# Patient Record
Sex: FEMALE | Race: WHITE | NOT HISPANIC OR LATINO | Employment: OTHER | URBAN - METROPOLITAN AREA
[De-identification: names, ages, dates, MRNs, and addresses within clinical notes are randomized per-mention and may not be internally consistent; named-entity substitution may affect disease eponyms.]

---

## 2017-12-07 LAB
ALBUMIN SERPL-MCNC: 4 G/DL (ref 3.6–5.1)
ALBUMIN/GLOB SERPL: 1.4 (CALC) (ref 1–2.5)
ALP SERPL-CCNC: 78 U/L (ref 33–130)
ALT SERPL-CCNC: 6 U/L (ref 6–29)
AST SERPL-CCNC: 13 U/L (ref 10–35)
BASOPHILS # BLD AUTO: 41 CELLS/UL (ref 0–200)
BASOPHILS NFR BLD AUTO: 0.7 %
BILIRUB SERPL-MCNC: 0.2 MG/DL (ref 0.2–1.2)
BUN SERPL-MCNC: 13 MG/DL (ref 7–25)
BUN/CREAT SERPL: NORMAL (CALC) (ref 6–22)
CALCIUM SERPL-MCNC: 9.4 MG/DL (ref 8.6–10.4)
CHLORIDE SERPL-SCNC: 105 MMOL/L (ref 98–110)
CO2 SERPL-SCNC: 26 MMOL/L (ref 20–31)
CREAT SERPL-MCNC: 0.87 MG/DL (ref 0.6–0.88)
EOSINOPHIL # BLD AUTO: 71 CELLS/UL (ref 15–500)
EOSINOPHIL NFR BLD AUTO: 1.2 %
ERYTHROCYTE [DISTWIDTH] IN BLOOD BY AUTOMATED COUNT: 12.3 % (ref 11–15)
GLOBULIN SER CALC-MCNC: 2.9 G/DL (CALC) (ref 1.9–3.7)
GLUCOSE SERPL-MCNC: 77 MG/DL (ref 65–99)
HCT VFR BLD AUTO: 34.9 % (ref 35–45)
HGB BLD-MCNC: 11.5 G/DL (ref 11.7–15.5)
LYMPHOCYTES # BLD AUTO: 1752 CELLS/UL (ref 850–3900)
LYMPHOCYTES NFR BLD AUTO: 29.7 %
MCH RBC QN AUTO: 30.7 PG (ref 27–33)
MCHC RBC AUTO-ENTMCNC: 33 G/DL (ref 32–36)
MCV RBC AUTO: 93.3 FL (ref 80–100)
MONOCYTES # BLD AUTO: 395 CELLS/UL (ref 200–950)
MONOCYTES NFR BLD AUTO: 6.7 %
NEUTROPHILS # BLD AUTO: 3640 CELLS/UL (ref 1500–7800)
NEUTROPHILS NFR BLD AUTO: 61.7 %
PLATELET # BLD AUTO: 326 THOUSAND/UL (ref 140–400)
PMV BLD REES-ECKER: 9.2 FL (ref 7.5–12.5)
POTASSIUM SERPL-SCNC: 4.8 MMOL/L (ref 3.5–5.3)
PROT SERPL-MCNC: 6.9 G/DL (ref 6.1–8.1)
RBC # BLD AUTO: 3.74 MILLION/UL (ref 3.8–5.1)
SL AMB EGFR AFRICAN AMERICAN: 72 ML/MIN/1.73M2
SL AMB EGFR NON AFRICAN AMERICAN: 62 ML/MIN/1.73M2
SODIUM SERPL-SCNC: 140 MMOL/L (ref 135–146)
TSH SERPL-ACNC: 0.48 MIU/L (ref 0.4–4.5)
VIT B12 SERPL-MCNC: 212 PG/ML (ref 200–1100)
WBC # BLD AUTO: 5.9 THOUSAND/UL (ref 3.8–10.8)

## 2017-12-22 LAB — METHYLMALONATE SERPL-SCNC: 542 NMOL/L (ref 87–318)

## 2018-05-10 LAB
AMORPHOUS MATERIAL (HISTORICAL): ABNORMAL
BACTERIA UR QL AUTO: ABNORMAL
BILIRUB UR QL STRIP: NEGATIVE MG/DL
CASTS/CASTS TYPE (HISTORICAL): ABNORMAL /LPF
CLARITY UR: CLEAR
COLOR UR: ABNORMAL
CRYSTAL TYPE (HISTORICAL): ABNORMAL /HPF
GLUCOSE UR STRIP-MCNC: NEGATIVE MG/DL
HGB UR QL STRIP.AUTO: ABNORMAL
KETONES UR STRIP-MCNC: NEGATIVE MG/DL
LEUKOCYTE ESTERASE UR QL STRIP: ABNORMAL
MUCOUS THREADS URNS QL MICRO: ABNORMAL
NITRITE UR QL STRIP: NEGATIVE
NON-SQ EPI CELLS URNS QL MICRO: ABNORMAL
OTHER STN SPEC: ABNORMAL
PH UR STRIP.AUTO: 6 [PH] (ref 4.5–8)
PROT UR STRIP-MCNC: NEGATIVE MG/DL
RBC #/AREA URNS AUTO: ABNORMAL /HPF
SP GR UR STRIP.AUTO: 1.01 (ref 1–1.04)
UROBILINOGEN UR QL STRIP.AUTO: NEGATIVE MG/DL (ref 0–1)
WBC #/AREA URNS AUTO: ABNORMAL /HPF

## 2018-06-28 ENCOUNTER — TRANSCRIBE ORDERS (OUTPATIENT)
Dept: ADMINISTRATIVE | Facility: HOSPITAL | Age: 83
End: 2018-06-28

## 2018-06-28 DIAGNOSIS — R10.2 PELVIC PAIN: Primary | ICD-10-CM

## 2018-07-01 ENCOUNTER — OFFICE VISIT (OUTPATIENT)
Dept: URGENT CARE | Age: 83
End: 2018-07-01
Payer: COMMERCIAL

## 2018-07-01 VITALS
BODY MASS INDEX: 21.68 KG/M2 | HEART RATE: 84 BPM | WEIGHT: 127 LBS | OXYGEN SATURATION: 95 % | RESPIRATION RATE: 16 BRPM | TEMPERATURE: 97.7 F | SYSTOLIC BLOOD PRESSURE: 152 MMHG | DIASTOLIC BLOOD PRESSURE: 80 MMHG | HEIGHT: 64 IN

## 2018-07-01 DIAGNOSIS — N30.01 ACUTE CYSTITIS WITH HEMATURIA: Primary | ICD-10-CM

## 2018-07-01 PROCEDURE — 87186 SC STD MICRODIL/AGAR DIL: CPT | Performed by: PHYSICIAN ASSISTANT

## 2018-07-01 PROCEDURE — 87086 URINE CULTURE/COLONY COUNT: CPT | Performed by: PHYSICIAN ASSISTANT

## 2018-07-01 PROCEDURE — 87147 CULTURE TYPE IMMUNOLOGIC: CPT | Performed by: PHYSICIAN ASSISTANT

## 2018-07-01 PROCEDURE — 87077 CULTURE AEROBIC IDENTIFY: CPT | Performed by: PHYSICIAN ASSISTANT

## 2018-07-01 PROCEDURE — 99203 OFFICE O/P NEW LOW 30 MIN: CPT | Performed by: PHYSICIAN ASSISTANT

## 2018-07-01 RX ORDER — QUETIAPINE FUMARATE 200 MG/1
400 TABLET, FILM COATED ORAL
Status: ON HOLD | COMMUNITY
End: 2018-08-13

## 2018-07-01 RX ORDER — SOLIFENACIN SUCCINATE 10 MG/1
5 TABLET, FILM COATED ORAL DAILY
COMMUNITY
End: 2018-08-08

## 2018-07-01 RX ORDER — ESCITALOPRAM OXALATE 10 MG/1
10 TABLET ORAL DAILY
Status: ON HOLD | COMMUNITY
End: 2018-08-13

## 2018-07-01 RX ORDER — CEPHALEXIN 500 MG/1
500 CAPSULE ORAL EVERY 8 HOURS SCHEDULED
Qty: 21 CAPSULE | Refills: 0 | Status: SHIPPED | OUTPATIENT
Start: 2018-07-01 | End: 2018-07-08

## 2018-07-01 RX ORDER — LAMOTRIGINE 200 MG/1
200 TABLET ORAL DAILY
Status: ON HOLD | COMMUNITY
End: 2018-08-13

## 2018-07-01 RX ORDER — CLONAZEPAM 0.5 MG/1
0.5 TABLET ORAL 3 TIMES DAILY
Status: ON HOLD | COMMUNITY
End: 2018-08-13

## 2018-07-01 RX ORDER — VENLAFAXINE 75 MG/1
75 TABLET ORAL DAILY
COMMUNITY
End: 2018-08-14 | Stop reason: HOSPADM

## 2018-07-01 RX ORDER — GABAPENTIN 300 MG/1
100 CAPSULE ORAL 2 TIMES DAILY
Status: ON HOLD | COMMUNITY
End: 2018-08-13

## 2018-07-01 NOTE — PATIENT INSTRUCTIONS
Follow up with PCP in 3-5 days  Proceed to  ER if symptoms worsen  Urinary Tract Infection in Women   AMBULATORY CARE:   A urinary tract infection (UTI)  is caused by bacteria that get inside your urinary tract  Most bacteria that enter your urinary tract come out when you urinate  If the bacteria stay in your urinary tract, you may get an infection  Your urinary tract includes your kidneys, ureters, bladder, and urethra  Urine is made in your kidneys, and it flows from the ureters to the bladder  Urine leaves the bladder through the urethra  A UTI is more common in your lower urinary tract, which includes your bladder and urethra  Common symptoms include the following:   · Urinating more often or waking from sleep to urinate    · Pain or burning when you urinate    · Pain or pressure in your lower abdomen     · Urine that smells bad    · Blood in your urine    · Leaking urine  Seek care immediately if:   · You are urinating very little or not at all  · You have a high fever with shaking chills  · You have side or back pain that gets worse  Contact your healthcare provider if:   · You have a fever  · You do not feel better after 2 days of taking antibiotics  · You are vomiting  · You have questions or concerns about your condition or care  Treatment for a UTI  may include medicines to treat a bacterial infection  You may also need medicines to decrease pain and burning, or decrease the urge to urinate often  Prevent a UTI:   · Empty your bladder often  Urinate and empty your bladder as soon as you feel the need  Do not hold your urine for long periods of time  · Wipe from front to back after you urinate or have a bowel movement  This will help prevent germs from getting into your urinary tract through your urethra  · Drink liquids as directed  Ask how much liquid to drink each day and which liquids are best for you   You may need to drink more liquids than usual to help flush out the bacteria  Do not drink alcohol, caffeine, or citrus juices  These can irritate your bladder and increase your symptoms  Your healthcare provider may recommend cranberry juice to help prevent a UTI  · Urinate after you have sex  This can help flush out bacteria passed during sex  · Do not douche or use feminine deodorants  These can change the chemical balance in your vagina  · Change sanitary pads or tampons often  This will help prevent germs from getting into your urinary tract  · Do pelvic muscle exercises often  Pelvic muscle exercises may help you start and stop urinating  Strong pelvic muscles may help you empty your bladder easier  Squeeze these muscles tightly for 5 seconds like you are trying to hold back urine  Then relax for 5 seconds  Gradually work up to squeezing for 10 seconds  Do 3 sets of 15 repetitions a day, or as directed  Follow up with your healthcare provider as directed:  Write down your questions so you remember to ask them during your visits  © 2017 [facility] Information is for End User's use only and may not be sold, redistributed or otherwise used for commercial purposes  All illustrations and images included in CareNotes® are the copyrighted property of [employer]  or Aureliano Ortega  The above information is an  only  It is not intended as medical advice for individual conditions or treatments  Talk to your doctor, nurse or pharmacist before following any medical regimen to see if it is safe and effective for you

## 2018-07-01 NOTE — PROGRESS NOTES
[facility] [employer] [facility]        NAME: Hannah Young is a 80 y o  female  : 1935    MRN: [id number]  DATE: 2018  TIME: 12:21 PM    Assessment and Plan   Acute cystitis with hematuria [N30 01]  1  Acute cystitis with hematuria  cephalexin (KEFLEX) 500 mg capsule    Urine culture         Patient Instructions       Follow up with PCP in 3-5 days  Proceed to  ER if symptoms worsen  Chief Complaint     Chief Complaint   Patient presents with    Urinary Tract Infection     3 day abdominal pain; pain with burning         History of Present Illness       42-year-old female patient reports today with a 3 day history of some pelvic pressure dysuria and blood in her urine  Was seen by gyn in a couple days ago and her pessary was removed at that point in time  Reports that the gyn did some urine cultures but has not returned a call about the results  Patient is still having symptoms since then  Did not start patient on antibiotics at that point in time patient reports no fever, chills, chest pain, nausea, vomiting, diarrhea at this time      Urinary Tract Infection    This is a new problem  The current episode started in the past 7 days  The problem occurs every urination  The problem has been waxing and waning  The quality of the pain is described as aching  The pain is moderate  There has been no fever  She is not sexually active  There is no history of pyelonephritis  Associated symptoms include frequency, hematuria and urgency  Pertinent negatives include no chills, discharge, flank pain, hesitancy, nausea, possible pregnancy or vomiting  She has tried nothing for the symptoms  The treatment provided no relief  Review of Systems   Review of Systems   Constitutional: Negative  Negative for chills  HENT: Negative  Eyes: Negative  Respiratory: Negative  Cardiovascular: Negative  Gastrointestinal: Negative  Negative for nausea and vomiting     Genitourinary: Positive for frequency, hematuria and urgency  Negative for flank pain and hesitancy  Musculoskeletal: Negative  Skin: Negative  Current Medications       Current Outpatient Prescriptions:     clonazePAM (KlonoPIN) 0 5 mg tablet, Take 0 5 mg by mouth 2 (two) times a day, Disp: , Rfl:     escitalopram (LEXAPRO) 10 mg tablet, Take 5 mg by mouth daily, Disp: , Rfl:     gabapentin (NEURONTIN) 300 mg capsule, Take 100 mg by mouth 3 (three) times a day, Disp: , Rfl:     lamoTRIgine (LaMICtal) 200 MG tablet, Take 25 mg by mouth daily, Disp: , Rfl:     QUEtiapine (SEROquel) 200 mg tablet, Take 25 mg by mouth daily at bedtime, Disp: , Rfl:     solifenacin (VESICARE) 10 MG tablet, Take 5 mg by mouth daily, Disp: , Rfl:     Topiramate (TOPAMAX PO), Take by mouth, Disp: , Rfl:     venlafaxine (EFFEXOR) 75 mg tablet, Take 75 mg by mouth 2 (two) times a day, Disp: , Rfl:     cephalexin (KEFLEX) 500 mg capsule, Take 1 capsule (500 mg total) by mouth every 8 (eight) hours for 7 days, Disp: 21 capsule, Rfl: 0    Current Allergies     Allergies as of 07/01/2018    (No Known Allergies)            The following portions of the patient's history were reviewed and updated as appropriate: allergies, current medications, past family history, past medical history, past social history, past surgical history and problem list      No past medical history on file  No past surgical history on file  No family history on file  Medications have been verified  Objective   /80   Pulse 84   Temp 97 7 °F (36 5 °C)   Resp 16   Ht 5' 4" (1 626 m)   Wt 57 6 kg (127 lb)   SpO2 95%   BMI 21 80 kg/m²        Physical Exam     Physical Exam   Constitutional: She is oriented to person, place, and time  She appears well-developed and well-nourished  No distress  HENT:   Head: Normocephalic and atraumatic     Right Ear: External ear normal    Left Ear: External ear normal    Nose: Nose normal    Mouth/Throat: Oropharynx is clear and moist  No oropharyngeal exudate  Eyes: Conjunctivae are normal  Right eye exhibits no discharge  Left eye exhibits no discharge  Neck: Normal range of motion  Neck supple  Cardiovascular: Normal rate, regular rhythm, normal heart sounds and intact distal pulses  No murmur heard  Pulmonary/Chest: Effort normal and breath sounds normal  No respiratory distress  She has no wheezes  She has no rales  Abdominal: Soft  Bowel sounds are normal  There is tenderness (Mild with palpation) in the suprapubic area  There is no rigidity, no rebound, no guarding, no tenderness at McBurney's point and negative Chi's sign  Musculoskeletal: Normal range of motion  Lymphadenopathy:     She has no cervical adenopathy  Neurological: She is alert and oriented to person, place, and time  Skin: Skin is warm and dry  Psychiatric: She has a normal mood and affect  Nursing note and vitals reviewed

## 2018-07-04 LAB — BACTERIA UR CULT: ABNORMAL

## 2018-07-05 ENCOUNTER — HOSPITAL ENCOUNTER (OUTPATIENT)
Dept: ULTRASOUND IMAGING | Facility: HOSPITAL | Age: 83
Discharge: HOME | End: 2018-07-05
Payer: COMMERCIAL

## 2018-07-05 DIAGNOSIS — R10.2 PELVIC PAIN: ICD-10-CM

## 2018-07-05 PROCEDURE — 76856 US EXAM PELVIC COMPLETE: CPT

## 2018-07-07 ENCOUNTER — TELEPHONE (OUTPATIENT)
Dept: URGENT CARE | Age: 83
End: 2018-07-07

## 2018-07-07 DIAGNOSIS — N30.01 ACUTE CYSTITIS WITH HEMATURIA: Primary | ICD-10-CM

## 2018-07-07 RX ORDER — NITROFURANTOIN 25; 75 MG/1; MG/1
100 CAPSULE ORAL 2 TIMES DAILY
Qty: 14 CAPSULE | Refills: 0 | Status: SHIPPED | OUTPATIENT
Start: 2018-07-07 | End: 2018-07-14

## 2018-07-07 NOTE — TELEPHONE ENCOUNTER
Patient's emergency contact did not call, he came to office and I spoke with him at   Patient was at [facility] 6/28  Urine culture was done at that time  Patient came in to our office 7/1 for persistent pelvic pain  Patient was placed on Keflex  OBGYN called patient and told patient that the urine culture done on 06/28 was negative and she does not need antibiotics  The urine culture that was done on 07/01 came back and was positive  It shows susceptibility to Macrobid I will start patient on this antibiotic now

## 2018-07-17 ENCOUNTER — TELEPHONE (OUTPATIENT)
Dept: FAMILY MEDICINE CLINIC | Facility: CLINIC | Age: 83
End: 2018-07-17

## 2018-07-17 NOTE — TELEPHONE ENCOUNTER
Patient called she said that she is having burning when urinating and that her right rib is hurting her when sitting and laying down and her right leg feels like it is going to give out on her when she is walking she said it started about 2 to 3 days ago she wants to know what she should do she can be reached at  [facility]

## 2018-07-18 ENCOUNTER — TREATMENT (OUTPATIENT)
Dept: FAMILY MEDICINE CLINIC | Facility: CLINIC | Age: 83
End: 2018-07-18

## 2018-07-18 ENCOUNTER — TELEPHONE (OUTPATIENT)
Dept: FAMILY MEDICINE CLINIC | Facility: CLINIC | Age: 83
End: 2018-07-18

## 2018-07-18 DIAGNOSIS — N39.0 URINARY TRACT INFECTION WITH HEMATURIA, SITE UNSPECIFIED: Primary | ICD-10-CM

## 2018-07-18 DIAGNOSIS — N39.0 URINARY TRACT INFECTION WITHOUT HEMATURIA, SITE UNSPECIFIED: Primary | ICD-10-CM

## 2018-07-18 DIAGNOSIS — R31.9 URINARY TRACT INFECTION WITH HEMATURIA, SITE UNSPECIFIED: Primary | ICD-10-CM

## 2018-07-18 LAB
SL AMB  POCT GLUCOSE, UA: NEGATIVE
SL AMB LEUKOCYTE ESTERASE,UA: ABNORMAL
SL AMB POCT BILIRUBIN,UA: NEGATIVE
SL AMB POCT BLOOD,UA: ABNORMAL
SL AMB POCT CLARITY,UA: ABNORMAL
SL AMB POCT COLOR,UA: YELLOW
SL AMB POCT KETONES,UA: NEGATIVE
SL AMB POCT NITRITE,UA: NEGATIVE
SL AMB POCT PH,UA: 7
SL AMB POCT SPECIFIC GRAVITY,UA: 1.01
SL AMB POCT URINE PROTEIN: NEGATIVE
SL AMB POCT UROBILINOGEN: 0.2

## 2018-07-18 RX ORDER — SULFAMETHOXAZOLE AND TRIMETHOPRIM 800; 160 MG/1; MG/1
1 TABLET ORAL EVERY 12 HOURS SCHEDULED
Qty: 14 TABLET | Refills: 0 | Status: SHIPPED | OUTPATIENT
Start: 2018-07-18 | End: 2018-07-25

## 2018-07-18 NOTE — TELEPHONE ENCOUNTER
Patient dropped off a urine sample she would like to know the out come it she can be reached at [phone]     TY

## 2018-07-18 NOTE — TELEPHONE ENCOUNTER
Pt notified    Doesn't know why she is on another antibx just finished one on Friday given by [facility] urgent [facility]

## 2018-07-19 ENCOUNTER — TELEPHONE (OUTPATIENT)
Dept: FAMILY MEDICINE CLINIC | Facility: CLINIC | Age: 83
End: 2018-07-19

## 2018-07-19 NOTE — TELEPHONE ENCOUNTER
Spoke with pt informed her that the symptoms she is having might be a side effect   Pt was told to increase her water intake and to call the office 1st thing in the morning If the situation gets worst

## 2018-07-19 NOTE — TELEPHONE ENCOUNTER
Patient called she said that she was subscribed bactrim 800-160 mg and she is to take them every twelve hours she wants to know if she can take it with her other medications patient can be reached at [phone]   TY

## 2018-07-19 NOTE — TELEPHONE ENCOUNTER
Pt called back stating her mouth is dry and her tongue is coated  She states it feels like it pasted  She is drinking ice water but it is not helping  She started a new medication that Dr Lisa Geiger put her on  It is Bactrim 800-160 mg every 12 hours  She states she took it this morning at 10:30am and just got the dry mouth this afternoon  She is concerned about this

## 2018-07-20 ENCOUNTER — OFFICE VISIT (OUTPATIENT)
Dept: FAMILY MEDICINE CLINIC | Facility: CLINIC | Age: 83
End: 2018-07-20
Payer: COMMERCIAL

## 2018-07-20 VITALS
OXYGEN SATURATION: 91 % | BODY MASS INDEX: 20.43 KG/M2 | TEMPERATURE: 96.8 F | DIASTOLIC BLOOD PRESSURE: 72 MMHG | HEART RATE: 145 BPM | WEIGHT: 119 LBS | SYSTOLIC BLOOD PRESSURE: 130 MMHG

## 2018-07-20 DIAGNOSIS — R30.0 DYSURIA: Primary | ICD-10-CM

## 2018-07-20 PROCEDURE — 99213 OFFICE O/P EST LOW 20 MIN: CPT | Performed by: FAMILY MEDICINE

## 2018-07-20 PROCEDURE — 4040F PNEUMOC VAC/ADMIN/RCVD: CPT | Performed by: FAMILY MEDICINE

## 2018-07-20 NOTE — PROGRESS NOTES
Assessment/Plan:    No problem-specific Assessment & Plan notes found for this encounter  There are no diagnoses linked to this encounter  Subjective:      Patient ID: Yandy Alarcon is a 80 y o  female      HPI    The following portions of the patient's history were reviewed and updated as appropriate: allergies, current medications, past family history, past medical history, past social history, past surgical history and problem list     Review of Systems      Objective:  Vitals:    07/20/18 1646   BP: 130/72   BP Location: Left arm   Patient Position: Sitting   Cuff Size: Adult   Pulse: (!) 145   Temp: (!) 96 8 °F (36 °C)   SpO2: 91%   Weight: 54 kg (119 lb)      Physical Exam      SPENT MOST OF VISIT COUNSELING ABOUT URINE

## 2018-07-20 NOTE — PATIENT INSTRUCTIONS
TAKE THE PYRIDIUM 3 TIMES A DAY FOR THE NEXT 30 DAYS  FINISH MY ANTIBIOTIC OVER THE NEXT SEVERAL DAYS  SYMPTOMS SHOULD RESOLVE WITHIN A FEW DAYS  IF THE SYMPTOMS COME BACK AFTER 1 MONTH WE MAY NEED TO PUT YOU ON PARENTAL 3 OR 4 TIMES A WEEK TO KEEP THE SYMPTOMS AWAY  LET ME KNOW IF THAT IS THE CASE    TAKE 2 TYLENOL ARTHRITIS TWICE A DAY FOR THE NEXT 7 DAYS FOR THE HIP PAIN

## 2018-07-24 ENCOUNTER — TELEPHONE (OUTPATIENT)
Dept: FAMILY MEDICINE CLINIC | Facility: CLINIC | Age: 83
End: 2018-07-24

## 2018-07-24 NOTE — TELEPHONE ENCOUNTER
Pt called stating she was prescribed Peridium 3 times a day  However she can only take it 2 times a day  She states she does not like the way it makes her feel  She states it makes her urinate frequently and it makes her very nervous

## 2018-08-08 ENCOUNTER — HOSPITAL ENCOUNTER (INPATIENT)
Facility: HOSPITAL | Age: 83
LOS: 6 days | Discharge: HOME | DRG: 885 | End: 2018-08-14
Attending: EMERGENCY MEDICINE | Admitting: PSYCHIATRY & NEUROLOGY
Payer: COMMERCIAL

## 2018-08-08 DIAGNOSIS — F33.2 SEVERE EPISODE OF RECURRENT MAJOR DEPRESSIVE DISORDER, WITHOUT PSYCHOTIC FEATURES (HCC): ICD-10-CM

## 2018-08-08 DIAGNOSIS — K59.00 CONSTIPATED: ICD-10-CM

## 2018-08-08 DIAGNOSIS — F31.9 BIPOLAR 1 DISORDER (HCC): Primary | ICD-10-CM

## 2018-08-08 DIAGNOSIS — N32.81 OVERACTIVE BLADDER: ICD-10-CM

## 2018-08-08 DIAGNOSIS — I10 ESSENTIAL HYPERTENSION: Chronic | ICD-10-CM

## 2018-08-08 PROBLEM — I47.10 SVT (SUPRAVENTRICULAR TACHYCARDIA): Status: ACTIVE | Noted: 2018-08-08

## 2018-08-08 PROBLEM — Z00.8 ENCOUNTER FOR PSYCHOLOGICAL EVALUATION: Status: ACTIVE | Noted: 2018-08-08

## 2018-08-08 PROBLEM — I47.1 SVT (SUPRAVENTRICULAR TACHYCARDIA) (HCC): Status: ACTIVE | Noted: 2018-08-08

## 2018-08-08 PROBLEM — R19.7 DIARRHEA: Status: ACTIVE | Noted: 2018-08-08

## 2018-08-08 LAB
AMPHETAMINES SERPL QL SCN: NEGATIVE
ANION GAP SERPL CALCULATED.3IONS-SCNC: 6 MMOL/L (ref 5–14)
BARBITURATES UR QL: NEGATIVE
BASOPHILS # BLD AUTO: 0 THOUSANDS/ΜL (ref 0–0.1)
BASOPHILS NFR BLD AUTO: 0 % (ref 0–1)
BENZODIAZ UR QL: NEGATIVE
BUN SERPL-MCNC: 11 MG/DL (ref 5–25)
CALCIUM SERPL-MCNC: 8.7 MG/DL (ref 8.4–10.2)
CHLORIDE SERPL-SCNC: 110 MMOL/L (ref 97–108)
CO2 SERPL-SCNC: 28 MMOL/L (ref 22–30)
COCAINE UR QL: NEGATIVE
CREAT SERPL-MCNC: 0.55 MG/DL (ref 0.6–1.2)
EOSINOPHIL # BLD AUTO: 0.1 THOUSAND/ΜL (ref 0–0.4)
EOSINOPHIL NFR BLD AUTO: 1 % (ref 0–6)
ERYTHROCYTE [DISTWIDTH] IN BLOOD BY AUTOMATED COUNT: 14.4 %
ETHANOL SERPL-MCNC: <10 MG/DL (ref 0–10)
GFR SERPL CREATININE-BSD FRML MDRD: 87 ML/MIN/1.73SQ M
GLUCOSE SERPL-MCNC: 93 MG/DL (ref 70–99)
HCT VFR BLD AUTO: 33.8 % (ref 36–46)
HGB BLD-MCNC: 11.1 G/DL (ref 12–16)
LYMPHOCYTES # BLD AUTO: 1.3 THOUSANDS/ΜL (ref 0.5–4)
LYMPHOCYTES NFR BLD AUTO: 24 % (ref 20–50)
MCH RBC QN AUTO: 30.8 PG (ref 26.8–34.3)
MCHC RBC AUTO-ENTMCNC: 32.8 G/DL (ref 31.4–37.4)
MCV RBC AUTO: 94 FL (ref 82–98)
METHADONE UR QL: NEGATIVE
MONOCYTES # BLD AUTO: 0.4 THOUSAND/ΜL (ref 0.2–0.9)
MONOCYTES NFR BLD AUTO: 6 % (ref 1–10)
NEUTROPHILS # BLD AUTO: 3.7 THOUSANDS/ΜL (ref 1.8–7.8)
NEUTS SEG NFR BLD AUTO: 68 % (ref 45–65)
OPIATES UR QL SCN: NEGATIVE
PCP UR QL: NEGATIVE
PLATELET # BLD AUTO: 308 THOUSANDS/UL (ref 150–450)
PMV BLD AUTO: 6.7 FL (ref 8.9–12.7)
POTASSIUM SERPL-SCNC: 4.2 MMOL/L (ref 3.6–5)
RBC # BLD AUTO: 3.6 MILLION/UL (ref 4–5.2)
SODIUM SERPL-SCNC: 144 MMOL/L (ref 137–147)
THC UR QL: NEGATIVE
WBC # BLD AUTO: 5.5 THOUSAND/UL (ref 4.31–10.16)

## 2018-08-08 PROCEDURE — 80307 DRUG TEST PRSMV CHEM ANLYZR: CPT | Performed by: EMERGENCY MEDICINE

## 2018-08-08 PROCEDURE — 36415 COLL VENOUS BLD VENIPUNCTURE: CPT | Performed by: EMERGENCY MEDICINE

## 2018-08-08 PROCEDURE — 80320 DRUG SCREEN QUANTALCOHOLS: CPT | Performed by: EMERGENCY MEDICINE

## 2018-08-08 PROCEDURE — 99253 IP/OBS CNSLTJ NEW/EST LOW 45: CPT | Performed by: HOSPITALIST

## 2018-08-08 PROCEDURE — 80048 BASIC METABOLIC PNL TOTAL CA: CPT | Performed by: EMERGENCY MEDICINE

## 2018-08-08 PROCEDURE — 85025 COMPLETE CBC W/AUTO DIFF WBC: CPT | Performed by: EMERGENCY MEDICINE

## 2018-08-08 PROCEDURE — 99285 EMERGENCY DEPT VISIT HI MDM: CPT

## 2018-08-08 RX ORDER — LORAZEPAM 0.5 MG/1
0.5 TABLET ORAL EVERY 4 HOURS PRN
Status: DISCONTINUED | OUTPATIENT
Start: 2018-08-08 | End: 2018-08-14 | Stop reason: HOSPADM

## 2018-08-08 RX ORDER — LANOLIN ALCOHOL/MO/W.PET/CERES
3 CREAM (GRAM) TOPICAL
Status: DISCONTINUED | OUTPATIENT
Start: 2018-08-08 | End: 2018-08-14 | Stop reason: HOSPADM

## 2018-08-08 RX ORDER — AMLODIPINE BESYLATE 2.5 MG/1
2.5 TABLET ORAL DAILY
Status: DISCONTINUED | OUTPATIENT
Start: 2018-08-08 | End: 2018-08-14 | Stop reason: HOSPADM

## 2018-08-08 RX ORDER — AMLODIPINE BESYLATE 2.5 MG/1
2.5 TABLET ORAL DAILY
Status: DISCONTINUED | OUTPATIENT
Start: 2018-08-09 | End: 2018-08-08

## 2018-08-08 RX ORDER — OLANZAPINE 10 MG/1
5 INJECTION, POWDER, LYOPHILIZED, FOR SOLUTION INTRAMUSCULAR EVERY 4 HOURS PRN
Status: DISCONTINUED | OUTPATIENT
Start: 2018-08-08 | End: 2018-08-14 | Stop reason: HOSPADM

## 2018-08-08 RX ORDER — QUETIAPINE FUMARATE 400 MG/1
400 TABLET, FILM COATED ORAL
Status: DISCONTINUED | OUTPATIENT
Start: 2018-08-08 | End: 2018-08-14 | Stop reason: HOSPADM

## 2018-08-08 RX ORDER — TRAZODONE HYDROCHLORIDE 50 MG/1
50 TABLET ORAL
Status: DISCONTINUED | OUTPATIENT
Start: 2018-08-08 | End: 2018-08-14 | Stop reason: HOSPADM

## 2018-08-08 RX ORDER — CLONAZEPAM 1 MG/1
TABLET ORAL
Status: COMPLETED
Start: 2018-08-08 | End: 2018-08-08

## 2018-08-08 RX ORDER — CLONAZEPAM 1 MG/1
1 TABLET ORAL ONCE
Status: COMPLETED | OUTPATIENT
Start: 2018-08-08 | End: 2018-08-08

## 2018-08-08 RX ORDER — TOPIRAMATE 25 MG/1
25 TABLET ORAL 2 TIMES DAILY
Status: DISCONTINUED | OUTPATIENT
Start: 2018-08-08 | End: 2018-08-08 | Stop reason: SDUPTHER

## 2018-08-08 RX ORDER — GABAPENTIN 100 MG/1
100 CAPSULE ORAL 2 TIMES DAILY
Status: DISCONTINUED | OUTPATIENT
Start: 2018-08-08 | End: 2018-08-09

## 2018-08-08 RX ORDER — TOPIRAMATE 25 MG/1
25 TABLET ORAL 2 TIMES DAILY
Status: DISCONTINUED | OUTPATIENT
Start: 2018-08-08 | End: 2018-08-10

## 2018-08-08 RX ORDER — NICOTINE 21 MG/24HR
21 PATCH, TRANSDERMAL 24 HOURS TRANSDERMAL ONCE
Status: COMPLETED | OUTPATIENT
Start: 2018-08-08 | End: 2018-08-08

## 2018-08-08 RX ORDER — OLANZAPINE 5 MG/1
5 TABLET ORAL EVERY 4 HOURS PRN
Status: DISCONTINUED | OUTPATIENT
Start: 2018-08-08 | End: 2018-08-14 | Stop reason: HOSPADM

## 2018-08-08 RX ORDER — CLONAZEPAM 0.5 MG/1
0.5 TABLET ORAL 3 TIMES DAILY
Status: DISCONTINUED | OUTPATIENT
Start: 2018-08-08 | End: 2018-08-14 | Stop reason: HOSPADM

## 2018-08-08 RX ORDER — LORAZEPAM 2 MG/ML
0.5 INJECTION INTRAMUSCULAR EVERY 4 HOURS PRN
Status: DISCONTINUED | OUTPATIENT
Start: 2018-08-08 | End: 2018-08-14 | Stop reason: HOSPADM

## 2018-08-08 RX ORDER — LAMOTRIGINE 25 MG/1
25 TABLET ORAL DAILY
Status: DISCONTINUED | OUTPATIENT
Start: 2018-08-09 | End: 2018-08-09

## 2018-08-08 RX ORDER — ACETAMINOPHEN 325 MG/1
650 TABLET ORAL EVERY 6 HOURS PRN
Status: DISCONTINUED | OUTPATIENT
Start: 2018-08-08 | End: 2018-08-14 | Stop reason: HOSPADM

## 2018-08-08 RX ORDER — SOLIFENACIN SUCCINATE 10 MG/1
5 TABLET, FILM COATED ORAL DAILY
COMMUNITY
End: 2018-08-14 | Stop reason: HOSPADM

## 2018-08-08 RX ORDER — NICOTINE 21 MG/24HR
PATCH, TRANSDERMAL 24 HOURS TRANSDERMAL
Status: COMPLETED
Start: 2018-08-08 | End: 2018-08-08

## 2018-08-08 RX ORDER — OXYBUTYNIN CHLORIDE 5 MG/1
5 TABLET, EXTENDED RELEASE ORAL DAILY
Status: DISCONTINUED | OUTPATIENT
Start: 2018-08-09 | End: 2018-08-14 | Stop reason: HOSPADM

## 2018-08-08 RX ADMIN — CLONAZEPAM 1 MG: 1 TABLET ORAL at 15:15

## 2018-08-08 RX ADMIN — NICOTINE 1 PATCH: 21 PATCH, EXTENDED RELEASE TRANSDERMAL at 15:14

## 2018-08-08 RX ADMIN — Medication 1 PATCH: at 15:14

## 2018-08-08 RX ADMIN — CLONAZEPAM 0.5 MG: 0.5 TABLET ORAL at 22:09

## 2018-08-08 RX ADMIN — GABAPENTIN 100 MG: 100 CAPSULE ORAL at 22:09

## 2018-08-08 RX ADMIN — TOPIRAMATE 25 MG: 25 TABLET, FILM COATED ORAL at 22:09

## 2018-08-08 RX ADMIN — AMLODIPINE BESYLATE 2.5 MG: 2.5 TABLET ORAL at 22:37

## 2018-08-08 RX ADMIN — QUETIAPINE 400 MG: 400 TABLET, FILM COATED ORAL at 22:09

## 2018-08-08 NOTE — ED NOTES
States that she is here for a nerves - "I am nervous about everything" ' Dr Michelle Thornton said I am bipolar"   " I don't think so "  " I also have a dry mouth "  Given water and [address] [facility],[address], RN  08/08/18 6750

## 2018-08-08 NOTE — ED NOTES
Patient presented to the ED with increased depression, increased anxiety, labile mood, decrease in sleep, increase in weight loss (estimating loss around 10-20lbs within the last couple months)  After numerous attempts of getting patient to return home and follow up with Dr Jolly Carbone office for an emergency appointment, patient stated she does not feel comfortable going home because "I do not know what would happen if I went home" Patient has no current plan and has no previous attempts but is scared that something that could happen  At this time, SW reached out to on call psych who called Dr Trina Arteaga then called and stated patient should be admitted and he would accept patient on the unit  Patient states "I wish I could feel better so I was happier " Patient states she has been feeling this way the last couple of weeks but the last 2-3 days patient not been able to control her feelings  SW to obtain pre cert for patient's admission

## 2018-08-08 NOTE — ED NOTES
Assisted to bathroom - using walker - gait steady - c/o of feeling nauseated or needing to have diarrhea - pt   Unsure of which      Agnes Westfall RN  08/08/18 9248

## 2018-08-08 NOTE — ED NOTES
Insurance Authorization:   Phone call placed to [employer]  Phone number: [phone]  Spoke to Clint Rolando  3 days approved  LCD/RVW :08/10/2018  Level of care: IP   Review on 08/10/2018  Authorization # [id number]

## 2018-08-08 NOTE — ED PROVIDER NOTES
History  Chief Complaint   Patient presents with    Anxiety     EMS states she is here bc she has been feeling funny, crampy all over and nerves  Pt states she is here bc of her nerves and she is feeling anxious     Patient is a 70-year-old female who comes in complaining of worsening depression over the last few weeks a requesting admission patient states that she has been compliant with medications and has seen doctors the past asking to come into the hospital see something can be done about depression    Patient has any suicidal ideations home as I a shins flow or auditory/visual hallucinations  Patient states that she has felt alone over since her   5 years ago and she will have intermittent phases were depression gets bad missed 1 of patient cannot recall at this time he with  past   Patient did not call Dr Rell Teran  Patient is not eating much having            Prior to Admission Medications   Prescriptions Last Dose Informant Patient Reported? Taking?    QUEtiapine (SEROquel) 200 mg tablet   Yes Yes   Sig: Take 400 mg by mouth daily at bedtime     Topiramate (TOPAMAX PO)   Yes Yes   Sig: Take 25 mg by mouth 2 (two) times a day     clonazePAM (KlonoPIN) 0 5 mg tablet   Yes Yes   Sig: Take 0 5 mg by mouth 3 (three) times a day     escitalopram (LEXAPRO) 10 mg tablet   Yes Yes   Sig: Take 10 mg by mouth daily     gabapentin (NEURONTIN) 300 mg capsule   Yes Yes   Sig: Take 100 mg by mouth 2 (two) times a day     lamoTRIgine (LaMICtal) 200 MG tablet   Yes Yes   Sig: Take 25 mg by mouth daily   solifenacin (VESICARE) 10 MG tablet   Yes Yes   Sig: Take 5 mg by mouth daily   venlafaxine (EFFEXOR) 75 mg tablet   Yes Yes   Sig: Take 75 mg by mouth daily        Facility-Administered Medications: None       Past Medical History:   Diagnosis Date    Anxiety     Depression     Psychiatric disorder        Past Surgical History:   Procedure Laterality Date    ADENOIDECTOMY      CATARACT EXTRACTION  CHOLECYSTECTOMY      HIP SURGERY      L hip    TONSILLECTOMY      WRIST SURGERY      L wrist       History reviewed  No pertinent family history  I have reviewed and agree with the history as documented  Social History   Substance Use Topics    Smoking status: Current Every Day Smoker    Smokeless tobacco: Never Used    Alcohol use No        Review of Systems   Constitutional: Positive for appetite change  HENT: Negative  Eyes: Negative  Respiratory: Negative  Cardiovascular: Negative  Gastrointestinal: Negative  Endocrine: Negative  Genitourinary: Negative  Musculoskeletal: Negative  Skin: Negative  Allergic/Immunologic: Negative  Neurological: Negative  Hematological: Negative  Psychiatric/Behavioral: Positive for dysphoric mood and sleep disturbance  All other systems reviewed and are negative  Physical Exam  Physical Exam   Constitutional: She is oriented to person, place, and time  She appears well-developed and well-nourished  Smells of tobacco   HENT:   Head: Normocephalic  Nose: Nose normal    Mouth/Throat: Oropharynx is clear and moist    Eyes: Conjunctivae are normal  Pupils are equal, round, and reactive to light  Neck: Normal range of motion  Neck supple  Cardiovascular: Normal rate, regular rhythm, normal heart sounds and intact distal pulses  Pulmonary/Chest: Effort normal and breath sounds normal    Abdominal: Soft  Bowel sounds are normal    Musculoskeletal: Normal range of motion  Neurological: She is alert and oriented to person, place, and time  Skin: Skin is warm and dry  Capillary refill takes less than 2 seconds  Psychiatric: Her speech is normal and behavior is normal  Judgment and thought content normal  Her mood appears not anxious  Her affect is blunt  Cognition and memory are normal  She exhibits a depressed mood  Nursing note and vitals reviewed        Vital Signs  ED Triage Vitals   Temperature Pulse Respirations Blood Pressure SpO2   08/08/18 1301 08/08/18 1301 08/08/18 1301 08/08/18 1301 08/08/18 1301   (!) 96 8 °F (36 °C) 88 20 161/85 97 %      Temp Source Heart Rate Source Patient Position - Orthostatic VS BP Location FiO2 (%)   08/08/18 1301 08/08/18 1301 08/08/18 1943 08/08/18 1301 --   Temporal Monitor Lying Left arm       Pain Score       08/08/18 2300       No Pain           Vitals:    08/08/18 2206 08/08/18 2237 08/08/18 2345 08/09/18 0707   BP: (!) 174/79 (!) 174/99 137/77 146/72   Pulse: 76  90 76   Patient Position - Orthostatic VS: Lying  Lying Lying       Visual Acuity      ED Medications  Medications   gabapentin (NEURONTIN) capsule 100 mg (100 mg Oral Given 8/8/18 2209)   clonazePAM (KlonoPIN) tablet 0 5 mg (0 5 mg Oral Given 8/8/18 2209)   QUEtiapine (SEROquel) tablet 400 mg (400 mg Oral Given 8/8/18 2209)   acetaminophen (TYLENOL) tablet 650 mg (not administered)   LORazepam (ATIVAN) tablet 0 5 mg (not administered)   melatonin tablet 3 mg (not administered)   OLANZapine (ZyPREXA) IM injection 5 mg (not administered)   OLANZapine (ZyPREXA) tablet 5 mg (not administered)   traZODone (DESYREL) tablet 50 mg (not administered)   LORazepam (ATIVAN) 2 mg/mL injection 0 5 mg (not administered)   lamoTRIgine (LaMICtal) tablet 25 mg (not administered)   oxybutynin (DITROPAN-XL) 24 hr tablet 5 mg (not administered)   topiramate (TOPAMAX) tablet 25 mg (25 mg Oral Given 8/8/18 2209)   amLODIPine (NORVASC) tablet 2 5 mg (2 5 mg Oral Given 8/8/18 2237)   nicotine (NICODERM CQ) 21 mg/24 hr TD 24 hr patch 21 mg (1 patch Transdermal Medication Applied 8/8/18 1514)   clonazePAM (KlonoPIN) tablet 1 mg (1 mg Oral Given 8/8/18 1515)       Diagnostic Studies  Results Reviewed     Procedure Component Value Units Date/Time    Rapid drug screen, urine [[id number]]  (Normal) Collected:  08/08/18 1602    Lab Status:  Final result Specimen:  Urine from Urine, Other Updated:  08/08/18 1702     Amph/Meth UR Negative Barbiturate Ur Negative     Benzodiazepine Urine Negative     Cocaine Urine Negative     Methadone Urine Negative     Opiate Urine Negative     PCP Ur Negative     THC Urine Negative    Narrative:         FOR MEDICAL PURPOSES ONLY  IF CONFIRMATION NEEDED PLEASE CONTACT THE LAB WITHIN 5 DAYS  Drug Screen Cutoff Levels:  AMPHETAMINE/METHAMPHETAMINES  1000 ng/mL  BARBITURATES     200 ng/mL  BENZODIAZEPINES     200 ng/mL  COCAINE      300 ng/mL  METHADONE      300 ng/mL  OPIATES      300 ng/mL  PHENCYCLIDINE     25 ng/mL  THC       50 ng/mL    Basic metabolic panel [[id number]]  (Abnormal) Collected:  08/08/18 1506    Lab Status:  Final result Specimen:  Blood from Arm, Left Updated:  08/08/18 1614     Sodium 144 mmol/L      Potassium 4 2 mmol/L      Chloride 110 (H) mmol/L      CO2 28 mmol/L      Anion Gap 6 mmol/L      BUN 11 mg/dL      Creatinine 0 55 (L) mg/dL      Glucose 93 mg/dL      Calcium 8 7 mg/dL      eGFR 87 ml/min/1 73sq m     Narrative:       Hemolysis  National Kidney Disease Education Program recommendations are as follows:  GFR calculation is accurate only with a steady state creatinine  Chronic Kidney disease less than 60 ml/min/1 73 sq  meters  Kidney failure less than 15 ml/min/1 73 sq  meters      Ethanol [[id number]]  (Normal) Collected:  08/08/18 1506    Lab Status:  Final result Specimen:  Blood from Arm, Left Updated:  08/08/18 1613     Ethanol Lvl <10 mg/dL     CBC and differential [[id number]]  (Abnormal) Collected:  08/08/18 1506    Lab Status:  Final result Specimen:  Blood from Arm, Left Updated:  08/08/18 1519     WBC 5 50 Thousand/uL      RBC 3 60 (L) Million/uL      Hemoglobin 11 1 (L) g/dL      Hematocrit 33 8 (L) %      MCV 94 fL      MCH 30 8 pg      MCHC 32 8 g/dL      RDW 14 4 %      MPV 6 7 (L) fL      Platelets 986 Thousands/uL      Neutrophils Relative 68 (H) %      Lymphocytes Relative 24 %      Monocytes Relative 6 %      Eosinophils Relative 1 %      Basophils Relative 0 % Neutrophils Absolute 3 70 Thousands/µL      Lymphocytes Absolute 1 30 Thousands/µL      Monocytes Absolute 0 40 Thousand/µL      Eosinophils Absolute 0 10 Thousand/µL      Basophils Absolute 0 00 Thousands/µL                  No orders to display              Procedures  Procedures       Phone Contacts  ED Phone Contact    ED Course                               MDM  CritCare Time    Disposition  Final diagnoses:   Severe episode of recurrent major depressive disorder, without psychotic features ([facility] 75 )     Time reflects when diagnosis was documented in both MDM as applicable and the Disposition within this note     Time User Action Codes Description Comment    8/8/2018  7:27 PM Alok Cole Add [F33 2] Severe episode of recurrent major depressive disorder, without psychotic features ([facility] 75 )     8/8/2018  7:27 PM Alok Cole Modify [F33 2] Severe episode of recurrent major depressive disorder, without psychotic features ([place] [address] )     8/9/2018  8:19 AM Lonnie Pelayo Modify [F33 2] Severe episode of recurrent major depressive disorder, without psychotic features [facility])       ED Disposition     ED Disposition Condition Comment    Transfer to Another Colman Willie should be transferred out to Older Adult         MD Documentation      Most Recent Value   Accepting Physician  Dr Jena Lopez NameKatharina   Sending MD  Dr Ana Cristina Vegas       RN Documentation      Most [facility] Name, Katharina Markham      Follow-up Information    None         Current Discharge Medication List      CONTINUE these medications which have NOT CHANGED    Details   clonazePAM (KlonoPIN) 0 5 mg tablet Take 0 5 mg by mouth 3 (three) times a day        escitalopram (LEXAPRO) 10 mg tablet Take 10 mg by mouth daily        gabapentin (NEURONTIN) 300 mg capsule Take 100 mg by mouth 2 (two) times a day lamoTRIgine (LaMICtal) 200 MG tablet Take 25 mg by mouth daily      QUEtiapine (SEROquel) 200 mg tablet Take 400 mg by mouth daily at bedtime        solifenacin (VESICARE) 10 MG tablet Take 5 mg by mouth daily      Topiramate (TOPAMAX PO) Take 25 mg by mouth 2 (two) times a day        venlafaxine (EFFEXOR) 75 mg tablet Take 75 mg by mouth daily             No discharge procedures on file      ED Provider  Electronically Signed by           Mahamed Rizo MD  08/09/18 5140

## 2018-08-08 NOTE — ED NOTES
KAYLEE spoke with Ayaan Rivera who stated 3 days approved, LCD/JEREMY: 08/10/2018 auth number [id number]  Care manager assigned will call [facility] Friday to do concurrent review

## 2018-08-09 PROBLEM — Z00.8 ENCOUNTER FOR PSYCHOLOGICAL EVALUATION: Status: RESOLVED | Noted: 2018-08-08 | Resolved: 2018-08-09

## 2018-08-09 PROBLEM — F31.9 BIPOLAR 1 DISORDER (HCC): Status: ACTIVE | Noted: 2018-08-09

## 2018-08-09 RX ORDER — MAGNESIUM HYDROXIDE/ALUMINUM HYDROXICE/SIMETHICONE 120; 1200; 1200 MG/30ML; MG/30ML; MG/30ML
30 SUSPENSION ORAL EVERY 4 HOURS PRN
Status: DISCONTINUED | OUTPATIENT
Start: 2018-08-09 | End: 2018-08-14 | Stop reason: HOSPADM

## 2018-08-09 RX ORDER — NICOTINE 21 MG/24HR
21 PATCH, TRANSDERMAL 24 HOURS TRANSDERMAL DAILY
Status: DISCONTINUED | OUTPATIENT
Start: 2018-08-09 | End: 2018-08-14 | Stop reason: HOSPADM

## 2018-08-09 RX ORDER — ESCITALOPRAM OXALATE 10 MG/1
10 TABLET ORAL DAILY
Status: DISCONTINUED | OUTPATIENT
Start: 2018-08-09 | End: 2018-08-14 | Stop reason: HOSPADM

## 2018-08-09 RX ORDER — LAMOTRIGINE 25 MG/1
25 TABLET ORAL 2 TIMES DAILY
Status: DISCONTINUED | OUTPATIENT
Start: 2018-08-09 | End: 2018-08-09

## 2018-08-09 RX ORDER — LAMOTRIGINE 100 MG/1
200 TABLET ORAL
Status: DISCONTINUED | OUTPATIENT
Start: 2018-08-09 | End: 2018-08-14 | Stop reason: HOSPADM

## 2018-08-09 RX ORDER — GABAPENTIN 100 MG/1
100 CAPSULE ORAL 3 TIMES DAILY
Status: DISCONTINUED | OUTPATIENT
Start: 2018-08-09 | End: 2018-08-14 | Stop reason: HOSPADM

## 2018-08-09 RX ADMIN — AMLODIPINE BESYLATE 2.5 MG: 2.5 TABLET ORAL at 09:01

## 2018-08-09 RX ADMIN — LAMOTRIGINE 25 MG: 25 TABLET ORAL at 09:01

## 2018-08-09 RX ADMIN — LAMOTRIGINE 200 MG: 100 TABLET ORAL at 21:06

## 2018-08-09 RX ADMIN — QUETIAPINE 400 MG: 400 TABLET, FILM COATED ORAL at 21:06

## 2018-08-09 RX ADMIN — TOPIRAMATE 25 MG: 25 TABLET, FILM COATED ORAL at 09:01

## 2018-08-09 RX ADMIN — NICOTINE 21 MG: 21 PATCH, EXTENDED RELEASE TRANSDERMAL at 08:58

## 2018-08-09 RX ADMIN — CLONAZEPAM 0.5 MG: 0.5 TABLET ORAL at 21:06

## 2018-08-09 RX ADMIN — CLONAZEPAM 0.5 MG: 0.5 TABLET ORAL at 17:06

## 2018-08-09 RX ADMIN — GABAPENTIN 100 MG: 100 CAPSULE ORAL at 11:40

## 2018-08-09 RX ADMIN — OXYBUTYNIN CHLORIDE 5 MG: 5 TABLET, EXTENDED RELEASE ORAL at 08:59

## 2018-08-09 RX ADMIN — TOPIRAMATE 25 MG: 25 TABLET, FILM COATED ORAL at 17:06

## 2018-08-09 RX ADMIN — GABAPENTIN 100 MG: 100 CAPSULE ORAL at 17:06

## 2018-08-09 RX ADMIN — GABAPENTIN 100 MG: 100 CAPSULE ORAL at 21:06

## 2018-08-09 RX ADMIN — CLONAZEPAM 0.5 MG: 0.5 TABLET ORAL at 09:00

## 2018-08-09 RX ADMIN — ESCITALOPRAM OXALATE 10 MG: 10 TABLET ORAL at 11:41

## 2018-08-09 NOTE — ASSESSMENT & PLAN NOTE
Patient reports abdominal discomfort and episodes of diarrhea started yesterday  Denies any recent antibiotic use  Will check stool C diff  Placed on contact isolation until C diff will be ruled out  Patient afebrile nontoxic WBC normal

## 2018-08-09 NOTE — PROGRESS NOTES
Awake, alert, oriented, forgetful at times  Medication compliant, psych team made aware of discrepancies between home meds and ordered meds  Denies any SI at this time  Depressed, anxious, slight tremor observed  Currently resting calmly in dining room attending group therapy  Will continue to monitor

## 2018-08-09 NOTE — H&P
Initial Psychiatric Evaluation    Medical Record Number: [id number]  Encounter: [id number]      History:     Michaelle Live is an 80 y o , female, admitted to the psychiatric unit under a 201 status to Dr Samira Livingston' service with the chief complaint of increased depression and labile mood  The patient has also been having increased anxiety and decreased sleep  The patient has been having a weight loss about 10-20 lb over the last couple months  The patient has no current suicidal thoughts or plan  The patient however is scared that something could happen  The patient did not feel comfortable leaving the hospital and following up outpatient with Dr Samira Livingston  The patient stated she does not feel comfortable going home because in I do not know what would happen if I went home     The patient states she has been feeling depressed and anxious for the last couple of weeks but the last 2-3 days she has not been able to control how she feels  The patient is a   The patient was transferred to the psychiatric unit once medically stable  The patient is alert and oriented x3 at this time  The patient is forgetful at times  She denies suicidal ideation  She denies any auditory or visual hallucination  Her speech is within normal limits  The patient does feel depressed and anxious  Her mood is labile  Patient has been seeing Dr sosa for many years  Past Medical History:   Diagnosis Date    Anxiety     Depression     Psychiatric disorder        Past surgical history:  Past Surgical History:   Procedure Laterality Date    ADENOIDECTOMY      CATARACT EXTRACTION      CHOLECYSTECTOMY      HIP SURGERY      L hip    TONSILLECTOMY      WRIST SURGERY      L wrist       Family history:  History reviewed  No pertinent family history      Current medications:    Current Facility-Administered Medications:     acetaminophen (TYLENOL) tablet 650 mg, 650 mg, Oral, Q6H PRN, Neysa Apley, PA-C    amLODIPine (NORVASC) tablet 2 5 mg, 2 5 mg, Oral, Daily, Papo Grimaldo MD, 2 5 mg at 08/09/18 0901    clonazePAM (KlonoPIN) tablet 0 5 mg, 0 5 mg, Oral, TID, Zahida Cordoba PA-C, 0 5 mg at 08/09/18 0900    escitalopram (LEXAPRO) tablet 10 mg, 10 mg, Oral, Daily, Claudette Llamas, PA-C    gabapentin (NEURONTIN) capsule 100 mg, 100 mg, Oral, TID, Lucille Recinos MD, Stopped at 08/09/18 1010    lamoTRIgine (LaMICtal) tablet 200 mg, 200 mg, Oral, HS, Claudette Llamas, PA-C    LORazepam (ATIVAN) 2 mg/mL injection 0 5 mg, 0 5 mg, Intramuscular, Q4H PRN, Zahida Cordoba PA-C    LORazepam (ATIVAN) tablet 0 5 mg, 0 5 mg, Oral, Q4H PRN, Zahida Cordoba PA-C    melatonin tablet 3 mg, 3 mg, Oral, HS PRN, Zahida Cordoba PA-C    nicotine (NICODERM CQ) 21 mg/24 hr TD 24 hr patch 21 mg, 21 mg, Transdermal, Daily, ALBERTO Tracy, 21 mg at 08/09/18 0858    OLANZapine (ZyPREXA) IM injection 5 mg, 5 mg, Intramuscular, Q4H PRN, Zahida Cordoba PA-C    OLANZapine (ZyPREXA) tablet 5 mg, 5 mg, Oral, Q4H PRN, Zahida Cordoba PA-C    oxybutynin (DITROPAN-XL) 24 hr tablet 5 mg, 5 mg, Oral, Daily, Papo Grimaldo MD, 5 mg at 08/09/18 0859    QUEtiapine (SEROquel) tablet 400 mg, 400 mg, Oral, HS, Zahida Cordoba PA-C, 400 mg at 08/08/18 2209    topiramate (TOPAMAX) tablet 25 mg, 25 mg, Oral, BID, Papo Grimaldo MD, 25 mg at 08/09/18 0901    traZODone (DESYREL) tablet 50 mg, 50 mg, Oral, HS PRN, BELL Sosa-C      Allergies:  No Known Allergies    Social History:  Social History     Social History    Marital status: /Civil Union     Spouse name: N/A    Number of children: N/A    Years of education: N/A     Occupational History    Not on file       Social History Main Topics    Smoking status: Current Every Day Smoker    Smokeless tobacco: Never Used    Alcohol use No    Drug use: No    Sexual activity: Not on file     Other Topics Concern    Not on file     Social History Narrative  No narrative on file         Physical Examination:     Vital Signs:  Vitals:    08/08/18 2206 08/08/18 2237 08/08/18 2345 08/09/18 0707   BP: (!) 174/79 (!) 174/99 137/77 146/72   BP Location: Right arm  Right arm Right arm   Pulse: 76  90 76   Resp:   18 17   Temp:   98 °F (36 7 °C) (!) 96 8 °F (36 °C)   TempSrc:   Temporal Temporal   SpO2:   95% 96%   Weight:       Height:             Appearance:  age appropriate and casually dressed   Behavior:  restless and fidgety   Speech:  normal volume   Mood:  anxious and depressed   Affect:  constricted   Thought Process:  circumstantial   Thought Content:  normal   Perceptual Disturbances: None   Risk Potential: none   Sensorium:  person, place, situation and time   Cognition:  intact   Consciousness:  alert and awake    Attention: attention span and concentration were age appropriate   Intellect: average   Insight:  fair   Judgment: fair and good      Motor Activity: no abnormal movements           Diagnostic Studies:     Recent Labs:  Results Reviewed     Procedure Component Value Units Date/Time    Rapid drug screen, urine [[id number]]  (Normal) Collected:  08/08/18 1602    Lab Status:  Final result Specimen:  Urine from Urine, Other Updated:  08/08/18 1702     Amph/Meth UR Negative     Barbiturate Ur Negative     Benzodiazepine Urine Negative     Cocaine Urine Negative     Methadone Urine Negative     Opiate Urine Negative     PCP Ur Negative     THC Urine Negative    Narrative:         FOR MEDICAL PURPOSES ONLY  IF CONFIRMATION NEEDED PLEASE CONTACT THE LAB WITHIN 5 DAYS      Drug Screen Cutoff Levels:  AMPHETAMINE/METHAMPHETAMINES  1000 ng/mL  BARBITURATES     200 ng/mL  BENZODIAZEPINES     200 ng/mL  COCAINE      300 ng/mL  METHADONE      300 ng/mL  OPIATES      300 ng/mL  PHENCYCLIDINE     25 ng/mL  THC       50 ng/mL    Basic metabolic panel [[id number]]  (Abnormal) Collected:  08/08/18 1506    Lab Status:  Final result Specimen:  Blood from Arm, Left Updated: 08/08/18 1614     Sodium 144 mmol/L      Potassium 4 2 mmol/L      Chloride 110 (H) mmol/L      CO2 28 mmol/L      Anion Gap 6 mmol/L      BUN 11 mg/dL      Creatinine 0 55 (L) mg/dL      Glucose 93 mg/dL      Calcium 8 7 mg/dL      eGFR 87 ml/min/1 73sq m     Narrative:       Hemolysis  National Kidney Disease Education Program recommendations are as follows:  GFR calculation is accurate only with a steady state creatinine  Chronic Kidney disease less than 60 ml/min/1 73 sq  meters  Kidney failure less than 15 ml/min/1 73 sq  meters  Ethanol [[id number]]  (Normal) Collected:  08/08/18 1506    Lab Status:  Final result Specimen:  Blood from Arm, Left Updated:  08/08/18 1613     Ethanol Lvl <10 mg/dL     CBC and differential [[id number]]  (Abnormal) Collected:  08/08/18 1506    Lab Status:  Final result Specimen:  Blood from Arm, Left Updated:  08/08/18 1519     WBC 5 50 Thousand/uL      RBC 3 60 (L) Million/uL      Hemoglobin 11 1 (L) g/dL      Hematocrit 33 8 (L) %      MCV 94 fL      MCH 30 8 pg      MCHC 32 8 g/dL      RDW 14 4 %      MPV 6 7 (L) fL      Platelets 290 Thousands/uL      Neutrophils Relative 68 (H) %      Lymphocytes Relative 24 %      Monocytes Relative 6 %      Eosinophils Relative 1 %      Basophils Relative 0 %      Neutrophils Absolute 3 70 Thousands/µL      Lymphocytes Absolute 1 30 Thousands/µL      Monocytes Absolute 0 40 Thousand/µL      Eosinophils Absolute 0 10 Thousand/µL      Basophils Absolute 0 00 Thousands/µL           I/O Past 24 hours:  No intake/output data recorded  I/O this shift:  In: 120 [P O :120]  Out: -         Impression / Plan:     Bipolar 1 disorder ([facility] [address] )    Recommended Treatment:      Medications  1) Restart patient's medications  Decrease Neurontin to 100mg TID and monitor gait  Non-pharmacological treatments  1) Continue with group therapy, milieu therapy and occupational therapy      2) Medical will be consulted to help manage comorbid conditions    Safety  1) Safety/communication plan established targeting dynamic risk factors above  Counseling / Coordination of Care    Total floor / unit time spent today 50 minutes  Greater than 50% of total time was spent with the patient and / or family counseling and / or coordination of care  A description of the counseling / coordination of care  Patient's Rights, confidentiality and exceptions to confidentiality, use of automated medical record, [facility] staff access to medical record, and consent to treatment reviewed        Shin Rm PA-C

## 2018-08-09 NOTE — PROGRESS NOTES
Pt resting in bed but is not sleeping, OOB to bathroom multiple times throughout the night which she said is normal for her  Pt does c/o not being able to fall asleep and wants to talk to the MD in the morning because she thinks her sleeping pills are not the right dose, pt educated about bed time medication regimen  Pt denies thoughts to self-harm and remains on SP1 for safety and support

## 2018-08-09 NOTE — PROGRESS NOTES
Pt resting queitly at this time with eyes closed, no distress or concerns noted  Sp1 maintained for safety and support

## 2018-08-09 NOTE — PLAN OF CARE
Alteration in Thoughts and Perception     Treatment Goal: Gain control of psychotic behaviors/thinking, reduce/eliminate presenting symptoms and demonstrate improved reality functioning upon discharge Progressing     Verbalize thoughts and feelings Progressing     Refrain from acting on delusional thinking/internal stimuli Progressing     Agree to be compliant with medication regime, as prescribed and report medication side effects Progressing     Attend and participate in unit activities, including therapeutic, recreational, and educational groups Progressing     Recognize dysfunctional thoughts, communicate reality-based thoughts at the time of discharge Progressing     Complete daily ADLs, including personal hygiene independently, as able Progressing        Anxiety     Anxiety is at manageable level Progressing        Depression     Treatment Goal: Demonstrate behavioral control of depressive symptoms, verbalize feelings of improved mood/affect, and adopt new coping skills prior to discharge Progressing     Verbalize thoughts and feelings Progressing     Refrain from harming self Progressing     Refrain from isolation Progressing     Refrain from self-neglect Progressing     Attend and participate in unit activities, including therapeutic, recreational, and educational groups Progressing     Complete daily ADLs, including personal hygiene independently, as able Progressing        Ineffective Coping     Cooperates with admission process Progressing     Identifies ineffective coping skills Progressing     Identifies healthy coping skills Progressing     Demonstrates healthy coping skills Progressing     Participates in unit activities Progressing     Patient/Family participate in treatment and DC plans Progressing     Patient/Family verbalizes awareness of resources Progressing     Understands least restrictive measures Progressing     Free from restraint events Progressing        Prexisting or High Potential for Compromised Skin Integrity     Skin integrity is maintained or improved Progressing

## 2018-08-09 NOTE — CONSULTS
Consult- Abbie Patton 1935, 80 y o  female MRN: [id number]    Unit/Bed#: [facility] 941-74 Encounter: [id number]    Primary Care Provider: Ad Brown MD   Date and time admitted to hospital: 8/8/2018  1:00 PM      Consults    * Encounter for psychological evaluation   Assessment & Plan    Patient presented with increased anxiety  Management as per Psychiatry        Essential hypertension   Assessment & Plan    Blood pressure on admission 161/80  Will start 2 5 mg of amlodipine  Monitor blood        Diarrhea   Assessment & Plan    Patient reports abdominal discomfort and episodes of diarrhea started yesterday  Denies any recent antibiotic use  Will check stool C diff  Placed on contact isolation until C diff will be ruled out  Patient afebrile nontoxic WBC normal         Hyperlipidemia   Assessment & Plan    Cardiac, low-cholesterol diet            VTE Prophylaxis: Reason for no pharmacologic prophylaxis High fall risk  / sequential compression device     Recommendations for Discharge:  · Follow up with PCP in psychiatry    Counseling / Coordination of Care Time: 45 minutes  Greater than 50% of total time spent on patient counseling and coordination of care  Collaboration of Care: Were Recommendations Directly Discussed with Primary Treatment Team? - Yes     History of Present Illness:    Abbie Patton is a 80 y o  female who is originally admitted to the psychiatry service due to increased anxiety  We are consulted for medical management of her other comorbidities  Patient has a history of hypertension for which is not on any medication her blood pressure on admission elevated upon my assessment she also complained on abdominal discomfort and episodes of loose stool since yesterday  She denies recent antibiotic use WBC normal patient afebrile and hemodynamically stable  Review of Systems:    Review of Systems   Gastrointestinal: Positive for abdominal pain and diarrhea         Past Medical and Surgical History:     Past Medical History:   Diagnosis Date    Anxiety     Psychiatric disorder        Past Surgical History:   Procedure Laterality Date    ADENOIDECTOMY      CATARACT EXTRACTION      CHOLECYSTECTOMY      HIP SURGERY      L hip    TONSILLECTOMY      WRIST SURGERY      L wrist       Meds/Allergies:    all medications and allergies reviewed    Allergies: No Known Allergies    Social History:     Marital Status: /Civil Union    Substance Use History:   History   Alcohol Use No     History   Smoking Status    Current Every Day Smoker   Smokeless Tobacco    Never Used     History   Drug Use No       Family History:    non-contributory    Physical Exam:     Vitals:   Blood Pressure: 134/86 (08/08/18 2012)  Pulse: 88 (08/08/18 2012)  Temperature: 98 °F (36 7 °C) (08/08/18 2012)  Temp Source: Temporal (08/08/18 2012)  Respirations: 18 (08/08/18 2012)  Height: 5' 3" (160 cm) (5'3 approximately) (08/08/18 2012)  Weight - Scale: 53 3 kg (117 lb 6 4 oz) (08/08/18 2012)  SpO2: 95 % (08/08/18 2012)    Physical Exam   Constitutional: No distress  HENT:   Head: Normocephalic  Eyes: Pupils are equal, round, and reactive to light  Neck: Normal range of motion  Cardiovascular: Regular rhythm  Pulmonary/Chest: No respiratory distress  Abdominal: She exhibits no distension  There is no tenderness  Musculoskeletal: She exhibits no edema  Neurological: She is alert  Skin: Skin is warm  Psychiatric: She has a normal mood and affect  Additional Data:     Lab Results: I have personally reviewed pertinent reports          Results from last 7 days  Lab Units 08/08/18  1506   WBC Thousand/uL 5 50   HEMOGLOBIN g/dL 11 1*   HEMATOCRIT % 33 8*   PLATELETS Thousands/uL 308   NEUTROS PCT % 68*   LYMPHS PCT % 24   MONOS PCT % 6   EOS PCT % 1       Results from last 7 days  Lab Units 08/08/18  1506   SODIUM mmol/L 144   POTASSIUM mmol/L 4 2   CHLORIDE mmol/L 110*   CO2 mmol/L 28   BUN mg/dL 11   CREATININE mg/dL 0 55*   CALCIUM mg/dL 8 7   GLUCOSE RANDOM mg/dL 93             No results found for: HGBA1C        Imaging: I have personally reviewed pertinent reports  No orders to display         ** Please Note: This note has been constructed using a voice recognition system   **

## 2018-08-09 NOTE — PROGRESS NOTES
Pt visible on unit, ambulates with walker  Med and meal compliant  Denies pain  Denies Sis at this time, will continue to monitor on SP1 precautions for safety and support

## 2018-08-10 PROCEDURE — 97166 OT EVAL MOD COMPLEX 45 MIN: CPT

## 2018-08-10 RX ORDER — DOCUSATE SODIUM 100 MG/1
100 CAPSULE, LIQUID FILLED ORAL 2 TIMES DAILY
Status: DISCONTINUED | OUTPATIENT
Start: 2018-08-10 | End: 2018-08-14 | Stop reason: HOSPADM

## 2018-08-10 RX ORDER — TOPIRAMATE 25 MG/1
50 TABLET ORAL 2 TIMES DAILY
Status: DISCONTINUED | OUTPATIENT
Start: 2018-08-10 | End: 2018-08-14 | Stop reason: HOSPADM

## 2018-08-10 RX ADMIN — OXYBUTYNIN CHLORIDE 5 MG: 5 TABLET, EXTENDED RELEASE ORAL at 08:43

## 2018-08-10 RX ADMIN — CLONAZEPAM 0.5 MG: 0.5 TABLET ORAL at 21:24

## 2018-08-10 RX ADMIN — DOCUSATE SODIUM 100 MG: 100 CAPSULE, LIQUID FILLED ORAL at 21:24

## 2018-08-10 RX ADMIN — TOPIRAMATE 50 MG: 25 TABLET, FILM COATED ORAL at 08:43

## 2018-08-10 RX ADMIN — AMLODIPINE BESYLATE 2.5 MG: 2.5 TABLET ORAL at 08:43

## 2018-08-10 RX ADMIN — GABAPENTIN 100 MG: 100 CAPSULE ORAL at 17:29

## 2018-08-10 RX ADMIN — GABAPENTIN 100 MG: 100 CAPSULE ORAL at 21:24

## 2018-08-10 RX ADMIN — CLONAZEPAM 0.5 MG: 0.5 TABLET ORAL at 17:29

## 2018-08-10 RX ADMIN — CLONAZEPAM 0.5 MG: 0.5 TABLET ORAL at 08:43

## 2018-08-10 RX ADMIN — NICOTINE 21 MG: 21 PATCH, EXTENDED RELEASE TRANSDERMAL at 08:43

## 2018-08-10 RX ADMIN — GABAPENTIN 100 MG: 100 CAPSULE ORAL at 08:43

## 2018-08-10 RX ADMIN — TOPIRAMATE 50 MG: 25 TABLET, FILM COATED ORAL at 17:29

## 2018-08-10 RX ADMIN — LAMOTRIGINE 200 MG: 100 TABLET ORAL at 21:24

## 2018-08-10 RX ADMIN — ESCITALOPRAM OXALATE 10 MG: 10 TABLET ORAL at 08:43

## 2018-08-10 RX ADMIN — QUETIAPINE 400 MG: 400 TABLET, FILM COATED ORAL at 21:24

## 2018-08-10 NOTE — NURSING NOTE
Patient continues to deny suicidal ideation and no attempts at self harm noted  Patient denies pain or discomfort  Patient's appetite is good with patient eating % of meals  Patient reports no anxiety or depressive symptoms this shift  New order to discontinue C-Diff diagnostic as patient has had no loose stools  No other new issues or concerns noted at this time

## 2018-08-10 NOTE — NURSING NOTE
Patient denies suicidal plans or intention but continues to have some thoughts regarding "not wanting to live like this"  Patient was compliant with medications and encouraged to attend groups and talk with peers or staff should she have active suicidal intention or plans  Patient agreed to same  No complaints of pain or discomfort  ALBERTO Mcclelland notified that patient has had no loose stools noted and still has order for C-Diff specimen  Staff notified of need for specimen to rule out same  No other issues noted at this time

## 2018-08-10 NOTE — SOCIAL WORK
SW attempted to meet with patient to complete psychosocial assessment  Patient was not in the mood to talk about her past and asked to speak another time  Patient remains medication and meal compliant  Patient still not feeling safe to contract for safety outside the hospital  Maribell Atkins will continue to follow up and provide services as needed

## 2018-08-10 NOTE — PLAN OF CARE
Problem: OCCUPATIONAL THERAPY ADULT  Goal: Performs self-care activities at highest level of function for planned discharge setting  See evaluation for individualized goals  Treatment Interventions: ADL retraining, Continued evaluation, Activityengagement (coping skills training, life management training)          See flowsheet documentation for full assessment, interventions and recommendations  Outcome: Progressing  Limitation: Decreased ADL status, Decreased high-level ADLs, Mood limitation (decreased coping skills, decreased life management tasks)  Prognosis: Good  Assessment: Pt is a 80 y o  female seen for OT evaluation s/p admit to [facility] on 8/8/2018 w/ Bipolar 1 disorder ([facility] [address] )  Comorbidities affecting pt's functional performance at time of assessment include: anxiety, depression  Personal factors affecting pt at time of IE include:steps to enter environment, difficulty performing IADLS  and decreased physical abilities (i e, increased walking challenges), desire to want to remain in her home, coping skills, life managgemetn skills, grief issues  Prior to admission, pt was living in her own home with her children assisting her  Upon evaluation: Pt requires treatment with consideration of the following deficits impacting occupational performance: impaired [address], increased pain, decreased coping skills and difficulty with ambulation, anxiety, decreased coping skills, decreased life management strategies  Pt to benefit from continued skilled OT tx while in the hospital to address deficits as defined above and maximize level of functional independence w ADL's and functional mobility  Occupational Performance areas to address include: socialization, health maintenance, functional mobility, social participation and coping skills training, life management training   From OT standpoint, recommendation at time of d/c would be to return to her own home when stable and if able to continue to life alone with family supports

## 2018-08-10 NOTE — PROGRESS NOTES
Patient awake, alert, and oriented  VSS  Pleasant and cooperative upon approach  Visible on the unit, social with peers  Meal and medication compliant  Denies SI, anxiety, depression at this time  No behavior issues noted  No further complaints  Maintained on SP1 precautions  Will continue to monitor for safety and support

## 2018-08-10 NOTE — PROGRESS NOTES
Pt appears to be asleep in bed with symmetrical non labored breathing observed  Pt in not exhibiting any erratic or self-harming behaviors, SP1 maintained for safety and support

## 2018-08-10 NOTE — OCCUPATIONAL THERAPY NOTE
Occupational Therapy Evaluation      Matthews Jero    8/10/2018    Patient Active Problem List   Diagnosis    Depression    Gastroesophageal reflux disease    Hyperlipidemia    Essential hypertension    Tobacco dependence syndrome    Sciatica    Diarrhea    Bipolar 1 disorder ([address] )       Past Medical History:   Diagnosis Date    Anxiety     Depression     Psychiatric disorder        Past Surgical History:   Procedure Laterality Date    ADENOIDECTOMY      CATARACT EXTRACTION      CHOLECYSTECTOMY      HIP SURGERY      L hip    TONSILLECTOMY      WRIST SURGERY      L wrist        08/10/18 1130   Note Type   Note type Eval/Treat   Restrictions/Precautions   Other Precautions Suicidal;Hard of hearing  (behavioral health 15 minute checks; she wears a hearing aide)   Pain Assessment   Pain Assessment No/denies pain  (stated no pain at rest, but in knees when she walks)   Pain Score (she rates pain a 7 when she walks)   Home Living   Type of [address] [place] [address]; Able to live on main level with bedroom/bathroom;Stairs to enter with rails   Additional Comments She stated that she plans to return to her home which is a bilevel  She stated that she lives alone, does not do the steps unless her children are present  She stated that she has been staying on the first floor of her 3 story bilevel  She stated that she really does not yet want to go to an detention  She stated that there are steps into her home, there is a banister on one side, but she will only do steps when her sons are there to supervise her  Prior Function   Level of [place] Independent with ADLs and functional mobility; Needs assistance with IADLs  (she is currently using a walker for ambulation)   Lives With Alone   Receives Help From Family  (3 sons and 1 daughter)   ADL Assistance Independent   IADLs Needs assistance   Falls in the last 6 months 0  (previously, before using walker, she stated she fell 5 times) Vocational Retired   Comments She stated that came into the hospital due in part trying to make a decision regarding [facility] placement  She stated that she does not feel ready for this, but she also reported that she has been having challenges with her legs, knees  Her record noted that she was complaining of worsening depression, increased depression, anxiety, labile mood  Depression was worsening over the last few weeks  She was noted to have been compliant with medications  She reported feeling alone, her  had passed 5 years ago  Lifestyle   Autonomy She wants to live in her own home, her children have been supportive   Reciprocal Relationships she stated that her 4 adult children are involved, they visit at least 1 time per week  Her children assist her with transportation, money management, cleaning  She also stated that she has friends who come over to play [employer] every week  Intrinsic Gratification she stated that her hobbies are watching TV, taping soap operas, playing [employer]   Psychosocial   Psychosocial (WDL) X   Patient Behaviors/Mood Anxious; Cooperative;Depressed;Pleasant;Verbal   Ability to Express Feelings Able to express   Ability to Express Needs Able to express   Ability to Express Thoughts Able to express   Ability to Understand Others Understands   Subjective   Subjective she stated reason for hospitalization, "I was scared, I felt dizzy  I wanted to come  "   ADL   Additional Comments She stated that when she is home, she is independent in her personal care  She stated that she has a tub bench  Recent daily cares noted that she did receive minimal assistance for hygien needs   Transfers   Additional Comments she has been transfering independently with use of rolling walker     Functional Mobility   Additional Comments she has been ambulating with use of rolling walker   Activity Tolerance   Activity Tolerance Patient tolerated treatment well   RUE Assessment   RUE Assessment [facility] LUE Assessment   LUE Assessment WFL   Hand Function   Gross Motor Coordination Functional   Fine Motor Coordination Functional  (she did present with tremors when attempting writing task)   Vision-Basic Assessment   Current Vision Does not wear glasses   Cognition   Overall Cognitive Status WFL   Arousal/Participation Alert; Responsive;Arousable; Cooperative   Attention Within functional limits   Orientation Level Oriented X4   Memory Within functional limits   Following Commands Follows multistep commands inconsistently   Comments She was able to carry out 1-2 step directions with extra time, but she did require some periodic assistance for error correction    Assessment   Limitation Decreased ADL status; Decreased high-level ADLs;Mood limitation  (decreased coping skills, decreased life management tasks)   Prognosis Good   Assessment Pt is a 80 y o  female seen for OT evaluation s/p admit to [facility] on 8/8/2018 w/ Bipolar 1 disorder ([facility] [address] )  Comorbidities affecting pt's functional performance at time of assessment include: anxiety, depression  Personal factors affecting pt at time of IE include:steps to enter environment, difficulty performing IADLS  and decreased physical abilities (i e, increased walking challenges), desire to want to remain in her home, coping skills, life managgemetn skills, grief issues  Prior to admission, pt was living in her own home with her children assisting her  Upon evaluation: Pt requires treatment with consideration of the following deficits impacting occupational performance: impaired [address], increased pain, decreased coping skills and difficulty with ambulation, anxiety, decreased coping skills, decreased life management strategies  Pt to benefit from continued skilled OT tx while in the hospital to address deficits as defined above and maximize level of functional independence w ADL's and functional mobility   Occupational Performance areas to address include: socialization, health maintenance, functional mobility, social participation and coping skills training, life management training  From OT standpoint, recommendation at time of d/c would be to return to her own home when stable and if able to continue to life alone with family supports  Goals   Patient Goals "Help my mind so I'm not so depressed  LTG Time Frame (30 days)   Long Term Goal #1 Attend/ participate in 1 OT group offered on the unit to offset admitting behaviors/ symptoms  Long Term Goal #2 Identify and explore strategies to promote improved functioning and wellness  Long Term Goal #3 Identify 3 positive qualities of self  #4 Consistently utilize positive coping skills to deal with daily living stressors without becoming unduly anxius and overwhelmed 100% of the time  Plan   Treatment Interventions ADL retraining;Continued evaluation; Activityengagement  (coping skills training, life management training)   Goal Expiration Date 09/09/18   Treatment Day 1   OT Frequency 5x/wk   Nehal Coronel, OT

## 2018-08-10 NOTE — PROGRESS NOTES
Psychiatry Progress Note    Subjective: Interval History     Patient continues to report feeling depressed  Patient continues to express passive suicidal ideations  States that she does not know what she would do if she was to go home and does not feel safe if she was to be discharged  Her anxiety remains height and  She had poor sleep last night  Patient was several somatic complaints primarily surrounding her abdomen  Medical was contacted     She has been tolerating her medications well without side effect        Current medications:    Current Facility-Administered Medications:     acetaminophen (TYLENOL) tablet 650 mg, 650 mg, Oral, Q6H PRN, Elvira Swenson PA-C    aluminum-magnesium hydroxide-simethicone (MYLANTA) 200-200-20 mg/5 mL oral suspension 30 mL, 30 mL, Oral, Q4H PRN, Hortencia Ciminiromy CRNP    amLODIPine (NORVASC) tablet 2 5 mg, 2 5 mg, Oral, Daily, Ailyn Queen MD, 2 5 mg at 08/09/18 0901    clonazePAM (KlonoPIN) tablet 0 5 mg, 0 5 mg, Oral, TID, Elvira Swenson PA-C, 0 5 mg at 08/09/18 2106    escitalopram (LEXAPRO) tablet 10 mg, 10 mg, Oral, Daily, Yrn Barbour PA-C, 10 mg at 08/09/18 1141    gabapentin (NEURONTIN) capsule 100 mg, 100 mg, Oral, TID, Satinder Benedict MD, 100 mg at 08/09/18 2106    lamoTRIgine (LaMICtal) tablet 200 mg, 200 mg, Oral, HS, Yrn Barbour PA-C, 200 mg at 08/09/18 2106    LORazepam (ATIVAN) 2 mg/mL injection 0 5 mg, 0 5 mg, Intramuscular, Q4H PRN, Elvira Swenson PA-C    LORazepam (ATIVAN) tablet 0 5 mg, 0 5 mg, Oral, Q4H PRN, Elvira Swenson PA-C    melatonin tablet 3 mg, 3 mg, Oral, HS PRN, Elvira Swenson PA-C    nicotine (NICODERM CQ) 21 mg/24 hr TD 24 hr patch 21 mg, 21 mg, Transdermal, Daily, Hortencia Ciminieri, CRNP, 21 mg at 08/09/18 0858    OLANZapine (ZyPREXA) IM injection 5 mg, 5 mg, Intramuscular, Q4H PRN, Elvira DumontrDAISY    OLANZapine (ZyPREXA) tablet 5 mg, 5 mg, Oral, Q4H PRN, Elvira Dumontr, DAISY   oxybutynin (DITROPAN-XL) 24 hr tablet 5 mg, 5 mg, Oral, Daily, Niki Sanchez MD, 5 mg at 08/09/18 0859    QUEtiapine (SEROquel) tablet 400 mg, 400 mg, Oral, HS, Keyana Fritz PA-C, 400 mg at 08/09/18 2106    topiramate (TOPAMAX) tablet 25 mg, 25 mg, Oral, BID, Niki Sanchez MD, 25 mg at 08/09/18 1706    traZODone (DESYREL) tablet 50 mg, 50 mg, Oral, HS PRN, Keyana Fritz PA-C      Objective:     Vital Signs:  Vitals:    08/08/18 2345 08/09/18 0707 08/09/18 1558 08/10/18 0624   BP: 137/77 146/72 (!) 187/91 148/73   BP Location: Right arm Right arm Right arm Right arm   Pulse: 90 76 77 71   Resp: 18 17 18 16   Temp: 98 °F (36 7 °C) (!) 96 8 °F (36 °C) (!) 97 1 °F (36 2 °C) (!) 97 4 °F (36 3 °C)   TempSrc: Temporal Temporal Temporal Temporal   SpO2: 95% 96% 93% 94%   Weight:       Height:             Appearance:  age appropriate and casually dressed   Behavior:  normal   Speech:  soft   Mood:  depressed   Affect:  blunted and constricted   Thought Process:  normal   Thought Content:  normal   Perceptual Disturbances: None   Risk Potential: none   Sensorium:  person, place, situation and time   Cognition:  intact   Consciousness:  alert and awake    Attention: attention span and concentration were age appropriate   Intellect: average   Insight:  good   Judgment: good      Motor Activity: no abnormal movements           Recent Labs:  Results Reviewed     Procedure Component Value Units Date/Time    Rapid drug screen, urine [[id number]]  (Normal) Collected:  08/08/18 1602    Lab Status:  Final result Specimen:  Urine from Urine, Other Updated:  08/08/18 1702     Amph/Meth UR Negative     Barbiturate Ur Negative     Benzodiazepine Urine Negative     Cocaine Urine Negative     Methadone Urine Negative     Opiate Urine Negative     PCP Ur Negative     THC Urine Negative    Narrative:         FOR MEDICAL PURPOSES ONLY  IF CONFIRMATION NEEDED PLEASE CONTACT THE LAB WITHIN 5 DAYS      Drug Screen Cutoff Levels:  AMPHETAMINE/METHAMPHETAMINES  1000 ng/mL  BARBITURATES     200 ng/mL  BENZODIAZEPINES     200 ng/mL  COCAINE      300 ng/mL  METHADONE      300 ng/mL  OPIATES      300 ng/mL  PHENCYCLIDINE     25 ng/mL  THC       50 ng/mL    Basic metabolic panel [[id number]]  (Abnormal) Collected:  08/08/18 1506    Lab Status:  Final result Specimen:  Blood from Arm, Left Updated:  08/08/18 1614     Sodium 144 mmol/L      Potassium 4 2 mmol/L      Chloride 110 (H) mmol/L      CO2 28 mmol/L      Anion Gap 6 mmol/L      BUN 11 mg/dL      Creatinine 0 55 (L) mg/dL      Glucose 93 mg/dL      Calcium 8 7 mg/dL      eGFR 87 ml/min/1 73sq m     Narrative:       Hemolysis  National Kidney Disease Education Program recommendations are as follows:  GFR calculation is accurate only with a steady state creatinine  Chronic Kidney disease less than 60 ml/min/1 73 sq  meters  Kidney failure less than 15 ml/min/1 73 sq  meters  Ethanol [[id number]]  (Normal) Collected:  08/08/18 1506    Lab Status:  Final result Specimen:  Blood from Arm, Left Updated:  08/08/18 1613     Ethanol Lvl <10 mg/dL     CBC and differential [[id number]]  (Abnormal) Collected:  08/08/18 1506    Lab Status:  Final result Specimen:  Blood from Arm, Left Updated:  08/08/18 1519     WBC 5 50 Thousand/uL      RBC 3 60 (L) Million/uL      Hemoglobin 11 1 (L) g/dL      Hematocrit 33 8 (L) %      MCV 94 fL      MCH 30 8 pg      MCHC 32 8 g/dL      RDW 14 4 %      MPV 6 7 (L) fL      Platelets 257 Thousands/uL      Neutrophils Relative 68 (H) %      Lymphocytes Relative 24 %      Monocytes Relative 6 %      Eosinophils Relative 1 %      Basophils Relative 0 %      Neutrophils Absolute 3 70 Thousands/µL      Lymphocytes Absolute 1 30 Thousands/µL      Monocytes Absolute 0 40 Thousand/µL      Eosinophils Absolute 0 10 Thousand/µL      Basophils Absolute 0 00 Thousands/µL           I/O Past 24 hours:  I/O last 3 completed shifts:   In: 540 [P O :540]  Out: -   No intake/output data recorded  Assessment / Plan:     Bipolar 1 disorder (HCC)    Recommended Treatment:      Medication changes:  1) increase Topamax to 50 mg twice a day    Non-pharmacological treatments  1) Continue with group therapy, milieu therapy and occupational therapy  Safety  1) Safety/communication plan established targeting dynamic risk factors above  2) Risks, benefits, and possible side effects of medications explained to patient and patient verbalizes understanding  Counseling / Coordination of Care    Total floor / unit time spent today 20 minutes  Greater than 50% of total time was spent with the patient and / or family counseling and / or coordination of care  A description of the counseling / coordination of care  Patient's Rights, confidentiality and exceptions to confidentiality, use of automated medical record, [facility] staff access to medical record, and consent to treatment reviewed      Dwayne Black PA-C

## 2018-08-11 RX ADMIN — AMLODIPINE BESYLATE 2.5 MG: 2.5 TABLET ORAL at 08:19

## 2018-08-11 RX ADMIN — TOPIRAMATE 50 MG: 25 TABLET, FILM COATED ORAL at 08:19

## 2018-08-11 RX ADMIN — DOCUSATE SODIUM 100 MG: 100 CAPSULE, LIQUID FILLED ORAL at 17:37

## 2018-08-11 RX ADMIN — CLONAZEPAM 0.5 MG: 0.5 TABLET ORAL at 15:20

## 2018-08-11 RX ADMIN — LAMOTRIGINE 200 MG: 100 TABLET ORAL at 21:11

## 2018-08-11 RX ADMIN — TOPIRAMATE 50 MG: 25 TABLET, FILM COATED ORAL at 17:37

## 2018-08-11 RX ADMIN — CLONAZEPAM 0.5 MG: 0.5 TABLET ORAL at 08:19

## 2018-08-11 RX ADMIN — OXYBUTYNIN CHLORIDE 5 MG: 5 TABLET, EXTENDED RELEASE ORAL at 08:19

## 2018-08-11 RX ADMIN — QUETIAPINE 400 MG: 400 TABLET, FILM COATED ORAL at 21:11

## 2018-08-11 RX ADMIN — GABAPENTIN 100 MG: 100 CAPSULE ORAL at 08:19

## 2018-08-11 RX ADMIN — CLONAZEPAM 0.5 MG: 0.5 TABLET ORAL at 21:10

## 2018-08-11 RX ADMIN — GABAPENTIN 100 MG: 100 CAPSULE ORAL at 21:11

## 2018-08-11 RX ADMIN — NICOTINE 21 MG: 21 PATCH, EXTENDED RELEASE TRANSDERMAL at 08:19

## 2018-08-11 RX ADMIN — GABAPENTIN 100 MG: 100 CAPSULE ORAL at 15:20

## 2018-08-11 RX ADMIN — ESCITALOPRAM OXALATE 10 MG: 10 TABLET ORAL at 08:19

## 2018-08-11 RX ADMIN — DOCUSATE SODIUM 100 MG: 100 CAPSULE, LIQUID FILLED ORAL at 08:19

## 2018-08-11 NOTE — PROGRESS NOTES
Psychiatry Progress Note    Subjective: Interval History     Patient, cooperative during assessment  Patient reports that she continues to have ongoing anxiety and depression  Patient reports however that she does feel her symptoms have improved since her admission  Patient reports that she feels that being around other people have helped to improve her mood  Patient continues to express passive suicidal ideations however patient denies any plan or intent to harm herself in the hospital at this time  Patient has expressed to staff that while she has noted improvement in her mood she still does not feel safe to return home  Patient has been visible on the unit during the day however more withdrawn in the evening  Patient has been compliant with her medications  Patient with no somatic complaints today  Patient ate 100% of breakfast, 75% of lunch, 25% of dinner        Current medications:    Current Facility-Administered Medications:     acetaminophen (TYLENOL) tablet 650 mg, 650 mg, Oral, Q6H PRN, Samira Cervantes PA-C    aluminum-magnesium hydroxide-simethicone (MYLANTA) 200-200-20 mg/5 mL oral suspension 30 mL, 30 mL, Oral, Q4H PRN, ALBERTO Tracy    amLODIPine (NORVASC) tablet 2 5 mg, 2 5 mg, Oral, Daily, Apurva Gray MD, 2 5 mg at 08/10/18 6380    clonazePAM (KlonoPIN) tablet 0 5 mg, 0 5 mg, Oral, TID, Samira Cervantes PA-C, 0 5 mg at 08/10/18 2124    docusate sodium (COLACE) capsule 100 mg, 100 mg, Oral, BID, Apurva Gray MD, 100 mg at 08/10/18 2124    escitalopram (LEXAPRO) tablet 10 mg, 10 mg, Oral, Daily, Lance Cruz PA-C, 10 mg at 08/10/18 4319    gabapentin (NEURONTIN) capsule 100 mg, 100 mg, Oral, TID, Hussein Castillo MD, 100 mg at 08/10/18 2124    lamoTRIgine (LaMICtal) tablet 200 mg, 200 mg, Oral, HS, Jeni Clemens PA-C, 200 mg at 08/10/18 2124    LORazepam (ATIVAN) 2 mg/mL injection 0 5 mg, 0 5 mg, Intramuscular, Q4H PRN, Samira Cervantes PA-C    LORazepam (ATIVAN) tablet 0 5 mg, 0 5 mg, Oral, Q4H PRN, Ximena Grade, PA-C    melatonin tablet 3 mg, 3 mg, Oral, HS PRN, Spearman Grade, PA-C    nicotine (NICODERM CQ) 21 mg/24 hr TD 24 hr patch 21 mg, 21 mg, Transdermal, Daily, Hortencia Ciminiromy, CRNP, 21 mg at 08/10/18 0843    OLANZapine (ZyPREXA) IM injection 5 mg, 5 mg, Intramuscular, Q4H PRN, Spearman Grade, PA-C    OLANZapine (ZyPREXA) tablet 5 mg, 5 mg, Oral, Q4H PRN, Spearman Grade, PA-C    oxybutynin (DITROPAN-XL) 24 hr tablet 5 mg, 5 mg, Oral, Daily, Ollen Homans, MD, 5 mg at 08/10/18 0843    QUEtiapine (SEROquel) tablet 400 mg, 400 mg, Oral, HS, Spearman Grade, PA-C, 400 mg at 08/10/18 2124    topiramate (TOPAMAX) tablet 50 mg, 50 mg, Oral, BID, Chad Smicksburg, PA-C, 50 mg at 08/10/18 1729    traZODone (DESYREL) tablet 50 mg, 50 mg, Oral, HS PRN, Spearman Grade, PA-C      Objective:     Vital Signs:  Vitals:    08/09/18 1558 08/10/18 0624 08/10/18 1621 08/11/18 0745   BP: (!) 187/91 148/73 132/63 114/63   BP Location: Right arm Right arm Right arm Left arm   Pulse: 77 71 77 76   Resp: 18 16 16 18   Temp: (!) 97 1 °F (36 2 °C) (!) 97 4 °F (36 3 °C) (!) 97 1 °F (36 2 °C) (!) 97 1 °F (36 2 °C)   TempSrc: Temporal Temporal Temporal Temporal   SpO2: 93% 94% 98% 98%   Weight:       Height:             Appearance:  age appropriate and casually dressed   Behavior:  normal   Speech:  normal volume   Mood:  depressed   Affect:  constricted   Thought Process:  normal   Thought Content:  normal   Perceptual Disturbances: None   Risk Potential: Suicidal Ideations without plan   Sensorium:  person, place, situation and time   Cognition:  intact   Consciousness:  alert and awake    Attention: attention span and concentration were age appropriate   Intellect: average   Insight:  limited   Judgment: limited      Motor Activity: no abnormal movements           Recent Labs:  Results Reviewed     Procedure Component Value Units Date/Time    Rapid drug screen, urine [[id number]]  (Normal) Collected:  08/08/18 1602    Lab Status:  Final result Specimen:  Urine from Urine, Other Updated:  08/08/18 1702     Amph/Meth UR Negative     Barbiturate Ur Negative     Benzodiazepine Urine Negative     Cocaine Urine Negative     Methadone Urine Negative     Opiate Urine Negative     PCP Ur Negative     THC Urine Negative    Narrative:         FOR MEDICAL PURPOSES ONLY  IF CONFIRMATION NEEDED PLEASE CONTACT THE LAB WITHIN 5 DAYS  Drug Screen Cutoff Levels:  AMPHETAMINE/METHAMPHETAMINES  1000 ng/mL  BARBITURATES     200 ng/mL  BENZODIAZEPINES     200 ng/mL  COCAINE      300 ng/mL  METHADONE      300 ng/mL  OPIATES      300 ng/mL  PHENCYCLIDINE     25 ng/mL  THC       50 ng/mL    Basic metabolic panel [[id number]]  (Abnormal) Collected:  08/08/18 1506    Lab Status:  Final result Specimen:  Blood from Arm, Left Updated:  08/08/18 1614     Sodium 144 mmol/L      Potassium 4 2 mmol/L      Chloride 110 (H) mmol/L      CO2 28 mmol/L      Anion Gap 6 mmol/L      BUN 11 mg/dL      Creatinine 0 55 (L) mg/dL      Glucose 93 mg/dL      Calcium 8 7 mg/dL      eGFR 87 ml/min/1 73sq m     Narrative:       Hemolysis  National Kidney Disease Education Program recommendations are as follows:  GFR calculation is accurate only with a steady state creatinine  Chronic Kidney disease less than 60 ml/min/1 73 sq  meters  Kidney failure less than 15 ml/min/1 73 sq  meters      Ethanol [[id number]]  (Normal) Collected:  08/08/18 1506    Lab Status:  Final result Specimen:  Blood from Arm, Left Updated:  08/08/18 1613     Ethanol Lvl <10 mg/dL     CBC and differential [[id number]]  (Abnormal) Collected:  08/08/18 1506    Lab Status:  Final result Specimen:  Blood from Arm, Left Updated:  08/08/18 1519     WBC 5 50 Thousand/uL      RBC 3 60 (L) Million/uL      Hemoglobin 11 1 (L) g/dL      Hematocrit 33 8 (L) %      MCV 94 fL      MCH 30 8 pg      MCHC 32 8 g/dL      RDW 14 4 %      MPV 6 7 (L) fL Platelets 328 Thousands/uL      Neutrophils Relative 68 (H) %      Lymphocytes Relative 24 %      Monocytes Relative 6 %      Eosinophils Relative 1 %      Basophils Relative 0 %      Neutrophils Absolute 3 70 Thousands/µL      Lymphocytes Absolute 1 30 Thousands/µL      Monocytes Absolute 0 40 Thousand/µL      Eosinophils Absolute 0 10 Thousand/µL      Basophils Absolute 0 00 Thousands/µL           I/O Past 24 hours:  I/O last 3 completed shifts: In: 940 [P O :940]  Out: -   No intake/output data recorded  Assessment / Plan:     [facility]    Recommended Treatment:      Medication changes:  1) continue current medication regimen  Non-pharmacological treatments  1) Continue with group therapy, milieu therapy and occupational therapy  Safety  1) Safety/communication plan established targeting dynamic risk factors above  2) Risks, benefits, and possible side effects of medications explained to patient and patient verbalizes understanding  Counseling / Coordination of Care    Total floor / unit time spent today 20 minutes  Greater than 50% of total time was spent with the patient and / or family counseling and / or coordination of care  A description of the counseling / coordination of care  Patient's Rights, confidentiality and exceptions to confidentiality, use of automated medical record, [facility] staff access to medical record, and consent to treatment reviewed      Rosalinda Mcclain PA-C

## 2018-08-11 NOTE — PROGRESS NOTES
Patient c/o constipation  Paged medical and got order placed for colace BID  Patient worried about having an accident if she takes medication tonight  Accepted medication  Isolative to room after dinner  No behavior issues noted  Maintained on SP1 precautions  Will continue to monitor for safety and support

## 2018-08-11 NOTE — NURSING NOTE
Patient remains compliant with medications and meals  Appetite is good with patient eating 100% of dinner  No complaints of pain or discomfort  Patient denies suicidal ideation and has no intent or plans for same  No new issues noted at this time

## 2018-08-11 NOTE — NURSING NOTE
Patient denies active suicidal ideation and reports no plan or intent  Patient denies pain and was compliant with AM medications  Patient's appetite is good with patient eating 100% of breakfast  Ptient does isolate to her room at times and she was encouraged to stay out of room and socialize with peers more often No new issues noted at this time

## 2018-08-11 NOTE — NURSING NOTE
Patient continues to deny suicidal ideation and has no plans or intent  Patient is visible on the unit at meal times and goes back to room in between meals  Patient encouraged to come out of room more often and socialize with peers  Patient declined same  Patient's appetite remains good with patient eating 100% of lunch

## 2018-08-11 NOTE — PROGRESS NOTES
Pt in bed and appears to be sleeping well, no distress or concerns noted  No erratic or self-harming behaviors, Sp1 maintained for safety and support

## 2018-08-12 RX ADMIN — AMLODIPINE BESYLATE 2.5 MG: 2.5 TABLET ORAL at 09:14

## 2018-08-12 RX ADMIN — QUETIAPINE 400 MG: 400 TABLET, FILM COATED ORAL at 21:12

## 2018-08-12 RX ADMIN — ESCITALOPRAM OXALATE 10 MG: 10 TABLET ORAL at 09:14

## 2018-08-12 RX ADMIN — OXYBUTYNIN CHLORIDE 5 MG: 5 TABLET, EXTENDED RELEASE ORAL at 09:14

## 2018-08-12 RX ADMIN — LAMOTRIGINE 200 MG: 100 TABLET ORAL at 21:12

## 2018-08-12 RX ADMIN — GABAPENTIN 100 MG: 100 CAPSULE ORAL at 21:11

## 2018-08-12 RX ADMIN — CLONAZEPAM 0.5 MG: 0.5 TABLET ORAL at 15:50

## 2018-08-12 RX ADMIN — TOPIRAMATE 50 MG: 25 TABLET, FILM COATED ORAL at 09:14

## 2018-08-12 RX ADMIN — CLONAZEPAM 0.5 MG: 0.5 TABLET ORAL at 09:14

## 2018-08-12 RX ADMIN — MELATONIN TAB 3 MG 3 MG: 3 TAB at 21:12

## 2018-08-12 RX ADMIN — NICOTINE 21 MG: 21 PATCH, EXTENDED RELEASE TRANSDERMAL at 09:19

## 2018-08-12 RX ADMIN — GABAPENTIN 100 MG: 100 CAPSULE ORAL at 15:50

## 2018-08-12 RX ADMIN — DOCUSATE SODIUM 100 MG: 100 CAPSULE, LIQUID FILLED ORAL at 09:14

## 2018-08-12 RX ADMIN — TOPIRAMATE 50 MG: 25 TABLET, FILM COATED ORAL at 17:17

## 2018-08-12 RX ADMIN — DOCUSATE SODIUM 100 MG: 100 CAPSULE, LIQUID FILLED ORAL at 17:17

## 2018-08-12 RX ADMIN — GABAPENTIN 100 MG: 100 CAPSULE ORAL at 09:14

## 2018-08-12 RX ADMIN — CLONAZEPAM 0.5 MG: 0.5 TABLET ORAL at 21:11

## 2018-08-12 NOTE — NURSING NOTE
Patient remains compliant with medications and meals  Appetite remains good  No complaints of pain or discomfort  Patient continues to deny suicidal ideation and says she feels pretty good  Patient is cooperative with staff and interacts with several peers without difficulty  No new issues noted at this time

## 2018-08-12 NOTE — NURSING NOTE
Patient reports no suicidal ideation and no depressive or anxiety symptoms this morning  Patient reports feeling "better"  Patient with good appetite and ate 100% of breakfast   No complaints of pain or discomfort  Patient is visible on the unit and interacts with peers without difficulty  Patient is cooperative with staff  Patient is ambulating with rolling walker independently without difficulty  No new issues noted at this time

## 2018-08-12 NOTE — NURSING NOTE
Pt alert and visiting with roommate  Cooperate, took meds without difficulty  No suicidal ideations  Continue to monitor

## 2018-08-12 NOTE — PROGRESS NOTES
Psychiatry Progress Note    Subjective: Interval History     Patient with a brighter affect today  Patient reporting improvement in her depression and anxiety  Patient expressing that she feels that being around people has made a difference in her mood  Patient reports that she needs to figure out something to do when she is out of the hospital so that she is not alone all the time  Patient reports that she is not having any suicidal ideations  No psychosis  Patient with no behavioral disturbances  Patient has been medication and meal compliant  Patient slept well last evening        Current medications:    Current Facility-Administered Medications:     acetaminophen (TYLENOL) tablet 650 mg, 650 mg, Oral, Q6H PRN, BELL Choe-C    aluminum-magnesium hydroxide-simethicone (MYLANTA) 200-200-20 mg/5 mL oral suspension 30 mL, 30 mL, Oral, Q4H PRN, ALBERTO Tracy    amLODIPine (NORVASC) tablet 2 5 mg, 2 5 mg, Oral, Daily, Cecy Siu MD, 2 5 mg at 08/11/18 3135    clonazePAM (KlonoPIN) tablet 0 5 mg, 0 5 mg, Oral, TID, BELL Choe-C, 0 5 mg at 08/11/18 2110    docusate sodium (COLACE) capsule 100 mg, 100 mg, Oral, BID, Cecy Siu MD, 100 mg at 08/11/18 1737    escitalopram (LEXAPRO) tablet 10 mg, 10 mg, Oral, Daily, Emma Counter, PA-C, 10 mg at 08/11/18 8322    gabapentin (NEURONTIN) capsule 100 mg, 100 mg, Oral, TID, Adama Quezada MD, 100 mg at 08/11/18 2111    lamoTRIgine (LaMICtal) tablet 200 mg, 200 mg, Oral, HS, Emma Counter, PA-C, 200 mg at 08/11/18 2111    LORazepam (ATIVAN) 2 mg/mL injection 0 5 mg, 0 5 mg, Intramuscular, Q4H PRN, Crystal More PA-C    LORazepam (ATIVAN) tablet 0 5 mg, 0 5 mg, Oral, Q4H PRN, Crystal More PA-C    melatonin tablet 3 mg, 3 mg, Oral, HS PRN, BELL Choe-C    nicotine (NICODERM CQ) 21 mg/24 hr TD 24 hr patch 21 mg, 21 mg, Transdermal, Daily, ALBERTO Tracy, 21 mg at 08/11/18 0819   OLANZapine (ZyPREXA) IM injection 5 mg, 5 mg, Intramuscular, Q4H PRN, Erica Sarkar PA-C    OLANZapine (ZyPREXA) tablet 5 mg, 5 mg, Oral, Q4H PRN, Erica Sarkar PA-C    oxybutynin (DITROPAN-XL) 24 hr tablet 5 mg, 5 mg, Oral, Daily, Sayda Bowling MD, 5 mg at 08/11/18 4097    QUEtiapine (SEROquel) tablet 400 mg, 400 mg, Oral, HS, Erica Sarkar PA-C, 400 mg at 08/11/18 2111    topiramate (TOPAMAX) tablet 50 mg, 50 mg, Oral, BID, Akbar Arias PA-C, 50 mg at 08/11/18 1737    traZODone (DESYREL) tablet 50 mg, 50 mg, Oral, HS PRN, Erica Sarkar PA-C      Objective:     Vital Signs:  Vitals:    08/10/18 1621 08/11/18 0745 08/11/18 1530 08/12/18 0730   BP: 132/63 114/63 124/64 156/68   BP Location: Right arm Left arm Right arm Left arm   Pulse: 77 76 72 83   Resp: 16 18 18 19   Temp: (!) 97 1 °F (36 2 °C) (!) 97 1 °F (36 2 °C) 98 2 °F (36 8 °C) (!) 96 8 °F (36 °C)   TempSrc: Temporal Temporal Temporal Temporal   SpO2: 98% 98% 98% 95%   Weight:       Height:             Appearance:  age appropriate and casually dressed   Behavior:  normal   Speech:  normal volume   Mood:  depressed   Affect:  constricted   Thought Process:  normal   Thought Content:  normal   Perceptual Disturbances: None   Risk Potential: Suicidal Ideations without plan   Sensorium:  person, place, situation and time   Cognition:  intact   Consciousness:  alert and awake    Attention: attention span and concentration were age appropriate   Intellect: average   Insight:  limited   Judgment: limited      Motor Activity: no abnormal movements           Recent Labs:  Results Reviewed     Procedure Component Value Units Date/Time    Rapid drug screen, urine [[id number]]  (Normal) Collected:  08/08/18 1602    Lab Status:  Final result Specimen:  Urine from Urine, Other Updated:  08/08/18 1702     Amph/Meth UR Negative     Barbiturate Ur Negative     Benzodiazepine Urine Negative     Cocaine Urine Negative     Methadone Urine Negative     Opiate Urine Negative     PCP Ur Negative     THC Urine Negative    Narrative:         FOR MEDICAL PURPOSES ONLY  IF CONFIRMATION NEEDED PLEASE CONTACT THE LAB WITHIN 5 DAYS  Drug Screen Cutoff Levels:  AMPHETAMINE/METHAMPHETAMINES  1000 ng/mL  BARBITURATES     200 ng/mL  BENZODIAZEPINES     200 ng/mL  COCAINE      300 ng/mL  METHADONE      300 ng/mL  OPIATES      300 ng/mL  PHENCYCLIDINE     25 ng/mL  THC       50 ng/mL    Basic metabolic panel [[id number]]  (Abnormal) Collected:  08/08/18 1506    Lab Status:  Final result Specimen:  Blood from Arm, Left Updated:  08/08/18 1614     Sodium 144 mmol/L      Potassium 4 2 mmol/L      Chloride 110 (H) mmol/L      CO2 28 mmol/L      Anion Gap 6 mmol/L      BUN 11 mg/dL      Creatinine 0 55 (L) mg/dL      Glucose 93 mg/dL      Calcium 8 7 mg/dL      eGFR 87 ml/min/1 73sq m     Narrative:       Hemolysis  National Kidney Disease Education Program recommendations are as follows:  GFR calculation is accurate only with a steady state creatinine  Chronic Kidney disease less than 60 ml/min/1 73 sq  meters  Kidney failure less than 15 ml/min/1 73 sq  meters      Ethanol [[id number]]  (Normal) Collected:  08/08/18 1506    Lab Status:  Final result Specimen:  Blood from Arm, Left Updated:  08/08/18 1613     Ethanol Lvl <10 mg/dL     CBC and differential [[id number]]  (Abnormal) Collected:  08/08/18 1506    Lab Status:  Final result Specimen:  Blood from Arm, Left Updated:  08/08/18 1519     WBC 5 50 Thousand/uL      RBC 3 60 (L) Million/uL      Hemoglobin 11 1 (L) g/dL      Hematocrit 33 8 (L) %      MCV 94 fL      MCH 30 8 pg      MCHC 32 8 g/dL      RDW 14 4 %      MPV 6 7 (L) fL      Platelets 814 Thousands/uL      Neutrophils Relative 68 (H) %      Lymphocytes Relative 24 %      Monocytes Relative 6 %      Eosinophils Relative 1 %      Basophils Relative 0 %      Neutrophils Absolute 3 70 Thousands/µL      Lymphocytes Absolute 1 30 Thousands/µL      Monocytes Absolute 0 40 Thousand/µL      Eosinophils Absolute 0 10 Thousand/µL      Basophils Absolute 0 00 Thousands/µL           I/O Past 24 hours:  I/O last 3 completed shifts: In: 1240 [P O :1240]  Out: -   No intake/output data recorded  Assessment / Plan:     [facility]    Recommended Treatment:      Medication changes:  1) continue current medication regimen  Non-pharmacological treatments  1) Continue with group therapy, milieu therapy and occupational therapy  Safety  1) Safety/communication plan established targeting dynamic risk factors above  2) Risks, benefits, and possible side effects of medications explained to patient and patient verbalizes understanding  Counseling / Coordination of Care    Total floor / unit time spent today 20 minutes  Greater than 50% of total time was spent with the patient and / or family counseling and / or coordination of care  A description of the counseling / coordination of care  Patient's Rights, confidentiality and exceptions to confidentiality, use of automated medical record, [facility] staff access to medical record, and consent to treatment reviewed      Chad Barbour PA-C

## 2018-08-13 PROCEDURE — 97150 GROUP THERAPEUTIC PROCEDURES: CPT

## 2018-08-13 RX ORDER — GABAPENTIN 100 MG/1
100 CAPSULE ORAL 3 TIMES DAILY
Qty: 90 CAPSULE | Refills: 0 | Status: SHIPPED | OUTPATIENT
Start: 2018-08-13 | End: 2019-04-17 | Stop reason: HOSPADM

## 2018-08-13 RX ORDER — AMOXICILLIN 250 MG
1 CAPSULE ORAL
Status: DISCONTINUED | OUTPATIENT
Start: 2018-08-13 | End: 2018-08-14 | Stop reason: HOSPADM

## 2018-08-13 RX ORDER — TOPIRAMATE 50 MG/1
50 TABLET, FILM COATED ORAL 2 TIMES DAILY
Qty: 60 TABLET | Refills: 0 | Status: SHIPPED | OUTPATIENT
Start: 2018-08-13

## 2018-08-13 RX ORDER — OXYBUTYNIN CHLORIDE 5 MG/1
5 TABLET, EXTENDED RELEASE ORAL DAILY
Qty: 30 TABLET | Refills: 0 | Status: SHIPPED | OUTPATIENT
Start: 2018-08-14 | End: 2018-08-29 | Stop reason: SDUPTHER

## 2018-08-13 RX ORDER — LAMOTRIGINE 200 MG/1
200 TABLET ORAL
Qty: 30 TABLET | Refills: 0 | Status: SHIPPED | OUTPATIENT
Start: 2018-08-13 | End: 2019-12-06 | Stop reason: SDUPTHER

## 2018-08-13 RX ORDER — BISACODYL 10 MG
10 SUPPOSITORY, RECTAL RECTAL DAILY PRN
Status: DISCONTINUED | OUTPATIENT
Start: 2018-08-13 | End: 2018-08-14 | Stop reason: HOSPADM

## 2018-08-13 RX ORDER — AMLODIPINE BESYLATE 2.5 MG/1
2.5 TABLET ORAL DAILY
Qty: 30 TABLET | Refills: 0 | Status: SHIPPED | OUTPATIENT
Start: 2018-08-14 | End: 2018-08-23 | Stop reason: SDUPTHER

## 2018-08-13 RX ORDER — DOCUSATE SODIUM 100 MG/1
100 CAPSULE, LIQUID FILLED ORAL 2 TIMES DAILY
Qty: 60 CAPSULE | Refills: 0 | Status: SHIPPED | OUTPATIENT
Start: 2018-08-13 | End: 2018-08-29 | Stop reason: SDUPTHER

## 2018-08-13 RX ORDER — CLONAZEPAM 0.5 MG/1
0.5 TABLET ORAL 3 TIMES DAILY
Qty: 90 TABLET | Refills: 0 | Status: SHIPPED | OUTPATIENT
Start: 2018-08-13 | End: 2019-03-31

## 2018-08-13 RX ORDER — QUETIAPINE FUMARATE 200 MG/1
400 TABLET, FILM COATED ORAL
Qty: 60 TABLET | Refills: 0 | Status: SHIPPED | OUTPATIENT
Start: 2018-08-13

## 2018-08-13 RX ORDER — ESCITALOPRAM OXALATE 10 MG/1
10 TABLET ORAL DAILY
Qty: 30 TABLET | Refills: 0 | Status: SHIPPED | OUTPATIENT
Start: 2018-08-13 | End: 2019-04-17 | Stop reason: HOSPADM

## 2018-08-13 RX ADMIN — LAMOTRIGINE 200 MG: 100 TABLET ORAL at 20:38

## 2018-08-13 RX ADMIN — GABAPENTIN 100 MG: 100 CAPSULE ORAL at 20:33

## 2018-08-13 RX ADMIN — NICOTINE 21 MG: 21 PATCH, EXTENDED RELEASE TRANSDERMAL at 08:45

## 2018-08-13 RX ADMIN — ESCITALOPRAM OXALATE 10 MG: 10 TABLET ORAL at 08:31

## 2018-08-13 RX ADMIN — CLONAZEPAM 0.5 MG: 0.5 TABLET ORAL at 08:30

## 2018-08-13 RX ADMIN — TOPIRAMATE 50 MG: 25 TABLET, FILM COATED ORAL at 17:53

## 2018-08-13 RX ADMIN — OXYBUTYNIN CHLORIDE 5 MG: 5 TABLET, EXTENDED RELEASE ORAL at 08:31

## 2018-08-13 RX ADMIN — QUETIAPINE 400 MG: 400 TABLET, FILM COATED ORAL at 20:38

## 2018-08-13 RX ADMIN — DOCUSATE SODIUM 100 MG: 100 CAPSULE, LIQUID FILLED ORAL at 17:53

## 2018-08-13 RX ADMIN — DOCUSATE SODIUM 100 MG: 100 CAPSULE, LIQUID FILLED ORAL at 08:31

## 2018-08-13 RX ADMIN — SENNOSIDES AND DOCUSATE SODIUM 1 TABLET: 8.6; 5 TABLET ORAL at 20:33

## 2018-08-13 RX ADMIN — CLONAZEPAM 0.5 MG: 0.5 TABLET ORAL at 20:33

## 2018-08-13 RX ADMIN — AMLODIPINE BESYLATE 2.5 MG: 2.5 TABLET ORAL at 08:31

## 2018-08-13 RX ADMIN — GABAPENTIN 100 MG: 100 CAPSULE ORAL at 08:31

## 2018-08-13 RX ADMIN — CLONAZEPAM 0.5 MG: 0.5 TABLET ORAL at 17:53

## 2018-08-13 RX ADMIN — TOPIRAMATE 50 MG: 25 TABLET, FILM COATED ORAL at 08:31

## 2018-08-13 RX ADMIN — GABAPENTIN 100 MG: 100 CAPSULE ORAL at 17:53

## 2018-08-13 NOTE — NURSING NOTE
Pt alert and oriented times three  No complaints offered  No suicidal ideations  Continue q 15 min checks

## 2018-08-13 NOTE — PROGRESS NOTES
Patient pleasant, cooperative, no issues offered   Med and meal compliant   Will continue to monitor

## 2018-08-13 NOTE — PLAN OF CARE
Problem: OCCUPATIONAL THERAPY ADULT  Goal: Performs self-care activities at highest level of function for planned discharge setting  See evaluation for individualized goals  Treatment Interventions: ADL retraining, Continued evaluation, Activityengagement (coping skills training, life management training)          See flowsheet documentation for full assessment, interventions and recommendations  Outcome: Progressing  Limitation: Decreased ADL status, Decreased high-level ADLs, Mood limitation (decreased coping skills, decreased life management tasks)  Prognosis: Good  Assessment: Sheeba Champagne was present for most of this OT socialization program  She carried out many of the morning stretch exercises  She was pleasant, engaged in some current events discussion  She appeared to have a good sense of humor at times, and she did make humorous side comments  She was supportive of group process  Her motivation for program has been good  Progress has been consistent towards her goals  Continue to promote positive focus and positive group investment

## 2018-08-13 NOTE — OCCUPATIONAL THERAPY NOTE
Occupational Therapy Group Treatment Note      Abbie Pooja    8/13/2018    Patient Active Problem List   Diagnosis    Depression    Gastroesophageal reflux disease    Hyperlipidemia    Essential hypertension    Tobacco dependence syndrome    Sciatica    Diarrhea    Bipolar 1 disorder ([address] )       Past Medical History:   Diagnosis Date    Anxiety     Depression     Psychiatric disorder        Past Surgical History:   Procedure Laterality Date    ADENOIDECTOMY      CATARACT EXTRACTION      CHOLECYSTECTOMY      HIP SURGERY      L hip    TONSILLECTOMY      WRIST SURGERY      L wrist        08/13/18 1005   Assessment   Assessment Sherry Metz joined this Life Management program for the full session  She was alert, attentive  She discussed body language, what was suggested in different pictures  She also discussed other forms of communications  She talked about strategies for motivation, stating that she was sometimes her own worst critic  She added that she has felt guilty regarding that she had to put her late  in a nursing home because she was not able to take care of him  She was engaged and supportive of group process  She also stated that she does not like to be alone, but she is not sure if she is ready to go to an senior living  Progress has been consistent towards her goals  Her efforts were commended  Plan   Treatment Interventions ADL retraining;Continued evaluation; Activityengagement  (coping skill, life management skills)   Goal Expiration Date 09/09/18   Treatment Day 4   OT Frequency 5x/wk   Chemo Median, OT

## 2018-08-13 NOTE — OCCUPATIONAL THERAPY NOTE
Occupational Therapy Group Treatment Note      Madhuri Durhamaman    8/13/2018    Patient Active Problem List   Diagnosis    Depression    Gastroesophageal reflux disease    Hyperlipidemia    Essential hypertension    Tobacco dependence syndrome    Sciatica    Diarrhea    Bipolar 1 disorder ([address] )       Past Medical History:   Diagnosis Date    Anxiety     Depression     Psychiatric disorder        Past Surgical History:   Procedure Laterality Date    ADENOIDECTOMY      CATARACT EXTRACTION      CHOLECYSTECTOMY      HIP SURGERY      L hip    TONSILLECTOMY      WRIST SURGERY      L wrist        08/13/18 1105   Assessment   Assessment Travis Clemente was present for most of this OT socialization program  She carried out many of the morning stretch exercises  She was pleasant, engaged in some current events discussion  She appeared to have a good sense of humor at times, and she did make humorous side comments  She was supportive of group process  Her motivation for program has been good  Progress has been consistent towards her goals  Continue to promote positive focus and positive group investment  Plan   Treatment Interventions ADL retraining;Continued evaluation; Activityengagement  (coping skills training, life management training)   Goal Expiration Date 09/09/18   Treatment Day 4   OT Frequency 5x/wk   Citlali Kelsey OT

## 2018-08-13 NOTE — SOCIAL WORK
KAYLEE spoke with son, Heron, in regards to discharge  Son does not believe that she should be discharged home alone but KAYLEE explained that from a hospital standpoint we have no reason to not allow patient to return home  KAYLEE explained the process of opening a Critical access hospital case due to his concerns, patient's son stated that is the same thing the family PCP stated a couple weeks ago and son never followed through  Per son, he wants no responsibility in bringing her home  KAYLEE will set up transportation  KAYLEE called and put in referral for [facility] to go out and assess patient in the community, [phone]  [place] will be out post discharge to evaluate patient  [place] will be calling [place] to discuss this further  Son is in agreement and is thankful for this  SW will continue to follow up provide services as needed

## 2018-08-13 NOTE — PLAN OF CARE
Problem: OCCUPATIONAL THERAPY ADULT  Goal: Performs self-care activities at highest level of function for planned discharge setting  See evaluation for individualized goals  Treatment Interventions: ADL retraining, Continued evaluation, Activityengagement (coping skill, life management skills)          See flowsheet documentation for full assessment, interventions and recommendations  Outcome: Progressing  Limitation: Decreased ADL status, Decreased high-level ADLs, Mood limitation (decreased coping skills, decreased life management tasks)  Prognosis: Good  Assessment: Jake Hercules joined this Life Management program for the full session  She was alert, attentive  She discussed body language, what was suggested in different pictures  She also discussed other forms of communications  She talked about strategies for motivation, stating that she was sometimes her own worst critic  She added that she has felt guilty regarding that she had to put her late  in a nursing home because she was not able to take care of him  She was engaged and supportive of group process  She also stated that she does not like to be alone, but she is not sure if she is ready to go to an [facility]  Progress has been consistent towards her goals  Her efforts were commended

## 2018-08-13 NOTE — PLAN OF CARE
Problem: DISCHARGE PLANNING  Goal: Discharge to home or other facility with appropriate resources  INTERVENTIONS:  - Identify barriers to discharge w/patient and caregiver  - Arrange for needed discharge resources and transportation as appropriate  - Identify discharge learning needs (meds, wound care, etc )  - Arrange for interpretive services to assist at discharge as needed  - Refer to Case Management Department for coordinating discharge planning if the patient needs post-hospital services based on physician/advanced practitioner order or complex needs related to functional status, cognitive ability, or social support system  Outcome: Completed Date Met: 08/13/18  Patient is in agreement with d/c on 08/14/2018  Family is aware and also in agreement  Patient aware of appointments

## 2018-08-13 NOTE — PROGRESS NOTES
Patient pleasant, happy to be d/c tomorrow  Refused laxative PRN  states '' I am going home tomorrow, I don't a  want mess up I will take when I am in home ''   Med and meal compliant  Will continue to monitor

## 2018-08-13 NOTE — PROGRESS NOTES
Psychiatry Progress Note    Subjective: Interval History     The patient states I feel better around people    The patient rates her depression a 4/10  She denies feeling anxious at this time  She states she has been feeling better since being in the hospital because she is around others  She states she lives alone and her  passed 5 years ago  She said for the first 2 years she was able to handle everything herself but feels that she is struggling now  She states she also has a walker and has a 2 story home  She states she has not been able to get up to the 2nd story which has added to her depression  Patient states she was not making herself meals at home her eating  Patient has been eating here  Patient states she slept well last night  Patient denies any suicidal or homicidal ideation  She denies any auditory or visual hallucinations  Will discuss with social work for safe and appropriate discharge        Current medications:    Current Facility-Administered Medications:     acetaminophen (TYLENOL) tablet 650 mg, 650 mg, Oral, Q6H PRN, Letty Trent PA-C    aluminum-magnesium hydroxide-simethicone (MYLANTA) 200-200-20 mg/5 mL oral suspension 30 mL, 30 mL, Oral, Q4H PRN, ALBERTO Tracy    amLODIPine (NORVASC) tablet 2 5 mg, 2 5 mg, Oral, Daily, Robe Yee MD, 2 5 mg at 08/12/18 0914    bisacodyl (DULCOLAX) rectal suppository 10 mg, 10 mg, Rectal, Daily PRN, Maria De Jesus Carlos PA-C    clonazePAM (KlonoPIN) tablet 0 5 mg, 0 5 mg, Oral, TID, Letty Trent PA-C, 0 5 mg at 08/12/18 2111    docusate sodium (COLACE) capsule 100 mg, 100 mg, Oral, BID, Robe Yee MD, 100 mg at 08/12/18 1717    escitalopram (LEXAPRO) tablet 10 mg, 10 mg, Oral, Daily, Peg DAISY Carlos, 10 mg at 08/12/18 0914    gabapentin (NEURONTIN) capsule 100 mg, 100 mg, Oral, TID, Theron David MD, 100 mg at 08/12/18 2111    lamoTRIgine (LaMICtal) tablet 200 mg, 200 mg, Oral, HS, Jeni Arvoneyda Oliver PA-C, 200 mg at 08/12/18 2112    LORazepam (ATIVAN) 2 mg/mL injection 0 5 mg, 0 5 mg, Intramuscular, Q4H PRN, Digna Castro PA-C    LORazepam (ATIVAN) tablet 0 5 mg, 0 5 mg, Oral, Q4H PRN, Digna Castro PA-C    magnesium hydroxide (MILK OF MAGNESIA) 400 mg/5 mL oral suspension 30 mL, 30 mL, Oral, Daily PRN, Anastacia Montoya PA-C    melatonin tablet 3 mg, 3 mg, Oral, HS PRN, Digna Castro PA-C, 3 mg at 08/12/18 2112    nicotine (NICODERM CQ) 21 mg/24 hr TD 24 hr patch 21 mg, 21 mg, Transdermal, Daily, Hortencia Ciminieri, CRNP, 21 mg at 08/12/18 0919    OLANZapine (ZyPREXA) IM injection 5 mg, 5 mg, Intramuscular, Q4H PRN, Digna Castro PA-C    OLANZapine (ZyPREXA) tablet 5 mg, 5 mg, Oral, Q4H PRN, Digna Castro PA-C    oxybutynin (DITROPAN-XL) 24 hr tablet 5 mg, 5 mg, Oral, Daily, Rebeca Arteaga MD, 5 mg at 08/12/18 0914    QUEtiapine (SEROquel) tablet 400 mg, 400 mg, Oral, HS, Digna Castro PA-C, 400 mg at 08/12/18 2112    senna-docusate sodium (SENOKOT S) 8 6-50 mg per tablet 1 tablet, 1 tablet, Oral, HS PRN, Anastacia Montoya PA-C    topiramate (TOPAMAX) tablet 50 mg, 50 mg, Oral, BID, Gil Arreola PA-C, 50 mg at 08/12/18 1717    traZODone (DESYREL) tablet 50 mg, 50 mg, Oral, HS PRN, Digna Castro PA-C      Objective:     Vital Signs:  Vitals:    08/11/18 0745 08/11/18 1530 08/12/18 0730 08/12/18 1530   BP: 114/63 124/64 156/68 121/72   BP Location: Left arm Right arm Left arm Right arm   Pulse: 76 72 83 78   Resp: 18 18 19 16   Temp: (!) 97 1 °F (36 2 °C) 98 2 °F (36 8 °C) (!) 96 8 °F (36 °C) 97 9 °F (36 6 °C)   TempSrc: Temporal Temporal Temporal Temporal   SpO2: 98% 98% 95% 96%   Weight:       Height:             Appearance:  age appropriate and casually dressed   Behavior:  normal   Speech:  normal volume   Mood:  depressed   Affect:  constricted   Thought Process:  normal   Thought Content:  normal   Perceptual Disturbances: None   Risk Potential: none   Sensorium:  person, place, situation and time   Cognition:  intact   Consciousness:  alert and awake    Attention: attention span and concentration were age appropriate   Intellect: average   Insight:  fair   Judgment: fair      Motor Activity: no abnormal movements           Recent Labs:  Results Reviewed     Procedure Component Value Units Date/Time    Rapid drug screen, urine [[id number]]  (Normal) Collected:  08/08/18 1602    Lab Status:  Final result Specimen:  Urine from Urine, Other Updated:  08/08/18 1702     Amph/Meth UR Negative     Barbiturate Ur Negative     Benzodiazepine Urine Negative     Cocaine Urine Negative     Methadone Urine Negative     Opiate Urine Negative     PCP Ur Negative     THC Urine Negative    Narrative:         FOR MEDICAL PURPOSES ONLY  IF CONFIRMATION NEEDED PLEASE CONTACT THE LAB WITHIN 5 DAYS  Drug Screen Cutoff Levels:  AMPHETAMINE/METHAMPHETAMINES  1000 ng/mL  BARBITURATES     200 ng/mL  BENZODIAZEPINES     200 ng/mL  COCAINE      300 ng/mL  METHADONE      300 ng/mL  OPIATES      300 ng/mL  PHENCYCLIDINE     25 ng/mL  THC       50 ng/mL    Basic metabolic panel [[id number]]  (Abnormal) Collected:  08/08/18 1506    Lab Status:  Final result Specimen:  Blood from Arm, Left Updated:  08/08/18 1614     Sodium 144 mmol/L      Potassium 4 2 mmol/L      Chloride 110 (H) mmol/L      CO2 28 mmol/L      Anion Gap 6 mmol/L      BUN 11 mg/dL      Creatinine 0 55 (L) mg/dL      Glucose 93 mg/dL      Calcium 8 7 mg/dL      eGFR 87 ml/min/1 73sq m     Narrative:       Hemolysis  National Kidney Disease Education Program recommendations are as follows:  GFR calculation is accurate only with a steady state creatinine  Chronic Kidney disease less than 60 ml/min/1 73 sq  meters  Kidney failure less than 15 ml/min/1 73 sq  meters      Ethanol [[id number]]  (Normal) Collected:  08/08/18 1506    Lab Status:  Final result Specimen:  Blood from Arm, Left Updated:  08/08/18 1613 Ethanol Lvl <10 mg/dL     CBC and differential [[id number]]  (Abnormal) Collected:  08/08/18 1506    Lab Status:  Final result Specimen:  Blood from Arm, Left Updated:  08/08/18 1519     WBC 5 50 Thousand/uL      RBC 3 60 (L) Million/uL      Hemoglobin 11 1 (L) g/dL      Hematocrit 33 8 (L) %      MCV 94 fL      MCH 30 8 pg      MCHC 32 8 g/dL      RDW 14 4 %      MPV 6 7 (L) fL      Platelets 460 Thousands/uL      Neutrophils Relative 68 (H) %      Lymphocytes Relative 24 %      Monocytes Relative 6 %      Eosinophils Relative 1 %      Basophils Relative 0 %      Neutrophils Absolute 3 70 Thousands/µL      Lymphocytes Absolute 1 30 Thousands/µL      Monocytes Absolute 0 40 Thousand/µL      Eosinophils Absolute 0 10 Thousand/µL      Basophils Absolute 0 00 Thousands/µL           I/O Past 24 hours:  I/O last 3 completed shifts: In: 600 [P O :600]  Out: -   No intake/output data recorded  Assessment / Plan:     [facility]    Recommended Treatment:      Medication changes:  1) Continue current medication regimen  Non-pharmacological treatments  1) Continue with group therapy, milieu therapy and occupational therapy  Safety  1) Safety/communication plan established targeting dynamic risk factors above  2) Risks, benefits, and possible side effects of medications explained to patient and patient verbalizes understanding  Counseling / Coordination of Care    Total floor / unit time spent today 20 minutes  Greater than 50% of total time was spent with the patient and / or family counseling and / or coordination of care  A description of the counseling / coordination of care  Patient's Rights, confidentiality and exceptions to confidentiality, use of automated medical record, [address] [place], [place] [address]nn [facility] staff access to medical record, and consent to treatment reviewed      Case Peraza PA-C

## 2018-08-13 NOTE — DISCHARGE INSTR - OTHER ORDERS
[facility]) AT [place] [facility] - licensed telephone and mobile crisis services that provide mental health assessments to all age groups regardless of income or insurance  [facility] operates 24-hour/7 days a week  [address] [place] [address] assists consumer who are experiencing a mental health emergency and lack the resources to assist themselves  Immediate intervention for suicidal and depressed individuals with home visits/outreach being top priority  Crisis staff assists consumer in [facility] including placement in [address] [place], [place] [address]y can be contacted at [phone]         For additional information and eligibility requirements of the programs and services available through the [facility] of Aging and Adult Services, you may contact the [facility] [address] and Referral Unit at [phone] or email our agency at [email or web address]

## 2018-08-14 VITALS
HEIGHT: 63 IN | HEART RATE: 77 BPM | WEIGHT: 117.4 LBS | TEMPERATURE: 98 F | BODY MASS INDEX: 20.8 KG/M2 | RESPIRATION RATE: 16 BRPM | OXYGEN SATURATION: 96 % | DIASTOLIC BLOOD PRESSURE: 63 MMHG | SYSTOLIC BLOOD PRESSURE: 134 MMHG

## 2018-08-14 RX ADMIN — CLONAZEPAM 0.5 MG: 0.5 TABLET ORAL at 08:16

## 2018-08-14 RX ADMIN — ESCITALOPRAM OXALATE 10 MG: 10 TABLET ORAL at 08:16

## 2018-08-14 RX ADMIN — OXYBUTYNIN CHLORIDE 5 MG: 5 TABLET, EXTENDED RELEASE ORAL at 08:17

## 2018-08-14 RX ADMIN — DOCUSATE SODIUM 100 MG: 100 CAPSULE, LIQUID FILLED ORAL at 08:17

## 2018-08-14 RX ADMIN — GABAPENTIN 100 MG: 100 CAPSULE ORAL at 08:16

## 2018-08-14 RX ADMIN — NICOTINE 21 MG: 21 PATCH, EXTENDED RELEASE TRANSDERMAL at 08:17

## 2018-08-14 RX ADMIN — AMLODIPINE BESYLATE 2.5 MG: 2.5 TABLET ORAL at 08:17

## 2018-08-14 RX ADMIN — TOPIRAMATE 50 MG: 25 TABLET, FILM COATED ORAL at 08:17

## 2018-08-14 NOTE — PLAN OF CARE
Problem: Alteration in Thoughts and Perception  Goal: Treatment Goal: Gain control of psychotic behaviors/thinking, reduce/eliminate presenting symptoms and demonstrate improved reality functioning upon discharge  Outcome: Adequate for Discharge    Goal: Verbalize thoughts and feelings  Interventions:  - Promote a nonjudgmental and trusting relationship with the patient through active listening and therapeutic communication  - Assess patient's level of functioning, behavior and potential for risk  - Engage patient in 1 on 1 interactions for a minimum of 15 minutes each session  - Encourage patient to express fears, feelings, frustrations, and discuss symptoms    - [place] patient to reality, help patient recognize reality-based thinking   - Administer medications as ordered and assess for potential side effects  - Provide the patient education related to the signs and symptoms of the illness and desired effects of prescribed medications   Outcome: Adequate for Discharge    Goal: Refrain from acting on delusional thinking/internal stimuli  Interventions:  - Monitor patient closely, per order   - Utilize least restrictive measures   - Set reasonable limits, give positive feedback for acceptable   - Administer medications as ordered and monitor of potential side effects   Outcome: Adequate for Discharge    Goal: Agree to be compliant with medication regime, as prescribed and report medication side effects  Interventions:  - Offer appropriate PRN medication and supervise ingestion; conduct aims, as needed    Outcome: Adequate for Discharge    Goal: Attend and participate in unit activities, including therapeutic, recreational, and educational groups  Interventions:  - Provide therapeutic and educational activities daily, encourage attendance and participation, and document same in the medical record    Outcome: Adequate for Discharge    Goal: Recognize dysfunctional thoughts, communicate reality-based thoughts at the time of discharge  Interventions:  - Provide medication and psycho-education to assist patient in compliance and developing insight into his/her illness    Outcome: Adequate for Discharge    Goal: Complete daily ADLs, including personal hygiene independently, as able  Interventions:  - Observe, teach, and assist patient with ADLS  - Monitor and promote a balance of rest/activity, with adequate nutrition and elimination    Outcome: Adequate for Discharge      Problem: Ineffective Coping  Goal: Cooperates with admission process  Interventions:   - Complete admission process   Outcome: Adequate for Discharge    Goal: Identifies ineffective coping skills  Outcome: Adequate for Discharge    Goal: Identifies healthy coping skills  Outcome: Adequate for Discharge    Goal: Demonstrates healthy coping skills  Outcome: Adequate for Discharge    Goal: Participates in unit activities  Interventions:  - Provide therapeutic environment   - Provide required programming   - Redirect inappropriate behaviors    Outcome: Adequate for Discharge    Goal: Patient/Family participate in treatment and DC plans  Interventions:  - Provide therapeutic environment   Outcome: Adequate for Discharge    Goal: Patient/Family verbalizes awareness of resources  Outcome: Adequate for Discharge    Goal: Understands least restrictive measures  Interventions:  - Utilize least restrictive behavior   Outcome: Adequate for Discharge    Goal: Free from restraint events  - Utilize least restrictive measures   - Provide behavioral interventions   - Redirect inappropriate behaviors    Outcome: Adequate for Discharge      Problem: Depression  Goal: Treatment Goal: Demonstrate behavioral control of depressive symptoms, verbalize feelings of improved mood/affect, and adopt new coping skills prior to discharge  Outcome: Adequate for Discharge    Goal: Verbalize thoughts and feelings  Interventions:  - Assess and re-assess patient's level of risk   - Engage patient in 1:1 interactions, daily, for a minimum of 15 minutes   - Encourage patient to express feelings, fears, frustrations, hopes    Outcome: Adequate for Discharge    Goal: Refrain from harming self  Interventions:  - Monitor patient closely, per order   - Supervise medication ingestion, monitor effects and side effects    Outcome: Adequate for Discharge    Goal: Refrain from isolation  Interventions:  - Develop a trusting relationship   - Encourage socialization    Outcome: Adequate for Discharge    Goal: Refrain from self-neglect  Outcome: Adequate for Discharge    Goal: Attend and participate in unit activities, including therapeutic, recreational, and educational groups  Interventions:  - Provide therapeutic and educational activities daily, encourage attendance and participation, and document same in the medical record    Outcome: Adequate for Discharge    Goal: Complete daily ADLs, including personal hygiene independently, as able  Interventions:  - Observe, teach, and assist patient with ADLS  -  Monitor and promote a balance of rest/activity, with adequate nutrition and elimination    Outcome: Adequate for Discharge      Problem: Anxiety  Goal: Anxiety is at manageable level  Interventions:  - Assess and monitor patient's anxiety level  - Monitor for signs and symptoms of anxiety both physical and emotional (heart palpitations, chest pain, shortness of breath, headaches, nausea, feeling jumpy, restlessness, irritable, apprehensive)  - Collaborate with interdisciplinary team and initiate plan and interventions as ordered    - [place] patient to unit/surroundings  - Explain treatment plan  - Encourage participation in care  - Encourage verbalization of concerns/fears  - Identify coping mechanisms  - Assist in developing anxiety-reducing skills  - Administer/offer alternative therapies  - Limit or eliminate stimulants   Outcome: Adequate for Discharge

## 2018-08-14 NOTE — SOCIAL WORK
Patient is being discharged, no SI/HI, no psychosis or A/V  Patient is in agreement with discharge  No questions verbalized  Patient provided with after care appointment, discharge instructions and prescriptions  IMM, core measures, transition of care, discharge instructions, and treatment plan complete  Pt will be picked up by [facility] at 9:30am  SW will continue to follow up as needed

## 2018-08-14 NOTE — NURSING NOTE
Patient d/c to home ,  APOLINAR Martini transported to home  All prescription given to patient and [facility]   All valuables given to patient

## 2018-08-14 NOTE — DISCHARGE SUMMARY
Psychiatric Discharge Summary    Medical Record Number: [id number]  Encounter: [id number]    Discharge diagnosis:  Bipolar 1 disorder ([facility] )    Secondary diagnoses:  Problem List     * (Principal)Bipolar 1 disorder ([facility] )    Depression    Gastroesophageal reflux disease    Hyperlipidemia (Chronic)    Essential hypertension (Chronic)    Tobacco dependence syndrome    Sciatica    Diarrhea          HPI:     Denilson Rubin 51-year-old female admitted to the psychiatric unit under a 201 status to Dr Talita Hutson' service with a chief complaint of increased depression and labile mood  The patient has been having increased anxiety and decreased sleep  The patient has been having a weight loss of 10-20 lb over the last couple months  The patient has no current suicidal thoughts or plan  The patient however is scared that something could happen  The patient does not feel comfortable leaving the hospital on following up outpatient with Dr sosa  The patient stated she did not feel comfortable home because I do not know what would happen if I went home    The patient states she has been feeling depressed and anxious for the last couple of weeks but the last 2-3 days she has not been able to control it  The patient is a  and lives alone  The patient was transferred to the psychiatric unit once medically stable  The patient is alert and oriented x3  The patient is forgetful at times  She denies suicidal ideation  She denies auditory or visual hallucinations  Her speech is within normal limits  The patient does feel depressed and anxious  Her mood is labile  Patient has been seeing Dr sosa for many years  Brief Hospital Course:     After the patient was admitted to the psychiatric unit the patient had medication adjustments to stabilize her mood  Following these medication changes, the patient started to stabilize  Finally on 8/14/2018, the patient was found to be stable for discharge    On the day of discharge the patient reported their symptoms as significantly improved  All psychiatric medications were being tolerated without side effect or adverse event  No suicidal or homicidal ideations were present  The patient expressed their readiness and willingness to be discharged and referral to outpatient treatment was made  The case was discussed with Dr Tomas Horton and he has deemed the patient stable for discharge        Condition on discharge:     Improved    Medications Upon Discharge:           amLODIPine (NORVASC) tablet 2 5 mg, 2 5 mg, Oral, Daily, Joy Zamora MD, 2 5 mg at 08/14/18 0817    clonazePAM (KlonoPIN) tablet 0 5 mg, 0 5 mg, Oral, TID, Alek Tang PA-C, 0 5 mg at 08/14/18 0816    docusate sodium (COLACE) capsule 100 mg, 100 mg, Oral, BID, Joy Zamora MD, 100 mg at 08/14/18 0817    escitalopram (LEXAPRO) tablet 10 mg, 10 mg, Oral, Daily, Jus Cunningham PA-C, 10 mg at 08/14/18 7592    gabapentin (NEURONTIN) capsule 100 mg, 100 mg, Oral, TID, Max Garcia MD, 100 mg at 08/14/18 0816    lamoTRIgine (LaMICtal) tablet 200 mg, 200 mg, Oral, HS, Jus Cunningham PA-C, Stopped at 08/13/18 2123    magnesium hydroxide (MILK OF MAGNESIA) 400 mg/5 mL oral suspension 30 mL, 30 mL, Oral, Daily PRN, Jus Cunningham PA-C    oxybutynin (DITROPAN-XL) 24 hr tablet 5 mg, 5 mg, Oral, Daily, Joy Zamora MD, 5 mg at 08/14/18 4317    QUEtiapine (SEROquel) tablet 400 mg, 400 mg, Oral, HS, Alek Tang PA-C, Stopped at 08/13/18 2123    senna-docusate sodium (SENOKOT S) 8 6-50 mg per tablet 1 tablet, 1 tablet, Oral, HS PRN, Jus Cunningham PA-C, 1 tablet at 08/13/18 2033    topiramate (TOPAMAX) tablet 50 mg, 50 mg, Oral, BID, Ray Shook PA-C, 50 mg at 08/14/18 [address]

## 2018-08-14 NOTE — PROGRESS NOTES
Pt in bed and appears to be sleeping well, no distress or concerns noted  Pt not exhibiting any erratic or self-harming behaviors, SP1 maintained for safety and support

## 2018-08-16 ENCOUNTER — TELEPHONE (OUTPATIENT)
Dept: FAMILY MEDICINE CLINIC | Facility: CLINIC | Age: 83
End: 2018-08-16

## 2018-08-16 NOTE — TELEPHONE ENCOUNTER
Patient called she said that she was in the hospital for her nerves went in last Wednesday she said and wasn't having bowel movements so they gave her colace and she said that she is moving her bowels really bad she wants to know if she should stop taking the colace patient can be reached at [phone] ty

## 2018-08-20 ENCOUNTER — TELEPHONE (OUTPATIENT)
Dept: FAMILY MEDICINE CLINIC | Facility: CLINIC | Age: 83
End: 2018-08-20

## 2018-08-20 ENCOUNTER — TRANSITIONAL CARE MANAGEMENT (OUTPATIENT)
Dept: FAMILY MEDICINE CLINIC | Facility: CLINIC | Age: 83
End: 2018-08-20

## 2018-08-23 ENCOUNTER — OFFICE VISIT (OUTPATIENT)
Dept: FAMILY MEDICINE CLINIC | Facility: CLINIC | Age: 83
End: 2018-08-23
Payer: COMMERCIAL

## 2018-08-23 VITALS
TEMPERATURE: 96.8 F | DIASTOLIC BLOOD PRESSURE: 70 MMHG | SYSTOLIC BLOOD PRESSURE: 130 MMHG | BODY MASS INDEX: 21.01 KG/M2 | WEIGHT: 118.6 LBS

## 2018-08-23 DIAGNOSIS — I10 ESSENTIAL HYPERTENSION: Chronic | ICD-10-CM

## 2018-08-23 PROCEDURE — 99495 TRANSJ CARE MGMT MOD F2F 14D: CPT | Performed by: FAMILY MEDICINE

## 2018-08-23 PROCEDURE — 1160F RVW MEDS BY RX/DR IN RCRD: CPT | Performed by: FAMILY MEDICINE

## 2018-08-23 RX ORDER — AMLODIPINE BESYLATE 2.5 MG/1
2.5 TABLET ORAL DAILY
Qty: 90 TABLET | Refills: 3 | Status: SHIPPED | OUTPATIENT
Start: 2018-08-23 | End: 2018-08-29 | Stop reason: SDUPTHER

## 2018-08-29 ENCOUNTER — TELEPHONE (OUTPATIENT)
Dept: FAMILY MEDICINE CLINIC | Facility: CLINIC | Age: 83
End: 2018-08-29

## 2018-08-29 DIAGNOSIS — I10 ESSENTIAL HYPERTENSION: Chronic | ICD-10-CM

## 2018-08-29 DIAGNOSIS — K59.00 CONSTIPATION, UNSPECIFIED CONSTIPATION TYPE: ICD-10-CM

## 2018-08-29 DIAGNOSIS — N32.81 OVERACTIVE BLADDER: ICD-10-CM

## 2018-08-29 RX ORDER — DOCUSATE SODIUM 100 MG/1
100 CAPSULE, LIQUID FILLED ORAL 2 TIMES DAILY
Qty: 180 CAPSULE | Refills: 3 | Status: SHIPPED | OUTPATIENT
Start: 2018-08-29 | End: 2018-08-31 | Stop reason: SDUPTHER

## 2018-08-29 RX ORDER — OXYBUTYNIN CHLORIDE 5 MG/1
5 TABLET, EXTENDED RELEASE ORAL DAILY
Qty: 90 TABLET | Refills: 3 | Status: SHIPPED | OUTPATIENT
Start: 2018-08-29 | End: 2018-08-31 | Stop reason: SDUPTHER

## 2018-08-29 RX ORDER — AMLODIPINE BESYLATE 2.5 MG/1
2.5 TABLET ORAL DAILY
Qty: 90 TABLET | Refills: 3 | Status: SHIPPED | OUTPATIENT
Start: 2018-08-29 | End: 2018-08-31 | Stop reason: SDUPTHER

## 2018-08-29 NOTE — TELEPHONE ENCOUNTER
Patient called she needs presp for amlodipine 2 5 mg and presp for docusate sodium 100 mg  And presp for Oxybutynin 5 mg [facility] at [phone]    TY

## 2018-08-31 DIAGNOSIS — N32.81 OVERACTIVE BLADDER: ICD-10-CM

## 2018-08-31 DIAGNOSIS — K59.00 CONSTIPATION, UNSPECIFIED CONSTIPATION TYPE: ICD-10-CM

## 2018-08-31 DIAGNOSIS — F31.9 BIPOLAR 1 DISORDER (HCC): ICD-10-CM

## 2018-08-31 DIAGNOSIS — I10 ESSENTIAL HYPERTENSION: Chronic | ICD-10-CM

## 2018-08-31 RX ORDER — DOCUSATE SODIUM 100 MG/1
100 CAPSULE, LIQUID FILLED ORAL 2 TIMES DAILY
Qty: 180 CAPSULE | Refills: 0 | Status: SHIPPED | OUTPATIENT
Start: 2018-08-31 | End: 2019-03-18 | Stop reason: SDUPTHER

## 2018-08-31 RX ORDER — AMLODIPINE BESYLATE 2.5 MG/1
2.5 TABLET ORAL DAILY
Qty: 90 TABLET | Refills: 0 | Status: SHIPPED | OUTPATIENT
Start: 2018-08-31 | End: 2018-11-26 | Stop reason: SDUPTHER

## 2018-08-31 RX ORDER — OXYBUTYNIN CHLORIDE 5 MG/1
5 TABLET, EXTENDED RELEASE ORAL DAILY
Qty: 90 TABLET | Refills: 0 | Status: SHIPPED | OUTPATIENT
Start: 2018-08-31 | End: 2018-11-26 | Stop reason: SDUPTHER

## 2018-11-26 DIAGNOSIS — N32.81 OVERACTIVE BLADDER: ICD-10-CM

## 2018-11-26 DIAGNOSIS — I10 ESSENTIAL HYPERTENSION: Chronic | ICD-10-CM

## 2018-11-27 RX ORDER — AMLODIPINE BESYLATE 2.5 MG/1
2.5 TABLET ORAL DAILY
Qty: 90 TABLET | Refills: 0 | Status: SHIPPED | OUTPATIENT
Start: 2018-11-27 | End: 2018-11-29 | Stop reason: SDUPTHER

## 2018-11-27 RX ORDER — OXYBUTYNIN CHLORIDE 5 MG/1
5 TABLET, EXTENDED RELEASE ORAL DAILY
Qty: 90 TABLET | Refills: 0 | Status: SHIPPED | OUTPATIENT
Start: 2018-11-27 | End: 2018-11-29 | Stop reason: SDUPTHER

## 2018-11-29 DIAGNOSIS — N32.81 OVERACTIVE BLADDER: ICD-10-CM

## 2018-11-29 DIAGNOSIS — I10 ESSENTIAL HYPERTENSION: Chronic | ICD-10-CM

## 2018-11-29 RX ORDER — OXYBUTYNIN CHLORIDE 5 MG/1
5 TABLET, EXTENDED RELEASE ORAL DAILY
Qty: 90 TABLET | Refills: 3 | Status: SHIPPED | OUTPATIENT
Start: 2018-11-29 | End: 2018-12-26 | Stop reason: SDUPTHER

## 2018-11-29 RX ORDER — AMLODIPINE BESYLATE 2.5 MG/1
2.5 TABLET ORAL DAILY
Qty: 90 TABLET | Refills: 3 | Status: SHIPPED | OUTPATIENT
Start: 2018-11-29 | End: 2018-12-26 | Stop reason: SDUPTHER

## 2018-12-05 ENCOUNTER — TELEPHONE (OUTPATIENT)
Dept: FAMILY MEDICINE CLINIC | Facility: CLINIC | Age: 83
End: 2018-12-05

## 2018-12-05 NOTE — TELEPHONE ENCOUNTER
Patient called she would like to speak with you personally she said she would like to know if she should get physical therapy she said that her legs are getting really bad and she does use a walker she said patient can be reached at [phone]   TY

## 2018-12-06 ENCOUNTER — TREATMENT (OUTPATIENT)
Dept: FAMILY MEDICINE CLINIC | Facility: CLINIC | Age: 83
End: 2018-12-06

## 2018-12-06 DIAGNOSIS — R26.2 AMBULATORY DYSFUNCTION: Primary | ICD-10-CM

## 2018-12-20 ENCOUNTER — TELEPHONE (OUTPATIENT)
Dept: FAMILY MEDICINE CLINIC | Facility: CLINIC | Age: 83
End: 2018-12-20

## 2018-12-20 NOTE — TELEPHONE ENCOUNTER
Patient called she would like the at home physical therapy set up for her she said that they can be reached at [phone] and looks like a order is in the sytem for her to have at home physical therapy    TY

## 2018-12-21 NOTE — TELEPHONE ENCOUNTER
Called to get pt set up for home PT and [facility] is not currently taking anymore new referrals right now  Called [facility] and they will reach out to her  Faxed over referral to them  Called and left message for pt that she should be expecting a call from them

## 2018-12-26 DIAGNOSIS — I10 ESSENTIAL HYPERTENSION: Chronic | ICD-10-CM

## 2018-12-26 DIAGNOSIS — N32.81 OVERACTIVE BLADDER: ICD-10-CM

## 2018-12-26 RX ORDER — AMLODIPINE BESYLATE 2.5 MG/1
2.5 TABLET ORAL DAILY
Qty: 90 TABLET | Refills: 3 | Status: SHIPPED | OUTPATIENT
Start: 2018-12-26 | End: 2019-04-17 | Stop reason: HOSPADM

## 2018-12-26 RX ORDER — OXYBUTYNIN CHLORIDE 5 MG/1
5 TABLET, EXTENDED RELEASE ORAL DAILY
Qty: 90 TABLET | Refills: 3 | Status: SHIPPED | OUTPATIENT
Start: 2018-12-26 | End: 2019-02-04 | Stop reason: SDUPTHER

## 2019-01-22 ENCOUNTER — OFFICE VISIT (OUTPATIENT)
Dept: FAMILY MEDICINE CLINIC | Facility: CLINIC | Age: 84
End: 2019-01-22
Payer: COMMERCIAL

## 2019-01-22 VITALS
OXYGEN SATURATION: 97 % | DIASTOLIC BLOOD PRESSURE: 80 MMHG | BODY MASS INDEX: 18.61 KG/M2 | HEART RATE: 75 BPM | WEIGHT: 109 LBS | HEIGHT: 64 IN | SYSTOLIC BLOOD PRESSURE: 140 MMHG | TEMPERATURE: 97.9 F

## 2019-01-22 DIAGNOSIS — M54.30 SCIATICA, UNSPECIFIED LATERALITY: ICD-10-CM

## 2019-01-22 DIAGNOSIS — H61.21 IMPACTED CERUMEN OF RIGHT EAR: Primary | ICD-10-CM

## 2019-01-22 DIAGNOSIS — R10.30 LOWER ABDOMINAL PAIN: ICD-10-CM

## 2019-01-22 LAB
ALBUMIN SERPL BCP-MCNC: 3.5 G/DL (ref 3.5–5)
ALP SERPL-CCNC: 72 U/L (ref 46–116)
ALT SERPL W P-5'-P-CCNC: 14 U/L (ref 12–78)
ANION GAP SERPL CALCULATED.3IONS-SCNC: 2 MMOL/L (ref 4–13)
AST SERPL W P-5'-P-CCNC: 14 U/L (ref 5–45)
BASOPHILS # BLD AUTO: 0.03 THOUSANDS/ΜL (ref 0–0.1)
BASOPHILS NFR BLD AUTO: 1 % (ref 0–1)
BILIRUB SERPL-MCNC: 0.2 MG/DL (ref 0.2–1)
BUN SERPL-MCNC: 12 MG/DL (ref 5–25)
CALCIUM SERPL-MCNC: 8.6 MG/DL (ref 8.3–10.1)
CHLORIDE SERPL-SCNC: 111 MMOL/L (ref 100–108)
CO2 SERPL-SCNC: 29 MMOL/L (ref 21–32)
CREAT SERPL-MCNC: 0.64 MG/DL (ref 0.6–1.3)
EOSINOPHIL # BLD AUTO: 0.09 THOUSAND/ΜL (ref 0–0.61)
EOSINOPHIL NFR BLD AUTO: 2 % (ref 0–6)
ERYTHROCYTE [DISTWIDTH] IN BLOOD BY AUTOMATED COUNT: 14 % (ref 11.6–15.1)
GFR SERPL CREATININE-BSD FRML MDRD: 82 ML/MIN/1.73SQ M
GLUCOSE SERPL-MCNC: 75 MG/DL (ref 65–140)
HCT VFR BLD AUTO: 35.8 % (ref 34.8–46.1)
HGB BLD-MCNC: 10.8 G/DL (ref 11.5–15.4)
IMM GRANULOCYTES # BLD AUTO: 0.01 THOUSAND/UL (ref 0–0.2)
IMM GRANULOCYTES NFR BLD AUTO: 0 % (ref 0–2)
LYMPHOCYTES # BLD AUTO: 1.8 THOUSANDS/ΜL (ref 0.6–4.47)
LYMPHOCYTES NFR BLD AUTO: 33 % (ref 14–44)
MCH RBC QN AUTO: 30.7 PG (ref 26.8–34.3)
MCHC RBC AUTO-ENTMCNC: 30.2 G/DL (ref 31.4–37.4)
MCV RBC AUTO: 102 FL (ref 82–98)
MONOCYTES # BLD AUTO: 0.38 THOUSAND/ΜL (ref 0.17–1.22)
MONOCYTES NFR BLD AUTO: 7 % (ref 4–12)
NEUTROPHILS # BLD AUTO: 3.15 THOUSANDS/ΜL (ref 1.85–7.62)
NEUTS SEG NFR BLD AUTO: 57 % (ref 43–75)
NRBC BLD AUTO-RTO: 0 /100 WBCS
PLATELET # BLD AUTO: 289 THOUSANDS/UL (ref 149–390)
PMV BLD AUTO: 9.6 FL (ref 8.9–12.7)
POTASSIUM SERPL-SCNC: 3.9 MMOL/L (ref 3.5–5.3)
PROT SERPL-MCNC: 7.3 G/DL (ref 6.4–8.2)
RBC # BLD AUTO: 3.52 MILLION/UL (ref 3.81–5.12)
SODIUM SERPL-SCNC: 142 MMOL/L (ref 136–145)
TSH SERPL DL<=0.05 MIU/L-ACNC: 0.63 UIU/ML (ref 0.36–3.74)
WBC # BLD AUTO: 5.46 THOUSAND/UL (ref 4.31–10.16)

## 2019-01-22 PROCEDURE — 36415 COLL VENOUS BLD VENIPUNCTURE: CPT | Performed by: FAMILY MEDICINE

## 2019-01-22 PROCEDURE — 69210 REMOVE IMPACTED EAR WAX UNI: CPT | Performed by: FAMILY MEDICINE

## 2019-01-22 PROCEDURE — 85025 COMPLETE CBC W/AUTO DIFF WBC: CPT | Performed by: FAMILY MEDICINE

## 2019-01-22 PROCEDURE — 80053 COMPREHEN METABOLIC PANEL: CPT | Performed by: FAMILY MEDICINE

## 2019-01-22 PROCEDURE — 84443 ASSAY THYROID STIM HORMONE: CPT | Performed by: FAMILY MEDICINE

## 2019-01-22 PROCEDURE — 99214 OFFICE O/P EST MOD 30 MIN: CPT | Performed by: FAMILY MEDICINE

## 2019-01-22 RX ORDER — CLONAZEPAM 0.5 MG/1
TABLET ORAL
COMMUNITY
Start: 2018-12-26 | End: 2019-04-17 | Stop reason: HOSPADM

## 2019-01-22 NOTE — PATIENT INSTRUCTIONS
Take Metamucil 2 tbsp every day  Hopefully this will help the constipation and reduce the abdominal pain  Return in 3-4 weeks to review the a studies  Stop oxybutynin till then  We will discuss further the left thigh pain upon the next visit  I believe this is an element of sciatica

## 2019-01-22 NOTE — PROGRESS NOTES
Assessment/Plan:    No problem-specific Assessment & Plan notes found for this encounter  Diagnoses and all orders for this visit:    Impacted cerumen of right ear  -     Ear cerumen removal  -     CBC and differential  -     Comprehensive metabolic panel  -     TSH, 3rd generation with Free T4 reflex  -     US pelvis complete non OB; Future  -     US abdomen complete; Future    Lower abdominal pain  -     CBC and differential  -     Comprehensive metabolic panel  -     TSH, 3rd generation with Free T4 reflex  -     US pelvis complete non OB; Future  -     US abdomen complete; Future    Sciatica, unspecified laterality    Other orders  -     clonazePAM (KlonoPIN) 0 5 mg tablet;           Subjective:      Patient ID: Curtis Witt is a 80 y o  female  Patient comes with several concerns today 1st is lower abdominal pain 2nd is weight loss and 3rd is back pain radiating to the knee on the left side  She is known to have had sciatic in the past   She has lost about 30-40 lb over the last several years  And about 10-12 lb over the last year  The patient denies any heartburn or reflux-type symptoms  The following portions of the patient's history were reviewed and updated as appropriate: allergies, current medications, past family history, past medical history, past social history, past surgical history and problem list     Review of Systems   Constitutional: Positive for unexpected weight change  Gastrointestinal: Positive for abdominal pain  Musculoskeletal: Positive for back pain  L thigh pain         Objective:  Vitals:    01/22/19 1403   BP: 140/80   BP Location: Left arm   Patient Position: Sitting   Cuff Size: Adult   Pulse: 75   Temp: 97 9 °F (36 6 °C)   TempSrc: Temporal   SpO2: 97%   Weight: 49 4 kg (109 lb)   Height: 5' 4" (1 626 m)      Physical Exam   Constitutional: She appears well-developed and well-nourished  HENT:   Head: Normocephalic and atraumatic     Eyes: Conjunctivae are normal    Neck: Neck supple  No thyromegaly present  Cardiovascular: Normal rate, regular rhythm, normal heart sounds and intact distal pulses  No murmur heard  Pulmonary/Chest: Effort normal and breath sounds normal  No respiratory distress  Abdominal: She exhibits no distension and no mass  There is no tenderness  Musculoskeletal: She exhibits no edema  Lymphadenopathy:     She has no cervical adenopathy  Skin: Skin is warm and dry  Psychiatric: She has a normal mood and affect  Her behavior is normal          Ear cerumen removal  Date/Time: 1/22/2019 2:34 PM  Performed by: Agueda Turcios by: Honey Vazquez     Patient location:  Clinic  Other Assisting Provider: No    Consent:     Consent obtained:  Verbal    Consent given by:  Patient  Procedure details:     Local anesthetic:  None    Location:  R ear    Procedure type: curette      Approach:  External  Post-procedure details:     Complication:  None    Hearing quality:  Improved    Patient tolerance of procedure: Tolerated well, no immediate complications      We will update the blood work etc get an ultrasound of the abdomen given the complaints listed above  We will also put her on Metamucil to try to reduce the constipation    I have her back in several weeks to review all this

## 2019-01-23 ENCOUNTER — TELEPHONE (OUTPATIENT)
Dept: FAMILY MEDICINE CLINIC | Facility: CLINIC | Age: 84
End: 2019-01-23

## 2019-01-23 NOTE — TELEPHONE ENCOUNTER
Patient called she said to cancel her appointment on 2/19/19 at 230 pm with dr Tressia Mohs said she is unable to make the appointment so it was cancelled per her request and rescheduled to 2/12/19 at 230 pm with dr Tressia Mohs

## 2019-01-29 ENCOUNTER — HOSPITAL ENCOUNTER (OUTPATIENT)
Dept: ULTRASOUND IMAGING | Facility: HOSPITAL | Age: 84
Discharge: HOME | End: 2019-01-29
Payer: COMMERCIAL

## 2019-01-29 DIAGNOSIS — R10.30 LOWER ABDOMINAL PAIN: ICD-10-CM

## 2019-01-29 DIAGNOSIS — H61.21 IMPACTED CERUMEN OF RIGHT EAR: ICD-10-CM

## 2019-01-29 PROCEDURE — 76856 US EXAM PELVIC COMPLETE: CPT

## 2019-02-04 ENCOUNTER — TELEPHONE (OUTPATIENT)
Dept: FAMILY MEDICINE CLINIC | Facility: CLINIC | Age: 84
End: 2019-02-04

## 2019-02-04 DIAGNOSIS — N32.81 OVERACTIVE BLADDER: ICD-10-CM

## 2019-02-04 RX ORDER — OXYBUTYNIN CHLORIDE 5 MG/1
TABLET, EXTENDED RELEASE ORAL
Qty: 90 TABLET | Refills: 3 | Status: SHIPPED | OUTPATIENT
Start: 2019-02-04 | End: 2019-03-18 | Stop reason: SDUPTHER

## 2019-02-04 NOTE — TELEPHONE ENCOUNTER
Patient called she said that she was told to stop taking the Oxybutynin 5 mg per dr Vivian Fountain but she said that she wants to go back on that medication she would like it called into [facility] pharmacy at [phone]    Pt # [phone]   TY   Patient calling back she would like to know if she is able to go back on the Oxybutynin she can be reached at [phone]   TY

## 2019-02-05 ENCOUNTER — TELEPHONE (OUTPATIENT)
Dept: FAMILY MEDICINE CLINIC | Facility: CLINIC | Age: 84
End: 2019-02-05

## 2019-02-05 NOTE — TELEPHONE ENCOUNTER
Pt called in and was informed that she can continue on the Oxybutynin  Pt states that she didn't need a refill on the medication because she has 46 pills left  Please call pharmacy and disregard the order thank you

## 2019-02-13 ENCOUNTER — TELEPHONE (OUTPATIENT)
Dept: FAMILY MEDICINE CLINIC | Facility: CLINIC | Age: 84
End: 2019-02-13

## 2019-02-13 NOTE — TELEPHONE ENCOUNTER
Pt called in to reschedule her appointment from yesterday  Pt would like Dr Cesar to review the pelvis X-ray she had done and call her  Please advise thank you

## 2019-02-13 NOTE — TELEPHONE ENCOUNTER
Ultrasound of the pelvis did not show anything abnormal   X-rays of the pelvis showed some arthritis in the back the hip joint looks in good shape

## 2019-02-26 ENCOUNTER — OFFICE VISIT (OUTPATIENT)
Dept: FAMILY MEDICINE CLINIC | Facility: CLINIC | Age: 84
End: 2019-02-26
Payer: COMMERCIAL

## 2019-02-26 VITALS
DIASTOLIC BLOOD PRESSURE: 76 MMHG | HEIGHT: 64 IN | TEMPERATURE: 97.6 F | BODY MASS INDEX: 17.93 KG/M2 | WEIGHT: 105 LBS | SYSTOLIC BLOOD PRESSURE: 134 MMHG

## 2019-02-26 DIAGNOSIS — M54.30 SCIATICA, UNSPECIFIED LATERALITY: ICD-10-CM

## 2019-02-26 DIAGNOSIS — D50.9 IRON DEFICIENCY ANEMIA, UNSPECIFIED IRON DEFICIENCY ANEMIA TYPE: Primary | Chronic | ICD-10-CM

## 2019-02-26 PROBLEM — D64.9 ANEMIA: Chronic | Status: ACTIVE | Noted: 2019-02-26

## 2019-02-26 LAB
BASOPHILS # BLD AUTO: 0.04 THOUSANDS/ΜL (ref 0–0.1)
BASOPHILS NFR BLD AUTO: 1 % (ref 0–1)
EOSINOPHIL # BLD AUTO: 0.11 THOUSAND/ΜL (ref 0–0.61)
EOSINOPHIL NFR BLD AUTO: 2 % (ref 0–6)
ERYTHROCYTE [DISTWIDTH] IN BLOOD BY AUTOMATED COUNT: 13.7 % (ref 11.6–15.1)
FERRITIN SERPL-MCNC: 84 NG/ML (ref 8–388)
HCT VFR BLD AUTO: 36.1 % (ref 34.8–46.1)
HGB BLD-MCNC: 10.8 G/DL (ref 11.5–15.4)
IMM GRANULOCYTES # BLD AUTO: 0.02 THOUSAND/UL (ref 0–0.2)
IMM GRANULOCYTES NFR BLD AUTO: 0 % (ref 0–2)
LYMPHOCYTES # BLD AUTO: 1.56 THOUSANDS/ΜL (ref 0.6–4.47)
LYMPHOCYTES NFR BLD AUTO: 27 % (ref 14–44)
MCH RBC QN AUTO: 30.6 PG (ref 26.8–34.3)
MCHC RBC AUTO-ENTMCNC: 29.9 G/DL (ref 31.4–37.4)
MCV RBC AUTO: 102 FL (ref 82–98)
MONOCYTES # BLD AUTO: 0.4 THOUSAND/ΜL (ref 0.17–1.22)
MONOCYTES NFR BLD AUTO: 7 % (ref 4–12)
NEUTROPHILS # BLD AUTO: 3.59 THOUSANDS/ΜL (ref 1.85–7.62)
NEUTS SEG NFR BLD AUTO: 63 % (ref 43–75)
NRBC BLD AUTO-RTO: 0 /100 WBCS
PLATELET # BLD AUTO: 282 THOUSANDS/UL (ref 149–390)
PMV BLD AUTO: 9.4 FL (ref 8.9–12.7)
RBC # BLD AUTO: 3.53 MILLION/UL (ref 3.81–5.12)
WBC # BLD AUTO: 5.72 THOUSAND/UL (ref 4.31–10.16)

## 2019-02-26 PROCEDURE — 99214 OFFICE O/P EST MOD 30 MIN: CPT | Performed by: FAMILY MEDICINE

## 2019-02-26 PROCEDURE — 84165 PROTEIN E-PHORESIS SERUM: CPT | Performed by: PATHOLOGY

## 2019-02-26 PROCEDURE — 82728 ASSAY OF FERRITIN: CPT | Performed by: FAMILY MEDICINE

## 2019-02-26 PROCEDURE — 85025 COMPLETE CBC W/AUTO DIFF WBC: CPT | Performed by: FAMILY MEDICINE

## 2019-02-26 PROCEDURE — 84165 PROTEIN E-PHORESIS SERUM: CPT | Performed by: FAMILY MEDICINE

## 2019-02-26 PROCEDURE — 36415 COLL VENOUS BLD VENIPUNCTURE: CPT | Performed by: FAMILY MEDICINE

## 2019-02-26 PROCEDURE — 1160F RVW MEDS BY RX/DR IN RCRD: CPT | Performed by: FAMILY MEDICINE

## 2019-02-26 RX ORDER — TRAMADOL HYDROCHLORIDE 50 MG/1
TABLET ORAL
Qty: 30 TABLET | Refills: 0 | Status: SHIPPED | OUTPATIENT
Start: 2019-02-26 | End: 2019-03-31

## 2019-02-26 NOTE — PROGRESS NOTES
Assessment/Plan:    No problem-specific Assessment & Plan notes found for this encounter  Diagnoses and all orders for this visit:    Iron deficiency anemia, unspecified iron deficiency anemia type  -     CBC and differential  -     Ferritin  -     Protein electrophoresis, serum  -     traMADol (ULTRAM) 50 mg tablet; 1/2 tablet every 8 hrs if needed    Sciatica, unspecified laterality          Subjective:      Patient ID: Ervin Carranza is a 80 y o  female  Patient comes with continued complaints of left back hip and lower extremity pain no numbness and tingling physical therapy has not helped her very much she is using a walker on a regular basis  We x-rayed her hip several months ago it was unremarkable   She has also developed a low-grade anemia over the last year and has had several factors completed but not a total workup  She has lost a bit of weight over the last year as well  She admits that her depression right now is not doing as well as it should  She follows with Dr Andrzej Smith in another month      The following portions of the patient's history were reviewed and updated as appropriate: allergies, current medications, past family history, past medical history, past social history, past surgical history and problem list     Review of Systems   Constitutional: Negative for activity change and appetite change  HENT: Negative for voice change  Eyes: Negative for visual disturbance  Respiratory: Negative for chest tightness and shortness of breath  Cardiovascular: Negative for chest pain, palpitations and leg swelling  Gastrointestinal: Negative for abdominal pain, blood in stool, constipation and diarrhea  Genitourinary: Negative for dysuria, vaginal bleeding and vaginal discharge  Musculoskeletal: Positive for back pain  Skin: Negative for rash  Neurological: Negative for dizziness  Neg SLR    Psychiatric/Behavioral: Positive for dysphoric mood  Objective:  Vitals:    02/26/19 1426   BP: 134/76   BP Location: Left arm   Patient Position: Sitting   Cuff Size: Adult   Temp: 97 6 °F (36 4 °C)   Weight: 47 6 kg (105 lb)   Height: 5' 4" (1 626 m)      Physical Exam   Constitutional: She appears well-developed and well-nourished  HENT:   Head: Normocephalic and atraumatic  Eyes: Conjunctivae are normal    Neck: Neck supple  No thyromegaly present  Cardiovascular: Normal rate, regular rhythm, normal heart sounds and intact distal pulses  No murmur heard  Pulmonary/Chest: Effort normal and breath sounds normal  No respiratory distress  Musculoskeletal: She exhibits no edema  Lymphadenopathy:     She has no cervical adenopathy  Skin: Skin is warm and dry  Psychiatric: She has a normal mood and affect  Her behavior is normal          Picture is compatible with a left sciatica  The weight loss is currently unexplainable unless it might be related to her depression her recent labs were all fairly unremarkable except for a low-grade anemia we will complete the factor workup there physical therapy did not seem to help very much she can take Tylenol products because it makes her too tired I am loathe to give any anti-inflammatory medicines given the weight loss recently  We will try low-dose of tramadol

## 2019-02-26 NOTE — PATIENT INSTRUCTIONS
Try the new pain pill 1/2 tablet every 8 hours when needed  If the pain is not too bad try not to use the pills  Also double up on the stool softener and take 1 twice every day

## 2019-02-28 ENCOUNTER — TELEPHONE (OUTPATIENT)
Dept: FAMILY MEDICINE CLINIC | Facility: CLINIC | Age: 84
End: 2019-02-28

## 2019-02-28 LAB
ALBUMIN SERPL ELPH-MCNC: 4.19 G/DL (ref 3.5–5)
ALBUMIN SERPL ELPH-MCNC: 59 % (ref 52–65)
ALPHA1 GLOB SERPL ELPH-MCNC: 0.33 G/DL (ref 0.1–0.4)
ALPHA1 GLOB SERPL ELPH-MCNC: 4.7 % (ref 2.5–5)
ALPHA2 GLOB SERPL ELPH-MCNC: 0.71 G/DL (ref 0.4–1.2)
ALPHA2 GLOB SERPL ELPH-MCNC: 10 % (ref 7–13)
BETA GLOB ABNORMAL SERPL ELPH-MCNC: 0.38 G/DL (ref 0.4–0.8)
BETA1 GLOB SERPL ELPH-MCNC: 5.4 % (ref 5–13)
BETA2 GLOB SERPL ELPH-MCNC: 6.1 % (ref 2–8)
BETA2+GAMMA GLOB SERPL ELPH-MCNC: 0.43 G/DL (ref 0.2–0.5)
GAMMA GLOB ABNORMAL SERPL ELPH-MCNC: 1.05 G/DL (ref 0.5–1.6)
GAMMA GLOB SERPL ELPH-MCNC: 14.8 % (ref 12–22)
IGG/ALB SER: 1.44 {RATIO} (ref 1.1–1.8)
PROT PATTERN SERPL ELPH-IMP: ABNORMAL
PROT SERPL-MCNC: 7.1 G/DL (ref 6.4–8.2)

## 2019-03-11 ENCOUNTER — TELEPHONE (OUTPATIENT)
Dept: FAMILY MEDICINE CLINIC | Facility: CLINIC | Age: 84
End: 2019-03-11

## 2019-03-11 NOTE — TELEPHONE ENCOUNTER
Patient is calling because she is taking aleve instead of the tramadol and she is also calling for her blood work results   Thank you

## 2019-03-11 NOTE — TELEPHONE ENCOUNTER
Pt notified of normal labs    Is eating   Why is she losing weight?   Down to 101 lb   Used to be in 120's  -

## 2019-03-13 ENCOUNTER — TELEPHONE (OUTPATIENT)
Dept: FAMILY MEDICINE CLINIC | Facility: CLINIC | Age: 84
End: 2019-03-13

## 2019-03-13 NOTE — TELEPHONE ENCOUNTER
Patient called she said that she hasn't moved her bowels since last Tuesday she did try taking Glycerin and she said it isnt' helping her and that she does have a big hemorrhoid and that when she was trying to move her bowels she squeezed to hard she said and when she was wiping she said she had some blood she wants to know what she can do for her bowels that she isn't going pt # [phone]  Ty

## 2019-03-18 DIAGNOSIS — N32.81 OVERACTIVE BLADDER: ICD-10-CM

## 2019-03-18 DIAGNOSIS — K59.00 CONSTIPATION, UNSPECIFIED CONSTIPATION TYPE: ICD-10-CM

## 2019-03-18 RX ORDER — OXYBUTYNIN CHLORIDE 5 MG/1
TABLET, EXTENDED RELEASE ORAL
Qty: 90 TABLET | Refills: 3 | Status: SHIPPED | OUTPATIENT
Start: 2019-03-18 | End: 2019-05-28 | Stop reason: ALTCHOICE

## 2019-03-18 RX ORDER — DOCUSATE SODIUM 100 MG/1
100 CAPSULE, LIQUID FILLED ORAL 2 TIMES DAILY
Qty: 180 CAPSULE | Refills: 0 | Status: SHIPPED | OUTPATIENT
Start: 2019-03-18 | End: 2019-04-17 | Stop reason: HOSPADM

## 2019-03-18 NOTE — TELEPHONE ENCOUNTER
Patient called she needs Rx for Colace 100 mg and Rx for Oxybutynin 5 mg called into [facility] at [phone]  TY

## 2019-03-21 ENCOUNTER — OFFICE VISIT (OUTPATIENT)
Dept: FAMILY MEDICINE CLINIC | Facility: CLINIC | Age: 84
End: 2019-03-21
Payer: COMMERCIAL

## 2019-03-21 VITALS
RESPIRATION RATE: 18 BRPM | OXYGEN SATURATION: 97 % | DIASTOLIC BLOOD PRESSURE: 74 MMHG | HEART RATE: 91 BPM | BODY MASS INDEX: 17.85 KG/M2 | WEIGHT: 104 LBS | SYSTOLIC BLOOD PRESSURE: 108 MMHG | TEMPERATURE: 98.2 F

## 2019-03-21 DIAGNOSIS — F33.2 SEVERE EPISODE OF RECURRENT MAJOR DEPRESSIVE DISORDER, WITHOUT PSYCHOTIC FEATURES (HCC): Primary | ICD-10-CM

## 2019-03-21 PROCEDURE — 99213 OFFICE O/P EST LOW 20 MIN: CPT | Performed by: FAMILY MEDICINE

## 2019-03-21 PROCEDURE — 1160F RVW MEDS BY RX/DR IN RCRD: CPT | Performed by: FAMILY MEDICINE

## 2019-03-21 NOTE — PROGRESS NOTES
Assessment/Plan:    No problem-specific Assessment & Plan notes found for this encounter  There are no diagnoses linked to this encounter  Subjective:      Patient ID: Tessa Conway is a 80 y o  female  Shawnaalisia Tracy returns today to discuss the weight issues over the last several months  She admits today that her depression is not doing as well as it probably should  She comes with a family member who verifies this  Her next scheduled appointment is in the middle of May with Dr Dali Melton  The following portions of the patient's history were reviewed and updated as appropriate: allergies, current medications, past family history, past medical history, past social history, past surgical history and problem list     Review of Systems      Objective:  Vitals:    03/21/19 1125   BP: 108/74   BP Location: Left arm   Patient Position: Sitting   Cuff Size: Standard   Pulse: 91   Resp: 18   Temp: 98 2 °F (36 8 °C)   TempSrc: Temporal   SpO2: 97%   Weight: 47 2 kg (104 lb)      Physical Exam   Constitutional: She appears well-developed and well-nourished  Eyes: Conjunctivae are normal    Pulmonary/Chest: Effort normal    Skin: Skin is warm and dry  Psychiatric: Thought content normal          Given the unremarkable lab evaluation and physical findings and the presence of uncontrolled depression I believe that her psychiatric issues are causing her to have some weight loss  Fortunately the nutritional panels and the weight has been stable over the last visit her too

## 2019-03-21 NOTE — PATIENT INSTRUCTIONS
I believe it is appropriate to call Dr Charlene Okeefe is office and schedule a visit earlier than May  Try Alvin instant breakfast as a supplement

## 2019-03-31 ENCOUNTER — HOSPITAL ENCOUNTER (INPATIENT)
Facility: HOSPITAL | Age: 84
LOS: 4 days | Discharge: NURSING, REHAB OR LONG-TERM CARE | DRG: 389 | End: 2019-04-04
Attending: EMERGENCY MEDICINE | Admitting: FAMILY MEDICINE
Payer: COMMERCIAL

## 2019-03-31 ENCOUNTER — APPOINTMENT (EMERGENCY)
Dept: RADIOLOGY | Facility: HOSPITAL | Age: 84
DRG: 389 | End: 2019-03-31
Payer: COMMERCIAL

## 2019-03-31 ENCOUNTER — APPOINTMENT (EMERGENCY)
Dept: CT IMAGING | Facility: HOSPITAL | Age: 84
DRG: 389 | End: 2019-03-31
Payer: COMMERCIAL

## 2019-03-31 ENCOUNTER — TELEPHONE (OUTPATIENT)
Dept: OTHER | Facility: OTHER | Age: 84
End: 2019-03-31

## 2019-03-31 DIAGNOSIS — K56.7 ILEUS (HCC): ICD-10-CM

## 2019-03-31 DIAGNOSIS — E86.0 DEHYDRATION: ICD-10-CM

## 2019-03-31 DIAGNOSIS — R11.10 VOMITING: Primary | ICD-10-CM

## 2019-03-31 DIAGNOSIS — F33.2 SEVERE EPISODE OF RECURRENT MAJOR DEPRESSIVE DISORDER, WITHOUT PSYCHOTIC FEATURES (HCC): ICD-10-CM

## 2019-03-31 PROBLEM — E43 SEVERE PROTEIN-CALORIE MALNUTRITION (HCC): Status: ACTIVE | Noted: 2019-03-31

## 2019-03-31 PROBLEM — E87.6 HYPOKALEMIA DUE TO LOSS OF POTASSIUM: Status: ACTIVE | Noted: 2019-03-31

## 2019-03-31 LAB
ALBUMIN SERPL BCP-MCNC: 4 G/DL (ref 3–5.2)
ALP SERPL-CCNC: 67 U/L (ref 43–122)
ALT SERPL W P-5'-P-CCNC: 19 U/L (ref 9–52)
AMORPH PHOS CRY URNS QL MICRO: ABNORMAL /HPF
ANION GAP SERPL CALCULATED.3IONS-SCNC: 8 MMOL/L (ref 5–14)
AST SERPL W P-5'-P-CCNC: 22 U/L (ref 14–36)
ATRIAL RATE: 89 BPM
BACTERIA UR QL AUTO: ABNORMAL /HPF
BILIRUB SERPL-MCNC: 0.4 MG/DL
BILIRUB UR QL STRIP: NEGATIVE
BUN SERPL-MCNC: 22 MG/DL (ref 5–25)
CALCIUM SERPL-MCNC: 9 MG/DL (ref 8.4–10.2)
CHLORIDE SERPL-SCNC: 107 MMOL/L (ref 97–108)
CLARITY UR: CLEAR
CO2 SERPL-SCNC: 28 MMOL/L (ref 22–30)
COLOR UR: YELLOW
CREAT SERPL-MCNC: 0.64 MG/DL (ref 0.6–1.2)
EOSINOPHIL # BLD AUTO: 0.08 THOUSAND/UL (ref 0–0.4)
EOSINOPHIL NFR BLD MANUAL: 1 % (ref 0–6)
ERYTHROCYTE [DISTWIDTH] IN BLOOD BY AUTOMATED COUNT: 14.1 %
GFR SERPL CREATININE-BSD FRML MDRD: 82 ML/MIN/1.73SQ M
GLUCOSE SERPL-MCNC: 121 MG/DL (ref 70–99)
GLUCOSE UR STRIP-MCNC: NEGATIVE MG/DL
HCT VFR BLD AUTO: 35.7 % (ref 36–46)
HGB BLD-MCNC: 11.6 G/DL (ref 12–16)
HGB UR QL STRIP.AUTO: NEGATIVE
KETONES UR STRIP-MCNC: NEGATIVE MG/DL
LEUKOCYTE ESTERASE UR QL STRIP: NEGATIVE
LYMPHOCYTES # BLD AUTO: 0.34 THOUSAND/UL (ref 0.5–4)
LYMPHOCYTES # BLD AUTO: 4 % (ref 25–45)
MCH RBC QN AUTO: 31.1 PG (ref 26.8–34.3)
MCHC RBC AUTO-ENTMCNC: 32.6 G/DL (ref 31.4–37.4)
MCV RBC AUTO: 95 FL (ref 80–100)
MONOCYTES # BLD AUTO: 0.25 THOUSAND/UL (ref 0.2–0.9)
MONOCYTES NFR BLD AUTO: 3 % (ref 1–10)
NEUTS BAND NFR BLD MANUAL: 2 % (ref 0–8)
NEUTS SEG # BLD: 7.73 THOUSAND/UL (ref 1.8–7.8)
NEUTS SEG NFR BLD AUTO: 90 %
NITRITE UR QL STRIP: NEGATIVE
NON-SQ EPI CELLS URNS QL MICRO: ABNORMAL /HPF
P AXIS: 34 DEGREES
PH UR STRIP.AUTO: 8 [PH]
PLATELET # BLD AUTO: 276 THOUSANDS/UL (ref 150–450)
PLATELET BLD QL SMEAR: ADEQUATE
PMV BLD AUTO: 7 FL (ref 8.9–12.7)
POTASSIUM SERPL-SCNC: 3.4 MMOL/L (ref 3.6–5)
PR INTERVAL: 188 MS
PROT SERPL-MCNC: 7 G/DL (ref 5.9–8.4)
PROT UR STRIP-MCNC: ABNORMAL MG/DL
QRS AXIS: 10 DEGREES
QRSD INTERVAL: 88 MS
QT INTERVAL: 382 MS
QTC INTERVAL: 464 MS
RBC # BLD AUTO: 3.74 MILLION/UL (ref 4–5.2)
RBC #/AREA URNS AUTO: ABNORMAL /HPF
RBC MORPH BLD: NORMAL
SODIUM SERPL-SCNC: 143 MMOL/L (ref 137–147)
SP GR UR STRIP.AUTO: 1.01 (ref 1–1.04)
T WAVE AXIS: 59 DEGREES
TOTAL CELLS COUNTED SPEC: 100
TOXIC GRANULES BLD QL SMEAR: PRESENT
TROPONIN I SERPL-MCNC: 0.01 NG/ML (ref 0–0.03)
UROBILINOGEN UA: NEGATIVE MG/DL
VENTRICULAR RATE: 89 BPM
WBC # BLD AUTO: 8.4 THOUSAND/UL (ref 4.31–10.16)
WBC #/AREA URNS AUTO: ABNORMAL /HPF

## 2019-03-31 PROCEDURE — 74022 RADEX COMPL AQT ABD SERIES: CPT

## 2019-03-31 PROCEDURE — 36415 COLL VENOUS BLD VENIPUNCTURE: CPT | Performed by: EMERGENCY MEDICINE

## 2019-03-31 PROCEDURE — 80053 COMPREHEN METABOLIC PANEL: CPT | Performed by: EMERGENCY MEDICINE

## 2019-03-31 PROCEDURE — 93005 ELECTROCARDIOGRAM TRACING: CPT

## 2019-03-31 PROCEDURE — 85027 COMPLETE CBC AUTOMATED: CPT | Performed by: EMERGENCY MEDICINE

## 2019-03-31 PROCEDURE — 74177 CT ABD & PELVIS W/CONTRAST: CPT

## 2019-03-31 PROCEDURE — 82607 VITAMIN B-12: CPT | Performed by: FAMILY MEDICINE

## 2019-03-31 PROCEDURE — 85007 BL SMEAR W/DIFF WBC COUNT: CPT | Performed by: EMERGENCY MEDICINE

## 2019-03-31 PROCEDURE — 81001 URINALYSIS AUTO W/SCOPE: CPT | Performed by: PHYSICIAN ASSISTANT

## 2019-03-31 PROCEDURE — 96361 HYDRATE IV INFUSION ADD-ON: CPT

## 2019-03-31 PROCEDURE — C9113 INJ PANTOPRAZOLE SODIUM, VIA: HCPCS | Performed by: PHYSICIAN ASSISTANT

## 2019-03-31 PROCEDURE — 99285 EMERGENCY DEPT VISIT HI MDM: CPT

## 2019-03-31 PROCEDURE — 93010 ELECTROCARDIOGRAM REPORT: CPT | Performed by: INTERNAL MEDICINE

## 2019-03-31 PROCEDURE — 96374 THER/PROPH/DIAG INJ IV PUSH: CPT

## 2019-03-31 PROCEDURE — 84484 ASSAY OF TROPONIN QUANT: CPT | Performed by: FAMILY MEDICINE

## 2019-03-31 PROCEDURE — 99222 1ST HOSP IP/OBS MODERATE 55: CPT | Performed by: FAMILY MEDICINE

## 2019-03-31 RX ORDER — LORAZEPAM 2 MG/ML
0.5 INJECTION INTRAMUSCULAR EVERY 4 HOURS PRN
Status: CANCELLED | OUTPATIENT
Start: 2019-03-31

## 2019-03-31 RX ORDER — ONDANSETRON 2 MG/ML
4 INJECTION INTRAMUSCULAR; INTRAVENOUS ONCE
Status: COMPLETED | OUTPATIENT
Start: 2019-03-31 | End: 2019-03-31

## 2019-03-31 RX ORDER — PANTOPRAZOLE SODIUM 40 MG/1
40 INJECTION, POWDER, FOR SOLUTION INTRAVENOUS EVERY 12 HOURS SCHEDULED
Status: DISCONTINUED | OUTPATIENT
Start: 2019-03-31 | End: 2019-04-03

## 2019-03-31 RX ORDER — ONDANSETRON 2 MG/ML
4 INJECTION INTRAMUSCULAR; INTRAVENOUS EVERY 6 HOURS PRN
Status: DISCONTINUED | OUTPATIENT
Start: 2019-03-31 | End: 2019-04-04 | Stop reason: HOSPADM

## 2019-03-31 RX ORDER — SODIUM CHLORIDE AND POTASSIUM CHLORIDE .9; .15 G/100ML; G/100ML
100 SOLUTION INTRAVENOUS CONTINUOUS
Status: CANCELLED | OUTPATIENT
Start: 2019-03-31

## 2019-03-31 RX ORDER — ENALAPRILAT 2.5 MG/2ML
0.62 INJECTION INTRAVENOUS EVERY 6 HOURS PRN
Status: DISCONTINUED | OUTPATIENT
Start: 2019-03-31 | End: 2019-04-04 | Stop reason: HOSPADM

## 2019-03-31 RX ORDER — NICOTINE 21 MG/24HR
1 PATCH, TRANSDERMAL 24 HOURS TRANSDERMAL DAILY
Status: DISCONTINUED | OUTPATIENT
Start: 2019-03-31 | End: 2019-04-04 | Stop reason: HOSPADM

## 2019-03-31 RX ORDER — OLANZAPINE 10 MG/1
2.5 INJECTION, POWDER, LYOPHILIZED, FOR SOLUTION INTRAMUSCULAR 2 TIMES DAILY PRN
Status: CANCELLED | OUTPATIENT
Start: 2019-03-31

## 2019-03-31 RX ORDER — PANTOPRAZOLE SODIUM 40 MG/1
40 INJECTION, POWDER, FOR SOLUTION INTRAVENOUS EVERY 12 HOURS SCHEDULED
Status: CANCELLED | OUTPATIENT
Start: 2019-03-31

## 2019-03-31 RX ORDER — BISACODYL 10 MG
10 SUPPOSITORY, RECTAL RECTAL ONCE
Status: COMPLETED | OUTPATIENT
Start: 2019-03-31 | End: 2019-03-31

## 2019-03-31 RX ORDER — KETOROLAC TROMETHAMINE 30 MG/ML
15 INJECTION, SOLUTION INTRAMUSCULAR; INTRAVENOUS EVERY 6 HOURS PRN
Status: DISCONTINUED | OUTPATIENT
Start: 2019-03-31 | End: 2019-04-03

## 2019-03-31 RX ORDER — ENALAPRILAT 2.5 MG/2ML
0.62 INJECTION INTRAVENOUS EVERY 6 HOURS PRN
Status: CANCELLED | OUTPATIENT
Start: 2019-03-31

## 2019-03-31 RX ORDER — LORAZEPAM 2 MG/ML
0.5 INJECTION INTRAMUSCULAR 3 TIMES DAILY
Status: DISCONTINUED | OUTPATIENT
Start: 2019-03-31 | End: 2019-04-02

## 2019-03-31 RX ORDER — SODIUM CHLORIDE AND POTASSIUM CHLORIDE .9; .15 G/100ML; G/100ML
100 SOLUTION INTRAVENOUS CONTINUOUS
Status: DISCONTINUED | OUTPATIENT
Start: 2019-03-31 | End: 2019-04-03

## 2019-03-31 RX ORDER — LORAZEPAM 2 MG/ML
0.5 INJECTION INTRAMUSCULAR EVERY 4 HOURS PRN
Status: DISCONTINUED | OUTPATIENT
Start: 2019-03-31 | End: 2019-03-31

## 2019-03-31 RX ORDER — BISACODYL 10 MG
10 SUPPOSITORY, RECTAL RECTAL ONCE
Status: CANCELLED | OUTPATIENT
Start: 2019-03-31

## 2019-03-31 RX ORDER — OLANZAPINE 10 MG/1
2.5 INJECTION, POWDER, LYOPHILIZED, FOR SOLUTION INTRAMUSCULAR 2 TIMES DAILY PRN
Status: DISCONTINUED | OUTPATIENT
Start: 2019-03-31 | End: 2019-04-01

## 2019-03-31 RX ADMIN — ONDANSETRON HYDROCHLORIDE 4 MG: 2 INJECTION, SOLUTION INTRAMUSCULAR; INTRAVENOUS at 09:59

## 2019-03-31 RX ADMIN — POTASSIUM CHLORIDE AND SODIUM CHLORIDE 100 ML/HR: 900; 150 INJECTION, SOLUTION INTRAVENOUS at 16:12

## 2019-03-31 RX ADMIN — NICOTINE 1 PATCH: 14 PATCH, EXTENDED RELEASE TRANSDERMAL at 16:14

## 2019-03-31 RX ADMIN — IOHEXOL 50 ML: 240 INJECTION, SOLUTION INTRATHECAL; INTRAVASCULAR; INTRAVENOUS; ORAL at 11:28

## 2019-03-31 RX ADMIN — ONDANSETRON HYDROCHLORIDE 4 MG: 2 INJECTION, SOLUTION INTRAMUSCULAR; INTRAVENOUS at 18:43

## 2019-03-31 RX ADMIN — PANTOPRAZOLE SODIUM 40 MG: 40 INJECTION, POWDER, FOR SOLUTION INTRAVENOUS at 21:29

## 2019-03-31 RX ADMIN — LORAZEPAM 0.5 MG: 2 INJECTION INTRAMUSCULAR; INTRAVENOUS at 22:17

## 2019-03-31 RX ADMIN — IOHEXOL 100 ML: 350 INJECTION, SOLUTION INTRAVENOUS at 13:11

## 2019-03-31 RX ADMIN — BISACODYL 10 MG: 10 SUPPOSITORY RECTAL at 16:14

## 2019-03-31 RX ADMIN — KETOROLAC TROMETHAMINE 15 MG: 30 INJECTION, SOLUTION INTRAMUSCULAR; INTRAVENOUS at 18:42

## 2019-03-31 RX ADMIN — ENOXAPARIN SODIUM 40 MG: 40 INJECTION SUBCUTANEOUS at 16:14

## 2019-03-31 RX ADMIN — LORAZEPAM 0.5 MG: 2 INJECTION INTRAMUSCULAR; INTRAVENOUS at 16:15

## 2019-03-31 RX ADMIN — SODIUM CHLORIDE 1000 ML: 9 INJECTION, SOLUTION INTRAVENOUS at 09:53

## 2019-03-31 NOTE — TELEPHONE ENCOUNTER
Male (approximated)  CONFIDENTIALTY NOTICE: This fax transmission is intended only for the addressee  It contains information that is legally privileged,  confidential or otherwise protected from use or disclosure  If you are not the intended recipient, you are strictly prohibited from reviewing,  disclosing, copying using or disseminating any of this information or taking any action in reliance on or regarding this information  If you have  received this fax in error, please notify us immediately by telephone so that we can arrange for its return to us  Page:   Call Id: [id number]  Health Call  Standard Call Report  Health Call  Patient Name: Male  Gender: Male  : (approximated)  Age:  Return Phone  Number:  Address:  City/State/Zip:  Practice Name: [address]:  Physician:  [address] Name:  Relationship To  Patient:  Return Phone Number: Unavailable  Presenting Problem: Consult/[facility]  Service Type: Consults  Charged Service 1: [address] Name and  Number:  Nurse Assessment  Nurse: Poonam John Date/Time: 3/31/2019 5:46:13 PM  Type of assessment required:  ---Consult  DateTime called Roseline Ybarra Name  ---[email or web address]  Etta Right of appointment:  ---Routine  Facility/Unit/Room Number/Unit Phone Number  ---[address]/Room#513/[phone]  Practice consulted:  ---Vascular  Requesting physician:  ---Carole oTdd  Patient's name//Medical Record Number  ---Cole Perez [id number]  Admitting diagnosis/Reason for consult  ---ILEUS/ILEUS  Physician Notified/DateTime:  ---[email or web address]  Male (approximated)  CONFIDENTIALTY NOTICE: This fax transmission is intended only for the addressee  It contains information that is legally privileged,  confidential or otherwise protected from use or disclosure   If you are not the intended recipient, you are strictly prohibited from reviewing,  disclosing, copying using or disseminating any of this information or taking any action in reliance on or regarding this information  If you have  received this fax in error, please notify us immediately by telephone so that we can arrange for its return to us  Page: 2 of 2  Call Id: [id number]  Protocols  Protocol Title Nurse Date/Time  Disp   Time Disposition Final User  3/31/2019 5:54:37 PM Close Yes Ranjan Arriaga

## 2019-04-01 ENCOUNTER — APPOINTMENT (INPATIENT)
Dept: RADIOLOGY | Facility: HOSPITAL | Age: 84
DRG: 389 | End: 2019-04-01
Payer: COMMERCIAL

## 2019-04-01 PROBLEM — E87.6 HYPOKALEMIA DUE TO LOSS OF POTASSIUM: Status: RESOLVED | Noted: 2019-03-31 | Resolved: 2019-04-01

## 2019-04-01 LAB
ANION GAP SERPL CALCULATED.3IONS-SCNC: 7 MMOL/L (ref 5–14)
ATRIAL RATE: 88 BPM
BASOPHILS # BLD AUTO: 0 THOUSANDS/ΜL (ref 0–0.1)
BASOPHILS NFR BLD AUTO: 0 % (ref 0–1)
BUN SERPL-MCNC: 17 MG/DL (ref 5–25)
CALCIUM SERPL-MCNC: 8.6 MG/DL (ref 8.4–10.2)
CHLORIDE SERPL-SCNC: 111 MMOL/L (ref 97–108)
CO2 SERPL-SCNC: 25 MMOL/L (ref 22–30)
CREAT SERPL-MCNC: 0.6 MG/DL (ref 0.6–1.2)
EOSINOPHIL # BLD AUTO: 0.1 THOUSAND/ΜL (ref 0–0.4)
EOSINOPHIL NFR BLD AUTO: 1 % (ref 0–6)
ERYTHROCYTE [DISTWIDTH] IN BLOOD BY AUTOMATED COUNT: 14.6 %
FERRITIN SERPL-MCNC: 105 NG/ML (ref 8–388)
GFR SERPL CREATININE-BSD FRML MDRD: 84 ML/MIN/1.73SQ M
GLUCOSE SERPL-MCNC: 83 MG/DL (ref 70–99)
HCT VFR BLD AUTO: 35.1 % (ref 36–46)
HGB BLD-MCNC: 11.3 G/DL (ref 12–16)
IRON SATN MFR SERPL: 15 %
IRON SERPL-MCNC: 28 UG/DL (ref 50–170)
LYMPHOCYTES # BLD AUTO: 0.8 THOUSANDS/ΜL (ref 0.5–4)
LYMPHOCYTES NFR BLD AUTO: 16 % (ref 25–45)
MCH RBC QN AUTO: 31 PG (ref 26.8–34.3)
MCHC RBC AUTO-ENTMCNC: 32.3 G/DL (ref 31.4–37.4)
MCV RBC AUTO: 96 FL (ref 80–100)
MONOCYTES # BLD AUTO: 0.5 THOUSAND/ΜL (ref 0.2–0.9)
MONOCYTES NFR BLD AUTO: 10 % (ref 1–10)
NEUTROPHILS # BLD AUTO: 3.6 THOUSANDS/ΜL (ref 1.8–7.8)
NEUTS SEG NFR BLD AUTO: 73 % (ref 45–65)
P AXIS: 26 DEGREES
PLATELET # BLD AUTO: 271 THOUSANDS/UL (ref 150–450)
PMV BLD AUTO: 7.4 FL (ref 8.9–12.7)
POTASSIUM SERPL-SCNC: 4 MMOL/L (ref 3.6–5)
PR INTERVAL: 194 MS
QRS AXIS: 8 DEGREES
QRSD INTERVAL: 86 MS
QT INTERVAL: 364 MS
QTC INTERVAL: 440 MS
RBC # BLD AUTO: 3.65 MILLION/UL (ref 4–5.2)
SODIUM SERPL-SCNC: 143 MMOL/L (ref 137–147)
T WAVE AXIS: 44 DEGREES
TIBC SERPL-MCNC: 186 UG/DL (ref 250–450)
TROPONIN I SERPL-MCNC: 0.01 NG/ML (ref 0–0.03)
TSH SERPL DL<=0.05 MIU/L-ACNC: 0.47 UIU/ML (ref 0.47–4.68)
VENTRICULAR RATE: 88 BPM
VIT B12 SERPL-MCNC: 144 PG/ML (ref 100–900)
WBC # BLD AUTO: 5 THOUSAND/UL (ref 4.31–10.16)

## 2019-04-01 PROCEDURE — 99232 SBSQ HOSP IP/OBS MODERATE 35: CPT | Performed by: FAMILY MEDICINE

## 2019-04-01 PROCEDURE — 83540 ASSAY OF IRON: CPT | Performed by: FAMILY MEDICINE

## 2019-04-01 PROCEDURE — 83550 IRON BINDING TEST: CPT | Performed by: FAMILY MEDICINE

## 2019-04-01 PROCEDURE — 84443 ASSAY THYROID STIM HORMONE: CPT | Performed by: FAMILY MEDICINE

## 2019-04-01 PROCEDURE — 74022 RADEX COMPL AQT ABD SERIES: CPT

## 2019-04-01 PROCEDURE — C9113 INJ PANTOPRAZOLE SODIUM, VIA: HCPCS | Performed by: PHYSICIAN ASSISTANT

## 2019-04-01 PROCEDURE — 85025 COMPLETE CBC W/AUTO DIFF WBC: CPT | Performed by: PHYSICIAN ASSISTANT

## 2019-04-01 PROCEDURE — 82728 ASSAY OF FERRITIN: CPT | Performed by: FAMILY MEDICINE

## 2019-04-01 PROCEDURE — 93010 ELECTROCARDIOGRAM REPORT: CPT | Performed by: INTERNAL MEDICINE

## 2019-04-01 PROCEDURE — 80048 BASIC METABOLIC PNL TOTAL CA: CPT | Performed by: PHYSICIAN ASSISTANT

## 2019-04-01 PROCEDURE — 99232 SBSQ HOSP IP/OBS MODERATE 35: CPT | Performed by: SPECIALIST

## 2019-04-01 RX ORDER — HALOPERIDOL 5 MG/ML
2 INJECTION INTRAMUSCULAR EVERY 6 HOURS PRN
Status: DISCONTINUED | OUTPATIENT
Start: 2019-04-01 | End: 2019-04-03

## 2019-04-01 RX ORDER — SODIUM PHOSPHATE, DIBASIC AND SODIUM PHOSPHATE, MONOBASIC 7; 19 G/133ML; G/133ML
1 ENEMA RECTAL ONCE
Status: COMPLETED | OUTPATIENT
Start: 2019-04-01 | End: 2019-04-01

## 2019-04-01 RX ADMIN — POTASSIUM CHLORIDE AND SODIUM CHLORIDE 100 ML/HR: 900; 150 INJECTION, SOLUTION INTRAVENOUS at 13:33

## 2019-04-01 RX ADMIN — LORAZEPAM 0.5 MG: 2 INJECTION INTRAMUSCULAR; INTRAVENOUS at 21:03

## 2019-04-01 RX ADMIN — ENALAPRILAT 0.62 MG: 1.25 INJECTION INTRAVENOUS at 05:55

## 2019-04-01 RX ADMIN — ENOXAPARIN SODIUM 40 MG: 40 INJECTION SUBCUTANEOUS at 08:27

## 2019-04-01 RX ADMIN — SODIUM PHOSPHATE, DIBASIC AND SODIUM PHOSPHATE, MONOBASIC 1 ENEMA: 7; 19 ENEMA RECTAL at 14:19

## 2019-04-01 RX ADMIN — PANTOPRAZOLE SODIUM 40 MG: 40 INJECTION, POWDER, FOR SOLUTION INTRAVENOUS at 08:27

## 2019-04-01 RX ADMIN — LORAZEPAM 0.5 MG: 2 INJECTION INTRAMUSCULAR; INTRAVENOUS at 16:16

## 2019-04-01 RX ADMIN — PANTOPRAZOLE SODIUM 40 MG: 40 INJECTION, POWDER, FOR SOLUTION INTRAVENOUS at 21:03

## 2019-04-01 RX ADMIN — LORAZEPAM 0.5 MG: 2 INJECTION INTRAMUSCULAR; INTRAVENOUS at 08:27

## 2019-04-01 RX ADMIN — NICOTINE 1 PATCH: 14 PATCH, EXTENDED RELEASE TRANSDERMAL at 08:31

## 2019-04-01 RX ADMIN — ENALAPRILAT 0.62 MG: 1.25 INJECTION INTRAVENOUS at 16:18

## 2019-04-01 RX ADMIN — POTASSIUM CHLORIDE AND SODIUM CHLORIDE 100 ML/HR: 900; 150 INJECTION, SOLUTION INTRAVENOUS at 02:31

## 2019-04-02 ENCOUNTER — APPOINTMENT (INPATIENT)
Dept: CT IMAGING | Facility: HOSPITAL | Age: 84
DRG: 389 | End: 2019-04-02
Payer: COMMERCIAL

## 2019-04-02 PROBLEM — E53.8 VITAMIN B12 DEFICIENCY: Status: ACTIVE | Noted: 2019-04-02

## 2019-04-02 PROBLEM — G93.40 ACUTE ENCEPHALOPATHY: Status: ACTIVE | Noted: 2019-04-02

## 2019-04-02 LAB
ANION GAP SERPL CALCULATED.3IONS-SCNC: 11 MMOL/L (ref 5–14)
BUN SERPL-MCNC: 15 MG/DL (ref 5–25)
CALCIUM SERPL-MCNC: 9.2 MG/DL (ref 8.4–10.2)
CHLORIDE SERPL-SCNC: 109 MMOL/L (ref 97–108)
CO2 SERPL-SCNC: 21 MMOL/L (ref 22–30)
CREAT SERPL-MCNC: 0.5 MG/DL (ref 0.6–1.2)
GFR SERPL CREATININE-BSD FRML MDRD: 89 ML/MIN/1.73SQ M
GLUCOSE SERPL-MCNC: 90 MG/DL (ref 70–99)
POTASSIUM SERPL-SCNC: 3.6 MMOL/L (ref 3.6–5)
SODIUM SERPL-SCNC: 141 MMOL/L (ref 137–147)

## 2019-04-02 PROCEDURE — G8978 MOBILITY CURRENT STATUS: HCPCS

## 2019-04-02 PROCEDURE — 70450 CT HEAD/BRAIN W/O DYE: CPT

## 2019-04-02 PROCEDURE — G8979 MOBILITY GOAL STATUS: HCPCS

## 2019-04-02 PROCEDURE — 99232 SBSQ HOSP IP/OBS MODERATE 35: CPT | Performed by: FAMILY MEDICINE

## 2019-04-02 PROCEDURE — C9113 INJ PANTOPRAZOLE SODIUM, VIA: HCPCS | Performed by: PHYSICIAN ASSISTANT

## 2019-04-02 PROCEDURE — 80048 BASIC METABOLIC PNL TOTAL CA: CPT | Performed by: FAMILY MEDICINE

## 2019-04-02 PROCEDURE — ERR1 ERRONEOUS ENCOUNTER-DISREGARD: Performed by: SPECIALIST

## 2019-04-02 PROCEDURE — 97163 PT EVAL HIGH COMPLEX 45 MIN: CPT

## 2019-04-02 RX ORDER — LORAZEPAM 2 MG/ML
1 INJECTION INTRAMUSCULAR EVERY 6 HOURS PRN
Status: DISCONTINUED | OUTPATIENT
Start: 2019-04-02 | End: 2019-04-04 | Stop reason: HOSPADM

## 2019-04-02 RX ORDER — CYANOCOBALAMIN 1000 UG/ML
1000 INJECTION INTRAMUSCULAR; SUBCUTANEOUS
Status: COMPLETED | OUTPATIENT
Start: 2019-04-02 | End: 2019-04-02

## 2019-04-02 RX ORDER — ESCITALOPRAM OXALATE 10 MG/1
10 TABLET ORAL DAILY
Status: DISCONTINUED | OUTPATIENT
Start: 2019-04-02 | End: 2019-04-04 | Stop reason: HOSPADM

## 2019-04-02 RX ORDER — DOCUSATE SODIUM 100 MG/1
100 CAPSULE, LIQUID FILLED ORAL 2 TIMES DAILY
Status: DISCONTINUED | OUTPATIENT
Start: 2019-04-02 | End: 2019-04-04 | Stop reason: HOSPADM

## 2019-04-02 RX ORDER — GABAPENTIN 100 MG/1
100 CAPSULE ORAL 3 TIMES DAILY
Status: DISCONTINUED | OUTPATIENT
Start: 2019-04-02 | End: 2019-04-04 | Stop reason: HOSPADM

## 2019-04-02 RX ORDER — SODIUM PHOSPHATE, DIBASIC AND SODIUM PHOSPHATE, MONOBASIC 7; 19 G/133ML; G/133ML
1 ENEMA RECTAL DAILY
Status: DISCONTINUED | OUTPATIENT
Start: 2019-04-02 | End: 2019-04-03

## 2019-04-02 RX ORDER — CLONAZEPAM 0.5 MG/1
0.5 TABLET ORAL 3 TIMES DAILY
Status: DISCONTINUED | OUTPATIENT
Start: 2019-04-02 | End: 2019-04-04 | Stop reason: HOSPADM

## 2019-04-02 RX ORDER — LAMOTRIGINE 100 MG/1
200 TABLET ORAL
Status: DISCONTINUED | OUTPATIENT
Start: 2019-04-02 | End: 2019-04-04 | Stop reason: HOSPADM

## 2019-04-02 RX ORDER — HYDRALAZINE HYDROCHLORIDE 20 MG/ML
10 INJECTION INTRAMUSCULAR; INTRAVENOUS EVERY 4 HOURS PRN
Status: DISCONTINUED | OUTPATIENT
Start: 2019-04-02 | End: 2019-04-03

## 2019-04-02 RX ORDER — AMLODIPINE BESYLATE 2.5 MG/1
2.5 TABLET ORAL DAILY
Status: DISCONTINUED | OUTPATIENT
Start: 2019-04-02 | End: 2019-04-04 | Stop reason: HOSPADM

## 2019-04-02 RX ORDER — TOPIRAMATE 25 MG/1
50 TABLET ORAL 2 TIMES DAILY
Status: DISCONTINUED | OUTPATIENT
Start: 2019-04-02 | End: 2019-04-04 | Stop reason: HOSPADM

## 2019-04-02 RX ORDER — HALOPERIDOL 5 MG/ML
3 INJECTION INTRAMUSCULAR ONCE
Status: DISCONTINUED | OUTPATIENT
Start: 2019-04-02 | End: 2019-04-04 | Stop reason: HOSPADM

## 2019-04-02 RX ADMIN — LORAZEPAM 0.5 MG: 2 INJECTION INTRAMUSCULAR; INTRAVENOUS at 09:37

## 2019-04-02 RX ADMIN — HALOPERIDOL LACTATE 2 MG: 5 INJECTION, SOLUTION INTRAMUSCULAR at 12:08

## 2019-04-02 RX ADMIN — TOPIRAMATE 50 MG: 25 TABLET, FILM COATED ORAL at 17:14

## 2019-04-02 RX ADMIN — NICOTINE 1 PATCH: 14 PATCH, EXTENDED RELEASE TRANSDERMAL at 09:39

## 2019-04-02 RX ADMIN — DOCUSATE SODIUM 100 MG: 100 CAPSULE, LIQUID FILLED ORAL at 17:14

## 2019-04-02 RX ADMIN — AMLODIPINE BESYLATE 2.5 MG: 2.5 TABLET ORAL at 14:40

## 2019-04-02 RX ADMIN — LORAZEPAM 1 MG: 2 INJECTION INTRAMUSCULAR; INTRAVENOUS at 05:44

## 2019-04-02 RX ADMIN — GABAPENTIN 100 MG: 100 CAPSULE ORAL at 21:21

## 2019-04-02 RX ADMIN — QUETIAPINE FUMARATE 400 MG: 300 TABLET ORAL at 21:20

## 2019-04-02 RX ADMIN — HYDRALAZINE HYDROCHLORIDE 10 MG: 20 INJECTION INTRAMUSCULAR; INTRAVENOUS at 00:45

## 2019-04-02 RX ADMIN — ESCITALOPRAM OXALATE 10 MG: 10 TABLET ORAL at 14:40

## 2019-04-02 RX ADMIN — HALOPERIDOL LACTATE 2 MG: 5 INJECTION, SOLUTION INTRAMUSCULAR at 00:57

## 2019-04-02 RX ADMIN — PANTOPRAZOLE SODIUM 40 MG: 40 INJECTION, POWDER, FOR SOLUTION INTRAVENOUS at 21:20

## 2019-04-02 RX ADMIN — CLONAZEPAM 0.5 MG: 0.5 TABLET ORAL at 21:20

## 2019-04-02 RX ADMIN — LAMOTRIGINE 200 MG: 100 TABLET ORAL at 21:21

## 2019-04-02 RX ADMIN — GABAPENTIN 100 MG: 100 CAPSULE ORAL at 17:14

## 2019-04-02 RX ADMIN — CYANOCOBALAMIN 1000 MCG: 1000 INJECTION INTRAMUSCULAR; SUBCUTANEOUS at 12:06

## 2019-04-02 RX ADMIN — ENOXAPARIN SODIUM 40 MG: 40 INJECTION SUBCUTANEOUS at 09:37

## 2019-04-02 RX ADMIN — PANTOPRAZOLE SODIUM 40 MG: 40 INJECTION, POWDER, FOR SOLUTION INTRAVENOUS at 09:36

## 2019-04-02 RX ADMIN — CLONAZEPAM 0.5 MG: 0.5 TABLET ORAL at 17:13

## 2019-04-03 LAB
ANION GAP SERPL CALCULATED.3IONS-SCNC: 8 MMOL/L (ref 5–14)
ANISOCYTOSIS BLD QL SMEAR: PRESENT
BUN SERPL-MCNC: 14 MG/DL (ref 5–25)
CALCIUM SERPL-MCNC: 8.9 MG/DL (ref 8.4–10.2)
CHLORIDE SERPL-SCNC: 109 MMOL/L (ref 97–108)
CK MB SERPL-MCNC: 11.4 NG/ML (ref 0–2.4)
CK MB SERPL-MCNC: 2.5 % (ref 0–2.5)
CK SERPL-CCNC: 464 U/L (ref 30–135)
CO2 SERPL-SCNC: 23 MMOL/L (ref 22–30)
CREAT SERPL-MCNC: 0.64 MG/DL (ref 0.6–1.2)
EOSINOPHIL # BLD AUTO: 0.07 THOUSAND/UL (ref 0–0.4)
EOSINOPHIL NFR BLD MANUAL: 1 % (ref 0–6)
ERYTHROCYTE [DISTWIDTH] IN BLOOD BY AUTOMATED COUNT: 14 %
GFR SERPL CREATININE-BSD FRML MDRD: 82 ML/MIN/1.73SQ M
GLUCOSE SERPL-MCNC: 83 MG/DL (ref 70–99)
HCT VFR BLD AUTO: 32.8 % (ref 36–46)
HGB BLD-MCNC: 10.7 G/DL (ref 12–16)
LYMPHOCYTES # BLD AUTO: 1.32 THOUSAND/UL (ref 0.5–4)
LYMPHOCYTES # BLD AUTO: 20 % (ref 25–45)
MCH RBC QN AUTO: 31 PG (ref 26.8–34.3)
MCHC RBC AUTO-ENTMCNC: 32.6 G/DL (ref 31.4–37.4)
MCV RBC AUTO: 95 FL (ref 80–100)
MONOCYTES # BLD AUTO: 0.92 THOUSAND/UL (ref 0.2–0.9)
MONOCYTES NFR BLD AUTO: 14 % (ref 1–10)
NEUTS BAND NFR BLD MANUAL: 20 % (ref 0–8)
NEUTS SEG # BLD: 4.29 THOUSAND/UL (ref 1.8–7.8)
NEUTS SEG NFR BLD AUTO: 45 %
PLATELET # BLD AUTO: 249 THOUSANDS/UL (ref 150–450)
PLATELET BLD QL SMEAR: ADEQUATE
PMV BLD AUTO: 7.9 FL (ref 8.9–12.7)
POIKILOCYTOSIS BLD QL SMEAR: PRESENT
POTASSIUM SERPL-SCNC: 3.5 MMOL/L (ref 3.6–5)
RBC # BLD AUTO: 3.45 MILLION/UL (ref 4–5.2)
RBC MORPH BLD: ABNORMAL
SODIUM SERPL-SCNC: 140 MMOL/L (ref 137–147)
TOTAL CELLS COUNTED SPEC: 100
WBC # BLD AUTO: 6.6 THOUSAND/UL (ref 4.31–10.16)

## 2019-04-03 PROCEDURE — 80048 BASIC METABOLIC PNL TOTAL CA: CPT | Performed by: FAMILY MEDICINE

## 2019-04-03 PROCEDURE — C9113 INJ PANTOPRAZOLE SODIUM, VIA: HCPCS | Performed by: PHYSICIAN ASSISTANT

## 2019-04-03 PROCEDURE — 82550 ASSAY OF CK (CPK): CPT | Performed by: FAMILY MEDICINE

## 2019-04-03 PROCEDURE — 97110 THERAPEUTIC EXERCISES: CPT

## 2019-04-03 PROCEDURE — ERR1 ERRONEOUS ENCOUNTER-DISREGARD: Performed by: SPECIALIST

## 2019-04-03 PROCEDURE — 85007 BL SMEAR W/DIFF WBC COUNT: CPT | Performed by: FAMILY MEDICINE

## 2019-04-03 PROCEDURE — 97530 THERAPEUTIC ACTIVITIES: CPT

## 2019-04-03 PROCEDURE — 99232 SBSQ HOSP IP/OBS MODERATE 35: CPT | Performed by: FAMILY MEDICINE

## 2019-04-03 PROCEDURE — 85027 COMPLETE CBC AUTOMATED: CPT | Performed by: FAMILY MEDICINE

## 2019-04-03 PROCEDURE — 82553 CREATINE MB FRACTION: CPT | Performed by: FAMILY MEDICINE

## 2019-04-03 RX ORDER — LANOLIN ALCOHOL/MO/W.PET/CERES
1000 CREAM (GRAM) TOPICAL DAILY
Status: DISCONTINUED | OUTPATIENT
Start: 2019-04-03 | End: 2019-04-04 | Stop reason: HOSPADM

## 2019-04-03 RX ORDER — AMOXICILLIN 250 MG
1 CAPSULE ORAL
Status: DISCONTINUED | OUTPATIENT
Start: 2019-04-03 | End: 2019-04-04 | Stop reason: HOSPADM

## 2019-04-03 RX ORDER — CYANOCOBALAMIN 1000 UG/ML
1000 INJECTION INTRAMUSCULAR; SUBCUTANEOUS
Status: COMPLETED | OUTPATIENT
Start: 2019-04-03 | End: 2019-04-03

## 2019-04-03 RX ORDER — POLYETHYLENE GLYCOL 3350 17 G/17G
17 POWDER, FOR SOLUTION ORAL DAILY
Status: DISCONTINUED | OUTPATIENT
Start: 2019-04-03 | End: 2019-04-04 | Stop reason: HOSPADM

## 2019-04-03 RX ORDER — PANTOPRAZOLE SODIUM 40 MG/1
40 TABLET, DELAYED RELEASE ORAL
Status: DISCONTINUED | OUTPATIENT
Start: 2019-04-04 | End: 2019-04-04 | Stop reason: HOSPADM

## 2019-04-03 RX ADMIN — LORAZEPAM 1 MG: 2 INJECTION INTRAMUSCULAR; INTRAVENOUS at 05:33

## 2019-04-03 RX ADMIN — CYANOCOBALAMIN TAB 1000 MCG 1000 MCG: 1000 TAB at 16:48

## 2019-04-03 RX ADMIN — CLONAZEPAM 0.5 MG: 0.5 TABLET ORAL at 21:56

## 2019-04-03 RX ADMIN — DOCUSATE SODIUM 100 MG: 100 CAPSULE, LIQUID FILLED ORAL at 18:43

## 2019-04-03 RX ADMIN — TOPIRAMATE 50 MG: 25 TABLET, FILM COATED ORAL at 18:43

## 2019-04-03 RX ADMIN — AMLODIPINE BESYLATE 2.5 MG: 2.5 TABLET ORAL at 08:58

## 2019-04-03 RX ADMIN — CLONAZEPAM 0.5 MG: 0.5 TABLET ORAL at 16:45

## 2019-04-03 RX ADMIN — POLYETHYLENE GLYCOL 3350 17 G: 17 POWDER, FOR SOLUTION ORAL at 16:47

## 2019-04-03 RX ADMIN — POTASSIUM CHLORIDE AND SODIUM CHLORIDE 100 ML/HR: 900; 150 INJECTION, SOLUTION INTRAVENOUS at 00:37

## 2019-04-03 RX ADMIN — NICOTINE 1 PATCH: 14 PATCH, EXTENDED RELEASE TRANSDERMAL at 09:03

## 2019-04-03 RX ADMIN — PANTOPRAZOLE SODIUM 40 MG: 40 INJECTION, POWDER, FOR SOLUTION INTRAVENOUS at 08:58

## 2019-04-03 RX ADMIN — ENOXAPARIN SODIUM 40 MG: 40 INJECTION SUBCUTANEOUS at 08:58

## 2019-04-03 RX ADMIN — CYANOCOBALAMIN 1000 MCG: 1000 INJECTION INTRAMUSCULAR; SUBCUTANEOUS at 16:48

## 2019-04-03 RX ADMIN — TOPIRAMATE 50 MG: 25 TABLET, FILM COATED ORAL at 08:58

## 2019-04-03 RX ADMIN — QUETIAPINE FUMARATE 400 MG: 300 TABLET ORAL at 21:56

## 2019-04-03 RX ADMIN — GABAPENTIN 100 MG: 100 CAPSULE ORAL at 21:56

## 2019-04-03 RX ADMIN — LAMOTRIGINE 200 MG: 100 TABLET ORAL at 21:56

## 2019-04-03 RX ADMIN — GABAPENTIN 100 MG: 100 CAPSULE ORAL at 08:58

## 2019-04-03 RX ADMIN — DOCUSATE SODIUM 100 MG: 100 CAPSULE, LIQUID FILLED ORAL at 08:58

## 2019-04-03 RX ADMIN — CLONAZEPAM 0.5 MG: 0.5 TABLET ORAL at 08:58

## 2019-04-03 RX ADMIN — SENNOSIDES AND DOCUSATE SODIUM 1 TABLET: 8.6; 5 TABLET ORAL at 21:56

## 2019-04-03 RX ADMIN — GABAPENTIN 100 MG: 100 CAPSULE ORAL at 16:45

## 2019-04-03 RX ADMIN — ESCITALOPRAM OXALATE 10 MG: 10 TABLET ORAL at 08:58

## 2019-04-04 ENCOUNTER — HOSPITAL ENCOUNTER (INPATIENT)
Facility: HOSPITAL | Age: 84
LOS: 13 days | Discharge: HOME | DRG: 091 | End: 2019-04-17
Attending: FAMILY MEDICINE | Admitting: FAMILY MEDICINE
Payer: COMMERCIAL

## 2019-04-04 VITALS
WEIGHT: 105.16 LBS | TEMPERATURE: 98.8 F | DIASTOLIC BLOOD PRESSURE: 63 MMHG | HEART RATE: 84 BPM | HEIGHT: 64 IN | BODY MASS INDEX: 17.95 KG/M2 | OXYGEN SATURATION: 94 % | RESPIRATION RATE: 20 BRPM | SYSTOLIC BLOOD PRESSURE: 136 MMHG

## 2019-04-04 DIAGNOSIS — F17.200 TOBACCO DEPENDENCE SYNDROME: ICD-10-CM

## 2019-04-04 DIAGNOSIS — F32.A DEPRESSION: ICD-10-CM

## 2019-04-04 DIAGNOSIS — L60.9 NAIL ABNORMALITIES: Primary | ICD-10-CM

## 2019-04-04 DIAGNOSIS — R10.30 LOWER ABDOMINAL PAIN: ICD-10-CM

## 2019-04-04 DIAGNOSIS — D64.9 ANEMIA: Chronic | ICD-10-CM

## 2019-04-04 PROBLEM — R53.81 PHYSICAL DECONDITIONING: Status: ACTIVE | Noted: 2019-04-04

## 2019-04-04 LAB
BASOPHILS # BLD AUTO: 0 THOUSANDS/ΜL (ref 0–0.1)
BASOPHILS NFR BLD AUTO: 1 % (ref 0–1)
CK MB SERPL-MCNC: 2.8 % (ref 0–2.5)
CK MB SERPL-MCNC: 5.2 NG/ML (ref 0–2.4)
CK SERPL-CCNC: 187 U/L (ref 30–135)
EOSINOPHIL # BLD AUTO: 0.1 THOUSAND/ΜL (ref 0–0.4)
EOSINOPHIL NFR BLD AUTO: 2 % (ref 0–6)
ERYTHROCYTE [DISTWIDTH] IN BLOOD BY AUTOMATED COUNT: 14.4 %
HCT VFR BLD AUTO: 30.9 % (ref 36–46)
HGB BLD-MCNC: 9.9 G/DL (ref 12–16)
LYMPHOCYTES # BLD AUTO: 1.8 THOUSANDS/ΜL (ref 0.5–4)
LYMPHOCYTES NFR BLD AUTO: 42 % (ref 25–45)
MCH RBC QN AUTO: 30.4 PG (ref 26.8–34.3)
MCHC RBC AUTO-ENTMCNC: 32 G/DL (ref 31.4–37.4)
MCV RBC AUTO: 95 FL (ref 80–100)
MONOCYTES # BLD AUTO: 0.5 THOUSAND/ΜL (ref 0.2–0.9)
MONOCYTES NFR BLD AUTO: 12 % (ref 1–10)
NEUTROPHILS # BLD AUTO: 1.8 THOUSANDS/ΜL (ref 1.8–7.8)
NEUTS SEG NFR BLD AUTO: 43 % (ref 45–65)
PLATELET # BLD AUTO: 233 THOUSANDS/UL (ref 150–450)
PMV BLD AUTO: 7.3 FL (ref 8.9–12.7)
PROCALCITONIN SERPL-MCNC: <0.05 NG/ML
RBC # BLD AUTO: 3.26 MILLION/UL (ref 4–5.2)
WBC # BLD AUTO: 4.2 THOUSAND/UL (ref 4.31–10.16)

## 2019-04-04 PROCEDURE — 82553 CREATINE MB FRACTION: CPT | Performed by: FAMILY MEDICINE

## 2019-04-04 PROCEDURE — 99305 1ST NF CARE MODERATE MDM 35: CPT | Performed by: FAMILY MEDICINE

## 2019-04-04 PROCEDURE — 82550 ASSAY OF CK (CPK): CPT | Performed by: FAMILY MEDICINE

## 2019-04-04 PROCEDURE — 84145 PROCALCITONIN (PCT): CPT | Performed by: FAMILY MEDICINE

## 2019-04-04 PROCEDURE — G8987 SELF CARE CURRENT STATUS: HCPCS

## 2019-04-04 PROCEDURE — 97116 GAIT TRAINING THERAPY: CPT

## 2019-04-04 PROCEDURE — 97535 SELF CARE MNGMENT TRAINING: CPT

## 2019-04-04 PROCEDURE — 97167 OT EVAL HIGH COMPLEX 60 MIN: CPT

## 2019-04-04 PROCEDURE — 97110 THERAPEUTIC EXERCISES: CPT

## 2019-04-04 PROCEDURE — 85025 COMPLETE CBC W/AUTO DIFF WBC: CPT | Performed by: FAMILY MEDICINE

## 2019-04-04 PROCEDURE — 99239 HOSP IP/OBS DSCHRG MGMT >30: CPT | Performed by: FAMILY MEDICINE

## 2019-04-04 PROCEDURE — G8988 SELF CARE GOAL STATUS: HCPCS

## 2019-04-04 RX ORDER — GABAPENTIN 100 MG/1
100 CAPSULE ORAL 3 TIMES DAILY
Status: DISCONTINUED | OUTPATIENT
Start: 2019-04-04 | End: 2019-04-17 | Stop reason: HOSPADM

## 2019-04-04 RX ORDER — NICOTINE 21 MG/24HR
1 PATCH, TRANSDERMAL 24 HOURS TRANSDERMAL DAILY
Status: DISCONTINUED | OUTPATIENT
Start: 2019-04-05 | End: 2019-04-17 | Stop reason: HOSPADM

## 2019-04-04 RX ORDER — GABAPENTIN 100 MG/1
100 CAPSULE ORAL 3 TIMES DAILY
Status: CANCELLED | OUTPATIENT
Start: 2019-04-04

## 2019-04-04 RX ORDER — QUETIAPINE FUMARATE 400 MG/1
400 TABLET, FILM COATED ORAL
Status: DISCONTINUED | OUTPATIENT
Start: 2019-04-04 | End: 2019-04-17 | Stop reason: HOSPADM

## 2019-04-04 RX ORDER — LANOLIN ALCOHOL/MO/W.PET/CERES
1000 CREAM (GRAM) TOPICAL DAILY
Status: CANCELLED | OUTPATIENT
Start: 2019-04-04

## 2019-04-04 RX ORDER — AMLODIPINE BESYLATE 2.5 MG/1
2.5 TABLET ORAL DAILY
Status: DISCONTINUED | OUTPATIENT
Start: 2019-04-05 | End: 2019-04-17 | Stop reason: HOSPADM

## 2019-04-04 RX ORDER — AMLODIPINE BESYLATE 2.5 MG/1
2.5 TABLET ORAL DAILY
Status: CANCELLED | OUTPATIENT
Start: 2019-04-04

## 2019-04-04 RX ORDER — LAMOTRIGINE 100 MG/1
200 TABLET ORAL
Status: DISCONTINUED | OUTPATIENT
Start: 2019-04-04 | End: 2019-04-17 | Stop reason: HOSPADM

## 2019-04-04 RX ORDER — LANOLIN ALCOHOL/MO/W.PET/CERES
1000 CREAM (GRAM) TOPICAL DAILY
Status: DISCONTINUED | OUTPATIENT
Start: 2019-04-05 | End: 2019-04-17 | Stop reason: HOSPADM

## 2019-04-04 RX ORDER — PANTOPRAZOLE SODIUM 40 MG/1
40 TABLET, DELAYED RELEASE ORAL
Status: CANCELLED | OUTPATIENT
Start: 2019-04-05

## 2019-04-04 RX ORDER — PANTOPRAZOLE SODIUM 40 MG/1
40 TABLET, DELAYED RELEASE ORAL
Status: DISCONTINUED | OUTPATIENT
Start: 2019-04-05 | End: 2019-04-17 | Stop reason: HOSPADM

## 2019-04-04 RX ORDER — POLYETHYLENE GLYCOL 3350 17 G/17G
17 POWDER, FOR SOLUTION ORAL DAILY
Status: DISCONTINUED | OUTPATIENT
Start: 2019-04-05 | End: 2019-04-17 | Stop reason: HOSPADM

## 2019-04-04 RX ORDER — CLONAZEPAM 0.5 MG/1
0.5 TABLET ORAL 3 TIMES DAILY
Status: CANCELLED | OUTPATIENT
Start: 2019-04-04

## 2019-04-04 RX ORDER — ESCITALOPRAM OXALATE 10 MG/1
10 TABLET ORAL DAILY
Status: CANCELLED | OUTPATIENT
Start: 2019-04-04

## 2019-04-04 RX ORDER — ESCITALOPRAM OXALATE 10 MG/1
10 TABLET ORAL DAILY
Status: DISCONTINUED | OUTPATIENT
Start: 2019-04-05 | End: 2019-04-17 | Stop reason: HOSPADM

## 2019-04-04 RX ORDER — CLONAZEPAM 0.5 MG/1
0.5 TABLET ORAL 3 TIMES DAILY
Status: DISCONTINUED | OUTPATIENT
Start: 2019-04-04 | End: 2019-04-17 | Stop reason: HOSPADM

## 2019-04-04 RX ORDER — TOPIRAMATE 25 MG/1
50 TABLET ORAL 2 TIMES DAILY
Status: DISCONTINUED | OUTPATIENT
Start: 2019-04-04 | End: 2019-04-17 | Stop reason: HOSPADM

## 2019-04-04 RX ORDER — LAMOTRIGINE 100 MG/1
200 TABLET ORAL
Status: CANCELLED | OUTPATIENT
Start: 2019-04-04

## 2019-04-04 RX ORDER — TOPIRAMATE 25 MG/1
50 TABLET ORAL 2 TIMES DAILY
Status: CANCELLED | OUTPATIENT
Start: 2019-04-04

## 2019-04-04 RX ORDER — POLYETHYLENE GLYCOL 3350 17 G/17G
17 POWDER, FOR SOLUTION ORAL DAILY
Status: CANCELLED | OUTPATIENT
Start: 2019-04-04

## 2019-04-04 RX ORDER — NICOTINE 21 MG/24HR
1 PATCH, TRANSDERMAL 24 HOURS TRANSDERMAL DAILY
Status: CANCELLED | OUTPATIENT
Start: 2019-04-04

## 2019-04-04 RX ORDER — AMOXICILLIN 250 MG
1 CAPSULE ORAL 2 TIMES DAILY
Status: DISCONTINUED | OUTPATIENT
Start: 2019-04-04 | End: 2019-04-17 | Stop reason: HOSPADM

## 2019-04-04 RX ORDER — AMOXICILLIN 250 MG
1 CAPSULE ORAL 2 TIMES DAILY
Status: CANCELLED | OUTPATIENT
Start: 2019-04-04

## 2019-04-04 RX ADMIN — TOPIRAMATE 50 MG: 25 TABLET, FILM COATED ORAL at 08:38

## 2019-04-04 RX ADMIN — ENOXAPARIN SODIUM 40 MG: 40 INJECTION SUBCUTANEOUS at 08:38

## 2019-04-04 RX ADMIN — CLONAZEPAM 0.5 MG: 0.5 TABLET ORAL at 21:42

## 2019-04-04 RX ADMIN — DOCUSATE SODIUM 100 MG: 100 CAPSULE, LIQUID FILLED ORAL at 08:38

## 2019-04-04 RX ADMIN — TOPIRAMATE 50 MG: 25 TABLET, FILM COATED ORAL at 18:57

## 2019-04-04 RX ADMIN — GABAPENTIN 100 MG: 100 CAPSULE ORAL at 08:38

## 2019-04-04 RX ADMIN — CYANOCOBALAMIN TAB 1000 MCG 1000 MCG: 1000 TAB at 08:38

## 2019-04-04 RX ADMIN — ESCITALOPRAM OXALATE 10 MG: 10 TABLET ORAL at 08:38

## 2019-04-04 RX ADMIN — CLONAZEPAM 0.5 MG: 0.5 TABLET ORAL at 08:38

## 2019-04-04 RX ADMIN — QUETIAPINE 400 MG: 400 TABLET, FILM COATED ORAL at 21:42

## 2019-04-04 RX ADMIN — SENNOSIDES AND DOCUSATE SODIUM 1 TABLET: 8.6; 5 TABLET ORAL at 18:57

## 2019-04-04 RX ADMIN — NICOTINE 1 PATCH: 14 PATCH, EXTENDED RELEASE TRANSDERMAL at 08:48

## 2019-04-04 RX ADMIN — PANTOPRAZOLE SODIUM 40 MG: 40 TABLET, DELAYED RELEASE ORAL at 06:28

## 2019-04-04 RX ADMIN — POLYETHYLENE GLYCOL 3350 17 G: 17 POWDER, FOR SOLUTION ORAL at 08:38

## 2019-04-04 RX ADMIN — GABAPENTIN 100 MG: 100 CAPSULE ORAL at 21:00

## 2019-04-04 RX ADMIN — LAMOTRIGINE 200 MG: 100 TABLET ORAL at 21:42

## 2019-04-05 PROCEDURE — 97167 OT EVAL HIGH COMPLEX 60 MIN: CPT

## 2019-04-05 PROCEDURE — 97530 THERAPEUTIC ACTIVITIES: CPT

## 2019-04-05 PROCEDURE — G8987 SELF CARE CURRENT STATUS: HCPCS

## 2019-04-05 PROCEDURE — 97535 SELF CARE MNGMENT TRAINING: CPT

## 2019-04-05 PROCEDURE — 97163 PT EVAL HIGH COMPLEX 45 MIN: CPT

## 2019-04-05 PROCEDURE — G8988 SELF CARE GOAL STATUS: HCPCS

## 2019-04-05 RX ORDER — SODIUM PHOSPHATE, DIBASIC AND SODIUM PHOSPHATE, MONOBASIC 7; 19 G/133ML; G/133ML
1 ENEMA RECTAL DAILY PRN
Status: DISCONTINUED | OUTPATIENT
Start: 2019-04-05 | End: 2019-04-17 | Stop reason: HOSPADM

## 2019-04-05 RX ORDER — ONDANSETRON 4 MG/1
4 TABLET, ORALLY DISINTEGRATING ORAL EVERY 8 HOURS PRN
Status: DISCONTINUED | OUTPATIENT
Start: 2019-04-05 | End: 2019-04-17 | Stop reason: HOSPADM

## 2019-04-05 RX ADMIN — AMLODIPINE BESYLATE 2.5 MG: 2.5 TABLET ORAL at 10:16

## 2019-04-05 RX ADMIN — GABAPENTIN 100 MG: 100 CAPSULE ORAL at 10:16

## 2019-04-05 RX ADMIN — CLONAZEPAM 0.5 MG: 0.5 TABLET ORAL at 10:15

## 2019-04-05 RX ADMIN — TOPIRAMATE 50 MG: 25 TABLET, FILM COATED ORAL at 18:07

## 2019-04-05 RX ADMIN — PANTOPRAZOLE SODIUM 40 MG: 40 TABLET, DELAYED RELEASE ORAL at 05:36

## 2019-04-05 RX ADMIN — TUBERCULIN PURIFIED PROTEIN DERIVATIVE 5 UNITS: 5 INJECTION, SOLUTION INTRADERMAL at 05:31

## 2019-04-05 RX ADMIN — SODIUM PHOSPHATE, DIBASIC AND SODIUM PHOSPHATE, MONOBASIC 1 ENEMA: 7; 19 ENEMA RECTAL at 18:26

## 2019-04-05 RX ADMIN — SENNOSIDES AND DOCUSATE SODIUM 1 TABLET: 8.6; 5 TABLET ORAL at 10:15

## 2019-04-05 RX ADMIN — CYANOCOBALAMIN TAB 1000 MCG 1000 MCG: 1000 TAB at 10:16

## 2019-04-05 RX ADMIN — CLONAZEPAM 0.5 MG: 0.5 TABLET ORAL at 21:39

## 2019-04-05 RX ADMIN — GABAPENTIN 100 MG: 100 CAPSULE ORAL at 18:08

## 2019-04-05 RX ADMIN — ESCITALOPRAM OXALATE 10 MG: 10 TABLET ORAL at 10:16

## 2019-04-05 RX ADMIN — QUETIAPINE 400 MG: 400 TABLET, FILM COATED ORAL at 21:40

## 2019-04-05 RX ADMIN — GABAPENTIN 100 MG: 100 CAPSULE ORAL at 21:40

## 2019-04-05 RX ADMIN — SENNOSIDES AND DOCUSATE SODIUM 1 TABLET: 8.6; 5 TABLET ORAL at 18:07

## 2019-04-05 RX ADMIN — LAMOTRIGINE 200 MG: 100 TABLET ORAL at 21:40

## 2019-04-05 RX ADMIN — POLYETHYLENE GLYCOL 3350 17 G: 17 POWDER, FOR SOLUTION ORAL at 10:18

## 2019-04-05 RX ADMIN — TOPIRAMATE 50 MG: 25 TABLET, FILM COATED ORAL at 10:15

## 2019-04-05 RX ADMIN — CLONAZEPAM 0.5 MG: 0.5 TABLET ORAL at 18:06

## 2019-04-05 RX ADMIN — ENOXAPARIN SODIUM 40 MG: 40 INJECTION SUBCUTANEOUS at 10:17

## 2019-04-05 RX ADMIN — NICOTINE 1 PATCH: 14 PATCH, EXTENDED RELEASE TRANSDERMAL at 10:16

## 2019-04-06 PROCEDURE — 97530 THERAPEUTIC ACTIVITIES: CPT | Performed by: PHYSICAL THERAPIST

## 2019-04-06 PROCEDURE — 97530 THERAPEUTIC ACTIVITIES: CPT

## 2019-04-06 PROCEDURE — 97110 THERAPEUTIC EXERCISES: CPT | Performed by: PHYSICAL THERAPIST

## 2019-04-06 PROCEDURE — 97535 SELF CARE MNGMENT TRAINING: CPT

## 2019-04-06 PROCEDURE — 97116 GAIT TRAINING THERAPY: CPT | Performed by: PHYSICAL THERAPIST

## 2019-04-06 RX ADMIN — CYANOCOBALAMIN TAB 1000 MCG 1000 MCG: 1000 TAB at 09:04

## 2019-04-06 RX ADMIN — LAMOTRIGINE 200 MG: 100 TABLET ORAL at 21:40

## 2019-04-06 RX ADMIN — CLONAZEPAM 0.5 MG: 0.5 TABLET ORAL at 15:39

## 2019-04-06 RX ADMIN — AMLODIPINE BESYLATE 2.5 MG: 2.5 TABLET ORAL at 09:04

## 2019-04-06 RX ADMIN — ESCITALOPRAM OXALATE 10 MG: 10 TABLET ORAL at 09:04

## 2019-04-06 RX ADMIN — SENNOSIDES AND DOCUSATE SODIUM 1 TABLET: 8.6; 5 TABLET ORAL at 09:04

## 2019-04-06 RX ADMIN — GABAPENTIN 100 MG: 100 CAPSULE ORAL at 15:39

## 2019-04-06 RX ADMIN — CLONAZEPAM 0.5 MG: 0.5 TABLET ORAL at 21:40

## 2019-04-06 RX ADMIN — TOPIRAMATE 50 MG: 25 TABLET, FILM COATED ORAL at 09:04

## 2019-04-06 RX ADMIN — GABAPENTIN 100 MG: 100 CAPSULE ORAL at 21:40

## 2019-04-06 RX ADMIN — GABAPENTIN 100 MG: 100 CAPSULE ORAL at 09:04

## 2019-04-06 RX ADMIN — POLYETHYLENE GLYCOL 3350 17 G: 17 POWDER, FOR SOLUTION ORAL at 09:03

## 2019-04-06 RX ADMIN — TOPIRAMATE 50 MG: 25 TABLET, FILM COATED ORAL at 18:16

## 2019-04-06 RX ADMIN — ENOXAPARIN SODIUM 40 MG: 40 INJECTION SUBCUTANEOUS at 09:04

## 2019-04-06 RX ADMIN — CLONAZEPAM 0.5 MG: 0.5 TABLET ORAL at 09:04

## 2019-04-06 RX ADMIN — QUETIAPINE 400 MG: 400 TABLET, FILM COATED ORAL at 21:40

## 2019-04-06 RX ADMIN — NICOTINE 1 PATCH: 14 PATCH, EXTENDED RELEASE TRANSDERMAL at 09:03

## 2019-04-06 RX ADMIN — SENNOSIDES AND DOCUSATE SODIUM 1 TABLET: 8.6; 5 TABLET ORAL at 18:16

## 2019-04-06 RX ADMIN — PANTOPRAZOLE SODIUM 40 MG: 40 TABLET, DELAYED RELEASE ORAL at 06:24

## 2019-04-07 RX ADMIN — TOPIRAMATE 50 MG: 25 TABLET, FILM COATED ORAL at 17:58

## 2019-04-07 RX ADMIN — SENNOSIDES AND DOCUSATE SODIUM 1 TABLET: 8.6; 5 TABLET ORAL at 17:58

## 2019-04-07 RX ADMIN — CLONAZEPAM 0.5 MG: 0.5 TABLET ORAL at 17:58

## 2019-04-07 RX ADMIN — SENNOSIDES AND DOCUSATE SODIUM 1 TABLET: 8.6; 5 TABLET ORAL at 09:14

## 2019-04-07 RX ADMIN — PANTOPRAZOLE SODIUM 40 MG: 40 TABLET, DELAYED RELEASE ORAL at 05:46

## 2019-04-07 RX ADMIN — GABAPENTIN 100 MG: 100 CAPSULE ORAL at 21:05

## 2019-04-07 RX ADMIN — CLONAZEPAM 0.5 MG: 0.5 TABLET ORAL at 09:14

## 2019-04-07 RX ADMIN — ESCITALOPRAM OXALATE 10 MG: 10 TABLET ORAL at 09:14

## 2019-04-07 RX ADMIN — POLYETHYLENE GLYCOL 3350 17 G: 17 POWDER, FOR SOLUTION ORAL at 09:14

## 2019-04-07 RX ADMIN — CYANOCOBALAMIN TAB 1000 MCG 1000 MCG: 1000 TAB at 09:14

## 2019-04-07 RX ADMIN — CLONAZEPAM 0.5 MG: 0.5 TABLET ORAL at 21:05

## 2019-04-07 RX ADMIN — ENOXAPARIN SODIUM 40 MG: 40 INJECTION SUBCUTANEOUS at 09:14

## 2019-04-07 RX ADMIN — AMLODIPINE BESYLATE 2.5 MG: 2.5 TABLET ORAL at 09:14

## 2019-04-07 RX ADMIN — TOPIRAMATE 50 MG: 25 TABLET, FILM COATED ORAL at 09:14

## 2019-04-07 RX ADMIN — GABAPENTIN 100 MG: 100 CAPSULE ORAL at 09:14

## 2019-04-07 RX ADMIN — GABAPENTIN 100 MG: 100 CAPSULE ORAL at 17:58

## 2019-04-07 RX ADMIN — LAMOTRIGINE 200 MG: 100 TABLET ORAL at 21:05

## 2019-04-07 RX ADMIN — NICOTINE 1 PATCH: 14 PATCH, EXTENDED RELEASE TRANSDERMAL at 09:15

## 2019-04-07 RX ADMIN — QUETIAPINE 400 MG: 400 TABLET, FILM COATED ORAL at 21:05

## 2019-04-08 PROCEDURE — 97535 SELF CARE MNGMENT TRAINING: CPT

## 2019-04-08 PROCEDURE — 97530 THERAPEUTIC ACTIVITIES: CPT

## 2019-04-08 PROCEDURE — 97110 THERAPEUTIC EXERCISES: CPT

## 2019-04-08 PROCEDURE — 97116 GAIT TRAINING THERAPY: CPT

## 2019-04-08 RX ADMIN — NICOTINE 1 PATCH: 14 PATCH, EXTENDED RELEASE TRANSDERMAL at 08:43

## 2019-04-08 RX ADMIN — TOPIRAMATE 50 MG: 25 TABLET, FILM COATED ORAL at 08:42

## 2019-04-08 RX ADMIN — ENOXAPARIN SODIUM 40 MG: 40 INJECTION SUBCUTANEOUS at 08:42

## 2019-04-08 RX ADMIN — PANTOPRAZOLE SODIUM 40 MG: 40 TABLET, DELAYED RELEASE ORAL at 06:18

## 2019-04-08 RX ADMIN — CYANOCOBALAMIN TAB 1000 MCG 1000 MCG: 1000 TAB at 08:42

## 2019-04-08 RX ADMIN — CLONAZEPAM 0.5 MG: 0.5 TABLET ORAL at 16:15

## 2019-04-08 RX ADMIN — GABAPENTIN 100 MG: 100 CAPSULE ORAL at 16:15

## 2019-04-08 RX ADMIN — QUETIAPINE 400 MG: 400 TABLET, FILM COATED ORAL at 21:19

## 2019-04-08 RX ADMIN — CLONAZEPAM 0.5 MG: 0.5 TABLET ORAL at 08:42

## 2019-04-08 RX ADMIN — CLONAZEPAM 0.5 MG: 0.5 TABLET ORAL at 20:12

## 2019-04-08 RX ADMIN — AMLODIPINE BESYLATE 2.5 MG: 2.5 TABLET ORAL at 08:42

## 2019-04-08 RX ADMIN — SENNOSIDES AND DOCUSATE SODIUM 1 TABLET: 8.6; 5 TABLET ORAL at 08:42

## 2019-04-08 RX ADMIN — GABAPENTIN 100 MG: 100 CAPSULE ORAL at 08:42

## 2019-04-08 RX ADMIN — SENNOSIDES AND DOCUSATE SODIUM 1 TABLET: 8.6; 5 TABLET ORAL at 18:20

## 2019-04-08 RX ADMIN — LAMOTRIGINE 200 MG: 100 TABLET ORAL at 21:19

## 2019-04-08 RX ADMIN — ESCITALOPRAM OXALATE 10 MG: 10 TABLET ORAL at 08:42

## 2019-04-08 RX ADMIN — POLYETHYLENE GLYCOL 3350 17 G: 17 POWDER, FOR SOLUTION ORAL at 08:42

## 2019-04-08 RX ADMIN — TOPIRAMATE 50 MG: 25 TABLET, FILM COATED ORAL at 18:20

## 2019-04-08 RX ADMIN — GABAPENTIN 100 MG: 100 CAPSULE ORAL at 20:13

## 2019-04-09 PROCEDURE — 97110 THERAPEUTIC EXERCISES: CPT

## 2019-04-09 PROCEDURE — 97535 SELF CARE MNGMENT TRAINING: CPT

## 2019-04-09 PROCEDURE — 97530 THERAPEUTIC ACTIVITIES: CPT

## 2019-04-09 RX ADMIN — LAMOTRIGINE 200 MG: 100 TABLET ORAL at 21:22

## 2019-04-09 RX ADMIN — QUETIAPINE 400 MG: 400 TABLET, FILM COATED ORAL at 21:21

## 2019-04-09 RX ADMIN — TOPIRAMATE 50 MG: 25 TABLET, FILM COATED ORAL at 09:55

## 2019-04-09 RX ADMIN — CLONAZEPAM 0.5 MG: 0.5 TABLET ORAL at 21:22

## 2019-04-09 RX ADMIN — TOPIRAMATE 50 MG: 25 TABLET, FILM COATED ORAL at 17:29

## 2019-04-09 RX ADMIN — ENOXAPARIN SODIUM 40 MG: 40 INJECTION SUBCUTANEOUS at 09:56

## 2019-04-09 RX ADMIN — SENNOSIDES AND DOCUSATE SODIUM 1 TABLET: 8.6; 5 TABLET ORAL at 09:54

## 2019-04-09 RX ADMIN — ESCITALOPRAM OXALATE 10 MG: 10 TABLET ORAL at 09:54

## 2019-04-09 RX ADMIN — CLONAZEPAM 0.5 MG: 0.5 TABLET ORAL at 17:29

## 2019-04-09 RX ADMIN — PANTOPRAZOLE SODIUM 40 MG: 40 TABLET, DELAYED RELEASE ORAL at 05:03

## 2019-04-09 RX ADMIN — CLONAZEPAM 0.5 MG: 0.5 TABLET ORAL at 09:54

## 2019-04-09 RX ADMIN — AMLODIPINE BESYLATE 2.5 MG: 2.5 TABLET ORAL at 09:54

## 2019-04-09 RX ADMIN — SENNOSIDES AND DOCUSATE SODIUM 1 TABLET: 8.6; 5 TABLET ORAL at 17:29

## 2019-04-09 RX ADMIN — GABAPENTIN 100 MG: 100 CAPSULE ORAL at 21:21

## 2019-04-09 RX ADMIN — GABAPENTIN 100 MG: 100 CAPSULE ORAL at 09:54

## 2019-04-09 RX ADMIN — NICOTINE 1 PATCH: 14 PATCH, EXTENDED RELEASE TRANSDERMAL at 09:59

## 2019-04-09 RX ADMIN — GABAPENTIN 100 MG: 100 CAPSULE ORAL at 17:29

## 2019-04-09 RX ADMIN — CYANOCOBALAMIN TAB 1000 MCG 1000 MCG: 1000 TAB at 09:54

## 2019-04-09 RX ADMIN — POLYETHYLENE GLYCOL 3350 17 G: 17 POWDER, FOR SOLUTION ORAL at 09:56

## 2019-04-10 PROCEDURE — 97535 SELF CARE MNGMENT TRAINING: CPT

## 2019-04-10 PROCEDURE — 97116 GAIT TRAINING THERAPY: CPT

## 2019-04-10 PROCEDURE — 97110 THERAPEUTIC EXERCISES: CPT

## 2019-04-10 PROCEDURE — 97530 THERAPEUTIC ACTIVITIES: CPT

## 2019-04-10 RX ADMIN — SENNOSIDES AND DOCUSATE SODIUM 1 TABLET: 8.6; 5 TABLET ORAL at 09:54

## 2019-04-10 RX ADMIN — TOPIRAMATE 50 MG: 25 TABLET, FILM COATED ORAL at 09:54

## 2019-04-10 RX ADMIN — SENNOSIDES AND DOCUSATE SODIUM 1 TABLET: 8.6; 5 TABLET ORAL at 17:14

## 2019-04-10 RX ADMIN — GABAPENTIN 100 MG: 100 CAPSULE ORAL at 09:53

## 2019-04-10 RX ADMIN — CLONAZEPAM 0.5 MG: 0.5 TABLET ORAL at 22:03

## 2019-04-10 RX ADMIN — CLONAZEPAM 0.5 MG: 0.5 TABLET ORAL at 17:14

## 2019-04-10 RX ADMIN — QUETIAPINE 400 MG: 400 TABLET, FILM COATED ORAL at 22:04

## 2019-04-10 RX ADMIN — ENOXAPARIN SODIUM 40 MG: 40 INJECTION SUBCUTANEOUS at 09:53

## 2019-04-10 RX ADMIN — GABAPENTIN 100 MG: 100 CAPSULE ORAL at 17:13

## 2019-04-10 RX ADMIN — PANTOPRAZOLE SODIUM 40 MG: 40 TABLET, DELAYED RELEASE ORAL at 06:37

## 2019-04-10 RX ADMIN — ESCITALOPRAM OXALATE 10 MG: 10 TABLET ORAL at 09:53

## 2019-04-10 RX ADMIN — CLONAZEPAM 0.5 MG: 0.5 TABLET ORAL at 09:53

## 2019-04-10 RX ADMIN — GABAPENTIN 100 MG: 100 CAPSULE ORAL at 22:04

## 2019-04-10 RX ADMIN — TOPIRAMATE 50 MG: 25 TABLET, FILM COATED ORAL at 17:14

## 2019-04-10 RX ADMIN — POLYETHYLENE GLYCOL 3350 17 G: 17 POWDER, FOR SOLUTION ORAL at 09:54

## 2019-04-10 RX ADMIN — LAMOTRIGINE 200 MG: 100 TABLET ORAL at 22:03

## 2019-04-10 RX ADMIN — AMLODIPINE BESYLATE 2.5 MG: 2.5 TABLET ORAL at 09:54

## 2019-04-10 RX ADMIN — CYANOCOBALAMIN TAB 1000 MCG 1000 MCG: 1000 TAB at 09:54

## 2019-04-10 RX ADMIN — NICOTINE 1 PATCH: 14 PATCH, EXTENDED RELEASE TRANSDERMAL at 09:54

## 2019-04-11 PROCEDURE — 97110 THERAPEUTIC EXERCISES: CPT

## 2019-04-11 PROCEDURE — 97530 THERAPEUTIC ACTIVITIES: CPT

## 2019-04-11 PROCEDURE — 97535 SELF CARE MNGMENT TRAINING: CPT

## 2019-04-11 PROCEDURE — G0515 COGNITIVE SKILLS DEVELOPMENT: HCPCS

## 2019-04-11 PROCEDURE — 97116 GAIT TRAINING THERAPY: CPT

## 2019-04-11 RX ORDER — HYDROCORTISONE ACETATE 25 MG/1
25 SUPPOSITORY RECTAL
Status: COMPLETED | OUTPATIENT
Start: 2019-04-11 | End: 2019-04-16

## 2019-04-11 RX ADMIN — TOPIRAMATE 50 MG: 25 TABLET, FILM COATED ORAL at 08:03

## 2019-04-11 RX ADMIN — ENOXAPARIN SODIUM 40 MG: 40 INJECTION SUBCUTANEOUS at 08:05

## 2019-04-11 RX ADMIN — SENNOSIDES AND DOCUSATE SODIUM 1 TABLET: 8.6; 5 TABLET ORAL at 08:03

## 2019-04-11 RX ADMIN — AMLODIPINE BESYLATE 2.5 MG: 2.5 TABLET ORAL at 08:03

## 2019-04-11 RX ADMIN — TOPIRAMATE 50 MG: 25 TABLET, FILM COATED ORAL at 17:32

## 2019-04-11 RX ADMIN — SENNOSIDES AND DOCUSATE SODIUM 1 TABLET: 8.6; 5 TABLET ORAL at 17:31

## 2019-04-11 RX ADMIN — CYANOCOBALAMIN TAB 1000 MCG 1000 MCG: 1000 TAB at 08:03

## 2019-04-11 RX ADMIN — PANTOPRAZOLE SODIUM 40 MG: 40 TABLET, DELAYED RELEASE ORAL at 06:47

## 2019-04-11 RX ADMIN — GABAPENTIN 100 MG: 100 CAPSULE ORAL at 16:41

## 2019-04-11 RX ADMIN — CLONAZEPAM 0.5 MG: 0.5 TABLET ORAL at 16:41

## 2019-04-11 RX ADMIN — ESCITALOPRAM OXALATE 10 MG: 10 TABLET ORAL at 08:03

## 2019-04-11 RX ADMIN — GABAPENTIN 100 MG: 100 CAPSULE ORAL at 21:18

## 2019-04-11 RX ADMIN — LAMOTRIGINE 200 MG: 100 TABLET ORAL at 21:18

## 2019-04-11 RX ADMIN — NICOTINE 1 PATCH: 14 PATCH, EXTENDED RELEASE TRANSDERMAL at 08:11

## 2019-04-11 RX ADMIN — GABAPENTIN 100 MG: 100 CAPSULE ORAL at 08:03

## 2019-04-11 RX ADMIN — HYDROCORTISONE ACETATE 25 MG: 25 SUPPOSITORY RECTAL at 21:17

## 2019-04-11 RX ADMIN — CLONAZEPAM 0.5 MG: 0.5 TABLET ORAL at 08:03

## 2019-04-11 RX ADMIN — CLONAZEPAM 0.5 MG: 0.5 TABLET ORAL at 21:19

## 2019-04-11 RX ADMIN — POLYETHYLENE GLYCOL 3350 17 G: 17 POWDER, FOR SOLUTION ORAL at 08:05

## 2019-04-11 RX ADMIN — QUETIAPINE 400 MG: 400 TABLET, FILM COATED ORAL at 21:19

## 2019-04-12 PROCEDURE — 97535 SELF CARE MNGMENT TRAINING: CPT

## 2019-04-12 PROCEDURE — 97110 THERAPEUTIC EXERCISES: CPT

## 2019-04-12 PROCEDURE — 97530 THERAPEUTIC ACTIVITIES: CPT

## 2019-04-12 PROCEDURE — 97116 GAIT TRAINING THERAPY: CPT

## 2019-04-12 RX ADMIN — CYANOCOBALAMIN TAB 1000 MCG 1000 MCG: 1000 TAB at 08:40

## 2019-04-12 RX ADMIN — GABAPENTIN 100 MG: 100 CAPSULE ORAL at 08:40

## 2019-04-12 RX ADMIN — CLONAZEPAM 0.5 MG: 0.5 TABLET ORAL at 20:09

## 2019-04-12 RX ADMIN — TOPIRAMATE 50 MG: 25 TABLET, FILM COATED ORAL at 08:40

## 2019-04-12 RX ADMIN — SENNOSIDES AND DOCUSATE SODIUM 1 TABLET: 8.6; 5 TABLET ORAL at 18:24

## 2019-04-12 RX ADMIN — CLONAZEPAM 0.5 MG: 0.5 TABLET ORAL at 08:40

## 2019-04-12 RX ADMIN — NICOTINE 1 PATCH: 14 PATCH, EXTENDED RELEASE TRANSDERMAL at 08:43

## 2019-04-12 RX ADMIN — ESCITALOPRAM OXALATE 10 MG: 10 TABLET ORAL at 08:40

## 2019-04-12 RX ADMIN — TOPIRAMATE 50 MG: 25 TABLET, FILM COATED ORAL at 18:24

## 2019-04-12 RX ADMIN — LAMOTRIGINE 200 MG: 100 TABLET ORAL at 22:47

## 2019-04-12 RX ADMIN — QUETIAPINE 400 MG: 400 TABLET, FILM COATED ORAL at 22:47

## 2019-04-12 RX ADMIN — ENOXAPARIN SODIUM 40 MG: 40 INJECTION SUBCUTANEOUS at 08:41

## 2019-04-12 RX ADMIN — GABAPENTIN 100 MG: 100 CAPSULE ORAL at 20:09

## 2019-04-12 RX ADMIN — SENNOSIDES AND DOCUSATE SODIUM 1 TABLET: 8.6; 5 TABLET ORAL at 08:40

## 2019-04-12 RX ADMIN — POLYETHYLENE GLYCOL 3350 17 G: 17 POWDER, FOR SOLUTION ORAL at 08:40

## 2019-04-12 RX ADMIN — GABAPENTIN 100 MG: 100 CAPSULE ORAL at 16:19

## 2019-04-12 RX ADMIN — AMLODIPINE BESYLATE 2.5 MG: 2.5 TABLET ORAL at 08:40

## 2019-04-12 RX ADMIN — PANTOPRAZOLE SODIUM 40 MG: 40 TABLET, DELAYED RELEASE ORAL at 06:22

## 2019-04-12 RX ADMIN — CLONAZEPAM 0.5 MG: 0.5 TABLET ORAL at 16:17

## 2019-04-13 PROCEDURE — 97116 GAIT TRAINING THERAPY: CPT

## 2019-04-13 PROCEDURE — 97530 THERAPEUTIC ACTIVITIES: CPT

## 2019-04-13 PROCEDURE — 97110 THERAPEUTIC EXERCISES: CPT

## 2019-04-13 RX ADMIN — CLONAZEPAM 0.5 MG: 0.5 TABLET ORAL at 10:36

## 2019-04-13 RX ADMIN — HYDROCORTISONE ACETATE 25 MG: 25 SUPPOSITORY RECTAL at 21:45

## 2019-04-13 RX ADMIN — NICOTINE 1 PATCH: 14 PATCH, EXTENDED RELEASE TRANSDERMAL at 10:37

## 2019-04-13 RX ADMIN — LAMOTRIGINE 200 MG: 100 TABLET ORAL at 21:45

## 2019-04-13 RX ADMIN — AMLODIPINE BESYLATE 2.5 MG: 2.5 TABLET ORAL at 10:37

## 2019-04-13 RX ADMIN — TOPIRAMATE 50 MG: 25 TABLET, FILM COATED ORAL at 18:06

## 2019-04-13 RX ADMIN — CLONAZEPAM 0.5 MG: 0.5 TABLET ORAL at 16:37

## 2019-04-13 RX ADMIN — ENOXAPARIN SODIUM 40 MG: 40 INJECTION SUBCUTANEOUS at 10:35

## 2019-04-13 RX ADMIN — TOPIRAMATE 50 MG: 25 TABLET, FILM COATED ORAL at 10:36

## 2019-04-13 RX ADMIN — CLONAZEPAM 0.5 MG: 0.5 TABLET ORAL at 21:45

## 2019-04-13 RX ADMIN — GABAPENTIN 100 MG: 100 CAPSULE ORAL at 16:37

## 2019-04-13 RX ADMIN — CYANOCOBALAMIN TAB 1000 MCG 1000 MCG: 1000 TAB at 10:36

## 2019-04-13 RX ADMIN — PANTOPRAZOLE SODIUM 40 MG: 40 TABLET, DELAYED RELEASE ORAL at 06:55

## 2019-04-13 RX ADMIN — QUETIAPINE 400 MG: 400 TABLET, FILM COATED ORAL at 21:45

## 2019-04-13 RX ADMIN — SENNOSIDES AND DOCUSATE SODIUM 1 TABLET: 8.6; 5 TABLET ORAL at 10:37

## 2019-04-13 RX ADMIN — ESCITALOPRAM OXALATE 10 MG: 10 TABLET ORAL at 10:35

## 2019-04-13 RX ADMIN — SENNOSIDES AND DOCUSATE SODIUM 1 TABLET: 8.6; 5 TABLET ORAL at 18:06

## 2019-04-13 RX ADMIN — POLYETHYLENE GLYCOL 3350 17 G: 17 POWDER, FOR SOLUTION ORAL at 10:35

## 2019-04-13 RX ADMIN — GABAPENTIN 100 MG: 100 CAPSULE ORAL at 21:45

## 2019-04-13 RX ADMIN — GABAPENTIN 100 MG: 100 CAPSULE ORAL at 10:36

## 2019-04-14 RX ADMIN — SENNOSIDES AND DOCUSATE SODIUM 1 TABLET: 8.6; 5 TABLET ORAL at 09:44

## 2019-04-14 RX ADMIN — TOPIRAMATE 50 MG: 25 TABLET, FILM COATED ORAL at 17:59

## 2019-04-14 RX ADMIN — AMLODIPINE BESYLATE 2.5 MG: 2.5 TABLET ORAL at 09:44

## 2019-04-14 RX ADMIN — GABAPENTIN 100 MG: 100 CAPSULE ORAL at 09:44

## 2019-04-14 RX ADMIN — TOPIRAMATE 50 MG: 25 TABLET, FILM COATED ORAL at 09:44

## 2019-04-14 RX ADMIN — GABAPENTIN 100 MG: 100 CAPSULE ORAL at 20:56

## 2019-04-14 RX ADMIN — HYDROCORTISONE ACETATE 25 MG: 25 SUPPOSITORY RECTAL at 21:02

## 2019-04-14 RX ADMIN — CLONAZEPAM 0.5 MG: 0.5 TABLET ORAL at 20:56

## 2019-04-14 RX ADMIN — GABAPENTIN 100 MG: 100 CAPSULE ORAL at 16:17

## 2019-04-14 RX ADMIN — LAMOTRIGINE 200 MG: 100 TABLET ORAL at 21:02

## 2019-04-14 RX ADMIN — CYANOCOBALAMIN TAB 1000 MCG 1000 MCG: 1000 TAB at 09:44

## 2019-04-14 RX ADMIN — QUETIAPINE 400 MG: 400 TABLET, FILM COATED ORAL at 21:02

## 2019-04-14 RX ADMIN — POLYETHYLENE GLYCOL 3350 17 G: 17 POWDER, FOR SOLUTION ORAL at 09:43

## 2019-04-14 RX ADMIN — SENNOSIDES AND DOCUSATE SODIUM 1 TABLET: 8.6; 5 TABLET ORAL at 17:59

## 2019-04-14 RX ADMIN — CLONAZEPAM 0.5 MG: 0.5 TABLET ORAL at 09:44

## 2019-04-14 RX ADMIN — ENOXAPARIN SODIUM 40 MG: 40 INJECTION SUBCUTANEOUS at 09:48

## 2019-04-14 RX ADMIN — CLONAZEPAM 0.5 MG: 0.5 TABLET ORAL at 16:17

## 2019-04-14 RX ADMIN — PANTOPRAZOLE SODIUM 40 MG: 40 TABLET, DELAYED RELEASE ORAL at 06:22

## 2019-04-14 RX ADMIN — NICOTINE 1 PATCH: 14 PATCH, EXTENDED RELEASE TRANSDERMAL at 09:44

## 2019-04-14 RX ADMIN — ESCITALOPRAM OXALATE 10 MG: 10 TABLET ORAL at 09:44

## 2019-04-15 LAB
ANION GAP SERPL CALCULATED.3IONS-SCNC: 5 MMOL/L (ref 5–14)
BUN SERPL-MCNC: 28 MG/DL (ref 5–25)
CALCIUM SERPL-MCNC: 9.5 MG/DL (ref 8.4–10.2)
CHLORIDE SERPL-SCNC: 100 MMOL/L (ref 97–108)
CO2 SERPL-SCNC: 35 MMOL/L (ref 22–30)
CREAT SERPL-MCNC: 0.67 MG/DL (ref 0.6–1.2)
ERYTHROCYTE [DISTWIDTH] IN BLOOD BY AUTOMATED COUNT: 14.9 %
GFR SERPL CREATININE-BSD FRML MDRD: 81 ML/MIN/1.73SQ M
GLUCOSE SERPL-MCNC: 78 MG/DL (ref 70–99)
HCT VFR BLD AUTO: 33.3 % (ref 36–46)
HGB BLD-MCNC: 10.8 G/DL (ref 12–16)
MCH RBC QN AUTO: 31.8 PG (ref 26.8–34.3)
MCHC RBC AUTO-ENTMCNC: 32.4 G/DL (ref 31.4–37.4)
MCV RBC AUTO: 98 FL (ref 80–100)
PLATELET # BLD AUTO: 466 THOUSANDS/UL (ref 150–450)
PMV BLD AUTO: 7.9 FL (ref 8.9–12.7)
POTASSIUM SERPL-SCNC: 4.2 MMOL/L (ref 3.6–5)
RBC # BLD AUTO: 3.4 MILLION/UL (ref 4–5.2)
SODIUM SERPL-SCNC: 140 MMOL/L (ref 137–147)
WBC # BLD AUTO: 6 THOUSAND/UL (ref 4.31–10.16)

## 2019-04-15 PROCEDURE — 80048 BASIC METABOLIC PNL TOTAL CA: CPT | Performed by: FAMILY MEDICINE

## 2019-04-15 PROCEDURE — 97530 THERAPEUTIC ACTIVITIES: CPT

## 2019-04-15 PROCEDURE — 97116 GAIT TRAINING THERAPY: CPT

## 2019-04-15 PROCEDURE — 85027 COMPLETE CBC AUTOMATED: CPT | Performed by: FAMILY MEDICINE

## 2019-04-15 PROCEDURE — 97110 THERAPEUTIC EXERCISES: CPT

## 2019-04-15 PROCEDURE — 0HBRXZZ EXCISION OF TOE NAIL, EXTERNAL APPROACH: ICD-10-PCS | Performed by: PODIATRIST

## 2019-04-15 RX ADMIN — ESCITALOPRAM OXALATE 10 MG: 10 TABLET ORAL at 09:50

## 2019-04-15 RX ADMIN — HYDROCORTISONE ACETATE 25 MG: 25 SUPPOSITORY RECTAL at 22:55

## 2019-04-15 RX ADMIN — CLONAZEPAM 0.5 MG: 0.5 TABLET ORAL at 20:08

## 2019-04-15 RX ADMIN — TOPIRAMATE 50 MG: 25 TABLET, FILM COATED ORAL at 09:49

## 2019-04-15 RX ADMIN — CYANOCOBALAMIN TAB 1000 MCG 1000 MCG: 1000 TAB at 09:49

## 2019-04-15 RX ADMIN — CLONAZEPAM 0.5 MG: 0.5 TABLET ORAL at 15:52

## 2019-04-15 RX ADMIN — POLYETHYLENE GLYCOL 3350 17 G: 17 POWDER, FOR SOLUTION ORAL at 09:50

## 2019-04-15 RX ADMIN — TOPIRAMATE 50 MG: 25 TABLET, FILM COATED ORAL at 17:28

## 2019-04-15 RX ADMIN — NICOTINE 1 PATCH: 14 PATCH, EXTENDED RELEASE TRANSDERMAL at 09:57

## 2019-04-15 RX ADMIN — SENNOSIDES AND DOCUSATE SODIUM 1 TABLET: 8.6; 5 TABLET ORAL at 17:28

## 2019-04-15 RX ADMIN — LAMOTRIGINE 200 MG: 100 TABLET ORAL at 22:55

## 2019-04-15 RX ADMIN — CLONAZEPAM 0.5 MG: 0.5 TABLET ORAL at 09:50

## 2019-04-15 RX ADMIN — QUETIAPINE 400 MG: 400 TABLET, FILM COATED ORAL at 22:55

## 2019-04-15 RX ADMIN — SENNOSIDES AND DOCUSATE SODIUM 1 TABLET: 8.6; 5 TABLET ORAL at 09:49

## 2019-04-15 RX ADMIN — AMLODIPINE BESYLATE 2.5 MG: 2.5 TABLET ORAL at 09:50

## 2019-04-15 RX ADMIN — GABAPENTIN 100 MG: 100 CAPSULE ORAL at 15:52

## 2019-04-15 RX ADMIN — GABAPENTIN 100 MG: 100 CAPSULE ORAL at 20:08

## 2019-04-15 RX ADMIN — GABAPENTIN 100 MG: 100 CAPSULE ORAL at 09:50

## 2019-04-15 RX ADMIN — ENOXAPARIN SODIUM 40 MG: 40 INJECTION SUBCUTANEOUS at 09:50

## 2019-04-15 RX ADMIN — PANTOPRAZOLE SODIUM 40 MG: 40 TABLET, DELAYED RELEASE ORAL at 06:12

## 2019-04-16 PROCEDURE — 97530 THERAPEUTIC ACTIVITIES: CPT

## 2019-04-16 PROCEDURE — 97110 THERAPEUTIC EXERCISES: CPT

## 2019-04-16 RX ORDER — HYDROCORTISONE ACETATE 25 MG/1
25 SUPPOSITORY RECTAL
Status: DISCONTINUED | OUTPATIENT
Start: 2019-04-16 | End: 2019-04-17 | Stop reason: HOSPADM

## 2019-04-16 RX ADMIN — HYDROCORTISONE ACETATE 25 MG: 25 SUPPOSITORY RECTAL at 21:57

## 2019-04-16 RX ADMIN — LAMOTRIGINE 200 MG: 100 TABLET ORAL at 21:56

## 2019-04-16 RX ADMIN — SENNOSIDES AND DOCUSATE SODIUM 1 TABLET: 8.6; 5 TABLET ORAL at 18:40

## 2019-04-16 RX ADMIN — GABAPENTIN 100 MG: 100 CAPSULE ORAL at 21:55

## 2019-04-16 RX ADMIN — QUETIAPINE 400 MG: 400 TABLET, FILM COATED ORAL at 21:56

## 2019-04-16 RX ADMIN — SENNOSIDES AND DOCUSATE SODIUM 1 TABLET: 8.6; 5 TABLET ORAL at 09:57

## 2019-04-16 RX ADMIN — ESCITALOPRAM OXALATE 10 MG: 10 TABLET ORAL at 09:57

## 2019-04-16 RX ADMIN — HYDROCORTISONE ACETATE 25 MG: 25 SUPPOSITORY RECTAL at 21:55

## 2019-04-16 RX ADMIN — ENOXAPARIN SODIUM 40 MG: 40 INJECTION SUBCUTANEOUS at 09:58

## 2019-04-16 RX ADMIN — CLONAZEPAM 0.5 MG: 0.5 TABLET ORAL at 21:56

## 2019-04-16 RX ADMIN — TOPIRAMATE 50 MG: 25 TABLET, FILM COATED ORAL at 09:00

## 2019-04-16 RX ADMIN — GABAPENTIN 100 MG: 100 CAPSULE ORAL at 09:58

## 2019-04-16 RX ADMIN — NICOTINE 1 PATCH: 14 PATCH, EXTENDED RELEASE TRANSDERMAL at 09:58

## 2019-04-16 RX ADMIN — GABAPENTIN 100 MG: 100 CAPSULE ORAL at 18:40

## 2019-04-16 RX ADMIN — CLONAZEPAM 0.5 MG: 0.5 TABLET ORAL at 18:39

## 2019-04-16 RX ADMIN — PANTOPRAZOLE SODIUM 40 MG: 40 TABLET, DELAYED RELEASE ORAL at 06:28

## 2019-04-16 RX ADMIN — TOPIRAMATE 50 MG: 25 TABLET, FILM COATED ORAL at 18:40

## 2019-04-16 RX ADMIN — POLYETHYLENE GLYCOL 3350 17 G: 17 POWDER, FOR SOLUTION ORAL at 09:57

## 2019-04-16 RX ADMIN — CLONAZEPAM 0.5 MG: 0.5 TABLET ORAL at 09:57

## 2019-04-16 RX ADMIN — CYANOCOBALAMIN TAB 1000 MCG 1000 MCG: 1000 TAB at 09:57

## 2019-04-17 VITALS
BODY MASS INDEX: 19.64 KG/M2 | HEIGHT: 62 IN | OXYGEN SATURATION: 98 % | SYSTOLIC BLOOD PRESSURE: 115 MMHG | DIASTOLIC BLOOD PRESSURE: 53 MMHG | TEMPERATURE: 98.3 F | WEIGHT: 106.7 LBS | HEART RATE: 76 BPM | RESPIRATION RATE: 18 BRPM

## 2019-04-17 LAB
ANION GAP SERPL CALCULATED.3IONS-SCNC: 5 MMOL/L (ref 5–14)
BASOPHILS # BLD AUTO: 0 THOUSANDS/ΜL (ref 0–0.1)
BASOPHILS NFR BLD AUTO: 1 % (ref 0–1)
BUN SERPL-MCNC: 31 MG/DL (ref 5–25)
CALCIUM SERPL-MCNC: 9.3 MG/DL (ref 8.4–10.2)
CHLORIDE SERPL-SCNC: 104 MMOL/L (ref 97–108)
CO2 SERPL-SCNC: 30 MMOL/L (ref 22–30)
CREAT SERPL-MCNC: 0.64 MG/DL (ref 0.6–1.2)
EOSINOPHIL # BLD AUTO: 0.1 THOUSAND/ΜL (ref 0–0.4)
EOSINOPHIL NFR BLD AUTO: 2 % (ref 0–6)
ERYTHROCYTE [DISTWIDTH] IN BLOOD BY AUTOMATED COUNT: 15 %
GFR SERPL CREATININE-BSD FRML MDRD: 82 ML/MIN/1.73SQ M
GLUCOSE P FAST SERPL-MCNC: 82 MG/DL (ref 70–99)
GLUCOSE SERPL-MCNC: 82 MG/DL (ref 70–99)
HCT VFR BLD AUTO: 29.6 % (ref 36–46)
HGB BLD-MCNC: 9.5 G/DL (ref 12–16)
LYMPHOCYTES # BLD AUTO: 1.9 THOUSANDS/ΜL (ref 0.5–4)
LYMPHOCYTES NFR BLD AUTO: 32 % (ref 25–45)
MCH RBC QN AUTO: 31.1 PG (ref 26.8–34.3)
MCHC RBC AUTO-ENTMCNC: 32.2 G/DL (ref 31.4–37.4)
MCV RBC AUTO: 97 FL (ref 80–100)
MONOCYTES # BLD AUTO: 0.5 THOUSAND/ΜL (ref 0.2–0.9)
MONOCYTES NFR BLD AUTO: 8 % (ref 1–10)
NEUTROPHILS # BLD AUTO: 3.3 THOUSANDS/ΜL (ref 1.8–7.8)
NEUTS SEG NFR BLD AUTO: 56 % (ref 45–65)
PLATELET # BLD AUTO: 355 THOUSANDS/UL (ref 150–450)
PMV BLD AUTO: 7.9 FL (ref 8.9–12.7)
POTASSIUM SERPL-SCNC: 4.1 MMOL/L (ref 3.6–5)
RBC # BLD AUTO: 3.06 MILLION/UL (ref 4–5.2)
SODIUM SERPL-SCNC: 139 MMOL/L (ref 137–147)
WBC # BLD AUTO: 5.8 THOUSAND/UL (ref 4.31–10.16)

## 2019-04-17 PROCEDURE — 85025 COMPLETE CBC W/AUTO DIFF WBC: CPT | Performed by: INTERNAL MEDICINE

## 2019-04-17 PROCEDURE — 80048 BASIC METABOLIC PNL TOTAL CA: CPT | Performed by: INTERNAL MEDICINE

## 2019-04-17 PROCEDURE — 99316 NF DSCHRG MGMT 30 MIN+: CPT | Performed by: INTERNAL MEDICINE

## 2019-04-17 RX ORDER — CLONAZEPAM 0.5 MG/1
0.5 TABLET ORAL 3 TIMES DAILY
Qty: 30 TABLET | Refills: 0 | Status: ON HOLD | OUTPATIENT
Start: 2019-04-17 | End: 2019-04-22 | Stop reason: CLARIF

## 2019-04-17 RX ORDER — POLYETHYLENE GLYCOL 3350 17 G/17G
17 POWDER, FOR SOLUTION ORAL DAILY
Qty: 14 EACH | Refills: 0 | Status: SHIPPED | OUTPATIENT
Start: 2019-04-18 | End: 2019-05-28 | Stop reason: SDUPTHER

## 2019-04-17 RX ORDER — AMOXICILLIN 250 MG
1 CAPSULE ORAL 2 TIMES DAILY
Qty: 30 TABLET | Refills: 0 | Status: SHIPPED | OUTPATIENT
Start: 2019-04-17 | End: 2019-05-28 | Stop reason: SDUPTHER

## 2019-04-17 RX ORDER — PANTOPRAZOLE SODIUM 40 MG/1
40 TABLET, DELAYED RELEASE ORAL
Qty: 30 TABLET | Refills: 0 | Status: SHIPPED | OUTPATIENT
Start: 2019-04-18 | End: 2019-05-28 | Stop reason: SDUPTHER

## 2019-04-17 RX ORDER — NICOTINE 21 MG/24HR
1 PATCH, TRANSDERMAL 24 HOURS TRANSDERMAL DAILY
Qty: 28 PATCH | Refills: 0 | Status: SHIPPED | OUTPATIENT
Start: 2019-04-18 | End: 2019-07-24

## 2019-04-17 RX ADMIN — SENNOSIDES AND DOCUSATE SODIUM 1 TABLET: 8.6; 5 TABLET ORAL at 09:31

## 2019-04-17 RX ADMIN — ESCITALOPRAM OXALATE 10 MG: 10 TABLET ORAL at 09:31

## 2019-04-17 RX ADMIN — CYANOCOBALAMIN TAB 1000 MCG 1000 MCG: 1000 TAB at 09:32

## 2019-04-17 RX ADMIN — GABAPENTIN 100 MG: 100 CAPSULE ORAL at 09:31

## 2019-04-17 RX ADMIN — AMLODIPINE BESYLATE 2.5 MG: 2.5 TABLET ORAL at 09:32

## 2019-04-17 RX ADMIN — CLONAZEPAM 0.5 MG: 0.5 TABLET ORAL at 09:31

## 2019-04-17 RX ADMIN — TOPIRAMATE 50 MG: 25 TABLET, FILM COATED ORAL at 09:32

## 2019-04-17 RX ADMIN — NICOTINE 1 PATCH: 14 PATCH, EXTENDED RELEASE TRANSDERMAL at 09:33

## 2019-04-17 RX ADMIN — PANTOPRAZOLE SODIUM 40 MG: 40 TABLET, DELAYED RELEASE ORAL at 05:02

## 2019-04-17 RX ADMIN — ENOXAPARIN SODIUM 40 MG: 40 INJECTION SUBCUTANEOUS at 09:31

## 2019-04-19 ENCOUNTER — TELEPHONE (OUTPATIENT)
Dept: FAMILY MEDICINE CLINIC | Facility: CLINIC | Age: 84
End: 2019-04-19

## 2019-04-21 ENCOUNTER — APPOINTMENT (EMERGENCY)
Dept: RADIOLOGY | Facility: HOSPITAL | Age: 84
End: 2019-04-21
Payer: COMMERCIAL

## 2019-04-21 ENCOUNTER — HOSPITAL ENCOUNTER (OUTPATIENT)
Facility: HOSPITAL | Age: 84
Setting detail: OBSERVATION
Discharge: HOME | End: 2019-04-22
Attending: EMERGENCY MEDICINE | Admitting: INTERNAL MEDICINE
Payer: COMMERCIAL

## 2019-04-21 ENCOUNTER — APPOINTMENT (EMERGENCY)
Dept: CT IMAGING | Facility: HOSPITAL | Age: 84
End: 2019-04-21
Payer: COMMERCIAL

## 2019-04-21 DIAGNOSIS — R10.9 NONSPECIFIC ABDOMINAL PAIN: ICD-10-CM

## 2019-04-21 DIAGNOSIS — R07.9 CHEST PAIN: ICD-10-CM

## 2019-04-21 DIAGNOSIS — R20.2 PARESTHESIAS: Primary | ICD-10-CM

## 2019-04-21 DIAGNOSIS — R51.9 HEADACHE: ICD-10-CM

## 2019-04-21 LAB
ALBUMIN SERPL BCP-MCNC: 3.1 G/DL (ref 3.5–5)
ALP SERPL-CCNC: 57 U/L (ref 46–116)
ALT SERPL W P-5'-P-CCNC: 24 U/L (ref 12–78)
ANION GAP SERPL CALCULATED.3IONS-SCNC: 6 MMOL/L (ref 4–13)
APTT PPP: 27 SECONDS (ref 26–38)
AST SERPL W P-5'-P-CCNC: 20 U/L (ref 5–45)
ATRIAL RATE: 67 BPM
BASOPHILS # BLD AUTO: 0.04 THOUSANDS/ΜL (ref 0–0.1)
BASOPHILS NFR BLD AUTO: 1 % (ref 0–1)
BILIRUB SERPL-MCNC: 0.29 MG/DL (ref 0.2–1)
BILIRUB UR QL STRIP: NEGATIVE
BUN SERPL-MCNC: 16 MG/DL (ref 5–25)
CALCIUM SERPL-MCNC: 9.1 MG/DL (ref 8.3–10.1)
CHLORIDE SERPL-SCNC: 110 MMOL/L (ref 100–108)
CLARITY UR: CLEAR
CO2 SERPL-SCNC: 29 MMOL/L (ref 21–32)
COLOR UR: YELLOW
CREAT SERPL-MCNC: 0.69 MG/DL (ref 0.6–1.3)
EOSINOPHIL # BLD AUTO: 0.14 THOUSAND/ΜL (ref 0–0.61)
EOSINOPHIL NFR BLD AUTO: 3 % (ref 0–6)
ERYTHROCYTE [DISTWIDTH] IN BLOOD BY AUTOMATED COUNT: 14.1 % (ref 11.6–15.1)
GFR SERPL CREATININE-BSD FRML MDRD: 80 ML/MIN/1.73SQ M
GLUCOSE SERPL-MCNC: 87 MG/DL (ref 65–140)
GLUCOSE UR STRIP-MCNC: NEGATIVE MG/DL
HCT VFR BLD AUTO: 32.6 % (ref 34.8–46.1)
HGB BLD-MCNC: 10 G/DL (ref 11.5–15.4)
HGB UR QL STRIP.AUTO: NEGATIVE
IMM GRANULOCYTES # BLD AUTO: 0.02 THOUSAND/UL (ref 0–0.2)
IMM GRANULOCYTES NFR BLD AUTO: 0 % (ref 0–2)
INR PPP: 1.05 (ref 0.86–1.17)
KETONES UR STRIP-MCNC: NEGATIVE MG/DL
LEUKOCYTE ESTERASE UR QL STRIP: NEGATIVE
LYMPHOCYTES # BLD AUTO: 1.57 THOUSANDS/ΜL (ref 0.6–4.47)
LYMPHOCYTES NFR BLD AUTO: 31 % (ref 14–44)
MCH RBC QN AUTO: 31.3 PG (ref 26.8–34.3)
MCHC RBC AUTO-ENTMCNC: 30.7 G/DL (ref 31.4–37.4)
MCV RBC AUTO: 102 FL (ref 82–98)
MONOCYTES # BLD AUTO: 0.36 THOUSAND/ΜL (ref 0.17–1.22)
MONOCYTES NFR BLD AUTO: 7 % (ref 4–12)
NEUTROPHILS # BLD AUTO: 2.96 THOUSANDS/ΜL (ref 1.85–7.62)
NEUTS SEG NFR BLD AUTO: 58 % (ref 43–75)
NITRITE UR QL STRIP: NEGATIVE
NRBC BLD AUTO-RTO: 0 /100 WBCS
P AXIS: 51 DEGREES
PH UR STRIP.AUTO: 8 [PH]
PLATELET # BLD AUTO: 347 THOUSANDS/UL (ref 149–390)
PMV BLD AUTO: 9.3 FL (ref 8.9–12.7)
POTASSIUM SERPL-SCNC: 3.9 MMOL/L (ref 3.5–5.3)
PR INTERVAL: 194 MS
PROT SERPL-MCNC: 6.8 G/DL (ref 6.4–8.2)
PROT UR STRIP-MCNC: NEGATIVE MG/DL
PROTHROMBIN TIME: 13.8 SECONDS (ref 11.8–14.2)
QRS AXIS: 18 DEGREES
QRSD INTERVAL: 92 MS
QT INTERVAL: 386 MS
QTC INTERVAL: 407 MS
RBC # BLD AUTO: 3.19 MILLION/UL (ref 3.81–5.12)
SODIUM SERPL-SCNC: 145 MMOL/L (ref 136–145)
SP GR UR STRIP.AUTO: 1.01 (ref 1–1.03)
T WAVE AXIS: 56 DEGREES
TROPONIN I SERPL-MCNC: <0.02 NG/ML
TSH SERPL DL<=0.05 MIU/L-ACNC: 1.35 UIU/ML (ref 0.36–3.74)
UROBILINOGEN UR QL STRIP.AUTO: 0.2 E.U./DL
VENTRICULAR RATE: 67 BPM
WBC # BLD AUTO: 5.09 THOUSAND/UL (ref 4.31–10.16)

## 2019-04-21 PROCEDURE — 99220 PR INITIAL OBSERVATION CARE/DAY 70 MINUTES: CPT | Performed by: INTERNAL MEDICINE

## 2019-04-21 PROCEDURE — 96374 THER/PROPH/DIAG INJ IV PUSH: CPT

## 2019-04-21 PROCEDURE — 85610 PROTHROMBIN TIME: CPT | Performed by: EMERGENCY MEDICINE

## 2019-04-21 PROCEDURE — 85730 THROMBOPLASTIN TIME PARTIAL: CPT | Performed by: EMERGENCY MEDICINE

## 2019-04-21 PROCEDURE — 99284 EMERGENCY DEPT VISIT MOD MDM: CPT | Performed by: EMERGENCY MEDICINE

## 2019-04-21 PROCEDURE — 99285 EMERGENCY DEPT VISIT HI MDM: CPT

## 2019-04-21 PROCEDURE — 84443 ASSAY THYROID STIM HORMONE: CPT | Performed by: EMERGENCY MEDICINE

## 2019-04-21 PROCEDURE — 84484 ASSAY OF TROPONIN QUANT: CPT | Performed by: EMERGENCY MEDICINE

## 2019-04-21 PROCEDURE — 84484 ASSAY OF TROPONIN QUANT: CPT | Performed by: INTERNAL MEDICINE

## 2019-04-21 PROCEDURE — 71046 X-RAY EXAM CHEST 2 VIEWS: CPT

## 2019-04-21 PROCEDURE — 85025 COMPLETE CBC W/AUTO DIFF WBC: CPT | Performed by: EMERGENCY MEDICINE

## 2019-04-21 PROCEDURE — 74177 CT ABD & PELVIS W/CONTRAST: CPT

## 2019-04-21 PROCEDURE — 70450 CT HEAD/BRAIN W/O DYE: CPT

## 2019-04-21 PROCEDURE — 96375 TX/PRO/DX INJ NEW DRUG ADDON: CPT

## 2019-04-21 PROCEDURE — 81003 URINALYSIS AUTO W/O SCOPE: CPT | Performed by: EMERGENCY MEDICINE

## 2019-04-21 PROCEDURE — 96361 HYDRATE IV INFUSION ADD-ON: CPT

## 2019-04-21 PROCEDURE — 80053 COMPREHEN METABOLIC PANEL: CPT | Performed by: EMERGENCY MEDICINE

## 2019-04-21 PROCEDURE — 93005 ELECTROCARDIOGRAM TRACING: CPT

## 2019-04-21 PROCEDURE — 93010 ELECTROCARDIOGRAM REPORT: CPT | Performed by: INTERNAL MEDICINE

## 2019-04-21 PROCEDURE — 36415 COLL VENOUS BLD VENIPUNCTURE: CPT | Performed by: EMERGENCY MEDICINE

## 2019-04-21 RX ORDER — PANTOPRAZOLE SODIUM 40 MG/1
40 TABLET, DELAYED RELEASE ORAL
Status: DISCONTINUED | OUTPATIENT
Start: 2019-04-22 | End: 2019-04-22 | Stop reason: HOSPADM

## 2019-04-21 RX ORDER — SODIUM CHLORIDE 9 MG/ML
75 INJECTION, SOLUTION INTRAVENOUS CONTINUOUS
Status: DISCONTINUED | OUTPATIENT
Start: 2019-04-21 | End: 2019-04-22 | Stop reason: HOSPADM

## 2019-04-21 RX ORDER — CLONAZEPAM 0.5 MG/1
0.5 TABLET ORAL 3 TIMES DAILY
Status: DISCONTINUED | OUTPATIENT
Start: 2019-04-21 | End: 2019-04-22 | Stop reason: HOSPADM

## 2019-04-21 RX ORDER — POLYETHYLENE GLYCOL 3350 17 G/17G
17 POWDER, FOR SOLUTION ORAL DAILY
Status: DISCONTINUED | OUTPATIENT
Start: 2019-04-21 | End: 2019-04-22 | Stop reason: HOSPADM

## 2019-04-21 RX ORDER — OXYBUTYNIN CHLORIDE 5 MG/1
5 TABLET, EXTENDED RELEASE ORAL DAILY
Status: DISCONTINUED | OUTPATIENT
Start: 2019-04-21 | End: 2019-04-22 | Stop reason: HOSPADM

## 2019-04-21 RX ORDER — CHOLECALCIFEROL (VITAMIN D3) 125 MCG
1000 CAPSULE ORAL DAILY
Status: DISCONTINUED | OUTPATIENT
Start: 2019-04-21 | End: 2019-04-22 | Stop reason: HOSPADM

## 2019-04-21 RX ORDER — ACETAMINOPHEN 325 MG/1
650 TABLET ORAL EVERY 6 HOURS PRN
Status: DISCONTINUED | OUTPATIENT
Start: 2019-04-21 | End: 2019-04-22 | Stop reason: HOSPADM

## 2019-04-21 RX ORDER — ONDANSETRON 2 MG/ML
4 INJECTION INTRAMUSCULAR; INTRAVENOUS ONCE
Status: COMPLETED | OUTPATIENT
Start: 2019-04-21 | End: 2019-04-21

## 2019-04-21 RX ORDER — LORAZEPAM 2 MG/ML
0.5 INJECTION INTRAMUSCULAR ONCE
Status: COMPLETED | OUTPATIENT
Start: 2019-04-21 | End: 2019-04-21

## 2019-04-21 RX ORDER — HYDRALAZINE HYDROCHLORIDE 20 MG/ML
10 INJECTION INTRAMUSCULAR; INTRAVENOUS EVERY 4 HOURS PRN
Status: DISCONTINUED | OUTPATIENT
Start: 2019-04-21 | End: 2019-04-22 | Stop reason: HOSPADM

## 2019-04-21 RX ORDER — TOPIRAMATE 25 MG/1
50 TABLET ORAL 2 TIMES DAILY
Status: DISCONTINUED | OUTPATIENT
Start: 2019-04-21 | End: 2019-04-22 | Stop reason: HOSPADM

## 2019-04-21 RX ORDER — AMOXICILLIN 250 MG
1 CAPSULE ORAL 2 TIMES DAILY
Status: DISCONTINUED | OUTPATIENT
Start: 2019-04-21 | End: 2019-04-22 | Stop reason: HOSPADM

## 2019-04-21 RX ORDER — NICOTINE 21 MG/24HR
14 PATCH, TRANSDERMAL 24 HOURS TRANSDERMAL ONCE
Status: COMPLETED | OUTPATIENT
Start: 2019-04-21 | End: 2019-04-22

## 2019-04-21 RX ORDER — QUETIAPINE FUMARATE 200 MG/1
400 TABLET, FILM COATED ORAL
Status: DISCONTINUED | OUTPATIENT
Start: 2019-04-21 | End: 2019-04-22 | Stop reason: HOSPADM

## 2019-04-21 RX ORDER — LAMOTRIGINE 100 MG/1
200 TABLET ORAL DAILY
Status: DISCONTINUED | OUTPATIENT
Start: 2019-04-21 | End: 2019-04-22 | Stop reason: HOSPADM

## 2019-04-21 RX ORDER — ONDANSETRON 2 MG/ML
4 INJECTION INTRAMUSCULAR; INTRAVENOUS EVERY 4 HOURS PRN
Status: DISCONTINUED | OUTPATIENT
Start: 2019-04-21 | End: 2019-04-22 | Stop reason: HOSPADM

## 2019-04-21 RX ORDER — LORAZEPAM 2 MG/ML
0.5 INJECTION INTRAMUSCULAR 4 TIMES DAILY PRN
Status: DISCONTINUED | OUTPATIENT
Start: 2019-04-21 | End: 2019-04-22 | Stop reason: HOSPADM

## 2019-04-21 RX ORDER — HYDROCODONE BITARTRATE AND ACETAMINOPHEN 5; 325 MG/1; MG/1
1 TABLET ORAL EVERY 4 HOURS PRN
Status: DISCONTINUED | OUTPATIENT
Start: 2019-04-21 | End: 2019-04-22 | Stop reason: HOSPADM

## 2019-04-21 RX ADMIN — OXYBUTYNIN CHLORIDE 5 MG: 5 TABLET, EXTENDED RELEASE ORAL at 13:36

## 2019-04-21 RX ADMIN — LORAZEPAM 0.5 MG: 2 INJECTION INTRAMUSCULAR; INTRAVENOUS at 08:43

## 2019-04-21 RX ADMIN — POLYETHYLENE GLYCOL 3350 17 G: 17 POWDER, FOR SOLUTION ORAL at 15:27

## 2019-04-21 RX ADMIN — SODIUM CHLORIDE 75 ML/HR: 0.9 INJECTION, SOLUTION INTRAVENOUS at 16:54

## 2019-04-21 RX ADMIN — SODIUM CHLORIDE 1000 ML: 0.9 INJECTION, SOLUTION INTRAVENOUS at 15:23

## 2019-04-21 RX ADMIN — QUETIAPINE FUMARATE 400 MG: 200 TABLET ORAL at 22:54

## 2019-04-21 RX ADMIN — ONDANSETRON 4 MG: 2 INJECTION INTRAMUSCULAR; INTRAVENOUS at 08:42

## 2019-04-21 RX ADMIN — TOPIRAMATE 50 MG: 25 TABLET, FILM COATED ORAL at 13:36

## 2019-04-21 RX ADMIN — CLONAZEPAM 0.5 MG: 0.5 TABLET ORAL at 13:36

## 2019-04-21 RX ADMIN — SODIUM CHLORIDE 500 ML: 0.9 INJECTION, SOLUTION INTRAVENOUS at 08:41

## 2019-04-21 RX ADMIN — IOHEXOL 100 ML: 350 INJECTION, SOLUTION INTRAVENOUS at 09:29

## 2019-04-21 RX ADMIN — SENNOSIDES AND DOCUSATE SODIUM 1 TABLET: 8.6; 5 TABLET ORAL at 16:59

## 2019-04-21 RX ADMIN — LAMOTRIGINE 200 MG: 100 TABLET ORAL at 13:36

## 2019-04-21 RX ADMIN — CLONAZEPAM 0.5 MG: 0.5 TABLET ORAL at 22:53

## 2019-04-21 RX ADMIN — NICOTINE 14 MG: 14 PATCH TRANSDERMAL at 11:24

## 2019-04-21 RX ADMIN — CYANOCOBALAMIN TAB 500 MCG 1000 MCG: 500 TAB at 13:35

## 2019-04-21 RX ADMIN — ENOXAPARIN SODIUM 30 MG: 30 INJECTION SUBCUTANEOUS at 15:35

## 2019-04-22 VITALS
TEMPERATURE: 98.5 F | HEART RATE: 77 BPM | OXYGEN SATURATION: 97 % | HEIGHT: 63 IN | DIASTOLIC BLOOD PRESSURE: 61 MMHG | WEIGHT: 102.95 LBS | SYSTOLIC BLOOD PRESSURE: 124 MMHG | BODY MASS INDEX: 18.24 KG/M2 | RESPIRATION RATE: 18 BRPM

## 2019-04-22 LAB
ALBUMIN SERPL BCP-MCNC: 2.7 G/DL (ref 3.5–5)
ALP SERPL-CCNC: 50 U/L (ref 46–116)
ALT SERPL W P-5'-P-CCNC: 18 U/L (ref 12–78)
ANION GAP SERPL CALCULATED.3IONS-SCNC: 7 MMOL/L (ref 4–13)
AST SERPL W P-5'-P-CCNC: 16 U/L (ref 5–45)
BILIRUB SERPL-MCNC: 0.16 MG/DL (ref 0.2–1)
BUN SERPL-MCNC: 14 MG/DL (ref 5–25)
CALCIUM SERPL-MCNC: 8.7 MG/DL (ref 8.3–10.1)
CHLORIDE SERPL-SCNC: 115 MMOL/L (ref 100–108)
CO2 SERPL-SCNC: 25 MMOL/L (ref 21–32)
CREAT SERPL-MCNC: 0.64 MG/DL (ref 0.6–1.3)
ERYTHROCYTE [DISTWIDTH] IN BLOOD BY AUTOMATED COUNT: 14 % (ref 11.6–15.1)
GFR SERPL CREATININE-BSD FRML MDRD: 82 ML/MIN/1.73SQ M
GLUCOSE SERPL-MCNC: 84 MG/DL (ref 65–140)
HCT VFR BLD AUTO: 30.7 % (ref 34.8–46.1)
HGB BLD-MCNC: 9.4 G/DL (ref 11.5–15.4)
MCH RBC QN AUTO: 31.2 PG (ref 26.8–34.3)
MCHC RBC AUTO-ENTMCNC: 30.6 G/DL (ref 31.4–37.4)
MCV RBC AUTO: 102 FL (ref 82–98)
PLATELET # BLD AUTO: 298 THOUSANDS/UL (ref 149–390)
PMV BLD AUTO: 9.1 FL (ref 8.9–12.7)
POTASSIUM SERPL-SCNC: 3.7 MMOL/L (ref 3.5–5.3)
PROT SERPL-MCNC: 5.9 G/DL (ref 6.4–8.2)
RBC # BLD AUTO: 3.01 MILLION/UL (ref 3.81–5.12)
SODIUM SERPL-SCNC: 147 MMOL/L (ref 136–145)
WBC # BLD AUTO: 5.43 THOUSAND/UL (ref 4.31–10.16)

## 2019-04-22 PROCEDURE — 80053 COMPREHEN METABOLIC PANEL: CPT | Performed by: INTERNAL MEDICINE

## 2019-04-22 PROCEDURE — 99217 PR OBSERVATION CARE DISCHARGE MANAGEMENT: CPT | Performed by: INTERNAL MEDICINE

## 2019-04-22 PROCEDURE — 97163 PT EVAL HIGH COMPLEX 45 MIN: CPT

## 2019-04-22 PROCEDURE — 85027 COMPLETE CBC AUTOMATED: CPT | Performed by: INTERNAL MEDICINE

## 2019-04-22 PROCEDURE — G8978 MOBILITY CURRENT STATUS: HCPCS

## 2019-04-22 PROCEDURE — G8979 MOBILITY GOAL STATUS: HCPCS

## 2019-04-22 RX ORDER — CLONAZEPAM 0.5 MG/1
0.5 TABLET ORAL 3 TIMES DAILY
COMMUNITY
End: 2019-04-26 | Stop reason: SDUPTHER

## 2019-04-22 RX ADMIN — TOPIRAMATE 50 MG: 25 TABLET, FILM COATED ORAL at 08:20

## 2019-04-22 RX ADMIN — CYANOCOBALAMIN TAB 500 MCG 1000 MCG: 500 TAB at 08:20

## 2019-04-22 RX ADMIN — CLONAZEPAM 0.5 MG: 0.5 TABLET ORAL at 16:40

## 2019-04-22 RX ADMIN — SODIUM CHLORIDE 75 ML/HR: 0.9 INJECTION, SOLUTION INTRAVENOUS at 06:30

## 2019-04-22 RX ADMIN — NICOTINE 1 PATCH: 7 PATCH TRANSDERMAL at 08:18

## 2019-04-22 RX ADMIN — CLONAZEPAM 0.5 MG: 0.5 TABLET ORAL at 08:20

## 2019-04-22 RX ADMIN — ENOXAPARIN SODIUM 30 MG: 30 INJECTION SUBCUTANEOUS at 08:18

## 2019-04-22 RX ADMIN — OXYBUTYNIN CHLORIDE 5 MG: 5 TABLET, EXTENDED RELEASE ORAL at 08:20

## 2019-04-22 RX ADMIN — TOPIRAMATE 50 MG: 25 TABLET, FILM COATED ORAL at 16:40

## 2019-04-22 RX ADMIN — SENNOSIDES AND DOCUSATE SODIUM 1 TABLET: 8.6; 5 TABLET ORAL at 08:20

## 2019-04-22 RX ADMIN — SENNOSIDES AND DOCUSATE SODIUM 1 TABLET: 8.6; 5 TABLET ORAL at 16:40

## 2019-04-22 RX ADMIN — PANTOPRAZOLE SODIUM 40 MG: 40 TABLET, DELAYED RELEASE ORAL at 05:22

## 2019-04-22 RX ADMIN — POLYETHYLENE GLYCOL 3350 17 G: 17 POWDER, FOR SOLUTION ORAL at 08:18

## 2019-04-22 RX ADMIN — LAMOTRIGINE 200 MG: 100 TABLET ORAL at 08:20

## 2019-04-25 ENCOUNTER — TELEPHONE (OUTPATIENT)
Dept: OTHER | Facility: OTHER | Age: 84
End: 2019-04-25

## 2019-04-25 ENCOUNTER — TELEPHONE (OUTPATIENT)
Dept: FAMILY MEDICINE CLINIC | Facility: CLINIC | Age: 84
End: 2019-04-25

## 2019-04-25 DIAGNOSIS — K64.9 HEMORRHOIDS, UNSPECIFIED HEMORRHOID TYPE: Primary | ICD-10-CM

## 2019-04-26 DIAGNOSIS — F33.2 SEVERE EPISODE OF RECURRENT MAJOR DEPRESSIVE DISORDER, WITHOUT PSYCHOTIC FEATURES (HCC): Primary | ICD-10-CM

## 2019-04-26 RX ORDER — CLONAZEPAM 0.5 MG/1
0.5 TABLET ORAL 3 TIMES DAILY PRN
Qty: 90 TABLET | Refills: 0 | Status: SHIPPED | OUTPATIENT
Start: 2019-04-26 | End: 2019-12-06 | Stop reason: DRUGHIGH

## 2019-05-13 ENCOUNTER — TREATMENT (OUTPATIENT)
Dept: FAMILY MEDICINE CLINIC | Facility: CLINIC | Age: 84
End: 2019-05-13

## 2019-05-13 ENCOUNTER — TELEPHONE (OUTPATIENT)
Dept: FAMILY MEDICINE CLINIC | Facility: CLINIC | Age: 84
End: 2019-05-13

## 2019-05-13 DIAGNOSIS — M54.30 SCIATICA, UNSPECIFIED LATERALITY: Primary | ICD-10-CM

## 2019-05-13 RX ORDER — LIDOCAINE 50 MG/G
1 PATCH TOPICAL DAILY
Qty: 30 PATCH | Refills: 12 | Status: SHIPPED | OUTPATIENT
Start: 2019-05-13 | End: 2019-05-28

## 2019-05-17 DIAGNOSIS — K64.9 HEMORRHOIDS, UNSPECIFIED HEMORRHOID TYPE: ICD-10-CM

## 2019-05-28 ENCOUNTER — OFFICE VISIT (OUTPATIENT)
Dept: FAMILY MEDICINE CLINIC | Facility: CLINIC | Age: 84
End: 2019-05-28
Payer: COMMERCIAL

## 2019-05-28 VITALS
HEIGHT: 63 IN | SYSTOLIC BLOOD PRESSURE: 130 MMHG | DIASTOLIC BLOOD PRESSURE: 78 MMHG | HEART RATE: 68 BPM | TEMPERATURE: 98.8 F | WEIGHT: 107.7 LBS | BODY MASS INDEX: 19.08 KG/M2

## 2019-05-28 DIAGNOSIS — R10.30 LOWER ABDOMINAL PAIN: ICD-10-CM

## 2019-05-28 DIAGNOSIS — K21.9 GASTROESOPHAGEAL REFLUX DISEASE WITHOUT ESOPHAGITIS: ICD-10-CM

## 2019-05-28 DIAGNOSIS — D50.0 IRON DEFICIENCY ANEMIA DUE TO CHRONIC BLOOD LOSS: Chronic | ICD-10-CM

## 2019-05-28 DIAGNOSIS — F31.9 BIPOLAR 1 DISORDER (HCC): ICD-10-CM

## 2019-05-28 DIAGNOSIS — K64.9 HEMORRHOIDS, UNSPECIFIED HEMORRHOID TYPE: ICD-10-CM

## 2019-05-28 DIAGNOSIS — I10 ESSENTIAL HYPERTENSION: Chronic | ICD-10-CM

## 2019-05-28 DIAGNOSIS — D64.9 ANEMIA: Chronic | ICD-10-CM

## 2019-05-28 DIAGNOSIS — E53.8 VITAMIN B12 DEFICIENCY: Primary | ICD-10-CM

## 2019-05-28 DIAGNOSIS — N32.89 SPASTIC BLADDER: ICD-10-CM

## 2019-05-28 PROCEDURE — 1160F RVW MEDS BY RX/DR IN RCRD: CPT | Performed by: FAMILY MEDICINE

## 2019-05-28 PROCEDURE — 3078F DIAST BP <80 MM HG: CPT | Performed by: FAMILY MEDICINE

## 2019-05-28 PROCEDURE — 1036F TOBACCO NON-USER: CPT | Performed by: FAMILY MEDICINE

## 2019-05-28 PROCEDURE — 99214 OFFICE O/P EST MOD 30 MIN: CPT | Performed by: FAMILY MEDICINE

## 2019-05-28 PROCEDURE — 3075F SYST BP GE 130 - 139MM HG: CPT | Performed by: FAMILY MEDICINE

## 2019-05-28 RX ORDER — AMOXICILLIN 250 MG
1 CAPSULE ORAL 2 TIMES DAILY
Qty: 180 TABLET | Refills: 3 | Status: SHIPPED | OUTPATIENT
Start: 2019-05-28 | End: 2020-01-17

## 2019-05-28 RX ORDER — POLYETHYLENE GLYCOL 3350 17 G/17G
17 POWDER, FOR SOLUTION ORAL DAILY
Qty: 90 EACH | Refills: 12 | Status: SHIPPED | OUTPATIENT
Start: 2019-05-28 | End: 2019-11-26

## 2019-05-28 RX ORDER — PANTOPRAZOLE SODIUM 40 MG/1
40 TABLET, DELAYED RELEASE ORAL
Qty: 90 TABLET | Refills: 3 | Status: SHIPPED | OUTPATIENT
Start: 2019-05-28

## 2019-05-28 RX ORDER — OXYBUTYNIN CHLORIDE 5 MG/1
5 TABLET ORAL DAILY
Qty: 90 TABLET | Refills: 3 | Status: SHIPPED | OUTPATIENT
Start: 2019-05-28 | End: 2019-09-20 | Stop reason: ALTCHOICE

## 2019-05-28 RX ORDER — OXYBUTYNIN CHLORIDE 5 MG/1
TABLET ORAL
COMMUNITY
Start: 2019-05-09 | End: 2019-05-28 | Stop reason: SDUPTHER

## 2019-06-13 ENCOUNTER — TREATMENT (OUTPATIENT)
Dept: FAMILY MEDICINE CLINIC | Facility: CLINIC | Age: 84
End: 2019-06-13

## 2019-06-13 DIAGNOSIS — K64.9 HEMORRHOIDS, UNSPECIFIED HEMORRHOID TYPE: ICD-10-CM

## 2019-07-15 ENCOUNTER — TREATMENT (OUTPATIENT)
Dept: FAMILY MEDICINE CLINIC | Facility: CLINIC | Age: 84
End: 2019-07-15

## 2019-07-15 ENCOUNTER — TELEPHONE (OUTPATIENT)
Dept: FAMILY MEDICINE CLINIC | Facility: CLINIC | Age: 84
End: 2019-07-15

## 2019-07-15 DIAGNOSIS — K64.9 HEMORRHOIDS, UNSPECIFIED HEMORRHOID TYPE: ICD-10-CM

## 2019-07-15 RX ORDER — HYDROCORTISONE ACETATE 25 MG/1
25 SUPPOSITORY RECTAL
Qty: 14 SUPPOSITORY | Refills: 0 | Status: SHIPPED | OUTPATIENT
Start: 2019-07-15 | End: 2019-07-19 | Stop reason: SDUPTHER

## 2019-07-15 NOTE — TELEPHONE ENCOUNTER
Pt did call back to check the status of her message  She take the appointment for 8:45am but would still like something a little later if possible

## 2019-07-15 NOTE — TELEPHONE ENCOUNTER
I cancelled her appt / called patient     You need to call [facility] Aakash Sharpe ) and tell them that I am changing her hemmorrhoid treatement:    Use suppository at bedtime for 2 wks and the cream externally bid and at bedtime for 2 wks ;     RXs called to [facility]

## 2019-07-15 NOTE — TELEPHONE ENCOUNTER
Pt called stating she would like to get an appointment on Wednesday to have her hemorrhoids checked  She states they are bothering her  She can be reached at [phone]  She can get her daughter to transport her  She was offered the 8:45am but is looking for something a little later

## 2019-07-18 ENCOUNTER — TELEPHONE (OUTPATIENT)
Dept: FAMILY MEDICINE CLINIC | Facility: CLINIC | Age: 84
End: 2019-07-18

## 2019-07-18 NOTE — TELEPHONE ENCOUNTER
Spoke to patient she states that aid never received faxed  Fax was sent twice I tried calling facility and fetema was on an emergency call  Verbal that was given to her was what was faxed  Need clarification on what is going on? Patient should be using suppository and rectal cream that glp ordered

## 2019-07-18 NOTE — TELEPHONE ENCOUNTER
Paul Delong called and states that she needs to speak to you and she needs for you to get back to her within half hour in order for her to make arrangements where she lives her phone number is [phone]

## 2019-07-19 DIAGNOSIS — K64.9 HEMORRHOIDS, UNSPECIFIED HEMORRHOID TYPE: ICD-10-CM

## 2019-07-19 RX ORDER — HYDROCORTISONE ACETATE 25 MG/1
25 SUPPOSITORY RECTAL
Qty: 14 SUPPOSITORY | Refills: 0 | Status: SHIPPED | OUTPATIENT
Start: 2019-07-19 | End: 2019-09-14

## 2019-07-22 ENCOUNTER — TELEPHONE (OUTPATIENT)
Dept: FAMILY MEDICINE CLINIC | Facility: CLINIC | Age: 84
End: 2019-07-22

## 2019-07-22 NOTE — TELEPHONE ENCOUNTER
Patient called stating she used suppository on Friday Sunday started with upset stomach has been shakey and nauseous

## 2019-07-24 ENCOUNTER — TREATMENT (OUTPATIENT)
Dept: FAMILY MEDICINE CLINIC | Facility: CLINIC | Age: 84
End: 2019-07-24

## 2019-07-24 ENCOUNTER — TELEPHONE (OUTPATIENT)
Dept: FAMILY MEDICINE CLINIC | Facility: CLINIC | Age: 84
End: 2019-07-24

## 2019-07-24 DIAGNOSIS — F17.200 TOBACCO DEPENDENCE SYNDROME: Primary | ICD-10-CM

## 2019-07-24 NOTE — TELEPHONE ENCOUNTER
Daughter called she states that on 05/2019 patient was here in the office and was put on a nicotine patch she says that patient still on the nicotine 14mg, the other orders that were written on her note were never sent to the pharmacy  She wants to know if patient should be on the 7mg patch now   If so then she would like this to be sent to [facility] pharmacy and she wants a call to be made to her at [phone] her name is dayna

## 2019-07-25 ENCOUNTER — TELEPHONE (OUTPATIENT)
Dept: FAMILY MEDICINE CLINIC | Facility: CLINIC | Age: 84
End: 2019-07-25

## 2019-07-25 DIAGNOSIS — F17.200 TOBACCO DEPENDENCE SYNDROME: ICD-10-CM

## 2019-07-25 NOTE — TELEPHONE ENCOUNTER
Pt called stating Dr Dimple Oden prescribed her Nicotine patches and they were sent to [facility] pharmacy on [address]  She now has to use [facility] at the home she is in  The phone number is [phone]  Fax is [phone]  She would like to speak with Morris Ibrahim or Dr Dimple Oden but did not state what it is in regards to

## 2019-08-05 ENCOUNTER — TELEPHONE (OUTPATIENT)
Dept: FAMILY MEDICINE CLINIC | Facility: CLINIC | Age: 84
End: 2019-08-05

## 2019-08-05 NOTE — TELEPHONE ENCOUNTER
Leeanna Cindy used suppositories for her hemorrhoids for 8 days but still is bothered by them and has occasional blood on toilet paper  Other suggestion or need to be seen?   [phone]

## 2019-08-06 ENCOUNTER — TELEPHONE (OUTPATIENT)
Dept: FAMILY MEDICINE CLINIC | Facility: CLINIC | Age: 84
End: 2019-08-06

## 2019-08-06 DIAGNOSIS — K64.9 HEMORRHOIDS, UNSPECIFIED HEMORRHOID TYPE: Primary | ICD-10-CM

## 2019-08-06 NOTE — TELEPHONE ENCOUNTER
Pt called in checking the status on her earlier phone call regarding her suppositories  Please advise thank you

## 2019-08-06 NOTE — TELEPHONE ENCOUNTER
Pt states that she would like the referral to colon rectal surgeon  Please generate  Script can be faxed to [phone] thank you

## 2019-08-06 NOTE — TELEPHONE ENCOUNTER
Patient called back regarding nans message  Not sure of it was received but patient states that she has used suppositories for hemorrhoids for 8 days which are still bothering and occasionally has blood on toilet paper   Please advise ty

## 2019-08-07 NOTE — TELEPHONE ENCOUNTER
Patient is scheduled with Dr Domenic Doll office on 08/14/19 at 11:15am The office will only treat the hemorrhoids not remove them  Pt is aware

## 2019-08-15 ENCOUNTER — TREATMENT (OUTPATIENT)
Dept: FAMILY MEDICINE CLINIC | Facility: CLINIC | Age: 84
End: 2019-08-15

## 2019-08-29 ENCOUNTER — TELEPHONE (OUTPATIENT)
Dept: FAMILY MEDICINE CLINIC | Facility: CLINIC | Age: 84
End: 2019-08-29

## 2019-08-29 NOTE — TELEPHONE ENCOUNTER
Pt needs insurance referral  Referral done  Waiting on referral number  Faxed to [facility] and to [facility]  Pt has appt 09/20/19  Phone # [phone]  Fax # [phone]  NPI   # [id number]  Dx code L84 and L60 8

## 2019-09-11 ENCOUNTER — TELEPHONE (OUTPATIENT)
Dept: FAMILY MEDICINE CLINIC | Facility: CLINIC | Age: 84
End: 2019-09-11

## 2019-09-11 DIAGNOSIS — R39.9 URINARY SYMPTOM OR SIGN: Primary | ICD-10-CM

## 2019-09-11 NOTE — TELEPHONE ENCOUNTER
Call from [facility] care nurse through [facility] requesting a order for a C&S due to patient complaining of flank discomfort   This can be faxed to [phone] pt pradeep Roach

## 2019-09-14 ENCOUNTER — APPOINTMENT (EMERGENCY)
Dept: CT IMAGING | Facility: HOSPITAL | Age: 84
End: 2019-09-14
Payer: COMMERCIAL

## 2019-09-14 ENCOUNTER — HOSPITAL ENCOUNTER (EMERGENCY)
Facility: HOSPITAL | Age: 84
Discharge: HOME | End: 2019-09-14
Attending: EMERGENCY MEDICINE | Admitting: EMERGENCY MEDICINE
Payer: COMMERCIAL

## 2019-09-14 VITALS
SYSTOLIC BLOOD PRESSURE: 157 MMHG | RESPIRATION RATE: 18 BRPM | TEMPERATURE: 98.6 F | DIASTOLIC BLOOD PRESSURE: 79 MMHG | HEART RATE: 68 BPM | WEIGHT: 100.97 LBS | OXYGEN SATURATION: 97 % | BODY MASS INDEX: 17.89 KG/M2

## 2019-09-14 DIAGNOSIS — R30.0 DYSURIA: Primary | ICD-10-CM

## 2019-09-14 LAB
ANION GAP SERPL CALCULATED.3IONS-SCNC: 9 MMOL/L (ref 4–13)
BACTERIA UR QL AUTO: ABNORMAL /HPF
BASOPHILS # BLD AUTO: 0.04 THOUSANDS/ΜL (ref 0–0.1)
BASOPHILS NFR BLD AUTO: 1 % (ref 0–1)
BILIRUB UR QL STRIP: NEGATIVE
BUN SERPL-MCNC: 20 MG/DL (ref 5–25)
CALCIUM SERPL-MCNC: 9.7 MG/DL (ref 8.3–10.1)
CHLORIDE SERPL-SCNC: 107 MMOL/L (ref 100–108)
CLARITY UR: CLEAR
CO2 SERPL-SCNC: 29 MMOL/L (ref 21–32)
COLOR UR: YELLOW
CREAT SERPL-MCNC: 0.76 MG/DL (ref 0.6–1.3)
EOSINOPHIL # BLD AUTO: 0.1 THOUSAND/ΜL (ref 0–0.61)
EOSINOPHIL NFR BLD AUTO: 2 % (ref 0–6)
ERYTHROCYTE [DISTWIDTH] IN BLOOD BY AUTOMATED COUNT: 13.2 % (ref 11.6–15.1)
GFR SERPL CREATININE-BSD FRML MDRD: 72 ML/MIN/1.73SQ M
GLUCOSE SERPL-MCNC: 85 MG/DL (ref 65–140)
GLUCOSE UR STRIP-MCNC: NEGATIVE MG/DL
HCT VFR BLD AUTO: 38.8 % (ref 34.8–46.1)
HGB BLD-MCNC: 11.9 G/DL (ref 11.5–15.4)
HGB UR QL STRIP.AUTO: ABNORMAL
IMM GRANULOCYTES # BLD AUTO: 0.01 THOUSAND/UL (ref 0–0.2)
IMM GRANULOCYTES NFR BLD AUTO: 0 % (ref 0–2)
KETONES UR STRIP-MCNC: NEGATIVE MG/DL
LEUKOCYTE ESTERASE UR QL STRIP: NEGATIVE
LYMPHOCYTES # BLD AUTO: 1.5 THOUSANDS/ΜL (ref 0.6–4.47)
LYMPHOCYTES NFR BLD AUTO: 26 % (ref 14–44)
MCH RBC QN AUTO: 30.4 PG (ref 26.8–34.3)
MCHC RBC AUTO-ENTMCNC: 30.7 G/DL (ref 31.4–37.4)
MCV RBC AUTO: 99 FL (ref 82–98)
MONOCYTES # BLD AUTO: 0.39 THOUSAND/ΜL (ref 0.17–1.22)
MONOCYTES NFR BLD AUTO: 7 % (ref 4–12)
NEUTROPHILS # BLD AUTO: 3.79 THOUSANDS/ΜL (ref 1.85–7.62)
NEUTS SEG NFR BLD AUTO: 64 % (ref 43–75)
NITRITE UR QL STRIP: NEGATIVE
NON-SQ EPI CELLS URNS QL MICRO: ABNORMAL /HPF
NRBC BLD AUTO-RTO: 0 /100 WBCS
PH UR STRIP.AUTO: 7 [PH] (ref 4.5–8)
PLATELET # BLD AUTO: 329 THOUSANDS/UL (ref 149–390)
PMV BLD AUTO: 9.1 FL (ref 8.9–12.7)
POTASSIUM SERPL-SCNC: 3.8 MMOL/L (ref 3.5–5.3)
PROT UR STRIP-MCNC: NEGATIVE MG/DL
RBC # BLD AUTO: 3.92 MILLION/UL (ref 3.81–5.12)
RBC #/AREA URNS AUTO: ABNORMAL /HPF
SODIUM SERPL-SCNC: 145 MMOL/L (ref 136–145)
SP GR UR STRIP.AUTO: 1.01 (ref 1–1.03)
UROBILINOGEN UR QL STRIP.AUTO: 0.2 E.U./DL
WBC # BLD AUTO: 5.83 THOUSAND/UL (ref 4.31–10.16)
WBC #/AREA URNS AUTO: ABNORMAL /HPF

## 2019-09-14 PROCEDURE — 81001 URINALYSIS AUTO W/SCOPE: CPT

## 2019-09-14 PROCEDURE — 85025 COMPLETE CBC W/AUTO DIFF WBC: CPT | Performed by: EMERGENCY MEDICINE

## 2019-09-14 PROCEDURE — 99284 EMERGENCY DEPT VISIT MOD MDM: CPT

## 2019-09-14 PROCEDURE — 99284 EMERGENCY DEPT VISIT MOD MDM: CPT | Performed by: EMERGENCY MEDICINE

## 2019-09-14 PROCEDURE — 96361 HYDRATE IV INFUSION ADD-ON: CPT

## 2019-09-14 PROCEDURE — 80048 BASIC METABOLIC PNL TOTAL CA: CPT | Performed by: EMERGENCY MEDICINE

## 2019-09-14 PROCEDURE — 36415 COLL VENOUS BLD VENIPUNCTURE: CPT | Performed by: EMERGENCY MEDICINE

## 2019-09-14 PROCEDURE — 74177 CT ABD & PELVIS W/CONTRAST: CPT

## 2019-09-14 PROCEDURE — 96374 THER/PROPH/DIAG INJ IV PUSH: CPT

## 2019-09-14 RX ORDER — LABETALOL 20 MG/4 ML (5 MG/ML) INTRAVENOUS SYRINGE
20 ONCE
Status: COMPLETED | OUTPATIENT
Start: 2019-09-14 | End: 2019-09-14

## 2019-09-14 RX ADMIN — SODIUM CHLORIDE 500 ML: 0.9 INJECTION, SOLUTION INTRAVENOUS at 18:04

## 2019-09-14 RX ADMIN — LABETALOL 20 MG/4 ML (5 MG/ML) INTRAVENOUS SYRINGE 20 MG: at 18:04

## 2019-09-14 RX ADMIN — IOHEXOL 100 ML: 350 INJECTION, SOLUTION INTRAVENOUS at 18:52

## 2019-09-14 NOTE — DISCHARGE INSTRUCTIONS
Call your family doctor in the morning, discuss your symptoms with them, you should be seen in the office for further evaluation and management of your dysuria  The number for the urologist is also included in these discharge instructions, they can further manage you symptoms

## 2019-09-14 NOTE — ED PROVIDER NOTES
History  Chief Complaint   Patient presents with    Urinary Frequency     Increased urinary frequency for two weeks  pain with urination  79-year-old female with a history of anemia, anxiety, GERD presents to the emergency department with a 2 week history of urinary frequency which has got worse over the last several days  She does have suprapubic pain associated with this  No burning on urination  No hematuria  She has had nausea without vomiting  No fevers or chills  History provided by:  Patient   used: No    Urinary Frequency   Severity:  Unable to specify  Onset quality:  Gradual  Duration:  2 weeks  Timing:  Intermittent  Progression:  Worsening  Chronicity:  New  Relieved by:  Nothing tried  Worsened by:  Nothing  Ineffective treatments:  Nothing tried  Associated symptoms: abdominal pain and nausea    Associated symptoms: no chest pain, no congestion, no cough, no diarrhea, no fatigue, no fever, no headaches, no loss of consciousness, no rash, no rhinorrhea, no shortness of breath, no sore throat, no vomiting and no wheezing    Abdominal pain:     Location:  Suprapubic    Quality comment:  Unable to specify    Severity:  Unable to specify    Onset quality:  Gradual    Duration:  2 weeks    Timing:  Intermittent    Progression:  Worsening    Chronicity:  New      Prior to Admission Medications   Prescriptions Last Dose Informant Patient Reported? Taking?    Mirabegron ER (MYRBETRIQ) 50 MG TB24   Yes Yes   Sig: Take 50 mg by mouth daily   QUEtiapine (SEROquel) 200 mg tablet  Self No Yes   Sig: Take 2 tablets (400 mg total) by mouth daily at bedtime   clonazePAM (KlonoPIN) 0 5 mg tablet   No Yes   Sig: Take 1 tablet (0 5 mg total) by mouth 3 (three) times a day as needed for anxiety for up to 30 days   Patient taking differently: Take 0 5 mg by mouth 2 (two) times a day    cyanocobalamin 1000 MCG tablet   No Yes   Sig: Take 1 tablet (1,000 mcg total) by mouth daily hydrocortisone (ANUSOL-HC, PROCTOSOL HC,) 2 5 % rectal cream   No Yes   Sig: Use three times a day  externally in perirectal area for 2 weeks   lamoTRIgine (LaMICtal) 200 MG tablet  Self No Yes   Sig: Take 1 tablet (200 mg total) by mouth daily at bedtime   Patient taking differently: Take 200 mg by mouth daily    nicotine (NICODERM CQ) 7 mg/24hr TD 24 hr patch   No Yes   Sig: Place 1 patch on the skin every 24 hours   oxybutynin (DITROPAN) 5 mg tablet   No No   Sig: Take 1 tablet (5 mg total) by mouth daily   pantoprazole (PROTONIX) 40 mg tablet   No Yes   Sig: Take 1 tablet (40 mg total) by mouth daily in the early morning   polyethylene glycol (MIRALAX) 17 g packet   No Yes   Sig: Take 17 g by mouth daily   senna-docusate sodium (SENOKOT S) 8 6-50 mg per tablet   No Yes   Sig: Take 1 tablet by mouth 2 (two) times a day   topiramate (TOPAMAX) 50 MG tablet  Self No Yes   Sig: Take 1 tablet (50 mg total) by mouth 2 (two) times a day      Facility-Administered Medications: None       Past Medical History:   Diagnosis Date    Anemia 2/26/2019    Anxiety     Bipolar 1 disorder (HCC)     Depression     DVT [facility]deep venous thrombosis) ([facility]) 2008    Left leg    GERD (gastroesophageal reflux disease)     Hypertension     Overactive bladder        Past Surgical History:   Procedure Laterality Date    ADENOIDECTOMY      CATARACT EXTRACTION Bilateral     Right 10/2003, left 4/2004    CHOLECYSTECTOMY      DILATION AND CURETTAGE OF UTERUS  1985    HERNIA REPAIR  11/02/2007    Femoral and small bowel resection    HIP SURGERY      L hip    TONSILLECTOMY      As a youth    WRIST SURGERY      L wrist       Family History   Problem Relation Age of Onset    Heart disease Father      I have reviewed and agree with the history as documented      Social History     Tobacco Use    Smoking status: Former Smoker     Packs/day: 1 00     Types: Cigarettes    Smokeless tobacco: Former User    Tobacco comment: Per [employer]: Heavy tobacco smoker   Substance Use Topics    Alcohol use: Not Currently     Alcohol/week: 0 0 standard drinks     Frequency: Never     Drinks per session: Patient refused     Binge frequency: Never    Drug use: Not Currently        Review of Systems   Constitutional: Negative  Negative for chills, diaphoresis, fatigue and fever  HENT: Negative  Negative for congestion, rhinorrhea and sore throat  Eyes: Negative  Negative for discharge, redness and itching  Respiratory: Negative  Negative for apnea, cough, chest tightness, shortness of breath and wheezing  Cardiovascular: Negative for chest pain, palpitations and leg swelling  Gastrointestinal: Positive for abdominal pain and nausea  Negative for abdominal distention, diarrhea and vomiting  Endocrine: Negative  Genitourinary: Positive for frequency  Negative for decreased urine volume, dysuria, flank pain and urgency  Musculoskeletal: Negative  Negative for back pain  Skin: Negative  Negative for rash  Allergic/Immunologic: Negative  Neurological: Negative  Negative for dizziness, loss of consciousness, syncope, weakness, light-headedness, numbness and headaches  Hematological: Negative  All other systems reviewed and are negative  Physical Exam  Physical Exam   Constitutional: She is oriented to person, place, and time  She appears well-developed and well-nourished  Non-toxic appearance  She does not have a sickly appearance  She does not appear ill  No distress  HENT:   Head: Normocephalic and atraumatic  Right Ear: External ear normal    Left Ear: External ear normal    Mouth/Throat: Oropharynx is clear and moist    Eyes: Pupils are equal, round, and reactive to light  Conjunctivae are normal  Right eye exhibits no discharge  Left eye exhibits no discharge  No scleral icterus  Cardiovascular: Normal rate and regular rhythm  Exam reveals no gallop and no friction rub  Murmur heard     Systolic murmur is present with a grade of 3/6  Pulmonary/Chest: Effort normal and breath sounds normal  No stridor  No respiratory distress  She has no wheezes  She has no rales  She exhibits no tenderness  Abdominal: Soft  Normal appearance and bowel sounds are normal  She exhibits no distension and no mass  There is tenderness in the right lower quadrant, suprapubic area and left lower quadrant  There is no rebound and no guarding  No hernia  Musculoskeletal: Normal range of motion  She exhibits no edema, tenderness or deformity  Neurological: She is alert and oriented to person, place, and time  She has normal reflexes  She exhibits normal muscle tone  Skin: Skin is warm and dry  No rash noted  She is not diaphoretic  No erythema  No pallor  Psychiatric: She has a normal mood and affect  Nursing note and vitals reviewed  Vital Signs  ED Triage Vitals [09/14/19 1705]   Temperature Pulse Respirations Blood Pressure SpO2   98 6 °F (37 °C) 77 18 (!) 239/112 98 %      Temp Source Heart Rate Source Patient Position - Orthostatic VS BP Location FiO2 (%)   Oral Monitor Lying Right arm --      Pain Score       7           Vitals:    09/14/19 1705   BP: (!) 239/112   Pulse: 77   Patient Position - Orthostatic VS: Lying         Visual Acuity      ED Medications  Medications - No data to display    Diagnostic Studies  Results Reviewed     Procedure Component Value Units Date/Time    CBC and differential [[id number]]     Lab Status:  No result Specimen:  Blood     Basic metabolic panel [[id number]]     Lab Status:  No result Specimen:  Blood     POCT urinalysis dipstick [[id number]]     Lab Status:  No result Specimen:  Urine                  No orders to display              Procedures  Procedures       ED Course                               MDM  Number of Diagnoses or Management Options  Diagnosis management comments: 51-year-old female presents with a 2 week history of urinary frequency and lower abdominal pain    She states over the last several days the symptoms are getting worse  She has no burning or hematuria  No fevers or chills  She has had nausea without vomiting  Exam she is alert in no distress  She does have mild lower abdominal tenderness without peritoneal signs  She is quite hypertensive on arrival to the emergency department  Patient denies a history of hypertension even though it is listed on her past medical history  She does not take any medication for blood pressure  She states she thinks because she is worked up will check basic labs, urinalysis  If her urine is diagnostic of a UTI, and her labs are normal   Will most likely discharge on antibiotics  Will recheck her blood pressure  If the urine is inconclusive, will CT abdomen pelvis to rule out other causes of her frequency of urination and abdominal pain  Amount and/or Complexity of Data Reviewed  Clinical lab tests: ordered and reviewed  Independent visualization of images, tracings, or specimens: yes        Disposition  Final diagnoses:   None     ED Disposition     None      Follow-up Information    None         Patient's Medications   Discharge Prescriptions    No medications on file     No discharge procedures on file      ED Provider  Electronically Signed by           Marvel Leong DO  09/15/19 3502

## 2019-09-14 NOTE — ED NOTES
Medication list updated with list provided by patient from facility        Janett Espinosa, JAIDA  09/14/19 8663

## 2019-09-17 ENCOUNTER — TELEPHONE (OUTPATIENT)
Dept: FAMILY MEDICINE CLINIC | Facility: CLINIC | Age: 84
End: 2019-09-17

## 2019-09-17 NOTE — TELEPHONE ENCOUNTER
Doesn't feel good stomach issues , hemorrhoid, frequent urination at [facility]  Seen at [facility] Ed Saturday night   MRI and urine ok   Needs follow up    Has no transportation to get here but cant wait  [phone]  Where can she be fit in? wants this week and Dr Richardson Lax

## 2019-09-17 NOTE — TELEPHONE ENCOUNTER
Bridgett Wright called asking for April about appointment this week with Dr fred Wright asked if April can call her back at [phone] and to leave a voicemail

## 2019-09-20 ENCOUNTER — OFFICE VISIT (OUTPATIENT)
Dept: FAMILY MEDICINE CLINIC | Facility: CLINIC | Age: 84
End: 2019-09-20
Payer: COMMERCIAL

## 2019-09-20 VITALS
TEMPERATURE: 98.3 F | DIASTOLIC BLOOD PRESSURE: 90 MMHG | OXYGEN SATURATION: 95 % | HEART RATE: 102 BPM | SYSTOLIC BLOOD PRESSURE: 140 MMHG

## 2019-09-20 DIAGNOSIS — E53.8 VITAMIN B12 DEFICIENCY: ICD-10-CM

## 2019-09-20 DIAGNOSIS — D50.0 IRON DEFICIENCY ANEMIA DUE TO CHRONIC BLOOD LOSS: Chronic | ICD-10-CM

## 2019-09-20 DIAGNOSIS — I10 ESSENTIAL HYPERTENSION: Chronic | ICD-10-CM

## 2019-09-20 DIAGNOSIS — E78.5 HYPERLIPIDEMIA, UNSPECIFIED HYPERLIPIDEMIA TYPE: Chronic | ICD-10-CM

## 2019-09-20 DIAGNOSIS — F33.2 SEVERE EPISODE OF RECURRENT MAJOR DEPRESSIVE DISORDER, WITHOUT PSYCHOTIC FEATURES (HCC): ICD-10-CM

## 2019-09-20 DIAGNOSIS — K21.9 GASTROESOPHAGEAL REFLUX DISEASE WITHOUT ESOPHAGITIS: Primary | ICD-10-CM

## 2019-09-20 PROBLEM — H61.21 IMPACTED CERUMEN OF RIGHT EAR: Status: RESOLVED | Noted: 2019-01-22 | Resolved: 2019-09-20

## 2019-09-20 PROBLEM — G93.40 ACUTE ENCEPHALOPATHY: Status: RESOLVED | Noted: 2019-04-02 | Resolved: 2019-09-20

## 2019-09-20 PROBLEM — K56.7 ILEUS (HCC): Status: RESOLVED | Noted: 2019-03-31 | Resolved: 2019-09-20

## 2019-09-20 PROCEDURE — 99213 OFFICE O/P EST LOW 20 MIN: CPT | Performed by: FAMILY MEDICINE

## 2019-09-23 ENCOUNTER — TREATMENT (OUTPATIENT)
Dept: FAMILY MEDICINE CLINIC | Facility: CLINIC | Age: 84
End: 2019-09-23

## 2019-09-23 ENCOUNTER — TELEPHONE (OUTPATIENT)
Dept: FAMILY MEDICINE CLINIC | Facility: CLINIC | Age: 84
End: 2019-09-23

## 2019-09-23 DIAGNOSIS — N76.1 CHRONIC VAGINITIS: ICD-10-CM

## 2019-09-23 DIAGNOSIS — N76.1 CHRONIC VAGINITIS: Primary | ICD-10-CM

## 2019-09-23 RX ORDER — DIAPER,BRIEF,INFANT-TODD,DISP
EACH MISCELLANEOUS
Qty: 30 G | Refills: 6 | Status: SHIPPED | OUTPATIENT
Start: 2019-09-23 | End: 2019-09-23 | Stop reason: SDUPTHER

## 2019-09-23 NOTE — TELEPHONE ENCOUNTER
Patient states due to the reaction from the previous cream she is feeling nauseous and feeling like she has to urinate a lot more often with burning wondering if these symptoms are coming from using the cream that had the alcohol

## 2019-09-23 NOTE — TELEPHONE ENCOUNTER
Patient called upset about proctocz 2 5% cream states she cream has alcohol and patient is allergic to this  Patient stated she also put the cream inside her rectum  Frustrated because she is not having any relief

## 2019-09-24 RX ORDER — DIAPER,BRIEF,INFANT-TODD,DISP
EACH MISCELLANEOUS
Qty: 30 G | Refills: 6 | Status: SHIPPED | OUTPATIENT
Start: 2019-09-24 | End: 2020-07-13

## 2019-09-26 ENCOUNTER — TELEPHONE (OUTPATIENT)
Dept: FAMILY MEDICINE CLINIC | Facility: CLINIC | Age: 84
End: 2019-09-26

## 2019-09-26 DIAGNOSIS — F17.200 TOBACCO DEPENDENCE SYNDROME: ICD-10-CM

## 2019-09-27 DIAGNOSIS — F17.200 TOBACCO DEPENDENCE SYNDROME: ICD-10-CM

## 2019-10-01 ENCOUNTER — TELEPHONE (OUTPATIENT)
Dept: FAMILY MEDICINE CLINIC | Facility: CLINIC | Age: 84
End: 2019-10-01

## 2019-10-01 NOTE — TELEPHONE ENCOUNTER
Patient called to cancel appointment she had for today at 2:15, she doesn't have transportation  She has UTI Sx, Pain, burning, pressure, frequency  Would you be able to call medication to the [address] [place] [address]?

## 2019-10-02 NOTE — TELEPHONE ENCOUNTER
Patient states she is not able to drop off urine specimen  States that has no burning when voiding just has frequency   But, when voiding urine drips to the back and is affecting her hemorrhoids

## 2019-10-04 ENCOUNTER — OFFICE VISIT (OUTPATIENT)
Dept: FAMILY MEDICINE CLINIC | Facility: CLINIC | Age: 84
End: 2019-10-04
Payer: COMMERCIAL

## 2019-10-04 VITALS
DIASTOLIC BLOOD PRESSURE: 80 MMHG | SYSTOLIC BLOOD PRESSURE: 110 MMHG | HEIGHT: 63 IN | WEIGHT: 108.2 LBS | HEART RATE: 80 BPM | RESPIRATION RATE: 18 BRPM | BODY MASS INDEX: 19.17 KG/M2 | TEMPERATURE: 98 F

## 2019-10-04 DIAGNOSIS — N90.89 LABIAL IRRITATION: ICD-10-CM

## 2019-10-04 DIAGNOSIS — R39.9 URINARY SYMPTOM OR SIGN: Primary | ICD-10-CM

## 2019-10-04 PROBLEM — K81.2 ACUTE ON CHRONIC CHOLECYSTITIS: Status: ACTIVE | Noted: 2019-10-04

## 2019-10-04 PROBLEM — K57.30 DIVERTICULOSIS OF COLON: Status: ACTIVE | Noted: 2019-10-04

## 2019-10-04 PROBLEM — K42.9 UMBILICAL HERNIA: Status: ACTIVE | Noted: 2019-10-04

## 2019-10-04 PROBLEM — K82.9 DISORDER OF GALLBLADDER: Status: ACTIVE | Noted: 2019-10-04

## 2019-10-04 LAB
SL AMB  POCT GLUCOSE, UA: NORMAL
SL AMB LEUKOCYTE ESTERASE,UA: NORMAL
SL AMB POCT BILIRUBIN,UA: NORMAL
SL AMB POCT BLOOD,UA: NORMAL
SL AMB POCT CLARITY,UA: NORMAL
SL AMB POCT COLOR,UA: NORMAL
SL AMB POCT KETONES,UA: NORMAL
SL AMB POCT NITRITE,UA: NORMAL
SL AMB POCT PH,UA: 5
SL AMB POCT SPECIFIC GRAVITY,UA: 1
SL AMB POCT URINE PROTEIN: NORMAL
SL AMB POCT UROBILINOGEN: NORMAL

## 2019-10-04 PROCEDURE — 81002 URINALYSIS NONAUTO W/O SCOPE: CPT | Performed by: NURSE PRACTITIONER

## 2019-10-04 PROCEDURE — 99213 OFFICE O/P EST LOW 20 MIN: CPT | Performed by: NURSE PRACTITIONER

## 2019-10-04 NOTE — PATIENT INSTRUCTIONS
1   Cortisone cream on labia first once a day   2  Light coat of vasoline on top and whenever urinating    Small amount

## 2019-10-04 NOTE — PROGRESS NOTES
Assessment/Plan:         Diagnoses and all orders for this visit:    Urinary symptom or sign  -     POCT urine dip  Urine dip is negative  If cont with symptoms will see GYN sooner   Has follow up next month already scheduled  Labial irritation  Cont with cortisone as directed and place thin layer of vasoline to act as barrier after urination  Change pull up more often         Subjective:      Patient ID: Caleb Mendoza is a 80 y o  female  HPI   Increasing urinary frequency   Having a lot of urgency to go  Feeling like getting worse  No fevers or chills  Has been tested in past and negative  Did have gyn appt    Started on mirabegron  States she does have a devise in place for several years from gyn to help   Feels it isn't working Left labia feeling irritated Using cortisone daily on labial area  The following portions of the patient's history were reviewed and updated as appropriate: allergies, current medications, past family history, past medical history, past social history, past surgical history and problem list     Review of Systems   Constitutional: Negative for activity change, appetite change, chills and fever  Genitourinary: Positive for frequency and urgency  Negative for dysuria, vaginal bleeding and vaginal discharge  Having urinary incontinence   Labia irritation on left side          Objective:  Vitals:    10/04/19 1051   BP: 110/80   BP Location: Left arm   Patient Position: Sitting   Cuff Size: Standard   Pulse: 80   Resp: 18   Temp: 98 °F (36 7 °C)   TempSrc: Temporal   Weight: 49 1 kg (108 lb 3 2 oz)   Height: 5' 3" (1 6 m)      Physical Exam   Constitutional: She appears well-developed and well-nourished  Abdominal: Soft  Bowel sounds are normal  There is no tenderness  Genitourinary:         Vitals reviewed

## 2019-10-16 DIAGNOSIS — R52 PAIN: Primary | ICD-10-CM

## 2019-10-16 RX ORDER — SENNOSIDES 8.6 MG
CAPSULE ORAL
Qty: 30 TABLET | Refills: 12 | Status: SHIPPED | OUTPATIENT
Start: 2019-10-16 | End: 2019-10-18 | Stop reason: SDUPTHER

## 2019-10-16 NOTE — TELEPHONE ENCOUNTER
Call from nurse at [facility] states that pt has been complaining on muscle aches and pain especially in her knees  Nurse requesting that [phone] fax order something for pain (tylenol)? For pt to take  Fax number is [phone] thank you  Per nurse pt has nothing on med list for pain not even tylenol

## 2019-10-18 DIAGNOSIS — R52 PAIN: ICD-10-CM

## 2019-10-18 RX ORDER — SENNOSIDES 8.6 MG
CAPSULE ORAL
Qty: 30 TABLET | Refills: 12 | Status: SHIPPED | OUTPATIENT
Start: 2019-10-18 | End: 2020-11-13

## 2019-11-04 ENCOUNTER — TELEPHONE (OUTPATIENT)
Dept: FAMILY MEDICINE CLINIC | Facility: CLINIC | Age: 84
End: 2019-11-04

## 2019-11-04 NOTE — TELEPHONE ENCOUNTER
Per Lashell's request [facility] that Iman Capellan has an allergy to alcohol  [phone]

## 2019-11-13 ENCOUNTER — TREATMENT (OUTPATIENT)
Dept: FAMILY MEDICINE CLINIC | Facility: CLINIC | Age: 84
End: 2019-11-13

## 2019-11-13 ENCOUNTER — TELEPHONE (OUTPATIENT)
Dept: FAMILY MEDICINE CLINIC | Facility: CLINIC | Age: 84
End: 2019-11-13

## 2019-11-13 DIAGNOSIS — K57.30 DIVERTICULOSIS OF COLON: Primary | ICD-10-CM

## 2019-11-13 RX ORDER — BISACODYL 10 MG
10 SUPPOSITORY, RECTAL RECTAL DAILY
Qty: 12 SUPPOSITORY | Refills: 0 | Status: SHIPPED | OUTPATIENT
Start: 2019-11-13 | End: 2019-12-06

## 2019-11-13 RX ORDER — BISACODYL 10 MG
10 SUPPOSITORY, RECTAL RECTAL DAILY
Qty: 12 SUPPOSITORY | Refills: 0 | Status: SHIPPED | OUTPATIENT
Start: 2019-11-13 | End: 2019-11-13 | Stop reason: SDUPTHER

## 2019-11-13 NOTE — TELEPHONE ENCOUNTER
Nurse Musa Carranza called, stating patient is having some constipation problems  They would like to know if you would be willing to prescribed a ducolax suppository  Please send to [address] [place] [address]   Please advise, if any questions please call Musa Carranza [phone] thank you

## 2019-11-26 ENCOUNTER — TELEPHONE (OUTPATIENT)
Dept: FAMILY MEDICINE CLINIC | Facility: CLINIC | Age: 84
End: 2019-11-26

## 2019-11-26 DIAGNOSIS — R10.30 LOWER ABDOMINAL PAIN: ICD-10-CM

## 2019-11-26 RX ORDER — POLYETHYLENE GLYCOL 3350 17 G/17G
17 POWDER, FOR SOLUTION ORAL EVERY OTHER DAY
Qty: 90 EACH | Refills: 12 | Status: SHIPPED | OUTPATIENT
Start: 2019-11-26 | End: 2020-01-17

## 2019-11-26 NOTE — TELEPHONE ENCOUNTER
Does not take her stool softeners every day   Wants it listed as needed  At [facility]   Fax   [phone] order  Doesn't need it everyday  Makes her have too many BM's

## 2019-11-26 NOTE — TELEPHONE ENCOUNTER
She wants the Rx to be as needed it for the bowel movement can you please review and fax it over to [facility]   Thank you

## 2019-11-27 ENCOUNTER — TELEPHONE (OUTPATIENT)
Dept: FAMILY MEDICINE CLINIC | Facility: CLINIC | Age: 84
End: 2019-11-27

## 2019-11-27 NOTE — TELEPHONE ENCOUNTER
Wants to take stool softener when she wants not every other day as prescribed by Dr Debbie Schmitt to regulate it on her own

## 2019-12-03 ENCOUNTER — APPOINTMENT (EMERGENCY)
Dept: CT IMAGING | Facility: HOSPITAL | Age: 84
End: 2019-12-03
Payer: COMMERCIAL

## 2019-12-03 ENCOUNTER — APPOINTMENT (EMERGENCY)
Dept: RADIOLOGY | Facility: HOSPITAL | Age: 84
End: 2019-12-03
Payer: COMMERCIAL

## 2019-12-03 ENCOUNTER — HOSPITAL ENCOUNTER (EMERGENCY)
Facility: HOSPITAL | Age: 84
Discharge: HOME | End: 2019-12-03
Attending: EMERGENCY MEDICINE | Admitting: EMERGENCY MEDICINE
Payer: COMMERCIAL

## 2019-12-03 VITALS
DIASTOLIC BLOOD PRESSURE: 95 MMHG | TEMPERATURE: 98.9 F | RESPIRATION RATE: 16 BRPM | SYSTOLIC BLOOD PRESSURE: 189 MMHG | HEART RATE: 75 BPM | OXYGEN SATURATION: 97 %

## 2019-12-03 DIAGNOSIS — N39.0 URINARY TRACT INFECTION: ICD-10-CM

## 2019-12-03 DIAGNOSIS — R10.9 FLANK PAIN: Primary | ICD-10-CM

## 2019-12-03 DIAGNOSIS — I10 HYPERTENSION: ICD-10-CM

## 2019-12-03 LAB
ALBUMIN SERPL BCP-MCNC: 4.3 G/DL (ref 3–5.2)
ALP SERPL-CCNC: 55 U/L (ref 43–122)
ALT SERPL W P-5'-P-CCNC: 17 U/L (ref 9–52)
AMORPH PHOS CRY URNS QL MICRO: ABNORMAL /HPF
ANION GAP SERPL CALCULATED.3IONS-SCNC: 8 MMOL/L (ref 5–14)
AST SERPL W P-5'-P-CCNC: 28 U/L (ref 14–36)
BACTERIA UR QL AUTO: ABNORMAL /HPF
BASOPHILS # BLD AUTO: 0 THOUSANDS/ΜL (ref 0–0.1)
BASOPHILS NFR BLD AUTO: 1 % (ref 0–1)
BILIRUB SERPL-MCNC: 0.4 MG/DL
BILIRUB UR QL STRIP: NEGATIVE
BUN SERPL-MCNC: 16 MG/DL (ref 5–25)
CALCIUM SERPL-MCNC: 9.6 MG/DL (ref 8.4–10.2)
CHLORIDE SERPL-SCNC: 107 MMOL/L (ref 97–108)
CLARITY UR: CLEAR
CO2 SERPL-SCNC: 28 MMOL/L (ref 22–30)
COLOR UR: ABNORMAL
CREAT SERPL-MCNC: 0.8 MG/DL (ref 0.6–1.2)
EOSINOPHIL # BLD AUTO: 0.1 THOUSAND/ΜL (ref 0–0.4)
EOSINOPHIL NFR BLD AUTO: 2 % (ref 0–6)
ERYTHROCYTE [DISTWIDTH] IN BLOOD BY AUTOMATED COUNT: 14.2 %
GFR SERPL CREATININE-BSD FRML MDRD: 68 ML/MIN/1.73SQ M
GLUCOSE SERPL-MCNC: 94 MG/DL (ref 70–99)
GLUCOSE UR STRIP-MCNC: NEGATIVE MG/DL
HCT VFR BLD AUTO: 36.2 % (ref 36–46)
HGB BLD-MCNC: 12.1 G/DL (ref 12–16)
HGB UR QL STRIP.AUTO: NEGATIVE
KETONES UR STRIP-MCNC: NEGATIVE MG/DL
LEUKOCYTE ESTERASE UR QL STRIP: 25
LIPASE SERPL-CCNC: 51 U/L (ref 23–300)
LYMPHOCYTES # BLD AUTO: 1.4 THOUSANDS/ΜL (ref 0.5–4)
LYMPHOCYTES NFR BLD AUTO: 37 % (ref 25–45)
MCH RBC QN AUTO: 31 PG (ref 26.8–34.3)
MCHC RBC AUTO-ENTMCNC: 33.4 G/DL (ref 31.4–37.4)
MCV RBC AUTO: 93 FL (ref 80–100)
MONOCYTES # BLD AUTO: 0.3 THOUSAND/ΜL (ref 0.2–0.9)
MONOCYTES NFR BLD AUTO: 8 % (ref 1–10)
NEUTROPHILS # BLD AUTO: 2 THOUSANDS/ΜL (ref 1.8–7.8)
NEUTS SEG NFR BLD AUTO: 53 % (ref 45–65)
NITRITE UR QL STRIP: NEGATIVE
NON-SQ EPI CELLS URNS QL MICRO: ABNORMAL /HPF
PH UR STRIP.AUTO: 7 [PH]
PLATELET # BLD AUTO: 315 THOUSANDS/UL (ref 150–450)
PMV BLD AUTO: 6.8 FL (ref 8.9–12.7)
POTASSIUM SERPL-SCNC: 4.1 MMOL/L (ref 3.6–5)
PROT SERPL-MCNC: 7.7 G/DL (ref 5.9–8.4)
PROT UR STRIP-MCNC: NEGATIVE MG/DL
RBC # BLD AUTO: 3.89 MILLION/UL (ref 4–5.2)
RBC #/AREA URNS AUTO: ABNORMAL /HPF
SODIUM SERPL-SCNC: 143 MMOL/L (ref 137–147)
SP GR UR STRIP.AUTO: 1.01 (ref 1–1.04)
UROBILINOGEN UA: NEGATIVE MG/DL
WBC # BLD AUTO: 3.7 THOUSAND/UL (ref 4.31–10.16)
WBC #/AREA URNS AUTO: ABNORMAL /HPF

## 2019-12-03 PROCEDURE — 81001 URINALYSIS AUTO W/SCOPE: CPT | Performed by: EMERGENCY MEDICINE

## 2019-12-03 PROCEDURE — 80053 COMPREHEN METABOLIC PANEL: CPT | Performed by: EMERGENCY MEDICINE

## 2019-12-03 PROCEDURE — 71045 X-RAY EXAM CHEST 1 VIEW: CPT

## 2019-12-03 PROCEDURE — 81003 URINALYSIS AUTO W/O SCOPE: CPT | Performed by: EMERGENCY MEDICINE

## 2019-12-03 PROCEDURE — 83690 ASSAY OF LIPASE: CPT | Performed by: EMERGENCY MEDICINE

## 2019-12-03 PROCEDURE — 36415 COLL VENOUS BLD VENIPUNCTURE: CPT | Performed by: EMERGENCY MEDICINE

## 2019-12-03 PROCEDURE — 93005 ELECTROCARDIOGRAM TRACING: CPT

## 2019-12-03 PROCEDURE — 96360 HYDRATION IV INFUSION INIT: CPT

## 2019-12-03 PROCEDURE — 74177 CT ABD & PELVIS W/CONTRAST: CPT

## 2019-12-03 PROCEDURE — 99285 EMERGENCY DEPT VISIT HI MDM: CPT

## 2019-12-03 PROCEDURE — 99284 EMERGENCY DEPT VISIT MOD MDM: CPT | Performed by: EMERGENCY MEDICINE

## 2019-12-03 PROCEDURE — 85025 COMPLETE CBC W/AUTO DIFF WBC: CPT | Performed by: EMERGENCY MEDICINE

## 2019-12-03 RX ORDER — HYDROCHLOROTHIAZIDE 25 MG/1
25 TABLET ORAL DAILY
Qty: 20 TABLET | Refills: 0 | Status: SHIPPED | OUTPATIENT
Start: 2019-12-03 | End: 2020-01-17

## 2019-12-03 RX ORDER — HYDROCHLOROTHIAZIDE 25 MG/1
25 TABLET ORAL DAILY
Status: DISCONTINUED | OUTPATIENT
Start: 2019-12-03 | End: 2019-12-03 | Stop reason: HOSPADM

## 2019-12-03 RX ORDER — CEPHALEXIN 500 MG/1
500 CAPSULE ORAL EVERY 8 HOURS SCHEDULED
Qty: 21 CAPSULE | Refills: 0 | Status: SHIPPED | OUTPATIENT
Start: 2019-12-03 | End: 2019-12-10

## 2019-12-03 RX ORDER — CEPHALEXIN 500 MG/1
500 CAPSULE ORAL ONCE
Status: COMPLETED | OUTPATIENT
Start: 2019-12-03 | End: 2019-12-03

## 2019-12-03 RX ORDER — ACETAMINOPHEN 325 MG/1
975 TABLET ORAL ONCE
Status: COMPLETED | OUTPATIENT
Start: 2019-12-03 | End: 2019-12-03

## 2019-12-03 RX ORDER — MORPHINE SULFATE 4 MG/ML
4 INJECTION, SOLUTION INTRAMUSCULAR; INTRAVENOUS ONCE
Status: DISCONTINUED | OUTPATIENT
Start: 2019-12-03 | End: 2019-12-03

## 2019-12-03 RX ADMIN — ACETAMINOPHEN 975 MG: 325 TABLET ORAL at 18:24

## 2019-12-03 RX ADMIN — IOHEXOL 100 ML: 350 INJECTION, SOLUTION INTRAVENOUS at 18:35

## 2019-12-03 RX ADMIN — HYDROCHLOROTHIAZIDE 25 MG: 25 TABLET ORAL at 18:51

## 2019-12-03 RX ADMIN — CEPHALEXIN 500 MG: 500 CAPSULE ORAL at 20:30

## 2019-12-03 RX ADMIN — SODIUM CHLORIDE 1000 ML: 0.9 INJECTION, SOLUTION INTRAVENOUS at 18:30

## 2019-12-03 NOTE — ED NOTES
Patient transported to [address] [place] [address], [address] [place] [address]  12/03/19 7316

## 2019-12-03 NOTE — ED PROVIDER NOTES
History  Chief Complaint   Patient presents with    Flank Pain     rt flank apin and frequent urination for 2 weeks  states it has been getting worse for the past 2 days     Patient is an 78-year-old female who presents for concerns of right flank pain for 2 days  Also complains of associated urinary frequency without dysuria  Denies vaginal discharge or bleeding  States she initially thought it was a muscle spasm but has been getting worse  Also noted to have elevated blood pressure on arrival, denies any history of hypertension or taking any antihypertensive medications  Review of medications shows that she is also on treatment for overactive bladder  States she has been compliant with her medications  Denies any associated headache, vision changes, neck pain, chest pain or shortness of breath  She points to just above for right posterior superior iliac spine as to where her pain is located  Denies any falls or trauma  Prior to Admission Medications   Prescriptions Last Dose Informant Patient Reported? Taking?    Mirabegron ER (MYRBETRIQ) 50 MG TB24   Yes No   Sig: Take 50 mg by mouth daily   QUEtiapine (SEROquel) 200 mg tablet  Self No No   Sig: Take 2 tablets (400 mg total) by mouth daily at bedtime   acetaminophen (TYLENOL) 650 mg CR tablet   No No   Si tablets po every 8 hours as needed   bisacodyl (DULCOLAX) 10 mg suppository   No No   Sig: Insert 1 suppository (10 mg total) into the rectum daily   clonazePAM (KlonoPIN) 0 5 mg tablet   No No   Sig: Take 1 tablet (0 5 mg total) by mouth 3 (three) times a day as needed for anxiety for up to 30 days   Patient taking differently: Take 0 5 mg by mouth 2 (two) times a day    cyanocobalamin 1000 MCG tablet   No No   Sig: Take 1 tablet (1,000 mcg total) by mouth daily   hydrocortisone 1 % cream   No No   Sig: Apply s directed 2-3 times per day   lamoTRIgine (LaMICtal) 200 MG tablet  Self No No   Sig: Take 1 tablet (200 mg total) by mouth daily at bedtime   Patient taking differently: Take 200 mg by mouth daily    nicotine (NICODERM CQ) 7 mg/24hr TD 24 hr patch   No No   Sig: Place 1 patch on the skin every 24 hours   pantoprazole (PROTONIX) 40 mg tablet   No No   Sig: Take 1 tablet (40 mg total) by mouth daily in the early morning   polyethylene glycol (MIRALAX) 17 g packet   No No   Sig: Take 17 g by mouth every other day On a regular basis   senna-docusate sodium (SENOKOT S) 8 6-50 mg per tablet   No No   Sig: Take 1 tablet by mouth 2 (two) times a day   topiramate (TOPAMAX) 50 MG tablet  Self No No   Sig: Take 1 tablet (50 mg total) by mouth 2 (two) times a day      Facility-Administered Medications: None       Past Medical History:   Diagnosis Date    Anemia 2/26/2019    Anxiety     Bipolar 1 disorder ([facility] )     Depression     DVT (deep venous thrombosis) ([facility] ) 2008    Left leg    GERD (gastroesophageal reflux disease)     Hypertension     Overactive bladder        Past Surgical History:   Procedure Laterality Date    ADENOIDECTOMY      CATARACT EXTRACTION Bilateral     Right 10/2003, left 4/2004    CHOLECYSTECTOMY      DILATION AND CURETTAGE OF UTERUS  1985    HERNIA REPAIR  11/02/2007    Femoral and small bowel resection    HIP SURGERY      L hip    TONSILLECTOMY      As a youth    WRIST SURGERY      L wrist       Family History   Problem Relation Age of Onset    Heart disease Father      I have reviewed and agree with the history as documented  Social History     Tobacco Use    Smoking status: Former Smoker     Packs/day: 1 00     Types: Cigarettes    Smokeless tobacco: Former User    Tobacco comment: Per [employer]: Heavy tobacco smoker   Substance Use Topics    Alcohol use: Not Currently     Alcohol/week: 0 0 standard drinks     Frequency: Never     Drinks per session: Patient refused     Binge frequency: Never    Drug use: Not Currently        Review of Systems   Constitutional: Negative  Negative for chills and fever  HENT: Negative  Negative for rhinorrhea, sore throat, trouble swallowing and voice change  Eyes: Negative  Negative for pain and visual disturbance  Respiratory: Negative  Negative for cough, shortness of breath and wheezing  Cardiovascular: Negative  Negative for chest pain and palpitations  Gastrointestinal: Negative for abdominal pain, diarrhea, nausea and vomiting  Genitourinary: Positive for flank pain and frequency  Negative for decreased urine volume, difficulty urinating, dysuria, vaginal bleeding, vaginal discharge and vaginal pain  Musculoskeletal: Negative for neck pain and neck stiffness  Skin: Negative  Negative for rash  Neurological: Negative  Negative for dizziness, speech difficulty, weakness, light-headedness and numbness  Physical Exam  Physical Exam   Constitutional: She is oriented to person, place, and time  She appears well-developed and well-nourished  No distress  HENT:   Head: Normocephalic and atraumatic  Mouth/Throat: Oropharynx is clear and moist    Eyes: Pupils are equal, round, and reactive to light  Conjunctivae and EOM are normal    Neck: Normal range of motion  Neck supple  No tracheal deviation present  Cardiovascular: Normal rate, regular rhythm and intact distal pulses  Pulmonary/Chest: Effort normal and breath sounds normal  No respiratory distress  She has no wheezes  She has no rales  Abdominal: Soft  Bowel sounds are normal  She exhibits no distension  There is no tenderness  There is no rebound and no guarding  Musculoskeletal: Normal range of motion  She exhibits no tenderness or deformity  Back:    Neurological: She is alert and oriented to person, place, and time  Skin: Skin is warm and dry  Capillary refill takes less than 2 seconds  No rash noted  Psychiatric: She has a normal mood and affect  Her behavior is normal    Nursing note and vitals reviewed        Vital Signs  ED Triage Vitals [12/03/19 1731]   Temperature Pulse Respirations Blood Pressure SpO2   98 9 °F (37 2 °C) 87 16 (!) 227/126 100 %      Temp Source Heart Rate Source Patient Position - Orthostatic VS BP Location FiO2 (%)   Tympanic Monitor Sitting Left arm --      Pain Score       --           Vitals:    12/03/19 1731   BP: (!) 227/126   Pulse: 87   Patient Position - Orthostatic VS: Sitting         Visual Acuity      ED Medications  Medications   sodium chloride 0 9 % bolus 1,000 mL (has no administration in time range)   morphine (PF) 4 mg/mL injection 4 mg (has no administration in time range)       Diagnostic Studies  Results Reviewed     Procedure Component Value Units Date/Time    CBC and differential [[id number]]     Lab Status:  No result Specimen:  Blood     Comprehensive metabolic panel [[id number]]     Lab Status:  No result Specimen:  Blood     Lipase [[id number]]     Lab Status:  No result Specimen:  Blood     UA w Reflex to Microscopic w Reflex to Culture [[id number]]     Lab Status:  No result Specimen:  Urine                  XR chest 1 view portable    (Results Pending)   CT abdomen pelvis with contrast    (Results Pending)              Procedures  Procedures         ED Course  ED Course as of Dec 03 1757   Tue Dec 03, 2019   1754 Procedure Note: EKG  Date/Time: 12/03/19 5:54 PM   Performed by: Sandra Diego  Authorized by: Sandra Diego  ECG interpreted by me, the ED Provider: yes   The EKG demonstrates:  Rate 80  Rhythm sinus  QTc 447  No ST elevations/depressions  Non-specific t wave changes                                      MDM  Number of Diagnoses or Management Options  Flank pain:   Hypertension:   Urinary tract infection:   Diagnosis management comments: Patient is an 80-year-old female who presents for flank pain with associated hypertension and urinary frequency  Will obtain CT scan of the abdomen pelvis, basic blood work as well as urinalysis and urine culture    Patient otherwise afebrile, in no acute distress an otherwise well-appearing  Exam consistent with urinary tract infection versus pyelonephritis versus renal stone  Disposition will be pending results of imaging and lab work  Patient's CT scan did not reveal any acute intra-abdominal pathology  She does have bilateral intrarenal stones but no evidence of blood on her urinalysis, does have history of chronic bladder spasm and a weakly positive urinalysis for infection  Will treat with antibiotics  Advised patient that she will need to follow up as an outpatient for both control of her blood pressure as well as continued treatment and evaluation for bladder spasm and urinary tract infection  Addendum:    At approximately 2200 on 12/03/2019 I was made aware by Radiology for a over read by radiologist for concerns of questionable pneumothorax  Patient had no respiratory distress, had no complaints of chest pain, had pointed to her right posterior superior iliac spine as source of her pain  Has other findings that could be consistent cause for her symptoms  Do not feel that she has a pneumothorax  However, out of abundance of caution I did attempt to call the patient's facility, left voicemail stating that she should contact the emergency room to be updated on any need for return evaluation  Amount and/or Complexity of Data Reviewed  Clinical lab tests: ordered and reviewed  Tests in the radiology section of CPT®: ordered and reviewed          Disposition  Final diagnoses:   None     ED Disposition     None      Follow-up Information    None         Patient's Medications   Discharge Prescriptions    No medications on file     No discharge procedures on file      ED Provider  Electronically Signed by           Dakota Edmonds DO  12/04/19 5253

## 2019-12-04 LAB
ATRIAL RATE: 80 BPM
P AXIS: 54 DEGREES
PR INTERVAL: 206 MS
QRS AXIS: 18 DEGREES
QRSD INTERVAL: 92 MS
QT INTERVAL: 388 MS
QTC INTERVAL: 447 MS
T WAVE AXIS: 58 DEGREES
VENTRICULAR RATE: 80 BPM

## 2019-12-04 PROCEDURE — 93010 ELECTROCARDIOGRAM REPORT: CPT | Performed by: INTERNAL MEDICINE

## 2019-12-05 ENCOUNTER — TELEPHONE (OUTPATIENT)
Dept: OTHER | Facility: OTHER | Age: 84
End: 2019-12-05

## 2019-12-06 ENCOUNTER — TREATMENT (OUTPATIENT)
Dept: FAMILY MEDICINE CLINIC | Facility: CLINIC | Age: 84
End: 2019-12-06

## 2019-12-06 ENCOUNTER — TELEPHONE (OUTPATIENT)
Dept: FAMILY MEDICINE CLINIC | Facility: CLINIC | Age: 84
End: 2019-12-06

## 2019-12-06 ENCOUNTER — TELEPHONE (OUTPATIENT)
Dept: OTHER | Facility: OTHER | Age: 84
End: 2019-12-06

## 2019-12-06 DIAGNOSIS — F41.1 ANXIETY STATE: ICD-10-CM

## 2019-12-06 DIAGNOSIS — F31.9 BIPOLAR 1 DISORDER (HCC): ICD-10-CM

## 2019-12-06 DIAGNOSIS — K57.30 DIVERTICULOSIS OF COLON: ICD-10-CM

## 2019-12-06 DIAGNOSIS — R32 URINARY INCONTINENCE IN FEMALE: Primary | ICD-10-CM

## 2019-12-06 DIAGNOSIS — F33.2 SEVERE EPISODE OF RECURRENT MAJOR DEPRESSIVE DISORDER, WITHOUT PSYCHOTIC FEATURES (HCC): ICD-10-CM

## 2019-12-06 RX ORDER — LAMOTRIGINE 200 MG/1
200 TABLET ORAL
Qty: 30 TABLET | Refills: 12 | Status: SHIPPED | OUTPATIENT
Start: 2019-12-06 | End: 2019-12-06

## 2019-12-06 RX ORDER — BISACODYL 10 MG
10 SUPPOSITORY, RECTAL RECTAL DAILY
Qty: 12 SUPPOSITORY | Refills: 3 | Status: SHIPPED | OUTPATIENT
Start: 2019-12-06 | End: 2020-01-17

## 2019-12-06 RX ORDER — LAMOTRIGINE 200 MG/1
200 TABLET ORAL
Qty: 30 TABLET | Refills: 12 | Status: SHIPPED | OUTPATIENT
Start: 2019-12-06

## 2019-12-06 RX ORDER — SOLIFENACIN SUCCINATE 10 MG/1
10 TABLET, FILM COATED ORAL DAILY
Qty: 30 TABLET | Refills: 3 | Status: SHIPPED | OUTPATIENT
Start: 2019-12-06 | End: 2020-07-13

## 2019-12-06 RX ORDER — CLONAZEPAM 0.5 MG/1
0.5 TABLET ORAL 2 TIMES DAILY
Qty: 60 TABLET | Refills: 0 | Status: ON HOLD | OUTPATIENT
Start: 2019-12-06 | End: 2021-01-09 | Stop reason: SDUPTHER

## 2019-12-06 NOTE — TELEPHONE ENCOUNTER
Patient called regarding the change in time of day her Lamictal can be given  Dr Ihsan Garber the change from being taken at night to being taken in the a m      She needs the Script with the changes to be faxed to [facility] attn: Felice Rodriguez

## 2019-12-06 NOTE — TELEPHONE ENCOUNTER
AP Dr Abi Escobedo @[phone]    Actual page:  [phone]/ [place]/ [facility]/ PT   Keri Burgess 1935/ needs updated med list faxed over      Informed caller to call service back if no response within 20 minutes

## 2019-12-06 NOTE — PROGRESS NOTES
Was in ER and a mixup with meds on readmission to [facility]  I reviewed the [address] [place] [address] with the Nurse and updated the needed changes, including: d/c Myrbetriq and start Vesicare (change made by UROL), Make Dulcolax Supp, PRN and reorder the clnazepam BID

## 2020-01-17 ENCOUNTER — OFFICE VISIT (OUTPATIENT)
Dept: FAMILY MEDICINE CLINIC | Facility: CLINIC | Age: 85
End: 2020-01-17
Payer: COMMERCIAL

## 2020-01-17 VITALS
WEIGHT: 102.6 LBS | OXYGEN SATURATION: 95 % | HEART RATE: 90 BPM | DIASTOLIC BLOOD PRESSURE: 72 MMHG | BODY MASS INDEX: 18.18 KG/M2 | HEIGHT: 63 IN | SYSTOLIC BLOOD PRESSURE: 120 MMHG | TEMPERATURE: 97.9 F

## 2020-01-17 DIAGNOSIS — K58.2 IRRITABLE BOWEL SYNDROME WITH BOTH CONSTIPATION AND DIARRHEA: Primary | ICD-10-CM

## 2020-01-17 DIAGNOSIS — K57.30 DIVERTICULOSIS OF COLON: ICD-10-CM

## 2020-01-17 DIAGNOSIS — E43 UNSPECIFIED SEVERE PROTEIN-CALORIE MALNUTRITION (HCC): ICD-10-CM

## 2020-01-17 DIAGNOSIS — F31.9 BIPOLAR 1 DISORDER (HCC): ICD-10-CM

## 2020-01-17 PROCEDURE — 1160F RVW MEDS BY RX/DR IN RCRD: CPT | Performed by: FAMILY MEDICINE

## 2020-01-17 PROCEDURE — 99214 OFFICE O/P EST MOD 30 MIN: CPT | Performed by: FAMILY MEDICINE

## 2020-01-17 RX ORDER — BISACODYL 10 MG
SUPPOSITORY, RECTAL RECTAL
Qty: 12 SUPPOSITORY | Refills: 12 | Status: SHIPPED | OUTPATIENT
Start: 2020-01-17 | End: 2020-06-15

## 2020-01-17 NOTE — PROGRESS NOTES
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  -     methylcellulose (CITRUCEL) 500 mg tablet; Take 2 tablets (1,000 mg total) by mouth daily    Unspecified severe protein-calorie malnutrition (HCC)    Bipolar 1 disorder (HCC)    Diverticulosis of colon          Subjective:      Patient ID: Karen Jefferson is a 80 y o  female  Patient comes to discuss her of recurring abdominal pain and bowel issues  She describes that sometimes she can go for 5 days without a bowel movement becomes very uncomfortable and then other day she may have small pellets frequently  Her bowel regimen I reviewed at the present time  She had a CT scan done 5 weeks ago which was fine she had labs done around that same time which were fine   I checked the old files and I do not find any evidence of a colonoscopy in past 10 years  The following portions of the patient's history were reviewed and updated as appropriate: allergies, current medications, past family history, past medical history, past social history, past surgical history and problem list     Review of Systems   Gastrointestinal: Positive for abdominal distention, abdominal pain, constipation, diarrhea and nausea  Objective:  Vitals:    01/17/20 1304   BP: 120/72   Pulse: 90   Temp: 97 9 °F (36 6 °C)   TempSrc: Temporal   SpO2: 95%   Weight: 46 5 kg (102 lb 9 6 oz)   Height: 5' 3" (1 6 m)      Physical Exam   Constitutional: She appears well-developed and well-nourished  Eyes: Conjunctivae are normal    Pulmonary/Chest: Effort normal    Abdominal: Bowel sounds are normal  She exhibits no distension and no mass  There is tenderness  There is no guarding  No hernia  Skin: Skin is warm and dry     Psychiatric: Thought content normal          I believe the major issue here is a form of irritable bowel syndrome which she has actually had symptoms for many years  We need to regularize the bowel pattern a bit so that we have a well-formed in more regular pattern  This should eliminate the intestinal cramping      Current regimen will include daily dose of Citrucel 500 mg and using a suppository for evacuation if bowel movements have not occurred within a 72 hour period

## 2020-01-20 ENCOUNTER — TELEPHONE (OUTPATIENT)
Dept: FAMILY MEDICINE CLINIC | Facility: CLINIC | Age: 85
End: 2020-01-20

## 2020-01-20 NOTE — TELEPHONE ENCOUNTER
----- Message from Claudia Kern MD sent at 1/17/2020  2:27 PM EST -----    Call patient's son Fabienne Carrel let him know that I check the old records I find no evidence of a colonoscopy within the last 8-10 years  As we discussed the CT scan done recently was okay but this does not cover the colon  If she continues to complain of issues with her belly perhaps we would need to get her to a GI specialist for colonoscopy let us see how she does with her bowel regimen for the next month or 2

## 2020-01-21 ENCOUNTER — TELEPHONE (OUTPATIENT)
Dept: FAMILY MEDICINE CLINIC | Facility: CLINIC | Age: 85
End: 2020-01-21

## 2020-01-21 NOTE — TELEPHONE ENCOUNTER
----- Message from Magali Sal MD sent at 1/17/2020  2:27 PM EST -----    Call patient's son Jaquan Zarate let him know that I check the old records I find no evidence of a colonoscopy within the last 8-10 years  As we discussed the CT scan done recently was okay but this does not cover the colon  If she continues to complain of issues with her belly perhaps we would need to get her to a GI specialist for colonoscopy let us see how she does with her bowel regimen for the next month or 2

## 2020-01-23 DIAGNOSIS — F17.200 TOBACCO DEPENDENCE SYNDROME: ICD-10-CM

## 2020-01-24 DIAGNOSIS — F17.200 TOBACCO DEPENDENCE SYNDROME: ICD-10-CM

## 2020-01-24 NOTE — TELEPHONE ENCOUNTER
[facility] for a referral they informed me patient does not a need a referral for this appt

## 2020-01-24 NOTE — TELEPHONE ENCOUNTER
Wants sennecot bid prn  [place]    She sates dulcolax isnt enough to keep her bowels moving       [facility] [address] [facility]  appt  1/28/2020

## 2020-01-24 NOTE — TELEPHONE ENCOUNTER
[facility] requesting [facility] to [facility]  [address] [facility]    She has an appointment  1/28/2020

## 2020-01-27 DIAGNOSIS — K59.00 CONSTIPATION, UNSPECIFIED CONSTIPATION TYPE: Primary | ICD-10-CM

## 2020-01-27 RX ORDER — SENNA PLUS 8.6 MG/1
1 TABLET ORAL DAILY
Qty: 30 TABLET | Refills: 12 | Status: SHIPPED | OUTPATIENT
Start: 2020-01-27 | End: 2020-06-15

## 2020-01-27 NOTE — TELEPHONE ENCOUNTER
Left message that senocot was added back to list and sent to [address] [place] [address] pharmacy    Patient was notifed that nicotine patches would not be prescribed by dr Cy Pritchett

## 2020-02-03 ENCOUNTER — TELEPHONE (OUTPATIENT)
Dept: FAMILY MEDICINE CLINIC | Facility: CLINIC | Age: 85
End: 2020-02-03

## 2020-02-03 NOTE — TELEPHONE ENCOUNTER
Pt needed her script for her sennakot it was sent to [facility] pharmacy instead of [facility] services  this has been corrected  And the medication has been called into [place] services    Pt has been notified that she should get her medication [facility] 2/3/20

## 2020-02-09 ENCOUNTER — HOSPITAL ENCOUNTER (EMERGENCY)
Facility: HOSPITAL | Age: 85
Discharge: HOME | End: 2020-02-09
Attending: EMERGENCY MEDICINE
Payer: COMMERCIAL

## 2020-02-09 ENCOUNTER — APPOINTMENT (EMERGENCY)
Dept: CT IMAGING | Facility: HOSPITAL | Age: 85
End: 2020-02-09
Payer: COMMERCIAL

## 2020-02-09 VITALS
WEIGHT: 109.13 LBS | DIASTOLIC BLOOD PRESSURE: 84 MMHG | BODY MASS INDEX: 19.33 KG/M2 | TEMPERATURE: 97.5 F | HEART RATE: 93 BPM | SYSTOLIC BLOOD PRESSURE: 191 MMHG | RESPIRATION RATE: 20 BRPM | OXYGEN SATURATION: 97 %

## 2020-02-09 DIAGNOSIS — K59.00 CONSTIPATION: ICD-10-CM

## 2020-02-09 DIAGNOSIS — R10.9 FLANK PAIN: Primary | ICD-10-CM

## 2020-02-09 LAB
ANION GAP SERPL CALCULATED.3IONS-SCNC: 6 MMOL/L (ref 4–13)
BASOPHILS # BLD AUTO: 0.05 THOUSANDS/ΜL (ref 0–0.1)
BASOPHILS NFR BLD AUTO: 1 % (ref 0–1)
BILIRUB UR QL STRIP: NEGATIVE
BUN SERPL-MCNC: 19 MG/DL (ref 5–25)
CALCIUM SERPL-MCNC: 9.2 MG/DL (ref 8.3–10.1)
CHLORIDE SERPL-SCNC: 109 MMOL/L (ref 100–108)
CLARITY UR: CLEAR
CO2 SERPL-SCNC: 29 MMOL/L (ref 21–32)
COLOR UR: YELLOW
CREAT SERPL-MCNC: 0.81 MG/DL (ref 0.6–1.3)
EOSINOPHIL # BLD AUTO: 0.03 THOUSAND/ΜL (ref 0–0.61)
EOSINOPHIL NFR BLD AUTO: 1 % (ref 0–6)
ERYTHROCYTE [DISTWIDTH] IN BLOOD BY AUTOMATED COUNT: 15.4 % (ref 11.6–15.1)
GFR SERPL CREATININE-BSD FRML MDRD: 66 ML/MIN/1.73SQ M
GLUCOSE SERPL-MCNC: 87 MG/DL (ref 65–140)
GLUCOSE UR STRIP-MCNC: NEGATIVE MG/DL
HCT VFR BLD AUTO: 35 % (ref 34.8–46.1)
HGB BLD-MCNC: 10.8 G/DL (ref 11.5–15.4)
HGB UR QL STRIP.AUTO: NEGATIVE
IMM GRANULOCYTES # BLD AUTO: 0.01 THOUSAND/UL (ref 0–0.2)
IMM GRANULOCYTES NFR BLD AUTO: 0 % (ref 0–2)
KETONES UR STRIP-MCNC: NEGATIVE MG/DL
LEUKOCYTE ESTERASE UR QL STRIP: NEGATIVE
LYMPHOCYTES # BLD AUTO: 1.54 THOUSANDS/ΜL (ref 0.6–4.47)
LYMPHOCYTES NFR BLD AUTO: 41 % (ref 14–44)
MCH RBC QN AUTO: 32 PG (ref 26.8–34.3)
MCHC RBC AUTO-ENTMCNC: 30.9 G/DL (ref 31.4–37.4)
MCV RBC AUTO: 104 FL (ref 82–98)
MONOCYTES # BLD AUTO: 0.29 THOUSAND/ΜL (ref 0.17–1.22)
MONOCYTES NFR BLD AUTO: 8 % (ref 4–12)
NEUTROPHILS # BLD AUTO: 1.84 THOUSANDS/ΜL (ref 1.85–7.62)
NEUTS SEG NFR BLD AUTO: 49 % (ref 43–75)
NITRITE UR QL STRIP: NEGATIVE
NRBC BLD AUTO-RTO: 0 /100 WBCS
PH UR STRIP.AUTO: 7 [PH]
PLATELET # BLD AUTO: 330 THOUSANDS/UL (ref 149–390)
PMV BLD AUTO: 8.7 FL (ref 8.9–12.7)
POTASSIUM SERPL-SCNC: 4 MMOL/L (ref 3.5–5.3)
PROT UR STRIP-MCNC: NEGATIVE MG/DL
RBC # BLD AUTO: 3.38 MILLION/UL (ref 3.81–5.12)
SODIUM SERPL-SCNC: 144 MMOL/L (ref 136–145)
SP GR UR STRIP.AUTO: 1.02 (ref 1–1.03)
UROBILINOGEN UR QL STRIP.AUTO: 0.2 E.U./DL
WBC # BLD AUTO: 3.76 THOUSAND/UL (ref 4.31–10.16)

## 2020-02-09 PROCEDURE — 74177 CT ABD & PELVIS W/CONTRAST: CPT

## 2020-02-09 PROCEDURE — 85025 COMPLETE CBC W/AUTO DIFF WBC: CPT | Performed by: EMERGENCY MEDICINE

## 2020-02-09 PROCEDURE — 81003 URINALYSIS AUTO W/O SCOPE: CPT | Performed by: EMERGENCY MEDICINE

## 2020-02-09 PROCEDURE — 80048 BASIC METABOLIC PNL TOTAL CA: CPT | Performed by: EMERGENCY MEDICINE

## 2020-02-09 PROCEDURE — 36415 COLL VENOUS BLD VENIPUNCTURE: CPT | Performed by: EMERGENCY MEDICINE

## 2020-02-09 PROCEDURE — 99284 EMERGENCY DEPT VISIT MOD MDM: CPT

## 2020-02-09 PROCEDURE — 99284 EMERGENCY DEPT VISIT MOD MDM: CPT | Performed by: EMERGENCY MEDICINE

## 2020-02-09 RX ORDER — POLYETHYLENE GLYCOL 3350 17 G/17G
17 POWDER, FOR SOLUTION ORAL 3 TIMES DAILY
Qty: 30 EACH | Refills: 0 | Status: SHIPPED | OUTPATIENT
Start: 2020-02-09 | End: 2020-06-15

## 2020-02-09 RX ADMIN — IOHEXOL 100 ML: 350 INJECTION, SOLUTION INTRAVENOUS at 13:20

## 2020-02-09 NOTE — DISCHARGE INSTRUCTIONS
Your blood pressure was elevated in the emergency department today  You should make an appointment with your doctor in the next 1 week for follow-up and recheck of the blood pressure  Tylenol 650 mg every 4 hours as needed for pain    CONSTIPATION    DISCHARGE INSTRUCTIONS:   Medicines:   Mix 1 cap full of MiraLax in 8 oz of water, and drink the entire glass  Do this 3 times a day until you start having regular bowel movements  Once you start having regular bowel movements, stop using MiraLax  Return to the emergency department if:   You have severe abdominal pain  You have a bloody or black bowel movement  You vomit more than once  You have a fever and back, stomach, muscle, or joint pain    Your flank pain is not better in the next 48 hours or gets worse before then  You are concerned about anything else

## 2020-02-09 NOTE — ED PROVIDER NOTES
Emergency Department Note- Chiara Wright 80 y o  female MRN: [id number]    Unit/Bed#: ED 28 Encounter: [id number]        History of Present Illness     Patient is an 80-year-old female history of irritable bowel syndrome, alternating diarrhea and constipation, transferred [facility] at the [facility]  She says her last regular bowel movement was 3 days ago  About that time she developed some right flank and abdominal pain which is relatively constant, mild in severity, better with Tylenol  It is worse with moving around and better with remaining still as well  No nausea, no vomiting, no dysuria or hematuria  No vaginal bleeding or discharge  No trauma  No fever or chills  No anorexia  No migration or change in the pain  She is normally able to ambulate without walker says she has been able to ambulate at her residence  She took Tylenol earlier this morning  She said she does not need pain medicine right now      REVIEW OF SYSTEMS     Constitutional:  No recent weight  gains or losses   Eyes:  No visual changes   ENT:  No tinnitus or hearing changes   Cardiac: No chest pain or palpitations   Respiratory:  No cough or shortness of breath   Abdominal:  As per HPI   Urinary: No dysuria or hematuria   Hematologic: No easy bruising or bleeding   Skin: No rash   Musculoskeletal: No aches or pains   Neurologic: No weakness or sensory changes   Psychiatric: No mood changes      Historical Information   Past Medical History:   Diagnosis Date    Anemia 2/26/2019    Anxiety     [facility]     Depression     [facility] 2008    Left leg    GERD (gastroesophageal reflux disease)     Hypertension     Overactive bladder      Past Surgical History:   Procedure Laterality Date    ADENOIDECTOMY      CATARACT EXTRACTION Bilateral     Right 10/2003, left 4/2004    CHOLECYSTECTOMY      DILATION AND CURETTAGE OF UTERUS  1985    HERNIA REPAIR  11/02/2007    Femoral and small bowel resection    HIP SURGERY      L hip    TONSILLECTOMY      As a youth    WRIST SURGERY      L wrist     Social History   Social History     Substance and Sexual Activity   Alcohol Use Not Currently    Alcohol/week: 0 0 standard drinks    Frequency: Never    Drinks per session: Patient refused    Binge frequency: Never     Social History     Substance and Sexual Activity   Drug Use Not Currently     Social History     Tobacco Use   Smoking Status Former Smoker    Packs/day: 1 00    Types: Cigarettes   Smokeless Tobacco Former User   Tobacco Comment    Per [employer]: Heavy tobacco smoker     Family History:   Family History   Problem Relation Age of Onset    Heart disease Father        MEDICATIONS:  No current facility-administered medications on file prior to encounter  Current Outpatient Medications on File Prior to Encounter   Medication Sig Dispense Refill    acetaminophen (TYLENOL) 650 mg CR tablet 2 tablets po every 8 hours as needed 30 tablet 12    bisacodyl (DULCOLAX) 10 mg suppository Use 1 if no bowel movement in 3 days  May repeat in 4 hours if necessary   12 suppository 12    clonazePAM (KlonoPIN) 0 5 mg tablet Take 1 tablet (0 5 mg total) by mouth 2 (two) times a day 60 tablet 0    cyanocobalamin 1000 MCG tablet Take 1 tablet (1,000 mcg total) by mouth daily 90 tablet 3    hydrocortisone 1 % cream Apply s directed 2-3 times per day 30 g 6    lamoTRIgine (LaMICtal) 200 MG tablet Take 1 tablet (200 mg total) by mouth daily before breakfast 30 tablet 12    methylcellulose (CITRUCEL) 500 mg tablet Take 1 tablet (500 mg total) by mouth daily 90 each 12    nicotine (NICODERM CQ) 7 mg/24hr TD 24 hr patch Place 1 patch on the skin every 24 hours 28 patch 1    pantoprazole (PROTONIX) 40 mg tablet Take 1 tablet (40 mg total) by mouth daily in the early morning 90 tablet 3    QUEtiapine (SEROquel) 200 mg tablet Take 2 tablets (400 mg total) by mouth daily at bedtime 60 tablet 0    senna (SENOKOT) 8 6 MG tablet Take 1 tablet (8 6 mg total) by mouth daily Bid  As needed 30 tablet 12    solifenacin (VESICARE) 10 MG tablet Take 1 tablet (10 mg total) by mouth daily In the morning  30 tablet 3    topiramate (TOPAMAX) 50 MG tablet Take 1 tablet (50 mg total) by mouth 2 (two) times a day 60 tablet 0     ALLERGIES:  Allergies   Allergen Reactions    Ethanol     Simvastatin        Vitals: Blood pressure (!) 195/85, pulse 69, temperature 97 5 °F (36 4 °C), temperature source Oral, resp  rate 17, weight 49 5 kg (109 lb 2 oz), SpO2 99 %  PHYSICAL EXAM    General:  Patient is well-appearing  Head:  Atraumatic  Eyes:  Conjunctiva pink  ENT:  Mucous membranes are moist  Neck:  Supple  Cardiac:  S1-S2, without murmurs  Lungs:  Clear to auscultation bilaterally  Abdomen:  Soft, some mild right flank tenderness and right lower and mid right abdominal tenderness, there is no CVA tenderness, there is no tympany, no rigidity, no guarding, bowel sounds are normal   She has no spinal tenderness there is no warmth or fluctuance surrounding her back or spine  Rectal examination shows no visible blood, nontender, no fecal impaction, small amount of soft stool at the end of my finger  Extremities:  Normal range of motion  Neurologic:  Awake, fluent speech, normal comprehension, AAOx3, strength is 5/5 of the bilateral hips, knees, ankles  Reflux or 2 out of for the knees and ankles  No saddle anesthesia, negative straight leg raise bilaterally    Skin:  Pink warm and dry  Psychiatric:  Alert, pleasant, cooperative      Labs Reviewed   BASIC METABOLIC PANEL - Abnormal       Result Value Ref Range Status    Sodium 144  136 - 145 mmol/L Final    Potassium 4 0  3 5 - 5 3 mmol/L Final    Chloride 109 (*) 100 - 108 mmol/L Final    CO2 29  21 - 32 mmol/L Final    ANION GAP 6  4 - 13 mmol/L Final    BUN 19  5 - 25 mg/dL Final    Creatinine 0 81  0 60 - 1 30 mg/dL Final    Comment: Standardized to IDMS reference method    Glucose 87  65 - 140 mg/dL Final    Comment:   If the patient is fasting, the ADA then defines impaired fasting glucose as > 100 mg/dL and diabetes as > or equal to 123 mg/dL  Specimen collection should occur prior to Sulfasalazine administration due to the potential for falsely depressed results  Specimen collection should occur prior to Sulfapyridine administration due to the potential for falsely elevated results      Calcium 9 2  8 3 - 10 1 mg/dL Final    eGFR 66  ml/min/1 73sq m Final    Narrative:     [place] guidelines for Chronic Kidney Disease (CKD):     Stage 1 with normal or high GFR (GFR > 90 mL/min/1 73 square meters)    Stage 2 Mild CKD (GFR = 60-89 mL/min/1 73 square meters)    Stage 3A Moderate CKD (GFR = 45-59 mL/min/1 73 square meters)    Stage 3B Moderate CKD (GFR = 30-44 mL/min/1 73 square meters)    Stage 4 Severe CKD (GFR = 15-29 mL/min/1 73 square meters)    Stage 5 End Stage CKD (GFR <15 mL/min/1 73 square meters)  Note: GFR calculation is accurate only with a steady state creatinine   CBC AND DIFFERENTIAL - Abnormal    WBC 3 76 (*) 4 31 - 10 16 Thousand/uL Final    RBC 3 38 (*) 3 81 - 5 12 Million/uL Final    Hemoglobin 10 8 (*) 11 5 - 15 4 g/dL Final    Hematocrit 35 0  34 8 - 46 1 % Final     (*) 82 - 98 fL Final    MCH 32 0  26 8 - 34 3 pg Final    MCHC 30 9 (*) 31 4 - 37 4 g/dL Final    RDW 15 4 (*) 11 6 - 15 1 % Final    MPV 8 7 (*) 8 9 - 12 7 fL Final    Platelets 209  232 - 390 Thousands/uL Final    nRBC 0  /100 WBCs Final    Neutrophils Relative 49  43 - 75 % Final    Immat GRANS % 0  0 - 2 % Final    Lymphocytes Relative 41  14 - 44 % Final    Monocytes Relative 8  4 - 12 % Final    Eosinophils Relative 1  0 - 6 % Final    Basophils Relative 1  0 - 1 % Final    Neutrophils Absolute 1 84 (*) 1 85 - 7 62 Thousands/µL Final    Immature Grans Absolute 0 01  0 00 - 0 20 Thousand/uL Final    Lymphocytes Absolute 1 54  0 60 - 4 47 Thousands/µL Final    Monocytes Absolute 0 29  0 17 - 1 22 Thousand/µL Final    Eosinophils Absolute 0 03  0 00 - 0 61 Thousand/µL Final    Basophils Absolute 0 05  0 00 - 0 10 Thousands/µL Final   UA W REFLEX TO MICROSCOPIC WITH REFLEX TO CULTURE    Color, UA Yellow   Final    Clarity, UA Clear   Final    Specific Gravity, UA 1 025  1 003 - 1 030 Final    pH, UA 7 0  4 5, 5 0, 5 5, 6 0, 6 5, 7 0, 7 5, 8 0 Final    Leukocytes, UA Negative  Negative Final    Nitrite, UA Negative  Negative Final    Protein, UA Negative  Negative mg/dl Final    Glucose, UA Negative  Negative mg/dl Final    Ketones, UA Negative  Negative mg/dl Final    Urobilinogen, UA 0 2  0 2, 1 0 E U /dl E U /dl Final    Bilirubin, UA Negative  Negative Final    Blood, UA Negative  Negative Final       Medications   iohexol (OMNIPAQUE) 350 MG/ML injection (MULTI-DOSE) 100 mL (100 mL Intravenous Given 2/9/20 1320)       CT abdomen pelvis w contrast   Final Result      1  Bilateral nephrolithiasis and renal cysts as on prior study without development of hydronephrosis or perinephric inflammation   2  Large amount of stool present within the colon  No evidence of small bowel obstruction  Bowel loop within a preexistent ventral hernia but without obstruction or inflammation   3  Limited assessment of the inferior pelvis secondary to artifact from left hip prosthesis  Workstation performed: [id number]             Vitals:    02/09/20 1152 02/09/20 1346   BP: (!) 146/115 (!) 195/85   TempSrc: Oral    Pulse: 73 69   Resp: 14 17   Patient Position - Orthostatic VS: Lying Lying   Temp: 97 5 °F (36 4 °C)        ED Course as of Feb 09 1419   Sun Feb 09, 2020   1406 On reassessment there was no change from the above physical exam findings  Patient was feeling comfortable          Assessment/Plan     ED Medical Decision Making: The cause of the patient's symptoms is unclear but unlikely to represent an acute emergency  No evidence of neurosurgical emergency in terms of her flank discomfort, think this may be muscular  Do not believe it represents pyelonephritis or ureteral colic  Do not believe it is an acute intra-abdominal surgical emergency  She feels comfortable going back to her assisted living facility in a believe that is reasonable  She does have orders for Senokot p r n  However she has not been asking for this  Given her constipation of the large stool burden on CT, will recommend that she use MiraLax until she is having good bowel movements then switch to Colace for bowel regimen  While the cause of the patient's complaints is most likely benign, it is possible that this is the early presentation of a more serious condition  I did discuss this diagnostic uncertainty with the patient, the importance of follow up care, as well as the need to return to immediately return to the closest emergency department for the concerning signs and symptoms listed in the discharge instructions  The patient stated they were aware of this diagnostic uncertainty, understood the importance of follow up and were comfortable being discharged  I believe that discharge home with outpatient follow up for further evaluation is medically appropriate  Supportive care, importance of follow-up and return precautions were discussed with the patient  Patient expressed understanding  Time reflects when diagnosis was documented in both MDM as applicable and the Disposition within this note     Time User Action Codes Description Comment    2/9/2020  2:06 PM Humera Meek Add [R10 9] Flank pain     2/9/2020  2:06 PM Humera Meek Add [K59 00] Constipation       ED Disposition     ED Disposition Condition Date/Time Comment    Discharge Stable Sun Feb 9, 2020  2:06 PM Emory Christianson discharge to home/self care              Follow-up Information     Follow up With Specialties Details Why Neetu Lipscomb MD Family Medicine Schedule an appointment as soon as possible for a visit in 2 days  [address] [facility] [address]            New Prescriptions    No medications on file            Mariangel Valenzuela DO  02/09/20 1662

## 2020-02-09 NOTE — ED NOTES
Patient son present in ED to transport patient home at this time        Padma Montoya RN  02/09/20 0252

## 2020-02-12 ENCOUNTER — TREATMENT (OUTPATIENT)
Dept: FAMILY MEDICINE CLINIC | Facility: CLINIC | Age: 85
End: 2020-02-12

## 2020-02-12 ENCOUNTER — TELEPHONE (OUTPATIENT)
Dept: FAMILY MEDICINE CLINIC | Facility: CLINIC | Age: 85
End: 2020-02-12

## 2020-02-12 DIAGNOSIS — M54.50 LOW BACK PAIN, UNSPECIFIED BACK PAIN LATERALITY, UNSPECIFIED CHRONICITY, UNSPECIFIED WHETHER SCIATICA PRESENT: Primary | ICD-10-CM

## 2020-02-12 RX ORDER — CELECOXIB 200 MG/1
200 CAPSULE ORAL 2 TIMES DAILY
Qty: 5 CAPSULE | Refills: 0 | Status: SHIPPED | OUTPATIENT
Start: 2020-02-12 | End: 2020-07-13

## 2020-02-12 NOTE — TELEPHONE ENCOUNTER
Bill from home care called regarding patient  Patient has had back pain since Sunday  Patient even went to ER on Sunday they did not prescribed anything for patient  Danyel Stacy would like to know if patient can have a muscle relaxer for a couple of days  Please send to [address] [facility] pharmacy

## 2020-02-21 ENCOUNTER — TELEPHONE (OUTPATIENT)
Dept: FAMILY MEDICINE CLINIC | Facility: CLINIC | Age: 85
End: 2020-02-21

## 2020-02-21 DIAGNOSIS — M54.50 LOW BACK PAIN, UNSPECIFIED BACK PAIN LATERALITY, UNSPECIFIED CHRONICITY, UNSPECIFIED WHETHER SCIATICA PRESENT: Primary | ICD-10-CM

## 2020-02-21 RX ORDER — CELECOXIB 200 MG/1
200 CAPSULE ORAL 2 TIMES DAILY
Qty: 6 CAPSULE | Refills: 0 | Status: SHIPPED | OUTPATIENT
Start: 2020-02-21 | End: 2020-07-13

## 2020-02-21 NOTE — TELEPHONE ENCOUNTER
Pt called stating that the medication given to her by dr Anthony Michel really worked for her pain  Pt would like another refill on this medication  It is celebrex 200 mg 2 times daily   Refill needs to be sent to [address] [place] [address]

## 2020-02-21 NOTE — TELEPHONE ENCOUNTER
Please call and advise that I will give her 3 more days of med   Looks like Dr Deshawn Gaffney doesn't want her on long term   He will be back in office on Tuesday and call call and discuss with him then

## 2020-03-13 ENCOUNTER — TELEPHONE (OUTPATIENT)
Dept: FAMILY MEDICINE CLINIC | Facility: CLINIC | Age: 85
End: 2020-03-13

## 2020-03-13 ENCOUNTER — TREATMENT (OUTPATIENT)
Dept: FAMILY MEDICINE CLINIC | Facility: CLINIC | Age: 85
End: 2020-03-13

## 2020-03-13 DIAGNOSIS — K59.00 CONSTIPATION, UNSPECIFIED CONSTIPATION TYPE: ICD-10-CM

## 2020-03-13 DIAGNOSIS — K59.00 CONSTIPATION, UNSPECIFIED CONSTIPATION TYPE: Primary | ICD-10-CM

## 2020-03-13 NOTE — TELEPHONE ENCOUNTER
After non BM for 3 days I would try MOM 30 cc  And repeat in 4 hrs ; then if not effective use the supporitory

## 2020-03-13 NOTE — TELEPHONE ENCOUNTER
Patient is calling because she will like to know how long can she go with out using a suppository after being able to go for a couple day please review thank you

## 2020-03-13 NOTE — TELEPHONE ENCOUNTER
Need order for MOM to [facility] [facility] pharmacy in order to have it given in nursing home

## 2020-03-18 ENCOUNTER — DOCUMENTATION (OUTPATIENT)
Dept: FAMILY MEDICINE CLINIC | Facility: CLINIC | Age: 85
End: 2020-03-18

## 2020-03-18 ENCOUNTER — TELEPHONE (OUTPATIENT)
Dept: FAMILY MEDICINE CLINIC | Facility: CLINIC | Age: 85
End: 2020-03-18

## 2020-03-18 NOTE — TELEPHONE ENCOUNTER
Gustavo nurse from [facility] called for patient because she has symptoms of a uti  He is wondering we can call in a prescription for the patient? Please advise  Fax for [facility] facility is [phone] if we need it

## 2020-03-18 NOTE — TELEPHONE ENCOUNTER
Call and find out what sx??      If burning / frequency / worse incontinence : then send C/S + U A  And I will cover her until result known     Let me know

## 2020-03-18 NOTE — TELEPHONE ENCOUNTER
Spoke with Poornima Fraction at [facility] and [phone] - patient has been having frequency, burning and urgency   Denies any fever or chills

## 2020-03-20 ENCOUNTER — TELEPHONE (OUTPATIENT)
Dept: FAMILY MEDICINE CLINIC | Facility: CLINIC | Age: 85
End: 2020-03-20

## 2020-03-20 NOTE — TELEPHONE ENCOUNTER
Patient called verifying medication for her constipation, Patient was given advised that Dr Pawan Arzate ordered MOM on 3/17 and she should be taking the medication to help her  Patient was so confused with her medication, given advise to have her aide or any family member to call our office so that we can straighten her medication

## 2020-03-26 ENCOUNTER — TELEPHONE (OUTPATIENT)
Dept: FAMILY MEDICINE CLINIC | Facility: CLINIC | Age: 85
End: 2020-03-26

## 2020-03-26 NOTE — TELEPHONE ENCOUNTER
I reviewed her studies nothing would imply that she needs an antibiotic at this time if she is having burning with urination or a marked difference in her urinary pattern perhaps that would indicate a need check that out thanks

## 2020-03-26 NOTE — TELEPHONE ENCOUNTER
Patient is asking about an antibiotic because she had a bladder study done and it came back she had infection please review thank you

## 2020-03-26 NOTE — TELEPHONE ENCOUNTER
Do you know anything about this test? Hard to get in contact with patient to clarify when calling facility

## 2020-03-27 ENCOUNTER — TREATMENT (OUTPATIENT)
Dept: MEDSURG UNIT | Facility: HOSPITAL | Age: 85
End: 2020-03-27

## 2020-03-27 DIAGNOSIS — N39.0 URINARY TRACT INFECTION WITHOUT HEMATURIA, SITE UNSPECIFIED: Primary | ICD-10-CM

## 2020-03-27 RX ORDER — SULFAMETHOXAZOLE AND TRIMETHOPRIM 800; 160 MG/1; MG/1
1 TABLET ORAL EVERY 12 HOURS SCHEDULED
Qty: 14 TABLET | Refills: 0 | Status: SHIPPED | OUTPATIENT
Start: 2020-03-27 | End: 2020-04-03

## 2020-04-07 ENCOUNTER — TELEPHONE (OUTPATIENT)
Dept: FAMILY MEDICINE CLINIC | Facility: CLINIC | Age: 85
End: 2020-04-07

## 2020-04-07 ENCOUNTER — DOCUMENTATION (OUTPATIENT)
Dept: FAMILY MEDICINE CLINIC | Facility: CLINIC | Age: 85
End: 2020-04-07

## 2020-04-14 ENCOUNTER — TREATMENT (OUTPATIENT)
Dept: FAMILY MEDICINE CLINIC | Facility: CLINIC | Age: 85
End: 2020-04-14

## 2020-04-14 ENCOUNTER — TELEPHONE (OUTPATIENT)
Dept: FAMILY MEDICINE CLINIC | Facility: CLINIC | Age: 85
End: 2020-04-14

## 2020-04-14 DIAGNOSIS — K64.9 HEMORRHOIDS, UNSPECIFIED HEMORRHOID TYPE: Primary | ICD-10-CM

## 2020-04-24 ENCOUNTER — TELEPHONE (OUTPATIENT)
Dept: FAMILY MEDICINE CLINIC | Facility: CLINIC | Age: 85
End: 2020-04-24

## 2020-04-27 ENCOUNTER — TELEPHONE (OUTPATIENT)
Dept: FAMILY MEDICINE CLINIC | Facility: CLINIC | Age: 85
End: 2020-04-27

## 2020-04-27 DIAGNOSIS — K59.00 CONSTIPATION, UNSPECIFIED CONSTIPATION TYPE: Primary | ICD-10-CM

## 2020-04-28 ENCOUNTER — TELEPHONE (OUTPATIENT)
Dept: FAMILY MEDICINE CLINIC | Facility: CLINIC | Age: 85
End: 2020-04-28

## 2020-04-28 DIAGNOSIS — K59.00 CONSTIPATION, UNSPECIFIED CONSTIPATION TYPE: ICD-10-CM

## 2020-04-28 DIAGNOSIS — N39.0 URINARY TRACT INFECTION WITHOUT HEMATURIA, SITE UNSPECIFIED: Primary | ICD-10-CM

## 2020-04-28 RX ORDER — SENNA PLUS 8.6 MG/1
1 TABLET ORAL DAILY
Qty: 30 TABLET | Refills: 12 | Status: CANCELLED | OUTPATIENT
Start: 2020-04-28

## 2020-04-29 ENCOUNTER — TREATMENT (OUTPATIENT)
Dept: FAMILY MEDICINE CLINIC | Facility: CLINIC | Age: 85
End: 2020-04-29

## 2020-04-29 DIAGNOSIS — N39.0 URINARY TRACT INFECTION WITHOUT HEMATURIA, SITE UNSPECIFIED: Primary | ICD-10-CM

## 2020-04-29 DIAGNOSIS — N39.0 URINARY TRACT INFECTION WITHOUT HEMATURIA, SITE UNSPECIFIED: ICD-10-CM

## 2020-04-29 RX ORDER — SULFAMETHOXAZOLE AND TRIMETHOPRIM 800; 160 MG/1; MG/1
1 TABLET ORAL EVERY 12 HOURS SCHEDULED
Qty: 14 TABLET | Refills: 0 | Status: SHIPPED | OUTPATIENT
Start: 2020-04-29 | End: 2020-05-06

## 2020-04-29 RX ORDER — SULFAMETHOXAZOLE AND TRIMETHOPRIM 800; 160 MG/1; MG/1
1 TABLET ORAL EVERY 12 HOURS SCHEDULED
Qty: 14 TABLET | Refills: 0 | Status: SHIPPED | OUTPATIENT
Start: 2020-04-29 | End: 2020-04-29 | Stop reason: SDUPTHER

## 2020-05-12 ENCOUNTER — TELEPHONE (OUTPATIENT)
Dept: FAMILY MEDICINE CLINIC | Facility: CLINIC | Age: 85
End: 2020-05-12

## 2020-05-14 ENCOUNTER — TELEPHONE (OUTPATIENT)
Dept: FAMILY MEDICINE CLINIC | Facility: CLINIC | Age: 85
End: 2020-05-14

## 2020-05-14 DIAGNOSIS — R30.0 DYSURIA: Primary | ICD-10-CM

## 2020-05-15 ENCOUNTER — TELEPHONE (OUTPATIENT)
Dept: OTHER | Facility: OTHER | Age: 85
End: 2020-05-15

## 2020-05-16 ENCOUNTER — TREATMENT (OUTPATIENT)
Dept: FAMILY MEDICINE CLINIC | Facility: CLINIC | Age: 85
End: 2020-05-16

## 2020-05-16 DIAGNOSIS — R32 URINARY INCONTINENCE IN FEMALE: Primary | ICD-10-CM

## 2020-05-16 RX ORDER — AMOXICILLIN 500 MG/1
500 TABLET, FILM COATED ORAL 3 TIMES DAILY
Qty: 21 TABLET | Refills: 0 | Status: SHIPPED | OUTPATIENT
Start: 2020-05-16 | End: 2020-05-23

## 2020-05-18 ENCOUNTER — TREATMENT (OUTPATIENT)
Dept: FAMILY MEDICINE CLINIC | Facility: CLINIC | Age: 85
End: 2020-05-18

## 2020-05-18 DIAGNOSIS — N39.0 URINARY TRACT INFECTION WITHOUT HEMATURIA, SITE UNSPECIFIED: Primary | ICD-10-CM

## 2020-05-18 RX ORDER — CIPROFLOXACIN 250 MG/1
250 TABLET, FILM COATED ORAL EVERY 12 HOURS SCHEDULED
Qty: 10 TABLET | Refills: 0 | Status: SHIPPED | OUTPATIENT
Start: 2020-05-18 | End: 2020-05-23

## 2020-05-19 ENCOUNTER — TELEPHONE (OUTPATIENT)
Dept: FAMILY MEDICINE CLINIC | Facility: CLINIC | Age: 85
End: 2020-05-19

## 2020-05-22 ENCOUNTER — TELEPHONE (OUTPATIENT)
Dept: FAMILY MEDICINE CLINIC | Facility: CLINIC | Age: 85
End: 2020-05-22

## 2020-06-01 ENCOUNTER — TELEPHONE (OUTPATIENT)
Dept: FAMILY MEDICINE CLINIC | Facility: CLINIC | Age: 85
End: 2020-06-01

## 2020-06-01 DIAGNOSIS — R30.0 DYSURIA: ICD-10-CM

## 2020-06-01 DIAGNOSIS — N39.0 URINARY TRACT INFECTION WITHOUT HEMATURIA, SITE UNSPECIFIED: Primary | ICD-10-CM

## 2020-06-03 ENCOUNTER — TELEPHONE (OUTPATIENT)
Dept: FAMILY MEDICINE CLINIC | Facility: CLINIC | Age: 85
End: 2020-06-03

## 2020-06-03 ENCOUNTER — TREATMENT (OUTPATIENT)
Dept: FAMILY MEDICINE CLINIC | Facility: CLINIC | Age: 85
End: 2020-06-03

## 2020-06-03 DIAGNOSIS — N39.0 URINARY TRACT INFECTION WITHOUT HEMATURIA, SITE UNSPECIFIED: Primary | ICD-10-CM

## 2020-06-04 ENCOUNTER — TELEPHONE (OUTPATIENT)
Dept: FAMILY MEDICINE CLINIC | Facility: CLINIC | Age: 85
End: 2020-06-04

## 2020-06-05 RX ORDER — SULFAMETHOXAZOLE AND TRIMETHOPRIM 800; 160 MG/1; MG/1
1 TABLET ORAL EVERY 12 HOURS SCHEDULED
Qty: 120 TABLET | Refills: 0 | Status: SHIPPED | OUTPATIENT
Start: 2020-06-05 | End: 2020-06-12

## 2020-06-07 ENCOUNTER — APPOINTMENT (EMERGENCY)
Dept: CT IMAGING | Facility: HOSPITAL | Age: 85
End: 2020-06-07
Payer: COMMERCIAL

## 2020-06-07 ENCOUNTER — HOSPITAL ENCOUNTER (EMERGENCY)
Facility: HOSPITAL | Age: 85
Discharge: HOME | End: 2020-06-07
Attending: EMERGENCY MEDICINE | Admitting: EMERGENCY MEDICINE
Payer: COMMERCIAL

## 2020-06-07 VITALS
TEMPERATURE: 98 F | OXYGEN SATURATION: 95 % | SYSTOLIC BLOOD PRESSURE: 141 MMHG | DIASTOLIC BLOOD PRESSURE: 77 MMHG | WEIGHT: 110.67 LBS | BODY MASS INDEX: 19.6 KG/M2 | RESPIRATION RATE: 18 BRPM | HEART RATE: 73 BPM

## 2020-06-07 DIAGNOSIS — K59.04 CHRONIC IDIOPATHIC CONSTIPATION: ICD-10-CM

## 2020-06-07 DIAGNOSIS — R10.9 ABDOMINAL PAIN: Primary | ICD-10-CM

## 2020-06-07 LAB
ALBUMIN SERPL BCP-MCNC: 3.7 G/DL (ref 3.5–5)
ALP SERPL-CCNC: 67 U/L (ref 46–116)
ALT SERPL W P-5'-P-CCNC: 15 U/L (ref 12–78)
ANION GAP SERPL CALCULATED.3IONS-SCNC: 4 MMOL/L (ref 4–13)
AST SERPL W P-5'-P-CCNC: 17 U/L (ref 5–45)
BASOPHILS # BLD AUTO: 0.03 THOUSANDS/ΜL (ref 0–0.1)
BASOPHILS NFR BLD AUTO: 1 % (ref 0–1)
BILIRUB SERPL-MCNC: 0.22 MG/DL (ref 0.2–1)
BUN SERPL-MCNC: 23 MG/DL (ref 5–25)
CALCIUM SERPL-MCNC: 9 MG/DL (ref 8.3–10.1)
CHLORIDE SERPL-SCNC: 105 MMOL/L (ref 100–108)
CO2 SERPL-SCNC: 31 MMOL/L (ref 21–32)
CREAT SERPL-MCNC: 1.02 MG/DL (ref 0.6–1.3)
EOSINOPHIL # BLD AUTO: 0.05 THOUSAND/ΜL (ref 0–0.61)
EOSINOPHIL NFR BLD AUTO: 1 % (ref 0–6)
ERYTHROCYTE [DISTWIDTH] IN BLOOD BY AUTOMATED COUNT: 13.3 % (ref 11.6–15.1)
GFR SERPL CREATININE-BSD FRML MDRD: 50 ML/MIN/1.73SQ M
GLUCOSE SERPL-MCNC: 93 MG/DL (ref 65–140)
HCT VFR BLD AUTO: 37.1 % (ref 34.8–46.1)
HGB BLD-MCNC: 11.4 G/DL (ref 11.5–15.4)
IMM GRANULOCYTES # BLD AUTO: 0.01 THOUSAND/UL (ref 0–0.2)
IMM GRANULOCYTES NFR BLD AUTO: 0 % (ref 0–2)
LIPASE SERPL-CCNC: 120 U/L (ref 73–393)
LYMPHOCYTES # BLD AUTO: 1.53 THOUSANDS/ΜL (ref 0.6–4.47)
LYMPHOCYTES NFR BLD AUTO: 37 % (ref 14–44)
MCH RBC QN AUTO: 32.4 PG (ref 26.8–34.3)
MCHC RBC AUTO-ENTMCNC: 30.7 G/DL (ref 31.4–37.4)
MCV RBC AUTO: 105 FL (ref 82–98)
MONOCYTES # BLD AUTO: 0.35 THOUSAND/ΜL (ref 0.17–1.22)
MONOCYTES NFR BLD AUTO: 8 % (ref 4–12)
NEUTROPHILS # BLD AUTO: 2.2 THOUSANDS/ΜL (ref 1.85–7.62)
NEUTS SEG NFR BLD AUTO: 53 % (ref 43–75)
NRBC BLD AUTO-RTO: 0 /100 WBCS
PLATELET # BLD AUTO: 332 THOUSANDS/UL (ref 149–390)
PMV BLD AUTO: 8.9 FL (ref 8.9–12.7)
POTASSIUM SERPL-SCNC: 4.3 MMOL/L (ref 3.5–5.3)
PROT SERPL-MCNC: 7.8 G/DL (ref 6.4–8.2)
RBC # BLD AUTO: 3.52 MILLION/UL (ref 3.81–5.12)
SODIUM SERPL-SCNC: 140 MMOL/L (ref 136–145)
WBC # BLD AUTO: 4.17 THOUSAND/UL (ref 4.31–10.16)

## 2020-06-07 PROCEDURE — 99284 EMERGENCY DEPT VISIT MOD MDM: CPT | Performed by: EMERGENCY MEDICINE

## 2020-06-07 PROCEDURE — 83690 ASSAY OF LIPASE: CPT | Performed by: EMERGENCY MEDICINE

## 2020-06-07 PROCEDURE — 85025 COMPLETE CBC W/AUTO DIFF WBC: CPT | Performed by: EMERGENCY MEDICINE

## 2020-06-07 PROCEDURE — 99284 EMERGENCY DEPT VISIT MOD MDM: CPT

## 2020-06-07 PROCEDURE — 36415 COLL VENOUS BLD VENIPUNCTURE: CPT

## 2020-06-07 PROCEDURE — 74177 CT ABD & PELVIS W/CONTRAST: CPT

## 2020-06-07 PROCEDURE — 80053 COMPREHEN METABOLIC PANEL: CPT | Performed by: EMERGENCY MEDICINE

## 2020-06-07 RX ORDER — POLYETHYLENE GLYCOL 3350 17 G/17G
17 POWDER, FOR SOLUTION ORAL ONCE
Status: COMPLETED | OUTPATIENT
Start: 2020-06-07 | End: 2020-06-07

## 2020-06-07 RX ORDER — MAGNESIUM CARB/ALUMINUM HYDROX 105-160MG
296 TABLET,CHEWABLE ORAL ONCE
Qty: 296 ML | Refills: 0 | Status: SHIPPED | OUTPATIENT
Start: 2020-06-07 | End: 2020-06-15

## 2020-06-07 RX ADMIN — POLYETHYLENE GLYCOL 3350 17 G: 17 POWDER, FOR SOLUTION ORAL at 19:06

## 2020-06-07 RX ADMIN — IOHEXOL 100 ML: 350 INJECTION, SOLUTION INTRAVENOUS at 17:22

## 2020-06-15 ENCOUNTER — TREATMENT (OUTPATIENT)
Dept: FAMILY MEDICINE CLINIC | Facility: CLINIC | Age: 85
End: 2020-06-15

## 2020-06-15 ENCOUNTER — TELEPHONE (OUTPATIENT)
Dept: FAMILY MEDICINE CLINIC | Facility: CLINIC | Age: 85
End: 2020-06-15

## 2020-06-16 ENCOUNTER — TELEPHONE (OUTPATIENT)
Dept: FAMILY MEDICINE CLINIC | Facility: CLINIC | Age: 85
End: 2020-06-16

## 2020-07-13 ENCOUNTER — HOSPITAL ENCOUNTER (INPATIENT)
Facility: HOSPITAL | Age: 85
LOS: 3 days | Discharge: HOME | DRG: 392 | End: 2020-07-17
Attending: EMERGENCY MEDICINE | Admitting: INTERNAL MEDICINE
Payer: COMMERCIAL

## 2020-07-13 ENCOUNTER — APPOINTMENT (EMERGENCY)
Dept: RADIOLOGY | Facility: HOSPITAL | Age: 85
DRG: 392 | End: 2020-07-13
Payer: COMMERCIAL

## 2020-07-13 ENCOUNTER — APPOINTMENT (EMERGENCY)
Dept: CT IMAGING | Facility: HOSPITAL | Age: 85
DRG: 392 | End: 2020-07-13
Payer: COMMERCIAL

## 2020-07-13 DIAGNOSIS — R10.9 ABDOMINAL PAIN, UNSPECIFIED ABDOMINAL LOCATION: ICD-10-CM

## 2020-07-13 DIAGNOSIS — F31.9 BIPOLAR 1 DISORDER (HCC): ICD-10-CM

## 2020-07-13 DIAGNOSIS — R10.30 LOWER ABDOMINAL PAIN: ICD-10-CM

## 2020-07-13 DIAGNOSIS — R07.9 CHEST PAIN, UNSPECIFIED: Primary | ICD-10-CM

## 2020-07-13 DIAGNOSIS — K21.9 GASTROESOPHAGEAL REFLUX DISEASE, ESOPHAGITIS PRESENCE NOT SPECIFIED: ICD-10-CM

## 2020-07-13 PROBLEM — Z79.2 PROPHYLACTIC ANTIBIOTIC: Status: ACTIVE | Noted: 2020-07-13

## 2020-07-13 PROBLEM — N32.81 OAB (OVERACTIVE BLADDER): Status: ACTIVE | Noted: 2020-07-13

## 2020-07-13 LAB
ALBUMIN SERPL BCP-MCNC: 3.5 G/DL (ref 3.5–5)
ALP SERPL-CCNC: 65 U/L (ref 46–116)
ALT SERPL W P-5'-P-CCNC: 12 U/L (ref 12–78)
ANION GAP SERPL CALCULATED.3IONS-SCNC: 8 MMOL/L (ref 4–13)
AST SERPL W P-5'-P-CCNC: 18 U/L (ref 5–45)
ATRIAL RATE: 80 BPM
ATRIAL RATE: 82 BPM
BASOPHILS # BLD AUTO: 0.05 THOUSANDS/ΜL (ref 0–0.1)
BASOPHILS NFR BLD AUTO: 1 % (ref 0–1)
BILIRUB SERPL-MCNC: 0.42 MG/DL (ref 0.2–1)
BILIRUB UR QL STRIP: NEGATIVE
BUN SERPL-MCNC: 14 MG/DL (ref 5–25)
CALCIUM SERPL-MCNC: 9 MG/DL (ref 8.3–10.1)
CHLORIDE SERPL-SCNC: 107 MMOL/L (ref 100–108)
CLARITY UR: CLEAR
CLARITY, POC: CLEAR
CO2 SERPL-SCNC: 28 MMOL/L (ref 21–32)
COLOR UR: YELLOW
COLOR, POC: YELLOW
CREAT SERPL-MCNC: 0.82 MG/DL (ref 0.6–1.3)
EOSINOPHIL # BLD AUTO: 0.05 THOUSAND/ΜL (ref 0–0.61)
EOSINOPHIL NFR BLD AUTO: 1 % (ref 0–6)
ERYTHROCYTE [DISTWIDTH] IN BLOOD BY AUTOMATED COUNT: 13.6 % (ref 11.6–15.1)
GFR SERPL CREATININE-BSD FRML MDRD: 65 ML/MIN/1.73SQ M
GLUCOSE SERPL-MCNC: 91 MG/DL (ref 65–140)
GLUCOSE UR STRIP-MCNC: NEGATIVE MG/DL
HCT VFR BLD AUTO: 33.1 % (ref 34.8–46.1)
HGB BLD-MCNC: 10.1 G/DL (ref 11.5–15.4)
HGB UR QL STRIP.AUTO: NEGATIVE
IMM GRANULOCYTES # BLD AUTO: 0.03 THOUSAND/UL (ref 0–0.2)
IMM GRANULOCYTES NFR BLD AUTO: 0 % (ref 0–2)
KETONES UR STRIP-MCNC: NEGATIVE MG/DL
LEUKOCYTE ESTERASE UR QL STRIP: NEGATIVE
LIPASE SERPL-CCNC: 104 U/L (ref 73–393)
LYMPHOCYTES # BLD AUTO: 1.31 THOUSANDS/ΜL (ref 0.6–4.47)
LYMPHOCYTES NFR BLD AUTO: 18 % (ref 14–44)
MCH RBC QN AUTO: 32.6 PG (ref 26.8–34.3)
MCHC RBC AUTO-ENTMCNC: 30.5 G/DL (ref 31.4–37.4)
MCV RBC AUTO: 107 FL (ref 82–98)
MONOCYTES # BLD AUTO: 0.62 THOUSAND/ΜL (ref 0.17–1.22)
MONOCYTES NFR BLD AUTO: 8 % (ref 4–12)
NEUTROPHILS # BLD AUTO: 5.37 THOUSANDS/ΜL (ref 1.85–7.62)
NEUTS SEG NFR BLD AUTO: 72 % (ref 43–75)
NITRITE UR QL STRIP: NEGATIVE
NRBC BLD AUTO-RTO: 0 /100 WBCS
P AXIS: 45 DEGREES
P AXIS: 88 DEGREES
PH UR STRIP.AUTO: 7 [PH] (ref 4.5–8)
PLATELET # BLD AUTO: 265 THOUSANDS/UL (ref 149–390)
PMV BLD AUTO: 9 FL (ref 8.9–12.7)
POTASSIUM SERPL-SCNC: 3.9 MMOL/L (ref 3.5–5.3)
PR INTERVAL: 186 MS
PR INTERVAL: 190 MS
PROT SERPL-MCNC: 7.5 G/DL (ref 6.4–8.2)
PROT UR STRIP-MCNC: NEGATIVE MG/DL
QRS AXIS: 26 DEGREES
QRS AXIS: 31 DEGREES
QRSD INTERVAL: 92 MS
QRSD INTERVAL: 94 MS
QT INTERVAL: 358 MS
QT INTERVAL: 366 MS
QTC INTERVAL: 412 MS
QTC INTERVAL: 427 MS
RBC # BLD AUTO: 3.1 MILLION/UL (ref 3.81–5.12)
SARS-COV-2 RNA RESP QL NAA+PROBE: NEGATIVE
SODIUM SERPL-SCNC: 143 MMOL/L (ref 136–145)
SP GR UR STRIP.AUTO: 1.01 (ref 1–1.03)
T WAVE AXIS: 62 DEGREES
T WAVE AXIS: 62 DEGREES
TROPONIN I SERPL-MCNC: <0.02 NG/ML
UROBILINOGEN UR QL STRIP.AUTO: 0.2 E.U./DL
VENTRICULAR RATE: 80 BPM
VENTRICULAR RATE: 82 BPM
WBC # BLD AUTO: 7.43 THOUSAND/UL (ref 4.31–10.16)

## 2020-07-13 PROCEDURE — 80053 COMPREHEN METABOLIC PANEL: CPT | Performed by: PHYSICIAN ASSISTANT

## 2020-07-13 PROCEDURE — 99220 PR INITIAL OBSERVATION CARE/DAY 70 MINUTES: CPT | Performed by: INTERNAL MEDICINE

## 2020-07-13 PROCEDURE — 93005 ELECTROCARDIOGRAM TRACING: CPT

## 2020-07-13 PROCEDURE — 81003 URINALYSIS AUTO W/O SCOPE: CPT

## 2020-07-13 PROCEDURE — 74177 CT ABD & PELVIS W/CONTRAST: CPT

## 2020-07-13 PROCEDURE — 93010 ELECTROCARDIOGRAM REPORT: CPT | Performed by: INTERNAL MEDICINE

## 2020-07-13 PROCEDURE — 84484 ASSAY OF TROPONIN QUANT: CPT | Performed by: PHYSICIAN ASSISTANT

## 2020-07-13 PROCEDURE — 83690 ASSAY OF LIPASE: CPT | Performed by: PHYSICIAN ASSISTANT

## 2020-07-13 PROCEDURE — 99285 EMERGENCY DEPT VISIT HI MDM: CPT | Performed by: PHYSICIAN ASSISTANT

## 2020-07-13 PROCEDURE — 99285 EMERGENCY DEPT VISIT HI MDM: CPT

## 2020-07-13 PROCEDURE — 84484 ASSAY OF TROPONIN QUANT: CPT | Performed by: INTERNAL MEDICINE

## 2020-07-13 PROCEDURE — 71045 X-RAY EXAM CHEST 1 VIEW: CPT

## 2020-07-13 PROCEDURE — 85025 COMPLETE CBC W/AUTO DIFF WBC: CPT | Performed by: PHYSICIAN ASSISTANT

## 2020-07-13 PROCEDURE — 36415 COLL VENOUS BLD VENIPUNCTURE: CPT | Performed by: PHYSICIAN ASSISTANT

## 2020-07-13 PROCEDURE — 87635 SARS-COV-2 COVID-19 AMP PRB: CPT | Performed by: PHYSICIAN ASSISTANT

## 2020-07-13 RX ORDER — OXYBUTYNIN CHLORIDE 10 MG/1
10 TABLET, EXTENDED RELEASE ORAL DAILY
Status: DISCONTINUED | OUTPATIENT
Start: 2020-07-14 | End: 2020-07-17 | Stop reason: HOSPADM

## 2020-07-13 RX ORDER — TOPIRAMATE 25 MG/1
50 TABLET ORAL 2 TIMES DAILY
Status: DISCONTINUED | OUTPATIENT
Start: 2020-07-13 | End: 2020-07-17 | Stop reason: HOSPADM

## 2020-07-13 RX ORDER — PANTOPRAZOLE SODIUM 40 MG/1
40 TABLET, DELAYED RELEASE ORAL
Status: DISCONTINUED | OUTPATIENT
Start: 2020-07-14 | End: 2020-07-17 | Stop reason: HOSPADM

## 2020-07-13 RX ORDER — QUETIAPINE FUMARATE 200 MG/1
400 TABLET, FILM COATED ORAL
Status: DISCONTINUED | OUTPATIENT
Start: 2020-07-13 | End: 2020-07-17 | Stop reason: HOSPADM

## 2020-07-13 RX ORDER — MAGNESIUM HYDROXIDE/ALUMINUM HYDROXICE/SIMETHICONE 120; 1200; 1200 MG/30ML; MG/30ML; MG/30ML
30 SUSPENSION ORAL 3 TIMES DAILY
Status: COMPLETED | OUTPATIENT
Start: 2020-07-13 | End: 2020-07-15

## 2020-07-13 RX ORDER — SULFAMETHOXAZOLE AND TRIMETHOPRIM 800; 160 MG/1; MG/1
1 TABLET ORAL DAILY
COMMUNITY
End: 2020-12-18

## 2020-07-13 RX ORDER — CLONAZEPAM 0.5 MG/1
0.5 TABLET ORAL 2 TIMES DAILY
Status: DISCONTINUED | OUTPATIENT
Start: 2020-07-13 | End: 2020-07-17 | Stop reason: HOSPADM

## 2020-07-13 RX ORDER — LAMOTRIGINE 100 MG/1
200 TABLET ORAL
Status: DISCONTINUED | OUTPATIENT
Start: 2020-07-14 | End: 2020-07-17 | Stop reason: HOSPADM

## 2020-07-13 RX ORDER — SULFAMETHOXAZOLE AND TRIMETHOPRIM 800; 160 MG/1; MG/1
1 TABLET ORAL DAILY
Status: DISCONTINUED | OUTPATIENT
Start: 2020-07-14 | End: 2020-07-17 | Stop reason: HOSPADM

## 2020-07-13 RX ORDER — LAMOTRIGINE 100 MG/1
200 TABLET ORAL ONCE
Status: COMPLETED | OUTPATIENT
Start: 2020-07-13 | End: 2020-07-13

## 2020-07-13 RX ORDER — BISACODYL 10 MG
10 SUPPOSITORY, RECTAL RECTAL AS NEEDED
Status: ON HOLD | COMMUNITY
End: 2021-02-08 | Stop reason: SDUPTHER

## 2020-07-13 RX ORDER — ESTRADIOL 0.1 MG/G
0.5 CREAM VAGINAL DAILY
COMMUNITY
End: 2021-10-26 | Stop reason: ALTCHOICE

## 2020-07-13 RX ORDER — HEPARIN SODIUM 5000 [USP'U]/ML
5000 INJECTION, SOLUTION INTRAVENOUS; SUBCUTANEOUS EVERY 8 HOURS SCHEDULED
Status: DISCONTINUED | OUTPATIENT
Start: 2020-07-13 | End: 2020-07-17 | Stop reason: HOSPADM

## 2020-07-13 RX ORDER — ONDANSETRON 2 MG/ML
4 INJECTION INTRAMUSCULAR; INTRAVENOUS EVERY 4 HOURS PRN
Status: DISCONTINUED | OUTPATIENT
Start: 2020-07-13 | End: 2020-07-17 | Stop reason: HOSPADM

## 2020-07-13 RX ORDER — CLONAZEPAM 0.5 MG/1
0.5 TABLET ORAL ONCE
Status: COMPLETED | OUTPATIENT
Start: 2020-07-13 | End: 2020-07-13

## 2020-07-13 RX ORDER — PANTOPRAZOLE SODIUM 40 MG/1
40 TABLET, DELAYED RELEASE ORAL ONCE
Status: COMPLETED | OUTPATIENT
Start: 2020-07-13 | End: 2020-07-13

## 2020-07-13 RX ORDER — ACETAMINOPHEN 325 MG/1
650 TABLET ORAL EVERY 6 HOURS PRN
Status: DISCONTINUED | OUTPATIENT
Start: 2020-07-13 | End: 2020-07-17 | Stop reason: HOSPADM

## 2020-07-13 RX ORDER — ONDANSETRON 2 MG/ML
4 INJECTION INTRAMUSCULAR; INTRAVENOUS ONCE
Status: COMPLETED | OUTPATIENT
Start: 2020-07-13 | End: 2020-07-13

## 2020-07-13 RX ORDER — ASPIRIN 81 MG/1
324 TABLET, CHEWABLE ORAL ONCE
Status: COMPLETED | OUTPATIENT
Start: 2020-07-13 | End: 2020-07-13

## 2020-07-13 RX ADMIN — IOHEXOL 85 ML: 350 INJECTION, SOLUTION INTRAVENOUS at 07:59

## 2020-07-13 RX ADMIN — PANTOPRAZOLE SODIUM 40 MG: 40 TABLET, DELAYED RELEASE ORAL at 12:08

## 2020-07-13 RX ADMIN — LAMOTRIGINE 200 MG: 100 TABLET ORAL at 12:08

## 2020-07-13 RX ADMIN — TOPIRAMATE 50 MG: 25 TABLET, FILM COATED ORAL at 12:08

## 2020-07-13 RX ADMIN — ALUMINUM HYDROXIDE, MAGNESIUM HYDROXIDE, AND SIMETHICONE 30 ML: 200; 200; 20 SUSPENSION ORAL at 20:06

## 2020-07-13 RX ADMIN — CLONAZEPAM 0.5 MG: 0.5 TABLET ORAL at 12:08

## 2020-07-13 RX ADMIN — TOPIRAMATE 50 MG: 25 TABLET, FILM COATED ORAL at 17:10

## 2020-07-13 RX ADMIN — QUETIAPINE FUMARATE 400 MG: 200 TABLET ORAL at 21:59

## 2020-07-13 RX ADMIN — ONDANSETRON 4 MG: 2 INJECTION INTRAMUSCULAR; INTRAVENOUS at 10:43

## 2020-07-13 RX ADMIN — CLONAZEPAM 0.5 MG: 0.5 TABLET ORAL at 18:37

## 2020-07-13 RX ADMIN — ASPIRIN 81 MG 324 MG: 81 TABLET ORAL at 10:44

## 2020-07-13 RX ADMIN — HEPARIN SODIUM 5000 UNITS: 5000 INJECTION INTRAVENOUS; SUBCUTANEOUS at 21:59

## 2020-07-13 NOTE — ASSESSMENT & PLAN NOTE
· Chest pain in adult  Upon further questioning pain is more abdominal and patient states that she did not have bowel movements for 4-5 days  · At time of examination she is eating a taco salad and feels worse  · Cardiac enzymes negative but will continue to trend and monitor on telemetry    · Will schedule Mylanta and assess symptoms

## 2020-07-13 NOTE — ED PROVIDER NOTES
History  Chief Complaint   Patient presents with    Abdominal Pain     Patient presents via EMS complaining of abdominal pain and nausea, reports problems passing her bowels for "around 5 days" at a time  [facility]  Patient presents ER for evaluation of abdominal pain  Patient states that she has had pain around the last 3 days  States she has had problems with her bowels in the past   Patient states that pain is mainly in her upper abdomen however radiates throughout her whole abdomen  Patient states that her last bowel movement was 2 days ago however prior to that she had 6 days without a bowel movement  Patient also complains of associated nausea however denies any vomiting  States that her chest has also been feeling intermittently tight and states that it began to feel tight again this morning and continues to feel tight here  States that she had an episode of dizziness around 2 days ago however denies any dizziness at this time  Also notes mild urinary frequency  Patient denies taking any medication this morning  Denies any fevers, vomiting, diarrhea, syncope, cough, shortness of breath, or any other concerning symptoms  Prior to Admission Medications   Prescriptions Last Dose Informant Patient Reported? Taking?    Mirabegron ER 50 MG  at Unknown time  Yes Yes   Sig: Take 1 tablet by mouth daily   QUEtiapine (SEROquel) 200 mg tablet 2020 at Unknown time Self No Yes   Sig: Take 2 tablets (400 mg total) by mouth daily at bedtime   Sennosides-Docusate Sodium (SENNA-PLUS PO) 2020 at Unknown time  Yes Yes   Sig: Take 8 6 mg by mouth 2 (two) times a day as needed   acetaminophen (TYLENOL) 650 mg CR tablet   No Yes   Si tablets po every 8 hours as needed   bisacodyl (Biscolax) 10 mg suppository   Yes Yes   Sig: Insert 10 mg into the rectum as needed for constipation   clonazePAM (KlonoPIN) 0 5 mg tablet 2020 at Unknown time  No Yes Sig: Take 1 tablet (0 5 mg total) by mouth 2 (two) times a day   cyanocobalamin 1000 MCG tablet 7/13/2020 at Unknown time  No Yes   Sig: Take 1 tablet (1,000 mcg total) by mouth daily   estradiol (ESTRACE VAGINAL) 0 1 mg/g vaginal cream   Yes Yes   Sig: Insert 0 5 g into the vagina daily   hydrocortisone 2 5 % cream Not Taking at Unknown time  No No   Sig: Apply topically 2 (two) times a day Use for hemorrhoids   Patient not taking: Reported on 7/13/2020   lamoTRIgine (LaMICtal) 200 MG tablet 7/13/2020 at Unknown time  No Yes   Sig: Take 1 tablet (200 mg total) by mouth daily before breakfast   magnesium hydroxide (MILK OF MAGNESIA) 400 mg/5 mL oral suspension   Yes Yes   Sig: Take by mouth daily as needed for constipation   methylcellulose (CITRUCEL) 500 mg tablet 7/13/2020 at Unknown time  Yes Yes   Sig: Take by mouth daily   pantoprazole (PROTONIX) 40 mg tablet 7/13/2020 at Unknown time  No Yes   Sig: Take 1 tablet (40 mg total) by mouth daily in the early morning   sulfamethoxazole-trimethoprim (BACTRIM DS) 800-160 mg per tablet Not Taking at Unknown time  Yes No   Sig: Take 1 tablet by mouth daily   topiramate (TOPAMAX) 50 MG tablet 7/13/2020 at Unknown time Self No Yes   Sig: Take 1 tablet (50 mg total) by mouth 2 (two) times a day      Facility-Administered Medications: None       Past Medical History:   Diagnosis Date    Anemia 2/26/2019    Anxiety     Bipolar 1 disorder ([facility])     Depression     DVT (deep venous thrombosis) ([facility]) 2008    Left leg    GERD (gastroesophageal reflux disease)     Hypertension     Overactive bladder        Past Surgical History:   Procedure Laterality Date    ADENOIDECTOMY      CATARACT EXTRACTION Bilateral     Right 10/2003, left 4/2004    CHOLECYSTECTOMY      DILATION AND CURETTAGE OF UTERUS  1985    HERNIA REPAIR  11/02/2007    Femoral and small bowel resection    HIP SURGERY      L hip    TONSILLECTOMY      As a youth    WRIST SURGERY      L wrist Family History   Problem Relation Age of Onset    Heart disease Father      I have reviewed and agree with the history as documented  E-Cigarette/Vaping     E-Cigarette/Vaping Substances     Social History     Tobacco Use    Smoking status: Former Smoker     Packs/day: 1 00     Types: Cigarettes    Smokeless tobacco: Former User    Tobacco comment: Per [employer]: Heavy tobacco smoker   Substance Use Topics    Alcohol use: Not Currently     Alcohol/week: 0 0 standard drinks     Frequency: Never     Drinks per session: Patient refused     Binge frequency: Never    Drug use: Not Currently       Review of Systems   Constitutional: Negative for fever  HENT: Negative for congestion, rhinorrhea and sore throat  Respiratory: Negative for shortness of breath  Cardiovascular: Positive for chest pain  Gastrointestinal: Positive for abdominal pain, constipation and nausea  Negative for vomiting  Genitourinary: Positive for frequency  Negative for dysuria  Musculoskeletal: Negative for back pain and neck pain  Skin: Negative for rash  Neurological: Negative for weakness, numbness and headaches  All other systems reviewed and are negative  Physical Exam  Physical Exam   Constitutional: She is oriented to person, place, and time  She appears well-developed and well-nourished  HENT:   Head: Normocephalic and atraumatic  Nose: Nose normal    Eyes: Conjunctivae and EOM are normal    Neck: Normal range of motion  Cardiovascular: Normal rate  Pulmonary/Chest: Effort normal  No respiratory distress  Abdominal: Soft  There is tenderness  There is no guarding  Diffuse tenderness palpation without guarding  Musculoskeletal: Normal range of motion  Neurological: She is alert and oriented to person, place, and time  Skin: Skin is warm  Capillary refill takes less than 2 seconds  Psychiatric: She has a normal mood and affect         Vital Signs  ED Triage Vitals   Temperature Pulse Respirations Blood Pressure SpO2   07/13/20 0629 07/13/20 0629 07/13/20 0629 07/13/20 0629 07/13/20 0629   97 8 °F (36 6 °C) 78 18 148/68 96 %      Temp Source Heart Rate Source Patient Position - Orthostatic VS BP Location FiO2 (%)   07/13/20 1251 07/13/20 0629 07/13/20 0629 07/13/20 0629 --   Tympanic Monitor Sitting Right arm       Pain Score       07/13/20 0629       6           Vitals:    07/13/20 0902 07/13/20 1045 07/13/20 1210 07/13/20 1251   BP: 161/81 (!) 177/84 146/73 (!) 173/92   Pulse: 83 90 92 82   Patient Position - Orthostatic VS: Lying  Sitting Lying         Visual Acuity      ED Medications  Medications   topiramate (TOPAMAX) tablet 50 mg (50 mg Oral Given 7/13/20 1208)   iohexol (OMNIPAQUE) 350 MG/ML injection (MULTI-DOSE) 85 mL (85 mL Intravenous Given 7/13/20 0759)   aspirin chewable tablet 324 mg (324 mg Oral Given 7/13/20 1044)   ondansetron (ZOFRAN) injection 4 mg (4 mg Intravenous Given 7/13/20 1043)   clonazePAM (KlonoPIN) tablet 0 5 mg (0 5 mg Oral Given 7/13/20 1208)   lamoTRIgine (LaMICtal) tablet 200 mg (200 mg Oral Given 7/13/20 1208)   pantoprazole (PROTONIX) EC tablet 40 mg (40 mg Oral Given 7/13/20 1208)       Diagnostic Studies  Results Reviewed     Procedure Component Value Units Date/Time    Troponin I [[id number]]     Lab Status:  No result Specimen:  Blood     Novel Coronavirus (Covid-19),PCR [facility] [[id number]]  (Normal) Collected:  07/13/20 1043    Lab Status:  Final result Specimen:  Nares from Nose Updated:  07/13/20 1204     SARS-CoV-2 Negative    Narrative: The specimen collection materials, transport medium, and/or testing methodology utilized in the production of these test results have been proven to be reliable in a limited validation with an abbreviated program under the Emergency Utilization Authorization provided by the FDA  Testing reported as "Presumptive positive" will be confirmed with secondary testing with a reference laboratory to ensure result accuracy  Clinical caution and judgement should be used with the interpretation of these results with consideration of the clinical impression and other laboratory testing  Testing reported as "Positive" or "Negative" has been proven to be accurate according to standard laboratory validation requirements  All testing is performed with control materials showing appropriate reactivity at standard intervals        Troponin I [[id number]]     Lab Status:  No result Specimen:  Blood     Troponin I [[id number]]  (Normal) Collected:  07/13/20 1043    Lab Status:  Final result Specimen:  Blood from Arm, Left Updated:  07/13/20 1131     Troponin I <0 02 ng/mL     POCT urinalysis dipstick [[id number]]  (Normal) Resulted:  07/13/20 0746    Lab Status:  Final result Specimen:  Urine Updated:  07/13/20 0746     Clarity, UA clear     Color, UA yellow    Comprehensive metabolic panel [[id number]] Collected:  07/13/20 0644    Lab Status:  Final result Specimen:  Blood from Arm, Left Updated:  07/13/20 0720     Sodium 143 mmol/L      Potassium 3 9 mmol/L      Chloride 107 mmol/L      CO2 28 mmol/L      ANION GAP 8 mmol/L      BUN 14 mg/dL      Creatinine 0 82 mg/dL      Glucose 91 mg/dL      Calcium 9 0 mg/dL      AST 18 U/L      ALT 12 U/L      Alkaline Phosphatase 65 U/L      Total Protein 7 5 g/dL      Albumin 3 5 g/dL      Total Bilirubin 0 42 mg/dL      eGFR 65 ml/min/1 73sq m     Narrative:       Leticia guidelines for Chronic Kidney Disease (CKD):     Stage 1 with normal or high GFR (GFR > 90 mL/min/1 73 square meters)    Stage 2 Mild CKD (GFR = 60-89 mL/min/1 73 square meters)    Stage 3A Moderate CKD (GFR = 45-59 mL/min/1 73 square meters)    Stage 3B Moderate CKD (GFR = 30-44 mL/min/1 73 square meters)    Stage 4 Severe CKD (GFR = 15-29 mL/min/1 73 square meters)    Stage 5 End Stage CKD (GFR <15 mL/min/1 73 square meters)  Note: GFR calculation is accurate only with a steady state creatinine    Lipase [[id number]]  (Normal) Collected:  07/13/20 0644    Lab Status:  Final result Specimen:  Blood from Arm, Left Updated:  07/13/20 0720     Lipase 104 u/L     Urine Macroscopic, POC [[id number]] Collected:  07/13/20 0714    Lab Status:  Final result Specimen:  Urine Updated:  07/13/20 0716     Color, UA Yellow     Clarity, UA Clear     pH, UA 7 0     Leukocytes, UA Negative     Nitrite, UA Negative     Protein, UA Negative mg/dl      Glucose, UA Negative mg/dl      Ketones, UA Negative mg/dl      Urobilinogen, UA 0 2 E U /dl      Bilirubin, UA Negative     Blood, UA Negative     Specific Gravity, UA 1 015    Narrative:       CLINITEK RESULT    Troponin I [[id number]]  (Normal) Collected:  07/13/20 0644    Lab Status:  Final result Specimen:  Blood from Arm, Left Updated:  07/13/20 0708     Troponin I <0 02 ng/mL     CBC and differential [[id number]]  (Abnormal) Collected:  07/13/20 0644    Lab Status:  Final result Specimen:  Blood from Arm, Left Updated:  07/13/20 0649     WBC 7 43 Thousand/uL      RBC 3 10 Million/uL      Hemoglobin 10 1 g/dL      Hematocrit 33 1 %       fL      MCH 32 6 pg      MCHC 30 5 g/dL      RDW 13 6 %      MPV 9 0 fL      Platelets 103 Thousands/uL      nRBC 0 /100 WBCs      Neutrophils Relative 72 %      Immat GRANS % 0 %      Lymphocytes Relative 18 %      Monocytes Relative 8 %      Eosinophils Relative 1 %      Basophils Relative 1 %      Neutrophils Absolute 5 37 Thousands/µL      Immature Grans Absolute 0 03 Thousand/uL      Lymphocytes Absolute 1 31 Thousands/µL      Monocytes Absolute 0 62 Thousand/µL      Eosinophils Absolute 0 05 Thousand/µL      Basophils Absolute 0 05 Thousands/µL                  CT abdomen pelvis with contrast   Final Result by Christiano Markham MD (07/13 0805)      1  Bilateral nonobstructive nephrolithiasis  2   No acute inflammatory or infectious process                 Workstation performed: [id number]         XR chest 1 view portable   Final Result by Jarad Veronica MD (07/13 0162)      No acute cardiopulmonary disease  Workstation performed: [id number]                    Procedures  Procedures         ED Course  ED Course as of Jul 13 1518   Mon Jul 13, 2020   0659 WBC: 7 43   0659 Hemoglobin(!): 10 1   0659 Blood Pressure: 148/68   0700 Temperature: 97 8 °F (36 6 °C)   0700 Pulse: 78   0700 Respirations: 18   0700 SpO2: 96 %   0749 Nitrite, UA: Negative   0749 Leukocytes, UA: Negative   0749 Blood, UA: Negative   0758 No acute abnormality   XR chest 1 view portable   1102 Nurse requested patients morning meds be ordered as she did not take them this morning  Meds were verified  Will order in ER          US AUDIT      Most Recent Value   Initial Alcohol Screen: US AUDIT-C    1  How often do you have a drink containing alcohol?  0 Filed at: 07/13/2020 0628   2  How many drinks containing alcohol do you have on a typical day you are drinking? 0 Filed at: 07/13/2020 0628   3b  FEMALE Any Age, or MALE 65+: How often do you have 4 or more drinks on one occassion? 0 Filed at: 07/13/2020 5951   Audit-C Score  0 Filed at: 07/13/2020 7192                  VENKAT/DAST-10      Most Recent Value   How many times in the past year have you    Used an illegal drug or used a prescription medication for non-medical reasons? Never Filed at: 07/13/2020 5022                                MDM     Labs and imaging grossly benign  EKG with no acute findings  However due to patient's chest pain, intermittent dizziness, age number abdominal pain, will admit for cardiac rule out  Patient given aspirin in the ER    Patient stable throughout entire ER stay      Disposition  Final diagnoses:   Chest pain, unspecified   Abdominal pain, unspecified abdominal location     Time reflects when diagnosis was documented in both MDM as applicable and the Disposition within this note     Time User Action Codes Description Comment    7/13/2020  9:22 AM Gloria Vera Add [R07 9] Chest pain, unspecified     7/13/2020  9:22 AM Tanda Bosworth Add [R10 9] Abdominal pain, unspecified abdominal location       ED Disposition     ED Disposition Condition Date/Time Comment    Admit Stable Mon Jul 13, 2020  9:22 AM Case was discussed with LUÍS and the patient's admission status was agreed to be Admission Status: observation status to the service of Dr Saravanan Toribio           Follow-up Information    None         Current Discharge Medication List      CONTINUE these medications which have NOT CHANGED    Details   acetaminophen (TYLENOL) 650 mg CR tablet 2 tablets po every 8 hours as needed  Qty: 30 tablet, Refills: 12    Associated Diagnoses: Pain      bisacodyl (Biscolax) 10 mg suppository Insert 10 mg into the rectum as needed for constipation      clonazePAM (KlonoPIN) 0 5 mg tablet Take 1 tablet (0 5 mg total) by mouth 2 (two) times a day  Qty: 60 tablet, Refills: 0    Associated Diagnoses: Anxiety state      cyanocobalamin 1000 MCG tablet Take 1 tablet (1,000 mcg total) by mouth daily  Qty: 90 tablet, Refills: 3    Associated Diagnoses: Anemia      estradiol (ESTRACE VAGINAL) 0 1 mg/g vaginal cream Insert 0 5 g into the vagina daily      lamoTRIgine (LaMICtal) 200 MG tablet Take 1 tablet (200 mg total) by mouth daily before breakfast  Qty: 30 tablet, Refills: 12    Associated Diagnoses: Bipolar 1 disorder (HCC)      magnesium hydroxide (MILK OF MAGNESIA) 400 mg/5 mL oral suspension Take by mouth daily as needed for constipation      methylcellulose (CITRUCEL) 500 mg tablet Take by mouth daily      Mirabegron ER 50 MG TB24 Take 1 tablet by mouth daily      pantoprazole (PROTONIX) 40 mg tablet Take 1 tablet (40 mg total) by mouth daily in the early morning  Qty: 90 tablet, Refills: 3    Associated Diagnoses: Anemia      QUEtiapine (SEROquel) 200 mg tablet Take 2 tablets (400 mg total) by mouth daily at bedtime  Qty: 60 tablet, Refills: 0    Associated Diagnoses: Bipolar 1 disorder (HCC)      Sennosides-Docusate Sodium (SENNA-PLUS PO) Take 8 6 mg by mouth 2 (two) times a day as needed      topiramate (TOPAMAX) 50 MG tablet Take 1 tablet (50 mg total) by mouth 2 (two) times a day  Qty: 60 tablet, Refills: 0    Associated Diagnoses: Bipolar 1 disorder (HCC)      hydrocortisone 2 5 % cream Apply topically 2 (two) times a day Use for hemorrhoids  Qty: 30 g, Refills: 2    Associated Diagnoses: Hemorrhoids, unspecified hemorrhoid type      sulfamethoxazole-trimethoprim (BACTRIM DS) 800-160 mg per tablet Take 1 tablet by mouth daily           No discharge procedures on file      PDMP Review     None          ED Provider  Electronically Signed by           Augusto Godinez PA-C  07/13/20 0630 [address]  07/13/20 9337

## 2020-07-13 NOTE — ASSESSMENT & PLAN NOTE
· Prophylactic antibiotic  [facility]      · On Bactrim ds for four months ending on 10/2/2020 for unclear reason

## 2020-07-13 NOTE — ASSESSMENT & PLAN NOTE
· Overactive bladder    Patient on Myrbetriq and should continue to follow-up with Dr Jeff Emerson as outpatient

## 2020-07-13 NOTE — H&P
H&P- Surekha Nair 1935, 80 y o  female MRN: [id number]  Unit/Bed#: E4 -01 Encounter: [id number]  Primary Care Provider: Perla Disla MD   Date and time admitted to hospital: 7/13/2020  6:23 AM        Assessment and Plan  * Chest pain in adult  Assessment & Plan  · Chest pain in adult  Upon further questioning pain is more abdominal and patient states that she did not have bowel movements for 4-5 days  · At time of examination she is eating a taco salad and feels worse  · Cardiac enzymes negative but will continue to trend and monitor on telemetry  · Will schedule Mylanta and assess symptoms    OAB (overactive bladder)  Assessment & Plan  · Overactive bladder  Patient on Myrbetriq and should continue to follow-up with Dr Betty Tierney as outpatient    Prophylactic antibiotic  Assessment & Plan  · Prophylactic antibiotic  Reviewed paper medication [facility]  · On Bactrim ds for four months ending on 10/2/2020 for unclear reason    Bipolar 1 disorder ([facility] [address] )  Assessment & Plan  · Bipolar disorder follows with Dr Malka Hernandez  Continue clonazepam, lamictal, topiramate, and quetiapine    Essential hypertension  Assessment & Plan  · History of essential hypertension no longer on any medications    Gastroesophageal reflux disease  Assessment & Plan  · GERD continue pantoprazole      VTE Prophylaxis: Heparin  Code Status: Level 1 - Full Code  Anticipated Length of Stay:  Patient will be admitted on an Observation basis with an anticipated length of stay of  less than 2 midnights  Justification for Hospital Stay: Chest pain in adult  Total Time for Visit, including Counseling / Coordination of Care: xx mins  Greater than 50% of this total time spent on direct patient counseling and coordination of care      Chief Complaint:     Chief Complaint   Patient presents with    Abdominal Pain     Patient presents via EMS complaining of abdominal pain and nausea, reports problems passing her bowels for "around 5 days" at a time  [facility]  History of Present Illness:    Surekha Nair is a 80 y o  female who presents with worsening abdominal pain  The [facility] and states that she has not had a bowel movement in 4-5 days  She was also complaining of urinary frequency but she does see Dr Betty Tierney with urogynecology  In the emergency department her workup was negative including CT scans but admission was requested for chest pain  Patient denies having any chest pain and states that pain is more abdominal and radiates into ribs  She denies having any heart disease  The patient had a bowel movement shortly after admission and now states that her abdominal pain is worse after eating a taco salad      Review of Systems:  History obtained from chart review and the patient  General ROS: negative for - chills or fever  Psychological ROS: negative for - hostility or irritability  Ophthalmic ROS: negative for - loss of vision  Respiratory ROS: negative for - shortness of breath  Cardiovascular ROS: negative for - chest pain  Gastrointestinal ROS: positive for - abdominal pain  Genito-Urinary ROS: negative for - hematuria  Musculoskeletal ROS: negative for - joint swelling or muscle pain  Neurological ROS: negative for - seizures  Otherwise, all other 12 point review of systems normal     Past Medical and Surgical History:   Past Medical History:   Diagnosis Date    Anemia 2/26/2019    Anxiety     Bipolar 1 disorder (HCC)     Depression     DVT (deep venous thrombosis) ([facility] [address] ) 2008    Left leg    GERD (gastroesophageal reflux disease)     Hypertension     Overactive bladder      Past Surgical History:   Procedure Laterality Date    ADENOIDECTOMY      CATARACT EXTRACTION Bilateral     Right 10/2003, left 4/2004    CHOLECYSTECTOMY      DILATION AND CURETTAGE OF UTERUS  1985    HERNIA REPAIR  11/02/2007    Femoral and small bowel resection    HIP SURGERY      L hip    TONSILLECTOMY      As a youth    WRIST SURGERY      L wrist     Meds/Allergies: Allergies: Allergies   Allergen Reactions    Ethanol     Simvastatin      Prior to Admission Medications   Prescriptions Last Dose Informant Patient Reported? Taking?    Mirabegron ER 50 MG  at Unknown time  Yes Yes   Sig: Take 1 tablet by mouth daily   QUEtiapine (SEROquel) 200 mg tablet 2020 at Unknown time Self No Yes   Sig: Take 2 tablets (400 mg total) by mouth daily at bedtime   Sennosides-Docusate Sodium (SENNA-PLUS PO) 2020 at Unknown time  Yes Yes   Sig: Take 8 6 mg by mouth 2 (two) times a day as needed   acetaminophen (TYLENOL) 650 mg CR tablet   No Yes   Si tablets po every 8 hours as needed   bisacodyl (Biscolax) 10 mg suppository   Yes Yes   Sig: Insert 10 mg into the rectum as needed for constipation   clonazePAM (KlonoPIN) 0 5 mg tablet 2020 at Unknown time  No Yes   Sig: Take 1 tablet (0 5 mg total) by mouth 2 (two) times a day   cyanocobalamin 1000 MCG tablet 2020 at Unknown time  No Yes   Sig: Take 1 tablet (1,000 mcg total) by mouth daily   estradiol (ESTRACE VAGINAL) 0 1 mg/g vaginal cream   Yes Yes   Sig: Insert 0 5 g into the vagina daily   hydrocortisone 2 5 % cream Not Taking at Unknown time  No No   Sig: Apply topically 2 (two) times a day Use for hemorrhoids   Patient not taking: Reported on 2020   lamoTRIgine (LaMICtal) 200 MG tablet 2020 at Unknown time  No Yes   Sig: Take 1 tablet (200 mg total) by mouth daily before breakfast   magnesium hydroxide (MILK OF MAGNESIA) 400 mg/5 mL oral suspension   Yes Yes   Sig: Take by mouth daily as needed for constipation   methylcellulose (CITRUCEL) 500 mg tablet 2020 at Unknown time  Yes Yes   Sig: Take by mouth daily   pantoprazole (PROTONIX) 40 mg tablet 2020 at Unknown time  No Yes   Sig: Take 1 tablet (40 mg total) by mouth daily in the early morning sulfamethoxazole-trimethoprim (BACTRIM DS) 800-160 mg per tablet Not Taking at Unknown time  Yes No   Sig: Take 1 tablet by mouth daily   topiramate (TOPAMAX) 50 MG tablet 7/13/2020 at Unknown time Self No Yes   Sig: Take 1 tablet (50 mg total) by mouth 2 (two) times a day      Facility-Administered Medications: None     Social History:     Social History     Socioeconomic History    Marital status: /Civil Union     Spouse name: Not on file    Number of children: Not on file    Years of education: Not on file    Highest education level: Not on file   Occupational History    Not on file   Social Needs    Financial resource strain: Not on file    Food insecurity:     Worry: Not on file     Inability: Not on file    Transportation needs:     Medical: Not on file     Non-medical: Not on file   Tobacco Use    Smoking status: Former Smoker     Packs/day: 1 00     Types: Cigarettes    Smokeless tobacco: Former User    Tobacco comment: Per [employer]: Heavy tobacco smoker   Substance and Sexual Activity    Alcohol use: Not Currently     Alcohol/week: 0 0 standard drinks     Frequency: Never     Drinks per session: Patient refused     Binge frequency: Never    Drug use: Not Currently    Sexual activity: Not Currently   Lifestyle    Physical activity:     Days per week: Not on file     Minutes per session: Not on file    Stress: Not on file   Relationships    Social connections:     Talks on phone: Not on file     Gets together: Not on file     Attends Nondenominational service: Not on file     Active member of club or organization: Not on file     Attends meetings of clubs or organizations: Not on file     Relationship status: Not on file    Intimate partner violence:     Fear of current or ex partner: Not on file     Emotionally abused: Not on file     Physically abused: Not on file     Forced sexual activity: Not on file   Other Topics Concern    Not on file   Social History Narrative    Not on file Patient Pre-hospital Living Situation:   Patient Pre-hospital Level of Mobility:   Patient Pre-hospital Diet Restrictions:     Family History:  Family History   Problem Relation Age of Onset    Heart disease Father      Physical Exam:   Vitals:   Blood Pressure: 170/71 (07/13/20 1500)  Pulse: 90 (07/13/20 1500)  Temperature: 97 8 °F (36 6 °C) (07/13/20 1500)  Temp Source: Temporal (07/13/20 1500)  Respirations: 16 (07/13/20 1500)  SpO2: 97 % (07/13/20 1500)    General appearance: alert, appears stated age and cooperative  Skin: Skin color, texture, turgor normal  No rashes or lesions  Head: Normocephalic, without obvious abnormality, atraumatic  Eyes: conjunctivae/corneas clear  PERRL, EOM's intact  Lungs: clear to auscultation bilaterally  Heart: regular rate and rhythm and ++ murmur  Abdomen: soft, non-tender; bowel sounds normal; no masses,  no organomegaly  Back: symmetric, no curvature  ROM normal  No CVA tenderness  Extremities: extremities normal, atraumatic, no cyanosis or edema  Neurologic: Grossly normal  Psychiatric:  Mood appropriate    Lab Results: I have personally reviewed pertinent reports      Results from last 7 days   Lab Units 07/13/20  0644   WBC Thousand/uL 7 43   HEMOGLOBIN g/dL 10 1*   HEMATOCRIT % 33 1*   PLATELETS Thousands/uL 265   NEUTROS PCT % 72   LYMPHS PCT % 18   MONOS PCT % 8   EOS PCT % 1     Results from last 7 days   Lab Units 07/13/20  0644   SODIUM mmol/L 143   POTASSIUM mmol/L 3 9   CHLORIDE mmol/L 107   CO2 mmol/L 28   ANION GAP mmol/L 8   BUN mg/dL 14   CREATININE mg/dL 0 82   CALCIUM mg/dL 9 0   ALBUMIN g/dL 3 5   TOTAL BILIRUBIN mg/dL 0 42   ALK PHOS U/L 65   ALT U/L 12   AST U/L 18   EGFR ml/min/1 73sq m 65   GLUCOSE RANDOM mg/dL 91         Results from last 7 days   Lab Units 07/13/20  1525 07/13/20  1043 07/13/20  0644   TROPONIN I ng/mL <0 02 <0 02 <0 02                  Results from last 7 days   Lab Units 07/13/20  0746 07/13/20  0714   COLOR UA  yellow Yellow CLARITY UA  clear Clear   SPEC GRAV UA   --  1 015   PH UA   --  7 0   LEUKOCYTES UA   --  Negative   NITRITE UA   --  Negative   GLUCOSE UA mg/dl  --  Negative   KETONES UA mg/dl  --  Negative   BILIRUBIN UA   --  Negative   BLOOD UA   --  Negative               Imaging: I have personally reviewed pertinent films in PACS  Xr Chest 1 View Portable  Result Date: 7/13/2020  Impression: No acute cardiopulmonary disease  Workstation performed: [id number]     Ct Abdomen Pelvis With Contrast  Result Date: 7/13/2020  Impression: 1  Bilateral nonobstructive nephrolithiasis  2   No acute inflammatory or infectious process  Workstation performed: [id number]       EKG, Pathology, and Other Studies Reviewed on Admission:   EKG  Result Date: 07/13/20  Personally reviewed strips with impression of:  Normal sinus rhythm 80 bpm    Allscripts/ Epic Records Reviewed: Yes    ** Please Note: This note has been constructed using a voice recognition system   **

## 2020-07-13 NOTE — ED NOTES
Patient using commode to urinate at this time  RN noticed reddened Sacrum       Von Veronica RN  07/13/20 0700

## 2020-07-13 NOTE — ASSESSMENT & PLAN NOTE
· Bipolar disorder follows with Dr Lili Wang    Continue clonazepam, lamictal, topiramate, and quetiapine

## 2020-07-13 NOTE — PLAN OF CARE
Problem: Potential for Falls  Goal: Patient will remain free of falls  Description  INTERVENTIONS:  - Assess patient frequently for physical needs  -  Identify cognitive and physical deficits and behaviors that affect risk of falls    -  [place] fall precautions as indicated by assessment   - Educate patient/family on patient safety including physical limitations  - Instruct patient to call for assistance with activity based on assessment  - Modify environment to reduce risk of injury  - Consider OT/PT consult to assist with strengthening/mobility  Outcome: Progressing     Problem: PAIN - ADULT  Goal: Verbalizes/displays adequate comfort level or baseline comfort level  Description  Interventions:  - Encourage patient to monitor pain and request assistance  - Assess pain using appropriate pain scale  - Administer analgesics based on type and severity of pain and evaluate response  - Implement non-pharmacological measures as appropriate and evaluate response  - Consider cultural and social influences on pain and pain management  - Notify physician/advanced practitioner if interventions unsuccessful or patient reports new pain  Outcome: Progressing     Problem: INFECTION - ADULT  Goal: Absence or prevention of progression during hospitalization  Description  INTERVENTIONS:  - Assess and monitor for signs and symptoms of infection  - Monitor lab/diagnostic results  - Monitor all insertion sites, i e  indwelling lines, tubes, and drains  - Monitor endotracheal if appropriate and nasal secretions for changes in amount and color  - [place] appropriate cooling/warming therapies per order  - Administer medications as ordered  - Instruct and encourage patient and family to use good hand hygiene technique  - Identify and instruct in appropriate isolation precautions for identified infection/condition  Outcome: Progressing  Goal: Absence of fever/infection during neutropenic period  Description  INTERVENTIONS:  - Monitor WBC    Outcome: Progressing     Problem: SAFETY ADULT  Goal: Maintain or return to baseline ADL function  Description  INTERVENTIONS:  -  Assess patient's ability to carry out ADLs; assess patient's baseline for ADL function and identify physical deficits which impact ability to perform ADLs (bathing, care of mouth/teeth, toileting, grooming, dressing, etc )  - Assess/evaluate cause of self-care deficits   - Assess range of motion  - Assess patient's mobility; develop plan if impaired  - Assess patient's need for assistive devices and provide as appropriate  - Encourage maximum independence but intervene and supervise when necessary  - Involve family in performance of ADLs  - Assess for home care needs following discharge   - Consider OT consult to assist with ADL evaluation and planning for discharge  - Provide patient education as appropriate  Outcome: Progressing  Goal: Maintain or return mobility status to optimal level  Description  INTERVENTIONS:  - Assess patient's baseline mobility status (ambulation, transfers, stairs, etc )    - Identify cognitive and physical deficits and behaviors that affect mobility  - Identify mobility aids required to assist with transfers and/or ambulation (gait belt, sit-to-stand, lift, walker, cane, etc )  - [place] fall precautions as indicated by assessment  - Record patient progress and toleration of activity level on Mobility SBAR; progress patient to next Phase/Stage  - Instruct patient to call for assistance with activity based on assessment  - Consider rehabilitation consult to assist with strengthening/weightbearing, etc   Outcome: Progressing     Problem: DISCHARGE PLANNING  Goal: Discharge to home or other facility with appropriate resources  Description  INTERVENTIONS:  - Identify barriers to discharge w/patient and caregiver  - Arrange for needed discharge resources and transportation as appropriate  - Identify discharge learning needs (meds, wound care, etc )  - Arrange for interpretive services to assist at discharge as needed  - Refer to Case Management Department for coordinating discharge planning if the patient needs post-hospital services based on physician/advanced practitioner order or complex needs related to functional status, cognitive ability, or social support system  Outcome: Progressing     Problem: Knowledge Deficit  Goal: Patient/family/caregiver demonstrates understanding of disease process, treatment plan, medications, and discharge instructions  Description  Complete learning assessment and assess knowledge base    Interventions:  - Provide teaching at level of understanding  - Provide teaching via preferred learning methods  Outcome: Progressing     Problem: CARDIOVASCULAR - ADULT  Goal: Maintains optimal cardiac output and hemodynamic stability  Description  INTERVENTIONS:  - Monitor I/O, vital signs and rhythm  - Monitor for S/S and trends of decreased cardiac output  - Administer and titrate ordered vasoactive medications to optimize hemodynamic stability  - Assess quality of pulses, skin color and temperature  - Assess for signs of decreased coronary artery perfusion  - Instruct patient to report change in severity of symptoms  Outcome: Progressing  Goal: Absence of cardiac dysrhythmias or at baseline rhythm  Description  INTERVENTIONS:  - Continuous cardiac monitoring, vital signs, obtain 12 lead EKG if ordered  - Administer antiarrhythmic and heart rate control medications as ordered  - Monitor electrolytes and administer replacement therapy as ordered  Outcome: Progressing     Problem: GASTROINTESTINAL - ADULT  Goal: Minimal or absence of nausea and/or vomiting  Description  INTERVENTIONS:  - Administer IV fluids if ordered to ensure adequate hydration  - Maintain NPO status until nausea and vomiting are resolved  - Nasogastric tube if ordered  - Administer ordered antiemetic medications as needed  - Provide nonpharmacologic comfort measures as appropriate  - Advance diet as tolerated, if ordered  - Consider nutrition services referral to assist patient with adequate nutrition and appropriate food choices  Outcome: Progressing  Goal: Maintains or returns to baseline bowel function  Description  INTERVENTIONS:  - Assess bowel function  - Encourage oral fluids to ensure adequate hydration  - Administer IV fluids if ordered to ensure adequate hydration  - Administer ordered medications as needed  - Encourage mobilization and activity  - Consider nutritional services referral to assist patient with adequate nutrition and appropriate food choices  Outcome: Progressing  Goal: Maintains adequate nutritional intake  Description  INTERVENTIONS:  - Monitor percentage of each meal consumed  - Identify factors contributing to decreased intake, treat as appropriate  - Assist with meals as needed  - Monitor I&O, weight, and lab values if indicated  - Obtain nutrition services referral as needed  Outcome: Progressing  Goal: Establish and maintain optimal ostomy function  Description  INTERVENTIONS:  - Assess bowel function  - Encourage oral fluids to ensure adequate hydration  - Administer IV fluids if ordered to ensure adequate hydration   - Administer ordered medications as needed  - Encourage mobilization and activity  - Nutrition services referral to assist patient with appropriate food choices  - Assess stoma site  - Consider wound care consult   Outcome: Progressing

## 2020-07-14 ENCOUNTER — ANESTHESIA EVENT (INPATIENT)
Dept: GASTROENTEROLOGY | Facility: HOSPITAL | Age: 85
DRG: 392 | End: 2020-07-14
Payer: COMMERCIAL

## 2020-07-14 LAB
ALBUMIN SERPL BCP-MCNC: 3 G/DL (ref 3.5–5)
ALP SERPL-CCNC: 58 U/L (ref 46–116)
ALT SERPL W P-5'-P-CCNC: 9 U/L (ref 12–78)
ANION GAP SERPL CALCULATED.3IONS-SCNC: 9 MMOL/L (ref 4–13)
AST SERPL W P-5'-P-CCNC: 14 U/L (ref 5–45)
BILIRUB SERPL-MCNC: 0.26 MG/DL (ref 0.2–1)
BUN SERPL-MCNC: 18 MG/DL (ref 5–25)
CALCIUM SERPL-MCNC: 8.7 MG/DL (ref 8.3–10.1)
CHLORIDE SERPL-SCNC: 108 MMOL/L (ref 100–108)
CO2 SERPL-SCNC: 26 MMOL/L (ref 21–32)
CREAT SERPL-MCNC: 0.68 MG/DL (ref 0.6–1.3)
ERYTHROCYTE [DISTWIDTH] IN BLOOD BY AUTOMATED COUNT: 13.5 % (ref 11.6–15.1)
GFR SERPL CREATININE-BSD FRML MDRD: 80 ML/MIN/1.73SQ M
GLUCOSE SERPL-MCNC: 94 MG/DL (ref 65–140)
HCT VFR BLD AUTO: 31.1 % (ref 34.8–46.1)
HGB BLD-MCNC: 9.8 G/DL (ref 11.5–15.4)
MCH RBC QN AUTO: 33.8 PG (ref 26.8–34.3)
MCHC RBC AUTO-ENTMCNC: 31.5 G/DL (ref 31.4–37.4)
MCV RBC AUTO: 107 FL (ref 82–98)
PLATELET # BLD AUTO: 259 THOUSANDS/UL (ref 149–390)
PMV BLD AUTO: 8.9 FL (ref 8.9–12.7)
POTASSIUM SERPL-SCNC: 3.7 MMOL/L (ref 3.5–5.3)
PROT SERPL-MCNC: 6.9 G/DL (ref 6.4–8.2)
RBC # BLD AUTO: 2.9 MILLION/UL (ref 3.81–5.12)
SODIUM SERPL-SCNC: 143 MMOL/L (ref 136–145)
WBC # BLD AUTO: 6.12 THOUSAND/UL (ref 4.31–10.16)

## 2020-07-14 PROCEDURE — 0DB68ZX EXCISION OF STOMACH, VIA NATURAL OR ARTIFICIAL OPENING ENDOSCOPIC, DIAGNOSTIC: ICD-10-PCS | Performed by: INTERNAL MEDICINE

## 2020-07-14 PROCEDURE — 0DB98ZX EXCISION OF DUODENUM, VIA NATURAL OR ARTIFICIAL OPENING ENDOSCOPIC, DIAGNOSTIC: ICD-10-PCS | Performed by: INTERNAL MEDICINE

## 2020-07-14 PROCEDURE — 99225 PR SBSQ OBSERVATION CARE/DAY 25 MINUTES: CPT | Performed by: INTERNAL MEDICINE

## 2020-07-14 PROCEDURE — 80053 COMPREHEN METABOLIC PANEL: CPT | Performed by: INTERNAL MEDICINE

## 2020-07-14 PROCEDURE — 85027 COMPLETE CBC AUTOMATED: CPT | Performed by: INTERNAL MEDICINE

## 2020-07-14 PROCEDURE — 99223 1ST HOSP IP/OBS HIGH 75: CPT | Performed by: INTERNAL MEDICINE

## 2020-07-14 RX ORDER — POLYETHYLENE GLYCOL 3350 17 G/17G
17 POWDER, FOR SOLUTION ORAL 2 TIMES DAILY
Status: DISCONTINUED | OUTPATIENT
Start: 2020-07-14 | End: 2020-07-17 | Stop reason: HOSPADM

## 2020-07-14 RX ORDER — BISACODYL 10 MG
10 SUPPOSITORY, RECTAL RECTAL DAILY PRN
Status: DISCONTINUED | OUTPATIENT
Start: 2020-07-14 | End: 2020-07-17 | Stop reason: HOSPADM

## 2020-07-14 RX ADMIN — CLONAZEPAM 0.5 MG: 0.5 TABLET ORAL at 17:16

## 2020-07-14 RX ADMIN — CLONAZEPAM 0.5 MG: 0.5 TABLET ORAL at 08:26

## 2020-07-14 RX ADMIN — HEPARIN SODIUM 5000 UNITS: 5000 INJECTION INTRAVENOUS; SUBCUTANEOUS at 06:04

## 2020-07-14 RX ADMIN — HEPARIN SODIUM 5000 UNITS: 5000 INJECTION INTRAVENOUS; SUBCUTANEOUS at 21:10

## 2020-07-14 RX ADMIN — QUETIAPINE FUMARATE 400 MG: 200 TABLET ORAL at 21:10

## 2020-07-14 RX ADMIN — SULFAMETHOXAZOLE AND TRIMETHOPRIM 1 TABLET: 800; 160 TABLET ORAL at 08:26

## 2020-07-14 RX ADMIN — HEPARIN SODIUM 5000 UNITS: 5000 INJECTION INTRAVENOUS; SUBCUTANEOUS at 13:35

## 2020-07-14 RX ADMIN — ONDANSETRON 4 MG: 2 INJECTION INTRAMUSCULAR; INTRAVENOUS at 19:44

## 2020-07-14 RX ADMIN — POLYETHYLENE GLYCOL 3350 17 G: 17 POWDER, FOR SOLUTION ORAL at 17:16

## 2020-07-14 RX ADMIN — ALUMINUM HYDROXIDE, MAGNESIUM HYDROXIDE, AND SIMETHICONE 30 ML: 200; 200; 20 SUSPENSION ORAL at 21:10

## 2020-07-14 RX ADMIN — OXYBUTYNIN 10 MG: 10 TABLET, FILM COATED, EXTENDED RELEASE ORAL at 08:26

## 2020-07-14 RX ADMIN — CYANOCOBALAMIN TAB 500 MCG 1000 MCG: 500 TAB at 08:26

## 2020-07-14 RX ADMIN — ONDANSETRON 4 MG: 2 INJECTION INTRAMUSCULAR; INTRAVENOUS at 01:13

## 2020-07-14 RX ADMIN — ALUMINUM HYDROXIDE, MAGNESIUM HYDROXIDE, AND SIMETHICONE 30 ML: 200; 200; 20 SUSPENSION ORAL at 17:16

## 2020-07-14 RX ADMIN — PANTOPRAZOLE SODIUM 40 MG: 40 TABLET, DELAYED RELEASE ORAL at 06:04

## 2020-07-14 RX ADMIN — TOPIRAMATE 50 MG: 25 TABLET, FILM COATED ORAL at 17:16

## 2020-07-14 RX ADMIN — TOPIRAMATE 50 MG: 25 TABLET, FILM COATED ORAL at 08:26

## 2020-07-14 RX ADMIN — ALUMINUM HYDROXIDE, MAGNESIUM HYDROXIDE, AND SIMETHICONE 30 ML: 200; 200; 20 SUSPENSION ORAL at 08:26

## 2020-07-14 RX ADMIN — POLYETHYLENE GLYCOL 3350 17 G: 17 POWDER, FOR SOLUTION ORAL at 13:35

## 2020-07-14 RX ADMIN — LAMOTRIGINE 200 MG: 100 TABLET ORAL at 06:04

## 2020-07-14 NOTE — ASSESSMENT & PLAN NOTE
· GERD continue pantoprazole  Was given Mylanta scheduled without relief of abdominal pain  · Patient does not recall any recent endoscopic evaluations  · Reports postprandial pain    Will have GI evaluate

## 2020-07-14 NOTE — CONSULTS
Consultation - [facility]) [facility]  Ernie Vanen 80 y o  female MRN: [id number]  Unit/Bed#: E4 -01 Encounter: [id number]        Inpatient consult to gastroenterology  Consult performed by: Mary Jane Tobar PA-C  Consult ordered by: Ashleigh Kirkpatrick DO          Reason for Consult / Principal Problem:  Abdominal pain    HPI:  71-year-old female with bipolar disorder, chronic GERD, hypertension, overactive bladder presenting for evaluation of abdominal pain  Patient reports multiple chronic GI symptoms  She reports worsening epigastric and left upper quadrant pain for a while" she cannot quantify how long exactly  The pain is sharp and occurs after meals  She has associated nausea and occasional heartburn but no vomiting  She also reports intermittent dysphagia for both solids and liquids, but mostly solids  She feels the food gets stuck in her throat, particularly cheese omelets  Sometimes when she is swallowing liquids, the liquid bubbles up in her throat and she needs to cough/bring it up  She has lost about 20-25 pounds over the past 2 years  She also has chronic constipation  She moves her bowels every 4-6 days  Her last bowel movement was on Sunday  She often strains to have a bowel movement  Sometimes she sees blood on the toilet paper with wiping which she attributes to hemorrhoids  Surgical history significant for cholecystectomy  She is a former tobacco smoker  She does not drink alcohol  She has never had EGD before  She thinks she had a colonoscopy at [facility] in the past, but I cannot find record of this  REVIEW OF SYSTEMS:    CONSTITUTIONAL: Denies any fever, chills  Good appetite, and no recent weight loss  HEENT: No earache or tinnitus  Denies hearing loss or visual disturbances  CARDIOVASCULAR: No chest pain or palpitations  RESPIRATORY: Denies any cough, hemoptysis, shortness of breath or dyspnea on exertion    GASTROINTESTINAL: As noted in the History of Present Illness  GENITOURINARY: No problems with urination  Denies any hematuria or dysuria  NEUROLOGIC: No dizziness or vertigo, denies headaches  MUSCULOSKELETAL: Denies any muscle or joint pain  SKIN: Denies skin rashes or itching  ENDOCRINE: Denies excessive thirst  Denies intolerance to heat or cold  PSYCHOSOCIAL: + anxiety and depression         Historical Information   Past Medical History:   Diagnosis Date    Anemia 2/26/2019    Anxiety     Bipolar 1 disorder ([facility] )     Depression     DVT (deep venous thrombosis) ([facility] ) 2008    Left leg    GERD (gastroesophageal reflux disease)     Hypertension     Overactive bladder      Past Surgical History:   Procedure Laterality Date    ADENOIDECTOMY      CATARACT EXTRACTION Bilateral     Right 10/2003, left 4/2004    CHOLECYSTECTOMY      DILATION AND CURETTAGE OF UTERUS  1985    HERNIA REPAIR  11/02/2007    Femoral and small bowel resection    HIP SURGERY      L hip    TONSILLECTOMY      As a youth    WRIST SURGERY      L wrist     Social History   Social History     Substance and Sexual Activity   Alcohol Use Not Currently    Alcohol/week: 0 0 standard drinks    Frequency: Never    Drinks per session: Patient refused    Binge frequency: Never     Social History     Substance and Sexual Activity   Drug Use Not Currently     Social History     Tobacco Use   Smoking Status Former Smoker    Packs/day: 1 00    Types: Cigarettes   Smokeless Tobacco Former User   Tobacco Comment    Per [employer]: Heavy tobacco smoker     Family History   Problem Relation Age of Onset    Heart disease Father        Meds/Allergies     Medications Prior to Admission   Medication    acetaminophen (TYLENOL) 650 mg CR tablet    bisacodyl (Biscolax) 10 mg suppository    clonazePAM (KlonoPIN) 0 5 mg tablet    cyanocobalamin 1000 MCG tablet    estradiol (ESTRACE VAGINAL) 0 1 mg/g vaginal cream    lamoTRIgine (LaMICtal) 200 MG tablet    magnesium hydroxide (MILK OF MAGNESIA) 400 mg/5 mL oral suspension    methylcellulose (CITRUCEL) 500 mg tablet    Mirabegron ER 50 MG TB24    pantoprazole (PROTONIX) 40 mg tablet    QUEtiapine (SEROquel) 200 mg tablet    Sennosides-Docusate Sodium (SENNA-PLUS PO)    topiramate (TOPAMAX) 50 MG tablet    hydrocortisone 2 5 % cream    sulfamethoxazole-trimethoprim (BACTRIM DS) 800-160 mg per tablet     Current Facility-Administered Medications   Medication Dose Route Frequency    acetaminophen (TYLENOL) tablet 650 mg  650 mg Oral Q6H PRN    aluminum-magnesium hydroxide-simethicone (MYLANTA) 200-200-20 mg/5 mL oral suspension 30 mL  30 mL Oral TID    clonazePAM (KlonoPIN) tablet 0 5 mg  0 5 mg Oral BID    cyanocobalamin (VITAMIN B-12) tablet 1,000 mcg  1,000 mcg Oral Daily    heparin (porcine) subcutaneous injection 5,000 Units  5,000 Units Subcutaneous Q8H [address]    lamoTRIgine (LaMICtal) tablet 200 mg  200 mg Oral Daily Before Breakfast    ondansetron (ZOFRAN) injection 4 mg  4 mg Intravenous Q4H PRN    oxybutynin (DITROPAN-XL) 24 hr tablet 10 mg  10 mg Oral Daily    pantoprazole (PROTONIX) EC tablet 40 mg  40 mg Oral Early Morning    QUEtiapine (SEROquel) tablet 400 mg  400 mg Oral HS    sulfamethoxazole-trimethoprim (BACTRIM DS) 800-160 mg per tablet 1 tablet  1 tablet Oral Daily    topiramate (TOPAMAX) tablet 50 mg  50 mg Oral BID       Allergies   Allergen Reactions    Ethanol     Simvastatin            Objective     Blood pressure 112/60, pulse 88, temperature 98 °F (36 7 °C), temperature source Tympanic, resp  rate 18, SpO2 97 %        Intake/Output Summary (Last 24 hours) at 7/14/2020 1122  Last data filed at 7/14/2020 0500  Gross per 24 hour   Intake 360 ml   Output    Net 360 ml         PHYSICAL EXAM:      General Appearance:   Alert, pleasant elderly female, cooperative, no distress, appears stated age    HEENT:   Normocephalic, atraumatic, anicteric      Neck:  Supple, symmetrical, trachea midline, no adenopathy   Lungs:   Clear to auscultation bilaterally   Heart[de-identified]   S1 and S2 normal; regular rate and rhythm   Abdomen:   Nondistended  Normal bowel sounds  Soft  Mild-moderate epigastric tenderness to palpation  No rebound or guarding      Genitalia:   Deferred    Rectal:   Deferred    Extremities:  No cyanosis, clubbing or edema    Pulses:  2+ and symmetric all extremities    Skin:  Skin color, texture, turgor normal, no rashes or lesions    Lymph nodes:  No palpable cervical lymphadenopathy        Lab Results:   Results from last 7 days   Lab Units 07/14/20  0523 07/13/20  0644   WBC Thousand/uL 6 12 7 43   HEMOGLOBIN g/dL 9 8* 10 1*   HEMATOCRIT % 31 1* 33 1*   PLATELETS Thousands/uL 259 265   NEUTROS PCT %  --  72   LYMPHS PCT %  --  18   MONOS PCT %  --  8   EOS PCT %  --  1     Results from last 7 days   Lab Units 07/14/20  0523   POTASSIUM mmol/L 3 7   CHLORIDE mmol/L 108   CO2 mmol/L 26   BUN mg/dL 18   CREATININE mg/dL 0 68   CALCIUM mg/dL 8 7   ALK PHOS U/L 58   ALT U/L 9*   AST U/L 14         Results from last 7 days   Lab Units 07/13/20  0644   LIPASE u/L 104       Imaging Studies: I have personally reviewed pertinent imaging studies  Xr Chest 1 View Portable    Result Date: 7/13/2020  Impression: No acute cardiopulmonary disease  Workstation performed: [id number]     Ct Abdomen Pelvis With Contrast    Result Date: 7/13/2020  Impression: 1  Bilateral nonobstructive nephrolithiasis  2   No acute inflammatory or infectious process  Workstation performed: [id number]       ASSESSMENT and PLAN:      Discussed plan with SLIM     42-year-old female with bipolar disorder, chronic GERD, hypertension, overactive bladder presenting for evaluation of multiple complaints including abdominal pain, dysphagia, constipation  1) Epigastric pain: She describes postprandial epigastric pain associated with nausea   Lipase and liver enzymes normal  CT abdomen pelvis with IV contrast unremarkable except for bilateral nonobstructing renal calculi  She had cholecystectomy in the past  She is on Protonix 40 mg daily outpatient  Differential diagnosis includes GERD, gastritis, peptic ulcer disease, H pylori infection, malignancy     -Plan for EGD tomorrow  -Continue Protonix 40 mg daily    2) Dysphagia:  She complains of intermittent dysphagia for both solids and liquids, but mostly solids  She also reports coughing after swallowing and "bubbling" in her throat when swallowing liquids  Differential diagnosis includes causes of esophageal dysphagia including stricture, ring, esophagitis, malignancy, dysmotility and oropharyngeal dysphagia      -Plan for EGD tomorrow  -Recommend SLP evaluation  -Continue Protonix 40 mg daily    3) Chronic constipation: Likely component of drug-induced constipation from psychiatric medications    -Start MiraLax twice daily  -Dulcolax suppositories as needed    4) Rectal bleeding: Likely outlet bleeding from hemorrhoids or anal fissure, cannot rule out large polyp or malignancy  She does have macrocytic anemia, hemoglobin 9 8     -We could consider colonoscopy outpatient depending upon patient's wishes    Patient was seen and examined by Dr Osiris Reinoso  All key medical decisions were made by Dr Osiris Reinoso  Thank you for allowing us to participate in the care of this present patient  We will follow-up with you closely

## 2020-07-14 NOTE — ASSESSMENT & PLAN NOTE
· Prophylactic antibiotic? Reviewed paper medication list from above and beyond at the nights      · On Bactrim ds for four months ending on 10/2/2020 for unclear reason

## 2020-07-14 NOTE — PLAN OF CARE
Problem: Potential for Falls  Goal: Patient will remain free of falls  Description  INTERVENTIONS:  - Assess patient frequently for physical needs  -  Identify cognitive and physical deficits and behaviors that affect risk of falls    -  [place] fall precautions as indicated by assessment   - Educate patient/family on patient safety including physical limitations  - Instruct patient to call for assistance with activity based on assessment  - Modify environment to reduce risk of injury  - Consider OT/PT consult to assist with strengthening/mobility  Outcome: Progressing     Problem: PAIN - ADULT  Goal: Verbalizes/displays adequate comfort level or baseline comfort level  Description  Interventions:  - Encourage patient to monitor pain and request assistance  - Assess pain using appropriate pain scale  - Administer analgesics based on type and severity of pain and evaluate response  - Implement non-pharmacological measures as appropriate and evaluate response  - Consider cultural and social influences on pain and pain management  - Notify physician/advanced practitioner if interventions unsuccessful or patient reports new pain  Outcome: Progressing     Problem: INFECTION - ADULT  Goal: Absence or prevention of progression during hospitalization  Description  INTERVENTIONS:  - Assess and monitor for signs and symptoms of infection  - Monitor lab/diagnostic results  - Monitor all insertion sites, i e  indwelling lines, tubes, and drains  - Monitor endotracheal if appropriate and nasal secretions for changes in amount and color  - [place] appropriate cooling/warming therapies per order  - Administer medications as ordered  - Instruct and encourage patient and family to use good hand hygiene technique  - Identify and instruct in appropriate isolation precautions for identified infection/condition  Outcome: Progressing  Goal: Absence of fever/infection during neutropenic period  Description  INTERVENTIONS:  - Monitor WBC    Outcome: Progressing     Problem: SAFETY ADULT  Goal: Maintain or return to baseline ADL function  Description  INTERVENTIONS:  -  Assess patient's ability to carry out ADLs; assess patient's baseline for ADL function and identify physical deficits which impact ability to perform ADLs (bathing, care of mouth/teeth, toileting, grooming, dressing, etc )  - Assess/evaluate cause of self-care deficits   - Assess range of motion  - Assess patient's mobility; develop plan if impaired  - Assess patient's need for assistive devices and provide as appropriate  - Encourage maximum independence but intervene and supervise when necessary  - Involve family in performance of ADLs  - Assess for home care needs following discharge   - Consider OT consult to assist with ADL evaluation and planning for discharge  - Provide patient education as appropriate  Outcome: Progressing  Goal: Maintain or return mobility status to optimal level  Description  INTERVENTIONS:  - Assess patient's baseline mobility status (ambulation, transfers, stairs, etc )    - Identify cognitive and physical deficits and behaviors that affect mobility  - Identify mobility aids required to assist with transfers and/or ambulation (gait belt, sit-to-stand, lift, walker, cane, etc )  - [place] fall precautions as indicated by assessment  - Record patient progress and toleration of activity level on Mobility SBAR; progress patient to next Phase/Stage  - Instruct patient to call for assistance with activity based on assessment  - Consider rehabilitation consult to assist with strengthening/weightbearing, etc   Outcome: Progressing     Problem: DISCHARGE PLANNING  Goal: Discharge to home or other facility with appropriate resources  Description  INTERVENTIONS:  - Identify barriers to discharge w/patient and caregiver  - Arrange for needed discharge resources and transportation as appropriate  - Identify discharge learning needs (meds, wound care, etc )  - Arrange for interpretive services to assist at discharge as needed  - Refer to Case Management Department for coordinating discharge planning if the patient needs post-hospital services based on physician/advanced practitioner order or complex needs related to functional status, cognitive ability, or social support system  Outcome: Progressing     Problem: Knowledge Deficit  Goal: Patient/family/caregiver demonstrates understanding of disease process, treatment plan, medications, and discharge instructions  Description  Complete learning assessment and assess knowledge base    Interventions:  - Provide teaching at level of understanding  - Provide teaching via preferred learning methods  Outcome: Progressing     Problem: CARDIOVASCULAR - ADULT  Goal: Maintains optimal cardiac output and hemodynamic stability  Description  INTERVENTIONS:  - Monitor I/O, vital signs and rhythm  - Monitor for S/S and trends of decreased cardiac output  - Administer and titrate ordered vasoactive medications to optimize hemodynamic stability  - Assess quality of pulses, skin color and temperature  - Assess for signs of decreased coronary artery perfusion  - Instruct patient to report change in severity of symptoms  Outcome: Progressing  Goal: Absence of cardiac dysrhythmias or at baseline rhythm  Description  INTERVENTIONS:  - Continuous cardiac monitoring, vital signs, obtain 12 lead EKG if ordered  - Administer antiarrhythmic and heart rate control medications as ordered  - Monitor electrolytes and administer replacement therapy as ordered  Outcome: Progressing     Problem: GASTROINTESTINAL - ADULT  Goal: Minimal or absence of nausea and/or vomiting  Description  INTERVENTIONS:  - Administer IV fluids if ordered to ensure adequate hydration  - Maintain NPO status until nausea and vomiting are resolved  - Nasogastric tube if ordered  - Administer ordered antiemetic medications as needed  - Provide nonpharmacologic comfort measures as appropriate  - Advance diet as tolerated, if ordered  - Consider nutrition services referral to assist patient with adequate nutrition and appropriate food choices  Outcome: Progressing  Goal: Maintains or returns to baseline bowel function  Description  INTERVENTIONS:  - Assess bowel function  - Encourage oral fluids to ensure adequate hydration  - Administer IV fluids if ordered to ensure adequate hydration  - Administer ordered medications as needed  - Encourage mobilization and activity  - Consider nutritional services referral to assist patient with adequate nutrition and appropriate food choices  Outcome: Progressing  Goal: Maintains adequate nutritional intake  Description  INTERVENTIONS:  - Monitor percentage of each meal consumed  - Identify factors contributing to decreased intake, treat as appropriate  - Assist with meals as needed  - Monitor I&O, weight, and lab values if indicated  - Obtain nutrition services referral as needed  Outcome: Progressing  Goal: Establish and maintain optimal ostomy function  Description  INTERVENTIONS:  - Assess bowel function  - Encourage oral fluids to ensure adequate hydration  - Administer IV fluids if ordered to ensure adequate hydration   - Administer ordered medications as needed  - Encourage mobilization and activity  - Nutrition services referral to assist patient with appropriate food choices  - Assess stoma site  - Consider wound care consult   Outcome: Progressing

## 2020-07-14 NOTE — ASSESSMENT & PLAN NOTE
· Overactive bladder    Patient on Myrbetriq and should continue to follow-up with Dr Mario Deleon as outpatient

## 2020-07-14 NOTE — UTILIZATION REVIEW
Initial Clinical Review  PLEASE NOTE:   Patient Upgraded to IP 7/14 @ 1709  From OBS 7/13 @  due to GI Consult with plan for EGD       Start   Ordered   07/14/20 1709  Inpatient Admission Once     Transfer Service: General Medicine       Question Answer Comment   Admitting Physician Homar Domingo    Level of Care Med Surg    Estimated length of stay More than 2 Midnights    Certification I certify that inpatient services are medically necessary for this patient for a duration of greater than two midnights  See H&P and MD Progress Notes for additional information about the patient's course of treatment  07/14/20 1709       ED Arrival Information     Expected Arrival Acuity Means of Arrival Escorted By Service Admission Type    - 7/13/2020 06:23 Urgent Ambulance [facility] ([address]) General Medicine Urgent    Arrival Complaint    abdominal pain        Chief Complaint   Patient presents with    Abdominal Pain     Patient presents via EMS complaining of abdominal pain and nausea, reports problems passing her bowels for "around 5 days" at a time  [facility]  Assessment/Plan: 79 yo female presents to ED from [facility] with abdominal pain x 3 days assoc with nausea  States her last BM was 2 days ago but prior to that she went 6 days w/o a BM  Also c/o intermittent chest tightness and states it  began to feel tight again this morning and continues to feel tight here  States that she had an episode of dizziness around 2 days ago however denies any dizziness at this time  Also notes mild urinary frequency - follows with urogyn for this  On exam: Abd-Diffuse tenderness palpation without guarding  Hg 10 1,  Trop negative, EKG w/o acute findings  CT A&P: No acute inflammatory or infectious process  Admitted to Observation with Chest Pain / Abdominal Pain - no BM 4-5 days  Will monitor on Tele, R/O ACS, schedule Mylanta   Patient had a bowel movement shortly after admission and now states that her abdominal pain is worse after eating a taco salad  7/14 Continues with abdominal pain - postprandial  NO BM  Consult GI  Per GI: Epigastric pain, Dysphagia, Rectal bleeding  Differential diagnosis includes GERD, gastritis, peptic ulcer disease, H pylori infection, malignancy  -Plan for EGD tomorrow  Continue Protonix 40 mg daily  Recommend Speech eval, Start MiraLax twice daily for constipation  Rectal bleeding likely due to hemorrhoids or anal fissure, cannot rule out large polyp or malignancy  She does have macrocytic anemia, hemoglobin 9  8 consider colonoscopy outpatient     ED Triage Vitals   Temperature Pulse Respirations Blood Pressure SpO2   07/13/20 0629 07/13/20 0629 07/13/20 0629 07/13/20 0629 07/13/20 0629   97 8 °F (36 6 °C) 78 18 148/68 96 %      Temp Source Heart Rate Source Patient Position - Orthostatic VS BP Location FiO2 (%)   07/13/20 1251 07/13/20 0629 07/13/20 0629 07/13/20 0629 --   Tympanic Monitor Sitting Right arm       Pain Score       07/13/20 0629       6        Wt Readings from Last 1 Encounters:   06/07/20 50 2 kg (110 lb 10 7 oz)     Additional Vital Signs:   07/14/20 1100  97 8 °F (36 6 °C)  88  18 112/64 94 % None (Room air) Lying   07/14/20 0700  98 °F (36 7 °C)  88  18 112/60 97 % None (Room air) Lying   07/13/20 2337  97 7 °F (36 5 °C)  85  18 111/62 96 % None (Room air) Lying   07/13/20 1959  97 4 °F (36 3 °C)  85  18 179/82Abnormal  98 % None (Room air) Lying   07/13/20 1500  97 8 °F (36 6 °C)  90  16 170/71 97 % None (Room air) Lying   07/13/20 1251  97 6 °F (36 4 °C)  82  16 173/92Abnormal  98 % None (Room air) Lying   07/13/20 1210    92  16 146/73 98 % None (Room air) Sitting   07/13/20 1045    90  16 177/84Abnormal  98 % None (Room air)    07/13/20 0902    83  14 161/81 98 % None (Room air) Lying       Pertinent Labs/Diagnostic Test Results:   Results from last 7 days   Lab Units 07/13/20  1043   SARS-COV-2  Negative     Results from last 7 days   Lab Units 07/14/20  0523 07/13/20  0644   WBC Thousand/uL 6 12 7 43   HEMOGLOBIN g/dL 9 8* 10 1*   HEMATOCRIT % 31 1* 33 1*   PLATELETS Thousands/uL 259 265   NEUTROS ABS Thousands/µL  --  5 37     Results from last 7 days   Lab Units 07/14/20  0523 07/13/20  0644   SODIUM mmol/L 143 143   POTASSIUM mmol/L 3 7 3 9   CHLORIDE mmol/L 108 107   CO2 mmol/L 26 28   ANION GAP mmol/L 9 8   BUN mg/dL 18 14   CREATININE mg/dL 0 68 0 82   EGFR ml/min/1 73sq m 80 65   CALCIUM mg/dL 8 7 9 0     Results from last 7 days   Lab Units 07/14/20  0523 07/13/20  0644   AST U/L 14 18   ALT U/L 9* 12   ALK PHOS U/L 58 65   TOTAL PROTEIN g/dL 6 9 7 5   ALBUMIN g/dL 3 0* 3 5   TOTAL BILIRUBIN mg/dL 0 26 0 42     Results from last 7 days   Lab Units 07/14/20  0523 07/13/20  0644   GLUCOSE RANDOM mg/dL 94 91       Results from last 7 days   Lab Units 07/13/20  1806 07/13/20  1525 07/13/20  1043 07/13/20  0644   TROPONIN I ng/mL <0 02 <0 02 <0 02 <0 02       Results from last 7 days   Lab Units 07/13/20  0644   LIPASE u/L 104     Results from last 7 days   Lab Units 07/13/20  0746 07/13/20  0714   CLARITY UA  clear Clear   COLOR UA  yellow Yellow   SPEC GRAV UA   --  1 015   PH UA   --  7 0   GLUCOSE UA mg/dl  --  Negative   KETONES UA mg/dl  --  Negative   BLOOD UA   --  Negative   PROTEIN UA mg/dl  --  Negative   NITRITE UA   --  Negative   BILIRUBIN UA   --  Negative   UROBILINOGEN UA E U /dl  --  0 2   LEUKOCYTES UA   --  Negative     7/13 CXR: Diffuse tenderness palpation without guarding  7/13 CT A&P: 1   Bilateral nonobstructive nephrolithiasis  2    No acute inflammatory or infectious process      ED Treatment:   Medication Administration from 07/13/2020 0623 to 07/13/2020 1243       Date/Time Order Dose Route Action     07/13/2020 1044 aspirin chewable tablet 324 mg 324 mg Oral Given     07/13/2020 1043 ondansetron (ZOFRAN) injection 4 mg 4 mg Intravenous Given     07/13/2020 1208 clonazePAM (KlonoPIN) tablet 0 5 mg 0 5 mg Oral Given     07/13/2020 1208 lamoTRIgine (LaMICtal) tablet 200 mg 200 mg Oral Given     07/13/2020 1208 pantoprazole (PROTONIX) EC tablet 40 mg 40 mg Oral Given     07/13/2020 1208 topiramate (TOPAMAX) tablet 50 mg 50 mg Oral Given        Past Medical History:   Diagnosis Date    Anemia 2/26/2019    Anxiety     Bipolar 1 disorder (HCC)     Depression     DVT (deep venous thrombosis) ([facility] ) 2008    Left leg    GERD (gastroesophageal reflux disease)     Hypertension     Overactive bladder      Present on Admission:   Bipolar 1 disorder ([facility] )   Chest pain in adult   Essential hypertension   Gastroesophageal reflux disease   OAB (overactive bladder)      Admitting Diagnosis: Chest pain, unspecified [R07 9]  Abdominal pain [R10 9]  Abdominal pain, unspecified abdominal location [R10 9]  Age/Sex: 80 y o  female  Admission Orders:  Scheduled Medications:  Medications:  aluminum-magnesium hydroxide-simethicone 30 mL Oral TID   clonazePAM 0 5 mg Oral BID   cyanocobalamin 1,000 mcg Oral Daily   heparin (porcine) 5,000 Units Subcutaneous Q8H [facility]   lamoTRIgine 200 mg Oral Daily Before Breakfast   oxybutynin 10 mg Oral Daily   pantoprazole 40 mg Oral Early Morning   QUEtiapine 400 mg Oral HS   sulfamethoxazole-trimethoprim 1 tablet Oral Daily   topiramate 50 mg Oral BID     Continuous IV Infusions:     PRN Meds:  acetaminophen 650 mg Oral Q6H PRN   ondansetron 4 mg Intravenous Q4H  X 1 7/14     TELE  CONSULT TO GASTROENTEROLOGY    Network Utilization Review Department  [email or web address]  ATTENTION: Please call with any questions or concerns to [phone] and carefully listen to the prompts so that you are directed to the right person  All voicemails are confidential   Magali Doing all requests for admission clinical reviews, approved or denied determinations and any other requests to dedicated fax number below belonging to the campus where the patient is receiving treatment   List of dedicated fax numbers for the Facilities:  [address] DENIALS (Administrative/Medical Necessity) [phone]   [address] (Maternity/NICU/Pediatrics) [phone]   Hilary Mckeon [phone]   Calin Loving [phone]   Ayesha Herrera [phone]   Ana Corbin [facility] [address] [facility] [phone]   [facility]  [phone]   [facility] [facility]  [address] [facility] [address] [facility] [phone]

## 2020-07-14 NOTE — ASSESSMENT & PLAN NOTE
· Bipolar disorder follows with Dr Joy Rojas    Continue clonazepam, lamictal, topiramate, and quetiapine

## 2020-07-14 NOTE — PLAN OF CARE
Problem: Potential for Falls  Goal: Patient will remain free of falls  Description  INTERVENTIONS:  - Assess patient frequently for physical needs  -  Identify cognitive and physical deficits and behaviors that affect risk of falls    -  [place] fall precautions as indicated by assessment   - Educate patient/family on patient safety including physical limitations  - Instruct patient to call for assistance with activity based on assessment  - Modify environment to reduce risk of injury  - Consider OT/PT consult to assist with strengthening/mobility  Outcome: Progressing     Problem: PAIN - ADULT  Goal: Verbalizes/displays adequate comfort level or baseline comfort level  Description  Interventions:  - Encourage patient to monitor pain and request assistance  - Assess pain using appropriate pain scale  - Administer analgesics based on type and severity of pain and evaluate response  - Implement non-pharmacological measures as appropriate and evaluate response  - Consider cultural and social influences on pain and pain management  - Notify physician/advanced practitioner if interventions unsuccessful or patient reports new pain  Outcome: Progressing     Problem: INFECTION - ADULT  Goal: Absence or prevention of progression during hospitalization  Description  INTERVENTIONS:  - Assess and monitor for signs and symptoms of infection  - Monitor lab/diagnostic results  - Monitor all insertion sites, i e  indwelling lines, tubes, and drains  - Monitor endotracheal if appropriate and nasal secretions for changes in amount and color  - [place] appropriate cooling/warming therapies per order  - Administer medications as ordered  - Instruct and encourage patient and family to use good hand hygiene technique  - Identify and instruct in appropriate isolation precautions for identified infection/condition  Outcome: Progressing  Goal: Absence of fever/infection during neutropenic period  Description  INTERVENTIONS:  - Monitor WBC    Outcome: Progressing     Problem: SAFETY ADULT  Goal: Maintain or return to baseline ADL function  Description  INTERVENTIONS:  -  Assess patient's ability to carry out ADLs; assess patient's baseline for ADL function and identify physical deficits which impact ability to perform ADLs (bathing, care of mouth/teeth, toileting, grooming, dressing, etc )  - Assess/evaluate cause of self-care deficits   - Assess range of motion  - Assess patient's mobility; develop plan if impaired  - Assess patient's need for assistive devices and provide as appropriate  - Encourage maximum independence but intervene and supervise when necessary  - Involve family in performance of ADLs  - Assess for home care needs following discharge   - Consider OT consult to assist with ADL evaluation and planning for discharge  - Provide patient education as appropriate  Outcome: Progressing  Goal: Maintain or return mobility status to optimal level  Description  INTERVENTIONS:  - Assess patient's baseline mobility status (ambulation, transfers, stairs, etc )    - Identify cognitive and physical deficits and behaviors that affect mobility  - Identify mobility aids required to assist with transfers and/or ambulation (gait belt, sit-to-stand, lift, walker, cane, etc )  - [place] fall precautions as indicated by assessment  - Record patient progress and toleration of activity level on Mobility SBAR; progress patient to next Phase/Stage  - Instruct patient to call for assistance with activity based on assessment  - Consider rehabilitation consult to assist with strengthening/weightbearing, etc   Outcome: Progressing     Problem: DISCHARGE PLANNING  Goal: Discharge to home or other facility with appropriate resources  Description  INTERVENTIONS:  - Identify barriers to discharge w/patient and caregiver  - Arrange for needed discharge resources and transportation as appropriate  - Identify discharge learning needs (meds, wound care, etc )  - Arrange for interpretive services to assist at discharge as needed  - Refer to Case Management Department for coordinating discharge planning if the patient needs post-hospital services based on physician/advanced practitioner order or complex needs related to functional status, cognitive ability, or social support system  Outcome: Progressing     Problem: Knowledge Deficit  Goal: Patient/family/caregiver demonstrates understanding of disease process, treatment plan, medications, and discharge instructions  Description  Complete learning assessment and assess knowledge base    Interventions:  - Provide teaching at level of understanding  - Provide teaching via preferred learning methods  Outcome: Progressing     Problem: CARDIOVASCULAR - ADULT  Goal: Maintains optimal cardiac output and hemodynamic stability  Description  INTERVENTIONS:  - Monitor I/O, vital signs and rhythm  - Monitor for S/S and trends of decreased cardiac output  - Administer and titrate ordered vasoactive medications to optimize hemodynamic stability  - Assess quality of pulses, skin color and temperature  - Assess for signs of decreased coronary artery perfusion  - Instruct patient to report change in severity of symptoms  Outcome: Progressing  Goal: Absence of cardiac dysrhythmias or at baseline rhythm  Description  INTERVENTIONS:  - Continuous cardiac monitoring, vital signs, obtain 12 lead EKG if ordered  - Administer antiarrhythmic and heart rate control medications as ordered  - Monitor electrolytes and administer replacement therapy as ordered  Outcome: Progressing     Problem: GASTROINTESTINAL - ADULT  Goal: Minimal or absence of nausea and/or vomiting  Description  INTERVENTIONS:  - Administer IV fluids if ordered to ensure adequate hydration  - Maintain NPO status until nausea and vomiting are resolved  - Nasogastric tube if ordered  - Administer ordered antiemetic medications as needed  - Provide nonpharmacologic comfort measures as appropriate  - Advance diet as tolerated, if ordered  - Consider nutrition services referral to assist patient with adequate nutrition and appropriate food choices  Outcome: Progressing  Goal: Maintains or returns to baseline bowel function  Description  INTERVENTIONS:  - Assess bowel function  - Encourage oral fluids to ensure adequate hydration  - Administer IV fluids if ordered to ensure adequate hydration  - Administer ordered medications as needed  - Encourage mobilization and activity  - Consider nutritional services referral to assist patient with adequate nutrition and appropriate food choices  Outcome: Progressing  Goal: Maintains adequate nutritional intake  Description  INTERVENTIONS:  - Monitor percentage of each meal consumed  - Identify factors contributing to decreased intake, treat as appropriate  - Assist with meals as needed  - Monitor I&O, weight, and lab values if indicated  - Obtain nutrition services referral as needed  Outcome: Progressing  Goal: Establish and maintain optimal ostomy function  Description  INTERVENTIONS:  - Assess bowel function  - Encourage oral fluids to ensure adequate hydration  - Administer IV fluids if ordered to ensure adequate hydration   - Administer ordered medications as needed  - Encourage mobilization and activity  - Nutrition services referral to assist patient with appropriate food choices  - Assess stoma site  - Consider wound care consult   Outcome: Progressing

## 2020-07-14 NOTE — ANESTHESIA PREPROCEDURE EVALUATION
Review of Systems/Medical History  Patient summary reviewed  Chart reviewed  No history of anesthetic complications     Cardiovascular  Hyperlipidemia, Hypertension controlled,    Pulmonary  Smoker ex-smoker  ,        GI/Hepatic    GERD ,        Negative  ROS        Endo/Other  Negative endo/other ROS      GYN       Hematology  Anemia ,     Musculoskeletal  Back pain ,   Comment: THR      Neurology  Negative neurology ROS      Psychology   Anxiety, Depression , being treated for depression,              Physical Exam    Airway    Mallampati score: II  TM Distance: >3 FB  Neck ROM: full     Dental   upper dentures and lower dentures,     Cardiovascular  Rhythm: regular, Rate: normal, Murmur,     Pulmonary  Pulmonary exam normal     Other Findings        Anesthesia Plan  ASA Score- 2 Emergent    Anesthesia Type- general and IV sedation with anesthesia with ASA Monitors  Additional Monitors:   Airway Plan:     Comment: Harsh murmur   Requested echo to be done prior to procedure   Medicine aware   Plan Factors-    Induction- intravenous  Postoperative Plan-     Informed Consent- Anesthetic plan and risks discussed with patient

## 2020-07-14 NOTE — PROGRESS NOTES
Progress Note - Sayda Marc 1935, 80 y o  female MRN: [id number]    Unit/Bed#: E4 -01 Encounter: [id number]    Primary Care Provider: Ashok Fernández MD   Date and time admitted to hospital: 7/13/2020  6:23 AM        * Chest pain in adult  Assessment & Plan  · Chest pain in adult  Upon further questioning pain is more abdominal and patient states that she did not have bowel movements for 4-5 days  · Yesterday she was eating a taco salad and felt worse  · Cardiac enzymes negative and will DC telemetry  · Trial of scheduled Mylanta did not improve symptoms    Results from last 7 days   Lab Units 07/13/20  1806 07/13/20  1525 07/13/20  1043 07/13/20  0644   TROPONIN I ng/mL <0 02 <0 02 <0 02 <0 02       OAB (overactive bladder)  Assessment & Plan  · Overactive bladder  Patient on Myrbetriq and should continue to follow-up with Dr Gopal Sharma as outpatient    Prophylactic antibiotic  Assessment & Plan  · Prophylactic antibiotic? Reviewed paper medication list from [facility] at the [facility]  · On Bactrim ds for four months ending on 10/2/2020 for unclear reason    Bipolar 1 disorder ([facility] )  Assessment & Plan  · Bipolar disorder follows with Dr Ousmane Pressley  Continue clonazepam, lamictal, topiramate, and quetiapine    Essential hypertension  Assessment & Plan  · History of essential hypertension no longer on any medications    Gastroesophageal reflux disease  Assessment & Plan  · GERD continue pantoprazole  Was given Mylanta scheduled without relief of abdominal pain  · Patient does not recall any recent endoscopic evaluations  · Reports postprandial pain  Will have GI evaluate      VTE Pharmacologic Prophylaxis: Heparin    Patient Centered Rounds: I have performed bedside rounds with nursing staff today  Discussions with Specialists or Other Care Team Provider:   Education and Discussions with Family / Patient:  Left voicemail with son Clare Thakur    Time Spent for Care: 30 mins    More than 50% of total time spent on counseling and coordination of care as described above  Current Length of Stay: 0 day(s)  Current Patient Status: Observation     Certification Statement: The patient will continue to require additional inpatient hospital stay due to Chest pain in adult  Discharge Plan / Estimated Discharge Date:     Code Status: Level 1 - Full Code  ______________________________________________________________________________    Subjective:   Patient seen and examined  Still having abdominal pain  Reports it is postprandial   Still did not have a bowel movement since last night  Objective:   Vitals: Blood pressure 112/60, pulse 88, temperature 98 °F (36 7 °C), temperature source Tympanic, resp  rate 18, SpO2 97 %      Physical Exam:   General appearance: alert, appears stated age and cooperative  Head: Normocephalic, without obvious abnormality, atraumatic  Lungs: clear to auscultation bilaterally  Heart: regular rate and rhythm and ++ murmur  Abdomen: Soft positive bowel sounds scattered tenderness no rebound or guarding  Back: negative  Extremities: extremities atraumatic, no cyanosis or edema  Neurologic: Grossly normal    Additional Data:   Labs:  Results from last 7 days   Lab Units 07/14/20  0523 07/13/20  0644   WBC Thousand/uL 6 12 7 43   HEMOGLOBIN g/dL 9 8* 10 1*   HEMATOCRIT % 31 1* 33 1*   MCV fL 107* 107*   PLATELETS Thousands/uL 259 265     Results from last 7 days   Lab Units 07/14/20  0523 07/13/20  0644   SODIUM mmol/L 143 143   POTASSIUM mmol/L 3 7 3 9   CHLORIDE mmol/L 108 107   CO2 mmol/L 26 28   ANION GAP mmol/L 9 8   BUN mg/dL 18 14   CREATININE mg/dL 0 68 0 82   CALCIUM mg/dL 8 7 9 0   ALBUMIN g/dL 3 0* 3 5   TOTAL BILIRUBIN mg/dL 0 26 0 42   ALK PHOS U/L 58 65   ALT U/L 9* 12   AST U/L 14 18   EGFR ml/min/1 73sq m 80 65   GLUCOSE RANDOM mg/dL 94 91         Results from last 7 days   Lab Units 07/13/20  1806 07/13/20  1525 07/13/20  1043   TROPONIN I ng/mL <0 02 <0 02 <0 02 * I Have Reviewed All Lab Data Listed Above  Cultures:               Imaging:  Imaging Reports Reviewed Today Include:   Xr Chest 1 View Portable    Result Date: 7/13/2020  Impression: No acute cardiopulmonary disease  Workstation performed: [id number]     Ct Abdomen Pelvis With Contrast    Result Date: 7/13/2020  Impression: 1  Bilateral nonobstructive nephrolithiasis  2   No acute inflammatory or infectious process  Workstation performed: [id number]     Scheduled Meds:  Current Facility-Administered Medications:  acetaminophen 650 mg Oral Q6H PRN Glorianne Sandrita, DO   aluminum-magnesium hydroxide-simethicone 30 mL Oral TID Glorianne Sandrita, DO   clonazePAM 0 5 mg Oral BID Wili Chavez, DO   cyanocobalamin 1,000 mcg Oral Daily Wili Chavez, DO   heparin (porcine) 5,000 Units Subcutaneous Q8H [address] Wili Chavez, DO   lamoTRIgine 200 mg Oral Daily Before Breakfast Wili Chavez, DO   ondansetron 4 mg Intravenous Q4H PRN Glorianne Sandrita, DO   oxybutynin 10 mg Oral Daily Wili Chavez, DO   pantoprazole 40 mg Oral Early Morning Wili Chavez, DO   QUEtiapine 400 mg Oral HS Wili Chavez, DO   sulfamethoxazole-trimethoprim 1 tablet Oral Daily Wili Chavez, DO   topiramate 50 mg Oral BID Glorianne Sandrita, DO       Glorianne Sandrita, DO  [facility] Internal Medicine  Hospitalist    ** Please Note: This note has been constructed using a voice recognition system   **

## 2020-07-14 NOTE — ASSESSMENT & PLAN NOTE
· Chest pain in adult  Upon further questioning pain is more abdominal and patient states that she did not have bowel movements for 4-5 days  · Yesterday she was eating a taco salad and felt worse    · Cardiac enzymes negative and will DC telemetry  · Trial of scheduled Mylanta did not improve symptoms    Results from last 7 days   Lab Units 07/13/20  1806 07/13/20  1525 07/13/20  1043 07/13/20  0644   TROPONIN I ng/mL <0 02 <0 02 <0 02 <0 02

## 2020-07-15 ENCOUNTER — APPOINTMENT (INPATIENT)
Dept: GASTROENTEROLOGY | Facility: HOSPITAL | Age: 85
DRG: 392 | End: 2020-07-15
Payer: COMMERCIAL

## 2020-07-15 ENCOUNTER — ANESTHESIA (INPATIENT)
Dept: GASTROENTEROLOGY | Facility: HOSPITAL | Age: 85
DRG: 392 | End: 2020-07-15
Payer: COMMERCIAL

## 2020-07-15 ENCOUNTER — APPOINTMENT (INPATIENT)
Dept: NON INVASIVE DIAGNOSTICS | Facility: HOSPITAL | Age: 85
DRG: 392 | End: 2020-07-15
Payer: COMMERCIAL

## 2020-07-15 LAB
ATRIAL RATE: 94 BPM
P AXIS: 31 DEGREES
PR INTERVAL: 178 MS
QRS AXIS: -5 DEGREES
QRSD INTERVAL: 86 MS
QT INTERVAL: 368 MS
QTC INTERVAL: 460 MS
T WAVE AXIS: 46 DEGREES
VENTRICULAR RATE: 94 BPM

## 2020-07-15 PROCEDURE — 93321 DOPPLER ECHO F-UP/LMTD STD: CPT

## 2020-07-15 PROCEDURE — 93005 ELECTROCARDIOGRAM TRACING: CPT

## 2020-07-15 PROCEDURE — 88305 TISSUE EXAM BY PATHOLOGIST: CPT | Performed by: PATHOLOGY

## 2020-07-15 PROCEDURE — 93308 TTE F-UP OR LMTD: CPT

## 2020-07-15 PROCEDURE — 93010 ELECTROCARDIOGRAM REPORT: CPT | Performed by: INTERNAL MEDICINE

## 2020-07-15 PROCEDURE — 43239 EGD BIOPSY SINGLE/MULTIPLE: CPT | Performed by: INTERNAL MEDICINE

## 2020-07-15 PROCEDURE — 93306 TTE W/DOPPLER COMPLETE: CPT

## 2020-07-15 PROCEDURE — 99232 SBSQ HOSP IP/OBS MODERATE 35: CPT | Performed by: INTERNAL MEDICINE

## 2020-07-15 PROCEDURE — 93325 DOPPLER ECHO COLOR FLOW MAPG: CPT

## 2020-07-15 RX ORDER — PROPOFOL 10 MG/ML
INJECTION, EMULSION INTRAVENOUS AS NEEDED
Status: DISCONTINUED | OUTPATIENT
Start: 2020-07-15 | End: 2020-07-15 | Stop reason: SURG

## 2020-07-15 RX ORDER — SODIUM CHLORIDE 9 MG/ML
125 INJECTION, SOLUTION INTRAVENOUS CONTINUOUS
Status: DISCONTINUED | OUTPATIENT
Start: 2020-07-15 | End: 2020-07-16

## 2020-07-15 RX ORDER — SODIUM CHLORIDE 9 MG/ML
INJECTION, SOLUTION INTRAVENOUS CONTINUOUS PRN
Status: DISCONTINUED | OUTPATIENT
Start: 2020-07-15 | End: 2020-07-15 | Stop reason: SURG

## 2020-07-15 RX ADMIN — CYANOCOBALAMIN TAB 500 MCG 1000 MCG: 500 TAB at 08:44

## 2020-07-15 RX ADMIN — OXYBUTYNIN 10 MG: 10 TABLET, FILM COATED, EXTENDED RELEASE ORAL at 08:44

## 2020-07-15 RX ADMIN — TOPIRAMATE 50 MG: 25 TABLET, FILM COATED ORAL at 17:19

## 2020-07-15 RX ADMIN — TOPIRAMATE 50 MG: 25 TABLET, FILM COATED ORAL at 08:44

## 2020-07-15 RX ADMIN — SODIUM CHLORIDE: 0.9 INJECTION, SOLUTION INTRAVENOUS at 15:39

## 2020-07-15 RX ADMIN — PROPOFOL 20 MG: 10 INJECTION, EMULSION INTRAVENOUS at 15:53

## 2020-07-15 RX ADMIN — POLYETHYLENE GLYCOL 3350 17 G: 17 POWDER, FOR SOLUTION ORAL at 08:44

## 2020-07-15 RX ADMIN — ALUMINUM HYDROXIDE, MAGNESIUM HYDROXIDE, AND SIMETHICONE 30 ML: 200; 200; 20 SUSPENSION ORAL at 08:44

## 2020-07-15 RX ADMIN — FAMOTIDINE 20 MG: 10 INJECTION, SOLUTION INTRAVENOUS at 06:33

## 2020-07-15 RX ADMIN — SODIUM CHLORIDE 125 ML/HR: 0.9 INJECTION, SOLUTION INTRAVENOUS at 15:17

## 2020-07-15 RX ADMIN — LIDOCAINE HYDROCHLORIDE 100 MG: 20 INJECTION, SOLUTION INTRAVENOUS at 15:45

## 2020-07-15 RX ADMIN — HEPARIN SODIUM 5000 UNITS: 5000 INJECTION INTRAVENOUS; SUBCUTANEOUS at 06:31

## 2020-07-15 RX ADMIN — ONDANSETRON 4 MG: 2 INJECTION INTRAMUSCULAR; INTRAVENOUS at 06:31

## 2020-07-15 RX ADMIN — HEPARIN SODIUM 5000 UNITS: 5000 INJECTION INTRAVENOUS; SUBCUTANEOUS at 21:14

## 2020-07-15 RX ADMIN — CLONAZEPAM 0.5 MG: 0.5 TABLET ORAL at 08:44

## 2020-07-15 RX ADMIN — QUETIAPINE FUMARATE 400 MG: 200 TABLET ORAL at 21:13

## 2020-07-15 RX ADMIN — PANTOPRAZOLE SODIUM 40 MG: 40 TABLET, DELAYED RELEASE ORAL at 06:33

## 2020-07-15 RX ADMIN — PROPOFOL 60 MG: 10 INJECTION, EMULSION INTRAVENOUS at 15:45

## 2020-07-15 RX ADMIN — ONDANSETRON 4 MG: 2 INJECTION INTRAMUSCULAR; INTRAVENOUS at 01:28

## 2020-07-15 RX ADMIN — LAMOTRIGINE 200 MG: 100 TABLET ORAL at 06:33

## 2020-07-15 RX ADMIN — PROPOFOL 20 MG: 10 INJECTION, EMULSION INTRAVENOUS at 15:47

## 2020-07-15 RX ADMIN — CLONAZEPAM 0.5 MG: 0.5 TABLET ORAL at 17:19

## 2020-07-15 RX ADMIN — POLYETHYLENE GLYCOL 3350 17 G: 17 POWDER, FOR SOLUTION ORAL at 17:19

## 2020-07-15 RX ADMIN — SULFAMETHOXAZOLE AND TRIMETHOPRIM 1 TABLET: 800; 160 TABLET ORAL at 08:44

## 2020-07-15 RX ADMIN — BISACODYL 10 MG: 10 SUPPOSITORY RECTAL at 06:33

## 2020-07-15 NOTE — ASSESSMENT & PLAN NOTE
· Chest pain in adult  Upon further questioning pain is more abdominal and patient states that she did not have bowel movements for 4-5 days  · Cardiac enzymes negative and will DC telemetry   · Does have history of rheumatic fever with murmur  Echocardiogram to further evaluate valves  · Trial of scheduled Mylanta did not improve symptoms      · GI consulted and for EGD today    Results from last 7 days   Lab Units 07/13/20  1806 07/13/20  1525 07/13/20  1043 07/13/20  0644   TROPONIN I ng/mL <0 02 <0 02 <0 02 <0 02

## 2020-07-15 NOTE — ASSESSMENT & PLAN NOTE
· GERD continue pantoprazole  Was given Mylanta scheduled without relief of abdominal pain  · Patient does not recall any recent endoscopic evaluations  · Reports postprandial pain    Per GI for EGD today

## 2020-07-15 NOTE — ASSESSMENT & PLAN NOTE
· Overactive bladder    Patient on Myrbetriq PTA and should continue to follow-up with Dr Mortimer Pluck as outpatient

## 2020-07-15 NOTE — PROGRESS NOTES
Progress Note - Joan Walker 1935, 80 y o  female MRN: [id number]    Unit/Bed#: E4 -01 Encounter: [id number]    Primary Care Provider: Dixon Tavarez MD   Date and time admitted to hospital: 7/13/2020  6:23 AM        * Chest pain in adult  Assessment & Plan  · Chest pain in adult  Upon further questioning pain is more abdominal and patient states that she did not have bowel movements for 4-5 days  · Cardiac enzymes negative and will DC telemetry   · Does have history of rheumatic fever with murmur  Echocardiogram to further evaluate valves  · Trial of scheduled Mylanta did not improve symptoms  · GI consulted and for EGD today    Results from last 7 days   Lab Units 07/13/20  1806 07/13/20  1525 07/13/20  1043 07/13/20  0644   TROPONIN I ng/mL <0 02 <0 02 <0 02 <0 02       OAB (overactive bladder)  Assessment & Plan  · Overactive bladder  Patient on Myrbetriq PTA and should continue to follow-up with Dr Nadine Lake as outpatient    Prophylactic antibiotic  Assessment & Plan  · Prophylactic antibiotic? Reviewed [facility] medication [facility]  · On Bactrim ds for four months ending on 10/2/2020 for unclear reason    Bipolar 1 disorder ([facility] [address] )  Assessment & Plan  · Bipolar disorder follows with Dr Mari Waters  Continue clonazepam, lamictal, topiramate, and quetiapine    Essential hypertension  Assessment & Plan  · History of essential hypertension no longer on any medications    Gastroesophageal reflux disease  Assessment & Plan  · GERD continue pantoprazole  Was given Mylanta scheduled without relief of abdominal pain  · Patient does not recall any recent endoscopic evaluations  · Reports postprandial pain  Per GI for EGD today      VTE Pharmacologic Prophylaxis: Heparin    Patient Centered Rounds: I have performed bedside rounds with nursing staff today    Discussions with Specialists or Other Care Team Provider: GI  Education and Discussions with Family / Patient:  Left detailed voicemail with patient's son Jorge Luis Mello    Time Spent for Care: 25 mins  More than 50% of total time spent on counseling and coordination of care as described above  Current Length of Stay: 1 day(s)  Current Patient Status: Inpatient     Certification Statement: The patient will continue to require additional inpatient hospital stay due to Chest pain in adult  Discharge Plan / Estimated Discharge Date:  Depending on EGD    Code Status: Level 1 - Full Code  ______________________________________________________________________________    Subjective:   Patient seen and examined  Still having abdominal pain  Still constipated  Objective:   Vitals: Blood pressure 130/65, pulse 90, temperature 98 6 °F (37 °C), temperature source Temporal, resp  rate 18, SpO2 97 %      Physical Exam:   General appearance: alert, appears stated age and cooperative  Head: Normocephalic, without obvious abnormality, atraumatic  Lungs: clear to auscultation bilaterally  Heart: regular rate and rhythm  Abdomen: Soft epigastric tenderness palpation  Back: negative  Extremities: extremities atraumatic, no cyanosis or edema  Neurologic: Grossly normal    Additional Data:   Labs:  Results from last 7 days   Lab Units 07/14/20  0523 07/13/20  0644   WBC Thousand/uL 6 12 7 43   HEMOGLOBIN g/dL 9 8* 10 1*   HEMATOCRIT % 31 1* 33 1*   MCV fL 107* 107*   PLATELETS Thousands/uL 259 265     Results from last 7 days   Lab Units 07/14/20  0523 07/13/20  0644   SODIUM mmol/L 143 143   POTASSIUM mmol/L 3 7 3 9   CHLORIDE mmol/L 108 107   CO2 mmol/L 26 28   ANION GAP mmol/L 9 8   BUN mg/dL 18 14   CREATININE mg/dL 0 68 0 82   CALCIUM mg/dL 8 7 9 0   ALBUMIN g/dL 3 0* 3 5   TOTAL BILIRUBIN mg/dL 0 26 0 42   ALK PHOS U/L 58 65   ALT U/L 9* 12   AST U/L 14 18   EGFR ml/min/1 73sq m 80 65   GLUCOSE RANDOM mg/dL 94 91         Results from last 7 days   Lab Units 07/13/20  1806 07/13/20  1525 07/13/20  1043   TROPONIN I ng/mL <0 02 <0 02 <0 02                          * I Have Reviewed All Lab Data Listed Above  Cultures:               Imaging:  Imaging Reports Reviewed Today Include:   Xr Chest 1 View Portable    Result Date: 7/13/2020  Impression: No acute cardiopulmonary disease  Workstation performed: [id number]     Ct Abdomen Pelvis With Contrast    Result Date: 7/13/2020  Impression: 1  Bilateral nonobstructive nephrolithiasis  2   No acute inflammatory or infectious process  Workstation performed: [id number]     Scheduled Meds:  Current Facility-Administered Medications:  acetaminophen 650 mg Oral Q6H PRN Lashay Hayes, DO   bisacodyl 10 mg Rectal Daily PRN Natalie Donovan PA-C   clonazePAM 0 5 mg Oral BID Lashay Abigail, DO   cyanocobalamin 1,000 mcg Oral Daily Wili Chavez, DO   heparin (porcine) 5,000 Units Subcutaneous Q8H [address] Wili Chavez, DO   lamoTRIgine 200 mg Oral Daily Before Breakfast Wili Chavez, DO   ondansetron 4 mg Intravenous Q4H PRN Lashay Hayes, DO   oxybutynin 10 mg Oral Daily Wili Chavez, DO   pantoprazole 40 mg Oral Early Morning Wili Chavez, DO   polyethylene glycol 17 g Oral BID Natalie Donovan PA-C   QUEtiapine 400 mg Oral HS Wili Chavez, DO   sulfamethoxazole-trimethoprim 1 tablet Oral Daily Wili Chavez, DO   topiramate 50 mg Oral BID Lashay Abigail, DO       Lashay Abigail, DO  [facility]  Hospitalist    ** Please Note: This note has been constructed using a voice recognition system   **

## 2020-07-15 NOTE — CASE MANAGEMENT
Cm reviewed pt care coordination rounds with Dr Pedro Patton  Pt is a tentative d/c pending EGD  Cm informed [facility] [phone] that pt is a tentative d/c today or tomorrow  Cm was informed that all new scripts and DC summary be faxed to [phone] Att: Margaret Kennedy Cm will f/u

## 2020-07-15 NOTE — SPEECH THERAPY NOTE
Speech Language/Pathology      Patient Name: Tessa Conway  CCZRS'O Date: 7/15/2020     ST consulted for swallow evaluation  Chart reviewed  Pt w/ hx of chronic GERD and other GI symptoms  Per report, pt reports intermittent dysphagia to solids>liquids and feels like food gets stuck in her throat  Per GI note, "sometimes when she is swallowing liquids, the liquid bubbles up in her throat and she needs to cough/bring it up  She has lost about 20-25 pounds over the past 2 years " Pt currently NPO for EGD today  Will f/u to complete formal swallow evaluation as able/appropriate      Heidy Overton, SLP

## 2020-07-16 PROCEDURE — 99239 HOSP IP/OBS DSCHRG MGMT >30: CPT | Performed by: INTERNAL MEDICINE

## 2020-07-16 PROCEDURE — 99232 SBSQ HOSP IP/OBS MODERATE 35: CPT | Performed by: INTERNAL MEDICINE

## 2020-07-16 RX ORDER — POLYETHYLENE GLYCOL 3350 17 G/17G
17 POWDER, FOR SOLUTION ORAL 2 TIMES DAILY
Qty: 60 EACH | Refills: 0 | Status: SHIPPED | OUTPATIENT
Start: 2020-07-16 | End: 2020-10-23

## 2020-07-16 RX ORDER — POLYETHYLENE GLYCOL 3350 17 G/17G
17 POWDER, FOR SOLUTION ORAL 2 TIMES DAILY
Qty: 60 EACH | Refills: 0 | Status: SHIPPED | OUTPATIENT
Start: 2020-07-16 | End: 2020-07-16

## 2020-07-16 RX ADMIN — CYANOCOBALAMIN TAB 500 MCG 1000 MCG: 500 TAB at 08:27

## 2020-07-16 RX ADMIN — CLONAZEPAM 0.5 MG: 0.5 TABLET ORAL at 17:26

## 2020-07-16 RX ADMIN — PANTOPRAZOLE SODIUM 40 MG: 40 TABLET, DELAYED RELEASE ORAL at 06:36

## 2020-07-16 RX ADMIN — POLYETHYLENE GLYCOL 3350 17 G: 17 POWDER, FOR SOLUTION ORAL at 17:26

## 2020-07-16 RX ADMIN — LAMOTRIGINE 200 MG: 100 TABLET ORAL at 06:36

## 2020-07-16 RX ADMIN — SULFAMETHOXAZOLE AND TRIMETHOPRIM 1 TABLET: 800; 160 TABLET ORAL at 08:28

## 2020-07-16 RX ADMIN — CLONAZEPAM 0.5 MG: 0.5 TABLET ORAL at 08:27

## 2020-07-16 RX ADMIN — HEPARIN SODIUM 5000 UNITS: 5000 INJECTION INTRAVENOUS; SUBCUTANEOUS at 21:12

## 2020-07-16 RX ADMIN — TOPIRAMATE 50 MG: 25 TABLET, FILM COATED ORAL at 17:26

## 2020-07-16 RX ADMIN — HEPARIN SODIUM 5000 UNITS: 5000 INJECTION INTRAVENOUS; SUBCUTANEOUS at 06:36

## 2020-07-16 RX ADMIN — QUETIAPINE FUMARATE 400 MG: 200 TABLET ORAL at 21:12

## 2020-07-16 RX ADMIN — OXYBUTYNIN 10 MG: 10 TABLET, FILM COATED, EXTENDED RELEASE ORAL at 08:27

## 2020-07-16 RX ADMIN — TOPIRAMATE 50 MG: 25 TABLET, FILM COATED ORAL at 08:27

## 2020-07-16 RX ADMIN — POLYETHYLENE GLYCOL 3350 17 G: 17 POWDER, FOR SOLUTION ORAL at 08:29

## 2020-07-16 NOTE — ASSESSMENT & PLAN NOTE
· Bipolar disorder follows with Dr Amanda Ghotra    Continue clonazepam, lamictal, topiramate, and quetiapine

## 2020-07-16 NOTE — ASSESSMENT & PLAN NOTE
· Chest pain in adult  Upon further questioning pain is more abdominal and patient states that she did not have bowel movements for 4-5 days prior to admission  · Cardiac enzymes negative and no events on telemetry  · Does have history of rheumatic fever with murmur  Echocardiogram performed  · Trial of scheduled Mylanta did not improve symptoms      · GI consulted and had EGD without findings for cause of abdominal pain    Results from last 7 days   Lab Units 07/13/20  1806 07/13/20  1525 07/13/20  1043 07/13/20  0644   TROPONIN I ng/mL <0 02 <0 02 <0 02 <0 02

## 2020-07-16 NOTE — SOCIAL WORK
Pt for return to [facility] [phone] tomorrow am, [facility] contacted and arranged for 1000hrs  Updated facility and they are also requesting a script for a Covid test, that will be completed at the facility  MD updated via [employer] on time and need for Covid rx  CM left  for Pt's son Bri Lawleray re: time for transport  Will update nsg

## 2020-07-16 NOTE — SPEECH THERAPY NOTE
Speech Language/Pathology    Speech/Language Pathology Progress Note    Patient Name: Florin Quan  VQEWD'R Date: 7/16/2020     Orders received and chart reviewed  Discharge note written for pending d/c today  Case d/w nurse who states pt is safely tolerating po  ST evaluation not warranted at this time

## 2020-07-16 NOTE — PROGRESS NOTES
Progress Note - Gino Schmitt 1935, 80 y o  female MRN: [id number]    Unit/Bed#: E4 -01 Encounter: [id number]    Primary Care Provider: Katty Nam MD   Date and time admitted to hospital: 7/13/2020  6:23 AM        * Chest pain in adult  Assessment & Plan  · Chest pain in adult  Upon further questioning pain is more abdominal and patient states that she did not have bowel movements for 4-5 days prior to admission  · Cardiac enzymes negative and no events on telemetry  · Does have history of rheumatic fever with murmur  Echocardiogram performed  · Trial of scheduled Mylanta did not improve symptoms  · GI consulted and had EGD without findings for cause of abdominal pain    Results from last 7 days   Lab Units 07/13/20  1806 07/13/20  1525 07/13/20  1043 07/13/20  0644   TROPONIN I ng/mL <0 02 <0 02 <0 02 <0 02       Gastroesophageal reflux disease  Assessment & Plan  · GERD continue pantoprazole  Was given Mylanta scheduled without relief of abdominal pain  · Patient does not recall any recent endoscopic evaluations  · Reports postprandial pain  Seen by GI and had EGD without any significant findings  · Pain appears more secondary to constipation and will be discharged with instructions to take miralax b i d  until re-evaluated by gastroenterology after discharge  OAB (overactive bladder)  Assessment & Plan  · Overactive bladder  Patient on Myrbetriq PTA and should continue to follow-up with Dr Ted Valencia as outpatient    Prophylactic antibiotic  Assessment & Plan  · Prophylactic antibiotic? Reviewed paper medication list from [facility]  · On Bactrim ds for four months ending on 10/2/2020 for unclear reason    Bipolar 1 disorder ([facility] [address] )  Assessment & Plan  · Bipolar disorder follows with Dr Aida Ulloa    Continue clonazepam, lamictal, topiramate, and quetiapine    Essential hypertension  Assessment & Plan  · History of essential hypertension no longer on any medications      VTE Pharmacologic Prophylaxis: Heparin    Patient Centered Rounds: I have performed bedside rounds with nursing staff today  Discussions with Specialists or Other Care Team Provider:  Case management  Education and Discussions with Family / Patient:  Left voicemail    Time Spent for Care: 25 mins  More than 50% of total time spent on counseling and coordination of care as described above  Current Length of Stay: 2 day(s)  Current Patient Status: Inpatient     Certification Statement: The patient will continue to require additional inpatient hospital stay due to Chest pain in adult  Discharge Plan / Estimated Discharge Date:  Tomorrow    Code Status: Level 1 - Full Code  ______________________________________________________________________________    Subjective:   Patient seen and examined  Still having abdominal pain  Objective:   Vitals: Blood pressure 132/62, pulse 101, temperature 98 9 °F (37 2 °C), temperature source Temporal, resp  rate 18, SpO2 94 %      Physical Exam:   General appearance: alert, appears stated age and cooperative  Head: Normocephalic, without obvious abnormality, atraumatic  Lungs: clear to auscultation bilaterally  Heart: regular rate and rhythm and ++ murmur  Abdomen: Scattered tenderness no guarding  Back: negative  Extremities: extremities atraumatic, no cyanosis or edema  Neurologic: Grossly normal    Additional Data:   Labs:  Results from last 7 days   Lab Units 07/14/20  0523 07/13/20  0644   WBC Thousand/uL 6 12 7 43   HEMOGLOBIN g/dL 9 8* 10 1*   HEMATOCRIT % 31 1* 33 1*   MCV fL 107* 107*   PLATELETS Thousands/uL 259 265     Results from last 7 days   Lab Units 07/14/20  0523 07/13/20  0644   SODIUM mmol/L 143 143   POTASSIUM mmol/L 3 7 3 9   CHLORIDE mmol/L 108 107   CO2 mmol/L 26 28   ANION GAP mmol/L 9 8   BUN mg/dL 18 14   CREATININE mg/dL 0 68 0 82   CALCIUM mg/dL 8 7 9 0   ALBUMIN g/dL 3 0* 3 5   TOTAL BILIRUBIN mg/dL 0 26 0 42   ALK PHOS U/L 58 65 ALT U/L 9* 12   AST U/L 14 18   EGFR ml/min/1 73sq m 80 65   GLUCOSE RANDOM mg/dL 94 91         Results from last 7 days   Lab Units 07/13/20  1806 07/13/20  1525 07/13/20  1043   TROPONIN I ng/mL <0 02 <0 02 <0 02                          * I Have Reviewed All Lab Data Listed Above  Cultures:               Imaging:  Imaging Reports Reviewed Today Include:   Xr Chest 1 View Portable    Result Date: 7/13/2020  Impression: No acute cardiopulmonary disease  Workstation performed: [id number]     Ct Abdomen Pelvis With Contrast    Result Date: 7/13/2020  Impression: 1  Bilateral nonobstructive nephrolithiasis  2   No acute inflammatory or infectious process  Workstation performed: [id number]     Scheduled Meds:  Current Facility-Administered Medications:  acetaminophen 650 mg Oral Q6H PRN Derryl Revel, DO    bisacodyl 10 mg Rectal Daily PRN Natalie Donovan PA-C    clonazePAM 0 5 mg Oral BID Derryl Revel, DO    cyanocobalamin 1,000 mcg Oral Daily iWli Chavez, DO    heparin (porcine) 5,000 Units Subcutaneous Q8H [address] Wili Chavez, DO    lamoTRIgine 200 mg Oral Daily Before Breakfast Wili Chavez, DO    ondansetron 4 mg Intravenous Q4H PRN Derryl Revel, DO    oxybutynin 10 mg Oral Daily Wili Chavez, DO    pantoprazole 40 mg Oral Early Morning Wili Chavez, DO    polyethylene glycol 17 g Oral BID Natalie Donovan PA-C    QUEtiapine 400 mg Oral HS Wili Chavez, DO    sodium chloride 125 mL/hr Intravenous Continuous Jonathan Bello DO Last Rate: Stopped (07/15/20 1625)   sulfamethoxazole-trimethoprim 1 tablet Oral Daily Wili Chavez, DO    topiramate 50 mg Oral BID Derryl Revel, DO        Derryl Revel, DO  [facility]  Hospitalist    ** Please Note: This note has been constructed using a voice recognition system   **

## 2020-07-16 NOTE — ASSESSMENT & PLAN NOTE
· Bipolar disorder follows with Dr Zenaida Gomes    Continue clonazepam, lamictal, topiramate, and quetiapine

## 2020-07-16 NOTE — ASSESSMENT & PLAN NOTE
· Overactive bladder    Patient on Myrbetriq PTA and should continue to follow-up with Dr Elaine Almazan as outpatient

## 2020-07-16 NOTE — ASSESSMENT & PLAN NOTE
· GERD continue pantoprazole  Was given Mylanta scheduled without relief of abdominal pain  · Patient does not recall any recent endoscopic evaluations  · Reports postprandial pain  Seen by GI and had EGD without any significant findings  · Pain appears more secondary to constipation and will be discharged with instructions to take miralax b i d  until re-evaluated by gastroenterology after discharge

## 2020-07-16 NOTE — PLAN OF CARE
Problem: Potential for Falls  Goal: Patient will remain free of falls  Description  INTERVENTIONS:  - Assess patient frequently for physical needs  -  Identify cognitive and physical deficits and behaviors that affect risk of falls    -  [place] fall precautions as indicated by assessment   - Educate patient/family on patient safety including physical limitations  - Instruct patient to call for assistance with activity based on assessment  - Modify environment to reduce risk of injury  - Consider OT/PT consult to assist with strengthening/mobility  Outcome: Progressing     Problem: PAIN - ADULT  Goal: Verbalizes/displays adequate comfort level or baseline comfort level  Description  Interventions:  - Encourage patient to monitor pain and request assistance  - Assess pain using appropriate pain scale  - Administer analgesics based on type and severity of pain and evaluate response  - Implement non-pharmacological measures as appropriate and evaluate response  - Consider cultural and social influences on pain and pain management  - Notify physician/advanced practitioner if interventions unsuccessful or patient reports new pain  Outcome: Progressing     Problem: INFECTION - ADULT  Goal: Absence or prevention of progression during hospitalization  Description  INTERVENTIONS:  - Assess and monitor for signs and symptoms of infection  - Monitor lab/diagnostic results  - Monitor all insertion sites, i e  indwelling lines, tubes, and drains  - Monitor endotracheal if appropriate and nasal secretions for changes in amount and color  - [place] appropriate cooling/warming therapies per order  - Administer medications as ordered  - Instruct and encourage patient and family to use good hand hygiene technique  - Identify and instruct in appropriate isolation precautions for identified infection/condition  Outcome: Progressing  Goal: Absence of fever/infection during neutropenic period  Description  INTERVENTIONS:  - Monitor WBC    Outcome: Progressing     Problem: SAFETY ADULT  Goal: Maintain or return to baseline ADL function  Description  INTERVENTIONS:  -  Assess patient's ability to carry out ADLs; assess patient's baseline for ADL function and identify physical deficits which impact ability to perform ADLs (bathing, care of mouth/teeth, toileting, grooming, dressing, etc )  - Assess/evaluate cause of self-care deficits   - Assess range of motion  - Assess patient's mobility; develop plan if impaired  - Assess patient's need for assistive devices and provide as appropriate  - Encourage maximum independence but intervene and supervise when necessary  - Involve family in performance of ADLs  - Assess for home care needs following discharge   - Consider OT consult to assist with ADL evaluation and planning for discharge  - Provide patient education as appropriate  Outcome: Progressing  Goal: Maintain or return mobility status to optimal level  Description  INTERVENTIONS:  - Assess patient's baseline mobility status (ambulation, transfers, stairs, etc )    - Identify cognitive and physical deficits and behaviors that affect mobility  - Identify mobility aids required to assist with transfers and/or ambulation (gait belt, sit-to-stand, lift, walker, cane, etc )  - [place] fall precautions as indicated by assessment  - Record patient progress and toleration of activity level on Mobility SBAR; progress patient to next Phase/Stage  - Instruct patient to call for assistance with activity based on assessment  - Consider rehabilitation consult to assist with strengthening/weightbearing, etc   Outcome: Progressing     Problem: DISCHARGE PLANNING  Goal: Discharge to home or other facility with appropriate resources  Description  INTERVENTIONS:  - Identify barriers to discharge w/patient and caregiver  - Arrange for needed discharge resources and transportation as appropriate  - Identify discharge learning needs (meds, wound care, etc )  - Arrange for interpretive services to assist at discharge as needed  - Refer to Case Management Department for coordinating discharge planning if the patient needs post-hospital services based on physician/advanced practitioner order or complex needs related to functional status, cognitive ability, or social support system  Outcome: Progressing     Problem: Knowledge Deficit  Goal: Patient/family/caregiver demonstrates understanding of disease process, treatment plan, medications, and discharge instructions  Description  Complete learning assessment and assess knowledge base    Interventions:  - Provide teaching at level of understanding  - Provide teaching via preferred learning methods  Outcome: Progressing     Problem: CARDIOVASCULAR - ADULT  Goal: Maintains optimal cardiac output and hemodynamic stability  Description  INTERVENTIONS:  - Monitor I/O, vital signs and rhythm  - Monitor for S/S and trends of decreased cardiac output  - Administer and titrate ordered vasoactive medications to optimize hemodynamic stability  - Assess quality of pulses, skin color and temperature  - Assess for signs of decreased coronary artery perfusion  - Instruct patient to report change in severity of symptoms  Outcome: Progressing  Goal: Absence of cardiac dysrhythmias or at baseline rhythm  Description  INTERVENTIONS:  - Continuous cardiac monitoring, vital signs, obtain 12 lead EKG if ordered  - Administer antiarrhythmic and heart rate control medications as ordered  - Monitor electrolytes and administer replacement therapy as ordered  Outcome: Progressing     Problem: GASTROINTESTINAL - ADULT  Goal: Minimal or absence of nausea and/or vomiting  Description  INTERVENTIONS:  - Administer IV fluids if ordered to ensure adequate hydration  - Maintain NPO status until nausea and vomiting are resolved  - Nasogastric tube if ordered  - Administer ordered antiemetic medications as needed  - Provide nonpharmacologic comfort measures as appropriate  - Advance diet as tolerated, if ordered  - Consider nutrition services referral to assist patient with adequate nutrition and appropriate food choices  Outcome: Progressing  Goal: Maintains or returns to baseline bowel function  Description  INTERVENTIONS:  - Assess bowel function  - Encourage oral fluids to ensure adequate hydration  - Administer IV fluids if ordered to ensure adequate hydration  - Administer ordered medications as needed  - Encourage mobilization and activity  - Consider nutritional services referral to assist patient with adequate nutrition and appropriate food choices  Outcome: Progressing  Goal: Maintains adequate nutritional intake  Description  INTERVENTIONS:  - Monitor percentage of each meal consumed  - Identify factors contributing to decreased intake, treat as appropriate  - Assist with meals as needed  - Monitor I&O, weight, and lab values if indicated  - Obtain nutrition services referral as needed  Outcome: Progressing  Goal: Establish and maintain optimal ostomy function  Description  INTERVENTIONS:  - Assess bowel function  - Encourage oral fluids to ensure adequate hydration  - Administer IV fluids if ordered to ensure adequate hydration   - Administer ordered medications as needed  - Encourage mobilization and activity  - Nutrition services referral to assist patient with appropriate food choices  - Assess stoma site  - Consider wound care consult   Outcome: Progressing     Problem: Nutrition/Hydration-ADULT  Goal: Nutrient/Hydration intake appropriate for improving, restoring or maintaining nutritional needs  Description  Monitor and assess patient's nutrition/hydration status for malnutrition  Collaborate with interdisciplinary team and initiate plan and interventions as ordered  Monitor patient's weight and dietary intake as ordered or per policy  Utilize nutrition screening tool and intervene as necessary   Determine patient's food preferences and provide high-protein, high-caloric foods as appropriate       INTERVENTIONS:  - Monitor oral intake, urinary output, labs, and treatment plans  - Assess nutrition and hydration status and recommend course of action  - Evaluate amount of meals eaten  - Assist patient with eating if necessary   - Allow adequate time for meals  - Recommend/ encourage appropriate diets, oral nutritional supplements, and vitamin/mineral supplements  - Order, calculate, and assess calorie counts as needed  - Recommend, monitor, and adjust tube feedings and TPN/PPN based on assessed needs  - Assess need for intravenous fluids  - Provide specific nutrition/hydration education as appropriate  - Include patient/family/caregiver in decisions related to nutrition  Outcome: Progressing

## 2020-07-16 NOTE — ASSESSMENT & PLAN NOTE
· Overactive bladder    Patient on Myrbetriq PTA and should continue to follow-up with Dr Patricia Myers as outpatient

## 2020-07-16 NOTE — DISCHARGE SUMMARY
Discharge- Gino Schmitt 1935, 80 y o  female MRN: [id number]  Unit/Bed#: E4 -01 Encounter: [id number]  Primary Care Provider: Katty Nam MD   Date and time admitted to hospital: 7/13/2020  6:23 AM        Admitting Provider:  Meron Grijalva DO  Discharge Provider:  Meron Grijalva DO  Admission Date: 7/13/2020       Discharge Date: 07/17/20   LOS: 3  Primary Care Physician at Discharge: Katty Nam, [address]:  Gino Schmitt is a 80 y o  female who presented from [facility] for abdominal pain  In the emergency department she was thought to have chest pain and admitted for rule out  Her cardiac enzymes were negative in upon further questioning her pain was more abdominal and has not had bowel movement in 4-5 days prior to admission  She was seen by Gastroenterology and underwent EGD without cause of pain  His symptoms likely secondary to constipation and she will be discharged with instructions to take miralax b i d  Willis Grijalva Left detailed voicemail with patient's son Jennifer Hannon  Please see problem list listed below  DISCHARGE DIAGNOSES  * Chest pain in adult  Assessment & Plan  · Chest pain in adult  Upon further questioning pain is more abdominal and patient states that she did not have bowel movements for 4-5 days prior to admission  · Cardiac enzymes negative and no events on telemetry  · Does have history of rheumatic fever with murmur  Echocardiogram performed  · Trial of scheduled Mylanta did not improve symptoms  · GI consulted and had EGD without findings for cause of abdominal pain    Results from last 7 days   Lab Units 07/13/20  1806 07/13/20  1525 07/13/20  1043 07/13/20  0644   TROPONIN I ng/mL <0 02 <0 02 <0 02 <0 02       Gastroesophageal reflux disease  Assessment & Plan  · GERD continue pantoprazole  Was given Mylanta scheduled without relief of abdominal pain    · Patient does not recall any recent endoscopic evaluations  · Reports postprandial pain  Seen by GI and had EGD without any significant findings  · Pain appears more secondary to constipation and will be discharged with instructions to take miralax b i d  until re-evaluated by gastroenterology after discharge  OAB (overactive bladder)  Assessment & Plan  · Overactive bladder  Patient on Myrbetriq PTA and should continue to follow-up with Dr Johana Gamboa as outpatient    Prophylactic antibiotic  Assessment & Plan  · Prophylactic antibiotic? Reviewed paper medication list from [facility] at the nights  · On Bactrim ds for four months ending on 10/2/2020 for unclear reason    Bipolar 1 disorder ([facility] [address] )  Assessment & Plan  · Bipolar disorder follows with Dr Charlene Okeefe  Continue clonazepam, lamictal, topiramate, and quetiapine    Essential hypertension  Assessment & Plan  · History of essential hypertension no longer on any medications    CONSULTING PROVIDERS   IP CONSULT TO GASTROENTEROLOGY    PROCEDURES PERFORMED  EGD  Date:  7/15/2020  Summary  · Irregular Z-line 40 cm from the incisors  Variation less than 1 cm  · The esophagus appeared normal  · Mild edematous and eroded mucosa; performed cold biopsy  · The duodenum appeared normal  · Performed biopsies in the 2nd part of the duodenum    RADIOLOGY RESULTS  Xr Chest 1 View Portable  Result Date: 7/13/2020  Impression: No acute cardiopulmonary disease  Workstation performed: [id number]     Ct Abdomen Pelvis With Contrast  Result Date: 7/13/2020  Impression: 1  Bilateral nonobstructive nephrolithiasis  2   No acute inflammatory or infectious process   Workstation performed: [id number]       LABS  Results from last 7 days   Lab Units 07/14/20  0523 07/13/20  0644   WBC Thousand/uL 6 12 7 43   HEMOGLOBIN g/dL 9 8* 10 1*   HEMATOCRIT % 31 1* 33 1*   MCV fL 107* 107*   PLATELETS Thousands/uL 259 265     Results from last 7 days   Lab Units 07/14/20  0523 07/13/20  0644   SODIUM mmol/L 143 143   POTASSIUM mmol/L 3 7 3 9   CHLORIDE mmol/L 108 107   CO2 mmol/L 26 28   BUN mg/dL 18 14   CREATININE mg/dL 0 68 0 82   CALCIUM mg/dL 8 7 9 0   ALBUMIN g/dL 3 0* 3 5   TOTAL BILIRUBIN mg/dL 0 26 0 42   ALK PHOS U/L 58 65   ALT U/L 9* 12   AST U/L 14 18   EGFR ml/min/1 73sq m 80 65   GLUCOSE RANDOM mg/dL 94 91     Results from last 7 days   Lab Units 07/13/20  1806 07/13/20  1525 07/13/20  1043   TROPONIN I ng/mL <0 02 <0 02 <0 02       Cultures:   Results from last 7 days   Lab Units 07/13/20  0746 07/13/20  0714   COLOR UA  yellow Yellow   CLARITY UA  clear Clear   SPEC GRAV UA   --  1 015   PH UA   --  7 0   LEUKOCYTES UA   --  Negative   NITRITE UA   --  Negative   GLUCOSE UA mg/dl  --  Negative   KETONES UA mg/dl  --  Negative   BILIRUBIN UA   --  Negative   BLOOD UA   --  Negative                 PHYSICAL EXAM:  Vitals:   Blood Pressure: 141/70 (07/17/20 0733)  Pulse: 104 (07/17/20 0733)  Temperature: 98 1 °F (36 7 °C) (07/17/20 0733)  Temp Source: Temporal (07/17/20 0733)  Respirations: 18 (07/17/20 0733)  SpO2: 99 % (07/17/20 0733)    General appearance: alert, appears stated age and cooperative  Skin: Skin color, texture, turgor normal  No rashes or lesions  Lungs: clear to auscultation bilaterally  Heart: regular rate and rhythm and ++ murmur  Abdomen: Soft scattered tenderness no rebound or guarding  Back: symmetric, no curvature  ROM normal  No CVA tenderness  Extremities: extremities normal, atraumatic, no cyanosis or edema  Neurologic: Grossly normal    Planned Re-admission:  No  Discharge Disposition: Home/Self Care  Facility:  [facility]    Test Results Pending at Discharge:    Order Current Status    Tissue Exam In process      Medications   · Summary of Medication Adjustments made as a result of this hospitalization:   · Constipation  MiraLax added  · Discharge Medication List: See after visit summary for reconciled discharge medications       Diet restrictions:  High-fiber diet   Activity restrictions: No strenuous activity  Discharge Condition: stable    Outpatient Follow-Up and Discharge Instructions  See after visit summary section titled Discharge Instructions for information provided to patient and family  Code Status: Level 1 - Full Code  Discharge Statement   I spent 35 minutes discharging the patient  This time was spent on the day of discharge  Greater than 50% of total time was spent with the patient and / or family counseling and / or coordination of care  ** Please Note: This note has been constructed using a voice recognition system   **

## 2020-07-16 NOTE — PLAN OF CARE
Problem: Potential for Falls  Goal: Patient will remain free of falls  Description  INTERVENTIONS:  - Assess patient frequently for physical needs  -  Identify cognitive and physical deficits and behaviors that affect risk of falls    -  [place] fall precautions as indicated by assessment   - Educate patient/family on patient safety including physical limitations  - Instruct patient to call for assistance with activity based on assessment  - Modify environment to reduce risk of injury  - Consider OT/PT consult to assist with strengthening/mobility  Outcome: Progressing     Problem: PAIN - ADULT  Goal: Verbalizes/displays adequate comfort level or baseline comfort level  Description  Interventions:  - Encourage patient to monitor pain and request assistance  - Assess pain using appropriate pain scale  - Administer analgesics based on type and severity of pain and evaluate response  - Implement non-pharmacological measures as appropriate and evaluate response  - Consider cultural and social influences on pain and pain management  - Notify physician/advanced practitioner if interventions unsuccessful or patient reports new pain  Outcome: Progressing     Problem: INFECTION - ADULT  Goal: Absence or prevention of progression during hospitalization  Description  INTERVENTIONS:  - Assess and monitor for signs and symptoms of infection  - Monitor lab/diagnostic results  - Monitor all insertion sites, i e  indwelling lines, tubes, and drains  - Monitor endotracheal if appropriate and nasal secretions for changes in amount and color  - [place] appropriate cooling/warming therapies per order  - Administer medications as ordered  - Instruct and encourage patient and family to use good hand hygiene technique  - Identify and instruct in appropriate isolation precautions for identified infection/condition  Outcome: Progressing  Goal: Absence of fever/infection during neutropenic period  Description  INTERVENTIONS:  - Monitor WBC    Outcome: Progressing     Problem: SAFETY ADULT  Goal: Maintain or return to baseline ADL function  Description  INTERVENTIONS:  -  Assess patient's ability to carry out ADLs; assess patient's baseline for ADL function and identify physical deficits which impact ability to perform ADLs (bathing, care of mouth/teeth, toileting, grooming, dressing, etc )  - Assess/evaluate cause of self-care deficits   - Assess range of motion  - Assess patient's mobility; develop plan if impaired  - Assess patient's need for assistive devices and provide as appropriate  - Encourage maximum independence but intervene and supervise when necessary  - Involve family in performance of ADLs  - Assess for home care needs following discharge   - Consider OT consult to assist with ADL evaluation and planning for discharge  - Provide patient education as appropriate  Outcome: Progressing  Goal: Maintain or return mobility status to optimal level  Description  INTERVENTIONS:  - Assess patient's baseline mobility status (ambulation, transfers, stairs, etc )    - Identify cognitive and physical deficits and behaviors that affect mobility  - Identify mobility aids required to assist with transfers and/or ambulation (gait belt, sit-to-stand, lift, walker, cane, etc )  - [place] fall precautions as indicated by assessment  - Record patient progress and toleration of activity level on Mobility SBAR; progress patient to next Phase/Stage  - Instruct patient to call for assistance with activity based on assessment  - Consider rehabilitation consult to assist with strengthening/weightbearing, etc   Outcome: Progressing     Problem: DISCHARGE PLANNING  Goal: Discharge to home or other facility with appropriate resources  Description  INTERVENTIONS:  - Identify barriers to discharge w/patient and caregiver  - Arrange for needed discharge resources and transportation as appropriate  - Identify discharge learning needs (meds, wound care, etc )  - Arrange for interpretive services to assist at discharge as needed  - Refer to Case Management Department for coordinating discharge planning if the patient needs post-hospital services based on physician/advanced practitioner order or complex needs related to functional status, cognitive ability, or social support system  Outcome: Progressing     Problem: Knowledge Deficit  Goal: Patient/family/caregiver demonstrates understanding of disease process, treatment plan, medications, and discharge instructions  Description  Complete learning assessment and assess knowledge base    Interventions:  - Provide teaching at level of understanding  - Provide teaching via preferred learning methods  Outcome: Progressing     Problem: CARDIOVASCULAR - ADULT  Goal: Maintains optimal cardiac output and hemodynamic stability  Description  INTERVENTIONS:  - Monitor I/O, vital signs and rhythm  - Monitor for S/S and trends of decreased cardiac output  - Administer and titrate ordered vasoactive medications to optimize hemodynamic stability  - Assess quality of pulses, skin color and temperature  - Assess for signs of decreased coronary artery perfusion  - Instruct patient to report change in severity of symptoms  Outcome: Progressing  Goal: Absence of cardiac dysrhythmias or at baseline rhythm  Description  INTERVENTIONS:  - Continuous cardiac monitoring, vital signs, obtain 12 lead EKG if ordered  - Administer antiarrhythmic and heart rate control medications as ordered  - Monitor electrolytes and administer replacement therapy as ordered  Outcome: Progressing     Problem: GASTROINTESTINAL - ADULT  Goal: Minimal or absence of nausea and/or vomiting  Description  INTERVENTIONS:  - Administer IV fluids if ordered to ensure adequate hydration  - Maintain NPO status until nausea and vomiting are resolved  - Nasogastric tube if ordered  - Administer ordered antiemetic medications as needed  - Provide nonpharmacologic comfort measures as appropriate  - Advance diet as tolerated, if ordered  - Consider nutrition services referral to assist patient with adequate nutrition and appropriate food choices  Outcome: Progressing  Goal: Maintains or returns to baseline bowel function  Description  INTERVENTIONS:  - Assess bowel function  - Encourage oral fluids to ensure adequate hydration  - Administer IV fluids if ordered to ensure adequate hydration  - Administer ordered medications as needed  - Encourage mobilization and activity  - Consider nutritional services referral to assist patient with adequate nutrition and appropriate food choices  Outcome: Progressing  Goal: Maintains adequate nutritional intake  Description  INTERVENTIONS:  - Monitor percentage of each meal consumed  - Identify factors contributing to decreased intake, treat as appropriate  - Assist with meals as needed  - Monitor I&O, weight, and lab values if indicated  - Obtain nutrition services referral as needed  Outcome: Progressing  Goal: Establish and maintain optimal ostomy function  Description  INTERVENTIONS:  - Assess bowel function  - Encourage oral fluids to ensure adequate hydration  - Administer IV fluids if ordered to ensure adequate hydration   - Administer ordered medications as needed  - Encourage mobilization and activity  - Nutrition services referral to assist patient with appropriate food choices  - Assess stoma site  - Consider wound care consult   Outcome: Progressing     Problem: Nutrition/Hydration-ADULT  Goal: Nutrient/Hydration intake appropriate for improving, restoring or maintaining nutritional needs  Description  Monitor and assess patient's nutrition/hydration status for malnutrition  Collaborate with interdisciplinary team and initiate plan and interventions as ordered  Monitor patient's weight and dietary intake as ordered or per policy  Utilize nutrition screening tool and intervene as necessary   Determine patient's food preferences and provide high-protein, high-caloric foods as appropriate       INTERVENTIONS:  - Monitor oral intake, urinary output, labs, and treatment plans  - Assess nutrition and hydration status and recommend course of action  - Evaluate amount of meals eaten  - Assist patient with eating if necessary   - Allow adequate time for meals  - Recommend/ encourage appropriate diets, oral nutritional supplements, and vitamin/mineral supplements  - Order, calculate, and assess calorie counts as needed  - Recommend, monitor, and adjust tube feedings and TPN/PPN based on assessed needs  - Assess need for intravenous fluids  - Provide specific nutrition/hydration education as appropriate  - Include patient/family/caregiver in decisions related to nutrition  Outcome: Progressing

## 2020-07-16 NOTE — NURSING NOTE
Assumed care of patient at 1500  Agree with previous RN assessment  Bed in lowest position, call bell within reach, bed alarm on for patient safety  Will continue to monitor

## 2020-07-16 NOTE — CASE MANAGEMENT
Cm informed [facility] after am rounds that pt is ready for d/c  Cm was advised that they need d/c summary and script for new drug  Cm obtained script for Dr David Rayo and faxed it to faxed number provided  Cm spoke with Karla Anderson and stated that they will review and call back to confirm d/c  Later cm called to see if everything was ok to set up transport  Karla Anderson stated that the will nee script to be refaxed and Covid testing to be done  Cm informed that the Covid was taken on 7/3 for her discharge and faxed result to [facility]  Cm called [facility] and transport was set for 1730  Cm called [facility] to notify  Karla Anderson stated that transport has to be changed for an earlier time because they cannot accept pt after 1430  Cm called [facility] and requested for new time  Cm received call back and was advised that the earliest transport was for 1745  Cm called [facility] and asked to speak with director to see if they can accommodate pt  Staffs were adamant they cannot accept  Cm called son Jennifer Hannon to inform of above  Son stated that he can  after work at [phone]  Cm called facility to inform of pt arrival and was told that they cannot accept because it is too late to try sending pt tomorrow morning  Cm notify son and MD of above  Son states that he will arrange with brother to  pt in the morning  Son has cm phone to call and update with time  Cm is following

## 2020-07-17 VITALS
TEMPERATURE: 98.1 F | HEART RATE: 104 BPM | OXYGEN SATURATION: 99 % | RESPIRATION RATE: 18 BRPM | SYSTOLIC BLOOD PRESSURE: 141 MMHG | DIASTOLIC BLOOD PRESSURE: 70 MMHG

## 2020-07-17 LAB — EXT SARS-COV-2: NOT DETECTED

## 2020-07-17 PROCEDURE — 99239 HOSP IP/OBS DSCHRG MGMT >30: CPT | Performed by: INTERNAL MEDICINE

## 2020-07-17 RX ADMIN — CYANOCOBALAMIN TAB 500 MCG 1000 MCG: 500 TAB at 08:33

## 2020-07-17 RX ADMIN — POLYETHYLENE GLYCOL 3350 17 G: 17 POWDER, FOR SOLUTION ORAL at 08:37

## 2020-07-17 RX ADMIN — HEPARIN SODIUM 5000 UNITS: 5000 INJECTION INTRAVENOUS; SUBCUTANEOUS at 06:38

## 2020-07-17 RX ADMIN — BISACODYL 10 MG: 10 SUPPOSITORY RECTAL at 07:02

## 2020-07-17 RX ADMIN — OXYBUTYNIN 10 MG: 10 TABLET, FILM COATED, EXTENDED RELEASE ORAL at 08:33

## 2020-07-17 RX ADMIN — TOPIRAMATE 50 MG: 25 TABLET, FILM COATED ORAL at 08:33

## 2020-07-17 RX ADMIN — CLONAZEPAM 0.5 MG: 0.5 TABLET ORAL at 08:33

## 2020-07-17 RX ADMIN — PANTOPRAZOLE SODIUM 40 MG: 40 TABLET, DELAYED RELEASE ORAL at 06:38

## 2020-07-17 RX ADMIN — SULFAMETHOXAZOLE AND TRIMETHOPRIM 1 TABLET: 800; 160 TABLET ORAL at 08:33

## 2020-07-17 RX ADMIN — LAMOTRIGINE 200 MG: 100 TABLET ORAL at 06:38

## 2020-07-17 NOTE — UTILIZATION REVIEW
Notification of Discharge  This is a Notification of Discharge from our facility [address]  Please be advised that this patient has been discharge from our facility  Below you will find the admission and discharge date and time including the patients disposition  PRESENTATION DATE: 7/13/2020  6:23 AM    IP ADMISSION DATE: 7/14/20 1709   DISCHARGE DATE: 7/17/2020 10:30 AM  DISPOSITION: Home/Self Care Home/Self Care   Admission Orders listed below:  Admission Orders (From admission, onward)     Ordered        07/14/20 1709  Inpatient Admission  Once         07/13/20 9849  Place in Observation  Once                   Please contact the UR Department if additional information is required to close this patient's authorization/case  Jodie Seat  Network Utilization Review Department  Main: [phone] x carefully listen to the prompts  All voicemails are confidential   [email or web address]  Send all requests for admission clinical reviews, approved or denied determinations and any other requests to dedicated fax number below belonging to the campus where the patient is receiving treatment   List of dedicated fax numbers:  [address] DENIALS (Administrative/Medical Necessity) [phone]   [address] [place] [address] (Maternity/NICU/Pediatrics) [phone]   Nicole Best [phone]   Augustina Christianson [phone]   Jannie Wilkinson [phone]   [facility] [address] [place] [address] [phone]   [facility]  [phone]   [address] [facility]  [address] [address] [address] [facility] [phone]

## 2020-07-17 NOTE — PLAN OF CARE
Problem: Potential for Falls  Goal: Patient will remain free of falls  Description  INTERVENTIONS:  - Assess patient frequently for physical needs  -  Identify cognitive and physical deficits and behaviors that affect risk of falls    -  [place] fall precautions as indicated by assessment   - Educate patient/family on patient safety including physical limitations  - Instruct patient to call for assistance with activity based on assessment  - Modify environment to reduce risk of injury  - Consider OT/PT consult to assist with strengthening/mobility  Outcome: Progressing     Problem: PAIN - ADULT  Goal: Verbalizes/displays adequate comfort level or baseline comfort level  Description  Interventions:  - Encourage patient to monitor pain and request assistance  - Assess pain using appropriate pain scale  - Administer analgesics based on type and severity of pain and evaluate response  - Implement non-pharmacological measures as appropriate and evaluate response  - Consider cultural and social influences on pain and pain management  - Notify physician/advanced practitioner if interventions unsuccessful or patient reports new pain  Outcome: Progressing     Problem: INFECTION - ADULT  Goal: Absence or prevention of progression during hospitalization  Description  INTERVENTIONS:  - Assess and monitor for signs and symptoms of infection  - Monitor lab/diagnostic results  - Monitor all insertion sites, i e  indwelling lines, tubes, and drains  - Monitor endotracheal if appropriate and nasal secretions for changes in amount and color  - [place] appropriate cooling/warming therapies per order  - Administer medications as ordered  - Instruct and encourage patient and family to use good hand hygiene technique  - Identify and instruct in appropriate isolation precautions for identified infection/condition  Outcome: Progressing  Goal: Absence of fever/infection during neutropenic period  Description  INTERVENTIONS:  - Monitor WBC    Outcome: Progressing     Problem: SAFETY ADULT  Goal: Maintain or return to baseline ADL function  Description  INTERVENTIONS:  -  Assess patient's ability to carry out ADLs; assess patient's baseline for ADL function and identify physical deficits which impact ability to perform ADLs (bathing, care of mouth/teeth, toileting, grooming, dressing, etc )  - Assess/evaluate cause of self-care deficits   - Assess range of motion  - Assess patient's mobility; develop plan if impaired  - Assess patient's need for assistive devices and provide as appropriate  - Encourage maximum independence but intervene and supervise when necessary  - Involve family in performance of ADLs  - Assess for home care needs following discharge   - Consider OT consult to assist with ADL evaluation and planning for discharge  - Provide patient education as appropriate  Outcome: Progressing  Goal: Maintain or return mobility status to optimal level  Description  INTERVENTIONS:  - Assess patient's baseline mobility status (ambulation, transfers, stairs, etc )    - Identify cognitive and physical deficits and behaviors that affect mobility  - Identify mobility aids required to assist with transfers and/or ambulation (gait belt, sit-to-stand, lift, walker, cane, etc )  - [place] fall precautions as indicated by assessment  - Record patient progress and toleration of activity level on Mobility SBAR; progress patient to next Phase/Stage  - Instruct patient to call for assistance with activity based on assessment  - Consider rehabilitation consult to assist with strengthening/weightbearing, etc   Outcome: Progressing     Problem: DISCHARGE PLANNING  Goal: Discharge to home or other facility with appropriate resources  Description  INTERVENTIONS:  - Identify barriers to discharge w/patient and caregiver  - Arrange for needed discharge resources and transportation as appropriate  - Identify discharge learning needs (meds, wound care, etc )  - Arrange for interpretive services to assist at discharge as needed  - Refer to Case Management Department for coordinating discharge planning if the patient needs post-hospital services based on physician/advanced practitioner order or complex needs related to functional status, cognitive ability, or social support system  Outcome: Progressing     Problem: Knowledge Deficit  Goal: Patient/family/caregiver demonstrates understanding of disease process, treatment plan, medications, and discharge instructions  Description  Complete learning assessment and assess knowledge base    Interventions:  - Provide teaching at level of understanding  - Provide teaching via preferred learning methods  Outcome: Progressing     Problem: CARDIOVASCULAR - ADULT  Goal: Maintains optimal cardiac output and hemodynamic stability  Description  INTERVENTIONS:  - Monitor I/O, vital signs and rhythm  - Monitor for S/S and trends of decreased cardiac output  - Administer and titrate ordered vasoactive medications to optimize hemodynamic stability  - Assess quality of pulses, skin color and temperature  - Assess for signs of decreased coronary artery perfusion  - Instruct patient to report change in severity of symptoms  Outcome: Progressing  Goal: Absence of cardiac dysrhythmias or at baseline rhythm  Description  INTERVENTIONS:  - Continuous cardiac monitoring, vital signs, obtain 12 lead EKG if ordered  - Administer antiarrhythmic and heart rate control medications as ordered  - Monitor electrolytes and administer replacement therapy as ordered  Outcome: Progressing     Problem: GASTROINTESTINAL - ADULT  Goal: Minimal or absence of nausea and/or vomiting  Description  INTERVENTIONS:  - Administer IV fluids if ordered to ensure adequate hydration  - Maintain NPO status until nausea and vomiting are resolved  - Nasogastric tube if ordered  - Administer ordered antiemetic medications as needed  - Provide nonpharmacologic comfort measures as appropriate  - Advance diet as tolerated, if ordered  - Consider nutrition services referral to assist patient with adequate nutrition and appropriate food choices  Outcome: Progressing  Goal: Maintains or returns to baseline bowel function  Description  INTERVENTIONS:  - Assess bowel function  - Encourage oral fluids to ensure adequate hydration  - Administer IV fluids if ordered to ensure adequate hydration  - Administer ordered medications as needed  - Encourage mobilization and activity  - Consider nutritional services referral to assist patient with adequate nutrition and appropriate food choices  Outcome: Progressing  Goal: Maintains adequate nutritional intake  Description  INTERVENTIONS:  - Monitor percentage of each meal consumed  - Identify factors contributing to decreased intake, treat as appropriate  - Assist with meals as needed  - Monitor I&O, weight, and lab values if indicated  - Obtain nutrition services referral as needed  Outcome: Progressing  Goal: Establish and maintain optimal ostomy function  Description  INTERVENTIONS:  - Assess bowel function  - Encourage oral fluids to ensure adequate hydration  - Administer IV fluids if ordered to ensure adequate hydration   - Administer ordered medications as needed  - Encourage mobilization and activity  - Nutrition services referral to assist patient with appropriate food choices  - Assess stoma site  - Consider wound care consult   Outcome: Progressing     Problem: Nutrition/Hydration-ADULT  Goal: Nutrient/Hydration intake appropriate for improving, restoring or maintaining nutritional needs  Description  Monitor and assess patient's nutrition/hydration status for malnutrition  Collaborate with interdisciplinary team and initiate plan and interventions as ordered  Monitor patient's weight and dietary intake as ordered or per policy  Utilize nutrition screening tool and intervene as necessary   Determine patient's food preferences and provide high-protein, high-caloric foods as appropriate       INTERVENTIONS:  - Monitor oral intake, urinary output, labs, and treatment plans  - Assess nutrition and hydration status and recommend course of action  - Evaluate amount of meals eaten  - Assist patient with eating if necessary   - Allow adequate time for meals  - Recommend/ encourage appropriate diets, oral nutritional supplements, and vitamin/mineral supplements  - Order, calculate, and assess calorie counts as needed  - Recommend, monitor, and adjust tube feedings and TPN/PPN based on assessed needs  - Assess need for intravenous fluids  - Provide specific nutrition/hydration education as appropriate  - Include patient/family/caregiver in decisions related to nutrition  Outcome: Progressing

## 2020-07-17 NOTE — PLAN OF CARE
Problem: Potential for Falls  Goal: Patient will remain free of falls  Description  INTERVENTIONS:  - Assess patient frequently for physical needs  -  Identify cognitive and physical deficits and behaviors that affect risk of falls    -  [place] fall precautions as indicated by assessment   - Educate patient/family on patient safety including physical limitations  - Instruct patient to call for assistance with activity based on assessment  - Modify environment to reduce risk of injury  - Consider OT/PT consult to assist with strengthening/mobility  Outcome: Progressing     Problem: PAIN - ADULT  Goal: Verbalizes/displays adequate comfort level or baseline comfort level  Description  Interventions:  - Encourage patient to monitor pain and request assistance  - Assess pain using appropriate pain scale  - Administer analgesics based on type and severity of pain and evaluate response  - Implement non-pharmacological measures as appropriate and evaluate response  - Consider cultural and social influences on pain and pain management  - Notify physician/advanced practitioner if interventions unsuccessful or patient reports new pain  Outcome: Progressing     Problem: INFECTION - ADULT  Goal: Absence or prevention of progression during hospitalization  Description  INTERVENTIONS:  - Assess and monitor for signs and symptoms of infection  - Monitor lab/diagnostic results  - Monitor all insertion sites, i e  indwelling lines, tubes, and drains  - Monitor endotracheal if appropriate and nasal secretions for changes in amount and color  - [place] appropriate cooling/warming therapies per order  - Administer medications as ordered  - Instruct and encourage patient and family to use good hand hygiene technique  - Identify and instruct in appropriate isolation precautions for identified infection/condition  Outcome: Progressing  Goal: Absence of fever/infection during neutropenic period  Description  INTERVENTIONS:  - Monitor WBC    Outcome: Progressing     Problem: SAFETY ADULT  Goal: Maintain or return to baseline ADL function  Description  INTERVENTIONS:  -  Assess patient's ability to carry out ADLs; assess patient's baseline for ADL function and identify physical deficits which impact ability to perform ADLs (bathing, care of mouth/teeth, toileting, grooming, dressing, etc )  - Assess/evaluate cause of self-care deficits   - Assess range of motion  - Assess patient's mobility; develop plan if impaired  - Assess patient's need for assistive devices and provide as appropriate  - Encourage maximum independence but intervene and supervise when necessary  - Involve family in performance of ADLs  - Assess for home care needs following discharge   - Consider OT consult to assist with ADL evaluation and planning for discharge  - Provide patient education as appropriate  Outcome: Progressing  Goal: Maintain or return mobility status to optimal level  Description  INTERVENTIONS:  - Assess patient's baseline mobility status (ambulation, transfers, stairs, etc )    - Identify cognitive and physical deficits and behaviors that affect mobility  - Identify mobility aids required to assist with transfers and/or ambulation (gait belt, sit-to-stand, lift, walker, cane, etc )  - [place] fall precautions as indicated by assessment  - Record patient progress and toleration of activity level on Mobility SBAR; progress patient to next Phase/Stage  - Instruct patient to call for assistance with activity based on assessment  - Consider rehabilitation consult to assist with strengthening/weightbearing, etc   Outcome: Progressing     Problem: DISCHARGE PLANNING  Goal: Discharge to home or other facility with appropriate resources  Description  INTERVENTIONS:  - Identify barriers to discharge w/patient and caregiver  - Arrange for needed discharge resources and transportation as appropriate  - Identify discharge learning needs (meds, wound care, etc )  - Arrange for interpretive services to assist at discharge as needed  - Refer to Case Management Department for coordinating discharge planning if the patient needs post-hospital services based on physician/advanced practitioner order or complex needs related to functional status, cognitive ability, or social support system  Outcome: Progressing     Problem: Knowledge Deficit  Goal: Patient/family/caregiver demonstrates understanding of disease process, treatment plan, medications, and discharge instructions  Description  Complete learning assessment and assess knowledge base    Interventions:  - Provide teaching at level of understanding  - Provide teaching via preferred learning methods  Outcome: Progressing     Problem: CARDIOVASCULAR - ADULT  Goal: Maintains optimal cardiac output and hemodynamic stability  Description  INTERVENTIONS:  - Monitor I/O, vital signs and rhythm  - Monitor for S/S and trends of decreased cardiac output  - Administer and titrate ordered vasoactive medications to optimize hemodynamic stability  - Assess quality of pulses, skin color and temperature  - Assess for signs of decreased coronary artery perfusion  - Instruct patient to report change in severity of symptoms  Outcome: Progressing  Goal: Absence of cardiac dysrhythmias or at baseline rhythm  Description  INTERVENTIONS:  - Continuous cardiac monitoring, vital signs, obtain 12 lead EKG if ordered  - Administer antiarrhythmic and heart rate control medications as ordered  - Monitor electrolytes and administer replacement therapy as ordered  Outcome: Progressing     Problem: GASTROINTESTINAL - ADULT  Goal: Minimal or absence of nausea and/or vomiting  Description  INTERVENTIONS:  - Administer IV fluids if ordered to ensure adequate hydration  - Maintain NPO status until nausea and vomiting are resolved  - Nasogastric tube if ordered  - Administer ordered antiemetic medications as needed  - Provide nonpharmacologic comfort measures as appropriate  - Advance diet as tolerated, if ordered  - Consider nutrition services referral to assist patient with adequate nutrition and appropriate food choices  Outcome: Progressing  Goal: Maintains or returns to baseline bowel function  Description  INTERVENTIONS:  - Assess bowel function  - Encourage oral fluids to ensure adequate hydration  - Administer IV fluids if ordered to ensure adequate hydration  - Administer ordered medications as needed  - Encourage mobilization and activity  - Consider nutritional services referral to assist patient with adequate nutrition and appropriate food choices  Outcome: Progressing  Goal: Maintains adequate nutritional intake  Description  INTERVENTIONS:  - Monitor percentage of each meal consumed  - Identify factors contributing to decreased intake, treat as appropriate  - Assist with meals as needed  - Monitor I&O, weight, and lab values if indicated  - Obtain nutrition services referral as needed  Outcome: Progressing  Goal: Establish and maintain optimal ostomy function  Description  INTERVENTIONS:  - Assess bowel function  - Encourage oral fluids to ensure adequate hydration  - Administer IV fluids if ordered to ensure adequate hydration   - Administer ordered medications as needed  - Encourage mobilization and activity  - Nutrition services referral to assist patient with appropriate food choices  - Assess stoma site  - Consider wound care consult   Outcome: Progressing     Problem: Nutrition/Hydration-ADULT  Goal: Nutrient/Hydration intake appropriate for improving, restoring or maintaining nutritional needs  Description  Monitor and assess patient's nutrition/hydration status for malnutrition  Collaborate with interdisciplinary team and initiate plan and interventions as ordered  Monitor patient's weight and dietary intake as ordered or per policy  Utilize nutrition screening tool and intervene as necessary   Determine patient's food preferences and provide high-protein, high-caloric foods as appropriate       INTERVENTIONS:  - Monitor oral intake, urinary output, labs, and treatment plans  - Assess nutrition and hydration status and recommend course of action  - Evaluate amount of meals eaten  - Assist patient with eating if necessary   - Allow adequate time for meals  - Recommend/ encourage appropriate diets, oral nutritional supplements, and vitamin/mineral supplements  - Order, calculate, and assess calorie counts as needed  - Recommend, monitor, and adjust tube feedings and TPN/PPN based on assessed needs  - Assess need for intravenous fluids  - Provide specific nutrition/hydration education as appropriate  - Include patient/family/caregiver in decisions related to nutrition  Outcome: Progressing

## 2020-07-17 NOTE — NURSING NOTE
Report given to receiving facility, COVID script in transfer envelope as requested  IV removed prior to discharge   Report given to transport team

## 2020-07-17 NOTE — PLAN OF CARE
Problem: Potential for Falls  Goal: Patient will remain free of falls  Description  INTERVENTIONS:  - Assess patient frequently for physical needs  -  Identify cognitive and physical deficits and behaviors that affect risk of falls    -  [place] fall precautions as indicated by assessment   - Educate patient/family on patient safety including physical limitations  - Instruct patient to call for assistance with activity based on assessment  - Modify environment to reduce risk of injury  - Consider OT/PT consult to assist with strengthening/mobility  7/17/2020 1021 by Ned Miranda RN  Outcome: Adequate for Discharge  7/17/2020 0801 by Ned Miranda RN  Outcome: Progressing     Problem: PAIN - ADULT  Goal: Verbalizes/displays adequate comfort level or baseline comfort level  Description  Interventions:  - Encourage patient to monitor pain and request assistance  - Assess pain using appropriate pain scale  - Administer analgesics based on type and severity of pain and evaluate response  - Implement non-pharmacological measures as appropriate and evaluate response  - Consider cultural and social influences on pain and pain management  - Notify physician/advanced practitioner if interventions unsuccessful or patient reports new pain  7/17/2020 1021 by Ned Miranda RN  Outcome: Adequate for Discharge  7/17/2020 0801 by Ned Miranda RN  Outcome: Progressing     Problem: INFECTION - ADULT  Goal: Absence or prevention of progression during hospitalization  Description  INTERVENTIONS:  - Assess and monitor for signs and symptoms of infection  - Monitor lab/diagnostic results  - Monitor all insertion sites, i e  indwelling lines, tubes, and drains  - Monitor endotracheal if appropriate and nasal secretions for changes in amount and color  - [place] appropriate cooling/warming therapies per order  - Administer medications as ordered  - Instruct and encourage patient and family to use good hand hygiene technique  - Identify and instruct in appropriate isolation precautions for identified infection/condition  7/17/2020 1021 by Myriam Hernandez RN  Outcome: Adequate for Discharge  7/17/2020 0801 by Myriam Hernandez RN  Outcome: Progressing  Goal: Absence of fever/infection during neutropenic period  Description  INTERVENTIONS:  - Monitor WBC    7/17/2020 1021 by Myriam Hernandez RN  Outcome: Adequate for Discharge  7/17/2020 0801 by Myriam Hernandez RN  Outcome: Progressing     Problem: SAFETY ADULT  Goal: Maintain or return to baseline ADL function  Description  INTERVENTIONS:  -  Assess patient's ability to carry out ADLs; assess patient's baseline for ADL function and identify physical deficits which impact ability to perform ADLs (bathing, care of mouth/teeth, toileting, grooming, dressing, etc )  - Assess/evaluate cause of self-care deficits   - Assess range of motion  - Assess patient's mobility; develop plan if impaired  - Assess patient's need for assistive devices and provide as appropriate  - Encourage maximum independence but intervene and supervise when necessary  - Involve family in performance of ADLs  - Assess for home care needs following discharge   - Consider OT consult to assist with ADL evaluation and planning for discharge  - Provide patient education as appropriate  7/17/2020 1021 by Myriam Hernandez RN  Outcome: Adequate for Discharge  7/17/2020 0801 by Myriam Hernandez RN  Outcome: Progressing  Goal: Maintain or return mobility status to optimal level  Description  INTERVENTIONS:  - Assess patient's baseline mobility status (ambulation, transfers, stairs, etc )    - Identify cognitive and physical deficits and behaviors that affect mobility  - Identify mobility aids required to assist with transfers and/or ambulation (gait belt, sit-to-stand, lift, walker, cane, etc )  - [place] fall precautions as indicated by assessment  - Record patient progress and toleration of activity level on Mobility SBAR; progress patient to next Phase/Stage  - Instruct patient to call for assistance with activity based on assessment  - Consider rehabilitation consult to assist with strengthening/weightbearing, etc   7/17/2020 1021 by Maria Del Rosario Pina RN  Outcome: Adequate for Discharge  7/17/2020 0801 by Maria Del Rosario Pina RN  Outcome: Progressing     Problem: DISCHARGE PLANNING  Goal: Discharge to home or other facility with appropriate resources  Description  INTERVENTIONS:  - Identify barriers to discharge w/patient and caregiver  - Arrange for needed discharge resources and transportation as appropriate  - Identify discharge learning needs (meds, wound care, etc )  - Arrange for interpretive services to assist at discharge as needed  - Refer to Case Management Department for coordinating discharge planning if the patient needs post-hospital services based on physician/advanced practitioner order or complex needs related to functional status, cognitive ability, or social support system  7/17/2020 1021 by Maria Del Rosario Pina RN  Outcome: Adequate for Discharge  7/17/2020 0801 by Maria Del Rosario Pina RN  Outcome: Progressing     Problem: Knowledge Deficit  Goal: Patient/family/caregiver demonstrates understanding of disease process, treatment plan, medications, and discharge instructions  Description  Complete learning assessment and assess knowledge base    Interventions:  - Provide teaching at level of understanding  - Provide teaching via preferred learning methods  7/17/2020 1021 by Maria Del Rosario Pina RN  Outcome: Adequate for Discharge  7/17/2020 0801 by Maria Del Rosario Pina RN  Outcome: Progressing     Problem: CARDIOVASCULAR - ADULT  Goal: Maintains optimal cardiac output and hemodynamic stability  Description  INTERVENTIONS:  - Monitor I/O, vital signs and rhythm  - Monitor for S/S and trends of decreased cardiac output  - Administer and titrate ordered vasoactive medications to optimize hemodynamic stability  - Assess quality of pulses, skin color and temperature  - Assess for signs of decreased coronary artery perfusion  - Instruct patient to report change in severity of symptoms  7/17/2020 1021 by Lamin Lai RN  Outcome: Adequate for Discharge  7/17/2020 0801 by Lamin Lai RN  Outcome: Progressing  Goal: Absence of cardiac dysrhythmias or at baseline rhythm  Description  INTERVENTIONS:  - Continuous cardiac monitoring, vital signs, obtain 12 lead EKG if ordered  - Administer antiarrhythmic and heart rate control medications as ordered  - Monitor electrolytes and administer replacement therapy as ordered  7/17/2020 1021 by Lamin Lai RN  Outcome: Adequate for Discharge  7/17/2020 0801 by Lamin Lai RN  Outcome: Progressing     Problem: GASTROINTESTINAL - ADULT  Goal: Minimal or absence of nausea and/or vomiting  Description  INTERVENTIONS:  - Administer IV fluids if ordered to ensure adequate hydration  - Maintain NPO status until nausea and vomiting are resolved  - Nasogastric tube if ordered  - Administer ordered antiemetic medications as needed  - Provide nonpharmacologic comfort measures as appropriate  - Advance diet as tolerated, if ordered  - Consider nutrition services referral to assist patient with adequate nutrition and appropriate food choices  7/17/2020 1021 by Lamin Lai RN  Outcome: Adequate for Discharge  7/17/2020 0801 by Lamin Lai RN  Outcome: Progressing  Goal: Maintains or returns to baseline bowel function  Description  INTERVENTIONS:  - Assess bowel function  - Encourage oral fluids to ensure adequate hydration  - Administer IV fluids if ordered to ensure adequate hydration  - Administer ordered medications as needed  - Encourage mobilization and activity  - Consider nutritional services referral to assist patient with adequate nutrition and appropriate food choices  7/17/2020 1021 by Lamin Lai RN  Outcome: Adequate for Discharge  7/17/2020 0801 by Lyn Shabazz RN  Outcome: Progressing  Goal: Maintains adequate nutritional intake  Description  INTERVENTIONS:  - Monitor percentage of each meal consumed  - Identify factors contributing to decreased intake, treat as appropriate  - Assist with meals as needed  - Monitor I&O, weight, and lab values if indicated  - Obtain nutrition services referral as needed  7/17/2020 1021 by Lyn Shabazz RN  Outcome: Adequate for Discharge  7/17/2020 0801 by Lyn Shabazz RN  Outcome: Progressing  Goal: Establish and maintain optimal ostomy function  Description  INTERVENTIONS:  - Assess bowel function  - Encourage oral fluids to ensure adequate hydration  - Administer IV fluids if ordered to ensure adequate hydration   - Administer ordered medications as needed  - Encourage mobilization and activity  - Nutrition services referral to assist patient with appropriate food choices  - Assess stoma site  - Consider wound care consult   7/17/2020 1021 by Lyn Shabazz RN  Outcome: Adequate for Discharge  7/17/2020 0801 by Lyn Shabazz RN  Outcome: Progressing     Problem: Nutrition/Hydration-ADULT  Goal: Nutrient/Hydration intake appropriate for improving, restoring or maintaining nutritional needs  Description  Monitor and assess patient's nutrition/hydration status for malnutrition  Collaborate with interdisciplinary team and initiate plan and interventions as ordered  Monitor patient's weight and dietary intake as ordered or per policy  Utilize nutrition screening tool and intervene as necessary  Determine patient's food preferences and provide high-protein, high-caloric foods as appropriate       INTERVENTIONS:  - Monitor oral intake, urinary output, labs, and treatment plans  - Assess nutrition and hydration status and recommend course of action  - Evaluate amount of meals eaten  - Assist patient with eating if necessary   - Allow adequate time for meals  - Recommend/ encourage appropriate diets, oral nutritional supplements, and vitamin/mineral supplements  - Order, calculate, and assess calorie counts as needed  - Recommend, monitor, and adjust tube feedings and TPN/PPN based on assessed needs  - Assess need for intravenous fluids  - Provide specific nutrition/hydration education as appropriate  - Include patient/family/caregiver in decisions related to nutrition  7/17/2020 1021 by Jonathan Rolle RN  Outcome: Adequate for Discharge  7/17/2020 0801 by Jonathan Rolle RN  Outcome: Progressing

## 2020-07-22 ENCOUNTER — TRANSITIONAL CARE MANAGEMENT (OUTPATIENT)
Dept: FAMILY MEDICINE CLINIC | Facility: CLINIC | Age: 85
End: 2020-07-22

## 2020-07-31 ENCOUNTER — OFFICE VISIT (OUTPATIENT)
Dept: FAMILY MEDICINE CLINIC | Facility: CLINIC | Age: 85
End: 2020-07-31
Payer: COMMERCIAL

## 2020-07-31 VITALS
TEMPERATURE: 99 F | WEIGHT: 104.6 LBS | DIASTOLIC BLOOD PRESSURE: 74 MMHG | OXYGEN SATURATION: 95 % | RESPIRATION RATE: 18 BRPM | HEART RATE: 94 BPM | SYSTOLIC BLOOD PRESSURE: 138 MMHG | BODY MASS INDEX: 18.53 KG/M2

## 2020-07-31 DIAGNOSIS — I10 ESSENTIAL HYPERTENSION: Chronic | ICD-10-CM

## 2020-07-31 DIAGNOSIS — R10.30 LOWER ABDOMINAL PAIN: ICD-10-CM

## 2020-07-31 DIAGNOSIS — K59.00 CONSTIPATION, UNSPECIFIED CONSTIPATION TYPE: Primary | Chronic | ICD-10-CM

## 2020-07-31 PROCEDURE — 99495 TRANSJ CARE MGMT MOD F2F 14D: CPT | Performed by: FAMILY MEDICINE

## 2020-07-31 RX ORDER — POLYETHYLENE GLYCOL 3350 17 G/17G
17 POWDER, FOR SOLUTION ORAL 2 TIMES DAILY
Qty: 60 EACH | Refills: 3 | Status: CANCELLED | OUTPATIENT
Start: 2020-07-31

## 2020-07-31 NOTE — PROGRESS NOTES
Assessment/Plan:    No problem-specific Assessment & Plan notes found for this encounter  There are no diagnoses linked to this encounter  Subjective:      Patient ID: Rufino León is a 80 y o  female  Patient comes today to follow-up on emergency room visit and brief stay in the hospital for abdominal and chest pain  Ventral diagnosis was thought to be constipation  She was placed on MiraLax b i d       As the record indicates she has had problems over the years with constipation and crampy abdominal pain  TCM 14      The following portions of the patient's history were reviewed and updated as appropriate: allergies, current medications, past family history, past medical history, past social history, past surgical history and problem list     Review of Systems   Constitutional: Negative for activity change and appetite change  HENT: Negative for voice change  Eyes: Negative for visual disturbance  Respiratory: Negative for chest tightness and shortness of breath  Cardiovascular: Negative for chest pain, palpitations and leg swelling  Gastrointestinal: Positive for abdominal pain and constipation  Negative for blood in stool and diarrhea  Genitourinary: Negative for dysuria, vaginal bleeding and vaginal discharge  Skin: Negative for rash  Neurological: Negative for dizziness  Psychiatric/Behavioral: Negative for dysphoric mood  Objective: There were no vitals filed for this visit  Physical Exam   Constitutional: She appears well-developed and well-nourished  HENT:   Head: Normocephalic and atraumatic  Eyes: Conjunctivae are normal    Neck: Neck supple  No thyromegaly present  Cardiovascular: Normal rate, regular rhythm, normal heart sounds and intact distal pulses  No murmur heard  Pulmonary/Chest: Effort normal and breath sounds normal  No respiratory distress  Musculoskeletal: She exhibits no edema     Lymphadenopathy:     She has no cervical adenopathy  Skin: Skin is warm and dry  Psychiatric: She has a normal mood and affect  Her behavior is normal          We will redo her bowel regimen trying to handle all contingencies

## 2020-07-31 NOTE — LETTER
Valeria Leyva  1935      To whom it may concern: The following regimen should be followed strictly for this patient's chronic bowel issues  MiraLax should be given 1 packet b i d  On a regular basis  If no BM in 48 hours milk of magnesia 30 cc should be administered  If no BM in 6 hours repeat the dose  If the milk of magnesia regimen does not work within 24 hours administer Dulcolax suppository  May repeat in 6 hours if no results  If the above measures have been ineffective administer 1/2 bottle of magnesium citrate and repeat the dose in 6 hours if ineffective                                JUAN Charlton

## 2020-08-07 ENCOUNTER — TELEPHONE (OUTPATIENT)
Dept: GASTROENTEROLOGY | Facility: MEDICAL CENTER | Age: 85
End: 2020-08-07

## 2020-08-11 ENCOUNTER — TELEPHONE (OUTPATIENT)
Dept: FAMILY MEDICINE CLINIC | Facility: CLINIC | Age: 85
End: 2020-08-11

## 2020-08-11 NOTE — TELEPHONE ENCOUNTER
Phone call from [address] [place] [address]  on med list  Should she keep taking this?   [phone]

## 2020-08-11 NOTE — TELEPHONE ENCOUNTER
No : miralax twice daily instead ; I wrote a document detailing her bowel regimen when she was here    Resend to her

## 2020-08-13 ENCOUNTER — TELEPHONE (OUTPATIENT)
Dept: FAMILY MEDICINE CLINIC | Facility: CLINIC | Age: 85
End: 2020-08-13

## 2020-08-13 NOTE — TELEPHONE ENCOUNTER
Faxed letter with instructions again  Patient stating that they are still bringing her this medication every morning

## 2020-08-21 NOTE — TELEPHONE ENCOUNTER
Pt called again stating Citrucel 500 mg needs to be canceled  If we can fax [facility] pharmacy [phone] also to [facility] beyond [facility] [phone]  Stating this med was canceled  Please advise

## 2020-08-24 ENCOUNTER — TELEPHONE (OUTPATIENT)
Dept: FAMILY MEDICINE CLINIC | Facility: CLINIC | Age: 85
End: 2020-08-24

## 2020-08-24 NOTE — TELEPHONE ENCOUNTER
Patient requesting letter stating she is allow to keep hydrocortisone 2 5% in her room  Letter should have instructions with how is patient to apply cream and how many times  Please fax to [phone]  Patient also would like a letter stating she is not taking the citrucel patients states they still bring her this in the mornings

## 2020-09-08 ENCOUNTER — TELEPHONE (OUTPATIENT)
Dept: FAMILY MEDICINE CLINIC | Facility: CLINIC | Age: 85
End: 2020-09-08

## 2020-09-08 NOTE — TELEPHONE ENCOUNTER
Patient called wanting to verify if she should continue the miralax  Did not take last nights dose because she did the regimen yesterday and has moved her bowels twice yesterday and 4 times this morning  States she has diarrhea

## 2020-10-07 ENCOUNTER — HOSPITAL ENCOUNTER (EMERGENCY)
Facility: HOSPITAL | Age: 85
Discharge: HOME | End: 2020-10-08
Attending: EMERGENCY MEDICINE | Admitting: EMERGENCY MEDICINE
Payer: COMMERCIAL

## 2020-10-07 ENCOUNTER — APPOINTMENT (EMERGENCY)
Dept: CT IMAGING | Facility: HOSPITAL | Age: 85
End: 2020-10-07
Payer: COMMERCIAL

## 2020-10-07 DIAGNOSIS — N12 PYELONEPHRITIS: ICD-10-CM

## 2020-10-07 DIAGNOSIS — R91.1 PULMONARY NODULE: ICD-10-CM

## 2020-10-07 DIAGNOSIS — M54.9 ACUTE BACK PAIN: Primary | ICD-10-CM

## 2020-10-07 LAB
ALBUMIN SERPL BCP-MCNC: 3.4 G/DL (ref 3.5–5)
ALP SERPL-CCNC: 71 U/L (ref 46–116)
ALT SERPL W P-5'-P-CCNC: 18 U/L (ref 12–78)
ANION GAP SERPL CALCULATED.3IONS-SCNC: 5 MMOL/L (ref 4–13)
AST SERPL W P-5'-P-CCNC: 21 U/L (ref 5–45)
BACTERIA UR QL AUTO: ABNORMAL /HPF
BASOPHILS # BLD AUTO: 0.02 THOUSANDS/ΜL (ref 0–0.1)
BASOPHILS NFR BLD AUTO: 0 % (ref 0–1)
BILIRUB DIRECT SERPL-MCNC: 0.09 MG/DL (ref 0–0.2)
BILIRUB SERPL-MCNC: 0.37 MG/DL (ref 0.2–1)
BILIRUB UR QL STRIP: NEGATIVE
BUN SERPL-MCNC: 16 MG/DL (ref 5–25)
CALCIUM SERPL-MCNC: 8.9 MG/DL (ref 8.3–10.1)
CHLORIDE SERPL-SCNC: 107 MMOL/L (ref 100–108)
CLARITY UR: ABNORMAL
CLARITY, POC: ABNORMAL
CO2 SERPL-SCNC: 28 MMOL/L (ref 21–32)
COLOR UR: YELLOW
COLOR, POC: YELLOW
CREAT SERPL-MCNC: 0.85 MG/DL (ref 0.6–1.3)
EOSINOPHIL # BLD AUTO: 0.08 THOUSAND/ΜL (ref 0–0.61)
EOSINOPHIL NFR BLD AUTO: 2 % (ref 0–6)
ERYTHROCYTE [DISTWIDTH] IN BLOOD BY AUTOMATED COUNT: 12.9 % (ref 11.6–15.1)
GFR SERPL CREATININE-BSD FRML MDRD: 63 ML/MIN/1.73SQ M
GLUCOSE SERPL-MCNC: 91 MG/DL (ref 65–140)
GLUCOSE UR STRIP-MCNC: NEGATIVE MG/DL
HCT VFR BLD AUTO: 33.7 % (ref 34.8–46.1)
HGB BLD-MCNC: 10.2 G/DL (ref 11.5–15.4)
HGB UR QL STRIP.AUTO: ABNORMAL
IMM GRANULOCYTES # BLD AUTO: 0.02 THOUSAND/UL (ref 0–0.2)
IMM GRANULOCYTES NFR BLD AUTO: 0 % (ref 0–2)
KETONES UR STRIP-MCNC: NEGATIVE MG/DL
LEUKOCYTE ESTERASE UR QL STRIP: ABNORMAL
LYMPHOCYTES # BLD AUTO: 1.39 THOUSANDS/ΜL (ref 0.6–4.47)
LYMPHOCYTES NFR BLD AUTO: 27 % (ref 14–44)
MCH RBC QN AUTO: 31.9 PG (ref 26.8–34.3)
MCHC RBC AUTO-ENTMCNC: 30.3 G/DL (ref 31.4–37.4)
MCV RBC AUTO: 105 FL (ref 82–98)
MONOCYTES # BLD AUTO: 0.44 THOUSAND/ΜL (ref 0.17–1.22)
MONOCYTES NFR BLD AUTO: 9 % (ref 4–12)
NEUTROPHILS # BLD AUTO: 3.13 THOUSANDS/ΜL (ref 1.85–7.62)
NEUTS SEG NFR BLD AUTO: 62 % (ref 43–75)
NITRITE UR QL STRIP: POSITIVE
NON-SQ EPI CELLS URNS QL MICRO: ABNORMAL /HPF
NRBC BLD AUTO-RTO: 0 /100 WBCS
PH UR STRIP.AUTO: 7 [PH] (ref 4.5–8)
PLATELET # BLD AUTO: 279 THOUSANDS/UL (ref 149–390)
PMV BLD AUTO: 8.7 FL (ref 8.9–12.7)
POTASSIUM SERPL-SCNC: 3.4 MMOL/L (ref 3.5–5.3)
PROT SERPL-MCNC: 7.2 G/DL (ref 6.4–8.2)
PROT UR STRIP-MCNC: NEGATIVE MG/DL
RBC # BLD AUTO: 3.2 MILLION/UL (ref 3.81–5.12)
RBC #/AREA URNS AUTO: ABNORMAL /HPF
SODIUM SERPL-SCNC: 140 MMOL/L (ref 136–145)
SP GR UR STRIP.AUTO: 1.01 (ref 1–1.03)
UROBILINOGEN UR QL STRIP.AUTO: 0.2 E.U./DL
WBC # BLD AUTO: 5.08 THOUSAND/UL (ref 4.31–10.16)
WBC #/AREA URNS AUTO: ABNORMAL /HPF

## 2020-10-07 PROCEDURE — 80048 BASIC METABOLIC PNL TOTAL CA: CPT | Performed by: EMERGENCY MEDICINE

## 2020-10-07 PROCEDURE — 81001 URINALYSIS AUTO W/SCOPE: CPT

## 2020-10-07 PROCEDURE — 74174 CTA ABD&PLVS W/CONTRAST: CPT

## 2020-10-07 PROCEDURE — 80076 HEPATIC FUNCTION PANEL: CPT | Performed by: EMERGENCY MEDICINE

## 2020-10-07 PROCEDURE — 96365 THER/PROPH/DIAG IV INF INIT: CPT

## 2020-10-07 PROCEDURE — 87077 CULTURE AEROBIC IDENTIFY: CPT

## 2020-10-07 PROCEDURE — 85025 COMPLETE CBC W/AUTO DIFF WBC: CPT | Performed by: EMERGENCY MEDICINE

## 2020-10-07 PROCEDURE — 87086 URINE CULTURE/COLONY COUNT: CPT

## 2020-10-07 PROCEDURE — 99284 EMERGENCY DEPT VISIT MOD MDM: CPT | Performed by: EMERGENCY MEDICINE

## 2020-10-07 PROCEDURE — 71275 CT ANGIOGRAPHY CHEST: CPT

## 2020-10-07 PROCEDURE — 96375 TX/PRO/DX INJ NEW DRUG ADDON: CPT

## 2020-10-07 PROCEDURE — G1004 CDSM NDSC: HCPCS

## 2020-10-07 PROCEDURE — 87186 SC STD MICRODIL/AGAR DIL: CPT

## 2020-10-07 PROCEDURE — 99284 EMERGENCY DEPT VISIT MOD MDM: CPT

## 2020-10-07 PROCEDURE — 36415 COLL VENOUS BLD VENIPUNCTURE: CPT | Performed by: EMERGENCY MEDICINE

## 2020-10-07 RX ORDER — CEPHALEXIN 500 MG/1
500 CAPSULE ORAL EVERY 8 HOURS SCHEDULED
Qty: 30 CAPSULE | Refills: 0 | Status: SHIPPED | OUTPATIENT
Start: 2020-10-07 | End: 2020-10-17

## 2020-10-07 RX ORDER — LIDOCAINE 40 MG/G
CREAM TOPICAL AS NEEDED
Qty: 30 G | Refills: 0 | Status: SHIPPED | OUTPATIENT
Start: 2020-10-07 | End: 2020-10-23 | Stop reason: SDUPTHER

## 2020-10-07 RX ORDER — LIDOCAINE 50 MG/G
1 PATCH TOPICAL ONCE
Status: DISCONTINUED | OUTPATIENT
Start: 2020-10-07 | End: 2020-10-08 | Stop reason: HOSPADM

## 2020-10-07 RX ORDER — METHOCARBAMOL 750 MG/1
750 TABLET, FILM COATED ORAL 3 TIMES DAILY
Qty: 30 TABLET | Refills: 0 | Status: SHIPPED | OUTPATIENT
Start: 2020-10-07 | End: 2021-01-09 | Stop reason: HOSPADM

## 2020-10-07 RX ADMIN — IOHEXOL 100 ML: 350 INJECTION, SOLUTION INTRAVENOUS at 22:02

## 2020-10-07 RX ADMIN — MORPHINE SULFATE 2 MG: 2 INJECTION, SOLUTION INTRAMUSCULAR; INTRAVENOUS at 21:20

## 2020-10-07 RX ADMIN — CEFTRIAXONE 1000 MG: 2 INJECTION, POWDER, FOR SOLUTION INTRAMUSCULAR; INTRAVENOUS at 22:58

## 2020-10-07 RX ADMIN — LIDOCAINE 1 PATCH: 50 PATCH TOPICAL at 22:12

## 2020-10-08 ENCOUNTER — TELEPHONE (OUTPATIENT)
Dept: FAMILY MEDICINE CLINIC | Facility: CLINIC | Age: 85
End: 2020-10-08

## 2020-10-08 VITALS
SYSTOLIC BLOOD PRESSURE: 105 MMHG | HEART RATE: 88 BPM | DIASTOLIC BLOOD PRESSURE: 56 MMHG | RESPIRATION RATE: 17 BRPM | BODY MASS INDEX: 19.17 KG/M2 | OXYGEN SATURATION: 93 % | TEMPERATURE: 97.8 F | WEIGHT: 108.25 LBS

## 2020-10-10 LAB — BACTERIA UR CULT: ABNORMAL

## 2020-10-23 ENCOUNTER — OFFICE VISIT (OUTPATIENT)
Dept: FAMILY MEDICINE CLINIC | Facility: CLINIC | Age: 85
End: 2020-10-23
Payer: COMMERCIAL

## 2020-10-23 VITALS
HEART RATE: 95 BPM | BODY MASS INDEX: 18.67 KG/M2 | TEMPERATURE: 98 F | WEIGHT: 105.4 LBS | SYSTOLIC BLOOD PRESSURE: 142 MMHG | DIASTOLIC BLOOD PRESSURE: 82 MMHG | OXYGEN SATURATION: 96 %

## 2020-10-23 DIAGNOSIS — M54.9 ACUTE BACK PAIN: ICD-10-CM

## 2020-10-23 DIAGNOSIS — Z23 IMMUNIZATION DUE: Primary | ICD-10-CM

## 2020-10-23 DIAGNOSIS — D50.0 IRON DEFICIENCY ANEMIA DUE TO CHRONIC BLOOD LOSS: Chronic | ICD-10-CM

## 2020-10-23 DIAGNOSIS — K57.30 DIVERTICULOSIS OF COLON: ICD-10-CM

## 2020-10-23 DIAGNOSIS — R19.7 DIARRHEA, UNSPECIFIED TYPE: ICD-10-CM

## 2020-10-23 DIAGNOSIS — I10 ESSENTIAL HYPERTENSION: Chronic | ICD-10-CM

## 2020-10-23 DIAGNOSIS — F33.2 SEVERE EPISODE OF RECURRENT MAJOR DEPRESSIVE DISORDER, WITHOUT PSYCHOTIC FEATURES (HCC): ICD-10-CM

## 2020-10-23 PROCEDURE — 99214 OFFICE O/P EST MOD 30 MIN: CPT | Performed by: FAMILY MEDICINE

## 2020-10-23 RX ORDER — LIDOCAINE 40 MG/G
CREAM TOPICAL
Qty: 30 G | Refills: 12 | Status: SHIPPED | OUTPATIENT
Start: 2020-10-23 | End: 2021-10-26 | Stop reason: ALTCHOICE

## 2020-10-26 ENCOUNTER — TELEPHONE (OUTPATIENT)
Dept: FAMILY MEDICINE CLINIC | Facility: CLINIC | Age: 85
End: 2020-10-26

## 2020-10-30 ENCOUNTER — TELEPHONE (OUTPATIENT)
Dept: FAMILY MEDICINE CLINIC | Facility: CLINIC | Age: 85
End: 2020-10-30

## 2020-11-09 ENCOUNTER — TELEPHONE (OUTPATIENT)
Dept: FAMILY MEDICINE CLINIC | Facility: CLINIC | Age: 85
End: 2020-11-09

## 2020-11-13 ENCOUNTER — TELEPHONE (OUTPATIENT)
Dept: FAMILY MEDICINE CLINIC | Facility: CLINIC | Age: 85
End: 2020-11-13

## 2020-11-13 ENCOUNTER — TREATMENT (OUTPATIENT)
Dept: FAMILY MEDICINE CLINIC | Facility: CLINIC | Age: 85
End: 2020-11-13

## 2020-11-13 DIAGNOSIS — M54.30 SCIATICA, UNSPECIFIED LATERALITY: Primary | ICD-10-CM

## 2020-11-13 RX ORDER — ACETAMINOPHEN 325 MG/1
650 TABLET ORAL 3 TIMES DAILY
Qty: 100 TABLET | Refills: 6 | Status: SHIPPED | OUTPATIENT
Start: 2020-11-13

## 2020-11-30 ENCOUNTER — TELEPHONE (OUTPATIENT)
Dept: FAMILY MEDICINE CLINIC | Facility: CLINIC | Age: 85
End: 2020-11-30

## 2020-12-18 ENCOUNTER — HOSPITAL ENCOUNTER (INPATIENT)
Facility: HOSPITAL | Age: 85
LOS: 22 days | Discharge: NURSING, REHAB OR LONG-TERM CARE | DRG: 177 | End: 2021-01-09
Attending: EMERGENCY MEDICINE | Admitting: INTERNAL MEDICINE
Payer: COMMERCIAL

## 2020-12-18 ENCOUNTER — APPOINTMENT (EMERGENCY)
Dept: RADIOLOGY | Facility: HOSPITAL | Age: 85
DRG: 177 | End: 2020-12-18
Payer: COMMERCIAL

## 2020-12-18 DIAGNOSIS — R33.9 URINARY RETENTION: ICD-10-CM

## 2020-12-18 DIAGNOSIS — F41.1 ANXIETY STATE: ICD-10-CM

## 2020-12-18 DIAGNOSIS — R41.82 ALTERED MENTAL STATUS: ICD-10-CM

## 2020-12-18 DIAGNOSIS — U07.1 PNEUMONIA DUE TO COVID-19 VIRUS: Primary | ICD-10-CM

## 2020-12-18 DIAGNOSIS — J12.82 PNEUMONIA DUE TO COVID-19 VIRUS: Primary | ICD-10-CM

## 2020-12-18 DIAGNOSIS — D53.9 ANEMIA, MACROCYTIC: ICD-10-CM

## 2020-12-18 DIAGNOSIS — R09.02 HYPOXIA: ICD-10-CM

## 2020-12-18 PROBLEM — E87.6 HYPOKALEMIA: Status: ACTIVE | Noted: 2020-12-18

## 2020-12-18 PROBLEM — G93.41 ACUTE METABOLIC ENCEPHALOPATHY: Status: ACTIVE | Noted: 2020-12-18

## 2020-12-18 LAB
ALBUMIN SERPL BCP-MCNC: 3.1 G/DL (ref 3.5–5)
ALP SERPL-CCNC: 87 U/L (ref 46–116)
ALT SERPL W P-5'-P-CCNC: 20 U/L (ref 12–78)
ANION GAP SERPL CALCULATED.3IONS-SCNC: 9 MMOL/L (ref 4–13)
AST SERPL W P-5'-P-CCNC: 38 U/L (ref 5–45)
ATRIAL RATE: 119 BPM
BACTERIA UR QL AUTO: ABNORMAL /HPF
BASOPHILS # BLD AUTO: 0.01 THOUSANDS/ΜL (ref 0–0.1)
BASOPHILS NFR BLD AUTO: 0 % (ref 0–1)
BILIRUB SERPL-MCNC: 0.27 MG/DL (ref 0.2–1)
BILIRUB UR QL STRIP: NEGATIVE
BUN SERPL-MCNC: 23 MG/DL (ref 5–25)
CALCIUM ALBUM COR SERPL-MCNC: 9.7 MG/DL (ref 8.3–10.1)
CALCIUM SERPL-MCNC: 9 MG/DL (ref 8.3–10.1)
CHLORIDE SERPL-SCNC: 106 MMOL/L (ref 100–108)
CLARITY UR: CLEAR
CLARITY, POC: CLEAR
CO2 SERPL-SCNC: 24 MMOL/L (ref 21–32)
COLOR UR: YELLOW
COLOR, POC: YELLOW
CREAT SERPL-MCNC: 1.03 MG/DL (ref 0.6–1.3)
D DIMER PPP FEU-MCNC: 1.47 UG/ML FEU
EOSINOPHIL # BLD AUTO: 0 THOUSAND/ΜL (ref 0–0.61)
EOSINOPHIL NFR BLD AUTO: 0 % (ref 0–6)
ERYTHROCYTE [DISTWIDTH] IN BLOOD BY AUTOMATED COUNT: 12.7 % (ref 11.6–15.1)
FLUAV RNA RESP QL NAA+PROBE: NEGATIVE
FLUBV RNA RESP QL NAA+PROBE: NEGATIVE
GFR SERPL CREATININE-BSD FRML MDRD: 50 ML/MIN/1.73SQ M
GLUCOSE SERPL-MCNC: 93 MG/DL (ref 65–140)
GLUCOSE UR STRIP-MCNC: NEGATIVE MG/DL
HCT VFR BLD AUTO: 36.2 % (ref 34.8–46.1)
HGB BLD-MCNC: 11 G/DL (ref 11.5–15.4)
HGB UR QL STRIP.AUTO: ABNORMAL
IMM GRANULOCYTES # BLD AUTO: 0.04 THOUSAND/UL (ref 0–0.2)
IMM GRANULOCYTES NFR BLD AUTO: 1 % (ref 0–2)
KETONES UR STRIP-MCNC: NEGATIVE MG/DL
LACTATE SERPL-SCNC: 1.2 MMOL/L (ref 0.5–2)
LEUKOCYTE ESTERASE UR QL STRIP: NEGATIVE
LYMPHOCYTES # BLD AUTO: 1.05 THOUSANDS/ΜL (ref 0.6–4.47)
LYMPHOCYTES NFR BLD AUTO: 30 % (ref 14–44)
MCH RBC QN AUTO: 31.1 PG (ref 26.8–34.3)
MCHC RBC AUTO-ENTMCNC: 30.4 G/DL (ref 31.4–37.4)
MCV RBC AUTO: 102 FL (ref 82–98)
MONOCYTES # BLD AUTO: 0.27 THOUSAND/ΜL (ref 0.17–1.22)
MONOCYTES NFR BLD AUTO: 8 % (ref 4–12)
NEUTROPHILS # BLD AUTO: 2.12 THOUSANDS/ΜL (ref 1.85–7.62)
NEUTS SEG NFR BLD AUTO: 61 % (ref 43–75)
NITRITE UR QL STRIP: NEGATIVE
NON-SQ EPI CELLS URNS QL MICRO: ABNORMAL /HPF
NRBC BLD AUTO-RTO: 0 /100 WBCS
NT-PROBNP SERPL-MCNC: 556 PG/ML
PH UR STRIP.AUTO: 6 [PH] (ref 4.5–8)
PLATELET # BLD AUTO: 353 THOUSANDS/UL (ref 149–390)
PMV BLD AUTO: 9.2 FL (ref 8.9–12.7)
POTASSIUM SERPL-SCNC: 3.4 MMOL/L (ref 3.5–5.3)
PR INTERVAL: 180 MS
PROT SERPL-MCNC: 8.1 G/DL (ref 6.4–8.2)
PROT UR STRIP-MCNC: ABNORMAL MG/DL
QRS AXIS: 7 DEGREES
QRSD INTERVAL: 84 MS
QT INTERVAL: 326 MS
QTC INTERVAL: 420 MS
RBC # BLD AUTO: 3.54 MILLION/UL (ref 3.81–5.12)
RBC #/AREA URNS AUTO: ABNORMAL /HPF
RSV RNA RESP QL NAA+PROBE: NEGATIVE
SARS-COV-2 RNA RESP QL NAA+PROBE: POSITIVE
SODIUM SERPL-SCNC: 139 MMOL/L (ref 136–145)
SP GR UR STRIP.AUTO: 1.02 (ref 1–1.03)
T WAVE AXIS: 42 DEGREES
TROPONIN I SERPL-MCNC: 0.03 NG/ML
UROBILINOGEN UR QL STRIP.AUTO: 0.2 E.U./DL
VENTRICULAR RATE: 100 BPM
WBC # BLD AUTO: 3.49 THOUSAND/UL (ref 4.31–10.16)
WBC #/AREA URNS AUTO: ABNORMAL /HPF

## 2020-12-18 PROCEDURE — 86140 C-REACTIVE PROTEIN: CPT | Performed by: PHYSICIAN ASSISTANT

## 2020-12-18 PROCEDURE — 85379 FIBRIN DEGRADATION QUANT: CPT | Performed by: EMERGENCY MEDICINE

## 2020-12-18 PROCEDURE — 84484 ASSAY OF TROPONIN QUANT: CPT | Performed by: EMERGENCY MEDICINE

## 2020-12-18 PROCEDURE — 84145 PROCALCITONIN (PCT): CPT | Performed by: EMERGENCY MEDICINE

## 2020-12-18 PROCEDURE — 99223 1ST HOSP IP/OBS HIGH 75: CPT | Performed by: PHYSICIAN ASSISTANT

## 2020-12-18 PROCEDURE — 93005 ELECTROCARDIOGRAM TRACING: CPT

## 2020-12-18 PROCEDURE — 99285 EMERGENCY DEPT VISIT HI MDM: CPT | Performed by: EMERGENCY MEDICINE

## 2020-12-18 PROCEDURE — 99285 EMERGENCY DEPT VISIT HI MDM: CPT

## 2020-12-18 PROCEDURE — 81001 URINALYSIS AUTO W/SCOPE: CPT

## 2020-12-18 PROCEDURE — 85025 COMPLETE CBC W/AUTO DIFF WBC: CPT | Performed by: EMERGENCY MEDICINE

## 2020-12-18 PROCEDURE — 71045 X-RAY EXAM CHEST 1 VIEW: CPT

## 2020-12-18 PROCEDURE — 0241U HB NFCT DS VIR RESP RNA 4 TRGT: CPT | Performed by: EMERGENCY MEDICINE

## 2020-12-18 PROCEDURE — 82728 ASSAY OF FERRITIN: CPT | Performed by: PHYSICIAN ASSISTANT

## 2020-12-18 PROCEDURE — 36415 COLL VENOUS BLD VENIPUNCTURE: CPT | Performed by: EMERGENCY MEDICINE

## 2020-12-18 PROCEDURE — 80053 COMPREHEN METABOLIC PANEL: CPT | Performed by: EMERGENCY MEDICINE

## 2020-12-18 PROCEDURE — 83605 ASSAY OF LACTIC ACID: CPT | Performed by: EMERGENCY MEDICINE

## 2020-12-18 PROCEDURE — 93010 ELECTROCARDIOGRAM REPORT: CPT | Performed by: INTERNAL MEDICINE

## 2020-12-18 PROCEDURE — 83880 ASSAY OF NATRIURETIC PEPTIDE: CPT | Performed by: EMERGENCY MEDICINE

## 2020-12-18 PROCEDURE — 87040 BLOOD CULTURE FOR BACTERIA: CPT | Performed by: EMERGENCY MEDICINE

## 2020-12-18 RX ORDER — DOXYCYCLINE HYCLATE 100 MG/1
100 CAPSULE ORAL ONCE
Status: COMPLETED | OUTPATIENT
Start: 2020-12-18 | End: 2020-12-18

## 2020-12-18 RX ORDER — CLONAZEPAM 0.5 MG/1
0.5 TABLET ORAL 2 TIMES DAILY
COMMUNITY
End: 2020-12-18

## 2020-12-18 RX ORDER — DEXAMETHASONE SODIUM PHOSPHATE 4 MG/ML
6 INJECTION, SOLUTION INTRA-ARTICULAR; INTRALESIONAL; INTRAMUSCULAR; INTRAVENOUS; SOFT TISSUE ONCE
Status: COMPLETED | OUTPATIENT
Start: 2020-12-18 | End: 2020-12-18

## 2020-12-18 RX ORDER — ACETAMINOPHEN 325 MG/1
650 TABLET ORAL EVERY 6 HOURS PRN
Status: DISCONTINUED | OUTPATIENT
Start: 2020-12-18 | End: 2021-01-09 | Stop reason: HOSPADM

## 2020-12-18 RX ADMIN — ACETAMINOPHEN 650 MG: 325 TABLET ORAL at 23:19

## 2020-12-18 RX ADMIN — CEFTRIAXONE SODIUM 1000 MG: 10 INJECTION, POWDER, FOR SOLUTION INTRAVENOUS at 22:31

## 2020-12-18 RX ADMIN — DEXAMETHASONE SODIUM PHOSPHATE 6 MG: 4 INJECTION, SOLUTION INTRAMUSCULAR; INTRAVENOUS at 22:31

## 2020-12-18 RX ADMIN — DOXYCYCLINE 100 MG: 100 CAPSULE ORAL at 22:30

## 2020-12-18 NOTE — ED PROVIDER NOTES
History  Chief Complaint   Patient presents with    Leg Pain     [facility] due to low O2 sats and confusion  Pts only complaint is left leg pain and urinary frequency  Pt is AOx3     80-year-old female with a history of hypertension, bipolar depression [facility] presents with low O2 sats at the nursing home and confusion  The O2 sat that was considered low was not documented  The 1st O2 sat that was documented was 97%  Patient is only complaining of left knee pain  She states this has been going on for awhile  She denies chest pain or shortness of breath  No coughing  History provided by:  Patient and EMS personnel   used: No    Medical Problem  Location:  Low O2 sat and confusion, left knee pain  Severity:  Unable to specify  Onset quality:  Unable to specify  Timing:  Unable to specify  Chronicity:  Chronic  Context:  Denies injury  Relieved by:  Nothing tried  Worsened by:  Nothing  Ineffective treatments:  Nothing tried  Associated symptoms: no abdominal pain, no chest pain, no congestion, no cough, no diarrhea, no ear pain, no fatigue, no fever, no headaches, no loss of consciousness, no myalgias, no nausea, no rash, no rhinorrhea, no shortness of breath, no sore throat, no vomiting and no wheezing        Prior to Admission Medications   Prescriptions Last Dose Informant Patient Reported? Taking?    Mirabegron ER 50 MG TB24   Yes No   Sig: Take 1 tablet by mouth daily   Psyllium 63 % POWD   No No   Sig: One tsp daily   QUEtiapine (SEROquel) 200 mg tablet  Self No No   Sig: Take 2 tablets (400 mg total) by mouth daily at bedtime   Sennosides-Docusate Sodium (SENNA-PLUS PO)   Yes No   Sig: Take 8 6 mg by mouth 2 (two) times a day as needed   acetaminophen (TYLENOL) 325 mg tablet   No No   Sig: Take 2 tablets (650 mg total) by mouth 3 (three) times a day   bisacodyl (Biscolax) 10 mg suppository   Yes No   Sig: Insert 10 mg into the rectum as needed for constipation   clonazePAM (KlonoPIN) 0 5 mg tablet   No No   Sig: Take 1 tablet (0 5 mg total) by mouth 2 (two) times a day   cyanocobalamin 1000 MCG tablet   No No   Sig: Take 1 tablet (1,000 mcg total) by mouth daily   estradiol (ESTRACE VAGINAL) 0 1 mg/g vaginal cream   Yes No   Sig: Insert 0 5 g into the vagina daily   hydrocortisone 2 5 % cream   Yes No   Sig: Apply topically 4 (four) times a day as needed   lamoTRIgine (LaMICtal) 200 MG tablet   No No   Sig: Take 1 tablet (200 mg total) by mouth daily before breakfast   lidocaine (LMX) 4 % cream   No No   Sig: q6hr prn rib pain    magnesium hydroxide (MILK OF MAGNESIA) 400 mg/5 mL oral suspension   No No   Sig: If no BM following the dulcolax suppositories give 1/2 bottle  If ineffective in 6 hours repeat the dose     methocarbamol (ROBAXIN) 750 mg tablet   No No   Sig: Take 1 tablet (750 mg total) by mouth 3 (three) times a day for 30 doses   pantoprazole (PROTONIX) 40 mg tablet   No No   Sig: Take 1 tablet (40 mg total) by mouth daily in the early morning   sulfamethoxazole-trimethoprim (BACTRIM DS) 800-160 mg per tablet   Yes No   Sig: Take 1 tablet by mouth daily   topiramate (TOPAMAX) 50 MG tablet  Self No No   Sig: Take 1 tablet (50 mg total) by mouth 2 (two) times a day      Facility-Administered Medications: None       Past Medical History:   Diagnosis Date    Anemia 2/26/2019    Anxiety     [facility]     Depression     [facility]deep [facility] thrombosis) [facility] 2008    Left leg    GERD (gastroesophageal reflux disease)     Hypertension     Overactive bladder        Past Surgical History:   Procedure Laterality Date    ADENOIDECTOMY      CATARACT EXTRACTION Bilateral     Right 10/2003, left 4/2004    CHOLECYSTECTOMY      DILATION AND CURETTAGE OF UTERUS  1985    HERNIA REPAIR  11/02/2007    Femoral and small bowel resection    HIP SURGERY      L hip    TONSILLECTOMY      As a youth    WRIST SURGERY      L wrist       Family History   Problem Relation Age of Onset    Heart disease Father      I have reviewed and agree with the history as documented  E-Cigarette/Vaping    E-Cigarette Use Never User      E-Cigarette/Vaping Substances    Nicotine No     THC No     CBD No     Flavoring No      Social History     Tobacco Use    Smoking status: Former Smoker     Packs/day: 1 00     Types: Cigarettes    Smokeless tobacco: Former User    Tobacco comment: Per [employer]: Heavy tobacco smoker   Substance Use Topics    Alcohol use: Not Currently     Alcohol/week: 0 0 standard drinks     Frequency: Never     Drinks per session: Patient refused     Binge frequency: Never    Drug use: Not Currently       Review of Systems   Constitutional: Negative  Negative for chills, diaphoresis, fatigue and fever  HENT: Negative  Negative for congestion, ear pain, rhinorrhea and sore throat  Eyes: Negative  Negative for discharge, redness and itching  Respiratory: Negative  Negative for apnea, cough, chest tightness, shortness of breath and wheezing  Cardiovascular: Negative for chest pain, palpitations and leg swelling  Gastrointestinal: Negative  Negative for abdominal pain, diarrhea, nausea and vomiting  Endocrine: Negative  Genitourinary: Negative  Negative for flank pain, frequency and urgency  Musculoskeletal: Negative  Negative for back pain and myalgias  Skin: Negative  Negative for rash  Allergic/Immunologic: Negative  Neurological: Negative  Negative for dizziness, loss of consciousness, syncope, weakness, light-headedness, numbness and headaches  Hematological: Negative  All other systems reviewed and are negative  Physical Exam  Physical Exam  Vitals signs and nursing note reviewed  Constitutional:       General: She is awake  She is not in acute distress  Appearance: Normal appearance  She is well-developed and normal weight  She is not ill-appearing, toxic-appearing or diaphoretic  HENT:      Head: Normocephalic and atraumatic  Right Ear: External ear normal       Left Ear: External ear normal       Nose: Nose normal       Mouth/Throat:      Pharynx: No oropharyngeal exudate  Eyes:      General: No scleral icterus  Right eye: No discharge  Left eye: No discharge  Conjunctiva/sclera: Conjunctivae normal       Pupils: Pupils are equal, round, and reactive to light  Neck:      Thyroid: No thyromegaly  Vascular: No JVD  Trachea: No tracheal deviation  Cardiovascular:      Rate and Rhythm: Regular rhythm  Tachycardia present  Heart sounds: Murmur present  Systolic murmur present with a grade of 3/6  No friction rub  No gallop  Pulmonary:      Effort: Pulmonary effort is normal  Tachypnea present  No accessory muscle usage, respiratory distress or retractions  Breath sounds: No stridor  Examination of the right-lower field reveals rales  Examination of the left-lower field reveals rales  Rales present  No wheezing or rhonchi  Chest:      Chest wall: No tenderness  Abdominal:      General: Bowel sounds are normal  There is no distension  Palpations: Abdomen is soft  There is no mass  Tenderness: There is no abdominal tenderness  Hernia: No hernia is present  Musculoskeletal: Normal range of motion  General: No swelling, tenderness, deformity or signs of injury  Left knee: Normal  She exhibits normal range of motion, no swelling, no effusion and no ecchymosis  No tenderness found  Right lower leg: No edema  Left lower leg: No edema  Skin:     General: Skin is warm and dry  Coloration: Skin is not jaundiced or pale  Findings: No bruising, erythema, lesion or rash  Neurological:      General: No focal deficit present  Mental Status: She is alert  She is disoriented  Motor: No weakness or abnormal muscle tone  Deep Tendon Reflexes: Reflexes are normal and symmetric  Psychiatric:         Mood and Affect: Mood normal          Behavior: Behavior is cooperative           Vital Signs  ED Triage Vitals [12/18/20 1756]   Temperature Pulse Respirations Blood Pressure SpO2   98 7 °F (37 1 °C) 101 22 160/83 94 %      Temp src Heart Rate Source Patient Position - Orthostatic VS BP Location FiO2 (%)   -- Monitor Lying Left arm --      Pain Score       --           Vitals:    12/18/20 1756   BP: 160/83   Pulse: 101   Patient Position - Orthostatic VS: Lying         Visual Acuity      ED Medications  Medications - No data to display    Diagnostic Studies  Results Reviewed     Procedure Component Value Units Date/Time    COVID19, Influenza A/B, RSV PCR, [facility] [[id number]]     Lab Status: No result Specimen: Nares from Nasopharyngeal Swab     CBC and differential [[id number]]     Lab Status: No result Specimen: Blood     Comprehensive metabolic panel [[id number]]     Lab Status: No result Specimen: Blood     Troponin I [[id number]]     Lab Status: No result Specimen: Blood     NT-BNP PRO [[id number]]     Lab Status: No result Specimen: Blood     Lactic acid [[id number]]     Lab Status: No result Specimen: Blood                  XR chest 1 view portable    (Results Pending)              Procedures  ECG 12 Lead Documentation Only    Date/Time: 12/18/2020 6:35 PM  Performed by: Harvy Schaumann, DO  Authorized by: Harvy Schaumann, DO     Indications / Diagnosis:  Confusion  ECG reviewed by me, the ED Provider: yes    Patient location:  ED  Interpretation:     Interpretation: normal    Rate:     ECG rate:  100    ECG rate assessment: tachycardic    Rhythm:     Rhythm: sinus tachycardia    Ectopy:     Ectopy: none    Conduction:     Conduction: normal    ST segments:     ST segments:  Normal  T waves:     T waves: normal               ED Course                                           MDM  Number of Diagnoses or Management Options  Diagnosis management comments: 41-year-old female presents from a nursing home with low O2 sats  The low O2 sat was not documented  The 1st sat was 97%  There is also a complaint of confusion  Patient's only complaint is that of left knee pain  She states this has been going on a while and there has been no injury  She has had no cough shortness of breath  On exam she is mildly tachypneic and tachycardic she does have rales at the bases  Her left knee exam is completely normal   Given the high incidence of COVID at this institution will rule out COVID, CHF, pneumonia  Will do cardiac workup  Will add lactic acid because it SIRS  At this time I do not feel an x-ray of the left knee is indicated as there was no injury       Amount and/or Complexity of Data Reviewed  Clinical lab tests: ordered and reviewed  Tests in the radiology section of CPT®: ordered and reviewed  Independent visualization of images, tracings, or specimens: yes        Disposition  Final diagnoses:   None     ED Disposition     None      Follow-up Information    None         Patient's Medications   Discharge Prescriptions    No medications on file     No discharge procedures on file      PDMP Review     None          ED Provider  Electronically Signed by           Destini Kaiser DO  12/18/20 Naif Bhandari DO  12/23/20 [id number]

## 2020-12-19 PROBLEM — D53.9 ANEMIA, MACROCYTIC: Status: ACTIVE | Noted: 2020-12-19

## 2020-12-19 LAB
ALBUMIN SERPL BCP-MCNC: 2.6 G/DL (ref 3.5–5)
ALP SERPL-CCNC: 78 U/L (ref 46–116)
ALT SERPL W P-5'-P-CCNC: 19 U/L (ref 12–78)
ANION GAP SERPL CALCULATED.3IONS-SCNC: 11 MMOL/L (ref 4–13)
AST SERPL W P-5'-P-CCNC: 38 U/L (ref 5–45)
BASOPHILS # BLD MANUAL: 0 THOUSAND/UL (ref 0–0.1)
BASOPHILS NFR MAR MANUAL: 0 % (ref 0–1)
BILIRUB SERPL-MCNC: 0.22 MG/DL (ref 0.2–1)
BUN SERPL-MCNC: 20 MG/DL (ref 5–25)
CALCIUM ALBUM COR SERPL-MCNC: 9.3 MG/DL (ref 8.3–10.1)
CALCIUM SERPL-MCNC: 8.2 MG/DL (ref 8.3–10.1)
CHLORIDE SERPL-SCNC: 108 MMOL/L (ref 100–108)
CO2 SERPL-SCNC: 22 MMOL/L (ref 21–32)
CREAT SERPL-MCNC: 0.95 MG/DL (ref 0.6–1.3)
CRP SERPL QL: 200 MG/L
EOSINOPHIL # BLD MANUAL: 0 THOUSAND/UL (ref 0–0.4)
EOSINOPHIL NFR BLD MANUAL: 0 % (ref 0–6)
ERYTHROCYTE [DISTWIDTH] IN BLOOD BY AUTOMATED COUNT: 12.8 % (ref 11.6–15.1)
FERRITIN SERPL-MCNC: 413 NG/ML (ref 8–388)
GFR SERPL CREATININE-BSD FRML MDRD: 55 ML/MIN/1.73SQ M
GLUCOSE SERPL-MCNC: 103 MG/DL (ref 65–140)
HCT VFR BLD AUTO: 30.9 % (ref 34.8–46.1)
HGB BLD-MCNC: 9.5 G/DL (ref 11.5–15.4)
LITHIUM SERPL-SCNC: <0.2 MMOL/L (ref 0.5–1)
LYMPHOCYTES # BLD AUTO: 0.92 THOUSAND/UL (ref 0.6–4.47)
LYMPHOCYTES # BLD AUTO: 31 % (ref 14–44)
MCH RBC QN AUTO: 31.3 PG (ref 26.8–34.3)
MCHC RBC AUTO-ENTMCNC: 30.7 G/DL (ref 31.4–37.4)
MCV RBC AUTO: 102 FL (ref 82–98)
METAMYELOCYTES NFR BLD MANUAL: 1 % (ref 0–1)
MONOCYTES # BLD AUTO: 0.09 THOUSAND/UL (ref 0–1.22)
MONOCYTES NFR BLD: 3 % (ref 4–12)
NEUTROPHILS # BLD MANUAL: 1.89 THOUSAND/UL (ref 1.85–7.62)
NEUTS BAND NFR BLD MANUAL: 2 % (ref 0–8)
NEUTS SEG NFR BLD AUTO: 62 % (ref 43–75)
NRBC BLD AUTO-RTO: 0 /100 WBCS
PLATELET # BLD AUTO: 321 THOUSANDS/UL (ref 149–390)
PLATELET BLD QL SMEAR: ADEQUATE
PMV BLD AUTO: 9.1 FL (ref 8.9–12.7)
POTASSIUM SERPL-SCNC: 3.6 MMOL/L (ref 3.5–5.3)
PROCALCITONIN SERPL-MCNC: 0.11 NG/ML
PROCALCITONIN SERPL-MCNC: 0.16 NG/ML
PROT SERPL-MCNC: 6.9 G/DL (ref 6.4–8.2)
RBC # BLD AUTO: 3.04 MILLION/UL (ref 3.81–5.12)
RBC MORPH BLD: NORMAL
SODIUM SERPL-SCNC: 141 MMOL/L (ref 136–145)
TOTAL CELLS COUNTED SPEC: 100
VARIANT LYMPHS # BLD AUTO: 1 %
WBC # BLD AUTO: 2.96 THOUSAND/UL (ref 4.31–10.16)

## 2020-12-19 PROCEDURE — 80178 ASSAY OF LITHIUM: CPT | Performed by: PHYSICIAN ASSISTANT

## 2020-12-19 PROCEDURE — 84145 PROCALCITONIN (PCT): CPT | Performed by: EMERGENCY MEDICINE

## 2020-12-19 PROCEDURE — 85027 COMPLETE CBC AUTOMATED: CPT | Performed by: PHYSICIAN ASSISTANT

## 2020-12-19 PROCEDURE — 99232 SBSQ HOSP IP/OBS MODERATE 35: CPT | Performed by: INTERNAL MEDICINE

## 2020-12-19 PROCEDURE — 80053 COMPREHEN METABOLIC PANEL: CPT | Performed by: PHYSICIAN ASSISTANT

## 2020-12-19 PROCEDURE — 85007 BL SMEAR W/DIFF WBC COUNT: CPT | Performed by: PHYSICIAN ASSISTANT

## 2020-12-19 RX ORDER — DOXYCYCLINE HYCLATE 100 MG/1
100 CAPSULE ORAL EVERY 12 HOURS
Status: DISCONTINUED | OUTPATIENT
Start: 2020-12-19 | End: 2020-12-20

## 2020-12-19 RX ORDER — ATORVASTATIN CALCIUM 40 MG/1
40 TABLET, FILM COATED ORAL
Status: DISCONTINUED | OUTPATIENT
Start: 2020-12-19 | End: 2021-01-09 | Stop reason: HOSPADM

## 2020-12-19 RX ORDER — FAMOTIDINE 20 MG/1
20 TABLET, FILM COATED ORAL DAILY
Status: DISCONTINUED | OUTPATIENT
Start: 2020-12-19 | End: 2021-01-09 | Stop reason: HOSPADM

## 2020-12-19 RX ORDER — CLONAZEPAM 0.5 MG/1
0.5 TABLET ORAL 2 TIMES DAILY
Status: DISCONTINUED | OUTPATIENT
Start: 2020-12-19 | End: 2021-01-09 | Stop reason: HOSPADM

## 2020-12-19 RX ORDER — POTASSIUM CHLORIDE 20MEQ/15ML
20 LIQUID (ML) ORAL ONCE
Status: COMPLETED | OUTPATIENT
Start: 2020-12-19 | End: 2020-12-19

## 2020-12-19 RX ORDER — MELATONIN
2000 DAILY
Status: DISCONTINUED | OUTPATIENT
Start: 2020-12-19 | End: 2021-01-09 | Stop reason: HOSPADM

## 2020-12-19 RX ORDER — ASCORBIC ACID 500 MG
1000 TABLET ORAL EVERY 12 HOURS SCHEDULED
Status: DISPENSED | OUTPATIENT
Start: 2020-12-19 | End: 2020-12-25

## 2020-12-19 RX ORDER — ONDANSETRON 2 MG/ML
4 INJECTION INTRAMUSCULAR; INTRAVENOUS EVERY 6 HOURS PRN
Status: DISCONTINUED | OUTPATIENT
Start: 2020-12-19 | End: 2021-01-09 | Stop reason: HOSPADM

## 2020-12-19 RX ORDER — MULTIVITAMIN/IRON/FOLIC ACID 18MG-0.4MG
1 TABLET ORAL DAILY
Status: DISCONTINUED | OUTPATIENT
Start: 2020-12-26 | End: 2021-01-09 | Stop reason: HOSPADM

## 2020-12-19 RX ORDER — QUETIAPINE FUMARATE 200 MG/1
400 TABLET, FILM COATED ORAL
Status: DISCONTINUED | OUTPATIENT
Start: 2020-12-19 | End: 2021-01-09 | Stop reason: HOSPADM

## 2020-12-19 RX ORDER — PANTOPRAZOLE SODIUM 40 MG/1
40 TABLET, DELAYED RELEASE ORAL
Status: DISCONTINUED | OUTPATIENT
Start: 2020-12-19 | End: 2021-01-09 | Stop reason: HOSPADM

## 2020-12-19 RX ORDER — HEPARIN SODIUM 5000 [USP'U]/ML
5000 INJECTION, SOLUTION INTRAVENOUS; SUBCUTANEOUS EVERY 8 HOURS SCHEDULED
Status: DISCONTINUED | OUTPATIENT
Start: 2020-12-19 | End: 2021-01-09 | Stop reason: HOSPADM

## 2020-12-19 RX ORDER — TOPIRAMATE 25 MG/1
50 TABLET ORAL 2 TIMES DAILY
Status: DISCONTINUED | OUTPATIENT
Start: 2020-12-19 | End: 2021-01-09 | Stop reason: HOSPADM

## 2020-12-19 RX ORDER — LAMOTRIGINE 100 MG/1
200 TABLET ORAL
Status: DISCONTINUED | OUTPATIENT
Start: 2020-12-19 | End: 2021-01-09 | Stop reason: HOSPADM

## 2020-12-19 RX ORDER — OXYBUTYNIN CHLORIDE 10 MG/1
10 TABLET, EXTENDED RELEASE ORAL DAILY
Status: DISCONTINUED | OUTPATIENT
Start: 2020-12-19 | End: 2021-01-09 | Stop reason: HOSPADM

## 2020-12-19 RX ORDER — ZINC SULFATE 50(220)MG
220 CAPSULE ORAL DAILY
Status: DISPENSED | OUTPATIENT
Start: 2020-12-19 | End: 2020-12-26

## 2020-12-19 RX ADMIN — DOXYCYCLINE 100 MG: 100 CAPSULE ORAL at 08:39

## 2020-12-19 RX ADMIN — TOPIRAMATE 50 MG: 25 TABLET, FILM COATED ORAL at 08:39

## 2020-12-19 RX ADMIN — CLONAZEPAM 0.5 MG: 0.5 TABLET ORAL at 17:50

## 2020-12-19 RX ADMIN — CLONAZEPAM 0.5 MG: 0.5 TABLET ORAL at 08:39

## 2020-12-19 RX ADMIN — ZINC SULFATE 220 MG (50 MG) CAPSULE 220 MG: CAPSULE at 08:39

## 2020-12-19 RX ADMIN — QUETIAPINE FUMARATE 400 MG: 200 TABLET ORAL at 01:18

## 2020-12-19 RX ADMIN — HEPARIN SODIUM 5000 UNITS: 5000 INJECTION INTRAVENOUS; SUBCUTANEOUS at 14:19

## 2020-12-19 RX ADMIN — REMDESIVIR 200 MG: 100 INJECTION, POWDER, LYOPHILIZED, FOR SOLUTION INTRAVENOUS at 01:18

## 2020-12-19 RX ADMIN — FAMOTIDINE 20 MG: 20 TABLET ORAL at 08:39

## 2020-12-19 RX ADMIN — OXYCODONE HYDROCHLORIDE AND ACETAMINOPHEN 1000 MG: 500 TABLET ORAL at 21:21

## 2020-12-19 RX ADMIN — OXYBUTYNIN 10 MG: 10 TABLET, FILM COATED, EXTENDED RELEASE ORAL at 08:39

## 2020-12-19 RX ADMIN — ONDANSETRON 4 MG: 2 INJECTION INTRAMUSCULAR; INTRAVENOUS at 21:21

## 2020-12-19 RX ADMIN — PANTOPRAZOLE SODIUM 40 MG: 40 TABLET, DELAYED RELEASE ORAL at 06:29

## 2020-12-19 RX ADMIN — HEPARIN SODIUM 5000 UNITS: 5000 INJECTION INTRAVENOUS; SUBCUTANEOUS at 21:21

## 2020-12-19 RX ADMIN — HEPARIN SODIUM 5000 UNITS: 5000 INJECTION INTRAVENOUS; SUBCUTANEOUS at 06:29

## 2020-12-19 RX ADMIN — TOPIRAMATE 50 MG: 25 TABLET, FILM COATED ORAL at 17:50

## 2020-12-19 RX ADMIN — ACETAMINOPHEN 650 MG: 325 TABLET ORAL at 08:41

## 2020-12-19 RX ADMIN — QUETIAPINE FUMARATE 400 MG: 200 TABLET ORAL at 21:20

## 2020-12-19 RX ADMIN — OXYCODONE HYDROCHLORIDE AND ACETAMINOPHEN 1000 MG: 500 TABLET ORAL at 08:39

## 2020-12-19 RX ADMIN — Medication 2000 UNITS: at 08:39

## 2020-12-19 RX ADMIN — ATORVASTATIN CALCIUM 40 MG: 40 TABLET, FILM COATED ORAL at 21:20

## 2020-12-19 RX ADMIN — LAMOTRIGINE 200 MG: 100 TABLET ORAL at 06:29

## 2020-12-19 RX ADMIN — OXYCODONE HYDROCHLORIDE AND ACETAMINOPHEN 1000 MG: 500 TABLET ORAL at 01:18

## 2020-12-19 RX ADMIN — POTASSIUM CHLORIDE 20 MEQ: 20 SOLUTION ORAL at 01:19

## 2020-12-19 RX ADMIN — ATORVASTATIN CALCIUM 40 MG: 40 TABLET, FILM COATED ORAL at 01:18

## 2020-12-19 RX ADMIN — DOXYCYCLINE 100 MG: 100 CAPSULE ORAL at 20:02

## 2020-12-19 RX ADMIN — CEFTRIAXONE SODIUM 1000 MG: 10 INJECTION, POWDER, FOR SOLUTION INTRAVENOUS at 20:02

## 2020-12-19 RX ADMIN — ACETAMINOPHEN 650 MG: 325 TABLET ORAL at 21:28

## 2020-12-19 NOTE — ASSESSMENT & PLAN NOTE
Cxr demonstrates b/l infiltrates in lower lungs on wet read  Mild covid-19 infection as pt becomes hypoxic at 85% w/ambulation and is improved on 1-2L NC  She is 92% on RA at rest  -Covid testing positive on 12/18/2020  -start rocephin/doxycycline  -start Vitamin D  -start vitamin C/MVI followed by Zinc, lipitor 40mg po daily cautiously as noted below, dexamethasone 6mg IV daily, oral pepcid, ac  -check procalcitonin x 2, inflammatory markers,   -d/w pt's son Madeline Bustamante regarding [facility]  Discussed risks and benefits  GFR is borderline at 39 but serum creatinine is 1 03  Will start remdesivir cautiously    -encouraged self proning

## 2020-12-19 NOTE — PLAN OF CARE
Problem: Potential for Falls  Goal: Patient will remain free of falls  Description: INTERVENTIONS:  - Assess patient frequently for physical needs  -  Identify cognitive and physical deficits and behaviors that affect risk of falls    -  [place] fall precautions as indicated by assessment   - Educate patient/family on patient safety including physical limitations  - Instruct patient to call for assistance with activity based on assessment  - Modify environment to reduce risk of injury  - Consider OT/PT consult to assist with strengthening/mobility  Outcome: Progressing     Problem: Prexisting or High Potential for Compromised Skin Integrity  Goal: Skin integrity is maintained or improved  Description: INTERVENTIONS:  - Identify patients at risk for skin breakdown  - Assess and monitor skin integrity  - Assess and monitor nutrition and hydration status  - Monitor labs   - Assess for incontinence   - Turn and reposition patient  - Assist with mobility/ambulation  - Relieve pressure over bony prominences  - Avoid friction and shearing  - Provide appropriate hygiene as needed including keeping skin clean and dry  - Evaluate need for skin moisturizer/barrier cream  - Collaborate with interdisciplinary team   - Patient/family teaching  - Consider wound care consult   Outcome: Progressing     Problem: PAIN - ADULT  Goal: Verbalizes/displays adequate comfort level or baseline comfort level  Description: Interventions:  - Encourage patient to monitor pain and request assistance  - Assess pain using appropriate pain scale  - Administer analgesics based on type and severity of pain and evaluate response  - Implement non-pharmacological measures as appropriate and evaluate response  - Consider cultural and social influences on pain and pain management  - Notify physician/advanced practitioner if interventions unsuccessful or patient reports new pain  Outcome: Progressing     Problem: INFECTION - ADULT  Goal: Absence or prevention of progression during hospitalization  Description: INTERVENTIONS:  - Assess and monitor for signs and symptoms of infection  - Monitor lab/diagnostic results  - Monitor all insertion sites, i e  indwelling lines, tubes, and drains  - Monitor endotracheal if appropriate and nasal secretions for changes in amount and color  - [place] appropriate cooling/warming therapies per order  - Administer medications as ordered  - Instruct and encourage patient and family to use good hand hygiene technique  - Identify and instruct in appropriate isolation precautions for identified infection/condition  Outcome: Progressing  Goal: Absence of fever/infection during neutropenic period  Description: INTERVENTIONS:  - Monitor WBC    Outcome: Progressing     Problem: SAFETY ADULT  Goal: Patient will remain free of falls  Description: INTERVENTIONS:  - Assess patient frequently for physical needs  -  Identify cognitive and physical deficits and behaviors that affect risk of falls    -  [place] fall precautions as indicated by assessment   - Educate patient/family on patient safety including physical limitations  - Instruct patient to call for assistance with activity based on assessment  - Modify environment to reduce risk of injury  - Consider OT/PT consult to assist with strengthening/mobility  Outcome: Progressing  Goal: Maintain or return to baseline ADL function  Description: INTERVENTIONS:  -  Assess patient's ability to carry out ADLs; assess patient's baseline for ADL function and identify physical deficits which impact ability to perform ADLs (bathing, care of mouth/teeth, toileting, grooming, dressing, etc )  - Assess/evaluate cause of self-care deficits   - Assess range of motion  - Assess patient's mobility; develop plan if impaired  - Assess patient's need for assistive devices and provide as appropriate  - Encourage maximum independence but intervene and supervise when necessary  - Involve family in performance of ADLs  - Assess for home care needs following discharge   - Consider OT consult to assist with ADL evaluation and planning for discharge  - Provide patient education as appropriate  Outcome: Progressing  Goal: Maintain or return mobility status to optimal level  Description: INTERVENTIONS:  - Assess patient's baseline mobility status (ambulation, transfers, stairs, etc )    - Identify cognitive and physical deficits and behaviors that affect mobility  - Identify mobility aids required to assist with transfers and/or ambulation (gait belt, sit-to-stand, lift, walker, cane, etc )  - [place] fall precautions as indicated by assessment  - Record patient progress and toleration of activity level on Mobility SBAR; progress patient to next Phase/Stage  - Instruct patient to call for assistance with activity based on assessment  - Consider rehabilitation consult to assist with strengthening/weightbearing, etc   Outcome: Progressing     Problem: DISCHARGE PLANNING  Goal: Discharge to home or other facility with appropriate resources  Description: INTERVENTIONS:  - Identify barriers to discharge w/patient and caregiver  - Arrange for needed discharge resources and transportation as appropriate  - Identify discharge learning needs (meds, wound care, etc )  - Arrange for interpretive services to assist at discharge as needed  - Refer to Case Management Department for coordinating discharge planning if the patient needs post-hospital services based on physician/advanced practitioner order or complex needs related to functional status, cognitive ability, or social support system  Outcome: Progressing     Problem: Knowledge Deficit  Goal: Patient/family/caregiver demonstrates understanding of disease process, treatment plan, medications, and discharge instructions  Description: Complete learning assessment and assess knowledge base    Interventions:  - Provide teaching at level of understanding  - Provide teaching via preferred learning methods  Outcome: Progressing

## 2020-12-19 NOTE — ASSESSMENT & PLAN NOTE
With the longstanding anxiety  Continue lamictal seroquel, topamax  Pt has mild tremors of hands and is confused per son although oriented x to self and hospital setting and month although not sedate    Will check lamictal level now, although less likely contributing to ams  Continue lamictal seroquel, topamax

## 2020-12-19 NOTE — H&P
H&P- Barbara Espinal 1935, 80 y o  female MRN: [id number]    Unit/Bed#: ED 08 Encounter: [id number]    Primary Care Provider: Lucas Jacobo MD   Date and time admitted to hospital: 12/18/2020  5:50 PM        * COVID-19  Assessment & Plan  Cxr demonstrates b/l infiltrates in lower lungs on wet read  Mild covid-19 infection as pt becomes hypoxic at 85% w/ambulation and is improved on 1-2L NC  She is 92% on RA at rest  -Covid testing positive on 12/18/2020  -start rocephin/doxycycline  -start Vitamin D  -start vitamin C/MVI followed by Zinc, lipitor 40mg po daily cautiously as noted below, dexamethasone 6mg IV daily, oral pepcid, ac  -check procalcitonin x 2, inflammatory markers,   -d/w pt's son Canelo Jaffe regarding Rosemarie Bloom  Discussed risks and benefits  GFR is borderline at 39 but serum creatinine is 1 03  Will start remdesivir cautiously  -encouraged self proning        Acute metabolic encephalopathy  Assessment & Plan  Possible acute metabolic encephalopathy  Pt oriented to self hospital setting month  When asked years she reports that the years December and she does not know the president is  Per the patient's son she normally is in tune with politics and actually voted this year  Do suspect some underlying cognitive decline given advanced age and mild microangiopathic changes on prior ct head  Continue to monitor and reorient  Hypokalemia  Assessment & Plan  Mild replete and repeat in am    Bipolar 1 disorder ([facility] [address] )  Assessment & Plan  With the longstanding anxiety  Continue lamictal seroquel, topamax  Pt has mild tremors of hands and is confused per son although oriented x to self and hospital setting and month although not sedate  Will check lamictal level now, although less likely contributing to ams  Continue lamictal seroquel, topamax    Essential hypertension  Assessment & Plan  No longer on pharmacotherapy      Hyperlipidemia  Assessment & Plan  No longer on statins    Discussed with son no history of anaphylaxis or angioedema or uticaria  Will empirically start lipitor 40mg po daily and monitor closely for covid pna      VTE Prophylaxis: Heparin  / sequential compression device   Code Status: level 3 dni/dnr  POLST: There is no POLST form on file for this patient (pre-hospital)  Discussion with family: joseph solomon    Anticipated Length of Stay:  Patient will be admitted on an Inpatient basis with an anticipated length of stay of  Greater than 2 midnights  Justification for Hospital Stay: covid 19    Total Time for Visit, including Counseling / Coordination of Care: 45 minutes  Greater than 50% of this total time spent on direct patient counseling and coordination of care  Chief Complaint:   ams and low o2 sats at above and beyond    History of Present Illness:    Kaleigh Carlson is a 80 y o  female who presents with pmh of bipolar d/o w/anxiety, htn, hld coming to hospital for hypoxia and confusion  HPI constructed by d/w ed provider and son by phone and review of emr  Pt coming from above and beyond  All of their residents have been isolating in their rooms for the last 14 days  Per ED provider there was note of low O2 sats prehospital although none were documented  Pt was evaluate din ed and had o2 sat in 92-97% on RA but w/ambulation dropped to 85%  She has mild tachypnea and tachycardia with exertion  She Pt is alert and oriented to self month place but not year or president  She does not know why she is in hospital and offers no complaints    We will admit for covid pneumonia    Review of Systems:    Review of Systems   Unable to perform ROS: Mental status change       Past Medical and Surgical History:     Past Medical History:   Diagnosis Date    Anemia 2/26/2019    Anxiety     [facility] 1 disorder ([facility] )     Depression     DVT (deep venous thrombosis) ([facility] ) 2008    Left leg    GERD (gastroesophageal reflux disease)     Hypertension     Overactive bladder Past Surgical History:   Procedure Laterality Date    ADENOIDECTOMY      CATARACT EXTRACTION Bilateral     Right 10/2003, left 4/2004    CHOLECYSTECTOMY      DILATION AND CURETTAGE OF UTERUS  1985    HERNIA REPAIR  11/02/2007    Femoral and small bowel resection    HIP SURGERY      L hip    TONSILLECTOMY      As a youth    WRIST SURGERY      L wrist       Meds/Allergies:    Prior to Admission medications    Medication Sig Start Date End Date Taking? Authorizing Provider   acetaminophen (TYLENOL) 325 mg tablet Take 2 tablets (650 mg total) by mouth 3 (three) times a day 11/13/20  Yes Td Calabrese MD   bisacodyl (Biscolax) 10 mg suppository Insert 10 mg into the rectum as needed for constipation   Yes Historical Provider, MD   clonazePAM (KlonoPIN) 0 5 mg tablet Take 1 tablet (0 5 mg total) by mouth 2 (two) times a day 12/6/19  Yes Lissette Kennedy MD   estradiol (ESTRACE VAGINAL) 0 1 mg/g vaginal cream Insert 0 5 g into the vagina daily   Yes Historical Provider, MD   hydrocortisone 2 5 % cream Apply topically 4 (four) times a day as needed   Yes Historical Provider, MD   lamoTRIgine (LaMICtal) 200 MG tablet Take 1 tablet (200 mg total) by mouth daily before breakfast 12/6/19  Yes Td Calabrese MD   lidocaine (LMX) 4 % cream q6hr prn rib pain  10/23/20  Yes Td Calabrese MD   magnesium hydroxide (MILK OF MAGNESIA) 400 mg/5 mL oral suspension If no BM following the dulcolax suppositories give 1/2 bottle  If ineffective in 6 hours repeat the dose   7/31/20  Yes Td Calabrese MD   Mirabegron ER 50 MG TB24 Take 1 tablet by mouth daily   Yes Historical Provider, MD   pantoprazole (PROTONIX) 40 mg tablet Take 1 tablet (40 mg total) by mouth daily in the early morning 5/28/19  Yes Td Calabrese MD   QUEtiapine (SEROquel) 200 mg tablet Take 2 tablets (400 mg total) by mouth daily at bedtime 8/13/18  Yes Berhane Leavitt PA-C   Sennosides-Docusate Sodium (SENNA-PLUS PO) Take 8 6 mg by mouth 2 (two) times a day as needed   Yes Historical Provider, MD   topiramate (TOPAMAX) 50 MG tablet Take 1 tablet (50 mg total) by mouth 2 (two) times a day 8/13/18  Yes Kieran Hernandez PA-C   clonazePAM (KlonoPIN) 0 5 mg tablet Take 0 5 mg by mouth 2 (two) times a day  12/18/20 Yes Historical Provider, MD   methocarbamol (ROBAXIN) 750 mg tablet Take 1 tablet (750 mg total) by mouth 3 (three) times a day for 30 doses 10/7/20 10/17/20  Mary Jane Jordan MD   Psyllium 63 % POWD One tsp daily 10/23/20   Shlomo Sinclair MD   cyanocobalamin 1000 MCG tablet Take 1 tablet (1,000 mcg total) by mouth daily 5/28/19 12/18/20  Shlomo Sinclair MD   sulfamethoxazole-trimethoprim (BACTRIM DS) 800-160 mg per tablet Take 1 tablet by mouth daily  12/18/20  Historical Provider, MD     I have reviewed home medications with patient personally  Allergies:    Allergies   Allergen Reactions    Ethanol     Simvastatin        Social History:     Marital Status: /Civil Union   Occupation:   Patient Pre-hospital Living Situation:   Patient Pre-hospital Level of Mobility:   Patient Pre-hospital Diet Restrictions:   Substance Use History:   Social History     Substance and Sexual Activity   Alcohol Use Not Currently    Alcohol/week: 0 0 standard drinks    Frequency: Never    Drinks per session: Patient refused    Binge frequency: Never     Social History     Tobacco Use   Smoking Status Former Smoker    Packs/day: 1 00    Types: Cigarettes   Smokeless Tobacco Former User   Tobacco Comment    Per [employer]: Heavy tobacco smoker     Social History     Substance and Sexual Activity   Drug Use Not Currently       Family History:    Family History   Problem Relation Age of Onset    Heart disease Father        Physical Exam:     Vitals:   Blood Pressure: 162/77 (12/18/20 1945)  Pulse: 102 (12/18/20 1945)  Temperature: 98 7 °F (37 1 °C) (12/18/20 1756)  Respirations: 20 (12/18/20 1945)  Weight - Scale: 50 6 kg (111 lb 8 8 oz) (12/18/20 1756)  SpO2: 91 % (12/18/20 1945)    Physical Exam  Vitals signs reviewed  Constitutional:       General: She is not in acute distress  Appearance: She is not ill-appearing, toxic-appearing or diaphoretic  HENT:      Head: Normocephalic  Right Ear: External ear normal       Left Ear: External ear normal       Nose: Nose normal       Mouth/Throat:      Mouth: Mucous membranes are dry  Eyes:      Extraocular Movements: Extraocular movements intact  Neck:      Musculoskeletal: Normal range of motion  Cardiovascular:      Rate and Rhythm: Normal rate and regular rhythm  Pulses: Normal pulses  Pulmonary:      Effort: No respiratory distress  Comments: No conversational dyspnea or wob  Abdominal:      Palpations: There is no mass  Tenderness: There is no abdominal tenderness  There is no guarding or rebound  Hernia: No hernia is present  Musculoskeletal:      Right lower leg: No edema  Left lower leg: No edema  Lymphadenopathy:      Cervical: No cervical adenopathy  Skin:     General: Skin is warm and dry  Neurological:      Mental Status: She is alert  Comments: Alert  Oriented to self month place and city but not year/president  Mild b/l action tremor   Psychiatric:      Comments: Anxious w/congruent affect         (  Be Sure to Include Physical Exam: Delete this entire line when you have entered your exam)    Additional Data:     Lab Results: I have personally reviewed pertinent reports        Results from last 7 days   Lab Units 12/18/20  1828   WBC Thousand/uL 3 49*   HEMOGLOBIN g/dL 11 0*   HEMATOCRIT % 36 2   PLATELETS Thousands/uL 353   NEUTROS PCT % 61   LYMPHS PCT % 30   MONOS PCT % 8   EOS PCT % 0     Results from last 7 days   Lab Units 12/18/20  1828   SODIUM mmol/L 139   POTASSIUM mmol/L 3 4*   CHLORIDE mmol/L 106   CO2 mmol/L 24   BUN mg/dL 23   CREATININE mg/dL 1 03   ANION GAP mmol/L 9   CALCIUM mg/dL 9 0   ALBUMIN g/dL 3 1*   TOTAL BILIRUBIN mg/dL 0 27   ALK PHOS U/L 87   ALT U/L 20   AST U/L 38   GLUCOSE RANDOM mg/dL 93                 Results from last 7 days   Lab Units 12/18/20  1828   LACTIC ACID mmol/L 1 2       Imaging: I have personally reviewed pertinent reports  cxr concerning for b/l lower lung pneumonia     XR chest 1 view portable   ED Interpretation by Gena Webb DO (12/18 1926)   Multilobar pneumonia          EKG, Pathology, and Other Studies Reviewed on Admission:   · EKG:     Allscripts / Epic Records Reviewed: Yes     ** Please Note: This note has been constructed using a voice recognition system   **

## 2020-12-19 NOTE — PLAN OF CARE
Problem: Potential for Falls  Goal: Patient will remain free of falls  Description: INTERVENTIONS:  - Assess patient frequently for physical needs  -  Identify cognitive and physical deficits and behaviors that affect risk of falls    -  [place] fall precautions as indicated by assessment   - Educate patient/family on patient safety including physical limitations  - Instruct patient to call for assistance with activity based on assessment  - Modify environment to reduce risk of injury  - Consider OT/PT consult to assist with strengthening/mobility  Outcome: Progressing     Problem: Prexisting or High Potential for Compromised Skin Integrity  Goal: Skin integrity is maintained or improved  Description: INTERVENTIONS:  - Identify patients at risk for skin breakdown  - Assess and monitor skin integrity  - Assess and monitor nutrition and hydration status  - Monitor labs   - Assess for incontinence   - Turn and reposition patient  - Assist with mobility/ambulation  - Relieve pressure over bony prominences  - Avoid friction and shearing  - Provide appropriate hygiene as needed including keeping skin clean and dry  - Evaluate need for skin moisturizer/barrier cream  - Collaborate with interdisciplinary team   - Patient/family teaching  - Consider wound care consult   Outcome: Progressing     Problem: PAIN - ADULT  Goal: Verbalizes/displays adequate comfort level or baseline comfort level  Description: Interventions:  - Encourage patient to monitor pain and request assistance  - Assess pain using appropriate pain scale  - Administer analgesics based on type and severity of pain and evaluate response  - Implement non-pharmacological measures as appropriate and evaluate response  - Consider cultural and social influences on pain and pain management  - Notify physician/advanced practitioner if interventions unsuccessful or patient reports new pain  Outcome: Progressing     Problem: INFECTION - ADULT  Goal: Absence or prevention of progression during hospitalization  Description: INTERVENTIONS:  - Assess and monitor for signs and symptoms of infection  - Monitor lab/diagnostic results  - Monitor all insertion sites, i e  indwelling lines, tubes, and drains  - Monitor endotracheal if appropriate and nasal secretions for changes in amount and color  - [place] appropriate cooling/warming therapies per order  - Administer medications as ordered  - Instruct and encourage patient and family to use good hand hygiene technique  - Identify and instruct in appropriate isolation precautions for identified infection/condition  Outcome: Progressing  Goal: Absence of fever/infection during neutropenic period  Description: INTERVENTIONS:  - Monitor WBC    Outcome: Progressing     Problem: SAFETY ADULT  Goal: Patient will remain free of falls  Description: INTERVENTIONS:  - Assess patient frequently for physical needs  -  Identify cognitive and physical deficits and behaviors that affect risk of falls    -  [place] fall precautions as indicated by assessment   - Educate patient/family on patient safety including physical limitations  - Instruct patient to call for assistance with activity based on assessment  - Modify environment to reduce risk of injury  - Consider OT/PT consult to assist with strengthening/mobility  Outcome: Progressing  Goal: Maintain or return to baseline ADL function  Description: INTERVENTIONS:  -  Assess patient's ability to carry out ADLs; assess patient's baseline for ADL function and identify physical deficits which impact ability to perform ADLs (bathing, care of mouth/teeth, toileting, grooming, dressing, etc )  - Assess/evaluate cause of self-care deficits   - Assess range of motion  - Assess patient's mobility; develop plan if impaired  - Assess patient's need for assistive devices and provide as appropriate  - Encourage maximum independence but intervene and supervise when necessary  - Involve family in performance of ADLs  - Assess for home care needs following discharge   - Consider OT consult to assist with ADL evaluation and planning for discharge  - Provide patient education as appropriate  Outcome: Progressing  Goal: Maintain or return mobility status to optimal level  Description: INTERVENTIONS:  - Assess patient's baseline mobility status (ambulation, transfers, stairs, etc )    - Identify cognitive and physical deficits and behaviors that affect mobility  - Identify mobility aids required to assist with transfers and/or ambulation (gait belt, sit-to-stand, lift, walker, cane, etc )  - [place] fall precautions as indicated by assessment  - Record patient progress and toleration of activity level on Mobility SBAR; progress patient to next Phase/Stage  - Instruct patient to call for assistance with activity based on assessment  - Consider rehabilitation consult to assist with strengthening/weightbearing, etc   Outcome: Progressing     Problem: DISCHARGE PLANNING  Goal: Discharge to home or other facility with appropriate resources  Description: INTERVENTIONS:  - Identify barriers to discharge w/patient and caregiver  - Arrange for needed discharge resources and transportation as appropriate  - Identify discharge learning needs (meds, wound care, etc )  - Arrange for interpretive services to assist at discharge as needed  - Refer to Case Management Department for coordinating discharge planning if the patient needs post-hospital services based on physician/advanced practitioner order or complex needs related to functional status, cognitive ability, or social support system  Outcome: Progressing     Problem: Knowledge Deficit  Goal: Patient/family/caregiver demonstrates understanding of disease process, treatment plan, medications, and discharge instructions  Description: Complete learning assessment and assess knowledge base    Interventions:  - Provide teaching at level of understanding  - Provide teaching via preferred learning methods  Outcome: Progressing

## 2020-12-19 NOTE — UTILIZATION REVIEW
Initial Clinical Review    Admission: Date/Time/Statement:   Admission Orders (From admission, onward)     Ordered        12/18/20 2008  Inpatient Admission  Once                   Orders Placed This Encounter   Procedures    Inpatient Admission     Standing Status:   Standing     Number of Occurrences:   1     Order Specific Question:   Admitting Physician     Answer:   Veronica Jordan [[id number]]     Order Specific Question:   Level of Care     Answer:   Med Surg [16]     Order Specific Question:   Bed Type     Answer:   Negative Pressure [6]     Order Specific Question:   Bed request comments     Answer:   COVID-19 pneumonia     Order Specific Question:   Estimated length of stay     Answer:   More than 2 Midnights     Order Specific Question:   Certification     Answer:   I certify that inpatient services are medically necessary for this patient for a duration of greater than two midnights  See H&P and MD Progress Notes for additional information about the patient's course of treatment  ED Arrival Information     Expected Arrival Acuity Means of Arrival Escorted By Service Admission Type    - 12/18/2020 17:49 Emergent Stretcher [facility] ([address]) Hospitalist Emergency    Arrival Complaint    altered mental status        Chief Complaint   Patient presents with    Leg Pain     Sent from [facility] due to low O2 sats and confusion  Pts only complaint is left leg pain and urinary frequency  Pt is AOx4  Assessment/Plan: 80year old female to the ED from nursing home with complaints of confusion, low pulse ox  Admitted to inpatient for COVID 19, acute metabolic encephalopathy, hypokalemia  H/O anxiety, htn, hld   Staff at nursing home state hypoxic, but not documentd  ON ambulation in the ED, pulse ox 85 %  Arrives tachycardic, alert and confused, anxious   CXR shows: New patchy airspace disease within the bilateral mid and lower lung fields consistent with the patient's clinical history of Covid-19 pneumonia  Started on IV decadron  Will start remdesivir cautiously due to GFR being borderline  Encourage self proning  CHeck procal and inflammatory markers  12/19 Continues with tachypnea, tachycardia and is now requiring 4 LNC to maintain pulse ox     ED Triage Vitals   Temperature Pulse Respirations Blood Pressure SpO2   12/18/20 1756 12/18/20 1756 12/18/20 1756 12/18/20 1756 12/18/20 1756   98 7 °F (37 1 °C) 101 22 160/83 94 %      Temp Source Heart Rate Source Patient Position - Orthostatic VS BP Location FiO2 (%)   12/18/20 2236 12/18/20 1756 12/18/20 1756 12/18/20 1756 --   Temporal Monitor Lying Left arm       Pain Score       12/18/20 2236       3          Wt Readings from Last 1 Encounters:   12/18/20 50 6 kg (111 lb 8 8 oz)     Additional Vital Signs:   Time  Temp  Pulse  Resp  BP  MAP (mmHg)  SpO2  Calculated FIO2 (%) - Nasal Cannula  Nasal Cannula O2 Flow Rate (L/min)  O2 Device  Patient Position - Orthostatic VS   12/19/20 14:24:27  97 7 °F (36 5 °C)  106Abnormal     105/60  75  94 %           12/19/20 0900            92 %  36  4 L/min  Nasal cannula     12/19/20 08:09:18  99 3 °F (37 4 °C)  104  18  112/74  87  93 %  28  2 L/min  Nasal cannula  Lying   12/19/20 01:37:54    96        94 %  28  2 L/min  Nasal cannula     12/19/20 00:21:43    110Abnormal         91 %  32  3 L/min  Nasal cannula     12/19/20 00:04:17    120Abnormal         83 %Abnormal       None (Room air)     12/18/20 23:59:36  99 3 °F (37 4 °C)  107Abnormal   18  158/85  109  86 %Abnormal       None (Room air)     12/18/20 2236  102 1 °F (38 9 °C)Abnormal   111Abnormal   21  152/70    96 %      None (Room air)     12/18/20 2133    103  22  164/92    91 %      None (Room air)  Lying   12/18/20 1945    102  20  162/77  109  91 %           12/18/20 1925    107Abnormal   20  168/83    92 %      None (Room air)  Lying   12/18/20 1756  98 7 °F (37 1 °C)  101  22  160/83    94 %             Pertinent Labs/Diagnostic Test Results:   12/19 CXR:   New patchy airspace disease within the bilateral mid and lower lung fields consistent with the patient's clinical history of Covid-19 pneumonia     12/18 EKG: Normal sinus rhythm  Normal ECG  When compared with ECG of 15-JUL-2020 06:00,  voltages are less pronounced  Results from last 7 days   Lab Units 12/18/20  1828   SARS-COV-2  Positive*     Results from last 7 days   Lab Units 12/19/20  0601 12/18/20  1828   WBC Thousand/uL 2 96* 3 49*   HEMOGLOBIN g/dL 9 5* 11 0*   HEMATOCRIT % 30 9* 36 2   PLATELETS Thousands/uL 321 353   NEUTROS ABS Thousands/µL  --  2 12   BANDS PCT % 2  --          Results from last 7 days   Lab Units 12/19/20  0601 12/18/20  1828   SODIUM mmol/L 141 139   POTASSIUM mmol/L 3 6 3 4*   CHLORIDE mmol/L 108 106   CO2 mmol/L 22 24   ANION GAP mmol/L 11 9   BUN mg/dL 20 23   CREATININE mg/dL 0 95 1 03   EGFR ml/min/1 73sq m 55 50   CALCIUM mg/dL 8 2* 9 0     Results from last 7 days   Lab Units 12/19/20  0601 12/18/20  1828   AST U/L 38 38   ALT U/L 19 20   ALK PHOS U/L 78 87   TOTAL PROTEIN g/dL 6 9 8 1   ALBUMIN g/dL 2 6* 3 1*   TOTAL BILIRUBIN mg/dL 0 22 0 27         Results from last 7 days   Lab Units 12/19/20  0601 12/18/20  1828   GLUCOSE RANDOM mg/dL 103 93         Results from last 7 days   Lab Units 12/18/20  1828   TROPONIN I ng/mL 0 03     Results from last 7 days   Lab Units 12/18/20  2044   D-DIMER QUANTITATIVE ug/ml FEU 1 47*             Results from last 7 days   Lab Units 12/19/20  0639 12/18/20  2044   PROCALCITONIN ng/ml 0 11 0 16     Results from last 7 days   Lab Units 12/18/20  1828   LACTIC ACID mmol/L 1 2       Results from last 7 days   Lab Units 12/18/20  1828   NT-PRO BNP pg/mL 556*     Results from last 7 days   Lab Units 12/18/20  2230   FERRITIN ng/mL 413*     Results from last 7 days   Lab Units 12/18/20  2230   CRP mg/L 200 0*         Results from last 7 days   Lab Units 12/18/20  1921 12/18/20  1920   CLARITY UA  clear Clear   COLOR UA  yellow Yellow   SPEC GRAV UA   --  1 020   PH UA   --  6 0   GLUCOSE UA mg/dl  --  Negative   KETONES UA mg/dl  --  Negative   BLOOD UA   --  Small*   PROTEIN UA mg/dl  --  30 (1+)*   NITRITE UA   --  Negative   BILIRUBIN UA   --  Negative   UROBILINOGEN UA E U /dl  --  0 2   LEUKOCYTES UA   --  Negative   WBC UA /hpf  --  1-2*   RBC UA /hpf  --  4-10*   BACTERIA UA /hpf  --  Occasional   EPITHELIAL CELLS WET PREP /hpf  --  Occasional     Results from last 7 days   Lab Units 12/18/20  1828   INFLUENZA A PCR  Negative   INFLUENZA B PCR  Negative   RSV PCR  Negative     Results from last 7 days   Lab Units 12/18/20 2057 12/18/20 2044   BLOOD CULTURE  Received in Microbiology Lab  Culture in Progress  Received in Microbiology Lab  Culture in Progress       Results from last 7 days   Lab Units 12/19/20  0601   TOTAL COUNTED  100           ED Treatment:   Medication Administration from 12/18/2020 1749 to 12/18/2020 2348       Date/Time Order Dose Route Action     12/18/2020 2231 ceftriaxone (ROCEPHIN) 1 g/50 mL in dextrose IVPB 1,000 mg Intravenous New Bag     12/18/2020 2230 doxycycline hyclate (VIBRAMYCIN) capsule 100 mg 100 mg Oral Given     12/18/2020 2231 dexamethasone (DECADRON) injection 6 mg 6 mg Intravenous Given     12/18/2020 2319 acetaminophen (TYLENOL) tablet 650 mg 650 mg Oral Given        Past Medical History:   Diagnosis Date    Anemia 2/26/2019    Anxiety     Bipolar 1 disorder ([facility] [address] )     Depression     DVT (deep venous thrombosis) ([facility] ) 2008    Left leg    GERD (gastroesophageal reflux disease)     Hypertension     Overactive bladder        Admitting Diagnosis: Leg pain [M79 606]  Altered mental status [R41 82]  Hypoxia [R09 02]  Pneumonia due to COVID-19 virus [U07 1, J12 89]  Age/Sex: 80 y o  female  Admission Orders:  SCDS  Up and assist  Activity as tolerated    Scheduled Medications:  ascorbic acid, 1,000 mg, Oral, Q12H [facility]  atorvastatin, 40 mg, Oral, HS  cefTRIAXone, 1,000 mg, Intravenous, Q24H  cholecalciferol, 2,000 Units, Oral, Daily  clonazePAM, 0 5 mg, Oral, BID  doxycycline hyclate, 100 mg, Oral, Q12H  famotidine, 20 mg, Oral, Daily  heparin (porcine), 5,000 Units, Subcutaneous, Q8H [facility]SON  lamoTRIgine, 200 mg, Oral, Daily Before Breakfast  zinc sulfate, 220 mg, Oral, Daily    Followed by  Zainab Daily ON 12/26/2020] multivitamin-minerals, 1 tablet, Oral, Daily  oxybutynin, 10 mg, Oral, Daily  pantoprazole, 40 mg, Oral, Early Morning  QUEtiapine, 400 mg, Oral, HS  [START ON 12/20/2020] remdesivir, 100 mg, Intravenous, Q24H  topiramate, 50 mg, Oral, BID      Continuous IV Infusions:     PRN Meds:  acetaminophen, 650 mg, Oral, Q6H PRN  ondansetron, 4 mg, Intravenous, Q6H PRN        None    Network Utilization Review Department  ATTENTION: Please call with any questions or concerns to [phone] and carefully listen to the prompts so that you are directed to the right person  All voicemails are confidential   Dann Los all requests for admission clinical reviews, approved or denied determinations and any other requests to dedicated fax number below belonging to the campus where the patient is receiving treatment   List of dedicated fax numbers for the Facilities:  [address] DENIALS (Administrative/Medical Necessity) [phone]   [address] (Maternity/NICU/Pediatrics) [phone]   [address] [place] [address] [facility] Rhiannaida Justin Paul 1063 (  Greg Nina "Heather" 103) [address] [place] [address] Juwan Acosta 1481 [phone]   [place] [address] [phone]

## 2020-12-19 NOTE — PROGRESS NOTES
Progress Note - Phillip Hu 1935, 80 y o  female MRN: [id number]    Unit/Bed#: [place] [address] 221-01 Encounter: [id number]    Primary Care Provider: Tomas Tristan MD   Date and time admitted to hospital: 12/18/2020  5:50 PM        * COVID-19  Assessment & Plan  · COVID-19 infection with acute respiratory failure/hypoxia  · COVID-19:  Continue remdesivir and atorvastatin  Consider adding dexamethasone if respiratory status declines  · Possible bacterial pneumonia  Continue empiric ceftriaxone and doxycycline    Lab Results   Component Value Date    SARSCOV2 Positive (A) 12/18/2020    Laura Medin Not Detected 07/17/2020    SARSCOV2 Negative 07/13/2020     Results from last 7 days   Lab Units 12/18/20  2230 12/18/20  2044   D-DIMER QUANTITATIVE ug/ml FEU  --  1 47*   CRP mg/L 200 0*  --    FERRITIN ng/mL 413*  --      Results from last 7 days   Lab Units 12/19/20  0639 12/18/20  2044   PROCALCITONIN ng/ml 0 11 0 16       Anemia, macrocytic  Assessment & Plan  · Macrocytic anemia  Will check Q96 and folic acid in a m  Results from last 7 days   Lab Units 12/19/20  0601 12/18/20  1828   HEMOGLOBIN g/dL 9 5* 11 0*       Acute metabolic encephalopathy  Assessment & Plan  · Acute metabolic encephalopathy secondary to COVID-19 and underlying cognitive decline    Hypokalemia  Assessment & Plan  · Hypokalemia has been repleted    Results from last 7 days   Lab Units 12/19/20  0601 12/18/20  1828   POTASSIUM mmol/L 3 6 3 4*       Bipolar 1 disorder (HCC)  Assessment & Plan  · Bipolar depression mood stable on lamictal quetiapine and topiramate    Hyperlipidemia  Assessment & Plan  · Hyperlipidemia placed on atorvastatin based on COVID-19 protocol      VTE Pharmacologic Prophylaxis:   Moderate Risk (Score 3-4) - Pharmacological DVT Prophylaxis Ordered: Heparin  Patient Centered Rounds: I have performed bedside rounds with nursing staff today    Discussions with Specialists or Other Care Team Provider: Education and Discussions with Family / Patient:     Time Spent for Care: 25 mins  More than 50% of total time spent on counseling and coordination of care as described above  Current Length of Stay: 1 day(s)  Current Patient Status: Inpatient   Certification Statement: The patient will continue to require additional inpatient hospital stay due to COVID-19  Discharge Plan / Estimated Discharge Date: Anticipate discharge in > 72 hrs to previous facility    Code Status: Level 1 - Full Code      Subjective:   Patient seen and examined  Still short of breath  Scattered pains    Objective:   Vitals: Blood pressure 105/60, pulse (!) 106, temperature 97 7 °F (36 5 °C), resp  rate 18, weight 50 6 kg (111 lb 8 8 oz), SpO2 97 %  Physical Exam  Vitals signs reviewed  Constitutional:       General: She is not in acute distress  Appearance: Normal appearance  Eyes:      General: No scleral icterus  Cardiovascular:      Rate and Rhythm: Regular rhythm  Heart sounds: Murmur present  Pulmonary:      Breath sounds: Decreased breath sounds and wheezing present  Abdominal:      General: Bowel sounds are normal       Palpations: Abdomen is soft  Tenderness: There is no guarding or rebound  Musculoskeletal:         General: No swelling  Skin:     General: Skin is warm  Neurological:      Mental Status: She is alert and oriented to person, place, and time  Mental status is at baseline     Psychiatric:         Mood and Affect: Mood normal        Additional Data:   Labs:  Results from last 7 days   Lab Units 12/19/20  0601 12/18/20  1828   WBC Thousand/uL 2 96* 3 49*   HEMOGLOBIN g/dL 9 5* 11 0*   HEMATOCRIT % 30 9* 36 2   MCV fL 102* 102*   TOTAL NEUT ABS Thousand/uL 1 89  --    BANDS PCT % 2  --    PLATELETS Thousands/uL 321 353     Results from last 7 days   Lab Units 12/19/20  0601 12/18/20  1828   SODIUM mmol/L 141 139   POTASSIUM mmol/L 3 6 3 4*   CHLORIDE mmol/L 108 106   CO2 mmol/L 22 24 ANION GAP mmol/L 11 9   BUN mg/dL 20 23   CREATININE mg/dL 0 95 1 03   CALCIUM mg/dL 8 2* 9 0   ALBUMIN g/dL 2 6* 3 1*   TOTAL BILIRUBIN mg/dL 0 22 0 27   ALK PHOS U/L 78 87   ALT U/L 19 20   AST U/L 38 38   EGFR ml/min/1 73sq m 55 50   GLUCOSE RANDOM mg/dL 103 93         Results from last 7 days   Lab Units 12/18/20  1828   TROPONIN I ng/mL 0 03     Results from last 7 days   Lab Units 12/18/20  1828   NT-PRO BNP pg/mL 556*      Results from last 7 days   Lab Units 12/19/20  0639  12/18/20  1828   LACTIC ACID mmol/L  --   --  1 2   PROCALCITONIN ng/ml 0 11   < >  --     < > = values in this interval not displayed  * I Have Reviewed All Lab Data Listed Above  Cultures:   Results from last 7 days   Lab Units 12/18/20 2057 12/18/20 2044 12/18/20  1828   BLOOD CULTURE  Received in Microbiology Lab  Culture in Progress  Received in Microbiology Lab  Culture in Progress  --    INFLUENZA A PCR   --   --  Negative       Results from last 7 days   Lab Units 12/18/20  1828   SARS-COV-2  Positive*   INFLUENZA A PCR  Negative   INFLUENZA B PCR  Negative   RSV PCR  Negative           Lines/Drains:  Invasive Devices     Peripheral Intravenous Line            Peripheral IV 10/07/20 Left Antecubital 72 days    Peripheral IV 12/18/20 Right Forearm 1 day              Telemetry:      Imaging:  Imaging Reports Reviewed Today Include:   Xr Chest 1 View Portable    Result Date: 12/19/2020  Impression: New patchy airspace disease within the bilateral mid and lower lung fields consistent with the patient's clinical history of Covid-19 pneumonia   Workstation performed: [id number]     Scheduled Meds:  Current Facility-Administered Medications   Medication Dose Route Frequency Provider Last Rate    acetaminophen  650 mg Oral Q6H PRN Azalia Hernández PA-C      ascorbic acid  1,000 mg Oral Q12H [address] Azalia Hernández PA-C      atorvastatin  40 mg Oral HS Azalia Hernández PA-C      cefTRIAXone  1,000 mg Intravenous Q24H Rubi Grijalva PA-C      cholecalciferol  2,000 Units Oral Daily Azalia Hernández PA-C      clonazePAM  0 5 mg Oral BID Rubi Grijalva PA-C      doxycycline hyclate  100 mg Oral Q12H Azalia Hernández PA-C      famotidine  20 mg Oral Daily Azalia Hernández PA-C      heparin (porcine)  5,000 Units Subcutaneous [facility] Azalia Hernández PA-C      lamoTRIgine  200 mg Oral Daily Before Breakfast Azalia Hernández PA-C      zinc sulfate  220 mg Oral Daily Azalia Hernández PA-C      Followed by   G[facility] Common ON 12/26/2020] multivitamin-minerals  1 tablet Oral Daily Azalia Hernández PA-C      ondansetron  4 mg Intravenous Q6H PRN Azalia Hernández PA-C      oxybutynin  10 mg Oral Daily Azalia Hernández PA-C      pantoprazole  40 mg Oral Early Morning Azalia Hernández PA-C      QUEtiapine  400 mg Oral HS Azalia Hernández PA-C      [START ON 12/20/2020] remdesivir  100 mg Intravenous Q24H Azalia Hernández PA-C      topiramate  50 mg Oral BID DAISY Joy DO  [facility] Internal Medicine  Hospitalist    ** Please Note: This note has been constructed using a voice recognition system   **

## 2020-12-19 NOTE — ASSESSMENT & PLAN NOTE
No longer on statins    Discussed with son no history of anaphylaxis or angioedema or uticaria  Will empirically start lipitor 40mg po daily and monitor closely for covid pna

## 2020-12-19 NOTE — ASSESSMENT & PLAN NOTE
Possible acute metabolic encephalopathy  Pt oriented to self hospital setting month  When asked years she reports that the years December and she does not know the president is  Per the patient's son she normally is in tune with politics and actually voted this year  Do suspect some underlying cognitive decline given advanced age and mild microangiopathic changes on prior ct head  Continue to monitor and reorient

## 2020-12-20 LAB
ALBUMIN SERPL BCP-MCNC: 2.5 G/DL (ref 3.5–5)
ALP SERPL-CCNC: 71 U/L (ref 46–116)
ALT SERPL W P-5'-P-CCNC: 13 U/L (ref 12–78)
ANION GAP SERPL CALCULATED.3IONS-SCNC: 9 MMOL/L (ref 4–13)
AST SERPL W P-5'-P-CCNC: 32 U/L (ref 5–45)
BILIRUB SERPL-MCNC: 0.25 MG/DL (ref 0.2–1)
BUN SERPL-MCNC: 24 MG/DL (ref 5–25)
CALCIUM ALBUM COR SERPL-MCNC: 9.8 MG/DL (ref 8.3–10.1)
CALCIUM SERPL-MCNC: 8.6 MG/DL (ref 8.3–10.1)
CHLORIDE SERPL-SCNC: 110 MMOL/L (ref 100–108)
CO2 SERPL-SCNC: 24 MMOL/L (ref 21–32)
CREAT SERPL-MCNC: 0.95 MG/DL (ref 0.6–1.3)
ERYTHROCYTE [DISTWIDTH] IN BLOOD BY AUTOMATED COUNT: 12.8 % (ref 11.6–15.1)
FOLATE SERPL-MCNC: >20 NG/ML (ref 3.1–17.5)
GFR SERPL CREATININE-BSD FRML MDRD: 55 ML/MIN/1.73SQ M
GLUCOSE SERPL-MCNC: 88 MG/DL (ref 65–140)
HCT VFR BLD AUTO: 33.5 % (ref 34.8–46.1)
HGB BLD-MCNC: 10.2 G/DL (ref 11.5–15.4)
IRON SATN MFR SERPL: 9 %
IRON SERPL-MCNC: 16 UG/DL (ref 50–170)
MCH RBC QN AUTO: 31 PG (ref 26.8–34.3)
MCHC RBC AUTO-ENTMCNC: 30.4 G/DL (ref 31.4–37.4)
MCV RBC AUTO: 102 FL (ref 82–98)
PLATELET # BLD AUTO: 358 THOUSANDS/UL (ref 149–390)
PMV BLD AUTO: 8.9 FL (ref 8.9–12.7)
POTASSIUM SERPL-SCNC: 3.6 MMOL/L (ref 3.5–5.3)
PROT SERPL-MCNC: 7 G/DL (ref 6.4–8.2)
RBC # BLD AUTO: 3.29 MILLION/UL (ref 3.81–5.12)
SODIUM SERPL-SCNC: 143 MMOL/L (ref 136–145)
TIBC SERPL-MCNC: 185 UG/DL (ref 250–450)
VIT B12 SERPL-MCNC: 5101 PG/ML (ref 100–900)
WBC # BLD AUTO: 3.12 THOUSAND/UL (ref 4.31–10.16)

## 2020-12-20 PROCEDURE — 82607 VITAMIN B-12: CPT | Performed by: INTERNAL MEDICINE

## 2020-12-20 PROCEDURE — 83540 ASSAY OF IRON: CPT | Performed by: INTERNAL MEDICINE

## 2020-12-20 PROCEDURE — XW033E5 INTRODUCTION OF REMDESIVIR ANTI-INFECTIVE INTO PERIPHERAL VEIN, PERCUTANEOUS APPROACH, NEW TECHNOLOGY GROUP 5: ICD-10-PCS | Performed by: INTERNAL MEDICINE

## 2020-12-20 PROCEDURE — 80053 COMPREHEN METABOLIC PANEL: CPT | Performed by: INTERNAL MEDICINE

## 2020-12-20 PROCEDURE — 99232 SBSQ HOSP IP/OBS MODERATE 35: CPT | Performed by: INTERNAL MEDICINE

## 2020-12-20 PROCEDURE — 83550 IRON BINDING TEST: CPT | Performed by: INTERNAL MEDICINE

## 2020-12-20 PROCEDURE — 85027 COMPLETE CBC AUTOMATED: CPT | Performed by: INTERNAL MEDICINE

## 2020-12-20 PROCEDURE — 82746 ASSAY OF FOLIC ACID SERUM: CPT | Performed by: INTERNAL MEDICINE

## 2020-12-20 RX ORDER — DEXAMETHASONE SODIUM PHOSPHATE 4 MG/ML
6 INJECTION, SOLUTION INTRA-ARTICULAR; INTRALESIONAL; INTRAMUSCULAR; INTRAVENOUS; SOFT TISSUE DAILY
Status: DISCONTINUED | OUTPATIENT
Start: 2020-12-20 | End: 2020-12-30

## 2020-12-20 RX ORDER — DICYCLOMINE HYDROCHLORIDE 10 MG/1
10 CAPSULE ORAL 3 TIMES DAILY PRN
Status: DISCONTINUED | OUTPATIENT
Start: 2020-12-20 | End: 2021-01-09 | Stop reason: HOSPADM

## 2020-12-20 RX ORDER — AMOXICILLIN 250 MG
1 CAPSULE ORAL 2 TIMES DAILY
Status: DISCONTINUED | OUTPATIENT
Start: 2020-12-20 | End: 2021-01-09 | Stop reason: HOSPADM

## 2020-12-20 RX ORDER — OLANZAPINE 10 MG/1
5 INJECTION, POWDER, LYOPHILIZED, FOR SOLUTION INTRAMUSCULAR ONCE
Status: COMPLETED | OUTPATIENT
Start: 2020-12-20 | End: 2020-12-20

## 2020-12-20 RX ADMIN — OXYCODONE HYDROCHLORIDE AND ACETAMINOPHEN 1000 MG: 500 TABLET ORAL at 08:11

## 2020-12-20 RX ADMIN — DOCUSATE SODIUM AND SENNOSIDES 1 TABLET: 8.6; 5 TABLET, FILM COATED ORAL at 16:39

## 2020-12-20 RX ADMIN — DICYCLOMINE HYDROCHLORIDE 10 MG: 10 CAPSULE ORAL at 13:35

## 2020-12-20 RX ADMIN — FAMOTIDINE 20 MG: 20 TABLET ORAL at 08:11

## 2020-12-20 RX ADMIN — CLONAZEPAM 0.5 MG: 0.5 TABLET ORAL at 16:40

## 2020-12-20 RX ADMIN — TOPIRAMATE 50 MG: 25 TABLET, FILM COATED ORAL at 08:11

## 2020-12-20 RX ADMIN — TOPIRAMATE 50 MG: 25 TABLET, FILM COATED ORAL at 16:40

## 2020-12-20 RX ADMIN — OLANZAPINE 5 MG: 10 INJECTION, POWDER, FOR SOLUTION INTRAMUSCULAR at 23:14

## 2020-12-20 RX ADMIN — Medication 2000 UNITS: at 08:11

## 2020-12-20 RX ADMIN — DEXAMETHASONE SODIUM PHOSPHATE 6 MG: 4 INJECTION INTRA-ARTICULAR; INTRALESIONAL; INTRAMUSCULAR; INTRAVENOUS; SOFT TISSUE at 13:35

## 2020-12-20 RX ADMIN — REMDESIVIR 100 MG: 100 INJECTION, POWDER, LYOPHILIZED, FOR SOLUTION INTRAVENOUS at 01:17

## 2020-12-20 RX ADMIN — DOXYCYCLINE 100 MG: 100 CAPSULE ORAL at 08:11

## 2020-12-20 RX ADMIN — ZINC SULFATE 220 MG (50 MG) CAPSULE 220 MG: CAPSULE at 08:11

## 2020-12-20 RX ADMIN — PANTOPRAZOLE SODIUM 40 MG: 40 TABLET, DELAYED RELEASE ORAL at 06:02

## 2020-12-20 RX ADMIN — LAMOTRIGINE 200 MG: 100 TABLET ORAL at 06:02

## 2020-12-20 RX ADMIN — DOCUSATE SODIUM AND SENNOSIDES 1 TABLET: 8.6; 5 TABLET, FILM COATED ORAL at 13:35

## 2020-12-20 RX ADMIN — OXYBUTYNIN 10 MG: 10 TABLET, FILM COATED, EXTENDED RELEASE ORAL at 08:11

## 2020-12-20 RX ADMIN — HEPARIN SODIUM 5000 UNITS: 5000 INJECTION INTRAVENOUS; SUBCUTANEOUS at 13:34

## 2020-12-20 RX ADMIN — CLONAZEPAM 0.5 MG: 0.5 TABLET ORAL at 08:11

## 2020-12-20 RX ADMIN — HEPARIN SODIUM 5000 UNITS: 5000 INJECTION INTRAVENOUS; SUBCUTANEOUS at 06:02

## 2020-12-20 RX ADMIN — ACETAMINOPHEN 650 MG: 325 TABLET ORAL at 13:35

## 2020-12-20 NOTE — ASSESSMENT & PLAN NOTE
· Macrocytic anemia  Follow-up on iron studies Q80 and folic acid drawn this a m      Results from last 7 days   Lab Units 12/20/20  0602 12/19/20  0601 12/18/20  1828   HEMOGLOBIN g/dL 10 2* 9 5* 11 0*

## 2020-12-20 NOTE — ASSESSMENT & PLAN NOTE
· Hypokalemia has been repleted    Results from last 7 days   Lab Units 12/20/20  0602 12/19/20  0601 12/18/20  1828   POTASSIUM mmol/L 3 6 3 6 3 4*

## 2020-12-20 NOTE — PROGRESS NOTES
Progress Note - Karen Correacci 1935, 80 y o  female MRN: [id number]    [facility] [address] 221-01 Encounter: [id number]    Primary Care Provider: Nickolas Patino MD   Date and time admitted to hospital: 12/18/2020  5:50 PM        * COVID-19  Assessment & Plan  · COVID-19 infection with acute respiratory failure/hypoxia  · COVID-19:  Continue remdesivir and atorvastatin  Will add dexamethasone given worsening respiratory status   · Possible bacterial pneumonia however given negative procalcitonin will discontinue ceftriaxone and doxycycline    Lab Results   Component Value Date    SARSCOV2 Positive (A) 12/18/2020    Jackeline Espinoza Not Detected 07/17/2020    SARSCOV2 Negative 07/13/2020     Results from last 7 days   Lab Units 12/18/20  2230 12/18/20  2044   D-DIMER QUANTITATIVE ug/ml FEU  --  1 47*   CRP mg/L 200 0*  --    FERRITIN ng/mL 413*  --      Results from last 7 days   Lab Units 12/19/20  0639 12/18/20  2044   PROCALCITONIN ng/ml 0 11 0 16       Anemia, macrocytic  Assessment & Plan  · Macrocytic anemia  Follow-up on iron studies B28 and folic acid drawn this a m  Results from last 7 days   Lab Units 12/20/20  0602 12/19/20  0601 12/18/20  1828   HEMOGLOBIN g/dL 10 2* 9 5* 11 0*       Acute metabolic encephalopathy  Assessment & Plan  · Acute metabolic encephalopathy secondary to COVID-19 and underlying cognitive decline    Hypokalemia  Assessment & Plan  · Hypokalemia has been repleted    Results from last 7 days   Lab Units 12/20/20  0602 12/19/20  0601 12/18/20  1828   POTASSIUM mmol/L 3 6 3 6 3 4*       Bipolar 1 disorder (HCC)  Assessment & Plan  · Bipolar depression mood stable on lamictal quetiapine and topiramate    Hyperlipidemia  Assessment & Plan  · Hyperlipidemia placed on atorvastatin based on COVID-19 protocol      VTE Pharmacologic Prophylaxis:   Moderate Risk (Score 3-4) - Pharmacological DVT Prophylaxis Ordered: Heparin      Patient Centered Rounds: I have performed bedside rounds with nursing staff today  Discussions with Specialists or Other Care Team Provider:     Education and Discussions with Family / Patient:  Discussed with son Eben Bird on telephone    Time Spent for Care: 25 mins  More than 50% of total time spent on counseling and coordination of care as described above  Current Length of Stay: 2 day(s)  Current Patient Status: Inpatient   Certification Statement: The patient will continue to require additional inpatient hospital stay due to COVID-19  Discharge Plan / Estimated Discharge Date: Anticipate discharge in 48-72 hrs to [facility] and beyond    Code Status: Level 1 - Full Code      Subjective:   Patient seen and examined  Persistent hypoxia  Confused at times trying to get out of bed    Objective:   Vitals: Blood pressure 136/70, pulse 103, temperature 98 3 °F (36 8 °C), resp  rate (!) 40, weight 50 6 kg (111 lb 8 8 oz), SpO2 92 %  Physical Exam  Vitals signs reviewed  Constitutional:       General: She is not in acute distress  Appearance: Normal appearance  HENT:      Head: Atraumatic  Eyes:      General: No scleral icterus  Cardiovascular:      Rate and Rhythm: Regular rhythm  Heart sounds: Normal heart sounds  Pulmonary:      Breath sounds: Decreased breath sounds and wheezing present  Abdominal:      General: Bowel sounds are normal       Palpations: Abdomen is soft  Tenderness: There is no guarding or rebound  Musculoskeletal:         General: No swelling  Skin:     General: Skin is warm  Neurological:      Mental Status: She is alert  She is disoriented     Psychiatric:         Mood and Affect: Mood normal        Additional Data:   Labs:  Results from last 7 days   Lab Units 12/20/20  0602 12/19/20  0601 12/18/20  1828   WBC Thousand/uL 3 12* 2 96* 3 49*   HEMOGLOBIN g/dL 10 2* 9 5* 11 0*   HEMATOCRIT % 33 5* 30 9* 36 2   MCV fL 102* 102* 102*   TOTAL NEUT ABS Thousand/uL  --  1 89  --    BANDS PCT %  --  2  --    PLATELETS Thousands/uL 358 321 353     Results from last 7 days   Lab Units 12/20/20  0602 12/19/20  0601 12/18/20  1828   SODIUM mmol/L 143 141 139   POTASSIUM mmol/L 3 6 3 6 3 4*   CHLORIDE mmol/L 110* 108 106   CO2 mmol/L 24 22 24   ANION GAP mmol/L 9 11 9   BUN mg/dL 24 20 23   CREATININE mg/dL 0 95 0 95 1 03   CALCIUM mg/dL 8 6 8 2* 9 0   ALBUMIN g/dL 2 5* 2 6* 3 1*   TOTAL BILIRUBIN mg/dL 0 25 0 22 0 27   ALK PHOS U/L 71 78 87   ALT U/L 13 19 20   AST U/L 32 38 38   EGFR ml/min/1 73sq m 55 55 50   GLUCOSE RANDOM mg/dL 88 103 93         Results from last 7 days   Lab Units 12/18/20  1828   TROPONIN I ng/mL 0 03     Results from last 7 days   Lab Units 12/18/20  1828   NT-PRO BNP pg/mL 556*      Results from last 7 days   Lab Units 12/19/20  0639  12/18/20  1828   LACTIC ACID mmol/L  --   --  1 2   PROCALCITONIN ng/ml 0 11   < >  --     < > = values in this interval not displayed  * I Have Reviewed All Lab Data Listed Above  Cultures:   Results from last 7 days   Lab Units 12/18/20 2057 12/18/20 2044 12/18/20  1828   BLOOD CULTURE  No Growth at 24 hrs  No Growth at 24 hrs   --    INFLUENZA A PCR   --   --  Negative       Results from last 7 days   Lab Units 12/18/20  1828   SARS-COV-2  Positive*   INFLUENZA A PCR  Negative   INFLUENZA B PCR  Negative   RSV PCR  Negative           Lines/Drains:  Invasive Devices     Peripheral Intravenous Line            Peripheral IV 10/07/20 Left Antecubital 73 days    Peripheral IV 12/18/20 Right Forearm 1 day              Telemetry:      Imaging:  Imaging Reports Reviewed Today Include:   Xr Chest 1 View Portable    Result Date: 12/19/2020  Impression: New patchy airspace disease within the bilateral mid and lower lung fields consistent with the patient's clinical history of Covid-19 pneumonia   Workstation performed: [id number]     Scheduled Meds:  Current Facility-Administered Medications   Medication Dose Route Frequency Provider Last Rate    acetaminophen  650 mg Oral Q6H PRN Alberto Champion PA-C      ascorbic acid  1,000 mg Oral Q12H [facility] Azalia Hernández PA-C      atorvastatin  40 mg Oral HS Azalia Hernández PA-C      cefTRIAXone  1,000 mg Intravenous Q24H Alberto Champion PA-C 1,000 mg (12/19/20 2002)    cholecalciferol  2,000 Units Oral Daily Azalia Hernández PA-C      clonazePAM  0 5 mg Oral BID Azalia Hernández PA-C      doxycycline hyclate  100 mg Oral Q12H Azalia Hernández PA-C      famotidine  20 mg Oral Daily Azalia Hernández PA-C      heparin (porcine)  5,000 Units Subcutaneous [facility] Azalia Hernández PA-C      lamoTRIgine  200 mg Oral Daily Before Breakfast Azalia Hernández PA-C      zinc sulfate  220 mg Oral Daily Azalia Hernández PA-C      Followed by   Bryson Dunn ON 12/26/2020] multivitamin-minerals  1 tablet Oral Daily Azalia Hernández PA-C      ondansetron  4 mg Intravenous Q6H PRN Azalia Hernández PA-C      oxybutynin  10 mg Oral Daily Azalia Hernández PA-C      pantoprazole  40 mg Oral Early Morning Azalia Hernández PA-C      QUEtiapine  400 mg Oral HS Azalia Hernández PA-C      remdesivir  100 mg Intravenous Q24H Azalia Hernández PA-C      topiramate  50 mg Oral BID DAISY Joy DO  [facility] Internal Medicine  Hospitalist    ** Please Note: This note has been constructed using a voice recognition system   **

## 2020-12-20 NOTE — ASSESSMENT & PLAN NOTE
· Macrocytic anemia  Will check Y75 and folic acid in a m      Results from last 7 days   Lab Units 12/19/20  0601 12/18/20  1828   HEMOGLOBIN g/dL 9 5* 11 0*

## 2020-12-20 NOTE — ASSESSMENT & PLAN NOTE
· COVID-19 infection with acute respiratory failure/hypoxia  · COVID-19:  Continue remdesivir and atorvastatin  Consider adding dexamethasone if respiratory status declines  · Possible bacterial pneumonia    Continue empiric ceftriaxone and doxycycline    Lab Results   Component Value Date    SARSCOV2 Positive (A) 12/18/2020    [facility] 98 Not Detected 07/17/2020    SARSCOV2 Negative 07/13/2020     Results from last 7 days   Lab Units 12/18/20  2230 12/18/20  2044   D-DIMER QUANTITATIVE ug/ml FEU  --  1 47*   CRP mg/L 200 0*  --    FERRITIN ng/mL 413*  --      Results from last 7 days   Lab Units 12/19/20  0639 12/18/20  2044   PROCALCITONIN ng/ml 0 11 0 16

## 2020-12-20 NOTE — ASSESSMENT & PLAN NOTE
· Hypokalemia has been repleted    Results from last 7 days   Lab Units 12/19/20  0601 12/18/20  1828   POTASSIUM mmol/L 3 6 3 4*

## 2020-12-20 NOTE — PLAN OF CARE
Problem: Potential for Falls  Goal: Patient will remain free of falls  Description: INTERVENTIONS:  - Assess patient frequently for physical needs  -  Identify cognitive and physical deficits and behaviors that affect risk of falls    -  [place] fall precautions as indicated by assessment   - Educate patient/family on patient safety including physical limitations  - Instruct patient to call for assistance with activity based on assessment  - Modify environment to reduce risk of injury  - Consider OT/PT consult to assist with strengthening/mobility  Outcome: Progressing     Problem: Prexisting or High Potential for Compromised Skin Integrity  Goal: Skin integrity is maintained or improved  Description: INTERVENTIONS:  - Identify patients at risk for skin breakdown  - Assess and monitor skin integrity  - Assess and monitor nutrition and hydration status  - Monitor labs   - Assess for incontinence   - Turn and reposition patient  - Assist with mobility/ambulation  - Relieve pressure over bony prominences  - Avoid friction and shearing  - Provide appropriate hygiene as needed including keeping skin clean and dry  - Evaluate need for skin moisturizer/barrier cream  - Collaborate with interdisciplinary team   - Patient/family teaching  - Consider wound care consult   Outcome: Progressing     Problem: PAIN - ADULT  Goal: Verbalizes/displays adequate comfort level or baseline comfort level  Description: Interventions:  - Encourage patient to monitor pain and request assistance  - Assess pain using appropriate pain scale  - Administer analgesics based on type and severity of pain and evaluate response  - Implement non-pharmacological measures as appropriate and evaluate response  - Consider cultural and social influences on pain and pain management  - Notify physician/advanced practitioner if interventions unsuccessful or patient reports new pain  Outcome: Progressing     Problem: INFECTION - ADULT  Goal: Absence or prevention of progression during hospitalization  Description: INTERVENTIONS:  - Assess and monitor for signs and symptoms of infection  - Monitor lab/diagnostic results  - Monitor all insertion sites, i e  indwelling lines, tubes, and drains  - Monitor endotracheal if appropriate and nasal secretions for changes in amount and color  - [place] appropriate cooling/warming therapies per order  - Administer medications as ordered  - Instruct and encourage patient and family to use good hand hygiene technique  - Identify and instruct in appropriate isolation precautions for identified infection/condition  Outcome: Progressing  Goal: Absence of fever/infection during neutropenic period  Description: INTERVENTIONS:  - Monitor WBC    Outcome: Progressing     Problem: SAFETY ADULT  Goal: Patient will remain free of falls  Description: INTERVENTIONS:  - Assess patient frequently for physical needs  -  Identify cognitive and physical deficits and behaviors that affect risk of falls    -  [place] fall precautions as indicated by assessment   - Educate patient/family on patient safety including physical limitations  - Instruct patient to call for assistance with activity based on assessment  - Modify environment to reduce risk of injury  - Consider OT/PT consult to assist with strengthening/mobility  Outcome: Progressing  Goal: Maintain or return to baseline ADL function  Description: INTERVENTIONS:  -  Assess patient's ability to carry out ADLs; assess patient's baseline for ADL function and identify physical deficits which impact ability to perform ADLs (bathing, care of mouth/teeth, toileting, grooming, dressing, etc )  - Assess/evaluate cause of self-care deficits   - Assess range of motion  - Assess patient's mobility; develop plan if impaired  - Assess patient's need for assistive devices and provide as appropriate  - Encourage maximum independence but intervene and supervise when necessary  - Involve family in performance of ADLs  - Assess for home care needs following discharge   - Consider OT consult to assist with ADL evaluation and planning for discharge  - Provide patient education as appropriate  Outcome: Progressing  Goal: Maintain or return mobility status to optimal level  Description: INTERVENTIONS:  - Assess patient's baseline mobility status (ambulation, transfers, stairs, etc )    - Identify cognitive and physical deficits and behaviors that affect mobility  - Identify mobility aids required to assist with transfers and/or ambulation (gait belt, sit-to-stand, lift, walker, cane, etc )  - [place] fall precautions as indicated by assessment  - Record patient progress and toleration of activity level on Mobility SBAR; progress patient to next Phase/Stage  - Instruct patient to call for assistance with activity based on assessment  - Consider rehabilitation consult to assist with strengthening/weightbearing, etc   Outcome: Progressing     Problem: DISCHARGE PLANNING  Goal: Discharge to home or other facility with appropriate resources  Description: INTERVENTIONS:  - Identify barriers to discharge w/patient and caregiver  - Arrange for needed discharge resources and transportation as appropriate  - Identify discharge learning needs (meds, wound care, etc )  - Arrange for interpretive services to assist at discharge as needed  - Refer to Case Management Department for coordinating discharge planning if the patient needs post-hospital services based on physician/advanced practitioner order or complex needs related to functional status, cognitive ability, or social support system  Outcome: Progressing     Problem: Knowledge Deficit  Goal: Patient/family/caregiver demonstrates understanding of disease process, treatment plan, medications, and discharge instructions  Description: Complete learning assessment and assess knowledge base    Interventions:  - Provide teaching at level of understanding  - Provide teaching via preferred learning methods  Outcome: Progressing

## 2020-12-21 LAB
ALBUMIN SERPL BCP-MCNC: 2.6 G/DL (ref 3.5–5)
ALP SERPL-CCNC: 77 U/L (ref 46–116)
ALT SERPL W P-5'-P-CCNC: 20 U/L (ref 12–78)
ANION GAP SERPL CALCULATED.3IONS-SCNC: 11 MMOL/L (ref 4–13)
AST SERPL W P-5'-P-CCNC: 45 U/L (ref 5–45)
BILIRUB SERPL-MCNC: 0.32 MG/DL (ref 0.2–1)
BUN SERPL-MCNC: 32 MG/DL (ref 5–25)
CALCIUM ALBUM COR SERPL-MCNC: 9.8 MG/DL (ref 8.3–10.1)
CALCIUM SERPL-MCNC: 8.7 MG/DL (ref 8.3–10.1)
CHLORIDE SERPL-SCNC: 110 MMOL/L (ref 100–108)
CO2 SERPL-SCNC: 23 MMOL/L (ref 21–32)
CREAT SERPL-MCNC: 0.96 MG/DL (ref 0.6–1.3)
CRP SERPL QL: 182.7 MG/L
D DIMER PPP FEU-MCNC: 1.04 UG/ML FEU
ERYTHROCYTE [DISTWIDTH] IN BLOOD BY AUTOMATED COUNT: 12.9 % (ref 11.6–15.1)
FERRITIN SERPL-MCNC: 541 NG/ML (ref 8–388)
GFR SERPL CREATININE-BSD FRML MDRD: 54 ML/MIN/1.73SQ M
GLUCOSE SERPL-MCNC: 97 MG/DL (ref 65–140)
HCT VFR BLD AUTO: 33.9 % (ref 34.8–46.1)
HGB BLD-MCNC: 10.6 G/DL (ref 11.5–15.4)
MCH RBC QN AUTO: 31.7 PG (ref 26.8–34.3)
MCHC RBC AUTO-ENTMCNC: 31.3 G/DL (ref 31.4–37.4)
MCV RBC AUTO: 102 FL (ref 82–98)
PLATELET # BLD AUTO: 480 THOUSANDS/UL (ref 149–390)
PMV BLD AUTO: 9.3 FL (ref 8.9–12.7)
POTASSIUM SERPL-SCNC: 3.6 MMOL/L (ref 3.5–5.3)
PROT SERPL-MCNC: 7 G/DL (ref 6.4–8.2)
RBC # BLD AUTO: 3.34 MILLION/UL (ref 3.81–5.12)
SODIUM SERPL-SCNC: 144 MMOL/L (ref 136–145)
WBC # BLD AUTO: 2.71 THOUSAND/UL (ref 4.31–10.16)

## 2020-12-21 PROCEDURE — 85027 COMPLETE CBC AUTOMATED: CPT | Performed by: INTERNAL MEDICINE

## 2020-12-21 PROCEDURE — 82728 ASSAY OF FERRITIN: CPT | Performed by: INTERNAL MEDICINE

## 2020-12-21 PROCEDURE — 80053 COMPREHEN METABOLIC PANEL: CPT | Performed by: INTERNAL MEDICINE

## 2020-12-21 PROCEDURE — 99223 1ST HOSP IP/OBS HIGH 75: CPT | Performed by: INTERNAL MEDICINE

## 2020-12-21 PROCEDURE — 85379 FIBRIN DEGRADATION QUANT: CPT | Performed by: INTERNAL MEDICINE

## 2020-12-21 PROCEDURE — 99232 SBSQ HOSP IP/OBS MODERATE 35: CPT | Performed by: INTERNAL MEDICINE

## 2020-12-21 PROCEDURE — 86140 C-REACTIVE PROTEIN: CPT | Performed by: INTERNAL MEDICINE

## 2020-12-21 RX ORDER — DEXTROSE AND SODIUM CHLORIDE 5; .45 G/100ML; G/100ML
75 INJECTION, SOLUTION INTRAVENOUS CONTINUOUS
Status: DISCONTINUED | OUTPATIENT
Start: 2020-12-21 | End: 2020-12-22

## 2020-12-21 RX ORDER — FERROUS SULFATE 325(65) MG
325 TABLET ORAL
Status: DISCONTINUED | OUTPATIENT
Start: 2020-12-22 | End: 2021-01-09 | Stop reason: HOSPADM

## 2020-12-21 RX ADMIN — FAMOTIDINE 20 MG: 20 TABLET ORAL at 11:18

## 2020-12-21 RX ADMIN — DOCUSATE SODIUM AND SENNOSIDES 1 TABLET: 8.6; 5 TABLET, FILM COATED ORAL at 11:14

## 2020-12-21 RX ADMIN — TOPIRAMATE 50 MG: 25 TABLET, FILM COATED ORAL at 18:22

## 2020-12-21 RX ADMIN — DEXTROSE AND SODIUM CHLORIDE 75 ML/HR: 5; .45 INJECTION, SOLUTION INTRAVENOUS at 18:21

## 2020-12-21 RX ADMIN — OXYCODONE HYDROCHLORIDE AND ACETAMINOPHEN 1000 MG: 500 TABLET ORAL at 11:14

## 2020-12-21 RX ADMIN — HEPARIN SODIUM 5000 UNITS: 5000 INJECTION INTRAVENOUS; SUBCUTANEOUS at 05:53

## 2020-12-21 RX ADMIN — LAMOTRIGINE 200 MG: 100 TABLET ORAL at 06:30

## 2020-12-21 RX ADMIN — HEPARIN SODIUM 5000 UNITS: 5000 INJECTION INTRAVENOUS; SUBCUTANEOUS at 14:48

## 2020-12-21 RX ADMIN — QUETIAPINE FUMARATE 400 MG: 200 TABLET ORAL at 22:01

## 2020-12-21 RX ADMIN — ATORVASTATIN CALCIUM 40 MG: 40 TABLET, FILM COATED ORAL at 22:01

## 2020-12-21 RX ADMIN — ZINC SULFATE 220 MG (50 MG) CAPSULE 220 MG: CAPSULE at 11:14

## 2020-12-21 RX ADMIN — OXYCODONE HYDROCHLORIDE AND ACETAMINOPHEN 1000 MG: 500 TABLET ORAL at 22:01

## 2020-12-21 RX ADMIN — PANTOPRAZOLE SODIUM 40 MG: 40 TABLET, DELAYED RELEASE ORAL at 05:53

## 2020-12-21 RX ADMIN — Medication 2000 UNITS: at 11:15

## 2020-12-21 RX ADMIN — CLONAZEPAM 0.5 MG: 0.5 TABLET ORAL at 11:14

## 2020-12-21 RX ADMIN — DEXAMETHASONE SODIUM PHOSPHATE 6 MG: 4 INJECTION INTRA-ARTICULAR; INTRALESIONAL; INTRAMUSCULAR; INTRAVENOUS; SOFT TISSUE at 11:19

## 2020-12-21 RX ADMIN — REMDESIVIR 100 MG: 100 INJECTION, POWDER, LYOPHILIZED, FOR SOLUTION INTRAVENOUS at 00:33

## 2020-12-21 RX ADMIN — OXYBUTYNIN 10 MG: 10 TABLET, FILM COATED, EXTENDED RELEASE ORAL at 11:14

## 2020-12-21 RX ADMIN — DOCUSATE SODIUM AND SENNOSIDES 1 TABLET: 8.6; 5 TABLET, FILM COATED ORAL at 18:22

## 2020-12-21 RX ADMIN — CLONAZEPAM 0.5 MG: 0.5 TABLET ORAL at 18:22

## 2020-12-21 RX ADMIN — REMDESIVIR 100 MG: 100 INJECTION, POWDER, LYOPHILIZED, FOR SOLUTION INTRAVENOUS at 22:00

## 2020-12-21 RX ADMIN — HEPARIN SODIUM 5000 UNITS: 5000 INJECTION INTRAVENOUS; SUBCUTANEOUS at 22:01

## 2020-12-21 RX ADMIN — TOPIRAMATE 50 MG: 25 TABLET, FILM COATED ORAL at 11:15

## 2020-12-21 RX ADMIN — DICYCLOMINE HYDROCHLORIDE 10 MG: 10 CAPSULE ORAL at 06:31

## 2020-12-21 NOTE — ASSESSMENT & PLAN NOTE
· Bipolar depression  · Patient appears anxious  · Continue Lamictal, quetiapine, topiramate, clonazepam

## 2020-12-21 NOTE — UTILIZATION REVIEW
Initial Clinical Review    Admission: Date/Time/Statement:   Admission Orders (From admission, onward)     Ordered        12/18/20 2008  Inpatient Admission  Once                   Orders Placed This Encounter   Procedures    Inpatient Admission     Standing Status:   Standing     Number of Occurrences:   1     Order Specific Question:   Admitting Physician     Answer:   Miquel Esparza [[id number]]     Order Specific Question:   Level of Care     Answer:   Med Surg [16]     Order Specific Question:   Bed Type     Answer:   Negative Pressure [6]     Order Specific Question:   Bed request comments     Answer:   COVID-19 pneumonia     Order Specific Question:   Estimated length of stay     Answer:   More than 2 Midnights     Order Specific Question:   Certification     Answer:   I certify that inpatient services are medically necessary for this patient for a duration of greater than two midnights  See H&P and MD Progress Notes for additional information about the patient's course of treatment  ED Arrival Information     Expected Arrival Acuity Means of Arrival Escorted By Service Admission Type    - 12/18/2020 17:49 Emergent Stret[facility] ([address]) Hospitalist Emergency    Arrival Complaint    altered mental status        Chief Complaint   Patient presents with    Leg Pain     [facility] due to low O2 sats and confusion  Pts only complaint is left leg pain and urinary frequency  Pt is AOx4  Assessment/Plan: 80year old female to the ED from nursing home with complaints of confusion, low pulse ox  Admitted to inpatient for COVID 19, acute metabolic encephalopathy, hypokalemia  H/O anxiety, htn, hld   Staff at nursing home state hypoxic, but not documentd  ON ambulation in the ED, pulse ox 85 %  Arrives tachycardic, alert and confused, anxious   CXR shows: New patchy airspace disease within the bilateral mid and lower lung fields consistent with the patient's clinical history of Covid-19 pneumonia  Started on IV decadron  Will start remdesivir cautiously due to GFR being borderline  Encourage self proning  CHeck procal and inflammatory markers  12/19 Continues with tachypnea, tachycardia and is now requiring 4 LNC to maintain pulse ox     ED Triage Vitals   Temperature Pulse Respirations Blood Pressure SpO2   12/18/20 1756 12/18/20 1756 12/18/20 1756 12/18/20 1756 12/18/20 1756   98 7 °F (37 1 °C) 101 22 160/83 94 %      Temp Source Heart Rate Source Patient Position - Orthostatic VS BP Location FiO2 (%)   12/18/20 2236 12/18/20 1756 12/18/20 1756 12/18/20 1756 --   Temporal Monitor Lying Left arm       Pain Score       12/18/20 2236       3          Wt Readings from Last 1 Encounters:   12/18/20 50 6 kg (111 lb 8 8 oz)     Additional Vital Signs:   Time  Temp  Pulse  Resp  BP  MAP (mmHg)  SpO2  Calculated FIO2 (%) - Nasal Cannula  Nasal Cannula O2 Flow Rate (L/min)  O2 Device  Patient Position - Orthostatic VS   12/19/20 14:24:27  97 7 °F (36 5 °C)  106Abnormal     105/60  75  94 %           12/19/20 0900            92 %  36  4 L/min  Nasal cannula     12/19/20 08:09:18  99 3 °F (37 4 °C)  104  18  112/74  87  93 %  28  2 L/min  Nasal cannula  Lying   12/19/20 01:37:54    96        94 %  28  2 L/min  Nasal cannula     12/19/20 00:21:43    110Abnormal         91 %  32  3 L/min  Nasal cannula     12/19/20 00:04:17    120Abnormal         83 %Abnormal       None (Room air)     12/18/20 23:59:36  99 3 °F (37 4 °C)  107Abnormal   18  158/85  109  86 %Abnormal       None (Room air)     12/18/20 2236  102 1 °F (38 9 °C)Abnormal   111Abnormal   21  152/70    96 %      None (Room air)     12/18/20 2133    103  22  164/92    91 %      None (Room air)  Lying   12/18/20 1945    102  20  162/77  109  91 %           12/18/20 1925    107Abnormal   20  168/83    92 %      None (Room air)  Lying   12/18/20 1756  98 7 °F (37 1 °C)  101  22  160/83    94 %             Pertinent Labs/Diagnostic Test Results:   12/19 CXR:   New patchy airspace disease within the bilateral mid and lower lung fields consistent with the patient's clinical history of Covid-19 pneumonia     12/18 EKG: Normal sinus rhythm  Normal ECG  When compared with ECG of 15-JUL-2020 06:00,  voltages are less pronounced  Results from last 7 days   Lab Units 12/18/20  1828   SARS-COV-2  Positive*     Results from last 7 days   Lab Units 12/21/20  0530 12/20/20  0602 12/19/20  0601 12/18/20  1828   WBC Thousand/uL 2 71* 3 12* 2 96* 3 49*   HEMOGLOBIN g/dL 10 6* 10 2* 9 5* 11 0*   HEMATOCRIT % 33 9* 33 5* 30 9* 36 2   PLATELETS Thousands/uL 480* 358 321 353   NEUTROS ABS Thousands/µL  --   --   --  2 12   BANDS PCT %  --   --  2  --          Results from last 7 days   Lab Units 12/21/20  0530 12/20/20  0602 12/19/20  0601 12/18/20  1828   SODIUM mmol/L 144 143 141 139   POTASSIUM mmol/L 3 6 3 6 3 6 3 4*   CHLORIDE mmol/L 110* 110* 108 106   CO2 mmol/L 23 24 22 24   ANION GAP mmol/L 11 9 11 9   BUN mg/dL 32* 24 20 23   CREATININE mg/dL 0 96 0 95 0 95 1 03   EGFR ml/min/1 73sq m 54 55 55 50   CALCIUM mg/dL 8 7 8 6 8 2* 9 0     Results from last 7 days   Lab Units 12/21/20  0530 12/20/20  0602 12/19/20  0601 12/18/20  1828   AST U/L 45 32 38 38   ALT U/L 20 13 19 20   ALK PHOS U/L 77 71 78 87   TOTAL PROTEIN g/dL 7 0 7 0 6 9 8 1   ALBUMIN g/dL 2 6* 2 5* 2 6* 3 1*   TOTAL BILIRUBIN mg/dL 0 32 0 25 0 22 0 27         Results from last 7 days   Lab Units 12/21/20  0530 12/20/20  0602 12/19/20  0601 12/18/20  1828   GLUCOSE RANDOM mg/dL 97 88 103 93         Results from last 7 days   Lab Units 12/18/20  1828   TROPONIN I ng/mL 0 03     Results from last 7 days   Lab Units 12/21/20  0530 12/18/20  2044   D-DIMER QUANTITATIVE ug/ml FEU 1 04* 1 47*             Results from last 7 days   Lab Units 12/19/20  0639 12/18/20  2044   PROCALCITONIN ng/ml 0 11 0 16     Results from last 7 days   Lab Units 12/18/20  1828 ukn LACTIC ACID mmol/L 1 2       Results from last 7 days   Lab Units 12/18/20  1828   NT-PRO BNP pg/mL 556*     Results from last 7 days   Lab Units 12/21/20  0530 12/18/20  2230   FERRITIN ng/mL 541* 413*     Results from last 7 days   Lab Units 12/21/20  0530 12/18/20  2230   CRP mg/L 182 7* 200 0*         Results from last 7 days   Lab Units 12/18/20  1921 12/18/20  1920   CLARITY UA  clear Clear   COLOR UA  yellow Yellow   SPEC GRAV UA   --  1 020   PH UA   --  6 0   GLUCOSE UA mg/dl  --  Negative   KETONES UA mg/dl  --  Negative   BLOOD UA   --  Small*   PROTEIN UA mg/dl  --  30 (1+)*   NITRITE UA   --  Negative   BILIRUBIN UA   --  Negative   UROBILINOGEN UA E U /dl  --  0 2   LEUKOCYTES UA   --  Negative   WBC UA /hpf  --  1-2*   RBC UA /hpf  --  4-10*   BACTERIA UA /hpf  --  Occasional   EPITHELIAL CELLS WET PREP /hpf  --  Occasional     Results from last 7 days   Lab Units 12/18/20  1828   INFLUENZA A PCR  Negative   INFLUENZA B PCR  Negative   RSV PCR  Negative     Results from last 7 days   Lab Units 12/18/20 2057 12/18/20  2044   BLOOD CULTURE  No Growth at 48 hrs  No Growth at 48 hrs       Results from last 7 days   Lab Units 12/19/20  0601   TOTAL COUNTED  100           ED Treatment:   Medication Administration from 12/18/2020 1749 to 12/18/2020 2348       Date/Time Order Dose Route Action     12/18/2020 2231 ceftriaxone (ROCEPHIN) 1 g/50 mL in dextrose IVPB 1,000 mg Intravenous New Bag     12/18/2020 2230 doxycycline hyclate (VIBRAMYCIN) capsule 100 mg 100 mg Oral Given     12/18/2020 2231 dexamethasone (DECADRON) injection 6 mg 6 mg Intravenous Given     12/18/2020 2319 acetaminophen (TYLENOL) tablet 650 mg 650 mg Oral Given        Past Medical History:   Diagnosis Date    Anemia 2/26/2019    Anxiety     B[facility] ([facility] )     Depression     D[facility]deep [facility] thrombosis) ([facility] ) 2008    Left leg    GERD (gastroesophageal reflux disease)     Hypertension     Overactive bladder Admitting Diagnosis: Leg pain [M79 606]  Altered mental status [R41 82]  Hypoxia [R09 02]  Pneumonia due to COVID-19 virus [U07 1, J12 89]  Age/Sex: 80 y o  female  Admission Orders:  SCDS  Up and assist  Activity as tolerated    Scheduled Medications:  ascorbic acid, 1,000 mg, Oral, Q12H CARSON  atorvastatin, 40 mg, Oral, HS  cholecalciferol, 2,000 Units, Oral, Daily  clonazePAM, 0 5 mg, Oral, BID  dexamethasone, 6 mg, Intravenous, Daily  famotidine, 20 mg, Oral, Daily  heparin (porcine), 5,000 Units, Subcutaneous, Q8H CARSON  lamoTRIgine, 200 mg, Oral, Daily Before Breakfast  zinc sulfate, 220 mg, Oral, Daily    Followed by  Munir Rice ON 12/26/2020] multivitamin-minerals, 1 tablet, Oral, Daily  oxybutynin, 10 mg, Oral, Daily  pantoprazole, 40 mg, Oral, Early Morning  QUEtiapine, 400 mg, Oral, HS  remdesivir, 100 mg, Intravenous, Q24H  senna-docusate sodium, 1 tablet, Oral, BID  topiramate, 50 mg, Oral, BID      Continuous IV Infusions:     PRN Meds:  acetaminophen, 650 mg, Oral, Q6H PRN  dicyclomine, 10 mg, Oral, TID PRN  ondansetron, 4 mg, Intravenous, Q6H PRN        IP CONSULT TO PULMONOLOGY    Network Utilization Review Department  ATTENTION: Please call with any questions or concerns to [phone] and carefully listen to the prompts so that you are directed to the right person  All voicemails are confidential   [facility] all requests for admission clinical reviews, approved or denied determinations and any other requests to dedicated fax number below belonging to the campus where the patient is receiving treatment   List of dedicated fax numbers for the Facilities:  [address] DENIALS (Administrative/Medical Necessity) [phone]   [address] [place] [address] (Maternity/NICU/Pediatrics) 261 [facility],[address] [address] [address] [place] [address] [address] [phone]   [place] [address] [phone]

## 2020-12-21 NOTE — ASSESSMENT & PLAN NOTE
· Acute metabolic encephalopathy secondary to COVID-19 and underlying cognitive decline  · Possibly secondary to steroids and history of anxiety   · If no improvement will consult Geriatrics for evaluation

## 2020-12-21 NOTE — CONSULTS
Consultation - Pulmonary Medicine   Barbara Espinal 80 y o  female MRN: [id number]  Unit/Bed#: [facility] [address] [place] 221-01 Encounter: [id number]      Assessment / Plan :  1  Acute hypoxic respiratory failure secondary to COVID-19 pneumonia  · Patient appears comfortable on nasal cannula  Continue to wean as tolerated  Maintain SpO2 greater than or equal to 89%  · Continue to encourage cough and deep breathing exercises, pulmonary toileting, out of bed as tolerated, increase activity as able  · Continue to encourage self prone positioning    2  COVID-19 pneumonia  · Patient has been started on remdesivir  Today is day 2 of 5  · Patient was started on Decadron 6 mg IV q day  Today is day 2 of 10  · Continue DVT prophylaxis dosing of heparin  · Continue vitamin-C, Lipitor, vitamin D3, Pepcid, vanc, and multivitamin  · Patient is not a candidate for Actemra at this time  · Patient did not receive convalescent plasma  · Patient is not on antibiotics at this time  Continue to monitor off antibiotics  · Trend procalcitonin  Initial Procalamine is normal at 0 11   · D-dimer elevated at 1 04, ferritin 541,  7  · Patient is positive for COVID-19 on 12/18/2020     3  Anemia  · Macrocytic anemia, follow-up iron studies, B70 and folic acid  · Management per primary care team    4  Acute encephalopathy  · Likely secondary to have COVID-19 pneumonia along with her acute hypoxia in conjunction with her underlying cognitive decline  History of Present Illness   Physician Requesting Consult: Jarett Aguilera DO  Reason for Consult / Principal Problem:  COVID-19 pneumonia with hypoxia  Hx and PE limited by:  Patient's mental status  HPI: Barbara Espinal is a 80y o  year old female who we are asked to see in pulmonary consult for worsening hypoxia and COVID-19 infection    Patient is a pleasant 35-year-old female with a past medical history significant for bipolar disorder, anxiety, hypertension, hyperlipidemia, and slowly progressing dementia  She presents to the hospital on 12/18/2020 with complaints of hypoxia and confusion  [facility] a [facility]  All the residents have been isolating in the rooms for the past 14 days  Unfortunately patient is a poor historian and is unable to offer anything else for HPI  She is restrained upon my evaluation today  She reports that she slept well last evening and is unable to answer any of my other questions  She does appear comfortable on nasal cannula at 6 liters/minute  Inpatient consult to Pulmonology  Consult performed by: Maisha Rizzo PA-C  Consult ordered by: Sandrine Barreto DO          Review of Systems   Unable to perform ROS: Dementia   All other systems reviewed and are negative        Historical Information   Past Medical History:   Diagnosis Date    Anemia 2/26/2019    Anxiety     B[facility] 1 disorder ([facility] [address] )     Depression     DVT (deep venous thrombosis) ([facility] [address] ) 2008    Left leg    GERD (gastroesophageal reflux disease)     Hypertension     Overactive bladder      Past Surgical History:   Procedure Laterality Date    ADENOIDECTOMY      CATARACT EXTRACTION Bilateral     Right 10/2003, left 4/2004    CHOLECYSTECTOMY      DILATION AND CURETTAGE OF UTERUS  1985    HERNIA REPAIR  11/02/2007    Femoral and small bowel resection    HIP SURGERY      L hip    TONSILLECTOMY      As a youth    WRIST SURGERY      L wrist     Social History   Social History     Substance and Sexual Activity   Alcohol Use Not Currently    Alcohol/week: 0 0 standard drinks    Frequency: Never    Drinks per session: Patient refused    Binge frequency: Never     Social History     Substance and Sexual Activity   Drug Use Not Currently     Social History     Tobacco Use   Smoking Status Former Smoker    Packs/day: 1 00    Types: Cigarettes   Smokeless Tobacco Former User   Tobacco Comment    Per [employer]: Heavy tobacco smoker Occupational History:  No significant occupational exposure history  Family History:   Family History   Problem Relation Age of Onset    Heart disease Father        Meds/Allergies   current meds:   Current Facility-Administered Medications   Medication Dose Route Frequency    acetaminophen (TYLENOL) tablet 650 mg  650 mg Oral Q6H PRN    ascorbic acid (VITAMIN C) tablet 1,000 mg  1,000 mg Oral Q12H [facility]    atorvastatin (LIPITOR) tablet 40 mg  40 mg Oral HS    cholecalciferol (VITAMIN D3) tablet 2,000 Units  2,000 Units Oral Daily    clonazePAM (KlonoPIN) tablet 0 5 mg  0 5 mg Oral BID    dexamethasone (DECADRON) injection 6 mg  6 mg Intravenous Daily    dicyclomine (BENTYL) capsule 10 mg  10 mg Oral TID PRN    famotidine (PEPCID) tablet 20 mg  20 mg Oral Daily    heparin (porcine) subcutaneous injection 5,000 Units  5,000 Units Subcutaneous Q8H [facility]    lamoTRIgine (LaMICtal) tablet 200 mg  200 mg Oral Daily Before Breakfast    zinc sulfate (ZINCATE) capsule 220 mg  220 mg Oral Daily    Followed by   Remigio Thornton ON 12/26/2020] multivitamin-minerals (CENTRUM ADULTS) tablet 1 tablet  1 tablet Oral Daily    ondansetron (ZOFRAN) injection 4 mg  4 mg Intravenous Q6H PRN    oxybutynin (DITROPAN-XL) 24 hr tablet 10 mg  10 mg Oral Daily    pantoprazole (PROTONIX) EC tablet 40 mg  40 mg Oral Early Morning    QUEtiapine (SEROquel) tablet 400 mg  400 mg Oral HS    remdesivir (Veklury) 100 mg in sodium chloride 0 9 % 250 mL IVPB  100 mg Intravenous Q24H    senna-docusate sodium (SENOKOT S) 8 6-50 mg per tablet 1 tablet  1 tablet Oral BID    topiramate (TOPAMAX) tablet 50 mg  50 mg Oral BID       Allergies   Allergen Reactions    Ethanol     Simvastatin        Objective   Vitals: Blood pressure 120/73, pulse 105, temperature 99 5 °F (37 5 °C), temperature source Oral, resp  rate 17, weight 50 6 kg (111 lb 8 8 oz), SpO2 91 %  ,Body mass index is 19 76 kg/m²    No intake or output data in the 24 hours ending 12/21/20 1422  Invasive Devices     Peripheral Intravenous Line            Peripheral IV 10/07/20 Left Antecubital 74 days    Peripheral IV 12/18/20 Right Forearm 2 days                Physical Exam  Vitals signs and nursing note reviewed  Constitutional:       Appearance: Normal appearance  She is not toxic-appearing or diaphoretic  HENT:      Head: Normocephalic and atraumatic  Right Ear: There is no impacted cerumen  Left Ear: There is no impacted cerumen  Nose: Nose normal  No congestion or rhinorrhea  Mouth/Throat:      Mouth: Mucous membranes are dry  Pharynx: No oropharyngeal exudate or posterior oropharyngeal erythema  Eyes:      General: No scleral icterus  Extraocular Movements: Extraocular movements intact  Pupils: Pupils are equal, round, and reactive to light  Neck:      Musculoskeletal: No neck rigidity or muscular tenderness  Vascular: No carotid bruit  Cardiovascular:      Rate and Rhythm: Normal rate and regular rhythm  Heart sounds: No murmur  No friction rub  No gallop  Pulmonary:      Effort: Pulmonary effort is normal  No respiratory distress  Breath sounds: Normal breath sounds  Abdominal:      General: Abdomen is flat  There is no distension  Palpations: Abdomen is soft  Tenderness: There is no abdominal tenderness  Lymphadenopathy:      Cervical: No cervical adenopathy  Skin:     General: Skin is dry  Capillary Refill: Capillary refill takes less than 2 seconds  Neurological:      General: No focal deficit present  Mental Status: She is alert and oriented to person, place, and time  Psychiatric:         Mood and Affect: Mood normal          Behavior: Behavior normal          Lab Results:   I have personally reviewed pertinent lab results  , ABG: No results found for: PHART, YQL8BMS, PO2ART, TRW4QFI, K8TJVDZC, BEART, SOURCE, BNP: No results found for: BNP, CBC:   Lab Results   Component Value Date    WBC 2 71 (L) 12/21/2020    HGB 10 6 (L) 12/21/2020    HCT 33 9 (L) 12/21/2020     (H) 12/21/2020     (H) 12/21/2020    MCH 31 7 12/21/2020    MCHC 31 3 (L) 12/21/2020    RDW 12 9 12/21/2020    MPV 9 3 12/21/2020   , CMP:   Lab Results   Component Value Date    SODIUM 144 12/21/2020    K 3 6 12/21/2020     (H) 12/21/2020    CO2 23 12/21/2020    BUN 32 (H) 12/21/2020    CREATININE 0 96 12/21/2020    CALCIUM 8 7 12/21/2020    AST 45 12/21/2020    ALT 20 12/21/2020    ALKPHOS 77 12/21/2020    EGFR 54 12/21/2020   , PT/INR: No results found for: PT, INR, Troponin: No results found for: TROPONINI     Imaging Studies: I have personally reviewed pertinent reports  Chest x-ray 12/18/2020  FINDINGS:     Cardiomediastinal silhouette appears unremarkable      New bilateral patchy airspace disease within mid and lower lung fields        Osseous structures appear within normal limits for patient age      IMPRESSION:     New patchy airspace disease within the bilateral mid and lower lung fields consistent with the patient's clinical history of Covid-19 pneumonia  EKG, Pathology, and Other Studies: I have personally reviewed pertinent reports         PFT Results: NA     Code Status: Level 1 - Full Code

## 2020-12-21 NOTE — ASSESSMENT & PLAN NOTE
· Hypokalemia has been repleted  · Continue to follow    Results from last 7 days   Lab Units 12/21/20  0530 12/20/20  0602 12/19/20  0601 12/18/20  1828   POTASSIUM mmol/L 3 6 3 6 3 6 3 4*

## 2020-12-21 NOTE — ASSESSMENT & PLAN NOTE
· Macrocytic anemia  Follow-up on iron studies B36 and folic acid drawn this a m      Results from last 7 days   Lab Units 12/20/20  0602 12/19/20  0601 12/18/20  1828   HEMOGLOBIN g/dL 10 2* 9 5* 11 0*

## 2020-12-21 NOTE — PROGRESS NOTES
Progress Note - Estefania Salcedo 1935, 80 y o  female MRN: [id number]    [facility] [address] 221-01 Encounter: [id number]    Primary Care Provider: Deion Anne MD   Date and time admitted to hospital: 12/18/2020  5:50 PM        Anemia, macrocytic  Assessment & Plan  · Macrocytic anemia    B12 and folate within normal limits  · Low iron, low TIBC, mildly elevated ferritin but could be secondary to COVID  · Start supplementation, repeat iron panel outpatient    Results from last 7 days   Lab Units 12/21/20  0530 12/20/20  0602 12/19/20  0601 12/18/20  1828   HEMOGLOBIN g/dL 10 6* 10 2* 9 5* 11 0*       Acute metabolic encephalopathy  Assessment & Plan  · Acute metabolic encephalopathy secondary to COVID-19 and underlying cognitive decline  · Possibly secondary to steroids and history of anxiety     Hypokalemia  Assessment & Plan  · Hypokalemia has been repleted  · Continue to follow    Results from last 7 days   Lab Units 12/21/20  0530 12/20/20  0602 12/19/20  0601 12/18/20  1828   POTASSIUM mmol/L 3 6 3 6 3 6 3 4*       Bipolar 1 disorder (HCC)  Assessment & Plan  · Bipolar depression mood stable on lamictal quetiapine and topiramate    Hyperlipidemia  Assessment & Plan  · Hyperlipidemia placed on atorvastatin based on COVID-19 protocol    * COVID-19  Assessment & Plan  · COVID-19 infection with acute respiratory failure/hypoxia  · COVID-19:  Continue remdesivir and atorvastatin , dexamethasone  · trend inflammatory markers  · patient was requiring 5 L nasal cannula overnight and subsequently  Increased to 10 L this afternoon due to desaturation  · Consult pulmonology for assistance    Lab Results   Component Value Date    SARSCOV2 Positive (A) 12/18/2020    [facility] Not Detected 07/17/2020    SARSCOV2 Negative 07/13/2020     Results from last 7 days   Lab Units 12/21/20  0530 12/18/20  2230 12/18/20  2044   D-DIMER QUANTITATIVE ug/ml FEU 1 04*  --  1 47*   CRP mg/L 182 7* 200 0*  --    FERRITIN ng/mL 541* 413*  --      Results from last 7 days   Lab Units 20  0639 20  2044   PROCALCITONIN ng/ml 0 11 0 16     VTE Pharmacologic Prophylaxis: heparin   Pharmacologic: Heparin  Mechanical VTE Prophylaxis in Place: Yes    Patient Centered Rounds: I have performed bedside rounds with nursing staff today  Discussions with Specialists or Other Care Team Provider: None    Education and Discussions with Family / Patient: Family updated     Time Spent for Care: 30 minutes  More than 50% of total time spent on counseling and coordination of care as described above  Current Length of Stay: 3 day(s)    Current Patient Status: Inpatient   Certification Statement: The patient will continue to require additional inpatient hospital stay due to need for ongoing management of covid pneumonia     Discharge Plan: 48-72 hours     Code Status: Level 1 - Full Code      Subjective:    patient seen and evaluated at bedside  She appears quite anxious  She is concerned with a soft restraints are added because she was getting out of bed per nursing  She is alert oriented to person place and time does not know the hospital situation why she is here  She is hard of hearing  Objective:     Vitals:   Temp (24hrs), Av °F (36 7 °C), Min:97 2 °F (36 2 °C), Max:99 5 °F (37 5 °C)    Temp:  [97 2 °F (36 2 °C)-99 5 °F (37 5 °C)] 99 5 °F (37 5 °C)  HR:  [] 105  Resp:  [17-20] 17  BP: (109-120)/(66-73) 120/73  SpO2:  [91 %-92 %] 91 %  Body mass index is 19 76 kg/m²  Input and Output Summary (last 24 hours):     No intake or output data in the 24 hours ending 20 1525    Physical Exam:     Physical Exam  Constitutional:       General: She is not in acute distress  Appearance: She is well-developed  She is not diaphoretic  HENT:      Head: Normocephalic and atraumatic  Eyes:      General:         Right eye: No discharge  Left eye: No discharge        Conjunctiva/sclera: Conjunctivae normal  Cardiovascular:      Rate and Rhythm: Normal rate and regular rhythm  Pulmonary:      Effort: Pulmonary effort is normal  No respiratory distress  Musculoskeletal:         General: No deformity  Skin:     Findings: No erythema or rash  Neurological:      Mental Status: She is alert  Comments: Oriented x3 but not to situation, impulsive   Psychiatric:         Behavior: Behavior normal          Additional Data:     Labs: I have reviewed pertinent results     Results from last 7 days   Lab Units 12/21/20  0530  12/19/20  0601 12/18/20  1828   WBC Thousand/uL 2 71*   < > 2 96* 3 49*   HEMOGLOBIN g/dL 10 6*   < > 9 5* 11 0*   HEMATOCRIT % 33 9*   < > 30 9* 36 2   PLATELETS Thousands/uL 480*   < > 321 353   BANDS PCT %  --   --  2  --    NEUTROS PCT %  --   --   --  61   LYMPHS PCT %  --   --   --  30   LYMPHO PCT %  --   --  31  --    MONOS PCT %  --   --   --  8   MONO PCT %  --   --  3*  --    EOS PCT %  --   --  0 0    < > = values in this interval not displayed  Results from last 7 days   Lab Units 12/21/20  0530   SODIUM mmol/L 144   POTASSIUM mmol/L 3 6   CHLORIDE mmol/L 110*   CO2 mmol/L 23   BUN mg/dL 32*   CREATININE mg/dL 0 96   ANION GAP mmol/L 11   CALCIUM mg/dL 8 7   ALBUMIN g/dL 2 6*   TOTAL BILIRUBIN mg/dL 0 32   ALK PHOS U/L 77   ALT U/L 20   AST U/L 45   GLUCOSE RANDOM mg/dL 97                 Results from last 7 days   Lab Units 12/19/20  0639 12/18/20 2044 12/18/20  1828   LACTIC ACID mmol/L  --   --  1 2   PROCALCITONIN ng/ml 0 11 0 16  --          Imaging: I have reviewed pertinent imaging       Recent Cultures (last 7 days):     Results from last 7 days   Lab Units 12/18/20 2057 12/18/20 2044   BLOOD CULTURE  No Growth at 48 hrs  No Growth at 48 hrs         Last 24 Hours Medication List:   Current Facility-Administered Medications   Medication Dose Route Frequency Provider Last Rate    acetaminophen  650 mg Oral Q6H PRN Azalia Hernández PA-C      ascorbic acid  1,000 mg Oral Q12H [facility]      atorvastatin  40 mg Oral HS Azalia Hernández PA-C      cholecalciferol  2,000 Units Oral Daily Azalia Hernández PA-C      clonazePAM  0 5 mg Oral BID Azalia Hernández PA-C      dexamethasone  6 mg Intravenous Daily Wili Chavez DO      dicyclomine  10 mg Oral TID PRN Jeannine aGy DO      famotidine  20 mg Oral Daily Azalia Hernández PA-C      heparin (porcine)  5,000 Units Subcutaneous [facility] Azalia Hernández PA-C      lamoTRIgine  200 mg Oral Daily Before Breakfast Azalia Hernández PA-C      zinc sulfate  220 mg Oral Daily Azalia Hernández PA-C      Followed by   Jerral Kocher ON 12/26/2020] multivitamin-minerals  1 tablet Oral Daily Azalia Hernández PA-C      ondansetron  4 mg Intravenous Q6H PRN Azalia Hernández PA-C      oxybutynin  10 mg Oral Daily Azalia Hernández PA-C      pantoprazole  40 mg Oral Early Morning Azalia Hernández PA-C      QUEtiapine  400 mg Oral HS Azalia Hernández PA-C      remdesivir  100 mg Intravenous Q24H Azalia Hernández PA-C      senna-docusate sodium  1 tablet Oral BID Wili Chavez DO      topiramate  50 mg Oral BID Jaylan Macias PA-C          Today, Patient Was Seen By: Argentina Mckinley DO    ** Please Note: Dictation voice to text software may have been used in the creation of this document   **

## 2020-12-21 NOTE — ASSESSMENT & PLAN NOTE
· Macrocytic anemia    B12 and folate within normal limits  · Low iron, low TIBC, mildly elevated ferritin but could be secondary to COVID  · Start supplementation, repeat iron panel outpatient    Results from last 7 days   Lab Units 12/21/20  0530 12/20/20  0602 12/19/20  0601 12/18/20  1828   HEMOGLOBIN g/dL 10 6* 10 2* 9 5* 11 0*

## 2020-12-21 NOTE — CASE MANAGEMENT
GMLOS: not documented              LOS: 3  BUNDLED? no  UNPLANNED READMISSION LEVEL: low  30 DAY READMISSION? No    Pt admitted having Covid PNA currently stable on 5Lnc- not on Home O2- will need Home O2 eval prior to d/c  Pt resides at [facility] at the [facility] where she is independent with ADL's, ambulating with a RW independently  Has a h/o Bipolar with meds managed by Dr Bethany Jo with last [facility] admission being 8/2019  Denies D&A, legal issues, current in home (at [facility]) services, nor being in an SNF in last 60 days  Pt's sons and daughter are her HCR's per [facility]  PCP is Dr Lona Montez and [facility] is used for prescriptions  Director of [facility] asks for a copy of AVS and Scripts be faxed to her upon d/c  [facility] will be needed on d/c to transport back to facility  Will continue to follow to assist with dc poc

## 2020-12-21 NOTE — ASSESSMENT & PLAN NOTE
· Hypokalemia has been repleted    Results from last 7 days   Lab Units 12/21/20  0530 12/20/20  0602 12/19/20  0601 12/18/20  1828   POTASSIUM mmol/L 3 6 3 6 3 6 3 4*

## 2020-12-21 NOTE — ASSESSMENT & PLAN NOTE
· COVID-19 infection with acute respiratory failure/hypoxia  · COVID-19:  Continue remdesivir and atorvastatin , dexamethasone  ·  trend inflammatory markers  ·  patient was requiring 5 L nasal cannula overnight and subsequently  Increased to 10 L this afternoon due to desaturation  · Consult pulmonology for assistance    Lab Results   Component Value Date    SARSCOV2 Positive (A) 12/18/2020    [address] [address] Not Detected 07/17/2020    SARSCOV2 Negative 07/13/2020     Results from last 7 days   Lab Units 12/21/20  0530 12/18/20  2230 12/18/20  2044   D-DIMER QUANTITATIVE ug/ml FEU 1 04*  --  1 47*   CRP mg/L 182 7* 200 0*  --    FERRITIN ng/mL 541* 413*  --      Results from last 7 days   Lab Units 12/19/20  0639 12/18/20  2044   PROCALCITONIN ng/ml 0 11 0 16

## 2020-12-21 NOTE — ASSESSMENT & PLAN NOTE
· COVID-19 infection with acute respiratory failure/hypoxia  · COVID-19:  Continue remdesivir and atorvastatin , dexamethasone  · trend inflammatory markers  · Is requiring 5 L nasal cannula  · Had conversation to discuss convalescent plasma with family and they are thinking it over and let me know tomorrow    Lab Results   Component Value Date    SARSCOV2 Positive (A) 12/18/2020    [address] [facility] 98 Not Detected 07/17/2020    SARSCOV2 Negative 07/13/2020     Results from last 7 days   Lab Units 12/21/20  0530 12/18/20  2230 12/18/20  2044   D-DIMER QUANTITATIVE ug/ml FEU 1 04*  --  1 47*   CRP mg/L 182 7* 200 0*  --    FERRITIN ng/mL 541* 413*  --      Results from last 7 days   Lab Units 12/19/20  0639 12/18/20  2044   PROCALCITONIN ng/ml 0 11 0 16

## 2020-12-21 NOTE — ASSESSMENT & PLAN NOTE
· Acute metabolic encephalopathy secondary to COVID-19 and underlying cognitive decline  · Possibly secondary to steroids and history of anxiety

## 2020-12-21 NOTE — NURSING NOTE
Patient was informed to call for help before getting out of bed  Patient repeatedly violates bed alarm and tries to get out of bed without calling for help  Provider made aware  Patient safely escorted from bed to bathroom without issue  Will continue to monitor

## 2020-12-22 ENCOUNTER — APPOINTMENT (INPATIENT)
Dept: RADIOLOGY | Facility: HOSPITAL | Age: 85
DRG: 177 | End: 2020-12-22
Payer: COMMERCIAL

## 2020-12-22 LAB
ALBUMIN SERPL BCP-MCNC: 2.6 G/DL (ref 3.5–5)
ALP SERPL-CCNC: 83 U/L (ref 46–116)
ALT SERPL W P-5'-P-CCNC: 21 U/L (ref 12–78)
ANION GAP SERPL CALCULATED.3IONS-SCNC: 12 MMOL/L (ref 4–13)
AST SERPL W P-5'-P-CCNC: 52 U/L (ref 5–45)
BILIRUB SERPL-MCNC: 0.26 MG/DL (ref 0.2–1)
BUN SERPL-MCNC: 29 MG/DL (ref 5–25)
CALCIUM ALBUM COR SERPL-MCNC: 9.6 MG/DL (ref 8.3–10.1)
CALCIUM SERPL-MCNC: 8.5 MG/DL (ref 8.3–10.1)
CHLORIDE SERPL-SCNC: 111 MMOL/L (ref 100–108)
CO2 SERPL-SCNC: 24 MMOL/L (ref 21–32)
CREAT SERPL-MCNC: 0.81 MG/DL (ref 0.6–1.3)
CRP SERPL QL: 147.8 MG/L
D DIMER PPP FEU-MCNC: 0.86 UG/ML FEU
ERYTHROCYTE [DISTWIDTH] IN BLOOD BY AUTOMATED COUNT: 12.9 % (ref 11.6–15.1)
FERRITIN SERPL-MCNC: 451 NG/ML (ref 8–388)
GFR SERPL CREATININE-BSD FRML MDRD: 66 ML/MIN/1.73SQ M
GLUCOSE SERPL-MCNC: 119 MG/DL (ref 65–140)
HCT VFR BLD AUTO: 34.3 % (ref 34.8–46.1)
HGB BLD-MCNC: 10.4 G/DL (ref 11.5–15.4)
MCH RBC QN AUTO: 30.7 PG (ref 26.8–34.3)
MCHC RBC AUTO-ENTMCNC: 30.3 G/DL (ref 31.4–37.4)
MCV RBC AUTO: 101 FL (ref 82–98)
NRBC BLD AUTO-RTO: 0 /100 WBCS
PLATELET # BLD AUTO: 528 THOUSANDS/UL (ref 149–390)
PMV BLD AUTO: 9 FL (ref 8.9–12.7)
POTASSIUM SERPL-SCNC: 3.3 MMOL/L (ref 3.5–5.3)
PROT SERPL-MCNC: 7 G/DL (ref 6.4–8.2)
RBC # BLD AUTO: 3.39 MILLION/UL (ref 3.81–5.12)
SODIUM SERPL-SCNC: 147 MMOL/L (ref 136–145)
WBC # BLD AUTO: 6.22 THOUSAND/UL (ref 4.31–10.16)

## 2020-12-22 PROCEDURE — 85379 FIBRIN DEGRADATION QUANT: CPT | Performed by: INTERNAL MEDICINE

## 2020-12-22 PROCEDURE — 80053 COMPREHEN METABOLIC PANEL: CPT | Performed by: INTERNAL MEDICINE

## 2020-12-22 PROCEDURE — 85027 COMPLETE CBC AUTOMATED: CPT | Performed by: INTERNAL MEDICINE

## 2020-12-22 PROCEDURE — 86140 C-REACTIVE PROTEIN: CPT | Performed by: INTERNAL MEDICINE

## 2020-12-22 PROCEDURE — 71045 X-RAY EXAM CHEST 1 VIEW: CPT

## 2020-12-22 PROCEDURE — 99233 SBSQ HOSP IP/OBS HIGH 50: CPT | Performed by: INTERNAL MEDICINE

## 2020-12-22 PROCEDURE — 99232 SBSQ HOSP IP/OBS MODERATE 35: CPT | Performed by: INTERNAL MEDICINE

## 2020-12-22 PROCEDURE — 82728 ASSAY OF FERRITIN: CPT | Performed by: INTERNAL MEDICINE

## 2020-12-22 PROCEDURE — 97163 PT EVAL HIGH COMPLEX 45 MIN: CPT

## 2020-12-22 PROCEDURE — 97167 OT EVAL HIGH COMPLEX 60 MIN: CPT

## 2020-12-22 RX ORDER — DEXTROSE MONOHYDRATE 50 MG/ML
75 INJECTION, SOLUTION INTRAVENOUS CONTINUOUS
Status: DISCONTINUED | OUTPATIENT
Start: 2020-12-22 | End: 2020-12-23

## 2020-12-22 RX ORDER — OLANZAPINE 10 MG/1
5 INJECTION, POWDER, LYOPHILIZED, FOR SOLUTION INTRAMUSCULAR ONCE
Status: DISCONTINUED | OUTPATIENT
Start: 2020-12-22 | End: 2020-12-22

## 2020-12-22 RX ADMIN — DEXTROSE 75 ML/HR: 5 SOLUTION INTRAVENOUS at 18:42

## 2020-12-22 RX ADMIN — OXYCODONE HYDROCHLORIDE AND ACETAMINOPHEN 1000 MG: 500 TABLET ORAL at 21:56

## 2020-12-22 RX ADMIN — DOCUSATE SODIUM AND SENNOSIDES 1 TABLET: 8.6; 5 TABLET, FILM COATED ORAL at 18:42

## 2020-12-22 RX ADMIN — TOPIRAMATE 50 MG: 25 TABLET, FILM COATED ORAL at 18:42

## 2020-12-22 RX ADMIN — HEPARIN SODIUM 5000 UNITS: 5000 INJECTION INTRAVENOUS; SUBCUTANEOUS at 21:56

## 2020-12-22 RX ADMIN — ATORVASTATIN CALCIUM 40 MG: 40 TABLET, FILM COATED ORAL at 21:56

## 2020-12-22 RX ADMIN — HEPARIN SODIUM 5000 UNITS: 5000 INJECTION INTRAVENOUS; SUBCUTANEOUS at 14:57

## 2020-12-22 RX ADMIN — REMDESIVIR 100 MG: 100 INJECTION, POWDER, LYOPHILIZED, FOR SOLUTION INTRAVENOUS at 02:00

## 2020-12-22 RX ADMIN — CLONAZEPAM 0.5 MG: 0.5 TABLET ORAL at 18:42

## 2020-12-22 RX ADMIN — QUETIAPINE FUMARATE 400 MG: 200 TABLET ORAL at 21:56

## 2020-12-22 RX ADMIN — PANTOPRAZOLE SODIUM 40 MG: 40 TABLET, DELAYED RELEASE ORAL at 04:48

## 2020-12-22 RX ADMIN — ATORVASTATIN CALCIUM 40 MG: 40 TABLET, FILM COATED ORAL at 22:48

## 2020-12-22 NOTE — QUICK NOTE
QUICK NOTE - Deterioration Index  Kaleigh Carlson 80 y o  female MRN: [id number]  Unit/Bed#: [place] [address] 221-01 Encounter: [id number]    Date Paged: 20  Time Paged: [phone]  Room #: 0  Arrival Time: 2853  Deterioration index score at time of page: 48 8  %  [employer] sent to Dr Stanley Jarrell from primary team  Need to escalate level of care: no     PROBLEMS resulting in high DI score:   27% Age is 85   23% Supplemental oxygen is Nasal cannula   19% Neurological exam is Lethargic   18% Sodium is 147 mmol/L   8% Systolic is 208   2% BUN is 29 mg/dL   2% Potassium is 3 3 mmol/L   1% Respiratory rate is 17   1% Platelet count is 393 Thousands/uL   <1% Pulse is 65   <1% Hematocrit is 34 3 %   <1% Pulse oximetry is 97 %   <1% Temperature is 97 4 °F (36 3 °C)   <1% WBC count is 6 22 Thousand/uL          PLAN:     Pt stable, no acute need to escalate care   NC requirement is stable    Please contact critical care via [employer] with any questions or concerns       Vitals:   Vitals:    20 0005 20 0700 20 1259 20 1454   BP: 90/69 105/70  163/96   BP Location: Right arm Right arm  Right arm   Pulse:  94  (!) 49   Resp:    Temp: 98 °F (36 7 °C) (!) 97 °F (36 1 °C)  (!) 97 4 °F (36 3 °C)   TempSrc: Axillary Axillary  Oral   SpO2:  90%  92%   Weight:       Height:   5' 3" (1 6 m)        Respiratory:  SpO2: SpO2: 92 %, SpO2 Activity: SpO2 Activity: At Rest, SpO2 Device: O2 Device: Nasal cannula  Nasal Cannula O2 Flow Rate (L/min): 5 L/min    Temperature: Temp (24hrs), Av 5 °F (36 4 °C), Min:97 °F (36 1 °C), Max:98 °F (36 7 °C)  Current: Temperature: (!) 97 4 °F (36 3 °C)    Labs:   Results from last 7 days   Lab Units 20  0446 20  0530 20  0602 20  0601 20  1828   WBC Thousand/uL 6 22 2 71* 3 12* 2 96* 3 49*   HEMOGLOBIN g/dL 10 4* 10 6* 10 2* 9 5* 11 0*   HEMATOCRIT % 34 3* 33 9* 33 5* 30 9* 36 2   PLATELETS Thousands/uL 528* 480* 358 321 353   NEUTROS PCT %  --   --   --   --  61   MONOS PCT %  --   --   --   --  8   MONO PCT %  --   --   --  3*  --      Results from last 7 days   Lab Units 12/22/20  0446 12/21/20  0530 12/20/20  0602   SODIUM mmol/L 147* 144 143   POTASSIUM mmol/L 3 3* 3 6 3 6   CHLORIDE mmol/L 111* 110* 110*   CO2 mmol/L 24 23 24   BUN mg/dL 29* 32* 24   CREATININE mg/dL 0 81 0 96 0 95   CALCIUM mg/dL 8 5 8 7 8 6   ALK PHOS U/L 83 77 71   ALT U/L 21 20 13   AST U/L 52* 45 32         Results from last 7 days   Lab Units 12/18/20  1828   LACTIC ACID mmol/L 1 2     Results from last 7 days   Lab Units 12/18/20  1828   TROPONIN I ng/mL 0 03     Results from last 7 days   Lab Units 12/19/20  0639 12/18/20  2044   PROCALCITONIN ng/ml 0 11 0 16       Code Status: Level 1 - Full Code

## 2020-12-22 NOTE — PLAN OF CARE
Problem: Potential for Falls  Goal: Patient will remain free of falls  Description: INTERVENTIONS:  - Assess patient frequently for physical needs  -  Identify cognitive and physical deficits and behaviors that affect risk of falls    -  [place] fall precautions as indicated by assessment   - Educate patient/family on patient safety including physical limitations  - Instruct patient to call for assistance with activity based on assessment  - Modify environment to reduce risk of injury  - Consider OT/PT consult to assist with strengthening/mobility  Outcome: Progressing     Problem: Prexisting or High Potential for Compromised Skin Integrity  Goal: Skin integrity is maintained or improved  Description: INTERVENTIONS:  - Identify patients at risk for skin breakdown  - Assess and monitor skin integrity  - Assess and monitor nutrition and hydration status  - Monitor labs   - Assess for incontinence   - Turn and reposition patient  - Assist with mobility/ambulation  - Relieve pressure over bony prominences  - Avoid friction and shearing  - Provide appropriate hygiene as needed including keeping skin clean and dry  - Evaluate need for skin moisturizer/barrier cream  - Collaborate with interdisciplinary team   - Patient/family teaching  - Consider wound care consult   Outcome: Progressing     Problem: PAIN - ADULT  Goal: Verbalizes/displays adequate comfort level or baseline comfort level  Description: Interventions:  - Encourage patient to monitor pain and request assistance  - Assess pain using appropriate pain scale  - Administer analgesics based on type and severity of pain and evaluate response  - Implement non-pharmacological measures as appropriate and evaluate response  - Consider cultural and social influences on pain and pain management  - Notify physician/advanced practitioner if interventions unsuccessful or patient reports new pain  Outcome: Progressing     Problem: INFECTION - ADULT  Goal: Absence or prevention of progression during hospitalization  Description: INTERVENTIONS:  - Assess and monitor for signs and symptoms of infection  - Monitor lab/diagnostic results  - Monitor all insertion sites, i e  indwelling lines, tubes, and drains  - Monitor endotracheal if appropriate and nasal secretions for changes in amount and color  - [place] appropriate cooling/warming therapies per order  - Administer medications as ordered  - Instruct and encourage patient and family to use good hand hygiene technique  - Identify and instruct in appropriate isolation precautions for identified infection/condition  Outcome: Progressing  Goal: Absence of fever/infection during neutropenic period  Description: INTERVENTIONS:  - Monitor WBC    Outcome: Progressing     Problem: SAFETY ADULT  Goal: Patient will remain free of falls  Description: INTERVENTIONS:  - Assess patient frequently for physical needs  -  Identify cognitive and physical deficits and behaviors that affect risk of falls    -  [place] fall precautions as indicated by assessment   - Educate patient/family on patient safety including physical limitations  - Instruct patient to call for assistance with activity based on assessment  - Modify environment to reduce risk of injury  - Consider OT/PT consult to assist with strengthening/mobility  Outcome: Progressing  Goal: Maintain or return to baseline ADL function  Description: INTERVENTIONS:  -  Assess patient's ability to carry out ADLs; assess patient's baseline for ADL function and identify physical deficits which impact ability to perform ADLs (bathing, care of mouth/teeth, toileting, grooming, dressing, etc )  - Assess/evaluate cause of self-care deficits   - Assess range of motion  - Assess patient's mobility; develop plan if impaired  - Assess patient's need for assistive devices and provide as appropriate  - Encourage maximum independence but intervene and supervise when necessary  - Involve family in performance of ADLs  - Assess for home care needs following discharge   - Consider OT consult to assist with ADL evaluation and planning for discharge  - Provide patient education as appropriate  Outcome: Progressing  Goal: Maintain or return mobility status to optimal level  Description: INTERVENTIONS:  - Assess patient's baseline mobility status (ambulation, transfers, stairs, etc )    - Identify cognitive and physical deficits and behaviors that affect mobility  - Identify mobility aids required to assist with transfers and/or ambulation (gait belt, sit-to-stand, lift, walker, cane, etc )  - [place] fall precautions as indicated by assessment  - Record patient progress and toleration of activity level on Mobility SBAR; progress patient to next Phase/Stage  - Instruct patient to call for assistance with activity based on assessment  - Consider rehabilitation consult to assist with strengthening/weightbearing, etc   Outcome: Progressing     Problem: DISCHARGE PLANNING  Goal: Discharge to home or other facility with appropriate resources  Description: INTERVENTIONS:  - Identify barriers to discharge w/patient and caregiver  - Arrange for needed discharge resources and transportation as appropriate  - Identify discharge learning needs (meds, wound care, etc )  - Arrange for interpretive services to assist at discharge as needed  - Refer to Case Management Department for coordinating discharge planning if the patient needs post-hospital services based on physician/advanced practitioner order or complex needs related to functional status, cognitive ability, or social support system  Outcome: Progressing     Problem: Knowledge Deficit  Goal: Patient/family/caregiver demonstrates understanding of disease process, treatment plan, medications, and discharge instructions  Description: Complete learning assessment and assess knowledge base    Interventions:  - Provide teaching at level of understanding  - Provide teaching via preferred learning methods  Outcome: Progressing

## 2020-12-22 NOTE — PHYSICAL THERAPY NOTE
Physical Therapy Evaluation    Patient's Name: Ingrid Velasquez    Admitting Diagnosis  Leg pain [M79 606]  Altered mental status [R41 82]  Hypoxia [R09 02]  Pneumonia due to COVID-19 virus [U07 1, J12 89]    Problem List  Patient Active Problem List   Diagnosis    Depression    Gastroesophageal reflux disease    Hyperlipidemia    Essential hypertension    Tobacco dependence syndrome    Sciatica    Diarrhea    Bipolar 1 disorder ([facility] )    Lower abdominal pain    Anemia    Severe protein-calorie malnutrition (HCC)    Vitamin B12 deficiency    Physical deconditioning    Chest pain in adult    Headache    Anxiety state    Acute on chronic cholecystitis    Disorder of gallbladder    Diverticulosis of colon    Umbilical hernia    Prophylactic antibiotic    OAB (overactive bladder)    Constipation    Hypokalemia    COVID-19    Acute metabolic encephalopathy    Anemia, macrocytic       Past Medical History  Past Medical History:   Diagnosis Date    Anemia 2/26/2019    Anxiety     Bipolar 1 disorder ([facility] )     Depression     DVT (deep venous thrombosis) ([facility] ) 2008    Left leg    GERD (gastroesophageal reflux disease)     Hypertension     Overactive bladder        Past Surgical History  Past Surgical History:   Procedure Laterality Date    ADENOIDECTOMY      CATARACT EXTRACTION Bilateral     Right 10/2003, left 4/2004    CHOLECYSTECTOMY      DILATION AND CURETTAGE OF UTERUS  1985    HERNIA REPAIR  11/02/2007    Femoral and small bowel resection    HIP SURGERY      L hip    TONSILLECTOMY      As a youth    WRIST SURGERY      L wrist       Recent Imaging  XR chest portable   Final Result by Harley Grijalva MD (12/22 1000)      Worsening multifocal airspace disease compatible with known Covid 19 pneumonia                    Workstation performed: [id number]         XR chest 1 view portable   ED Interpretation by Seng Zamudio DO (12/18 1926)   Multilobar pneumonia      Final Result by Raisa Rosas MD (12/19 1311)      New patchy airspace disease within the bilateral mid and lower lung fields consistent with the patient's clinical history of Covid-19 pneumonia  Workstation performed: [id number]             Recent Vital Signs  Vitals:    12/22/20 0005 12/22/20 0700 12/22/20 1259 12/22/20 1454   BP: 90/69 105/70  163/96   BP Location: Right arm Right arm  Right arm   Pulse:  94  (!) 49   Resp: 21 19 17   Temp: 98 °F (36 7 °C) (!) 97 °F (36 1 °C)  (!) 97 4 °F (36 3 °C)   TempSrc: Axillary Axillary  Oral   SpO2:  90%  92%   Weight:       Height:   5' 3" (1 6 m)         12/22/20 1330   PT Last Visit   PT Visit Date 12/22/20   Note Type   Note type Evaluation   Pain Assessment   Pain Assessment Tool 0-10   Pain Score No Pain   Home Living   Type of Home Assisted living   Additional Comments A[facility]B at [facility]; pt is confused and unable to report accurate home settup at this time, info from CM note   Prior Function   Level of [place] Independent with ADLs and functional mobility   ADL Assistance Independent   Comments per CM note pt was ind with ADLs and Ind with mobility with RW at baseline   Restrictions/Precautions   Weight Bearing Precautions Per Order No   Other Precautions Contact/isolation; Airborne/isolation;Cognitive; Restraints;O2;Fall Risk;Hard of hearing; Chair Alarm; Bed Alarm; Impulsive   General   Family/Caregiver Present No   Cognition   Overall Cognitive Status Impaired   Arousal/Participation Cooperative   Orientation Level Oriented to person   Memory Unable to assess   Following Commands Follows one step commands with increased time or repetition   Comments pt is hard of hearing   RLE Assessment   RLE Assessment WFL  (weakness but 3/5 at least)   LLE Assessment   LLE Assessment WFL  (weakness but 3/5 at least)   Coordination   Movements are Fluid and Coordinated 0   Coordination and Movement Description tremors and overall decreased coordination    Sensation (unable to assess)   Bed Mobility   Supine to Sit 3  Moderate assistance   Additional items Assist x 1;Bedrails; Increased time required;Verbal cues;LE management;HOB elevated   Sit to Supine 3  Moderate assistance   Additional items Assist x 1;Bedrails;HOB elevated; Increased time required;Verbal cues;LE management   Additional Comments pt very stiff   Transfers   Sit to Stand 4  Minimal assistance   Additional items Assist x 1; Armrests; Increased time required;Verbal cues; Impulsive   Stand to Sit 4  Minimal assistance   Additional items Assist x 1; Armrests; Increased time required;Verbal cues; Impulsive   Toilet transfer 4  Minimal assistance   Additional items Assist x 1; Armrests; Increased time required;Verbal cues;Standard toilet; Impulsive   Additional Comments with RW   Ambulation/Elevation   Gait pattern Excessively slow; Short stride; Foward flexed;Narrow VLADIMIR; Improper Weight shift   Gait Assistance 4  Minimal assist   Additional items Assist x 1;Verbal cues; Tactile cues   Assistive Device Rolling walker   Distance 20ft x2   Balance   Static Sitting Fair -   Dynamic Sitting Poor +   Static Standing Fair -   Dynamic Standing Poor +   Ambulatory Poor +   Endurance Deficit   Endurance Deficit Yes   Endurance Deficit Description increased O2 needs for baseline   Activity Tolerance   Activity Tolerance Patient limited by fatigue   Medical Staff Made Aware spoke to OT   Nurse Made Aware spoke to RN   Assessment   Prognosis Fair   Problem List Decreased strength;Decreased endurance; Impaired balance;Decreased mobility; Decreased coordination;Decreased cognition; Impaired judgement;Decreased safety awareness; Impaired hearing   Barriers to Discharge Inaccessible home environment;Decreased caregiver support   Goals   Patient Goals pt not reporting due to COG   STG Expiration Date 01/01/21   Short Term Goal #1 Pt will complete bed mobility, transfers, ambulation with RW 150ft mod I to return home     Plan Treatment/Interventions Functional transfer training;LE strengthening/ROM; Therapeutic exercise; Endurance training;Patient/family training;Cognitive reorientation;Equipment eval/education; Bed mobility;Gait training;Spoke to nursing;Spoke to case management;OT;Elevations   PT Frequency   (3-5x/wk)   Recommendation   PT Discharge Recommendation Post-Acute Rehabilitation Services   Equipment Recommended Walker   PT - OK to Discharge Yes   Additional Comments to rehab when medically cleared   [address] Mobility Inpatient   Turning in Bed Without Bedrails 2   Lying on Back to Sitting on Edge of Flat Bed 3   Moving Bed to Chair 3   Standing Up From Chair 3   Walk in Room 3   Climb 3-5 Stairs 2   Basic Mobility Inpatient Raw Score 16   Basic Mobility Standardized Score 38 32         ASSESSMENT                                                                                                                     Harshil Bhatti is a 80 y o  [facility]-19  Pt  has a past medical history of Anemia (2/26/2019), Anxiety, Bipolar 1 disorder ([facility] ), Depression, DVT (deep venous thrombosis) ([facility] ) (2008), GERD (gastroesophageal reflux disease), Hypertension, and Overactive bladder  PT was consulted and pt was seen on 12/22/2020 for mobility assessment and d/c planning  Pt presents supine in bed alert, confused, following most commands although struggling to understand some due to being hard of hearing  She does not report any pain at this time however was reporting back pain earlier  Very stiff when first attempting to move and somewhat retropulsive when first standing however improved with increased ambulation  Requires frequent VC for safety due to confusion  Pt is currently functioning at a moderate assistance x1 level for bed mobility, minimum assistance x1 level for transfers, minimum assistance x1 level for ambulation with Rolling Walker   Pt will benefit from continued skilled IP PT to address the above mentioned impairments  in order to maximize recovery and increase functional independence when completing mobility and ADLs  Currently PT recommendations for DME include RW  At this time PT recommendations for d/c are short term inpt rehab          Elizabeth Leach PT, DPT

## 2020-12-22 NOTE — PROGRESS NOTES
Progress Note - Dorothy Alcala 1935, 80 y o  female MRN: [id number]    Unit/Bed#: [address] 221[address]01 Encounter: [id number]    Primary Care Provider: Glade Frankel, MD   Date and time admitted to hospital: 12/18/2020  5:50 PM        Anemia, macrocytic  Assessment & Plan  · Macrocytic anemia    B12 and folate within normal limits  · Low iron, low TIBC, mildly elevated ferritin but could be secondary to COVID  · Start supplementation, repeat iron panel outpatient    Results from last 7 days   Lab Units 12/21/20  0530 12/20/20  0602 12/19/20  0601 12/18/20  1828   HEMOGLOBIN g/dL 10 6* 10 2* 9 5* 11 0*       Acute metabolic encephalopathy  Assessment & Plan  · Acute metabolic encephalopathy secondary to COVID-19 and underlying cognitive decline  · Possibly secondary to steroids and history of anxiety   · If no improvement will consult Geriatrics for evaluation    Hypokalemia  Assessment & Plan  · Hypokalemia has been repleted  · Continue to follow    Results from last 7 days   Lab Units 12/21/20  0530 12/20/20  0602 12/19/20  0601 12/18/20  1828   POTASSIUM mmol/L 3 6 3 6 3 6 3 4*       Bipolar 1 disorder ([facility] )  Assessment & Plan  · Bipolar depression  · Patient appears anxious  · Continue Lamictal, quetiapine, topiramate, clonazepam     Hyperlipidemia  Assessment & Plan  · Hyperlipidemia placed on atorvastatin based on COVID-19 protocol    * COVID-19  Assessment & Plan  · COVID-19 infection with acute respiratory failure/hypoxia  · COVID-19:  Continue remdesivir and atorvastatin , dexamethasone  · trend inflammatory markers  · Is requiring 5 L nasal cannula  · Had conversation to discuss convalescent plasma with family and they are thinking it over and let me know tomorrow    Lab Results   Component Value Date    SARSCOV2 Positive (A) 12/18/2020    Mary Muck Not Detected 07/17/2020    SARSCOV2 Negative 07/13/2020     Results from last 7 days   Lab Units 12/21/20  0530 12/18/20  2230 12/18/20  2044   D-DIMER QUANTITATIVE ug/ml FEU 1 04*  --  1 47*   CRP mg/L 182 7* 200 0*  --    FERRITIN ng/mL 541* 413*  --      Results from last 7 days   Lab Units 20  0639 20  2044   PROCALCITONIN ng/ml 0 11 0 16         VTE Pharmacologic Prophylaxis:  Heparin  Pharmacologic: Heparin  Mechanical VTE Prophylaxis in Place: Yes    Patient Centered Rounds: I have performed bedside rounds with nursing staff today  Discussions with Specialists or Other Care Team Provider: None    Education and Discussions with Family / Patient: Plan discussed with son     Time Spent for Care: 30 minutes  More than 50% of total time spent on counseling and coordination of care as described above  Current Length of Stay: 4 day(s)    Current Patient Status: Inpatient   Certification Statement: The patient will continue to require additional inpatient hospital stay due to Need for IV fluids, inpatient management of COVID pneumonia    Discharge Plan:  Pending improvement in oxygenation, COVID pneumonia    Code Status: Level 1 - Full Code      Subjective:   Patient seen evaluated at bedside  She is quite anxious  She refused her medications this morning and ripped out her IV per nursing  She reports that she is going to die  She is alert and oriented x3 during this discussion  Objective:     Vitals:   Temp (24hrs), Av 5 °F (36 4 °C), Min:97 °F (36 1 °C), Max:98 °F (36 7 °C)    Temp:  [97 °F (36 1 °C)-98 °F (36 7 °C)] 97 4 °F (36 3 °C)  HR:  [49-94] 49  Resp:  [17-21] 17  BP: ()/(69-96) 163/96  SpO2:  [90 %-92 %] 92 %  Body mass index is 19 76 kg/m²  Input and Output Summary (last 24 hours): Intake/Output Summary (Last 24 hours) at 2020 1747  Last data filed at 2020 0601  Gross per 24 hour   Intake    Output 855 ml   Net -855 ml       Physical Exam:     Physical Exam  Constitutional:       General: She is not in acute distress  Appearance: She is well-developed  She is not diaphoretic     HENT: Head: Normocephalic and atraumatic  Eyes:      General:         Right eye: No discharge  Left eye: No discharge  Conjunctiva/sclera: Conjunctivae normal    Cardiovascular:      Rate and Rhythm: Normal rate and regular rhythm  Pulmonary:      Effort: Pulmonary effort is normal  No respiratory distress  Musculoskeletal:         General: No deformity  Skin:     Findings: No erythema or rash  Neurological:      General: No focal deficit present  Mental Status: She is alert  Mental status is at baseline  Psychiatric:         Behavior: Behavior normal          Thought Content: Thought content normal          Judgment: Judgment normal       Comments: Quite anxious         Additional Data:     Labs: I have reviewed pertinent results     Results from last 7 days   Lab Units 12/22/20  0446  12/19/20  0601 12/18/20  1828   WBC Thousand/uL 6 22   < > 2 96* 3 49*   HEMOGLOBIN g/dL 10 4*   < > 9 5* 11 0*   HEMATOCRIT % 34 3*   < > 30 9* 36 2   PLATELETS Thousands/uL 528*   < > 321 353   BANDS PCT %  --   --  2  --    NEUTROS PCT %  --   --   --  61   LYMPHS PCT %  --   --   --  30   LYMPHO PCT %  --   --  31  --    MONOS PCT %  --   --   --  8   MONO PCT %  --   --  3*  --    EOS PCT %  --   --  0 0    < > = values in this interval not displayed       Results from last 7 days   Lab Units 12/22/20  0446   SODIUM mmol/L 147*   POTASSIUM mmol/L 3 3*   CHLORIDE mmol/L 111*   CO2 mmol/L 24   BUN mg/dL 29*   CREATININE mg/dL 0 81   ANION GAP mmol/L 12   CALCIUM mg/dL 8 5   ALBUMIN g/dL 2 6*   TOTAL BILIRUBIN mg/dL 0 26   ALK PHOS U/L 83   ALT U/L 21   AST U/L 52*   GLUCOSE RANDOM mg/dL 119                 Results from last 7 days   Lab Units 12/19/20  0639 12/18/20 2044 12/18/20  1828   LACTIC ACID mmol/L  --   --  1 2   PROCALCITONIN ng/ml 0 11 0 16  --          Imaging: I have reviewed pertinent imaging       Recent Cultures (last 7 days):     Results from last 7 days   Lab Units 12/18/20 2057 12/18/20 2044   BLOOD CULTURE  No Growth at 72 hrs  No Growth at 72 hrs  Last 24 Hours Medication List:   Current Facility-Administered Medications   Medication Dose Route Frequency Provider Last Rate    acetaminophen  650 mg Oral Q6H PRN Azalia Hernández PA-C      ascorbic acid  1,000 mg Oral Q12H [facility] Azalia Hernández PA-C      atorvastatin  40 mg Oral HS Azalia Hernández PA-C      cholecalciferol  2,000 Units Oral Daily Azalia Hernández PA-C      clonazePAM  0 5 mg Oral BID Azalia Hernández PA-C      dexamethasone  6 mg Intravenous Daily Aman Mckeon DO      dextrose  75 mL/hr Intravenous Continuous Renee Ortiz DO      dicyclomine  10 mg Oral TID PRN Aman Mckeon DO      famotidine  20 mg Oral Daily Azalia Hernández PA-C      ferrous sulfate  325 mg Oral Daily With Breakfast Renee Ortiz DO      heparin (porcine)  5,000 Units Subcutaneous [facility] Azalia Hernández PA-C      lamoTRIgine  200 mg Oral Daily Before Breakfast Azalia Hernández PA-C      zinc sulfate  220 mg Oral Daily Azalia Hernández PA-C      Followed by   Bryson Shaun ON 12/26/2020] multivitamin-minerals  1 tablet Oral Daily Azalia Hernández PA-C      ondansetron  4 mg Intravenous Q6H PRN Azalia Hernández PA-C      oxybutynin  10 mg Oral Daily Azalia Hernández PA-C      pantoprazole  40 mg Oral Early Morning Azalia Hernández PA-C      QUEtiapine  400 mg Oral HS Azalia Hernández PA-C      remdesivir  100 mg Intravenous Q24H Azalia Hernández PA-C      senna-docusate sodium  1 tablet Oral BID Wili Chavez DO      topiramate  50 mg Oral BID Alberto Champion PA-C          Today, Patient Was Seen By: Renee Ortiz DO    ** Please Note: Dictation voice to text software may have been used in the creation of this document   **

## 2020-12-22 NOTE — PLAN OF CARE
Problem: PHYSICAL THERAPY ADULT  Goal: Performs mobility at highest level of function for planned discharge setting  See evaluation for individualized goals  Description: Treatment/Interventions: Functional transfer training, LE strengthening/ROM, Therapeutic exercise, Endurance training, Patient/family training, Cognitive reorientation, Equipment eval/education, Bed mobility, Gait training, Spoke to nursing, Spoke to case management, OT, Elevations  Equipment Recommended: Angel Scripture       See flowsheet documentation for full assessment, interventions and recommendations  Note: Prognosis: Fair  Problem List: Decreased strength, Decreased endurance, Impaired balance, Decreased mobility, Decreased coordination, Decreased cognition, Impaired judgement, Decreased safety awareness, Impaired hearing     Barriers to Discharge: Inaccessible home environment, Decreased caregiver support     PT Discharge Recommendation: [address]     PT - OK to Discharge: Yes    See flowsheet documentation for full assessment

## 2020-12-22 NOTE — PROGRESS NOTES
RN reports pt slept through B, will be receiving L soon  Unknown intake prior to this, no intakes documented per flowsheets  Noting no significant wt changes as of recent, wt appears to vary  Continue with regular diet  Will order ensure compact TID to optimize intake

## 2020-12-22 NOTE — OCCUPATIONAL THERAPY NOTE
Occupational Therapy Evaluation     Patient Name: Harsihl BAR'S Date: 12/22/2020  Problem List  Principal Problem:    COVID-19  Active Problems:    Hyperlipidemia    Bipolar 1 disorder ([facility] )    Hypokalemia    Acute metabolic encephalopathy    Anemia, macrocytic    Past Medical History  Past Medical History:   Diagnosis Date    Anemia 2/26/2019    Anxiety     Bipolar 1 disorder ([facility] )     Depression     DVT (deep venous thrombosis) ([facility] ) 2008    Left leg    GERD (gastroesophageal reflux disease)     Hypertension     Overactive bladder      Past Surgical History  Past Surgical History:   Procedure Laterality Date    ADENOIDECTOMY      CATARACT EXTRACTION Bilateral     Right 10/2003, left 4/2004    CHOLECYSTECTOMY      DILATION AND CURETTAGE OF UTERUS  1985    HERNIA REPAIR  11/02/2007    Femoral and small bowel resection    HIP SURGERY      L hip    TONSILLECTOMY      As a youth    WRIST SURGERY      L wrist           12/22/20 1350   Note Type   Note type Evaluation   Restrictions/Precautions   Weight Bearing Precautions Per Order No   Other Precautions Contact/isolation; Airborne/isolation;Cognitive; Chair Alarm; Bed Alarm; Restraints;O2;Fall Risk;Impulsive;Hard of hearing   Pain Assessment   Pain Assessment Tool FLACC   Pain Score No Pain   Pain Rating: FLACC (Rest) - Face 0   Pain Rating: FLACC (Rest) - Legs 0   Pain Rating: FLACC (Rest) - Activity 0   Pain Rating: FLACC (Rest) - Cry 0   Pain Rating: FLACC (Rest) - Consolability 0   Score: FLACC (Rest) 0   Pain Rating: FLACC (Activity) - Face 0   Pain Rating: FLACC (Activity) - Legs 0   Pain Rating: FLACC (Activity) - Activity 0   Pain Rating: FLACC (Activity) - Cry 0   Pain Rating: FLACC (Activity) - Consolability 0   Score: FLACC (Activity) 0   Home Living   Type of Home Assisted living  ([facility] assisted)   Home Equipment Walker   Additional Comments Pt is poor historian, info on home setup and PLOF obtained from pt's chart   Per CM note, pt lives at [facility] at the [address] [place] [address]  Prior Function   Level of [place] Independent with ADLs and functional mobility; Needs assistance with IADLs   Lives With Facility staff   Receives Help From Personal care attendant   ADL Assistance Independent   IADLs Needs assistance   Falls in the last 6 months   (unknown, pt unable to report)   Vocational Retired   Comments At baseline, pt was I w/ ADLs and functional mobility/transfers w/ use of RW, required assist w/ IADLs, and (-)   Unknown fall hx, pt unable to report  Lifestyle   Autonomy At baseline, pt was I w/ ADLs and functional mobility/transfers w/ use of RW, required assist w/ IADLs, and (-)   Unknown fall hx, pt unable to report  Reciprocal Relationships 2 sons, dtr   Service to Others Retired   Intrinsic Gratification Watching TV   ADL   Where Assessed Edge of bed   Eating Assistance 5  Supervision/Setup   Grooming Assistance 5  Supervision/Setup   [address]  Minimal Assistance   LB [address] 3  Moderate Assistance   [address] 4  [address] 3  Moderate [address]  3  Moderate Assistance   Functional Assistance 4  Minimal Assistance   Bed Mobility   Supine to Sit 3  Moderate assistance   Additional items Assist x 1;HOB elevated; Bedrails; Increased time required;Verbal cues;LE management   Sit to Supine 3  Moderate assistance   Additional items Assist x 1; Increased time required;Verbal cues;LE management   Additional Comments Pt lying supine at end of session w/ bed alarm activated and B/L restraints in place  All needs met and pt reports no further questions for OT at this time   Transfers   Sit to Stand 4  Minimal assistance   Additional items Assist x 1;Bedrails; Increased time required;Verbal cues   Stand to Sit 4  Minimal assistance   Additional items Assist x 1; Increased time required;Verbal cues   Toilet transfer 4  Minimal assistance   Additional items Assist x 1; Increased time required;Verbal cues;Standard toilet  (grab bar use)   Additional Comments Cues for safe technique and hand placement   Functional Mobility   Functional Mobility 4  Minimal assistance   Additional Comments Assist x1   Additional items Rolling walker   Balance   Static Sitting Fair -   Dynamic Sitting Poor +   Static Standing Fair -   Dynamic Standing Poor +   Ambulatory Poor +   Activity Tolerance   Activity Tolerance Patient limited by fatigue   Medical Staff [facility],    Nurse Made Aware yes; Kayce Fisher RN   RUE Assessment   RUE Assessment WFL   RUE Strength   RUE Overall Strength Within Functional Limits - able to perform ADL tasks with strength  (3+/5 throughout)   LUE Assessment   LUE Assessment WFL   LUE Strength   LUE Overall Strength Within Functional Limits - able to perform ADL tasks with strength  (3+/5 throughout)   Hand Function   Gross Motor Coordination Functional   Fine Motor Coordination Functional   Vision - Complex Assessment   Additional Comments Able to accurately identify number of digits held up by therapist   Cognition   Overall Cognitive Status Impaired   Arousal/Participation Alert   Attention Attends with cues to redirect   Orientation Level Oriented to person;Disoriented to place; Disoriented to time;Disoriented to situation   Memory Decreased recall of precautions;Decreased recall of recent events;Decreased short term memory   Following Commands Follows one step commands with increased time or repetition   Comments [place]  Limited insight into deficits, decreased safety awareness   Assessment   Limitation Decreased ADL status; Decreased UE ROM; Decreased UE strength;Decreased cognition;Decreased Safe judgement during ADL;Decreased endurance;Decreased high-level ADLs; Decreased self-care trans   Prognosis Fair   Assessment Pt is a 80 y o  female seen for OT evaluation s/p adm to [facility] on 12/18/2020 w/ hypoxia and altered mental status and dx'd w/ PCEVS-14, acute metabolic encephalopathy, and hypokalemia  Comorbidities affecting pts functional performance include a significant PMH of Anxiety, Anemia, Bipolar 1 disorder, Depression, DVT, GERD, and HTN  Pt with active OT orders and activity orders for Up with assistance  Pt is poor historian, info on home setup and PLOF obtained from pt's chart  Per CM note, pt lives at [facility] at the [facility]  At baseline, pt was I w/ ADLs and functional mobility/transfers w/ use of RW, required assist w/ IADLs, and (-)   Unknown fall hx, pt unable to report  Upon evaluation, pt currently requires Min A for UB ADLs, Mod A for LB ADLs, Mod A for toileting, Mod A for bed mobility, and Min A for functional mobility/transfers 2* the following deficits impacting occupational performance: weakness, decreased strength, decreased balance, decreased tolerance, impaired memory, impaired problem solving and decreased safety awareness  These impairments, as well at pts difficulty performing ADLS and limited insight into deficits limit pts ability to safely engage in all baseline areas of occupation  Pt scored overall 35/100 on the Barthel Index  Pt to continue to benefit from continued acute OT services during hospital stay to address defined deficits and to maximize level of functional independence in the following Occupational Performance areas: grooming, bathing/shower, toilet hygiene, dressing, medication management, health maintenance, functional mobility, community mobility, clothing management and social participation  From OT standpoint, recommend OT at [facility] vs STR pending requirements of [facility] upon D/C   OT will continue to follow pt 3-5x/wk to address the following goals to  w/in 10-14 days:   Goals   Patient Goals None stated 2* cognitive deficits   LTG Time Frame 10-14   Long Term Goal Please refer to LTGs listed below   Plan   Treatment Interventions ADL retraining;Functional transfer training;UE strengthening/ROM; Endurance training;Patient/family training;Equipment evaluation/education; Compensatory technique education;Continued evaluation; Activityengagement; Energy conservation   Goal Expiration Date 01/05/21   OT Treatment Day 0   OT Frequency 3-5x/wk   Recommendation   OT Discharge Recommendation Other (Comment)  (return to [facility] w/ OT vs STR pending facility requirements)   OT - OK to Discharge Yes  (when medically cleared)   Barthel Index   Feeding 5   Bathing 0   Grooming Score 0   Dressing Score 5   Bladder Score 5   Bowels Score 5   Toilet Use Score 5   Transfers (Bed/Chair) Score 10   Mobility (Level Surface) Score 0   Stairs Score 0   Barthel Index Score 35   Modified Lexa Scale   Modified Lexa Scale 4        GOALS    1  Pt will improve activity tolerance to G for min 30 min txment sessions for increase engagement in functional tasks    2  Pt will complete bed mobility at a Mod I level w/ G balance/safety demonstrated to decrease caregiver assistance required     3  Pt will complete UB/LB dressing/self care w/ mod I using adaptive device and DME as needed    4  Pt will complete bathing w/ Mod I w/ use of AE and DME as needed    5  Pt will complete toileting w/ mod I w/ G hygiene/thoroughness using DME as needed    6  Pt will improve functional transfers to Mod I on/off all surfaces using DME as needed w/ G balance/safety     7  Pt will improve functional mobility during ADL/IADL/leisure tasks to Mod I using DME as needed w/ G balance/safety     8  Pt will be attentive 100% of the time during ongoing cognitive assessment w/ G participation to assist w/ safe d/c planning/recommendations    9  Pt will increase BUE strength by 1MM grade via AROM/AAROM/PROM exercises to increase independence in ADLs and transfers    10   Pt will increase standing tolerance to 8-10 mins with Fair+ dynamic standing balance to increase safety during participation in ADLs         Sissy Carlson OTR/L

## 2020-12-22 NOTE — PROGRESS NOTES
Progress Note - Pulmonary   Emory Christianson 80 y o  female MRN: [id number]  Unit/Bed#: [place] [id number] [id number] Encounter: [id number]    Assessment:  Acute hypoxic respiratory failure  COVID-19 pneumonia  Macrocytic anemia  Toxic metabolic encephalopathy  Bipolar disorder  Hyperlipidemia    Plan:  Patient is currently on 5 L by nasal cannula  Titrate to maintain SpO2 greater than 88%  Continue encourage cough and deep breathing exercises, pulmonary toileting, activity/out of bed as tolerated, self prone positioning as able  Today is day 3 of 5 of remdesivir   Continue IV Decadron 6 mg, day 3 of 10  DVT prophylaxis per protocol, D-dimer less than 2 5  Vitamins, Lipitor, H2 blocker  Consideration for convalescent plasma if any clinical worsening  Can continue to monitor off antibiotics for negative procalcitonin  Continue reorienting, avoid mind-altering medications, maintain strict aspiration precautions    Discussed with nursing    Subjective:   Patient seen examined at bedside  She is a poor historian  Remains confused  "Leave me alone"  Denies shortness of breath  Objective:     Vitals: Blood pressure 105/70, pulse 94, temperature (!) 97 °F (36 1 °C), temperature source Axillary, resp  rate 19, height 5' 3" (1 6 m), weight 50 6 kg (111 lb 8 8 oz), SpO2 90 %  ,Body mass index is 19 76 kg/m²  Intake/Output Summary (Last 24 hours) at 12/22/2020 1409  Last data filed at 12/22/2020 0601  Gross per 24 hour   Intake    Output 855 ml   Net -855 ml       Invasive Devices     Peripheral Intravenous Line            Peripheral IV 10/07/20 Left Antecubital 75 days                Physical Exam:   General appearance:  Awake, in no acute distress  Head: Normocephalic, without obvious abnormality, atraumatic  Eyes: EOMI  No discharge bilaterally  No scleral icterus     Neck: supple, symmetrical, trachea midline  Lungs:  Respirations unlabored  Heart: regular rate and rhythm, S1, S2 normal, no murmur, click, rub or gallop  Abdomen:  No appreciable distension or tenderness  Extremities: No edema or tenderness  Skin: No lesions or pallor noted  No rash  Neurologic:  Memory, cognition impaired    Labs: I have personally reviewed pertinent lab results  Imaging and other studies: I have personally reviewed pertinent reports

## 2020-12-22 NOTE — PLAN OF CARE
Problem: OCCUPATIONAL THERAPY ADULT  Goal: Performs self-care activities at highest level of function for planned discharge setting  See evaluation for individualized goals  Description: Treatment Interventions: ADL retraining, Functional transfer training, UE strengthening/ROM, Endurance training, Patient/family training, Equipment evaluation/education, Compensatory technique education, Continued evaluation, Activityengagement, Energy conservation          See flowsheet documentation for full assessment, interventions and recommendations  Note: Limitation: Decreased ADL status, Decreased UE ROM, Decreased UE strength, Decreased cognition, Decreased Safe judgement during ADL, Decreased endurance, Decreased high-level ADLs, Decreased self-care trans  Prognosis: Fair  Assessment: Pt is a 80 y o  female seen for OT evaluation s/p adm to [facility] on 12/18/2020 w/ hypoxia and altered mental status and dx'd w/ KPPOM-07, acute metabolic encephalopathy, and hypokalemia  Comorbidities affecting pts functional performance include a significant PMH of Anxiety, Anemia, Bipolar 1 disorder, Depression, DVT, GERD, and HTN  Pt with active OT orders and activity orders for Up with assistance  Pt is poor historian, info on home setup and PLOF obtained from pt's chart  Per CM note, pt lives at [facility] at the [facility]  At baseline, pt was I w/ ADLs and functional mobility/transfers w/ use of RW, required assist w/ IADLs, and (-)   Unknown fall hx, pt unable to report  Upon evaluation, pt currently requires Min A for UB ADLs, Mod A for LB ADLs, Mod A for toileting, Mod A for bed mobility, and Min A for functional mobility/transfers 2* the following deficits impacting occupational performance: weakness, decreased strength, decreased balance, decreased tolerance, impaired memory, impaired problem solving and decreased safety awareness   These impairments, as well at pts difficulty performing ADLS and limited insight into deficits limit pts ability to safely engage in all baseline areas of occupation  Pt scored overall 35/100 on the Barthel Index  Pt to continue to benefit from continued acute OT services during hospital stay to address defined deficits and to maximize level of functional independence in the following Occupational Performance areas: grooming, bathing/shower, toilet hygiene, dressing, medication management, health maintenance, functional mobility, community mobility, clothing management and social participation  From OT standpoint, recommend OT at [facility] vs STR pending requirements of shelter upon D/C   OT will continue to follow pt 3-5x/wk to address the following goals to  w/in 10-14 days:     OT Discharge Recommendation: Other (Comment)(return to shelter w/ OT vs STR pending facility requirements)  OT - OK to Discharge: Yes(when medically cleared) Yes

## 2020-12-23 PROBLEM — E87.0 HYPERNATREMIA: Status: ACTIVE | Noted: 2020-12-23

## 2020-12-23 LAB
ABO GROUP BLD: NORMAL
ABO GROUP BLD: NORMAL
ALBUMIN SERPL BCP-MCNC: 2.3 G/DL (ref 3.5–5)
ALP SERPL-CCNC: 81 U/L (ref 46–116)
ALT SERPL W P-5'-P-CCNC: 23 U/L (ref 12–78)
ANION GAP SERPL CALCULATED.3IONS-SCNC: 9 MMOL/L (ref 4–13)
AST SERPL W P-5'-P-CCNC: 45 U/L (ref 5–45)
BACTERIA BLD CULT: NORMAL
BACTERIA BLD CULT: NORMAL
BASOPHILS # BLD AUTO: 0.02 THOUSANDS/ΜL (ref 0–0.1)
BASOPHILS NFR BLD AUTO: 0 % (ref 0–1)
BILIRUB SERPL-MCNC: 0.38 MG/DL (ref 0.2–1)
BLD GP AB SCN SERPL QL: NEGATIVE
BUN SERPL-MCNC: 19 MG/DL (ref 5–25)
CALCIUM ALBUM COR SERPL-MCNC: 9.8 MG/DL (ref 8.3–10.1)
CALCIUM SERPL-MCNC: 8.4 MG/DL (ref 8.3–10.1)
CHLORIDE SERPL-SCNC: 109 MMOL/L (ref 100–108)
CO2 SERPL-SCNC: 24 MMOL/L (ref 21–32)
CREAT SERPL-MCNC: 0.71 MG/DL (ref 0.6–1.3)
CRP SERPL QL: 136.8 MG/L
D DIMER PPP FEU-MCNC: 0.87 UG/ML FEU
EOSINOPHIL # BLD AUTO: 0 THOUSAND/ΜL (ref 0–0.61)
EOSINOPHIL NFR BLD AUTO: 0 % (ref 0–6)
ERYTHROCYTE [DISTWIDTH] IN BLOOD BY AUTOMATED COUNT: 12.8 % (ref 11.6–15.1)
FERRITIN SERPL-MCNC: 408 NG/ML (ref 8–388)
GFR SERPL CREATININE-BSD FRML MDRD: 78 ML/MIN/1.73SQ M
GLUCOSE SERPL-MCNC: 103 MG/DL (ref 65–140)
HCT VFR BLD AUTO: 32.5 % (ref 34.8–46.1)
HGB BLD-MCNC: 10.2 G/DL (ref 11.5–15.4)
IMM GRANULOCYTES # BLD AUTO: 0.05 THOUSAND/UL (ref 0–0.2)
IMM GRANULOCYTES NFR BLD AUTO: 1 % (ref 0–2)
LYMPHOCYTES # BLD AUTO: 1.09 THOUSANDS/ΜL (ref 0.6–4.47)
LYMPHOCYTES NFR BLD AUTO: 17 % (ref 14–44)
MCH RBC QN AUTO: 31.6 PG (ref 26.8–34.3)
MCHC RBC AUTO-ENTMCNC: 31.4 G/DL (ref 31.4–37.4)
MCV RBC AUTO: 101 FL (ref 82–98)
MONOCYTES # BLD AUTO: 0.67 THOUSAND/ΜL (ref 0.17–1.22)
MONOCYTES NFR BLD AUTO: 11 % (ref 4–12)
NEUTROPHILS # BLD AUTO: 4.53 THOUSANDS/ΜL (ref 1.85–7.62)
NEUTS SEG NFR BLD AUTO: 71 % (ref 43–75)
NRBC BLD AUTO-RTO: 0 /100 WBCS
PLATELET # BLD AUTO: 499 THOUSANDS/UL (ref 149–390)
PMV BLD AUTO: 8.9 FL (ref 8.9–12.7)
POTASSIUM SERPL-SCNC: 3.1 MMOL/L (ref 3.5–5.3)
PROT SERPL-MCNC: 6.5 G/DL (ref 6.4–8.2)
RBC # BLD AUTO: 3.23 MILLION/UL (ref 3.81–5.12)
RH BLD: POSITIVE
RH BLD: POSITIVE
SODIUM SERPL-SCNC: 142 MMOL/L (ref 136–145)
SPECIMEN EXPIRATION DATE: NORMAL
WBC # BLD AUTO: 6.36 THOUSAND/UL (ref 4.31–10.16)

## 2020-12-23 PROCEDURE — 82728 ASSAY OF FERRITIN: CPT | Performed by: INTERNAL MEDICINE

## 2020-12-23 PROCEDURE — 99232 SBSQ HOSP IP/OBS MODERATE 35: CPT | Performed by: INTERNAL MEDICINE

## 2020-12-23 PROCEDURE — 85379 FIBRIN DEGRADATION QUANT: CPT | Performed by: INTERNAL MEDICINE

## 2020-12-23 PROCEDURE — 86901 BLOOD TYPING SEROLOGIC RH(D): CPT | Performed by: INTERNAL MEDICINE

## 2020-12-23 PROCEDURE — 86140 C-REACTIVE PROTEIN: CPT | Performed by: INTERNAL MEDICINE

## 2020-12-23 PROCEDURE — 86850 RBC ANTIBODY SCREEN: CPT | Performed by: INTERNAL MEDICINE

## 2020-12-23 PROCEDURE — 85025 COMPLETE CBC W/AUTO DIFF WBC: CPT | Performed by: INTERNAL MEDICINE

## 2020-12-23 PROCEDURE — 80053 COMPREHEN METABOLIC PANEL: CPT | Performed by: INTERNAL MEDICINE

## 2020-12-23 PROCEDURE — 86900 BLOOD TYPING SEROLOGIC ABO: CPT | Performed by: INTERNAL MEDICINE

## 2020-12-23 RX ORDER — POTASSIUM CHLORIDE 20 MEQ/1
40 TABLET, EXTENDED RELEASE ORAL
Status: DISCONTINUED | OUTPATIENT
Start: 2020-12-23 | End: 2020-12-23

## 2020-12-23 RX ORDER — POTASSIUM CHLORIDE 20 MEQ/1
40 TABLET, EXTENDED RELEASE ORAL EVERY 4 HOURS
Status: COMPLETED | OUTPATIENT
Start: 2020-12-23 | End: 2020-12-23

## 2020-12-23 RX ADMIN — CLONAZEPAM 0.5 MG: 0.5 TABLET ORAL at 09:58

## 2020-12-23 RX ADMIN — POTASSIUM CHLORIDE 40 MEQ: 1500 TABLET, EXTENDED RELEASE ORAL at 14:55

## 2020-12-23 RX ADMIN — CLONAZEPAM 0.5 MG: 0.5 TABLET ORAL at 17:12

## 2020-12-23 RX ADMIN — Medication 2000 UNITS: at 09:58

## 2020-12-23 RX ADMIN — DOCUSATE SODIUM AND SENNOSIDES 1 TABLET: 8.6; 5 TABLET, FILM COATED ORAL at 17:12

## 2020-12-23 RX ADMIN — OXYBUTYNIN 10 MG: 10 TABLET, FILM COATED, EXTENDED RELEASE ORAL at 09:58

## 2020-12-23 RX ADMIN — FERROUS SULFATE TAB 325 MG (65 MG ELEMENTAL FE) 325 MG: 325 (65 FE) TAB at 09:58

## 2020-12-23 RX ADMIN — ZINC SULFATE 220 MG (50 MG) CAPSULE 220 MG: CAPSULE at 09:58

## 2020-12-23 RX ADMIN — TOPIRAMATE 50 MG: 25 TABLET, FILM COATED ORAL at 17:12

## 2020-12-23 RX ADMIN — QUETIAPINE FUMARATE 400 MG: 200 TABLET ORAL at 21:14

## 2020-12-23 RX ADMIN — HEPARIN SODIUM 5000 UNITS: 5000 INJECTION INTRAVENOUS; SUBCUTANEOUS at 14:55

## 2020-12-23 RX ADMIN — DOCUSATE SODIUM AND SENNOSIDES 1 TABLET: 8.6; 5 TABLET, FILM COATED ORAL at 09:58

## 2020-12-23 RX ADMIN — TOPIRAMATE 50 MG: 25 TABLET, FILM COATED ORAL at 09:58

## 2020-12-23 RX ADMIN — FAMOTIDINE 20 MG: 20 TABLET ORAL at 09:58

## 2020-12-23 RX ADMIN — DEXAMETHASONE SODIUM PHOSPHATE 6 MG: 4 INJECTION INTRA-ARTICULAR; INTRALESIONAL; INTRAMUSCULAR; INTRAVENOUS; SOFT TISSUE at 09:59

## 2020-12-23 RX ADMIN — ATORVASTATIN CALCIUM 40 MG: 40 TABLET, FILM COATED ORAL at 21:14

## 2020-12-23 RX ADMIN — OXYCODONE HYDROCHLORIDE AND ACETAMINOPHEN 1000 MG: 500 TABLET ORAL at 20:00

## 2020-12-23 RX ADMIN — HEPARIN SODIUM 5000 UNITS: 5000 INJECTION INTRAVENOUS; SUBCUTANEOUS at 06:29

## 2020-12-23 RX ADMIN — POTASSIUM CHLORIDE 40 MEQ: 1500 TABLET, EXTENDED RELEASE ORAL at 11:54

## 2020-12-23 RX ADMIN — OXYCODONE HYDROCHLORIDE AND ACETAMINOPHEN 1000 MG: 500 TABLET ORAL at 09:58

## 2020-12-23 RX ADMIN — HEPARIN SODIUM 5000 UNITS: 5000 INJECTION INTRAVENOUS; SUBCUTANEOUS at 21:14

## 2020-12-23 NOTE — PROGRESS NOTES
Progress Note - Poncho Galvin 1935, 80 y o  female MRN: [id number]    Unit[facility] [id number] Encounter: [id number]    Primary Care Provider: Victor M Delgado MD   Date and time admitted to hospital: 12/18/2020  5:50 PM    Addendum:     Discussed case with Son ([place]) Roddy Carrasco  Family wishes to pursue convalescent plasma  Convalescent Plasma was ordered on 12/23/2020  The Fact Sheet for Patients and Parents/Caregivers was provided to the patient and/or healthcare agent  The option to accept or refuse administration was offered  The risks, benefits, and alternatives to treatment were reviewed, and all questions addressed  Disclosure that this treatment is authorized for use under EUA and not FDA approved for treatment was discussed  Additionally, discussed code status  Wish to maintain patient as Level 1 Full code at this time  Hypernatremia  Assessment & Plan  Secondary to free water deficit improved with D5  Now that patient's mental status is improved and has access to free water at the bedside without restraints  Will hold D5 today and monitor sodium tomorrow    Anemia, macrocytic  Assessment & Plan  · Macrocytic anemia    B12 and folate within normal limits  · Low iron, low TIBC, mildly elevated ferritin but could be secondary to COVID  · Start supplementation, repeat iron panel outpatient    Results from last 7 days   Lab Units 12/23/20  0631 12/22/20  0446 12/21/20  0530 12/20/20  0602 12/19/20  0601 12/18/20  1828   HEMOGLOBIN g/dL 10 2* 10 4* 10 6* 10 2* 9 5* 11 0*       Acute metabolic encephalopathy  Assessment & Plan  · Acute metabolic encephalopathy secondary to COVID-19 and underlying cognitive decline  · Possibly secondary to steroids and history of anxiety   · If no improvement will consult Geriatrics for evaluation    Bipolar 1 disorder ([facility] )  Assessment & Plan  · Bipolar depression  · Patient appears anxious  · Continue Lamictal, quetiapine, topiramate, clonazepam Hyperlipidemia  Assessment & Plan  · Hyperlipidemia placed on atorvastatin based on COVID-19 protocol    * COVID-19  Assessment & Plan  · COVID-19 infection with acute respiratory failure/hypoxia  · COVID-19:  Continue remdesivir and atorvastatin , dexamethasone  · trend inflammatory markers  · Is requiring 5 L nasal cannula  · Discuss with family convalescent plasma and code status     Lab Results   Component Value Date    SARSCOV2 Positive (A) 12/18/2020    SARSCOV2 Not Detected 07/17/2020    SARSCOV2 Negative 07/13/2020     Results from last 7 days   Lab Units 12/23/20  0631 12/22/20  0446 12/21/20  0530 12/18/20  2230 12/18/20  2044   D-DIMER QUANTITATIVE ug/ml FEU 0 87* 0 86* 1 04*  --  1 47*   CRP mg/L 136 8* 147 8* 182 7* 200 0*  --    FERRITIN ng/mL  --  451* 541* 413*  --      Results from last 7 days   Lab Units 12/19/20  0639 12/18/20  2044   PROCALCITONIN ng/ml 0 11 0 16         VTE Pharmacologic Prophylaxis:  Lovenox  Pharmacologic: Enoxaparin (Lovenox)  Mechanical VTE Prophylaxis in Place: Yes    Patient Centered Rounds: I have performed bedside rounds with nursing staff today  Discussions with Specialists or Other Care Team Provider: None    Education and Discussions with Family / Patient:  Called family left voicemail    Time Spent for Care: 30 minutes  More than 50% of total time spent on counseling and coordination of care as described above  Current Length of Stay: 5 day(s)    Current Patient Status: Inpatient   Certification Statement: The patient will continue to require additional inpatient hospital stay due to Need for management of COVID pneumonia    Discharge Plan:  No imminent discharge date, continue inpatient care    Code Status: Level 1 - Full Code      Subjective:   Patient seen evaluated at bedside  She would not like to be examined this morning would like me to go way    Per nursing overnight patient had been cooperative with staff and has been off of restraints without issue     Objective:     Vitals:   Temp (24hrs), Av 5 °F (34 7 °C), Min:83 7 °F (28 7 °C), Max:99 °F (37 2 °C)    Temp:  [83 7 °F (28 7 °C)-99 °F (37 2 °C)] 99 °F (37 2 °C)  HR:  [] 105  Resp:  [17-21] 21  BP: (162-166)/(88-96) 162/89  SpO2:  [92 %-98 %] 92 %  Body mass index is 19 76 kg/m²  Input and Output Summary (last 24 hours): Intake/Output Summary (Last 24 hours) at 2020 1002  Last data filed at 2020 2230  Gross per 24 hour   Intake    Output 700 ml   Net -700 ml       Physical Exam:     Physical Exam  Constitutional:       General: She is not in acute distress  Appearance: She is well-developed  She is not diaphoretic  HENT:      Head: Normocephalic and atraumatic  Eyes:      General:         Right eye: No discharge  Left eye: No discharge  Conjunctiva/sclera: Conjunctivae normal    Pulmonary:      Effort: Pulmonary effort is normal  No respiratory distress  Musculoskeletal:         General: No deformity  Skin:     Findings: No erythema or rash  Neurological:      General: No focal deficit present  Mental Status: She is alert  Psychiatric:         Behavior: Behavior normal       Comments: Withdrawn, anxious         Additional Data:     Labs: I have reviewed pertinent results     Results from last 7 days   Lab Units 20  0631  20  0601   WBC Thousand/uL 6 36   < > 2 96*   HEMOGLOBIN g/dL 10 2*   < > 9 5*   HEMATOCRIT % 32 5*   < > 30 9*   PLATELETS Thousands/uL 499*   < > 321   BANDS PCT %  --   --  2   NEUTROS PCT % 71  --   --    LYMPHS PCT % 17  --   --    LYMPHO PCT %  --   --  31   MONOS PCT % 11  --   --    MONO PCT %  --   --  3*   EOS PCT % 0  --  0    < > = values in this interval not displayed       Results from last 7 days   Lab Units 20  0631   SODIUM mmol/L 142   POTASSIUM mmol/L 3 1*   CHLORIDE mmol/L 109*   CO2 mmol/L 24   BUN mg/dL 19   CREATININE mg/dL 0 71   ANION GAP mmol/L 9   CALCIUM mg/dL 8 4   ALBUMIN g/dL 2 3*   TOTAL BILIRUBIN mg/dL 0 38   ALK PHOS U/L 81   ALT U/L 23   AST U/L 45   GLUCOSE RANDOM mg/dL 103                 Results from last 7 days   Lab Units 12/19/20  0639 12/18/20 2044 12/18/20  1828   LACTIC ACID mmol/L  --   --  1 2   PROCALCITONIN ng/ml 0 11 0 16  --          Imaging: I have reviewed pertinent imaging       Recent Cultures (last 7 days):     Results from last 7 days   Lab Units 12/18/20 2057 12/18/20 2044   BLOOD CULTURE  No Growth After 4 Days  No Growth After 4 Days         Last 24 Hours Medication List:   Current Facility-Administered Medications   Medication Dose Route Frequency Provider Last Rate    acetaminophen  650 mg Oral Q6H PRN Azalia Hernández PA-C      ascorbic acid  1,000 mg Oral Q12H [address] Azalia Hernández PA-C      atorvastatin  40 mg Oral HS Azalia Hernández PA-C      cholecalciferol  2,000 Units Oral Daily Azalia Hernández PA-C      clonazePAM  0 5 mg Oral BID Azalia Hernández PA-C      dexamethasone  6 mg Intravenous Daily Alejandra Lata, DO      dicyclomine  10 mg Oral TID PRN Alejandra Lata, DO      famotidine  20 mg Oral Daily Azalia Hernández PA-C      ferrous sulfate  325 mg Oral Daily With Breakfast Marlis Dance, DO      heparin (porcine)  5,000 Units Subcutaneous [facility] Azalia Hernández PA-C      lamoTRIgine  200 mg Oral Daily Before Breakfast Azalia Hernández PA-C      zinc sulfate  220 mg Oral Daily Azalia Hernández PA-C      Followed by   Madisyn Stratton ON 12/26/2020] multivitamin-minerals  1 tablet Oral Daily Azalia Hernández PA-C      ondansetron  4 mg Intravenous Q6H PRN Azalia Hernández PA-C      oxybutynin  10 mg Oral Daily Azalia Hernández PA-C      pantoprazole  40 mg Oral Early Morning Azalia Hernández PA-C      QUEtiapine  400 mg Oral HS Azalia Hernández PA-C      senna-docusate sodium  1 tablet Oral BID Wili Chavez DO      topiramate  50 mg Oral BID Vanessa Rivera PA-C          Today, Patient Was Seen By: Gene Artis DO    ** Please Note: Dictation voice to text software may have been used in the creation of this document   **

## 2020-12-23 NOTE — ASSESSMENT & PLAN NOTE
· Macrocytic anemia    B12 and folate within normal limits  · Low iron, low TIBC, mildly elevated ferritin but could be secondary to COVID  · Start supplementation, repeat iron panel outpatient    Results from last 7 days   Lab Units 12/23/20  0631 12/22/20  0446 12/21/20  0530 12/20/20  0602 12/19/20  0601 12/18/20  1828   HEMOGLOBIN g/dL 10 2* 10 4* 10 6* 10 2* 9 5* 11 0*

## 2020-12-23 NOTE — ASSESSMENT & PLAN NOTE
Secondary to free water deficit improved with D5  Now that patient's mental status is improved and has access to free water at the bedside without restraints  Will hold D5 today and monitor sodium tomorrow

## 2020-12-23 NOTE — PLAN OF CARE
Problem: Potential for Falls  Goal: Patient will remain free of falls  Description: INTERVENTIONS:  - Assess patient frequently for physical needs  -  Identify cognitive and physical deficits and behaviors that affect risk of falls    -  [place] fall precautions as indicated by assessment   - Educate patient/family on patient safety including physical limitations  - Instruct patient to call for assistance with activity based on assessment  - Modify environment to reduce risk of injury  - Consider OT/PT consult to assist with strengthening/mobility  Outcome: Progressing     Problem: Prexisting or High Potential for Compromised Skin Integrity  Goal: Skin integrity is maintained or improved  Description: INTERVENTIONS:  - Identify patients at risk for skin breakdown  - Assess and monitor skin integrity  - Assess and monitor nutrition and hydration status  - Monitor labs   - Assess for incontinence   - Turn and reposition patient  - Assist with mobility/ambulation  - Relieve pressure over bony prominences  - Avoid friction and shearing  - Provide appropriate hygiene as needed including keeping skin clean and dry  - Evaluate need for skin moisturizer/barrier cream  - Collaborate with interdisciplinary team   - Patient/family teaching  - Consider wound care consult   Outcome: Progressing     Problem: PAIN - ADULT  Goal: Verbalizes/displays adequate comfort level or baseline comfort level  Description: Interventions:  - Encourage patient to monitor pain and request assistance  - Assess pain using appropriate pain scale  - Administer analgesics based on type and severity of pain and evaluate response  - Implement non-pharmacological measures as appropriate and evaluate response  - Consider cultural and social influences on pain and pain management  - Notify physician/advanced practitioner if interventions unsuccessful or patient reports new pain  Outcome: Progressing     Problem: INFECTION - ADULT  Goal: Absence or prevention of progression during hospitalization  Description: INTERVENTIONS:  - Assess and monitor for signs and symptoms of infection  - Monitor lab/diagnostic results  - Monitor all insertion sites, i e  indwelling lines, tubes, and drains  - Monitor endotracheal if appropriate and nasal secretions for changes in amount and color  - [place] appropriate cooling/warming therapies per order  - Administer medications as ordered  - Instruct and encourage patient and family to use good hand hygiene technique  - Identify and instruct in appropriate isolation precautions for identified infection/condition  Outcome: Progressing  Goal: Absence of fever/infection during neutropenic period  Description: INTERVENTIONS:  - Monitor WBC    Outcome: Progressing     Problem: SAFETY ADULT  Goal: Patient will remain free of falls  Description: INTERVENTIONS:  - Assess patient frequently for physical needs  -  Identify cognitive and physical deficits and behaviors that affect risk of falls    -  [place] fall precautions as indicated by assessment   - Educate patient/family on patient safety including physical limitations  - Instruct patient to call for assistance with activity based on assessment  - Modify environment to reduce risk of injury  - Consider OT/PT consult to assist with strengthening/mobility  Outcome: Progressing  Goal: Maintain or return to baseline ADL function  Description: INTERVENTIONS:  -  Assess patient's ability to carry out ADLs; assess patient's baseline for ADL function and identify physical deficits which impact ability to perform ADLs (bathing, care of mouth/teeth, toileting, grooming, dressing, etc )  - Assess/evaluate cause of self-care deficits   - Assess range of motion  - Assess patient's mobility; develop plan if impaired  - Assess patient's need for assistive devices and provide as appropriate  - Encourage maximum independence but intervene and supervise when necessary  - Involve family in performance of ADLs  - Assess for home care needs following discharge   - Consider OT consult to assist with ADL evaluation and planning for discharge  - Provide patient education as appropriate  Outcome: Progressing  Goal: Maintain or return mobility status to optimal level  Description: INTERVENTIONS:  - Assess patient's baseline mobility status (ambulation, transfers, stairs, etc )    - Identify cognitive and physical deficits and behaviors that affect mobility  - Identify mobility aids required to assist with transfers and/or ambulation (gait belt, sit-to-stand, lift, walker, cane, etc )  - [place] fall precautions as indicated by assessment  - Record patient progress and toleration of activity level on Mobility SBAR; progress patient to next Phase/Stage  - Instruct patient to call for assistance with activity based on assessment  - Consider rehabilitation consult to assist with strengthening/weightbearing, etc   Outcome: Progressing     Problem: DISCHARGE PLANNING  Goal: Discharge to home or other facility with appropriate resources  Description: INTERVENTIONS:  - Identify barriers to discharge w/patient and caregiver  - Arrange for needed discharge resources and transportation as appropriate  - Identify discharge learning needs (meds, wound care, etc )  - Arrange for interpretive services to assist at discharge as needed  - Refer to Case Management Department for coordinating discharge planning if the patient needs post-hospital services based on physician/advanced practitioner order or complex needs related to functional status, cognitive ability, or social support system  Outcome: Progressing     Problem: Knowledge Deficit  Goal: Patient/family/caregiver demonstrates understanding of disease process, treatment plan, medications, and discharge instructions  Description: Complete learning assessment and assess knowledge base    Interventions:  - Provide teaching at level of understanding  - Provide teaching via preferred learning methods  Outcome: Progressing     Problem: Nutrition/Hydration-ADULT  Goal: Nutrient/Hydration intake appropriate for improving, restoring or maintaining nutritional needs  Description: Monitor and assess patient's nutrition/hydration status for malnutrition  Collaborate with interdisciplinary team and initiate plan and interventions as ordered  Monitor patient's weight and dietary intake as ordered or per policy  Utilize nutrition screening tool and intervene as necessary  Determine patient's food preferences and provide high-protein, high-caloric foods as appropriate       INTERVENTIONS:  - Monitor oral intake, urinary output, labs, and treatment plans  - Assess nutrition and hydration status and recommend course of action  - Evaluate amount of meals eaten  - Assist patient with eating if necessary   - Allow adequate time for meals  - Recommend/ encourage appropriate diets, oral nutritional supplements, and vitamin/mineral supplements  - Order, calculate, and assess calorie counts as needed  - Recommend, monitor, and adjust tube feedings and TPN/PPN based on assessed needs  - Assess need for intravenous fluids  - Provide specific nutrition/hydration education as appropriate  - Include patient/family/caregiver in decisions related to nutrition  Outcome: Progressing

## 2020-12-24 ENCOUNTER — APPOINTMENT (INPATIENT)
Dept: RADIOLOGY | Facility: HOSPITAL | Age: 85
DRG: 177 | End: 2020-12-24
Payer: COMMERCIAL

## 2020-12-24 LAB
ABO GROUP BLD BPU: NORMAL
ALBUMIN SERPL BCP-MCNC: 2.4 G/DL (ref 3.5–5)
ALP SERPL-CCNC: 89 U/L (ref 46–116)
ALT SERPL W P-5'-P-CCNC: 25 U/L (ref 12–78)
ANION GAP SERPL CALCULATED.3IONS-SCNC: 9 MMOL/L (ref 4–13)
AST SERPL W P-5'-P-CCNC: 49 U/L (ref 5–45)
BASOPHILS # BLD AUTO: 0.01 THOUSANDS/ΜL (ref 0–0.1)
BASOPHILS NFR BLD AUTO: 0 % (ref 0–1)
BILIRUB SERPL-MCNC: 0.44 MG/DL (ref 0.2–1)
BPU ID: NORMAL
BUN SERPL-MCNC: 26 MG/DL (ref 5–25)
CALCIUM ALBUM COR SERPL-MCNC: 10.1 MG/DL (ref 8.3–10.1)
CALCIUM SERPL-MCNC: 8.8 MG/DL (ref 8.3–10.1)
CHLORIDE SERPL-SCNC: 113 MMOL/L (ref 100–108)
CO2 SERPL-SCNC: 23 MMOL/L (ref 21–32)
CREAT SERPL-MCNC: 0.67 MG/DL (ref 0.6–1.3)
CRP SERPL QL: 163.4 MG/L
D DIMER PPP FEU-MCNC: 0.9 UG/ML FEU
EOSINOPHIL # BLD AUTO: 0 THOUSAND/ΜL (ref 0–0.61)
EOSINOPHIL NFR BLD AUTO: 0 % (ref 0–6)
ERYTHROCYTE [DISTWIDTH] IN BLOOD BY AUTOMATED COUNT: 12.9 % (ref 11.6–15.1)
FERRITIN SERPL-MCNC: 456 NG/ML (ref 8–388)
GFR SERPL CREATININE-BSD FRML MDRD: 80 ML/MIN/1.73SQ M
GLUCOSE SERPL-MCNC: 98 MG/DL (ref 65–140)
HCT VFR BLD AUTO: 31.5 % (ref 34.8–46.1)
HGB BLD-MCNC: 9.8 G/DL (ref 11.5–15.4)
IMM GRANULOCYTES # BLD AUTO: 0.05 THOUSAND/UL (ref 0–0.2)
IMM GRANULOCYTES NFR BLD AUTO: 1 % (ref 0–2)
LYMPHOCYTES # BLD AUTO: 0.86 THOUSANDS/ΜL (ref 0.6–4.47)
LYMPHOCYTES NFR BLD AUTO: 12 % (ref 14–44)
MCH RBC QN AUTO: 31 PG (ref 26.8–34.3)
MCHC RBC AUTO-ENTMCNC: 31.1 G/DL (ref 31.4–37.4)
MCV RBC AUTO: 100 FL (ref 82–98)
MONOCYTES # BLD AUTO: 0.68 THOUSAND/ΜL (ref 0.17–1.22)
MONOCYTES NFR BLD AUTO: 9 % (ref 4–12)
NEUTROPHILS # BLD AUTO: 5.84 THOUSANDS/ΜL (ref 1.85–7.62)
NEUTS SEG NFR BLD AUTO: 78 % (ref 43–75)
NRBC BLD AUTO-RTO: 0 /100 WBCS
PLATELET # BLD AUTO: 509 THOUSANDS/UL (ref 149–390)
PMV BLD AUTO: 8.9 FL (ref 8.9–12.7)
POTASSIUM SERPL-SCNC: 3.9 MMOL/L (ref 3.5–5.3)
PROT SERPL-MCNC: 6.8 G/DL (ref 6.4–8.2)
RBC # BLD AUTO: 3.16 MILLION/UL (ref 3.81–5.12)
SODIUM SERPL-SCNC: 145 MMOL/L (ref 136–145)
UNIT DISPENSE STATUS: NORMAL
UNIT PRODUCT CODE: NORMAL
UNIT RH: NORMAL
WBC # BLD AUTO: 7.44 THOUSAND/UL (ref 4.31–10.16)

## 2020-12-24 PROCEDURE — 97530 THERAPEUTIC ACTIVITIES: CPT

## 2020-12-24 PROCEDURE — 85025 COMPLETE CBC W/AUTO DIFF WBC: CPT | Performed by: INTERNAL MEDICINE

## 2020-12-24 PROCEDURE — 82728 ASSAY OF FERRITIN: CPT | Performed by: INTERNAL MEDICINE

## 2020-12-24 PROCEDURE — 99232 SBSQ HOSP IP/OBS MODERATE 35: CPT | Performed by: INTERNAL MEDICINE

## 2020-12-24 PROCEDURE — 97116 GAIT TRAINING THERAPY: CPT

## 2020-12-24 PROCEDURE — 80053 COMPREHEN METABOLIC PANEL: CPT | Performed by: INTERNAL MEDICINE

## 2020-12-24 PROCEDURE — 71045 X-RAY EXAM CHEST 1 VIEW: CPT

## 2020-12-24 PROCEDURE — 97535 SELF CARE MNGMENT TRAINING: CPT

## 2020-12-24 PROCEDURE — XW13325 TRANSFUSION OF CONVALESCENT PLASMA (NONAUTOLOGOUS) INTO PERIPHERAL VEIN, PERCUTANEOUS APPROACH, NEW TECHNOLOGY GROUP 5: ICD-10-PCS | Performed by: INTERNAL MEDICINE

## 2020-12-24 PROCEDURE — 86140 C-REACTIVE PROTEIN: CPT | Performed by: INTERNAL MEDICINE

## 2020-12-24 PROCEDURE — 85379 FIBRIN DEGRADATION QUANT: CPT | Performed by: INTERNAL MEDICINE

## 2020-12-24 RX ADMIN — HEPARIN SODIUM 5000 UNITS: 5000 INJECTION INTRAVENOUS; SUBCUTANEOUS at 06:03

## 2020-12-24 RX ADMIN — CLONAZEPAM 0.5 MG: 0.5 TABLET ORAL at 17:43

## 2020-12-24 RX ADMIN — CLONAZEPAM 0.5 MG: 0.5 TABLET ORAL at 08:01

## 2020-12-24 RX ADMIN — OXYBUTYNIN 10 MG: 10 TABLET, FILM COATED, EXTENDED RELEASE ORAL at 08:01

## 2020-12-24 RX ADMIN — DEXAMETHASONE SODIUM PHOSPHATE 6 MG: 4 INJECTION INTRA-ARTICULAR; INTRALESIONAL; INTRAMUSCULAR; INTRAVENOUS; SOFT TISSUE at 08:01

## 2020-12-24 RX ADMIN — DOCUSATE SODIUM AND SENNOSIDES 1 TABLET: 8.6; 5 TABLET, FILM COATED ORAL at 17:43

## 2020-12-24 RX ADMIN — DOCUSATE SODIUM AND SENNOSIDES 1 TABLET: 8.6; 5 TABLET, FILM COATED ORAL at 08:01

## 2020-12-24 RX ADMIN — OXYCODONE HYDROCHLORIDE AND ACETAMINOPHEN 1000 MG: 500 TABLET ORAL at 08:01

## 2020-12-24 RX ADMIN — ZINC SULFATE 220 MG (50 MG) CAPSULE 220 MG: CAPSULE at 08:01

## 2020-12-24 RX ADMIN — FERROUS SULFATE TAB 325 MG (65 MG ELEMENTAL FE) 325 MG: 325 (65 FE) TAB at 08:01

## 2020-12-24 RX ADMIN — LAMOTRIGINE 200 MG: 100 TABLET ORAL at 06:03

## 2020-12-24 RX ADMIN — Medication 2000 UNITS: at 08:01

## 2020-12-24 RX ADMIN — TOPIRAMATE 50 MG: 25 TABLET, FILM COATED ORAL at 17:43

## 2020-12-24 RX ADMIN — FAMOTIDINE 20 MG: 20 TABLET ORAL at 08:01

## 2020-12-24 RX ADMIN — TOPIRAMATE 50 MG: 25 TABLET, FILM COATED ORAL at 08:01

## 2020-12-24 RX ADMIN — HEPARIN SODIUM 5000 UNITS: 5000 INJECTION INTRAVENOUS; SUBCUTANEOUS at 14:58

## 2020-12-24 NOTE — PROGRESS NOTES
Pt with poor appetite, consuming little of meals, about 50% of ensure compacts  Would benefit from recent wt given low intake  May need to consider nutrition support if pt continues to have poor intake  Continue with ensure compact TID given pt not able to complete entire supplement, more calorically dense per mL than ensure enlvie 8 oz bottle

## 2020-12-24 NOTE — PLAN OF CARE
Problem: Potential for Falls  Goal: Patient will remain free of falls  Description: INTERVENTIONS:  - Assess patient frequently for physical needs  -  Identify cognitive and physical deficits and behaviors that affect risk of falls    -  [place] fall precautions as indicated by assessment   - Educate patient/family on patient safety including physical limitations  - Instruct patient to call for assistance with activity based on assessment  - Modify environment to reduce risk of injury  - Consider OT/PT consult to assist with strengthening/mobility  Outcome: Progressing     Problem: Prexisting or High Potential for Compromised Skin Integrity  Goal: Skin integrity is maintained or improved  Description: INTERVENTIONS:  - Identify patients at risk for skin breakdown  - Assess and monitor skin integrity  - Assess and monitor nutrition and hydration status  - Monitor labs   - Assess for incontinence   - Turn and reposition patient  - Assist with mobility/ambulation  - Relieve pressure over bony prominences  - Avoid friction and shearing  - Provide appropriate hygiene as needed including keeping skin clean and dry  - Evaluate need for skin moisturizer/barrier cream  - Collaborate with interdisciplinary team   - Patient/family teaching  - Consider wound care consult   Outcome: Progressing     Problem: PAIN - ADULT  Goal: Verbalizes/displays adequate comfort level or baseline comfort level  Description: Interventions:  - Encourage patient to monitor pain and request assistance  - Assess pain using appropriate pain scale  - Administer analgesics based on type and severity of pain and evaluate response  - Implement non-pharmacological measures as appropriate and evaluate response  - Consider cultural and social influences on pain and pain management  - Notify physician/advanced practitioner if interventions unsuccessful or patient reports new pain  Outcome: Progressing     Problem: INFECTION - ADULT  Goal: Absence or prevention of progression during hospitalization  Description: INTERVENTIONS:  - Assess and monitor for signs and symptoms of infection  - Monitor lab/diagnostic results  - Monitor all insertion sites, i e  indwelling lines, tubes, and drains  - Monitor endotracheal if appropriate and nasal secretions for changes in amount and color  - [place] appropriate cooling/warming therapies per order  - Administer medications as ordered  - Instruct and encourage patient and family to use good hand hygiene technique  - Identify and instruct in appropriate isolation precautions for identified infection/condition  Outcome: Progressing  Goal: Absence of fever/infection during neutropenic period  Description: INTERVENTIONS:  - Monitor WBC    Outcome: Progressing     Problem: SAFETY ADULT  Goal: Patient will remain free of falls  Description: INTERVENTIONS:  - Assess patient frequently for physical needs  -  Identify cognitive and physical deficits and behaviors that affect risk of falls    -  [place] fall precautions as indicated by assessment   - Educate patient/family on patient safety including physical limitations  - Instruct patient to call for assistance with activity based on assessment  - Modify environment to reduce risk of injury  - Consider OT/PT consult to assist with strengthening/mobility  Outcome: Progressing  Goal: Maintain or return to baseline ADL function  Description: INTERVENTIONS:  -  Assess patient's ability to carry out ADLs; assess patient's baseline for ADL function and identify physical deficits which impact ability to perform ADLs (bathing, care of mouth/teeth, toileting, grooming, dressing, etc )  - Assess/evaluate cause of self-care deficits   - Assess range of motion  - Assess patient's mobility; develop plan if impaired  - Assess patient's need for assistive devices and provide as appropriate  - Encourage maximum independence but intervene and supervise when necessary  - Involve family in performance of ADLs  - Assess for home care needs following discharge   - Consider OT consult to assist with ADL evaluation and planning for discharge  - Provide patient education as appropriate  Outcome: Progressing  Goal: Maintain or return mobility status to optimal level  Description: INTERVENTIONS:  - Assess patient's baseline mobility status (ambulation, transfers, stairs, etc )    - Identify cognitive and physical deficits and behaviors that affect mobility  - Identify mobility aids required to assist with transfers and/or ambulation (gait belt, sit-to-stand, lift, walker, cane, etc )  - [place] fall precautions as indicated by assessment  - Record patient progress and toleration of activity level on Mobility SBAR; progress patient to next Phase/Stage  - Instruct patient to call for assistance with activity based on assessment  - Consider rehabilitation consult to assist with strengthening/weightbearing, etc   Outcome: Progressing     Problem: DISCHARGE PLANNING  Goal: Discharge to home or other facility with appropriate resources  Description: INTERVENTIONS:  - Identify barriers to discharge w/patient and caregiver  - Arrange for needed discharge resources and transportation as appropriate  - Identify discharge learning needs (meds, wound care, etc )  - Arrange for interpretive services to assist at discharge as needed  - Refer to Case Management Department for coordinating discharge planning if the patient needs post-hospital services based on physician/advanced practitioner order or complex needs related to functional status, cognitive ability, or social support system  Outcome: Progressing     Problem: Knowledge Deficit  Goal: Patient/family/caregiver demonstrates understanding of disease process, treatment plan, medications, and discharge instructions  Description: Complete learning assessment and assess knowledge base    Interventions:  - Provide teaching at level of understanding  - Provide teaching via preferred learning methods  Outcome: Progressing     Problem: Nutrition/Hydration-ADULT  Goal: Nutrient/Hydration intake appropriate for improving, restoring or maintaining nutritional needs  Description: Monitor and assess patient's nutrition/hydration status for malnutrition  Collaborate with interdisciplinary team and initiate plan and interventions as ordered  Monitor patient's weight and dietary intake as ordered or per policy  Utilize nutrition screening tool and intervene as necessary  Determine patient's food preferences and provide high-protein, high-caloric foods as appropriate       INTERVENTIONS:  - Monitor oral intake, urinary output, labs, and treatment plans  - Assess nutrition and hydration status and recommend course of action  - Evaluate amount of meals eaten  - Assist patient with eating if necessary   - Allow adequate time for meals  - Recommend/ encourage appropriate diets, oral nutritional supplements, and vitamin/mineral supplements  - Order, calculate, and assess calorie counts as needed  - Recommend, monitor, and adjust tube feedings and TPN/PPN based on assessed needs  - Assess need for intravenous fluids  - Provide specific nutrition/hydration education as appropriate  - Include patient/family/caregiver in decisions related to nutrition  Outcome: Progressing

## 2020-12-24 NOTE — PROGRESS NOTES
Progress Note - Poncho Galvin 1935, 80 y o  female MRN: [id number]    Unit/Bed#: [facility] [address] 87 221-01 Encounter: [id number]    Primary Care Provider: Victor M Delgado MD   Date and time admitted to hospital: 12/18/2020  5:50 PM        Hypernatremia  Assessment & Plan  Secondary to free water deficit improved with D5  Follow-up BMP    Acute metabolic encephalopathy  Assessment & Plan  · Acute metabolic encephalopathy secondary to COVID-19 and underlying cognitive decline  · Possibly secondary to steroids and history of anxiety   · Improving although patient is to be significantly anxious    Hypokalemia  Assessment & Plan  · Hypokalemia has been repleted  · Continue to follow    Results from last 7 days   Lab Units 12/24/20  0633 12/23/20  0631 12/22/20  0446 12/21/20  0530 12/20/20  0602 12/19/20  0601 12/18/20  1828   POTASSIUM mmol/L 3 9 3 1* 3 3* 3 6 3 6 3 6 3 4*       Bipolar 1 disorder (HCC)  Assessment & Plan  · Bipolar depression  · Patient appears anxious  · Continue Lamictal, quetiapine, topiramate, clonazepam     Hyperlipidemia  Assessment & Plan  · Hyperlipidemia placed on atorvastatin based on COVID-19 protocol    * COVID-19  Assessment & Plan  · COVID-19 infection with acute respiratory failure/hypoxia  · COVID-19:  Completed remdesivir   · On dexamethasone  · trend inflammatory markers-up trending  · Is requiring 5-6 L nasal cannula for the last few days  · Received convalescent plasma 12/23  · Discussed code status and poor functional status with family    They wish  for patient to be full code    Lab Results   Component Value Date    SARSCOV2 Positive (A) 12/18/2020    Tiffani Ramos Not Detected 07/17/2020    SARSCOV2 Negative 07/13/2020     Results from last 7 days   Lab Units 12/24/20  0633 12/23/20  0631 12/22/20  0446 12/21/20  0530 12/18/20  2230 12/18/20  2044   D-DIMER QUANTITATIVE ug/ml FEU 0 90* 0 87* 0 86* 1 04*  --  1 47*   CRP mg/L 163 4* 136 8* 147 8* 182 7* 200 0*  --    FERRITIN ng/mL 456* 408* 451* 541* 413*  --      Results from last 7 days   Lab Units 20  0639 20  2044   PROCALCITONIN ng/ml 0 11 0 16         VTE Pharmacologic Prophylaxis:  Heparin  Pharmacologic: Heparin  Mechanical VTE Prophylaxis in Place: Yes    Patient Centered Rounds: I have performed bedside rounds with nursing staff today  Discussions with Specialists or Other Care Team Provider:  None    Education and Discussions with Family / Patient:  Poor prognosis discussed with family member via telephone    Time Spent for Care: 30 minutes  More than 50% of total time spent on counseling and coordination of care as described above  Current Length of Stay: 6 day(s)    Current Patient Status: Inpatient   Certification Statement: The patient will continue to require additional inpatient hospital stay due to Need for ongoing management coping pneumonia    Discharge Plan:  No tentative discharge date    Code Status: Level 1 - Full Code      Subjective:   Patient seen and evaluated at bedside  No overnight events  Patient is requiring 6 L nasal cannula  At the bedside patient appears frail  Is alert and oriented x3  Objective:     Vitals:   Temp (24hrs), Av 7 °F (36 5 °C), Min:96 9 °F (36 1 °C), Max:98 9 °F (37 2 °C)    Temp:  [96 9 °F (36 1 °C)-98 9 °F (37 2 °C)] 98 9 °F (37 2 °C)  HR:  [] 108  Resp:  [18-22] 20  BP: (127-141)/(82-87) 129/84  SpO2:  [87 %-93 %] 92 %  Body mass index is 19 76 kg/m²  Input and Output Summary (last 24 hours): Intake/Output Summary (Last 24 hours) at 2020 1552  Last data filed at 2020 0650  Gross per 24 hour   Intake 209 ml   Output 395 ml   Net -186 ml       Physical Exam:     Physical Exam  Vitals signs reviewed  Constitutional:       General: She is not in acute distress  Appearance: She is well-developed  She is ill-appearing  She is not diaphoretic  Comments: Frail appearing   HENT:      Head: Normocephalic and atraumatic  Mouth/Throat:      Mouth: Mucous membranes are dry  Eyes:      General: No scleral icterus  Conjunctiva/sclera: Conjunctivae normal       Pupils: Pupils are equal, round, and reactive to light  Cardiovascular:      Rate and Rhythm: Normal rate and regular rhythm  Heart sounds: Normal heart sounds  No murmur  Pulmonary:      Effort: Pulmonary effort is normal  No respiratory distress  Breath sounds: Normal breath sounds  No wheezing or rales  Abdominal:      General: There is no distension  Palpations: Abdomen is soft  Tenderness: There is no abdominal tenderness  There is no guarding or rebound  Skin:     General: Skin is warm and dry  Neurological:      Mental Status: She is alert and oriented to person, place, and time  Psychiatric:         Mood and Affect: Mood normal          Behavior: Behavior normal          Thought Content: Thought content normal          Judgment: Judgment normal          Additional Data:     Labs: I have reviewed pertinent results     Results from last 7 days   Lab Units 12/24/20  0635  12/19/20  0601   WBC Thousand/uL 7 44   < > 2 96*   HEMOGLOBIN g/dL 9 8*   < > 9 5*   HEMATOCRIT % 31 5*   < > 30 9*   PLATELETS Thousands/uL 509*   < > 321   BANDS PCT %  --   --  2   NEUTROS PCT % 78*   < >  --    LYMPHS PCT % 12*   < >  --    LYMPHO PCT %  --   --  31   MONOS PCT % 9   < >  --    MONO PCT %  --   --  3*   EOS PCT % 0   < > 0    < > = values in this interval not displayed       Results from last 7 days   Lab Units 12/24/20  0633   SODIUM mmol/L 145   POTASSIUM mmol/L 3 9   CHLORIDE mmol/L 113*   CO2 mmol/L 23   BUN mg/dL 26*   CREATININE mg/dL 0 67   ANION GAP mmol/L 9   CALCIUM mg/dL 8 8   ALBUMIN g/dL 2 4*   TOTAL BILIRUBIN mg/dL 0 44   ALK PHOS U/L 89   ALT U/L 25   AST U/L 49*   GLUCOSE RANDOM mg/dL 98                 Results from last 7 days   Lab Units 12/19/20  0639 12/18/20 2044 12/18/20  1828   LACTIC ACID mmol/L  --   --  1 2   PROCALCITONIN ng/ml 0 11 0 16  --          Imaging: I have reviewed pertinent imaging       Recent Cultures (last 7 days):     Results from last 7 days   Lab Units 12/18/20 2057 12/18/20 2044   BLOOD CULTURE  No Growth After 5 Days  No Growth After 5 Days  Last 24 Hours Medication List:   Current Facility-Administered Medications   Medication Dose Route Frequency Provider Last Rate    acetaminophen  650 mg Oral Q6H PRN Azalia Hernández PA-C      ascorbic acid  1,000 mg Oral Q12H [facility] Azalia Hernández PA-C      atorvastatin  40 mg Oral HS Azalia Hernández PA-C      cholecalciferol  2,000 Units Oral Daily Azalia Hernández PA-C      clonazePAM  0 5 mg Oral BID Azalia Hernández PA-C      dexamethasone  6 mg Intravenous Daily [place] Amin, DO      dicyclomine  10 mg Oral TID PRN [place] Amin, DO      famotidine  20 mg Oral Daily Azalia Hernández PA-C      ferrous sulfate  325 mg Oral Daily With Breakfast Gene Artis DO      heparin (porcine)  5,000 Units Subcutaneous [facility] Azalia Hernández PA-C      lamoTRIgine  200 mg Oral Daily Before Breakfast Azalia Hernández PA-C      zinc sulfate  220 mg Oral Daily Azalia Hernández PA-C      Followed by   Gio Sandoval ON 12/26/2020] multivitamin-minerals  1 tablet Oral Daily Azalia Hernández PA-C      ondansetron  4 mg Intravenous Q6H PRN Azalia Hernández PA-C      oxybutynin  10 mg Oral Daily Azalia Hernández PA-C      pantoprazole  40 mg Oral Early Morning Azalia Hernández PA-C      QUEtiapine  400 mg Oral HS Azalia Hernández PA-C      senna-docusate sodium  1 tablet Oral BID [place] Amin, DO      topiramate  50 mg Oral BID Sayda Ames PA-C          Today, Patient Was Seen By: Gene Artis DO    ** Please Note: Dictation voice to text software may have been used in the creation of this document   **

## 2020-12-24 NOTE — PLAN OF CARE
Problem: OCCUPATIONAL THERAPY ADULT  Goal: Performs self-care activities at highest level of function for planned discharge setting  See evaluation for individualized goals  Description: Treatment Interventions: ADL retraining, Functional transfer training, UE strengthening/ROM, Endurance training, Patient/family training, Equipment evaluation/education, Compensatory technique education, Continued evaluation, Activityengagement, Energy conservation          See flowsheet documentation for full assessment, interventions and recommendations  Outcome: Progressing  Note: Limitation: Decreased ADL status, Decreased UE ROM, Decreased UE strength, Decreased cognition, Decreased Safe judgement during ADL, Decreased endurance, Decreased high-level ADLs, Decreased self-care trans  Prognosis: Fair  Assessment: Pt was seen for skilled OT with focus on completion of bed mobility, self toileting, review of basic orientation, validation therapies and review of current plan of care  Pt with incoherent speech upon greeting  Pt mumbling regarding use of toilet  Min A overall to achieve EOB positioning  Pt without awareness of need for use of RW  Impulsive movements noted with need for hand over hand A to prevent fall  Unable to redirect Pt after turning the opposite direction, wrapping herself in O2, catheter tubing  Mod/Max A provided to return Pt to supine positioning due to noted confusion and limited carry over of simple commands  Improvement noted with mood and behaviors following return to supine positioning with all needs with in reach  Bed alarm activated, but reported need for call bell alarm on bed required to alert nursing staff outside of Pt's room  Reported all findings to nursing staff  See above levels of A required for all functional tasks  Pt may benefit from further rehab with focus on achieving optimal performance levels with all functional tasks   Continue to recommend Inpatient rehab     OT Discharge Recommendation: (return to [facility] with OT vs STR  pending facility requirements )  OT - OK to Discharge: Yes(when medically cleared  )

## 2020-12-24 NOTE — PHYSICAL THERAPY NOTE
Physical Therapy Progress Note     12/24/20 1140   Note Type   Note Type Treatment   Pain Assessment   Pain Assessment Tool Pain Assessment not indicated - pt denies pain   Pain Score No Pain   Restrictions/Precautions   Weight Bearing Precautions Per Order No   Other Precautions Contact/isolation; Airborne/isolation; Fall Risk;Multiple lines;Hard of hearing;O2;Cognitive; Chair Alarm; Bed Alarm; Impulsive   General   Chart Reviewed Yes   Response to Previous Treatment Patient reporting fatigue but able to participate  Family/Caregiver Present No   Subjective   Subjective "I have to pee "   Bed Mobility   Supine to Sit 4  Minimal assistance   Additional items Assist x 1;Bedrails;HOB elevated; Increased time required;Leg ;Verbal cues;LE management; Impulsive   Sit to Supine 4  Minimal assistance   Additional items Assist x 1;HOB elevated; Bedrails;Leg ; Increased time required;Verbal cues;LE management; Impulsive   Transfers   Sit to Stand 4  Minimal assistance   Additional items Assist x 2;Bedrails; Increased time required;Verbal cues; Impulsive   Stand to Sit 4  Minimal assistance   Additional items Assist x 2;Bedrails;Armrests; Increased time required; Impulsive;Verbal cues   Ambulation/Elevation   Gait pattern Decreased foot clearance; Forward Flexion; Short stride; Excessively slow; Inconsistent kristopher; Step to;Shuffling   Gait Assistance 2  Maximal assist   Additional items Assist x 2; Tactile cues; Verbal cues   Assistive Device Other (Comment)  ([facility])   Distance 5' x 2 with seated toileting break in between   Balance   Static Sitting Fair -   Dynamic Sitting Poor +   Static Standing Poor   Dynamic Standing Poor -   Ambulatory Poor -   Endurance Deficit   Endurance Deficit Yes   Endurance Deficit Description fatigue/weakness/cognition   Activity Tolerance   Activity Tolerance Patient limited by fatigue;Treatment limited secondary to medical complications (Comment)   Medical Staff [address], [facility] [address]   Nurse Made Aware Yes   Assessment   Prognosis Poor   Problem List Decreased strength;Decreased endurance;Decreased range of motion;Decreased mobility; Impaired balance;Decreased coordination;Decreased cognition; Impaired judgement;Decreased safety awareness;Decreased skin integrity; Impaired hearing   Assessment Pt  supine in bed upon my arrival  Pt  throughout treatment session impulsive with poor carryover with all cueing provided both verbal/tactile  Pt  kept reporting throughout treatment session requiring to pee, however catheter in place  However, poor understanding of current medical status by pt  Progressed with transfers requiring A of therapist with cues for hand placement/technique  Progressed with limited amb  trial with A of 2 and cues provided for LE sequencing  Pt  impulsive and decreased safety awareness throughout  Attempted to place pt  on bedside commode, however pt  kept attempting to get off commode unassisted stating, "I need to pee " Redirection/cueing unsucessful  Pt  returned to supine in bed with alarm active at end of treatment session  Nursing staff made aware of no cable for call center present on bed for bed alarm at end of treatment session  PT will continue to recommend d/c to rehab when medically stable for continued improvement of noted impairments above  Barriers to Discharge Inaccessible home environment;Decreased caregiver support   Barriers to Discharge Comments Level of support  Goals   Patient Goals To rest    STG Expiration Date 01/01/21   PT Treatment Day 1   Plan   Treatment/Interventions Functional transfer training;LE strengthening/ROM; Endurance training; Therapeutic exercise; Bed mobility;Gait training;Spoke to nursing;Spoke to case management;OT   Progress Slow progress, cognitive deficits   PT Frequency Other (Comment)  (3-5x/wk)   Recommendation   PT Discharge Recommendation Post-Acute Rehabilitation Services   Equipment Recommended Walker  (RW)   PT - OK to Discharge Yes  (if d/c to rehab when medically stable )     Patrizia Quiñones, PTA

## 2020-12-24 NOTE — ASSESSMENT & PLAN NOTE
· COVID-19 infection with acute respiratory failure/hypoxia  · COVID-19:  Completed remdesivir   · On dexamethasone  · trend inflammatory markers-up trending  · Is requiring 5-6 L nasal cannula for the last few days  · Received convalescent plasma 12/23  · Discussed code status and poor functional status with family    They wish  for patient to be full code    Lab Results   Component Value Date    SARSCOV2 Positive (A) 12/18/2020    Holder Puff Not Detected 07/17/2020    SARSCOV2 Negative 07/13/2020     Results from last 7 days   Lab Units 12/24/20  0633 12/23/20  0631 12/22/20  0446 12/21/20  0530 12/18/20  2230 12/18/20  2044   D-DIMER QUANTITATIVE ug/ml FEU 0 90* 0 87* 0 86* 1 04*  --  1 47*   CRP mg/L 163 4* 136 8* 147 8* 182 7* 200 0*  --    FERRITIN ng/mL 456* 408* 451* 541* 413*  --      Results from last 7 days   Lab Units 12/19/20  0639 12/18/20  2044   PROCALCITONIN ng/ml 0 11 0 16

## 2020-12-24 NOTE — PROGRESS NOTES
Patient has been retaining urine  Straight cath x3 since admission  Attempted to void numerous times without any success  Bladder scanned for 270  Patient continues to have urinary frequency and attempting to urinate  Contacted Azalia villa PA-C, regarding patient  Will place an indwelling urinary catheter d/t failing urinary retention protocol

## 2020-12-24 NOTE — PLAN OF CARE
Problem: PHYSICAL THERAPY ADULT  Goal: Performs mobility at highest level of function for planned discharge setting  See evaluation for individualized goals  Description: Treatment/Interventions: Functional transfer training, LE strengthening/ROM, Therapeutic exercise, Endurance training, Patient/family training, Cognitive reorientation, Equipment eval/education, Bed mobility, Gait training, Spoke to nursing, Spoke to case management, OT, Elevations  Equipment Recommended: Syl Rodgers       See flowsheet documentation for full assessment, interventions and recommendations  Outcome: Not Progressing  Note: Prognosis: Poor  Problem List: Decreased strength, Decreased endurance, Decreased range of motion, Decreased mobility, Impaired balance, Decreased coordination, Decreased cognition, Impaired judgement, Decreased safety awareness, Decreased skin integrity, Impaired hearing  Assessment: Pt  supine in bed upon my arrival  Pt  throughout treatment session impulsive with poor carryover with all cueing provided both verbal/tactile  Pt  kept reporting throughout treatment session requiring to pee, however catheter in place  However, poor understanding of current medical status by pt  Progressed with transfers requiring A of therapist with cues for hand placement/technique  Progressed with limited amb  trial with A of 2 and cues provided for LE sequencing  Pt  impulsive and decreased safety awareness throughout  Attempted to place pt  on bedside commode, however pt  kept attempting to get off commode unassisted stating, "I need to pee " Redirection/cueing unsucessful  Pt  returned to supine in bed with alarm active at end of treatment session  Nursing staff made aware of no cable for call center present on bed for bed alarm at end of treatment session  PT will continue to recommend d/c to rehab when medically stable for continued improvement of noted impairments above     Barriers to Discharge: Inaccessible home environment, Decreased caregiver support  Barriers to Discharge Comments: Level of support  PT Discharge Recommendation: Post-Acute Rehabilitation Services     PT - OK to Discharge: Yes(if d/c to rehab when medically stable )    See flowsheet documentation for full assessment

## 2020-12-24 NOTE — ASSESSMENT & PLAN NOTE
· Acute metabolic encephalopathy secondary to COVID-19 and underlying cognitive decline  · Possibly secondary to steroids and history of anxiety   · Improving although patient is to be significantly anxious

## 2020-12-24 NOTE — CASE MANAGEMENT
STR is recommended for pt with general referral to Covid accepting facilities and those in the area willing to accept pt's within 10 days of testing positive- will need to discuss with son Sarah Hart for ultimate choice once facilities return decision of acceptance  Will continue to follow to assist with dc poc

## 2020-12-24 NOTE — ASSESSMENT & PLAN NOTE
· Hypokalemia has been repleted  · Continue to follow    Results from last 7 days   Lab Units 12/24/20  0633 12/23/20  0631 12/22/20  0446 12/21/20  0530 12/20/20  0602 12/19/20  0601 12/18/20  1828   POTASSIUM mmol/L 3 9 3 1* 3 3* 3 6 3 6 3 6 3 4*

## 2020-12-24 NOTE — OCCUPATIONAL THERAPY NOTE
Occupational Therapy Treatment Note:         12/24/20 1138   Note Type   Note Type Treatment   Restrictions/Precautions   Weight Bearing Precautions Per Order No   Other Precautions Contact/isolation; Airborne/isolation;Droplet precautions   Pain Assessment   Pain Assessment Tool 0-10   Pain Score No Pain   Pain Location/Orientation Location: Back   ADL   Where Assessed Edge of bed   Grooming Assistance 3  Moderate Assistance   Grooming Deficit Setup;Verbal cueing;Supervision/safety; Increased time to complete;Wash/dry hands; Wash/dry face   Grooming Comments Pt with need for hand over hand A with activities  UB Bathing Assistance Unable to assess   LB Bathing Assistance 1  Total Assistance   UB Dressing Assistance 1  Total Assistance   UB Dressing Comments cues to poor focus to tasks    LB Dressing Assistance 1  Total [address]  Unable to assess   Toileting Comments catheter in place  Pt requested use of bathroom  Pt unable to void  Functional Standing Tolerance   Time 1-2 mins   Activity SPT   Comments Pt with poor safety awareness with activity  Eyes closed through out  Bed Mobility   Supine to Sit 4  Minimal assistance   Additional items Assist x 1;Bedrails; Increased time required;Verbal cues;LE management   Sit to Supine 4  Minimal assistance   Additional items Assist x 1;Bedrails;Verbal cues;LE management   Transfers   Sit to Stand 4  Minimal assistance   Additional items Assist x 2;Bedrails;Armrests; Increased time required;Verbal cues   Stand to Sit 4  Minimal assistance   Additional items Assist x 2;Bedrails;Armrests; Increased time required;Verbal cues   Stand pivot 4  Minimal assistance   Additional items Assist x 2;Bedrails; Increased time required;Verbal cues   Toilet transfer 4  Minimal assistance   Additional items Assist x 2;Armrests; Increased time required;Verbal cues; Commode   Additional Comments cues for safety required   Unable to redirect    Cognition   Overall Cognitive Status Impaired   Arousal/Participation Other (Comment)  (Pt with limited focus to activity or communication )   Attention Difficulty attending to directions   Orientation Level Oriented to person   Memory Decreased short term memory;Decreased recall of recent events;Decreased recall of precautions   Following Commands Follows one step commands without difficulty   Additional Activities   Additional Activities Other (Comment)  (reviewed basic orientation, provided vaildation of verb)   Additional Activities Comments Pt unable to follow simple commands Eyes closed through out tx session    Activity Tolerance   Activity Tolerance Patient limited by fatigue   Medical Staff Made Aware reported all findings to nursing staff  Assessment   Assessment Pt was seen for skilled OT with focus on completion of bed mobility, self toileting, review of basic orientation, validation therapies and review of current plan of care  Pt with incoherent speech upon greeting  Pt mumbling regarding use of toilet  Min A overall to achieve EOB positioning  Pt without awareness of need for use of RW  Impulsive movements noted with need for hand over hand A to prevent fall  Unable to redirect Pt after turning the opposite direction, wrapping herself in O2, catheter tubing  Mod/Max A provided to return Pt to supine positioning due to noted confusion and limited carry over of simple commands  Improvement noted with mood and behaviors following return to supine positioning with all needs with in reach  Bed alarm activated, but reported need for call bell alarm on bed required to alert nursing staff outside of Pt's room  Reported all findings to nursing staff  See above levels of A required for all functional tasks  Pt may benefit from further rehab with focus on achieving optimal performance levels with all functional tasks   Continue to recommend Inpatient rehab   Plan   Treatment Interventions ADL retraining;Functional transfer training;UE strengthening/ROM; Endurance training;Cognitive reorientation;Patient/family training   Goal Expiration Date 01/05/21   OT Treatment Day 1   OT Frequency 3-5x/wk   Recommendation   OT Discharge Recommendation   (return to [facility] with OT vs STR  pending facility requirements )   OT - OK to Discharge Yes  (when medically cleared  )   Barthel Index   Feeding 5   Bathing 0   Grooming Score 0   Dressing Score 5   Bladder Score 5   Bowels Score 5   Toilet Use Score 5   Transfers (Bed/Chair) Score 10   Mobility (Level Surface) Score 0   Stairs Score 0   Barthel Index Score 35   Modified [place] Scale   Modified Lexa Scale 4   Tena Fairchild, [address]

## 2020-12-25 LAB
ALBUMIN SERPL BCP-MCNC: 2.6 G/DL (ref 3.5–5)
ALP SERPL-CCNC: 104 U/L (ref 46–116)
ALT SERPL W P-5'-P-CCNC: 28 U/L (ref 12–78)
ANION GAP SERPL CALCULATED.3IONS-SCNC: 9 MMOL/L (ref 4–13)
AST SERPL W P-5'-P-CCNC: 47 U/L (ref 5–45)
BASOPHILS # BLD AUTO: 0.01 THOUSANDS/ΜL (ref 0–0.1)
BASOPHILS NFR BLD AUTO: 0 % (ref 0–1)
BILIRUB SERPL-MCNC: 0.5 MG/DL (ref 0.2–1)
BUN SERPL-MCNC: 25 MG/DL (ref 5–25)
CALCIUM ALBUM COR SERPL-MCNC: 10.1 MG/DL (ref 8.3–10.1)
CALCIUM SERPL-MCNC: 9 MG/DL (ref 8.3–10.1)
CHLORIDE SERPL-SCNC: 111 MMOL/L (ref 100–108)
CO2 SERPL-SCNC: 26 MMOL/L (ref 21–32)
CREAT SERPL-MCNC: 0.64 MG/DL (ref 0.6–1.3)
CRP SERPL QL: 82.7 MG/L
D DIMER PPP FEU-MCNC: 1.4 UG/ML FEU
EOSINOPHIL # BLD AUTO: 0 THOUSAND/ΜL (ref 0–0.61)
EOSINOPHIL NFR BLD AUTO: 0 % (ref 0–6)
ERYTHROCYTE [DISTWIDTH] IN BLOOD BY AUTOMATED COUNT: 12.6 % (ref 11.6–15.1)
FERRITIN SERPL-MCNC: 496 NG/ML (ref 8–388)
GFR SERPL CREATININE-BSD FRML MDRD: 82 ML/MIN/1.73SQ M
GLUCOSE SERPL-MCNC: 94 MG/DL (ref 65–140)
HCT VFR BLD AUTO: 35.7 % (ref 34.8–46.1)
HGB BLD-MCNC: 11.1 G/DL (ref 11.5–15.4)
IMM GRANULOCYTES # BLD AUTO: 0.08 THOUSAND/UL (ref 0–0.2)
IMM GRANULOCYTES NFR BLD AUTO: 1 % (ref 0–2)
LYMPHOCYTES # BLD AUTO: 0.56 THOUSANDS/ΜL (ref 0.6–4.47)
LYMPHOCYTES NFR BLD AUTO: 6 % (ref 14–44)
MCH RBC QN AUTO: 31.2 PG (ref 26.8–34.3)
MCHC RBC AUTO-ENTMCNC: 31.1 G/DL (ref 31.4–37.4)
MCV RBC AUTO: 100 FL (ref 82–98)
MONOCYTES # BLD AUTO: 0.73 THOUSAND/ΜL (ref 0.17–1.22)
MONOCYTES NFR BLD AUTO: 8 % (ref 4–12)
NEUTROPHILS # BLD AUTO: 7.99 THOUSANDS/ΜL (ref 1.85–7.62)
NEUTS SEG NFR BLD AUTO: 85 % (ref 43–75)
NRBC BLD AUTO-RTO: 0 /100 WBCS
PLATELET # BLD AUTO: 645 THOUSANDS/UL (ref 149–390)
PMV BLD AUTO: 9.1 FL (ref 8.9–12.7)
POTASSIUM SERPL-SCNC: 3.7 MMOL/L (ref 3.5–5.3)
PROT SERPL-MCNC: 7.5 G/DL (ref 6.4–8.2)
RBC # BLD AUTO: 3.56 MILLION/UL (ref 3.81–5.12)
SODIUM SERPL-SCNC: 146 MMOL/L (ref 136–145)
WBC # BLD AUTO: 9.37 THOUSAND/UL (ref 4.31–10.16)

## 2020-12-25 PROCEDURE — 85379 FIBRIN DEGRADATION QUANT: CPT | Performed by: INTERNAL MEDICINE

## 2020-12-25 PROCEDURE — 82728 ASSAY OF FERRITIN: CPT | Performed by: INTERNAL MEDICINE

## 2020-12-25 PROCEDURE — 86140 C-REACTIVE PROTEIN: CPT | Performed by: INTERNAL MEDICINE

## 2020-12-25 PROCEDURE — 99232 SBSQ HOSP IP/OBS MODERATE 35: CPT | Performed by: INTERNAL MEDICINE

## 2020-12-25 PROCEDURE — 80053 COMPREHEN METABOLIC PANEL: CPT | Performed by: INTERNAL MEDICINE

## 2020-12-25 PROCEDURE — 85025 COMPLETE CBC W/AUTO DIFF WBC: CPT | Performed by: INTERNAL MEDICINE

## 2020-12-25 RX ORDER — DEXTROSE MONOHYDRATE 50 MG/ML
75 INJECTION, SOLUTION INTRAVENOUS CONTINUOUS
Status: DISCONTINUED | OUTPATIENT
Start: 2020-12-25 | End: 2020-12-28

## 2020-12-25 RX ADMIN — CLONAZEPAM 0.5 MG: 0.5 TABLET ORAL at 17:05

## 2020-12-25 RX ADMIN — ZINC SULFATE 220 MG (50 MG) CAPSULE 220 MG: CAPSULE at 09:34

## 2020-12-25 RX ADMIN — CLONAZEPAM 0.5 MG: 0.5 TABLET ORAL at 09:34

## 2020-12-25 RX ADMIN — DOCUSATE SODIUM AND SENNOSIDES 1 TABLET: 8.6; 5 TABLET, FILM COATED ORAL at 09:34

## 2020-12-25 RX ADMIN — Medication 2000 UNITS: at 09:34

## 2020-12-25 RX ADMIN — OXYBUTYNIN 10 MG: 10 TABLET, FILM COATED, EXTENDED RELEASE ORAL at 09:34

## 2020-12-25 RX ADMIN — FERROUS SULFATE TAB 325 MG (65 MG ELEMENTAL FE) 325 MG: 325 (65 FE) TAB at 09:34

## 2020-12-25 RX ADMIN — OXYCODONE HYDROCHLORIDE AND ACETAMINOPHEN 1000 MG: 500 TABLET ORAL at 09:34

## 2020-12-25 RX ADMIN — HEPARIN SODIUM 5000 UNITS: 5000 INJECTION INTRAVENOUS; SUBCUTANEOUS at 14:40

## 2020-12-25 RX ADMIN — QUETIAPINE FUMARATE 400 MG: 200 TABLET ORAL at 21:11

## 2020-12-25 RX ADMIN — TOPIRAMATE 50 MG: 25 TABLET, FILM COATED ORAL at 09:34

## 2020-12-25 RX ADMIN — HEPARIN SODIUM 5000 UNITS: 5000 INJECTION INTRAVENOUS; SUBCUTANEOUS at 21:11

## 2020-12-25 RX ADMIN — ATORVASTATIN CALCIUM 40 MG: 40 TABLET, FILM COATED ORAL at 21:11

## 2020-12-25 RX ADMIN — DEXAMETHASONE SODIUM PHOSPHATE 6 MG: 4 INJECTION INTRA-ARTICULAR; INTRALESIONAL; INTRAMUSCULAR; INTRAVENOUS; SOFT TISSUE at 09:26

## 2020-12-25 RX ADMIN — DOCUSATE SODIUM AND SENNOSIDES 1 TABLET: 8.6; 5 TABLET, FILM COATED ORAL at 17:05

## 2020-12-25 RX ADMIN — FAMOTIDINE 20 MG: 20 TABLET ORAL at 09:34

## 2020-12-25 RX ADMIN — DEXTROSE 75 ML/HR: 5 SOLUTION INTRAVENOUS at 17:05

## 2020-12-25 RX ADMIN — TOPIRAMATE 50 MG: 25 TABLET, FILM COATED ORAL at 17:05

## 2020-12-25 NOTE — ASSESSMENT & PLAN NOTE
· COVID-19 infection with acute respiratory failure/hypoxia  · COVID-19:  Completed remdesivir   · On dexamethasone  · Continue trend inflammatory markers  · Is requiring 5-6 L nasal cannula for the last few days  · Received convalescent plasma 12/23  · Discussed code status and poor functional status with family    They wish  for patient to be full code    Lab Results   Component Value Date    SARSCOV2 Positive (A) 12/18/2020    [facility] Not Detected 07/17/2020    SARSCOV2 Negative 07/13/2020     Results from last 7 days   Lab Units 12/25/20  0940 12/24/20  0000 12/23/20  0631 12/22/20  0446 12/21/20  0530 12/18/20  2230 12/18/20  2044   D-DIMER QUANTITATIVE ug/ml FEU 1 40* 0 90* 0 87* 0 86* 1 04*  --  1 47*   CRP mg/L 82 7* 163 4* 136 8* 147 8* 182 7* 200 0*  --    FERRITIN ng/mL 496* 456* 408* 451* 541* 413*  --      Results from last 7 days   Lab Units 12/19/20  0639 12/18/20  2044   PROCALCITONIN ng/ml 0 11 0 16

## 2020-12-25 NOTE — PROGRESS NOTES
Progress Note - Dorothy Alcala 1935, 80 y o  female MRN: [id number]    [facility] [address] [facility]01 Encounter: [id number]    Primary Care Provider: Glade Frankel, MD   Date and time admitted to hospital: 12/18/2020  5:50 PM        Hypernatremia  Assessment & Plan  Secondary to free water deficit   Gentle IV fluids overnight  Follow-up BMP    Acute metabolic encephalopathy  Assessment & Plan  · Acute metabolic encephalopathy secondary to COVID-19 and underlying cognitive decline  · Possibly secondary to steroids and history of anxiety   · Improving although patient is to be significantly anxious  · Intermittently refusing medications which is likely contributing    Bipolar 1 disorder ([facility] [address] )  Assessment & Plan  · Bipolar depression  · Patient appears anxious  · Continue Lamictal, quetiapine, topiramate, clonazepam     Hyperlipidemia  Assessment & Plan  · Hyperlipidemia placed on atorvastatin based on COVID-19 protocol    * COVID-19  Assessment & Plan  · COVID-19 infection with acute respiratory failure/hypoxia  · COVID-19:  Completed remdesivir   · On dexamethasone  · Continue trend inflammatory markers  · Is requiring 5-6 L nasal cannula for the last few days  · Received convalescent plasma 12/23  · Discussed code status and poor functional status with family    They wish  for patient to be full code    Lab Results   Component Value Date    SARSCOV2 Positive (A) 12/18/2020    [address] [facility] 98 Not Detected 07/17/2020    SARSCOV2 Negative 07/13/2020     Results from last 7 days   Lab Units 12/25/20  0940 12/24/20  1867 12/23/20  0631 12/22/20  0446 12/21/20  0530 12/18/20  2230 12/18/20  2044   D-DIMER QUANTITATIVE ug/ml FEU 1 40* 0 90* 0 87* 0 86* 1 04*  --  1 47*   CRP mg/L 82 7* 163 4* 136 8* 147 8* 182 7* 200 0*  --    FERRITIN ng/mL 496* 456* 408* 451* 541* 413*  --      Results from last 7 days   Lab Units 12/19/20  0639 12/18/20  2044   PROCALCITONIN ng/ml 0 11 0 16         VTE Pharmacologic Prophylaxis: Heparin  Pharmacologic: Heparin  Mechanical VTE Prophylaxis in Place: Yes    Patient Centered Rounds: I have performed bedside rounds with nursing staff today  Discussions with Specialists or Other Care Team Provider: none    Education and Discussions with Family / Patient: Family Updated     Time Spent for Care: 30 minutes  More than 50% of total time spent on counseling and coordination of care as described above  Current Length of Stay: 7 day(s)    Current Patient Status: Inpatient   Certification Statement: The patient will continue to require additional inpatient hospital stay due to Inpatient workup of COVID pneumonia    Discharge Plan:  No tentative discharge day, ongoing workup of COVID pneumonia    Code Status: Level 1 - Full Code      Subjective:   Patient seen and evaluated at bedside  Would like to sleep and does not want me to evaluate her  Refuses physical exam       Objective:     Vitals:   Temp (24hrs), Av 1 °F (36 7 °C), Min:97 2 °F (36 2 °C), Max:99 °F (37 2 °C)    Temp:  [97 2 °F (36 2 °C)-99 °F (37 2 °C)] 99 °F (37 2 °C)  HR:  [] 109  Resp:  [20] 20  BP: (150-175)/() 150/80  SpO2:  [84 %-93 %] 93 %  Body mass index is 19 76 kg/m²  Input and Output Summary (last 24 hours): Intake/Output Summary (Last 24 hours) at 2020 1615  Last data filed at 2020 0601  Gross per 24 hour   Intake    Output 850 ml   Net -850 ml       Physical Exam:     Physical Exam  Constitutional:       General: She is not in acute distress  Appearance: She is well-developed  She is ill-appearing  She is not diaphoretic  HENT:      Head: Normocephalic and atraumatic  Eyes:      General:         Right eye: No discharge  Left eye: No discharge  Conjunctiva/sclera: Conjunctivae normal    Pulmonary:      Effort: Pulmonary effort is normal  No respiratory distress  Musculoskeletal:         General: No deformity  Skin:     Findings: No erythema or rash  Neurological:      Mental Status: She is alert and oriented to person, place, and time  Psychiatric:         Behavior: Behavior normal          Additional Data:     Labs: I have reviewed pertinent results     Results from last 7 days   Lab Units 12/25/20  0940  12/19/20  0601   WBC Thousand/uL 9 37   < > 2 96*   HEMOGLOBIN g/dL 11 1*   < > 9 5*   HEMATOCRIT % 35 7   < > 30 9*   PLATELETS Thousands/uL 645*   < > 321   BANDS PCT %  --   --  2   NEUTROS PCT % 85*   < >  --    LYMPHS PCT % 6*   < >  --    LYMPHO PCT %  --   --  31   MONOS PCT % 8   < >  --    MONO PCT %  --   --  3*   EOS PCT % 0   < > 0    < > = values in this interval not displayed  Results from last 7 days   Lab Units 12/25/20  0940   SODIUM mmol/L 146*   POTASSIUM mmol/L 3 7   CHLORIDE mmol/L 111*   CO2 mmol/L 26   BUN mg/dL 25   CREATININE mg/dL 0 64   ANION GAP mmol/L 9   CALCIUM mg/dL 9 0   ALBUMIN g/dL 2 6*   TOTAL BILIRUBIN mg/dL 0 50   ALK PHOS U/L 104   ALT U/L 28   AST U/L 47*   GLUCOSE RANDOM mg/dL 94                 Results from last 7 days   Lab Units 12/19/20  0639 12/18/20  2044 12/18/20  1828   LACTIC ACID mmol/L  --   --  1 2   PROCALCITONIN ng/ml 0 11 0 16  --          Imaging: I have reviewed pertinent imaging       Recent Cultures (last 7 days):     Results from last 7 days   Lab Units 12/18/20 2057 12/18/20 2044   BLOOD CULTURE  No Growth After 5 Days  No Growth After 5 Days         Last 24 Hours Medication List:   Current Facility-Administered Medications   Medication Dose Route Frequency Provider Last Rate    acetaminophen  650 mg Oral Q6H PRN Azalia Hernández PA-C      ascorbic acid  1,000 mg Oral Q12H [facility] Azalia Hernández PA-C      atorvastatin  40 mg Oral HS Azalia Hernández PA-C      cholecalciferol  2,000 Units Oral Daily Azalia Hernández PA-C      clonazePAM  0 5 mg Oral BID Azalia Hernández PA-C      dexamethasone  6 mg Intravenous Daily [place] Elsa,       dicyclomine  10 mg Oral TID PRN Kezia Ahumada,       famotidine  20 mg Oral Daily Azalia Hernández PA-C      ferrous sulfate  325 mg Oral Daily With Breakfast Kerrie Rivera DO      heparin (porcine)  5,000 Units Subcutaneous [facility] Azalia Hernández PA-C      lamoTRIgine  200 mg Oral Daily Before Breakfast Azalia Hernández PA-C      zinc sulfate  220 mg Oral Daily Azalia Hernández PA-C      Followed by   Narinder Garza ON 12/26/2020] multivitamin-minerals  1 tablet Oral Daily Azalia Hernández PA-C      ondansetron  4 mg Intravenous Q6H PRN Azalia Hernández PA-C      oxybutynin  10 mg Oral Daily Azalia Hernández PA-C      pantoprazole  40 mg Oral Early Morning Azalia Hernández PA-C      QUEtiapine  400 mg Oral HS Azalia Hernández PA-C      senna-docusate sodium  1 tablet Oral BID Kezia Ahumada DO      topiramate  50 mg Oral BID Margaret Isaacs PA-C          Today, Patient Was Seen By: Kerrie Rivera DO    ** Please Note: Dictation voice to text software may have been used in the creation of this document   **

## 2020-12-25 NOTE — ASSESSMENT & PLAN NOTE
· Acute metabolic encephalopathy secondary to COVID-19 and underlying cognitive decline  · Possibly secondary to steroids and history of anxiety   · Improving although patient is to be significantly anxious  · Intermittently refusing medications which is likely contributing

## 2020-12-25 NOTE — PLAN OF CARE
Problem: Potential for Falls  Goal: Patient will remain free of falls  Description: INTERVENTIONS:  - Assess patient frequently for physical needs  -  Identify cognitive and physical deficits and behaviors that affect risk of falls    -  [place] fall precautions as indicated by assessment   - Educate patient/family on patient safety including physical limitations  - Instruct patient to call for assistance with activity based on assessment  - Modify environment to reduce risk of injury  - Consider OT/PT consult to assist with strengthening/mobility  Outcome: Progressing     Problem: Prexisting or High Potential for Compromised Skin Integrity  Goal: Skin integrity is maintained or improved  Description: INTERVENTIONS:  - Identify patients at risk for skin breakdown  - Assess and monitor skin integrity  - Assess and monitor nutrition and hydration status  - Monitor labs   - Assess for incontinence   - Turn and reposition patient  - Assist with mobility/ambulation  - Relieve pressure over bony prominences  - Avoid friction and shearing  - Provide appropriate hygiene as needed including keeping skin clean and dry  - Evaluate need for skin moisturizer/barrier cream  - Collaborate with interdisciplinary team   - Patient/family teaching  - Consider wound care consult   Outcome: Progressing     Problem: PAIN - ADULT  Goal: Verbalizes/displays adequate comfort level or baseline comfort level  Description: Interventions:  - Encourage patient to monitor pain and request assistance  - Assess pain using appropriate pain scale  - Administer analgesics based on type and severity of pain and evaluate response  - Implement non-pharmacological measures as appropriate and evaluate response  - Consider cultural and social influences on pain and pain management  - Notify physician/advanced practitioner if interventions unsuccessful or patient reports new pain  Outcome: Progressing     Problem: INFECTION - ADULT  Goal: Absence or prevention of progression during hospitalization  Description: INTERVENTIONS:  - Assess and monitor for signs and symptoms of infection  - Monitor lab/diagnostic results  - Monitor all insertion sites, i e  indwelling lines, tubes, and drains  - Monitor endotracheal if appropriate and nasal secretions for changes in amount and color  - [place] appropriate cooling/warming therapies per order  - Administer medications as ordered  - Instruct and encourage patient and family to use good hand hygiene technique  - Identify and instruct in appropriate isolation precautions for identified infection/condition  Outcome: Progressing  Goal: Absence of fever/infection during neutropenic period  Description: INTERVENTIONS:  - Monitor WBC    Outcome: Progressing     Problem: SAFETY ADULT  Goal: Patient will remain free of falls  Description: INTERVENTIONS:  - Assess patient frequently for physical needs  -  Identify cognitive and physical deficits and behaviors that affect risk of falls    -  [place] fall precautions as indicated by assessment   - Educate patient/family on patient safety including physical limitations  - Instruct patient to call for assistance with activity based on assessment  - Modify environment to reduce risk of injury  - Consider OT/PT consult to assist with strengthening/mobility  Outcome: Progressing  Goal: Maintain or return to baseline ADL function  Description: INTERVENTIONS:  -  Assess patient's ability to carry out ADLs; assess patient's baseline for ADL function and identify physical deficits which impact ability to perform ADLs (bathing, care of mouth/teeth, toileting, grooming, dressing, etc )  - Assess/evaluate cause of self-care deficits   - Assess range of motion  - Assess patient's mobility; develop plan if impaired  - Assess patient's need for assistive devices and provide as appropriate  - Encourage maximum independence but intervene and supervise when necessary  - Involve family in performance of ADLs  - Assess for home care needs following discharge   - Consider OT consult to assist with ADL evaluation and planning for discharge  - Provide patient education as appropriate  Outcome: Progressing  Goal: Maintain or return mobility status to optimal level  Description: INTERVENTIONS:  - Assess patient's baseline mobility status (ambulation, transfers, stairs, etc )    - Identify cognitive and physical deficits and behaviors that affect mobility  - Identify mobility aids required to assist with transfers and/or ambulation (gait belt, sit-to-stand, lift, walker, cane, etc )  - [place] fall precautions as indicated by assessment  - Record patient progress and toleration of activity level on Mobility SBAR; progress patient to next Phase/Stage  - Instruct patient to call for assistance with activity based on assessment  - Consider rehabilitation consult to assist with strengthening/weightbearing, etc   Outcome: Progressing     Problem: DISCHARGE PLANNING  Goal: Discharge to home or other facility with appropriate resources  Description: INTERVENTIONS:  - Identify barriers to discharge w/patient and caregiver  - Arrange for needed discharge resources and transportation as appropriate  - Identify discharge learning needs (meds, wound care, etc )  - Arrange for interpretive services to assist at discharge as needed  - Refer to Case Management Department for coordinating discharge planning if the patient needs post-hospital services based on physician/advanced practitioner order or complex needs related to functional status, cognitive ability, or social support system  Outcome: Progressing     Problem: Knowledge Deficit  Goal: Patient/family/caregiver demonstrates understanding of disease process, treatment plan, medications, and discharge instructions  Description: Complete learning assessment and assess knowledge base    Interventions:  - Provide teaching at level of understanding  - Provide teaching via preferred learning methods  Outcome: Progressing     Problem: Nutrition/Hydration-ADULT  Goal: Nutrient/Hydration intake appropriate for improving, restoring or maintaining nutritional needs  Description: Monitor and assess patient's nutrition/hydration status for malnutrition  Collaborate with interdisciplinary team and initiate plan and interventions as ordered  Monitor patient's weight and dietary intake as ordered or per policy  Utilize nutrition screening tool and intervene as necessary  Determine patient's food preferences and provide high-protein, high-caloric foods as appropriate       INTERVENTIONS:  - Monitor oral intake, urinary output, labs, and treatment plans  - Assess nutrition and hydration status and recommend course of action  - Evaluate amount of meals eaten  - Assist patient with eating if necessary   - Allow adequate time for meals  - Recommend/ encourage appropriate diets, oral nutritional supplements, and vitamin/mineral supplements  - Order, calculate, and assess calorie counts as needed  - Recommend, monitor, and adjust tube feedings and TPN/PPN based on assessed needs  - Assess need for intravenous fluids  - Provide specific nutrition/hydration education as appropriate  - Include patient/family/caregiver in decisions related to nutrition  Outcome: Progressing

## 2020-12-26 LAB
ALBUMIN SERPL BCP-MCNC: 2.4 G/DL (ref 3.5–5)
ALP SERPL-CCNC: 104 U/L (ref 46–116)
ALT SERPL W P-5'-P-CCNC: 22 U/L (ref 12–78)
ANION GAP SERPL CALCULATED.3IONS-SCNC: 10 MMOL/L (ref 4–13)
AST SERPL W P-5'-P-CCNC: 54 U/L (ref 5–45)
BASOPHILS # BLD AUTO: 0.01 THOUSANDS/ΜL (ref 0–0.1)
BASOPHILS NFR BLD AUTO: 0 % (ref 0–1)
BILIRUB SERPL-MCNC: 0.67 MG/DL (ref 0.2–1)
BUN SERPL-MCNC: 24 MG/DL (ref 5–25)
CALCIUM ALBUM COR SERPL-MCNC: 10.6 MG/DL (ref 8.3–10.1)
CALCIUM SERPL-MCNC: 9.3 MG/DL (ref 8.3–10.1)
CHLORIDE SERPL-SCNC: 107 MMOL/L (ref 100–108)
CO2 SERPL-SCNC: 23 MMOL/L (ref 21–32)
CREAT SERPL-MCNC: 0.63 MG/DL (ref 0.6–1.3)
CRP SERPL QL: 163.3 MG/L
D DIMER PPP FEU-MCNC: 1.49 UG/ML FEU
EOSINOPHIL # BLD AUTO: 0.01 THOUSAND/ΜL (ref 0–0.61)
EOSINOPHIL NFR BLD AUTO: 0 % (ref 0–6)
ERYTHROCYTE [DISTWIDTH] IN BLOOD BY AUTOMATED COUNT: 12.7 % (ref 11.6–15.1)
FERRITIN SERPL-MCNC: 473 NG/ML (ref 8–388)
GFR SERPL CREATININE-BSD FRML MDRD: 82 ML/MIN/1.73SQ M
GLUCOSE SERPL-MCNC: 91 MG/DL (ref 65–140)
HCT VFR BLD AUTO: 35.3 % (ref 34.8–46.1)
HGB BLD-MCNC: 11 G/DL (ref 11.5–15.4)
IMM GRANULOCYTES # BLD AUTO: 0.1 THOUSAND/UL (ref 0–0.2)
IMM GRANULOCYTES NFR BLD AUTO: 1 % (ref 0–2)
LYMPHOCYTES # BLD AUTO: 0.86 THOUSANDS/ΜL (ref 0.6–4.47)
LYMPHOCYTES NFR BLD AUTO: 9 % (ref 14–44)
MCH RBC QN AUTO: 31.9 PG (ref 26.8–34.3)
MCHC RBC AUTO-ENTMCNC: 31.2 G/DL (ref 31.4–37.4)
MCV RBC AUTO: 102 FL (ref 82–98)
MONOCYTES # BLD AUTO: 0.81 THOUSAND/ΜL (ref 0.17–1.22)
MONOCYTES NFR BLD AUTO: 8 % (ref 4–12)
NEUTROPHILS # BLD AUTO: 7.87 THOUSANDS/ΜL (ref 1.85–7.62)
NEUTS SEG NFR BLD AUTO: 82 % (ref 43–75)
NRBC BLD AUTO-RTO: 0 /100 WBCS
PLATELET # BLD AUTO: 666 THOUSANDS/UL (ref 149–390)
PMV BLD AUTO: 9.7 FL (ref 8.9–12.7)
POTASSIUM SERPL-SCNC: 3.8 MMOL/L (ref 3.5–5.3)
PROCALCITONIN SERPL-MCNC: 0.05 NG/ML
PROT SERPL-MCNC: 7.8 G/DL (ref 6.4–8.2)
RBC # BLD AUTO: 3.45 MILLION/UL (ref 3.81–5.12)
SODIUM SERPL-SCNC: 140 MMOL/L (ref 136–145)
WBC # BLD AUTO: 9.66 THOUSAND/UL (ref 4.31–10.16)

## 2020-12-26 PROCEDURE — 85025 COMPLETE CBC W/AUTO DIFF WBC: CPT | Performed by: INTERNAL MEDICINE

## 2020-12-26 PROCEDURE — 82728 ASSAY OF FERRITIN: CPT | Performed by: INTERNAL MEDICINE

## 2020-12-26 PROCEDURE — 99232 SBSQ HOSP IP/OBS MODERATE 35: CPT | Performed by: INTERNAL MEDICINE

## 2020-12-26 PROCEDURE — 80053 COMPREHEN METABOLIC PANEL: CPT | Performed by: INTERNAL MEDICINE

## 2020-12-26 PROCEDURE — 85379 FIBRIN DEGRADATION QUANT: CPT | Performed by: INTERNAL MEDICINE

## 2020-12-26 PROCEDURE — 86140 C-REACTIVE PROTEIN: CPT | Performed by: INTERNAL MEDICINE

## 2020-12-26 PROCEDURE — 84145 PROCALCITONIN (PCT): CPT | Performed by: INTERNAL MEDICINE

## 2020-12-26 RX ADMIN — HEPARIN SODIUM 5000 UNITS: 5000 INJECTION INTRAVENOUS; SUBCUTANEOUS at 21:54

## 2020-12-26 RX ADMIN — TOPIRAMATE 50 MG: 25 TABLET, FILM COATED ORAL at 17:17

## 2020-12-26 RX ADMIN — DEXTROSE 75 ML/HR: 5 SOLUTION INTRAVENOUS at 09:48

## 2020-12-26 RX ADMIN — CLONAZEPAM 0.5 MG: 0.5 TABLET ORAL at 09:47

## 2020-12-26 RX ADMIN — DICYCLOMINE HYDROCHLORIDE 10 MG: 10 CAPSULE ORAL at 09:51

## 2020-12-26 RX ADMIN — HEPARIN SODIUM 5000 UNITS: 5000 INJECTION INTRAVENOUS; SUBCUTANEOUS at 06:05

## 2020-12-26 RX ADMIN — QUETIAPINE FUMARATE 400 MG: 200 TABLET ORAL at 21:53

## 2020-12-26 RX ADMIN — ACETAMINOPHEN 650 MG: 325 TABLET ORAL at 11:56

## 2020-12-26 RX ADMIN — ATORVASTATIN CALCIUM 40 MG: 40 TABLET, FILM COATED ORAL at 21:53

## 2020-12-26 RX ADMIN — DICYCLOMINE HYDROCHLORIDE 10 MG: 10 CAPSULE ORAL at 17:17

## 2020-12-26 RX ADMIN — Medication 2000 UNITS: at 09:47

## 2020-12-26 RX ADMIN — CLONAZEPAM 0.5 MG: 0.5 TABLET ORAL at 17:17

## 2020-12-26 RX ADMIN — DEXAMETHASONE SODIUM PHOSPHATE 6 MG: 4 INJECTION INTRA-ARTICULAR; INTRALESIONAL; INTRAMUSCULAR; INTRAVENOUS; SOFT TISSUE at 09:46

## 2020-12-26 RX ADMIN — HEPARIN SODIUM 5000 UNITS: 5000 INJECTION INTRAVENOUS; SUBCUTANEOUS at 13:52

## 2020-12-26 RX ADMIN — PANTOPRAZOLE SODIUM 40 MG: 40 TABLET, DELAYED RELEASE ORAL at 06:05

## 2020-12-26 RX ADMIN — TOPIRAMATE 50 MG: 25 TABLET, FILM COATED ORAL at 09:47

## 2020-12-26 RX ADMIN — FERROUS SULFATE TAB 325 MG (65 MG ELEMENTAL FE) 325 MG: 325 (65 FE) TAB at 07:21

## 2020-12-26 RX ADMIN — LAMOTRIGINE 200 MG: 100 TABLET ORAL at 07:21

## 2020-12-26 RX ADMIN — DOCUSATE SODIUM AND SENNOSIDES 1 TABLET: 8.6; 5 TABLET, FILM COATED ORAL at 17:17

## 2020-12-26 NOTE — ASSESSMENT & PLAN NOTE
· Macrocytic anemia    B12 and folate within normal limits  · Low iron, low TIBC, mildly elevated ferritin but could be secondary to COVID  · Start supplementation, repeat iron panel outpatient    Results from last 7 days   Lab Units 12/26/20  0633 12/25/20  0940 12/24/20  0635 12/23/20  0631 12/22/20  0446 12/21/20  0530 12/20/20  0602   HEMOGLOBIN g/dL 11 0* 11 1* 9 8* 10 2* 10 4* 10 6* 10 2*

## 2020-12-26 NOTE — ASSESSMENT & PLAN NOTE
· Acute metabolic encephalopathy secondary to COVID-19 and underlying cognitive decline  · Possibly secondary to steroids and history of anxiety   · Continue current psychiatric regimen  · Discuss hospice with family

## 2020-12-26 NOTE — PROGRESS NOTES
Progress Note - Lu Leyden 1935, 80 y o  female MRN: [id number]    Unit/Bed#: [address] 221-01 Encounter: [id number]    Primary Care Provider: Saira Honeycutt MD   Date and time admitted to hospital: 12/18/2020  5:50 PM        Anemia, macrocytic  Assessment & Plan  · Macrocytic anemia    B12 and folate within normal limits  · Low iron, low TIBC, mildly elevated ferritin but could be secondary to COVID  · Start supplementation, repeat iron panel outpatient    Results from last 7 days   Lab Units 12/26/20  0633 12/25/20  0940 12/24/20  0635 12/23/20  0631 12/22/20  0446 12/21/20  0530 12/20/20  0602   HEMOGLOBIN g/dL 11 0* 11 1* 9 8* 10 2* 10 4* 10 6* 10 2*       Acute metabolic encephalopathy  Assessment & Plan  · Acute metabolic encephalopathy secondary to COVID-19 and underlying cognitive decline  · Possibly secondary to steroids and history of anxiety   · Continue current psychiatric regimen  · Discuss hospice with family    Bipolar 1 disorder ([facility] [address] )  Assessment & Plan  · Bipolar depression  · Patient appears anxious  · Continue Lamictal, quetiapine, topiramate, clonazepam     Hyperlipidemia  Assessment & Plan  · Hyperlipidemia placed on atorvastatin based on COVID-19 protocol    * COVID-19  Assessment & Plan  · COVID-19 infection with acute respiratory failure/hypoxia  · COVID-19:  Completed remdesivir   · On dexamethasone  · Continue trend inflammatory markers  · Oxygen requirement has increased to 10 L from 6 L  · Received convalescent plasma 12/23  · Will discuss code status, transition to hospice with family    Lab Results   Component Value Date    SARSCOV2 Positive (A) 12/18/2020    [facility] 98 Not Detected 07/17/2020    SARSCOV2 Negative 07/13/2020     Results from last 7 days   Lab Units 12/26/20  0633 12/25/20  0940 12/24/20  0633 12/23/20  0631 12/22/20  0446 12/21/20  0530   D-DIMER QUANTITATIVE ug/ml FEU  --  1 40* 0 90* 0 87* 0 86* 1 04*   CRP mg/L  --  82 7* 163 4* 136 8* 147 8* 182 7* FERRITIN ng/mL 473* 496* 456* 408* 451* 541*             VTE Pharmacologic Prophylaxis:   Pharmacologic: Enoxaparin (Lovenox)  Mechanical VTE Prophylaxis in Place: Yes    Patient Centered Rounds: I have performed bedside rounds with nursing staff today  Discussions with Specialists or Other Care Team Provider: None    Education and Discussions with Family / Patient:  Called family left message    Time Spent for Care: 30 minutes  More than 50% of total time spent on counseling and coordination of care as described above  Current Length of Stay: 8 day(s)    Current Patient Status: Inpatient   Certification Statement: The patient will continue to require additional inpatient hospital stay due to Inpatient management of COVID pneumonia    Discharge Plan:  Inpatient management of COVID pneumonia, discuss code status with family    Code Status: Level 1 - Full Code      Subjective:   Patient seen at bedside  She requesting no evaluation  Does not want to have a physical exam or talk with me  Would just like to sleep  Objective:     Vitals:   Temp (24hrs), Av 1 °F (36 7 °C), Min:97 7 °F (36 5 °C), Max:98 5 °F (36 9 °C)    Temp:  [97 7 °F (36 5 °C)-98 5 °F (36 9 °C)] 97 7 °F (36 5 °C)  HR:  [101-109] 103  Resp:  [20-21] 20  BP: (150-163)/() 163/98  SpO2:  [93 %-95 %] 95 %  Body mass index is 19 76 kg/m²  Input and Output Summary (last 24 hours): Intake/Output Summary (Last 24 hours) at 2020 1021  Last data filed at 2020 0948  Gross per 24 hour   Intake 1000 ml   Output 750 ml   Net 250 ml       Physical Exam:     Physical Exam  Constitutional:       General: She is not in acute distress  Appearance: She is well-developed  She is ill-appearing  She is not diaphoretic  HENT:      Head: Normocephalic and atraumatic  Eyes:      General:         Right eye: No discharge  Left eye: No discharge        Conjunctiva/sclera: Conjunctivae normal    Cardiovascular:      Rate and Rhythm: Normal rate and regular rhythm  Pulmonary:      Effort: Pulmonary effort is normal  No respiratory distress  Musculoskeletal:         General: No deformity  Skin:     Findings: No erythema or rash  Neurological:      Mental Status: She is alert  Comments: Does note that she is in the hospital as 2020, will often repeat the same answer a few times, hard of hearing         Additional Data:     Labs:  I have reviewed pertinent results     Results from last 7 days   Lab Units 12/26/20  0633   WBC Thousand/uL 9 66   HEMOGLOBIN g/dL 11 0*   HEMATOCRIT % 35 3   PLATELETS Thousands/uL 666*   NEUTROS PCT % 82*   LYMPHS PCT % 9*   MONOS PCT % 8   EOS PCT % 0     Results from last 7 days   Lab Units 12/25/20  0940   SODIUM mmol/L 146*   POTASSIUM mmol/L 3 7   CHLORIDE mmol/L 111*   CO2 mmol/L 26   BUN mg/dL 25   CREATININE mg/dL 0 64   ANION GAP mmol/L 9   CALCIUM mg/dL 9 0   ALBUMIN g/dL 2 6*   TOTAL BILIRUBIN mg/dL 0 50   ALK PHOS U/L 104   ALT U/L 28   AST U/L 47*   GLUCOSE RANDOM mg/dL 94                         Imaging: I have reviewed pertinent imaging       Recent Cultures (last 7 days):           Last 24 Hours Medication List:   Current Facility-Administered Medications   Medication Dose Route Frequency Provider Last Rate    acetaminophen  650 mg Oral Q6H PRN Azalia Hernández PA-C      atorvastatin  40 mg Oral HS Azalia Hernández PA-C      cholecalciferol  2,000 Units Oral Daily Azalia Hernández PA-C      clonazePAM  0 5 mg Oral BID Azalia Hernández PA-C      dexamethasone  6 mg Intravenous Daily Alejandra Lata, DO      dextrose  75 mL/hr Intravenous Continuous Anneliese Dance, DO 75 mL/hr (12/26/20 0948)    dicyclomine  10 mg Oral TID PRN Alejandra Lata, DO      famotidine  20 mg Oral Daily Azalia Hernández PA-C      ferrous sulfate  325 mg Oral Daily With Breakfast Anneliese Dance, DO      heparin (porcine)  5,000 Units Subcutaneous Q8H [address] Azalia Medina PA-C      lamoTRIgine  200 mg Oral Daily Before Breakfast Azalia Hernández PA-C      multivitamin-minerals  1 tablet Oral Daily Azalia Hernández PA-C      ondansetron  4 mg Intravenous Q6H PRN Frutoso DAISY Miranda      oxybutynin  10 mg Oral Daily Azalia Hernández PA-C      pantoprazole  40 mg Oral Early Morning Azalia Hernández PA-C      QUEtiapine  400 mg Oral HS Azalia Hernández PA-C      senna-docusate sodium  1 tablet Oral BID Tyson Tierney DO      topiramate  50 mg Oral BID Frutoso DAISY Miranda          Today, Patient Was Seen By: Laura Portillo DO    ** Please Note: Dictation voice to text software may have been used in the creation of this document   **

## 2020-12-26 NOTE — PLAN OF CARE
Problem: Potential for Falls  Goal: Patient will remain free of falls  Description: INTERVENTIONS:  - Assess patient frequently for physical needs  -  Identify cognitive and physical deficits and behaviors that affect risk of falls    -  [place] fall precautions as indicated by assessment   - Educate patient/family on patient safety including physical limitations  - Instruct patient to call for assistance with activity based on assessment  - Modify environment to reduce risk of injury  - Consider OT/PT consult to assist with strengthening/mobility  Outcome: Progressing     Problem: Prexisting or High Potential for Compromised Skin Integrity  Goal: Skin integrity is maintained or improved  Description: INTERVENTIONS:  - Identify patients at risk for skin breakdown  - Assess and monitor skin integrity  - Assess and monitor nutrition and hydration status  - Monitor labs   - Assess for incontinence   - Turn and reposition patient  - Assist with mobility/ambulation  - Relieve pressure over bony prominences  - Avoid friction and shearing  - Provide appropriate hygiene as needed including keeping skin clean and dry  - Evaluate need for skin moisturizer/barrier cream  - Collaborate with interdisciplinary team   - Patient/family teaching  - Consider wound care consult   Outcome: Progressing     Problem: PAIN - ADULT  Goal: Verbalizes/displays adequate comfort level or baseline comfort level  Description: Interventions:  - Encourage patient to monitor pain and request assistance  - Assess pain using appropriate pain scale  - Administer analgesics based on type and severity of pain and evaluate response  - Implement non-pharmacological measures as appropriate and evaluate response  - Consider cultural and social influences on pain and pain management  - Notify physician/advanced practitioner if interventions unsuccessful or patient reports new pain  Outcome: Progressing     Problem: INFECTION - ADULT  Goal: Absence or prevention of progression during hospitalization  Description: INTERVENTIONS:  - Assess and monitor for signs and symptoms of infection  - Monitor lab/diagnostic results  - Monitor all insertion sites, i e  indwelling lines, tubes, and drains  - Monitor endotracheal if appropriate and nasal secretions for changes in amount and color  - [place] appropriate cooling/warming therapies per order  - Administer medications as ordered  - Instruct and encourage patient and family to use good hand hygiene technique  - Identify and instruct in appropriate isolation precautions for identified infection/condition  Outcome: Progressing     Problem: SAFETY ADULT  Goal: Patient will remain free of falls  Description: INTERVENTIONS:  - Assess patient frequently for physical needs  -  Identify cognitive and physical deficits and behaviors that affect risk of falls    -  [place] fall precautions as indicated by assessment   - Educate patient/family on patient safety including physical limitations  - Instruct patient to call for assistance with activity based on assessment  - Modify environment to reduce risk of injury  - Consider OT/PT consult to assist with strengthening/mobility  Outcome: Progressing  Goal: Maintain or return to baseline ADL function  Description: INTERVENTIONS:  -  Assess patient's ability to carry out ADLs; assess patient's baseline for ADL function and identify physical deficits which impact ability to perform ADLs (bathing, care of mouth/teeth, toileting, grooming, dressing, etc )  - Assess/evaluate cause of self-care deficits   - Assess range of motion  - Assess patient's mobility; develop plan if impaired  - Assess patient's need for assistive devices and provide as appropriate  - Encourage maximum independence but intervene and supervise when necessary  - Involve family in performance of ADLs  - Assess for home care needs following discharge   - Consider OT consult to assist with ADL evaluation and planning for discharge  - Provide patient education as appropriate  Outcome: Progressing  Goal: Maintain or return mobility status to optimal level  Description: INTERVENTIONS:  - Assess patient's baseline mobility status (ambulation, transfers, stairs, etc )    - Identify cognitive and physical deficits and behaviors that affect mobility  - Identify mobility aids required to assist with transfers and/or ambulation (gait belt, sit-to-stand, lift, walker, cane, etc )  - [place] fall precautions as indicated by assessment  - Record patient progress and toleration of activity level on Mobility SBAR; progress patient to next Phase/Stage  - Instruct patient to call for assistance with activity based on assessment  - Consider rehabilitation consult to assist with strengthening/weightbearing, etc   Outcome: Progressing     Problem: DISCHARGE PLANNING  Goal: Discharge to home or other facility with appropriate resources  Description: INTERVENTIONS:  - Identify barriers to discharge w/patient and caregiver  - Arrange for needed discharge resources and transportation as appropriate  - Identify discharge learning needs (meds, wound care, etc )  - Arrange for interpretive services to assist at discharge as needed  - Refer to Case Management Department for coordinating discharge planning if the patient needs post-hospital services based on physician/advanced practitioner order or complex needs related to functional status, cognitive ability, or social support system  Outcome: Progressing     Problem: Knowledge Deficit  Goal: Patient/family/caregiver demonstrates understanding of disease process, treatment plan, medications, and discharge instructions  Description: Complete learning assessment and assess knowledge base    Interventions:  - Provide teaching at level of understanding  - Provide teaching via preferred learning methods  Outcome: Progressing     Problem: Nutrition/Hydration-ADULT  Goal: Nutrient/Hydration intake appropriate for improving, restoring or maintaining nutritional needs  Description: Monitor and assess patient's nutrition/hydration status for malnutrition  Collaborate with interdisciplinary team and initiate plan and interventions as ordered  Monitor patient's weight and dietary intake as ordered or per policy  Utilize nutrition screening tool and intervene as necessary  Determine patient's food preferences and provide high-protein, high-caloric foods as appropriate       INTERVENTIONS:  - Monitor oral intake, urinary output, labs, and treatment plans  - Assess nutrition and hydration status and recommend course of action  - Evaluate amount of meals eaten  - Assist patient with eating if necessary   - Allow adequate time for meals  - Recommend/ encourage appropriate diets, oral nutritional supplements, and vitamin/mineral supplements  - Order, calculate, and assess calorie counts as needed  - Recommend, monitor, and adjust tube feedings and TPN/PPN based on assessed needs  - Assess need for intravenous fluids  - Provide specific nutrition/hydration education as appropriate  - Include patient/family/caregiver in decisions related to nutrition  Outcome: Progressing

## 2020-12-26 NOTE — PLAN OF CARE
Problem: Potential for Falls  Goal: Patient will remain free of falls  Description: INTERVENTIONS:  - Assess patient frequently for physical needs  -  Identify cognitive and physical deficits and behaviors that affect risk of falls    -  [place] fall precautions as indicated by assessment   - Educate patient/family on patient safety including physical limitations  - Instruct patient to call for assistance with activity based on assessment  - Modify environment to reduce risk of injury  - Consider OT/PT consult to assist with strengthening/mobility  Outcome: Progressing     Problem: Prexisting or High Potential for Compromised Skin Integrity  Goal: Skin integrity is maintained or improved  Description: INTERVENTIONS:  - Identify patients at risk for skin breakdown  - Assess and monitor skin integrity  - Assess and monitor nutrition and hydration status  - Monitor labs   - Assess for incontinence   - Turn and reposition patient  - Assist with mobility/ambulation  - Relieve pressure over bony prominences  - Avoid friction and shearing  - Provide appropriate hygiene as needed including keeping skin clean and dry  - Evaluate need for skin moisturizer/barrier cream  - Collaborate with interdisciplinary team   - Patient/family teaching  - Consider wound care consult   Outcome: Progressing     Problem: PAIN - ADULT  Goal: Verbalizes/displays adequate comfort level or baseline comfort level  Description: Interventions:  - Encourage patient to monitor pain and request assistance  - Assess pain using appropriate pain scale  - Administer analgesics based on type and severity of pain and evaluate response  - Implement non-pharmacological measures as appropriate and evaluate response  - Consider cultural and social influences on pain and pain management  - Notify physician/advanced practitioner if interventions unsuccessful or patient reports new pain  Outcome: Progressing     Problem: INFECTION - ADULT  Goal: Absence or prevention of progression during hospitalization  Description: INTERVENTIONS:  - Assess and monitor for signs and symptoms of infection  - Monitor lab/diagnostic results  - Monitor all insertion sites, i e  indwelling lines, tubes, and drains  - Monitor endotracheal if appropriate and nasal secretions for changes in amount and color  - [place] appropriate cooling/warming therapies per order  - Administer medications as ordered  - Instruct and encourage patient and family to use good hand hygiene technique  - Identify and instruct in appropriate isolation precautions for identified infection/condition  Outcome: Progressing     Problem: SAFETY ADULT  Goal: Patient will remain free of falls  Description: INTERVENTIONS:  - Assess patient frequently for physical needs  -  Identify cognitive and physical deficits and behaviors that affect risk of falls    -  [place] fall precautions as indicated by assessment   - Educate patient/family on patient safety including physical limitations  - Instruct patient to call for assistance with activity based on assessment  - Modify environment to reduce risk of injury  - Consider OT/PT consult to assist with strengthening/mobility  Outcome: Progressing  Goal: Maintain or return to baseline ADL function  Description: INTERVENTIONS:  -  Assess patient's ability to carry out ADLs; assess patient's baseline for ADL function and identify physical deficits which impact ability to perform ADLs (bathing, care of mouth/teeth, toileting, grooming, dressing, etc )  - Assess/evaluate cause of self-care deficits   - Assess range of motion  - Assess patient's mobility; develop plan if impaired  - Assess patient's need for assistive devices and provide as appropriate  - Encourage maximum independence but intervene and supervise when necessary  - Involve family in performance of ADLs  - Assess for home care needs following discharge   - Consider OT consult to assist with ADL evaluation and planning for discharge  - Provide patient education as appropriate  Outcome: Progressing  Goal: Maintain or return mobility status to optimal level  Description: INTERVENTIONS:  - Assess patient's baseline mobility status (ambulation, transfers, stairs, etc )    - Identify cognitive and physical deficits and behaviors that affect mobility  - Identify mobility aids required to assist with transfers and/or ambulation (gait belt, sit-to-stand, lift, walker, cane, etc )  - [place] fall precautions as indicated by assessment  - Record patient progress and toleration of activity level on Mobility SBAR; progress patient to next Phase/Stage  - Instruct patient to call for assistance with activity based on assessment  - Consider rehabilitation consult to assist with strengthening/weightbearing, etc   Outcome: Progressing     Problem: DISCHARGE PLANNING  Goal: Discharge to home or other facility with appropriate resources  Description: INTERVENTIONS:  - Identify barriers to discharge w/patient and caregiver  - Arrange for needed discharge resources and transportation as appropriate  - Identify discharge learning needs (meds, wound care, etc )  - Arrange for interpretive services to assist at discharge as needed  - Refer to Case Management Department for coordinating discharge planning if the patient needs post-hospital services based on physician/advanced practitioner order or complex needs related to functional status, cognitive ability, or social support system  Outcome: Progressing     Problem: Knowledge Deficit  Goal: Patient/family/caregiver demonstrates understanding of disease process, treatment plan, medications, and discharge instructions  Description: Complete learning assessment and assess knowledge base    Interventions:  - Provide teaching at level of understanding  - Provide teaching via preferred learning methods  Outcome: Progressing     Problem: Nutrition/Hydration-ADULT  Goal: Nutrient/Hydration intake appropriate for improving, restoring or maintaining nutritional needs  Description: Monitor and assess patient's nutrition/hydration status for malnutrition  Collaborate with interdisciplinary team and initiate plan and interventions as ordered  Monitor patient's weight and dietary intake as ordered or per policy  Utilize nutrition screening tool and intervene as necessary  Determine patient's food preferences and provide high-protein, high-caloric foods as appropriate       INTERVENTIONS:  - Monitor oral intake, urinary output, labs, and treatment plans  - Assess nutrition and hydration status and recommend course of action  - Evaluate amount of meals eaten  - Assist patient with eating if necessary   - Allow adequate time for meals  - Recommend/ encourage appropriate diets, oral nutritional supplements, and vitamin/mineral supplements  - Order, calculate, and assess calorie counts as needed  - Recommend, monitor, and adjust tube feedings and TPN/PPN based on assessed needs  - Assess need for intravenous fluids  - Provide specific nutrition/hydration education as appropriate  - Include patient/family/caregiver in decisions related to nutrition  Outcome: Progressing

## 2020-12-26 NOTE — ASSESSMENT & PLAN NOTE
· COVID-19 infection with acute respiratory failure/hypoxia  · COVID-19:  Completed remdesivir   · On dexamethasone  · Continue trend inflammatory markers  · Oxygen requirement has increased to 10 L from 6 L  · Received convalescent plasma 12/23  · Will discuss code status, transition to hospice with family    Lab Results   Component Value Date    SARSCOV2 Positive (A) 12/18/2020    [address] [address] Not Detected 07/17/2020    SARSCOV2 Negative 07/13/2020     Results from last 7 days   Lab Units 12/26/20  0633 12/25/20  0940 12/24/20  1078 12/23/20  0631 12/22/20  0446 12/21/20  0530   D-DIMER QUANTITATIVE ug/ml FEU  --  1 40* 0 90* 0 87* 0 86* 1 04*   CRP mg/L  --  82 7* 163 4* 136 8* 147 8* 182 7*   FERRITIN ng/mL 473* 496* 456* 408* 451* 541*

## 2020-12-27 LAB — PROCALCITONIN SERPL-MCNC: <0.05 NG/ML

## 2020-12-27 PROCEDURE — 84145 PROCALCITONIN (PCT): CPT | Performed by: INTERNAL MEDICINE

## 2020-12-27 PROCEDURE — 99232 SBSQ HOSP IP/OBS MODERATE 35: CPT | Performed by: INTERNAL MEDICINE

## 2020-12-27 RX ADMIN — DOCUSATE SODIUM AND SENNOSIDES 1 TABLET: 8.6; 5 TABLET, FILM COATED ORAL at 17:07

## 2020-12-27 RX ADMIN — CLONAZEPAM 0.5 MG: 0.5 TABLET ORAL at 08:55

## 2020-12-27 RX ADMIN — TOPIRAMATE 50 MG: 25 TABLET, FILM COATED ORAL at 17:10

## 2020-12-27 RX ADMIN — DEXAMETHASONE SODIUM PHOSPHATE 6 MG: 4 INJECTION INTRA-ARTICULAR; INTRALESIONAL; INTRAMUSCULAR; INTRAVENOUS; SOFT TISSUE at 08:55

## 2020-12-27 RX ADMIN — HEPARIN SODIUM 5000 UNITS: 5000 INJECTION INTRAVENOUS; SUBCUTANEOUS at 06:26

## 2020-12-27 RX ADMIN — PANTOPRAZOLE SODIUM 40 MG: 40 TABLET, DELAYED RELEASE ORAL at 06:26

## 2020-12-27 RX ADMIN — DEXTROSE 75 ML/HR: 5 SOLUTION INTRAVENOUS at 02:14

## 2020-12-27 RX ADMIN — FAMOTIDINE 20 MG: 20 TABLET ORAL at 08:55

## 2020-12-27 RX ADMIN — CLONAZEPAM 0.5 MG: 0.5 TABLET ORAL at 17:07

## 2020-12-27 RX ADMIN — LAMOTRIGINE 200 MG: 100 TABLET ORAL at 06:26

## 2020-12-27 RX ADMIN — HEPARIN SODIUM 5000 UNITS: 5000 INJECTION INTRAVENOUS; SUBCUTANEOUS at 14:56

## 2020-12-27 RX ADMIN — TOPIRAMATE 50 MG: 25 TABLET, FILM COATED ORAL at 08:55

## 2020-12-27 NOTE — PROGRESS NOTES
Patient's son, Gus Plasencia, called by RN and RN place phone by patient ear so that son could talk to patient on phone  Gus Plasencia talked with patient for about 10 minutes  RN forewarned Gus Plasencia of patient's confusion and SOB and consequent difficulty with holding conversation      Diamante Flores RN 12/27/2020 1:14 PM

## 2020-12-27 NOTE — PROGRESS NOTES
Progress Note - Zahra Lares 1935, 80 y o  female MRN: [id number]    Unit/Bed#: [facility] [address] 221[address]01 Encounter: [id number]    Primary Care Provider: Candy Mireles MD   Date and time admitted to hospital: 12/18/2020  5:50 PM        Hypernatremia  Assessment & Plan  Secondary to free water deficit   Gentle IV fluids overnight  Follow-up BMP    Acute metabolic encephalopathy  Assessment & Plan  · Acute metabolic encephalopathy secondary to COVID-19 and underlying cognitive decline  · Possibly secondary to steroids and history of anxiety   · Continue current psychiatric regimen  · Palliative care to follow    Bipolar 1 disorder ([facility] [address] )  Assessment & Plan  · Bipolar depression  · Patient appears anxious  · Continue Lamictal, quetiapine, topiramate, clonazepam     * COVID-19  Assessment & Plan  · COVID-19 infection with acute respiratory failure/hypoxia  · COVID-19:  Completed remdesivir   · On dexamethasone  · Continue trend inflammatory markers  · Oxygen has increased from 6 L to 10 L to 15 L +face mask over the last 48 hours  · Had extensive conversation with son Mario Miner concerning patient's progressive decline, frailty, poor prognosis  Patient was lucid today and discussed the idea of resuscitation and intubation with her the bedside  She said that she would not want intubation or resuscitation  Discussed that ethically for me as a physician, I feel that would be on do harm to Viviana barber to perform heroic measures such as intubation and resuscitation  Discussed DNR/DNI status  Discussed transition to hospice care  Family is undecided and wishes to maintain patient level 1 full code  · Will attempt to have Skype visit with patient and family today    · Consult palliative for assistance    Lab Results   Component Value Date    SARSCOV2 Positive (A) 12/18/2020    [address] [facility] Not Detected 07/17/2020    SARSCOV2 Negative 07/13/2020     Results from last 7 days   Lab Units 12/26/20  1139 12/26/20  6447 20  0940 20  5883 20  0631 20  0446 20  0530   D-DIMER QUANTITATIVE ug/ml FEU 1 49*  --  1 40* 0 90* 0 87* 0 86* 1 04*   CRP mg/L  --  163 3* 82 7* 163 4* 136 8* 147 8* 182 7*   FERRITIN ng/mL  --  473* 496* 456* 408* 451* 541*     Results from last 7 days   Lab Units 20  1139   PROCALCITONIN ng/ml 0 05         VTE Pharmacologic Prophylaxis:  Heparin  Pharmacologic: Heparin  Mechanical VTE Prophylaxis in Place: Yes    Patient Centered Rounds: I have performed bedside rounds with nursing staff today  Discussions with Specialists or Other Care Team Provider: None    Education and Discussions with Family / Patient: Family updated on medical plan, code status discussion     Time Spent for Care: 30 minutes  More than 50% of total time spent on counseling and coordination of care as described above  Current Length of Stay: 9 day(s)    Current Patient Status: Inpatient   Certification Statement: The patient will continue to require additional inpatient hospital stay due to Need for inpatient management of COVID pneumonia    Discharge Plan:  Inpatient management of COVID pneumonia    Code Status: Level 1 - Full Code      Subjective:   Patient seen and evaluated at bedside  Patient is more alert and lucid today  Had discussion about worsening progressive lung disease  I asked her about if she would want CPR or intubation  She reports that she would not want that  She asked what her family thinks  Objective:     Vitals:   Temp (24hrs), Av 9 °F (36 6 °C), Min:97 1 °F (36 2 °C), Max:99 5 °F (37 5 °C)    Temp:  [97 1 °F (36 2 °C)-99 5 °F (37 5 °C)] 97 1 °F (36 2 °C)  HR:  [] 106  Resp:  [19-24] 24  BP: (152-168)/() 152/90  SpO2:  [76 %-98 %] 92 %  Body mass index is 19 76 kg/m²  Input and Output Summary (last 24 hours):        Intake/Output Summary (Last 24 hours) at 2020 0911  Last data filed at 2020 0214  Gross per 24 hour   Intake 1000 ml Output 2150 ml   Net -1150 ml       Physical Exam:     Physical Exam  Vitals signs reviewed  Constitutional:       Appearance: She is well-developed  She is ill-appearing and toxic-appearing  She is not diaphoretic  Comments: Frail   HENT:      Head: Normocephalic and atraumatic  Eyes:      General: No scleral icterus  Conjunctiva/sclera: Conjunctivae normal    Pulmonary:      Comments: Increased work of breathing  Abdominal:      General: There is no distension  Palpations: Abdomen is soft  Tenderness: There is no abdominal tenderness  There is no guarding or rebound  Musculoskeletal:         General: No swelling, tenderness or deformity  Lymphadenopathy:      Cervical: Cervical adenopathy present  Skin:     General: Skin is warm and dry  Neurological:      General: No focal deficit present  Mental Status: She is alert  Psychiatric:         Thought Content: Thought content normal          Judgment: Judgment normal       Comments: Anxious         Additional Data:     Labs:  I have reviewed pertinent results     Results from last 7 days   Lab Units 12/26/20  0633   WBC Thousand/uL 9 66   HEMOGLOBIN g/dL 11 0*   HEMATOCRIT % 35 3   PLATELETS Thousands/uL 666*   NEUTROS PCT % 82*   LYMPHS PCT % 9*   MONOS PCT % 8   EOS PCT % 0     Results from last 7 days   Lab Units 12/26/20  0633   SODIUM mmol/L 140   POTASSIUM mmol/L 3 8   CHLORIDE mmol/L 107   CO2 mmol/L 23   BUN mg/dL 24   CREATININE mg/dL 0 63   ANION GAP mmol/L 10   CALCIUM mg/dL 9 3   ALBUMIN g/dL 2 4*   TOTAL BILIRUBIN mg/dL 0 67   ALK PHOS U/L 104   ALT U/L 22   AST U/L 54*   GLUCOSE RANDOM mg/dL 91                 Results from last 7 days   Lab Units 12/26/20  1139   PROCALCITONIN ng/ml 0 05         Imaging: I have reviewed pertinent imaging       Recent Cultures (last 7 days):           Last 24 Hours Medication List:   Current Facility-Administered Medications   Medication Dose Route Frequency Provider Last Rate    acetaminophen  650 mg Oral Q6H PRN Saul Tapia PA-C      atorvastatin  40 mg Oral HS Azalia Hernández PA-C      cholecalciferol  2,000 Units Oral Daily Azalia Hernández PA-C      clonazePAM  0 5 mg Oral BID Azalia Hernández PA-C      dexamethasone  6 mg Intravenous Daily Matthew Ring DO      dextrose  75 mL/hr Intravenous Continuous Sandrine Barreto DO 75 mL/hr (12/27/20 0214)    dicyclomine  10 mg Oral TID PRN Matthew Ring DO      famotidine  20 mg Oral Daily Azalia Hernández PA-C      ferrous sulfate  325 mg Oral Daily With Breakfast Sandrine Barreto DO      heparin (porcine)  5,000 Units [facility]cutaneous [facility] Hern[facility]dez PA-C      lamoTRIgine  200 mg Oral Daily Before Breakfast Azalia Hernández PA-C      multivitamin-minerals  1 tablet Oral Daily Azalia Hernández PA-C      ondansetron  4 mg Intravenous Q6H PRN Saul Tapia PA-C      oxybutynin  10 mg Oral Daily Azalia Hernández PA-C      pantoprazole  40 mg Oral Early Morning Azalia Hernández PA-C      QUEtiapine  400 mg Oral HS Azalia Hernández PA-C      senna-docusate sodium  1 tablet Oral BID Matthew Ring DO      topiramate  50 mg Oral BID Saul Tapia PA-C          Today, Patient Was Seen By: Sandrine Barreto DO    ** Please Note: Dictation voice to text software may have been used in the creation of this document   **

## 2020-12-27 NOTE — ASSESSMENT & PLAN NOTE
· Acute metabolic encephalopathy secondary to COVID-19 and underlying cognitive decline  · Possibly secondary to steroids and history of anxiety   · Continue current psychiatric regimen  · Palliative care to follow

## 2020-12-27 NOTE — PLAN OF CARE
Problem: Potential for Falls  Goal: Patient will remain free of falls  Description: INTERVENTIONS:  - Assess patient frequently for physical needs  -  Identify cognitive and physical deficits and behaviors that affect risk of falls    -  [place] fall precautions as indicated by assessment   - Educate patient/family on patient safety including physical limitations  - Instruct patient to call for assistance with activity based on assessment  - Modify environment to reduce risk of injury  - Consider OT/PT consult to assist with strengthening/mobility  Outcome: Progressing     Problem: Prexisting or High Potential for Compromised Skin Integrity  Goal: Skin integrity is maintained or improved  Description: INTERVENTIONS:  - Identify patients at risk for skin breakdown  - Assess and monitor skin integrity  - Assess and monitor nutrition and hydration status  - Monitor labs   - Assess for incontinence   - Turn and reposition patient  - Assist with mobility/ambulation  - Relieve pressure over bony prominences  - Avoid friction and shearing  - Provide appropriate hygiene as needed including keeping skin clean and dry  - Evaluate need for skin moisturizer/barrier cream  - Collaborate with interdisciplinary team   - Patient/family teaching  - Consider wound care consult   Outcome: Progressing     Problem: PAIN - ADULT  Goal: Verbalizes/displays adequate comfort level or baseline comfort level  Description: Interventions:  - Encourage patient to monitor pain and request assistance  - Assess pain using appropriate pain scale  - Administer analgesics based on type and severity of pain and evaluate response  - Implement non-pharmacological measures as appropriate and evaluate response  - Consider cultural and social influences on pain and pain management  - Notify physician/advanced practitioner if interventions unsuccessful or patient reports new pain  Outcome: Progressing     Problem: INFECTION - ADULT  Goal: Absence or prevention of progression during hospitalization  Description: INTERVENTIONS:  - Assess and monitor for signs and symptoms of infection  - Monitor lab/diagnostic results  - Monitor all insertion sites, i e  indwelling lines, tubes, and drains  - Monitor endotracheal if appropriate and nasal secretions for changes in amount and color  - [place] appropriate cooling/warming therapies per order  - Administer medications as ordered  - Instruct and encourage patient and family to use good hand hygiene technique  - Identify and instruct in appropriate isolation precautions for identified infection/condition  Outcome: Progressing     Problem: SAFETY ADULT  Goal: Patient will remain free of falls  Description: INTERVENTIONS:  - Assess patient frequently for physical needs  -  Identify cognitive and physical deficits and behaviors that affect risk of falls    -  [place] fall precautions as indicated by assessment   - Educate patient/family on patient safety including physical limitations  - Instruct patient to call for assistance with activity based on assessment  - Modify environment to reduce risk of injury  - Consider OT/PT consult to assist with strengthening/mobility  Outcome: Progressing  Goal: Maintain or return to baseline ADL function  Description: INTERVENTIONS:  -  Assess patient's ability to carry out ADLs; assess patient's baseline for ADL function and identify physical deficits which impact ability to perform ADLs (bathing, care of mouth/teeth, toileting, grooming, dressing, etc )  - Assess/evaluate cause of self-care deficits   - Assess range of motion  - Assess patient's mobility; develop plan if impaired  - Assess patient's need for assistive devices and provide as appropriate  - Encourage maximum independence but intervene and supervise when necessary  - Involve family in performance of ADLs  - Assess for home care needs following discharge   - Consider OT consult to assist with ADL evaluation and planning for discharge  - Provide patient education as appropriate  Outcome: Progressing  Goal: Maintain or return mobility status to optimal level  Description: INTERVENTIONS:  - Assess patient's baseline mobility status (ambulation, transfers, stairs, etc )    - Identify cognitive and physical deficits and behaviors that affect mobility  - Identify mobility aids required to assist with transfers and/or ambulation (gait belt, sit-to-stand, lift, walker, cane, etc )  - [place] fall precautions as indicated by assessment  - Record patient progress and toleration of activity level on Mobility SBAR; progress patient to next Phase/Stage  - Instruct patient to call for assistance with activity based on assessment  - Consider rehabilitation consult to assist with strengthening/weightbearing, etc   Outcome: Progressing     Problem: DISCHARGE PLANNING  Goal: Discharge to home or other facility with appropriate resources  Description: INTERVENTIONS:  - Identify barriers to discharge w/patient and caregiver  - Arrange for needed discharge resources and transportation as appropriate  - Identify discharge learning needs (meds, wound care, etc )  - Arrange for interpretive services to assist at discharge as needed  - Refer to Case Management Department for coordinating discharge planning if the patient needs post-hospital services based on physician/advanced practitioner order or complex needs related to functional status, cognitive ability, or social support system  Outcome: Progressing     Problem: Knowledge Deficit  Goal: Patient/family/caregiver demonstrates understanding of disease process, treatment plan, medications, and discharge instructions  Description: Complete learning assessment and assess knowledge base    Interventions:  - Provide teaching at level of understanding  - Provide teaching via preferred learning methods  Outcome: Progressing     Problem: Nutrition/Hydration-ADULT  Goal: Nutrient/Hydration intake appropriate for improving, restoring or maintaining nutritional needs  Description: Monitor and assess patient's nutrition/hydration status for malnutrition  Collaborate with interdisciplinary team and initiate plan and interventions as ordered  Monitor patient's weight and dietary intake as ordered or per policy  Utilize nutrition screening tool and intervene as necessary  Determine patient's food preferences and provide high-protein, high-caloric foods as appropriate       INTERVENTIONS:  - Monitor oral intake, urinary output, labs, and treatment plans  - Assess nutrition and hydration status and recommend course of action  - Evaluate amount of meals eaten  - Assist patient with eating if necessary   - Allow adequate time for meals  - Recommend/ encourage appropriate diets, oral nutritional supplements, and vitamin/mineral supplements  - Order, calculate, and assess calorie counts as needed  - Recommend, monitor, and adjust tube feedings and TPN/PPN based on assessed needs  - Assess need for intravenous fluids  - Provide specific nutrition/hydration education as appropriate  - Include patient/family/caregiver in decisions related to nutrition  Outcome: Progressing

## 2020-12-27 NOTE — PLAN OF CARE
Problem: Potential for Falls  Goal: Patient will remain free of falls  Description: INTERVENTIONS:  - Assess patient frequently for physical needs  -  Identify cognitive and physical deficits and behaviors that affect risk of falls    -  [place] fall precautions as indicated by assessment   - Educate patient/family on patient safety including physical limitations  - Instruct patient to call for assistance with activity based on assessment  - Modify environment to reduce risk of injury  - Consider OT/PT consult to assist with strengthening/mobility  Outcome: Progressing     Problem: Prexisting or High Potential for Compromised Skin Integrity  Goal: Skin integrity is maintained or improved  Description: INTERVENTIONS:  - Identify patients at risk for skin breakdown  - Assess and monitor skin integrity  - Assess and monitor nutrition and hydration status  - Monitor labs   - Assess for incontinence   - Turn and reposition patient  - Assist with mobility/ambulation  - Relieve pressure over bony prominences  - Avoid friction and shearing  - Provide appropriate hygiene as needed including keeping skin clean and dry  - Evaluate need for skin moisturizer/barrier cream  - Collaborate with interdisciplinary team   - Patient/family teaching  - Consider wound care consult   Outcome: Progressing     Problem: PAIN - ADULT  Goal: Verbalizes/displays adequate comfort level or baseline comfort level  Description: Interventions:  - Encourage patient to monitor pain and request assistance  - Assess pain using appropriate pain scale  - Administer analgesics based on type and severity of pain and evaluate response  - Implement non-pharmacological measures as appropriate and evaluate response  - Consider cultural and social influences on pain and pain management  - Notify physician/advanced practitioner if interventions unsuccessful or patient reports new pain  Outcome: Progressing     Problem: INFECTION - ADULT  Goal: Absence or prevention of progression during hospitalization  Description: INTERVENTIONS:  - Assess and monitor for signs and symptoms of infection  - Monitor lab/diagnostic results  - Monitor all insertion sites, i e  indwelling lines, tubes, and drains  - Monitor endotracheal if appropriate and nasal secretions for changes in amount and color  - [place] appropriate cooling/warming therapies per order  - Administer medications as ordered  - Instruct and encourage patient and family to use good hand hygiene technique  - Identify and instruct in appropriate isolation precautions for identified infection/condition  Outcome: Progressing     Problem: SAFETY ADULT  Goal: Patient will remain free of falls  Description: INTERVENTIONS:  - Assess patient frequently for physical needs  -  Identify cognitive and physical deficits and behaviors that affect risk of falls    -  [place] fall precautions as indicated by assessment   - Educate patient/family on patient safety including physical limitations  - Instruct patient to call for assistance with activity based on assessment  - Modify environment to reduce risk of injury  - Consider OT/PT consult to assist with strengthening/mobility  Outcome: Progressing  Goal: Maintain or return to baseline ADL function  Description: INTERVENTIONS:  -  Assess patient's ability to carry out ADLs; assess patient's baseline for ADL function and identify physical deficits which impact ability to perform ADLs (bathing, care of mouth/teeth, toileting, grooming, dressing, etc )  - Assess/evaluate cause of self-care deficits   - Assess range of motion  - Assess patient's mobility; develop plan if impaired  - Assess patient's need for assistive devices and provide as appropriate  - Encourage maximum independence but intervene and supervise when necessary  - Involve family in performance of ADLs  - Assess for home care needs following discharge   - Consider OT consult to assist with ADL evaluation and planning for discharge  - Provide patient education as appropriate  Outcome: Progressing  Goal: Maintain or return mobility status to optimal level  Description: INTERVENTIONS:  - Assess patient's baseline mobility status (ambulation, transfers, stairs, etc )    - Identify cognitive and physical deficits and behaviors that affect mobility  - Identify mobility aids required to assist with transfers and/or ambulation (gait belt, sit-to-stand, lift, walker, cane, etc )  - [place] fall precautions as indicated by assessment  - Record patient progress and toleration of activity level on Mobility SBAR; progress patient to next Phase/Stage  - Instruct patient to call for assistance with activity based on assessment  - Consider rehabilitation consult to assist with strengthening/weightbearing, etc   Outcome: Progressing     Problem: DISCHARGE PLANNING  Goal: Discharge to home or other facility with appropriate resources  Description: INTERVENTIONS:  - Identify barriers to discharge w/patient and caregiver  - Arrange for needed discharge resources and transportation as appropriate  - Identify discharge learning needs (meds, wound care, etc )  - Arrange for interpretive services to assist at discharge as needed  - Refer to Case Management Department for coordinating discharge planning if the patient needs post-hospital services based on physician/advanced practitioner order or complex needs related to functional status, cognitive ability, or social support system  Outcome: Progressing     Problem: Knowledge Deficit  Goal: Patient/family/caregiver demonstrates understanding of disease process, treatment plan, medications, and discharge instructions  Description: Complete learning assessment and assess knowledge base    Interventions:  - Provide teaching at level of understanding  - Provide teaching via preferred learning methods  Outcome: Progressing     Problem: Nutrition/Hydration-ADULT  Goal: Nutrient/Hydration intake appropriate for improving, restoring or maintaining nutritional needs  Description: Monitor and assess patient's nutrition/hydration status for malnutrition  Collaborate with interdisciplinary team and initiate plan and interventions as ordered  Monitor patient's weight and dietary intake as ordered or per policy  Utilize nutrition screening tool and intervene as necessary  Determine patient's food preferences and provide high-protein, high-caloric foods as appropriate       INTERVENTIONS:  - Monitor oral intake, urinary output, labs, and treatment plans  - Assess nutrition and hydration status and recommend course of action  - Evaluate amount of meals eaten  - Assist patient with eating if necessary   - Allow adequate time for meals  - Recommend/ encourage appropriate diets, oral nutritional supplements, and vitamin/mineral supplements  - Order, calculate, and assess calorie counts as needed  - Recommend, monitor, and adjust tube feedings and TPN/PPN based on assessed needs  - Assess need for intravenous fluids  - Provide specific nutrition/hydration education as appropriate  - Include patient/family/caregiver in decisions related to nutrition  Outcome: Progressing

## 2020-12-27 NOTE — ASSESSMENT & PLAN NOTE
· COVID-19 infection with acute respiratory failure/hypoxia  · COVID-19:  Completed remdesivir   · On dexamethasone  · Continue trend inflammatory markers  · Oxygen has increased from 6 L to 10 L to 15 L +face mask over the last 48 hours  · Had extensive conversation with son Gema Brown concerning patient's progressive decline, frailty, poor prognosis  Patient was lucid today and discussed the idea of resuscitation and intubation with her the bedside  She said that she would not want intubation or resuscitation  Discussed that ethically for me as a physician, I feel that would be on do harm to Sam barber to perform heroic measures such as intubation and resuscitation  Discussed DNR/DNI status  Discussed transition to hospice care  Family is undecided and wishes to maintain patient level 1 full code  · Will attempt to have Skype visit with patient and family today    · Consult palliative for assistance    Lab Results   Component Value Date    SARSCOV2 Positive (A) 12/18/2020    [facility] 98 Not Detected 07/17/2020    SARSCOV2 Negative 07/13/2020     Results from last 7 days   Lab Units 12/26/20  1139 12/26/20  8493 12/25/20  0940 12/24/20  0935 12/23/20  0631 12/22/20  0446 12/21/20  0530   D-DIMER QUANTITATIVE ug/ml FEU 1 49*  --  1 40* 0 90* 0 87* 0 86* 1 04*   CRP mg/L  --  163 3* 82 7* 163 4* 136 8* 147 8* 182 7*   FERRITIN ng/mL  --  473* 496* 456* 408* 451* 541*     Results from last 7 days   Lab Units 12/26/20  1139   PROCALCITONIN ng/ml 0 05

## 2020-12-28 ENCOUNTER — TELEPHONE (OUTPATIENT)
Dept: FAMILY MEDICINE CLINIC | Facility: CLINIC | Age: 85
End: 2020-12-28

## 2020-12-28 PROBLEM — J12.82 PNEUMONIA DUE TO COVID-19 VIRUS: Status: ACTIVE | Noted: 2020-12-28

## 2020-12-28 PROBLEM — U07.1 PNEUMONIA DUE TO COVID-19 VIRUS: Status: ACTIVE | Noted: 2020-12-28

## 2020-12-28 PROBLEM — J96.00 ACUTE RESPIRATORY FAILURE DUE TO COVID-19 (HCC): Status: ACTIVE | Noted: 2020-12-18

## 2020-12-28 LAB
ALBUMIN SERPL BCP-MCNC: 2.2 G/DL (ref 3.5–5)
ALP SERPL-CCNC: 95 U/L (ref 46–116)
ALT SERPL W P-5'-P-CCNC: 23 U/L (ref 12–78)
ANION GAP SERPL CALCULATED.3IONS-SCNC: 9 MMOL/L (ref 4–13)
AST SERPL W P-5'-P-CCNC: 30 U/L (ref 5–45)
BASOPHILS # BLD AUTO: 0.01 THOUSANDS/ΜL (ref 0–0.1)
BASOPHILS NFR BLD AUTO: 0 % (ref 0–1)
BILIRUB SERPL-MCNC: 0.48 MG/DL (ref 0.2–1)
BUN SERPL-MCNC: 17 MG/DL (ref 5–25)
CALCIUM ALBUM COR SERPL-MCNC: 10.3 MG/DL (ref 8.3–10.1)
CALCIUM SERPL-MCNC: 8.9 MG/DL (ref 8.3–10.1)
CHLORIDE SERPL-SCNC: 105 MMOL/L (ref 100–108)
CO2 SERPL-SCNC: 26 MMOL/L (ref 21–32)
CREAT SERPL-MCNC: 0.57 MG/DL (ref 0.6–1.3)
CRP SERPL QL: 67.9 MG/L
D DIMER PPP FEU-MCNC: 1.06 UG/ML FEU
EOSINOPHIL # BLD AUTO: 0 THOUSAND/ΜL (ref 0–0.61)
EOSINOPHIL NFR BLD AUTO: 0 % (ref 0–6)
ERYTHROCYTE [DISTWIDTH] IN BLOOD BY AUTOMATED COUNT: 12.6 % (ref 11.6–15.1)
FERRITIN SERPL-MCNC: 581 NG/ML (ref 8–388)
GFR SERPL CREATININE-BSD FRML MDRD: 85 ML/MIN/1.73SQ M
GLUCOSE SERPL-MCNC: 61 MG/DL (ref 65–140)
HCT VFR BLD AUTO: 34.5 % (ref 34.8–46.1)
HGB BLD-MCNC: 10.7 G/DL (ref 11.5–15.4)
IMM GRANULOCYTES # BLD AUTO: 0.1 THOUSAND/UL (ref 0–0.2)
IMM GRANULOCYTES NFR BLD AUTO: 1 % (ref 0–2)
LYMPHOCYTES # BLD AUTO: 1.37 THOUSANDS/ΜL (ref 0.6–4.47)
LYMPHOCYTES NFR BLD AUTO: 14 % (ref 14–44)
MCH RBC QN AUTO: 30.4 PG (ref 26.8–34.3)
MCHC RBC AUTO-ENTMCNC: 31 G/DL (ref 31.4–37.4)
MCV RBC AUTO: 98 FL (ref 82–98)
MONOCYTES # BLD AUTO: 1.05 THOUSAND/ΜL (ref 0.17–1.22)
MONOCYTES NFR BLD AUTO: 11 % (ref 4–12)
NEUTROPHILS # BLD AUTO: 7.27 THOUSANDS/ΜL (ref 1.85–7.62)
NEUTS SEG NFR BLD AUTO: 74 % (ref 43–75)
NRBC BLD AUTO-RTO: 0 /100 WBCS
PLATELET # BLD AUTO: 689 THOUSANDS/UL (ref 149–390)
PMV BLD AUTO: 9.6 FL (ref 8.9–12.7)
POTASSIUM SERPL-SCNC: 3.1 MMOL/L (ref 3.5–5.3)
PROT SERPL-MCNC: 7.3 G/DL (ref 6.4–8.2)
RBC # BLD AUTO: 3.52 MILLION/UL (ref 3.81–5.12)
SODIUM SERPL-SCNC: 140 MMOL/L (ref 136–145)
WBC # BLD AUTO: 9.8 THOUSAND/UL (ref 4.31–10.16)

## 2020-12-28 PROCEDURE — 86140 C-REACTIVE PROTEIN: CPT | Performed by: INTERNAL MEDICINE

## 2020-12-28 PROCEDURE — 85379 FIBRIN DEGRADATION QUANT: CPT | Performed by: INTERNAL MEDICINE

## 2020-12-28 PROCEDURE — 85025 COMPLETE CBC W/AUTO DIFF WBC: CPT | Performed by: INTERNAL MEDICINE

## 2020-12-28 PROCEDURE — 80053 COMPREHEN METABOLIC PANEL: CPT | Performed by: INTERNAL MEDICINE

## 2020-12-28 PROCEDURE — 82728 ASSAY OF FERRITIN: CPT | Performed by: INTERNAL MEDICINE

## 2020-12-28 PROCEDURE — 99232 SBSQ HOSP IP/OBS MODERATE 35: CPT | Performed by: INTERNAL MEDICINE

## 2020-12-28 PROCEDURE — 99443 PR PHYS/QHP TELEPHONE EVALUATION 21-30 MIN: CPT | Performed by: FAMILY MEDICINE

## 2020-12-28 RX ORDER — POTASSIUM CHLORIDE 20 MEQ/1
20 TABLET, EXTENDED RELEASE ORAL DAILY
Status: DISCONTINUED | OUTPATIENT
Start: 2020-12-29 | End: 2020-12-30

## 2020-12-28 RX ADMIN — LAMOTRIGINE 200 MG: 100 TABLET ORAL at 09:08

## 2020-12-28 RX ADMIN — DEXAMETHASONE SODIUM PHOSPHATE 6 MG: 4 INJECTION INTRA-ARTICULAR; INTRALESIONAL; INTRAMUSCULAR; INTRAVENOUS; SOFT TISSUE at 09:08

## 2020-12-28 RX ADMIN — TOPIRAMATE 50 MG: 25 TABLET, FILM COATED ORAL at 09:08

## 2020-12-28 RX ADMIN — OXYBUTYNIN 10 MG: 10 TABLET, FILM COATED, EXTENDED RELEASE ORAL at 09:08

## 2020-12-28 RX ADMIN — HEPARIN SODIUM 5000 UNITS: 5000 INJECTION INTRAVENOUS; SUBCUTANEOUS at 06:16

## 2020-12-28 RX ADMIN — FERROUS SULFATE TAB 325 MG (65 MG ELEMENTAL FE) 325 MG: 325 (65 FE) TAB at 09:08

## 2020-12-28 RX ADMIN — HEPARIN SODIUM 5000 UNITS: 5000 INJECTION INTRAVENOUS; SUBCUTANEOUS at 21:50

## 2020-12-28 RX ADMIN — Medication 2000 UNITS: at 09:08

## 2020-12-28 RX ADMIN — DOCUSATE SODIUM AND SENNOSIDES 1 TABLET: 8.6; 5 TABLET, FILM COATED ORAL at 17:03

## 2020-12-28 RX ADMIN — DEXTROSE 75 ML/HR: 5 SOLUTION INTRAVENOUS at 13:21

## 2020-12-28 RX ADMIN — Medication 1 TABLET: at 09:08

## 2020-12-28 RX ADMIN — FAMOTIDINE 20 MG: 20 TABLET ORAL at 09:08

## 2020-12-28 RX ADMIN — ATORVASTATIN CALCIUM 40 MG: 40 TABLET, FILM COATED ORAL at 21:50

## 2020-12-28 RX ADMIN — DOCUSATE SODIUM AND SENNOSIDES 1 TABLET: 8.6; 5 TABLET, FILM COATED ORAL at 09:08

## 2020-12-28 RX ADMIN — CLONAZEPAM 0.5 MG: 0.5 TABLET ORAL at 17:03

## 2020-12-28 RX ADMIN — TOPIRAMATE 50 MG: 25 TABLET, FILM COATED ORAL at 17:03

## 2020-12-28 RX ADMIN — HEPARIN SODIUM 5000 UNITS: 5000 INJECTION INTRAVENOUS; SUBCUTANEOUS at 13:16

## 2020-12-28 RX ADMIN — PANTOPRAZOLE SODIUM 40 MG: 40 TABLET, DELAYED RELEASE ORAL at 06:16

## 2020-12-28 RX ADMIN — CLONAZEPAM 0.5 MG: 0.5 TABLET ORAL at 09:08

## 2020-12-28 RX ADMIN — QUETIAPINE FUMARATE 400 MG: 200 TABLET ORAL at 21:50

## 2020-12-28 NOTE — PLAN OF CARE
Problem: Potential for Falls  Goal: Patient will remain free of falls  Description: INTERVENTIONS:  - Assess patient frequently for physical needs  -  Identify cognitive and physical deficits and behaviors that affect risk of falls    -  [place] fall precautions as indicated by assessment   - Educate patient/family on patient safety including physical limitations  - Instruct patient to call for assistance with activity based on assessment  - Modify environment to reduce risk of injury  - Consider OT/PT consult to assist with strengthening/mobility  Outcome: Progressing     Problem: Prexisting or High Potential for Compromised Skin Integrity  Goal: Skin integrity is maintained or improved  Description: INTERVENTIONS:  - Identify patients at risk for skin breakdown  - Assess and monitor skin integrity  - Assess and monitor nutrition and hydration status  - Monitor labs   - Assess for incontinence   - Turn and reposition patient  - Assist with mobility/ambulation  - Relieve pressure over bony prominences  - Avoid friction and shearing  - Provide appropriate hygiene as needed including keeping skin clean and dry  - Evaluate need for skin moisturizer/barrier cream  - Collaborate with interdisciplinary team   - Patient/family teaching  - Consider wound care consult   Outcome: Progressing     Problem: PAIN - ADULT  Goal: Verbalizes/displays adequate comfort level or baseline comfort level  Description: Interventions:  - Encourage patient to monitor pain and request assistance  - Assess pain using appropriate pain scale  - Administer analgesics based on type and severity of pain and evaluate response  - Implement non-pharmacological measures as appropriate and evaluate response  - Consider cultural and social influences on pain and pain management  - Notify physician/advanced practitioner if interventions unsuccessful or patient reports new pain  Outcome: Progressing     Problem: INFECTION - ADULT  Goal: Absence or prevention of progression during hospitalization  Description: INTERVENTIONS:  - Assess and monitor for signs and symptoms of infection  - Monitor lab/diagnostic results  - Monitor all insertion sites, i e  indwelling lines, tubes, and drains  - Monitor endotracheal if appropriate and nasal secretions for changes in amount and color  - [place] appropriate cooling/warming therapies per order  - Administer medications as ordered  - Instruct and encourage patient and family to use good hand hygiene technique  - Identify and instruct in appropriate isolation precautions for identified infection/condition  Outcome: Progressing     Problem: SAFETY ADULT  Goal: Patient will remain free of falls  Description: INTERVENTIONS:  - Assess patient frequently for physical needs  -  Identify cognitive and physical deficits and behaviors that affect risk of falls    -  [place] fall precautions as indicated by assessment   - Educate patient/family on patient safety including physical limitations  - Instruct patient to call for assistance with activity based on assessment  - Modify environment to reduce risk of injury  - Consider OT/PT consult to assist with strengthening/mobility  Outcome: Progressing  Goal: Maintain or return to baseline ADL function  Description: INTERVENTIONS:  -  Assess patient's ability to carry out ADLs; assess patient's baseline for ADL function and identify physical deficits which impact ability to perform ADLs (bathing, care of mouth/teeth, toileting, grooming, dressing, etc )  - Assess/evaluate cause of self-care deficits   - Assess range of motion  - Assess patient's mobility; develop plan if impaired  - Assess patient's need for assistive devices and provide as appropriate  - Encourage maximum independence but intervene and supervise when necessary  - Involve family in performance of ADLs  - Assess for home care needs following discharge   - Consider OT consult to assist with ADL evaluation and planning for discharge  - Provide patient education as appropriate  Outcome: Progressing  Goal: Maintain or return mobility status to optimal level  Description: INTERVENTIONS:  - Assess patient's baseline mobility status (ambulation, transfers, stairs, etc )    - Identify cognitive and physical deficits and behaviors that affect mobility  - Identify mobility aids required to assist with transfers and/or ambulation (gait belt, sit-to-stand, lift, walker, cane, etc )  - [place] fall precautions as indicated by assessment  - Record patient progress and toleration of activity level on Mobility SBAR; progress patient to next Phase/Stage  - Instruct patient to call for assistance with activity based on assessment  - Consider rehabilitation consult to assist with strengthening/weightbearing, etc   Outcome: Progressing     Problem: DISCHARGE PLANNING  Goal: Discharge to home or other facility with appropriate resources  Description: INTERVENTIONS:  - Identify barriers to discharge w/patient and caregiver  - Arrange for needed discharge resources and transportation as appropriate  - Identify discharge learning needs (meds, wound care, etc )  - Arrange for interpretive services to assist at discharge as needed  - Refer to Case Management Department for coordinating discharge planning if the patient needs post-hospital services based on physician/advanced practitioner order or complex needs related to functional status, cognitive ability, or social support system  Outcome: Progressing     Problem: Knowledge Deficit  Goal: Patient/family/caregiver demonstrates understanding of disease process, treatment plan, medications, and discharge instructions  Description: Complete learning assessment and assess knowledge base    Interventions:  - Provide teaching at level of understanding  - Provide teaching via preferred learning methods  Outcome: Progressing     Problem: Nutrition/Hydration-ADULT  Goal: Nutrient/Hydration intake appropriate for improving, restoring or maintaining nutritional needs  Description: Monitor and assess patient's nutrition/hydration status for malnutrition  Collaborate with interdisciplinary team and initiate plan and interventions as ordered  Monitor patient's weight and dietary intake as ordered or per policy  Utilize nutrition screening tool and intervene as necessary  Determine patient's food preferences and provide high-protein, high-caloric foods as appropriate       INTERVENTIONS:  - Monitor oral intake, urinary output, labs, and treatment plans  - Assess nutrition and hydration status and recommend course of action  - Evaluate amount of meals eaten  - Assist patient with eating if necessary   - Allow adequate time for meals  - Recommend/ encourage appropriate diets, oral nutritional supplements, and vitamin/mineral supplements  - Order, calculate, and assess calorie counts as needed  - Recommend, monitor, and adjust tube feedings and TPN/PPN based on assessed needs  - Assess need for intravenous fluids  - Provide specific nutrition/hydration education as appropriate  - Include patient/family/caregiver in decisions related to nutrition  Outcome: Progressing

## 2020-12-28 NOTE — CONSULTS
Remote Support Note - Palliative and Supportive Care   Barbara Espinal 80 y o  female [id number]      Assessment:  Patient Active Problem List   Diagnosis    Depression    Gastroesophageal reflux disease    Hyperlipidemia    Essential hypertension    Tobacco dependence syndrome    Sciatica    Diarrhea    Bipolar 1 disorder ([address] )    Lower abdominal pain    Anemia    Severe protein-calorie malnutrition (HCC)    Vitamin B12 deficiency    Physical deconditioning    Chest pain in adult    Headache    Anxiety state    Acute on chronic cholecystitis    Disorder of gallbladder    Diverticulosis of colon    Umbilical hernia    Prophylactic antibiotic    OAB (overactive bladder)    Constipation    Hypokalemia    COVID-19    Acute metabolic encephalopathy    Anemia, macrocytic    Hypernatremia   - Counseling on health screening and disease prevention, COVID-19 specific (CPT V65 49)    Recommendations:  1  Symptom management - defer to primary team at this time  Family has requested full cares, and advanced symptom management may be dangerous to her longevity  2  Goals - full cares, no limits set by family  - We do note that pt was described as lucid and disinterested in a full cares plan as recently as yesterday by attending provider Dr Carrington Memory  Despite this, her code status was not adjusted to DNR/DNI 3, as was her request    - Instead, family was consulted and have insisted on a full cares plan  Code Status: FULL - Level 1   Decisional apparatus:  Patient is not competent on my exam today  If competence is lost, patient's substitute decision maker would shared by all children [place] 169     - son Prem Bonilla -     - son Chioma Arcos - [phone],     - daughter Candy Chan - [phone]   Advance Directive / Living Will / POLST:  None on file  Yumiko Mathias and Manish Davey report they share PoA for medical decisions       I have reviewed the patient's controlled substance dispensing history in the Prescription Drug Monitoring Program in compliance with the [facility] regulations before prescribing any controlled substances  12/28/2020, 12:38 PM -  Received notice from Pearl Keita MD's team that this patient is likely or confirmed positive for COVID-19  The presence of progressive organ damage from viral infection, or certain pre-existing conditions prior to viral infection, or age >75 y/o, would place this patient in higher risk category for death from COVID-19  Importantly, mortality statistics for all patients requiring ICU level care for COVID-19 are above 50%  After careful chart review and discussion with the primary team, we identify the following terminal illness(es) and risk factor(s) that are currently active in patient's case:       - Terminal illness(es) - COVID-19, severe protein-calorie malnutrition       - COVID-19 risk factor(s) of increased mortality - age>85 yrs; malnutrition; elevated Ca++ (which may suggest occult malignancy)      Given terminal illness(es), and the incurable nature of COVID-19 and its high mortality rate in the ICU, we attest that code resuscitation constitutes a Non-Beneficial Treatment (NBT) in the care of this patient  Non-urgent intubation could be considered by the ICU team, but chest compressions, electric shocks, and ACLS protocol medications do not provide a reasonable, rational hope for improvement in the patient's function -- nor statistically significant likelihood to prolong the patient's life -- compared to the certainty of harm to the patient and risk to the healthcare team and community from COVID-19 infection  We support the primary team in recommending a DNAR 2 status as the limit of appropriate aggressive care  Other interventions which may meet our criteria for NBT include:  - none noted at this time         Next, an attempt was made to contact the appropriate decision-maker in the patient's case  Faisal Price did not answer his phone; a msg was left  Kristine Gauthier did answer, but did not make time to discuss matters with us, and cut off the conversation due to work restrictions  Shae Fontaine did receive our phone call today     After introducing Palliative and Supportive Care and stating the role of our team in informing the family about the patient's status and the treatments offered, the family were informed that:      "COVID-19 is a very severe, potentially fatal illness caused by the novel coronavirus  It is less predictable, and perhaps more severe, than we originally learned in other countries  At this time, you/your loved one is presumed/proven to be sick with this disease "    "Some treatments might not be medically indicated, nor safe, and might not be used  These treatments might include: chest compressions, electric shocks, kidney dialysis, heroic medicines, and surgeries  The ICU team will not provide CPR, as this does not help COVID-19 patients  Long-term ventilation or tracheostomy will not be provided, as this is not an appropriate use of resources in most cases of COVID-19  Basic medical cares and comfort will always be provided        Furthermore, the patient's family or representatives were informed of the following visitation restrictions:      Skylar Grace the risk to the community, we have designated our ICU as a 'hot zone,' and visitors are not allowed at all  Your loved one will get the best medical attention available, but the risk of death from this virus is still very high in critically ill patients  Because of the restrictions on visitation, you will not be allowed to visit in person, regardless of your loved one's medical status  Every effort will be made to allow virtual visitation instead      Despite these challenges, Shae Fontaine wishes to continue providing treatments as offered currently, including mid[place] to high[place]    He is informed that no other antiviral treatments are available to pt at this stage, and that steroids alone are her treatment at this time  He is perfectly happy to continue providing limited / non-invasive cares as currently deployed  He does note that pt has been more and more forgetful, and less and less appropriate, in the outpatient world  He does not feel that she has good command of the specifics of her health in the home environs  We agreed to ongoing support and monitoring, and careful communication as pt's recovery proceeds or possibly fails  Counseling / Coordination of Care    I spent 30++ minutes with the patient during this PHONE visit  Cares and counseling included the following: etiology of diagnosis, diagnostic results, impression, and recommendations, treatment instructions, follow up requirements and compliance with treatment regimen  Extensive time was also spent in counseling the family re: public health and pandemic precautions as documented, given the extra risk COVID-19 poses to this medically vulnerable patient, and the risk of spread to family and community members of our patient

## 2020-12-29 LAB
ALBUMIN SERPL BCP-MCNC: 2.2 G/DL (ref 3.5–5)
ALP SERPL-CCNC: 97 U/L (ref 46–116)
ALT SERPL W P-5'-P-CCNC: 27 U/L (ref 12–78)
ANION GAP SERPL CALCULATED.3IONS-SCNC: 8 MMOL/L (ref 4–13)
AST SERPL W P-5'-P-CCNC: 49 U/L (ref 5–45)
BASOPHILS # BLD AUTO: 0.02 THOUSANDS/ΜL (ref 0–0.1)
BASOPHILS NFR BLD AUTO: 0 % (ref 0–1)
BILIRUB SERPL-MCNC: 0.47 MG/DL (ref 0.2–1)
BUN SERPL-MCNC: 17 MG/DL (ref 5–25)
CALCIUM ALBUM COR SERPL-MCNC: 10.8 MG/DL (ref 8.3–10.1)
CALCIUM SERPL-MCNC: 9.4 MG/DL (ref 8.3–10.1)
CHLORIDE SERPL-SCNC: 104 MMOL/L (ref 100–108)
CO2 SERPL-SCNC: 29 MMOL/L (ref 21–32)
CREAT SERPL-MCNC: 0.7 MG/DL (ref 0.6–1.3)
EOSINOPHIL # BLD AUTO: 0 THOUSAND/ΜL (ref 0–0.61)
EOSINOPHIL NFR BLD AUTO: 0 % (ref 0–6)
ERYTHROCYTE [DISTWIDTH] IN BLOOD BY AUTOMATED COUNT: 12.3 % (ref 11.6–15.1)
GFR SERPL CREATININE-BSD FRML MDRD: 79 ML/MIN/1.73SQ M
GLUCOSE SERPL-MCNC: 233 MG/DL (ref 65–140)
GLUCOSE SERPL-MCNC: 82 MG/DL (ref 65–140)
HCT VFR BLD AUTO: 37.3 % (ref 34.8–46.1)
HGB BLD-MCNC: 11.8 G/DL (ref 11.5–15.4)
IMM GRANULOCYTES # BLD AUTO: 0.14 THOUSAND/UL (ref 0–0.2)
IMM GRANULOCYTES NFR BLD AUTO: 2 % (ref 0–2)
LYMPHOCYTES # BLD AUTO: 1.18 THOUSANDS/ΜL (ref 0.6–4.47)
LYMPHOCYTES NFR BLD AUTO: 12 % (ref 14–44)
MCH RBC QN AUTO: 31.4 PG (ref 26.8–34.3)
MCHC RBC AUTO-ENTMCNC: 31.6 G/DL (ref 31.4–37.4)
MCV RBC AUTO: 99 FL (ref 82–98)
MONOCYTES # BLD AUTO: 1.11 THOUSAND/ΜL (ref 0.17–1.22)
MONOCYTES NFR BLD AUTO: 12 % (ref 4–12)
NEUTROPHILS # BLD AUTO: 7.08 THOUSANDS/ΜL (ref 1.85–7.62)
NEUTS SEG NFR BLD AUTO: 74 % (ref 43–75)
NRBC BLD AUTO-RTO: 0 /100 WBCS
PLATELET # BLD AUTO: 645 THOUSANDS/UL (ref 149–390)
PMV BLD AUTO: 8.8 FL (ref 8.9–12.7)
POTASSIUM SERPL-SCNC: 3.1 MMOL/L (ref 3.5–5.3)
PROT SERPL-MCNC: 7.7 G/DL (ref 6.4–8.2)
RBC # BLD AUTO: 3.76 MILLION/UL (ref 3.81–5.12)
SODIUM SERPL-SCNC: 141 MMOL/L (ref 136–145)
WBC # BLD AUTO: 9.53 THOUSAND/UL (ref 4.31–10.16)

## 2020-12-29 PROCEDURE — 82948 REAGENT STRIP/BLOOD GLUCOSE: CPT

## 2020-12-29 PROCEDURE — 80053 COMPREHEN METABOLIC PANEL: CPT | Performed by: INTERNAL MEDICINE

## 2020-12-29 PROCEDURE — 85025 COMPLETE CBC W/AUTO DIFF WBC: CPT | Performed by: INTERNAL MEDICINE

## 2020-12-29 PROCEDURE — 99232 SBSQ HOSP IP/OBS MODERATE 35: CPT | Performed by: INTERNAL MEDICINE

## 2020-12-29 PROCEDURE — 99232 SBSQ HOSP IP/OBS MODERATE 35: CPT | Performed by: NURSE PRACTITIONER

## 2020-12-29 PROCEDURE — 92610 EVALUATE SWALLOWING FUNCTION: CPT

## 2020-12-29 RX ADMIN — Medication 2000 UNITS: at 09:44

## 2020-12-29 RX ADMIN — DEXAMETHASONE SODIUM PHOSPHATE 6 MG: 4 INJECTION INTRA-ARTICULAR; INTRALESIONAL; INTRAMUSCULAR; INTRAVENOUS; SOFT TISSUE at 09:44

## 2020-12-29 RX ADMIN — Medication 1 TABLET: at 09:45

## 2020-12-29 RX ADMIN — DOCUSATE SODIUM AND SENNOSIDES 1 TABLET: 8.6; 5 TABLET, FILM COATED ORAL at 18:31

## 2020-12-29 RX ADMIN — CLONAZEPAM 0.5 MG: 0.5 TABLET ORAL at 18:31

## 2020-12-29 RX ADMIN — OXYBUTYNIN 10 MG: 10 TABLET, FILM COATED, EXTENDED RELEASE ORAL at 09:45

## 2020-12-29 RX ADMIN — DOCUSATE SODIUM AND SENNOSIDES 1 TABLET: 8.6; 5 TABLET, FILM COATED ORAL at 09:45

## 2020-12-29 RX ADMIN — HEPARIN SODIUM 5000 UNITS: 5000 INJECTION INTRAVENOUS; SUBCUTANEOUS at 22:30

## 2020-12-29 RX ADMIN — FERROUS SULFATE TAB 325 MG (65 MG ELEMENTAL FE) 325 MG: 325 (65 FE) TAB at 09:44

## 2020-12-29 RX ADMIN — HEPARIN SODIUM 5000 UNITS: 5000 INJECTION INTRAVENOUS; SUBCUTANEOUS at 13:27

## 2020-12-29 RX ADMIN — HEPARIN SODIUM 5000 UNITS: 5000 INJECTION INTRAVENOUS; SUBCUTANEOUS at 06:12

## 2020-12-29 RX ADMIN — CLONAZEPAM 0.5 MG: 0.5 TABLET ORAL at 09:44

## 2020-12-29 RX ADMIN — TOPIRAMATE 50 MG: 25 TABLET, FILM COATED ORAL at 18:31

## 2020-12-29 RX ADMIN — FAMOTIDINE 20 MG: 20 TABLET ORAL at 09:44

## 2020-12-29 RX ADMIN — TOPIRAMATE 50 MG: 25 TABLET, FILM COATED ORAL at 09:45

## 2020-12-29 NOTE — ASSESSMENT & PLAN NOTE
· With metabolic encephalopathy due to acute illness, hypernatremia, hypoxia  · Continue Seroquel 400 mg at bedtime, Topamax 50 mg b i d , Lamictal 200 mg daily

## 2020-12-29 NOTE — PROGRESS NOTES
Progress Note - Darling Diana 1935, 80 y o  female MRN: [id number]    Unit/Bed#: [facility] [address] 221-01 Encounter: [id number]    Primary Care Provider: Fernando Oliva MD   Date and time admitted to hospital: 12/18/2020  5:50 PM        * Acute respiratory failure due to COVID-19 [facility])  Assessment & Plan  With persistently high oxygen requirements  Continue oxygen to maintain saturation more than 90%  Conversation between Dr Kerrie Pablo and son regarding code status, noted  At this time she is still full code      Pneumonia due to COVID-19 virus  Assessment & Plan  · On day 8 of dexamethasone    The patient refused 1 dose of dexamethasone in the series  · Ongoing treatment with Lipitor, vitamin-D and multivitamin  · Remdesivir dose completed    Acute metabolic encephalopathy  Assessment & Plan  · Acute metabolic encephalopathy secondary to COVID-19, hypernatremia on top of possible cognitive decline    Hypernatremia  Assessment & Plan  Secondary to poor oral intake  Improving with hypotonic fluid   DC IV fluids in the setting of COVID and increased oxygen requirement    Bipolar 1 disorder (HCC)  Assessment & Plan  · With metabolic encephalopathy due to acute illness, hypernatremia, hypoxia  · Continue Seroquel 400 mg at bedtime, Topamax 50 mg b i d , Lamictal 200 mg daily    Hyperlipidemia  Assessment & Plan  · Continue Lipitor 40 mg daily     Hypokalemia  Assessment & Plan  Replete   Results from last 7 days   Lab Units 12/28/20  0645 12/26/20  8860 12/25/20  0940 12/24/20  0633 12/23/20  0631 12/22/20  0446   POTASSIUM mmol/L 3 1* 3 8 3 7 3 9 3 1* 3 3*         VTE Pharmacologic Prophylaxis:   Pharmacologic: Heparin  Mechanical VTE Prophylaxis in Place: Yes    Patient Centered Rounds: Discussed with her nurse    Discussions with Specialists or Other Care Team Provider:  Palliative care    Education and Discussions with Family / Patient:  Left voicemail for sons Cathlean Mask and Olya Dan    Time Spent for Care: 25 minutes  More than 50% of total time spent on counseling and coordination of care as described above  Current Length of Stay: 10 day(s)    Current Patient Status: Inpatient   Certification Statement: The patient will continue to require additional inpatient hospital stay due to [place]    Discharge Plan: To be determined    Code Status: Level 1 - Full Code      Subjective:   Patient is a poor historian  According to her nurse she can be impulsive  I asked her about her conversation with Dr Oziel Kumari yesterday regarding code status  She could not remember  I asked her again about her code status, whether she would want to be resuscitated or not  She could not answer  She only said "I do not know "    Objective:     Vitals:   Temp (24hrs), Av 5 °F (36 9 °C), Min:97 7 °F (36 5 °C), Max:99 °F (37 2 °C)    Temp:  [97 7 °F (36 5 °C)-99 °F (37 2 °C)] 98 8 °F (37 1 °C)  HR:  [103-111] 103  Resp:  [20-28] 20  BP: (152-166)/(93-99) 156/93  SpO2:  [82 %-91 %] 91 %  Body mass index is 19 76 kg/m²  Input and Output Summary (last 24 hours): Intake/Output Summary (Last 24 hours) at 2020 1928  Last data filed at 2020 2301  Gross per 24 hour   Intake    Output 400 ml   Net -400 ml       Physical Exam:     Physical Exam  Vitals signs reviewed  Constitutional:       Appearance: She is ill-appearing  HENT:      Head:      Comments: Temporal wasting     Mouth/Throat:      Pharynx: No posterior oropharyngeal erythema  Eyes:      General: No scleral icterus  Neck:      Musculoskeletal: Neck supple  Cardiovascular:      Rate and Rhythm: Regular rhythm  Heart sounds: Murmur present  Pulmonary:      Comments: Poor effort  Decreased breath sounds at the base  Abdominal:      General: Abdomen is flat  Palpations: Abdomen is soft  Musculoskeletal:      Right lower leg: No edema  Left lower leg: No edema  Skin:     General: Skin is warm     Neurological:      Comments: Disoriented  Follow simple commands   Psychiatric:      Comments: Depressed mood       Additional Data:     Labs:    Results from last 7 days   Lab Units 12/28/20  0613   WBC Thousand/uL 9 80   HEMOGLOBIN g/dL 10 7*   HEMATOCRIT % 34 5*   PLATELETS Thousands/uL 689*   NEUTROS PCT % 74   LYMPHS PCT % 14   MONOS PCT % 11   EOS PCT % 0     Results from last 7 days   Lab Units 12/28/20  0645   SODIUM mmol/L 140   POTASSIUM mmol/L 3 1*   CHLORIDE mmol/L 105   CO2 mmol/L 26   BUN mg/dL 17   CREATININE mg/dL 0 57*   ANION GAP mmol/L 9   CALCIUM mg/dL 8 9   ALBUMIN g/dL 2 2*   TOTAL BILIRUBIN mg/dL 0 48   ALK PHOS U/L 95   ALT U/L 23   AST U/L 30   GLUCOSE RANDOM mg/dL 61*                 Results from last 7 days   Lab Units 12/27/20  0445 12/26/20  1139   PROCALCITONIN ng/ml <0 05 0 05           * I Have Reviewed All Lab Data Listed Above  * Additional Pertinent Lab Tests Reviewed:  All Labs Within Last 24 Hours Reviewed    Imaging:    Imaging Reports Reviewed Today Include:  Chest x-ray    Recent Cultures (last 7 days):           Last 24 Hours Medication List:   Current Facility-Administered Medications   Medication Dose Route Frequency Provider Last Rate    acetaminophen  650 mg Oral Q6H PRN Azalia Hernández PA-C      atorvastatin  40 mg Oral HS Azalia Hernández PA-C      cholecalciferol  2,000 Units Oral Daily Azalia Hernández PA-C      clonazePAM  0 5 mg Oral BID Azalia Hernández PA-C      dexamethasone  6 mg Intravenous Daily Wili Chavez DO      dicyclomine  10 mg Oral TID PRN Pradeep Grijalva DO      famotidine  20 mg Oral Daily Azalia Hernández PA-C      ferrous sulfate  325 mg Oral Daily With Breakfast Luigi Clemente DO      heparin (porcine)  5,000 Units Subcutaneous Q8H [address] Azalia Hernández PA-C      lamoTRIgine  200 mg Oral Daily Before Breakfast Azalia Hernández PA-C      multivitamin-minerals  1 tablet Oral Daily Azalia Hernández PA-C      ondansetron  4 mg Intravenous Q6H PRN 120 Lois Hernández PA-C      oxybutynin  10 mg Oral Daily Azalia Hernández PA-C      pantoprazole  40 mg Oral Early Morning Azalia Hernández PA-C      QUEtiapine  400 mg Oral HS Azalia Hernández PA-C      senna-docusate sodium  1 tablet Oral BID Merissa Moffett DO      topiramate  50 mg Oral BID Camilo Abebe PA-C          Today, Patient Was Seen By: Susan Talbot MD    ** Please Note: Dictation voice to text software may have been used in the creation of this document   **

## 2020-12-29 NOTE — ASSESSMENT & PLAN NOTE
· On day 8 of dexamethasone    The patient refused 1 dose of dexamethasone in the series  · Ongoing treatment with Lipitor, vitamin-D and multivitamin  · Remdesivir dose completed

## 2020-12-29 NOTE — ASSESSMENT & PLAN NOTE
Replete   Results from last 7 days   Lab Units 12/28/20  0645 12/26/20  0195 12/25/20  0940 12/24/20  0633 12/23/20  0631 12/22/20  0446   POTASSIUM mmol/L 3 1* 3 8 3 7 3 9 3 1* 3 3*

## 2020-12-29 NOTE — PLAN OF CARE
Problem: Potential for Falls  Goal: Patient will remain free of falls  Description: INTERVENTIONS:  - Assess patient frequently for physical needs  -  Identify cognitive and physical deficits and behaviors that affect risk of falls    -  [place] fall precautions as indicated by assessment   - Educate patient/family on patient safety including physical limitations  - Instruct patient to call for assistance with activity based on assessment  - Modify environment to reduce risk of injury  - Consider OT/PT consult to assist with strengthening/mobility  Outcome: Progressing     Problem: Prexisting or High Potential for Compromised Skin Integrity  Goal: Skin integrity is maintained or improved  Description: INTERVENTIONS:  - Identify patients at risk for skin breakdown  - Assess and monitor skin integrity  - Assess and monitor nutrition and hydration status  - Monitor labs   - Assess for incontinence   - Turn and reposition patient  - Assist with mobility/ambulation  - Relieve pressure over bony prominences  - Avoid friction and shearing  - Provide appropriate hygiene as needed including keeping skin clean and dry  - Evaluate need for skin moisturizer/barrier cream  - Collaborate with interdisciplinary team   - Patient/family teaching  - Consider wound care consult   Outcome: Progressing     Problem: PAIN - ADULT  Goal: Verbalizes/displays adequate comfort level or baseline comfort level  Description: Interventions:  - Encourage patient to monitor pain and request assistance  - Assess pain using appropriate pain scale  - Administer analgesics based on type and severity of pain and evaluate response  - Implement non-pharmacological measures as appropriate and evaluate response  - Consider cultural and social influences on pain and pain management  - Notify physician/advanced practitioner if interventions unsuccessful or patient reports new pain  Outcome: Progressing     Problem: INFECTION - ADULT  Goal: Absence or prevention of progression during hospitalization  Description: INTERVENTIONS:  - Assess and monitor for signs and symptoms of infection  - Monitor lab/diagnostic results  - Monitor all insertion sites, i e  indwelling lines, tubes, and drains  - Monitor endotracheal if appropriate and nasal secretions for changes in amount and color  - [place] appropriate cooling/warming therapies per order  - Administer medications as ordered  - Instruct and encourage patient and family to use good hand hygiene technique  - Identify and instruct in appropriate isolation precautions for identified infection/condition  Outcome: Progressing     Problem: SAFETY ADULT  Goal: Patient will remain free of falls  Description: INTERVENTIONS:  - Assess patient frequently for physical needs  -  Identify cognitive and physical deficits and behaviors that affect risk of falls    -  [place] fall precautions as indicated by assessment   - Educate patient/family on patient safety including physical limitations  - Instruct patient to call for assistance with activity based on assessment  - Modify environment to reduce risk of injury  - Consider OT/PT consult to assist with strengthening/mobility  Outcome: Progressing  Goal: Maintain or return to baseline ADL function  Description: INTERVENTIONS:  -  Assess patient's ability to carry out ADLs; assess patient's baseline for ADL function and identify physical deficits which impact ability to perform ADLs (bathing, care of mouth/teeth, toileting, grooming, dressing, etc )  - Assess/evaluate cause of self-care deficits   - Assess range of motion  - Assess patient's mobility; develop plan if impaired  - Assess patient's need for assistive devices and provide as appropriate  - Encourage maximum independence but intervene and supervise when necessary  - Involve family in performance of ADLs  - Assess for home care needs following discharge   - Consider OT consult to assist with ADL evaluation and planning for discharge  - Provide patient education as appropriate  Outcome: Progressing  Goal: Maintain or return mobility status to optimal level  Description: INTERVENTIONS:  - Assess patient's baseline mobility status (ambulation, transfers, stairs, etc )    - Identify cognitive and physical deficits and behaviors that affect mobility  - Identify mobility aids required to assist with transfers and/or ambulation (gait belt, sit-to-stand, lift, walker, cane, etc )  - [place] fall precautions as indicated by assessment  - Record patient progress and toleration of activity level on Mobility SBAR; progress patient to next Phase/Stage  - Instruct patient to call for assistance with activity based on assessment  - Consider rehabilitation consult to assist with strengthening/weightbearing, etc   Outcome: Progressing     Problem: DISCHARGE PLANNING  Goal: Discharge to home or other facility with appropriate resources  Description: INTERVENTIONS:  - Identify barriers to discharge w/patient and caregiver  - Arrange for needed discharge resources and transportation as appropriate  - Identify discharge learning needs (meds, wound care, etc )  - Arrange for interpretive services to assist at discharge as needed  - Refer to Case Management Department for coordinating discharge planning if the patient needs post-hospital services based on physician/advanced practitioner order or complex needs related to functional status, cognitive ability, or social support system  Outcome: Progressing     Problem: Knowledge Deficit  Goal: Patient/family/caregiver demonstrates understanding of disease process, treatment plan, medications, and discharge instructions  Description: Complete learning assessment and assess knowledge base    Interventions:  - Provide teaching at level of understanding  - Provide teaching via preferred learning methods  Outcome: Progressing     Problem: Nutrition/Hydration-ADULT  Goal: Nutrient/Hydration intake appropriate for improving, restoring or maintaining nutritional needs  Description: Monitor and assess patient's nutrition/hydration status for malnutrition  Collaborate with interdisciplinary team and initiate plan and interventions as ordered  Monitor patient's weight and dietary intake as ordered or per policy  Utilize nutrition screening tool and intervene as necessary  Determine patient's food preferences and provide high-protein, high-caloric foods as appropriate       INTERVENTIONS:  - Monitor oral intake, urinary output, labs, and treatment plans  - Assess nutrition and hydration status and recommend course of action  - Evaluate amount of meals eaten  - Assist patient with eating if necessary   - Allow adequate time for meals  - Recommend/ encourage appropriate diets, oral nutritional supplements, and vitamin/mineral supplements  - Order, calculate, and assess calorie counts as needed  - Recommend, monitor, and adjust tube feedings and TPN/PPN based on assessed needs  - Assess need for intravenous fluids  - Provide specific nutrition/hydration education as appropriate  - Include patient/family/caregiver in decisions related to nutrition  Outcome: Progressing     Problem: RESPIRATORY - ADULT  Goal: Achieves optimal ventilation and oxygenation  Description: INTERVENTIONS:  - Assess for changes in respiratory status  - Assess for changes in mentation and behavior  - Position to facilitate oxygenation and minimize respiratory effort  - Oxygen administered by appropriate delivery if ordered  - Initiate smoking cessation education as indicated  - Encourage broncho-pulmonary hygiene including cough, deep breathe, Incentive Spirometry  - Assess the need for suctioning and aspirate as needed  - Assess and instruct to report SOB or any respiratory difficulty  - Respiratory Therapy support as indicated  Outcome: Progressing     Problem: GENITOURINARY - ADULT  Goal: Urinary catheter remains patent  Description: INTERVENTIONS:  - Assess patency of urinary catheter  - If patient has a chronic aj, consider changing catheter if non-functioning  - Follow guidelines for intermittent irrigation of non-functioning urinary catheter  Outcome: Progressing     Problem: SKIN/TISSUE INTEGRITY - ADULT  Goal: Skin integrity remains intact  Description: INTERVENTIONS  - Identify patients at risk for skin breakdown  - Assess and monitor skin integrity  - Assess and monitor nutrition and hydration status  - Monitor labs (i e  albumin)  - Assess for incontinence   - Turn and reposition patient  - Assist with mobility/ambulation  - Relieve pressure over bony prominences  - Avoid friction and shearing  - Provide appropriate hygiene as needed including keeping skin clean and dry  - Evaluate need for skin moisturizer/barrier cream  - Collaborate with interdisciplinary team (i e  Nutrition, Rehabilitation, etc )   - Patient/family teaching  Outcome: Progressing

## 2020-12-29 NOTE — PROGRESS NOTES
Progress Note - Chad Anderson 1935, 80 y o  female MRN: [id number]    Unit/Bed#: [facility] [address] 2 [address] 221-01 Encounter: [id number]    Primary Care Provider: Adali Musa MD   Date and time admitted to hospital: 12/18/2020  5:50 PM        * Acute respiratory failure due to COVID-19 [facility])  Assessment & Plan  With persistently high oxygen requirements and poor oral intake  Overall prognosis is not good  Continue current HFNC and face mask  Continue to discuss and update joseph Rao regarding goals    Pneumonia due to COVID-19 virus  Assessment & Plan  · On day 9 of dexamethasone  The patient refused 1 dose of dexamethasone in the series  · Ongoing treatment with Lipitor, vitamin-D and multivitamin  · Remdesivir dose completed    Acute metabolic encephalopathy  Assessment & Plan  · Acute metabolic encephalopathy secondary to COVID-19, hypernatremia on top of underlying cognitive decline    Hypernatremia  Assessment & Plan  Secondary to poor oral intake  Improved with hypotonic fluid  Today her sodium is slightly elevated at 141  Compared to 140 yesterday  Without adequate p o  intake, this will get worse  Recheck labs in a m  Bipolar 1 disorder (HCC)  Assessment & Plan  · With metabolic encephalopathy due to acute illness, hypernatremia, hypoxia  · Continue Seroquel 400 mg at bedtime, Topamax 50 mg b i d , Lamictal 200 mg daily    Hyperlipidemia  Assessment & Plan  · Continue Lipitor 40 mg daily       VTE Pharmacologic Prophylaxis:   Pharmacologic: Heparin  Mechanical VTE Prophylaxis in Place: Yes    Patient Centered Rounds: Discussed with her nurse    Discussions with Specialists or Other Care Team Provider: Palliative care    Education and Discussions with Family / Patient: left  for joseph Rao    Time Spent for Care: 25 minutes  More than 50% of total time spent on counseling and coordination of care as described above      Current Length of Stay: 11 day(s)    Current Patient Status: Inpatient Certification Statement: The patient will continue to require additional inpatient hospital stay due to [place]    Discharge Plan: To be determined    Code Status: Level 1 - Full Code      Subjective:   Patient reports that she does not feel well  However she could not describe in detail  She is requiring oxygen via nasal cannula as well as face mask  According to her nurse she eats very little and she noticed that she has difficulty swallowing regular food  She was seen by speech therapy who recommended pureed and nectar thickened liquid diet for now    Objective:     Vitals:   Temp (24hrs), Av 1 °F (36 7 °C), Min:97 4 °F (36 3 °C), Max:99 2 °F (37 3 °C)    Temp:  [97 4 °F (36 3 °C)-99 2 °F (37 3 °C)] 99 2 °F (37 3 °C)  HR:  [101-107] 106  Resp:  [17-23] 18  BP: (139-159)/(81-98) 139/81  SpO2:  [87 %-98 %] 98 %  Body mass index is 19 76 kg/m²  Input and Output Summary (last 24 hours): Intake/Output Summary (Last 24 hours) at 2020 1602  Last data filed at 2020 1410  Gross per 24 hour   Intake    Output 1975 ml   Net -1975 ml       Physical Exam:     Physical Exam  Constitutional:       Appearance: She is ill-appearing  HENT:      Head:      Comments: Temporal wasting     Nose: No rhinorrhea  Eyes:      General: No scleral icterus  Neck:      Musculoskeletal: Neck supple  Cardiovascular:      Rate and Rhythm: Regular rhythm  Heart sounds: Murmur present  No gallop      Pulmonary:      Comments: Coarse breath sounds  Poor effort  Musculoskeletal:      Comments: Diminished muscle mass   Neurological:      Comments: Looks very deconditioned   Psychiatric:         Mood and Affect: Mood normal       Comments: Depressed mood     Additional Data:     Labs:    Results from last 7 days   Lab Units 20  0907   WBC Thousand/uL 9 53   HEMOGLOBIN g/dL 11 8   HEMATOCRIT % 37 3   PLATELETS Thousands/uL 645*   NEUTROS PCT % 74   LYMPHS PCT % 12*   MONOS PCT % 12   EOS PCT % 0     Results from last 7 days   Lab Units 12/29/20  0907   SODIUM mmol/L 141   POTASSIUM mmol/L 3 1*   CHLORIDE mmol/L 104   CO2 mmol/L 29   BUN mg/dL 17   CREATININE mg/dL 0 70   ANION GAP mmol/L 8   CALCIUM mg/dL 9 4   ALBUMIN g/dL 2 2*   TOTAL BILIRUBIN mg/dL 0 47   ALK PHOS U/L 97   ALT U/L 27   AST U/L 49*   GLUCOSE RANDOM mg/dL 82                 Results from last 7 days   Lab Units 12/27/20  0445 12/26/20  1139   PROCALCITONIN ng/ml <0 05 0 05           * I Have Reviewed All Lab Data Listed Above  * Additional Pertinent Lab Tests Reviewed:  All Labs Within Last 24 Hours Reviewed    Imaging:    Imaging Reports Reviewed Today Include: none    Recent Cultures (last 7 days):           Last 24 Hours Medication List:   Current Facility-Administered Medications   Medication Dose Route Frequency Provider Last Rate    acetaminophen  650 mg Oral Q6H PRN Azalia Hernández PA-C      atorvastatin  40 mg Oral HS Azalia Hernández PA-C      cholecalciferol  2,000 Units Oral Daily Azalia Hernández PA-C      clonazePAM  0 5 mg Oral BID Azalia Hernández PA-C      dexamethasone  6 mg Intravenous Daily Wili Chavez DO      dicyclomine  10 mg Oral TID PRN Great mitchDO      famotidine  20 mg Oral Daily Azalia Hernández PA-C      ferrous sulfate  325 mg Oral Daily With Breakfast Argentina Mckinley DO      heparin (porcine)  5,000 Units Subcutaneous Q8H [address] Azalia Hernández PA-C      lamoTRIgine  200 mg Oral Daily Before Breakfast Azalia Hernández PA-C      multivitamin-minerals  1 tablet Oral Daily Azalia Hernández PA-C      ondansetron  4 mg Intravenous Q6H PRN Azalia Hernández PA-C      oxybutynin  10 mg Oral Daily Azalia Hernández PA-C      pantoprazole  40 mg Oral Early Morning Azalia Hernández PA-C      potassium chloride  20 mEq Oral Daily Pamela Saba MD      QUEtiapine  400 mg Oral HS Azalia Hernández PA-C      senna-docusate sodium  1 tablet Oral BID Марина meyer,       topiramate  50 mg Oral BID Gissell Miranda PA-C          Today, Patient Was Seen By: Cami Lennon MD    ** Please Note: Dictation voice to text software may have been used in the creation of this document   **

## 2020-12-29 NOTE — ASSESSMENT & PLAN NOTE
Secondary to poor oral intake  Improving with hypotonic fluid   DC IV fluids in the setting of COVID and increased oxygen requirement

## 2020-12-29 NOTE — PROGRESS NOTES
Progress note - Palliative and Supportive Care   Karen Jefferson 80 y o  female [id number]    Assessment:    -   Patient Active Problem List   Diagnosis    Depression    Gastroesophageal reflux disease    Hyperlipidemia    Essential hypertension    Tobacco dependence syndrome    Sciatica    Diarrhea    Bipolar 1 disorder ([facility] [address] )    Lower abdominal pain    Anemia    Severe protein-calorie malnutrition (HCC)    Vitamin B12 deficiency    Physical deconditioning    Chest pain in adult    Headache    Anxiety state    Acute on chronic cholecystitis    Disorder of gallbladder    Diverticulosis of colon    Umbilical hernia    Prophylactic antibiotic    OAB (overactive bladder)    Constipation    Hypokalemia    Acute respiratory failure due to COVID-19 (HCC)    Acute metabolic encephalopathy    Anemia, macrocytic    Hypernatremia    Pneumonia due to COVID-19 virus       Plan:  1  Symptom management  Per primary team    2  Goals  Family to continue to discuss patient will remain a level 1 at this time     Code Status: Full Code - Level 1   Decisional apparatus:  Patient is not competent on my exam today  If competence is lost, patient's substitute decision maker would default to adult children by [place]  Advance Directive / Living Will / POLST:  None on file    Interval history:      The patient continues to decline despite treatment  Touched base with both Silvio Dupont and Nael, 2 of the patient's adult children, spoke at length about code status  At this time, both Silvio Dupont and Nael are not agreeable to change of code status to DNAR/DNI, at this time full code will continue  Explained poor prognosis and possible consequences of CPR and intubation  They would like to continue full cares without limitations at this time however they plan to discuss with their other siblings          MEDICATIONS / ALLERGIES:     current meds:   Current Facility-Administered Medications   Medication Dose Route Frequency    acetaminophen (TYLENOL) tablet 650 mg  650 mg Oral Q6H PRN    atorvastatin (LIPITOR) tablet 40 mg  40 mg Oral HS    cholecalciferol (VITAMIN D3) tablet 2,000 Units  2,000 Units Oral Daily    clonazePAM (KlonoPIN) tablet 0 5 mg  0 5 mg Oral BID    dexamethasone (DECADRON) injection 6 mg  6 mg Intravenous Daily    dicyclomine (BENTYL) capsule 10 mg  10 mg Oral TID PRN    famotidine (PEPCID) tablet 20 mg  20 mg Oral Daily    ferrous sulfate tablet 325 mg  325 mg Oral Daily With Breakfast    heparin (porcine) subcutaneous injection 5,000 Units  5,000 Units Subcutaneous Q8H [address]    lamoTRIgine (LaMICtal) tablet 200 mg  200 mg Oral Daily Before Breakfast    multivitamin-minerals (CENTRUM ADULTS) tablet 1 tablet  1 tablet Oral Daily    ondansetron (ZOFRAN) injection 4 mg  4 mg Intravenous Q6H PRN    oxybutynin (DITROPAN-XL) 24 hr tablet 10 mg  10 mg Oral Daily    pantoprazole (PROTONIX) EC tablet 40 mg  40 mg Oral Early Morning    potassium chloride (K-DUR,KLOR-CON) CR tablet 20 mEq  20 mEq Oral Daily    QUEtiapine (SEROquel) tablet 400 mg  400 mg Oral HS    senna-docusate sodium (SENOKOT S) 8 6-50 mg per tablet 1 tablet  1 tablet Oral BID    topiramate (TOPAMAX) tablet 50 mg  50 mg Oral BID       Allergies   Allergen Reactions    Ethanol     Simvastatin        OBJECTIVE:    Physical Exam  Physical Exam  Vitals signs and nursing note reviewed  Constitutional:       Appearance: Normal appearance  She is cachectic  She is ill-appearing  Pulmonary:      Effort: Tachypnea present  Breath sounds: Decreased breath sounds present  Neurological:      Mental Status: She is lethargic and disoriented  Psychiatric:         Attention and Perception: She is inattentive  Cognition and Memory: Cognition is impaired  Memory is impaired           Lab Results:   CBC:   Lab Results   Component Value Date    WBC 9 53 12/29/2020    HGB 11 8 12/29/2020    HCT 37 3 12/29/2020    MCV 99 (H) 12/29/2020     (H) 12/29/2020    MCH 31 4 12/29/2020    MCHC 31 6 12/29/2020    RDW 12 3 12/29/2020    MPV 8 8 (L) 12/29/2020    NRBC 0 12/29/2020   , CMP:   Lab Results   Component Value Date    SODIUM 141 12/29/2020    K 3 1 (L) 12/29/2020     12/29/2020    CO2 29 12/29/2020    BUN 17 12/29/2020    CREATININE 0 70 12/29/2020    CALCIUM 9 4 12/29/2020    AST 49 (H) 12/29/2020    ALT 27 12/29/2020    ALKPHOS 97 12/29/2020    EGFR 79 12/29/2020   , PT/PTT:No results found for: PT, PTT    Counseling / Coordination of Care    Total floor / unit time spent today 20+  minutes  Greater than 50% of total time was spent with the patient and / or family counseling and / or coordination of care  A description of the counseling / coordination of care: goals of care, supportive listening, offered informations, chart review

## 2020-12-29 NOTE — OCCUPATIONAL THERAPY NOTE
Occupational Therapy  OT tx session not appropriate at this time per nursing staff  Will continue to follow as tolerated   MAE Monroe

## 2020-12-29 NOTE — ASSESSMENT & PLAN NOTE
· Acute metabolic encephalopathy secondary to COVID-19, hypernatremia on top of possible cognitive decline

## 2020-12-29 NOTE — ASSESSMENT & PLAN NOTE
· Acute metabolic encephalopathy secondary to COVID-19, hypernatremia on top of underlying cognitive decline

## 2020-12-29 NOTE — ASSESSMENT & PLAN NOTE
With persistently high oxygen requirements  Continue oxygen to maintain saturation more than 90%  Conversation between Dr Lona Bach and son regarding code status, noted  At this time she is still full code

## 2020-12-29 NOTE — PHYSICAL THERAPY NOTE
Physical Therapy Cancellation Note    Discussed with RN, pt  Not appropriate for therapeutic intervention at this time due to current medical status  Will cancel and continue to follow as appropriate       Jeff Raymond PTA

## 2020-12-29 NOTE — ASSESSMENT & PLAN NOTE
With persistently high oxygen requirements and poor oral intake  Overall prognosis is not good  Continue current HFNC and face mask  Continue to discuss and update joseph Young regarding goals

## 2020-12-29 NOTE — UTILIZATION REVIEW
Continued Stay Review    Date:  12/28/20                    Current Patient Class: IP  Current Level of Care: [facility]    HPI:85 y o  female initially admitted on 12/18 to IP for COVID 19, hypoxia, acute metabolic encephalopathy, hypokalemia  Completed Remdesivir 12/22    Assessment/Plan: Continues with persistent high O2 requirements to maintain sats >90%  Ongoing treatment with Lipitor, vitamin-D and multivitamin  On day 8 of dexamethasone  Hypernatremia improving  Will DC IVF's  Replete K    Palliative Care was consulted - Family wishes to pursue full cares    Pertinent Labs/Diagnostic Results:     12/24 CXR: Stable chest with findings most consistent with COVID-19 pneumonia  12/22 CXR: Worsening multifocal airspace disease compatible with known Covid 19 pneumonia    Results from last 7 days   Lab Units 12/28/20  0613 12/26/20  0633 12/25/20  0940 12/24/20  0635   WBC Thousand/uL 9 80 9 66 9 37 7 44   HEMOGLOBIN g/dL 10 7* 11 0* 11 1* 9 8*   HEMATOCRIT % 34 5* 35 3 35 7 31 5*   PLATELETS Thousands/uL 689* 666* 645* 509*   NEUTROS ABS Thousands/µL 7 27 7 87* 7 99* 5 84     Results from last 7 days   Lab Units 12/28/20  0645 12/26/20  0633 12/25/20  0940 12/24/20  0633   SODIUM mmol/L 140 140 146* 145   POTASSIUM mmol/L 3 1* 3 8 3 7 3 9   CHLORIDE mmol/L 105 107 111* 113*   CO2 mmol/L 26 23 26 23   ANION GAP mmol/L 9 10 9 9   BUN mg/dL 17 24 25 26*   CREATININE mg/dL 0 57* 0 63 0 64 0 67   EGFR ml/min/1 73sq m 85 82 82 80   CALCIUM mg/dL 8 9 9 3 9 0 8 8     Results from last 7 days   Lab Units 12/29/20  0907 12/28/20  0645 12/26/20  0633 12/25/20  0940 12/24/20  0633   AST U/L 49* 30 54* 47* 49*   ALT U/L 27 23 22 28 25   ALK PHOS U/L 97 95 104 104 89   TOTAL PROTEIN g/dL 7 7 7 3 7 8 7 5 6 8   ALBUMIN g/dL 2 2* 2 2* 2 4* 2 6* 2 4*   TOTAL BILIRUBIN mg/dL 0 47 0 48 0 67 0 50 0 44     Results from last 7 days   Lab Units 12/29/20  0907 12/28/20  0645 12/26/20  6700 12/25/20  0940 12/24/20  2780 12/23/20  0631 GLUCOSE RANDOM mg/dL 82 61* 91 94 98 103     Results from last 7 days   Lab Units 12/28/20  0645 12/26/20  1139 12/25/20  0940 12/24/20  0633 12/23/20  0631   D-DIMER QUANTITATIVE ug/ml FEU 1 06* 1 49* 1 40* 0 90* 0 87*     Results from last 7 days   Lab Units 12/27/20  0445 12/26/20  1139   PROCALCITONIN ng/ml <0 05 0 05     Results from last 7 days   Lab Units 12/28/20  1035 12/26/20  0633 12/25/20  0940   FERRITIN ng/mL 581* 473* 496*     Results from last 7 days   Lab Units 12/28/20  0645 12/26/20  4926 12/25/20  0940 12/24/20  0633 12/23/20  0631   CRP mg/L 67 9* 163 3* 82 7* 163 4* 136 8*     Vital Signs:   12/28/20 21:57:20  97 7 °F (36 5 °C)  101    158/98  118  97 %           12/28/20 21:49:04  97 9 °F (36 6 °C)  107Abnormal   23Abnormal   159/96  117  87 %Abnormal            12/28/20 2100            95 %  80    15 L/min  Mid flow nasal cannula    12/28/20 15:26:03  98 8 °F (37 1 °C)  103  20  156/93  114  91 %           12/28/20 08:47:04  97 7 °F (36 5 °C)  111Abnormal   28Abnormal   153/95    82 %Abnormal                Medications:   Scheduled Medications:  atorvastatin, 40 mg, Oral, HS  cholecalciferol, 2,000 Units, Oral, Daily  clonazePAM, 0 5 mg, Oral, BID  dexamethasone, 6 mg, Intravenous, Daily  famotidine, 20 mg, Oral, Daily  ferrous sulfate, 325 mg, Oral, Daily With Breakfast  heparin (porcine), 5,000 Units, Subcutaneous, Q8H CARSON  lamoTRIgine, 200 mg, Oral, Daily Before Breakfast  multivitamin-minerals, 1 tablet, Oral, Daily  oxybutynin, 10 mg, Oral, Daily  pantoprazole, 40 mg, Oral, Early Morning  QUEtiapine, 400 mg, Oral, HS  senna-docusate sodium, 1 tablet, Oral, BID  topiramate, 50 mg, Oral, BID    Continuous IV Infusions:  dextrose 5 % infusion  @ 75 / hr    PRN Meds:  acetaminophen, 650 mg, Oral, Q6H PRN  dicyclomine, 10 mg, Oral, TID PRN  ondansetron, 4 mg, Intravenous, Q6H PRN    Discharge Plan: TBD    Network Utilization Review Department  ATTENTION: Please call with any questions or concerns to [phone] and carefully listen to the prompts so that you are directed to the right person  All voicemails are confidential   Scott Ramires all requests for admission clinical reviews, approved or denied determinations and any other requests to dedicated fax number below belonging to the campus where the patient is receiving treatment   List of dedicated fax numbers for the Facilities:  [address] DENIALS (Administrative/Medical Necessity) [phone]   [address] (Maternity/NICU/Pediatrics) [phone]   [address] Compa Paul 9091 (  Greg Nina "Heather" 103) [address] [place] [address] Juwan Acosta [address] [place] [address] [phone]

## 2020-12-29 NOTE — ASSESSMENT & PLAN NOTE
Secondary to poor oral intake  Improved with hypotonic fluid  Today her sodium is slightly elevated at 141  Compared to 140 yesterday  Without adequate p o  intake, this will get worse  Recheck labs in a m

## 2020-12-29 NOTE — ASSESSMENT & PLAN NOTE
· On day 9 of dexamethasone    The patient refused 1 dose of dexamethasone in the series  · Ongoing treatment with Lipitor, vitamin-D and multivitamin  · Remdesivir dose completed

## 2020-12-29 NOTE — SPEECH THERAPY NOTE
Speech Language/Pathology  Speech/Language Pathology  Assessment    Patient Name: Ciro Grover  CXCHU'G Date: 12/29/2020     Problem List  Principal Problem:    Acute respiratory failure due to COVID-19 [facility])  Active Problems:    Hyperlipidemia    Bipolar 1 disorder ([facility] )    Hypokalemia    Acute metabolic encephalopathy    Anemia, macrocytic    Hypernatremia    Pneumonia due to COVID-19 virus    Past Medical History  Past Medical History:   Diagnosis Date    Anemia 2/26/2019    Anxiety     Bipolar 1 disorder ([place] [address] )     Depression     DVT (deep venous thrombosis) ([place] [address] ) 2008    Left leg    GERD (gastroesophageal reflux disease)     Hypertension     Overactive bladder      Past Surgical History  Past Surgical History:   Procedure Laterality Date    ADENOIDECTOMY      CATARACT EXTRACTION Bilateral     Right 10/2003, left 4/2004    CHOLECYSTECTOMY      DILATION AND CURETTAGE OF UTERUS  1985    HERNIA REPAIR  11/02/2007    Femoral and small bowel resection    HIP SURGERY      L hip    TONSILLECTOMY      As a youth    WRIST SURGERY      L wrist        Bedside Swallow Evaluation:    Summary:  Pt presents w/ moderate oral, mild/moderate pharyngeal stage dysphagia characterized by reduced lip seal (poor w/ straw, states her lips are sore), mildly reduced bolus formation and transfer  No obvious residue  Minimal mastication of banana, followed by cough  Incoordination w/ thin straw and tsps, and pt began yelling "i'm choking I'm choking" and coughed though appeared to be ok  Question possibly anxiety contributing  Oral care completed/dentures soaked, and lip moisturizer applied  Recommendations:  Diet: puree  Liquid: nectar  May do best w/ tsp  Or liquids piped in via straw  Meds: crush  Supervision: full/feed (pt in wrist restraints)  Positioning:Upright  Strategies: Pt to take PO/Meds only when fully alert and upright     Oral care  Aspiration precautions  Reflux precautions  Therapy Prognosis: guarded  Prognosis considerations: overall medical status, poor intake  Frequency: 2-5x/week    Goal(s):  Pt will tolerate least restrictive diet w/out s/s aspiration or oral/pharyngeal difficulties  Pt will tolerate puree and nectar thick liquids w/ good oral containment and transfer and w/out s/s aspiration  Patient's goal: none stated  Began to refuse po despite encouragement  Consider consult w/:  Nutrition (consider pudding supplements or mighty shakes if pt is agreeeable to PO)    Reason for consult:  R/o aspiration  Determine safest and least restrictive diet  Failed nursing dysphagia assessment  respiratory compromise  Nursing reported cough w/ PO    Precautions:  Contact  [place]  Current diet:  regular  Premorbid diet[de-identified]  regular  Previous VBS:  None  Bedside unable to be completed 7/14/20 as pt was npo for egd: FINDINGS:  · Irregular Z-line 40 cm from the incisors  Variation less than 1 cm  · The esophagus appeared normal  · Mild edematous and eroded mucosa; performed cold biopsy  · The duodenum appeared normal  · Performed biopsies in the 2nd part of the duodenum  ·   O2 requirement:  Mfc, not keep NRB on "get t off get it off"  Wrist restraints  Voice/Speech:  Clear  Social:  Above and beyond  Follows commands:  Poorly/no              Cognitive Status:  Confused  "I have to pee" over and over again  Pt has a catheter  Oral mech exam:  Dentition: upper and lower plates that were taken out, soaked and cleaned off  Labial strength and ROM: generalized weakness  Lingual strength and ROM:generalized weakness  Mandibular strength and ROM:dnt  Secretion management:wfl, though thick stringy mucous when dentures were removed  Items administered:  Noted above    Oral stage: mod  Lip closure: reduced  Mastication:weak  Bolus formation:fair, reduced w/ thin liquids  Bolus control:adequate w/ puree and seemingly banana, nectar    Transfer: wfl  suspect some weakness  Oral residue: none visualized  Pocketing:none    Pharyngeal stage:mildto mild/mod  Swallow promptness:prompt to min delay  Laryngeal rise:weak/fair  Wet voice:-  Throat clear:-  Cough: thin, banana  Secondary swallows:-  Audible swallows:-    Esophageal stage:  H/o GERD  H/o EGD    Aspiration precautions posted    Results d/w:  Pt, nursing,physician                      per [facility]  Chief Complaint:   ams and low o2 sats at above and beyond  History of Present Illness:  Prosper Contreras is a 80 y o  female who presents with pmh of bipolar d/o w/anxiety, htn, hld coming to hospital for hypoxia and confusion  HPI constructed by d/w ed provider and son by phone and review of emr  Pt coming from above and beyond  All of their residents have been isolating in their rooms for the last 14 days  Per ED provider there was note of low O2 sats prehospital although none were documented  Pt was evaluate din ed and had o2 sat in 92-97% on RA but w/ambulation dropped to 85%  She has mild tachypnea and tachycardia with exertion  She Pt is alert and oriented to self month place but not year or president  She does not know why she is in hospital and offers no complaints    We will admit for covid pneumonia

## 2020-12-30 PROBLEM — R33.9 URINARY RETENTION: Status: ACTIVE | Noted: 2020-12-30

## 2020-12-30 PROBLEM — E87.6 HYPOKALEMIA: Status: RESOLVED | Noted: 2020-12-18 | Resolved: 2020-12-30

## 2020-12-30 LAB
ALBUMIN SERPL BCP-MCNC: 2.3 G/DL (ref 3.5–5)
ALP SERPL-CCNC: 95 U/L (ref 46–116)
ALT SERPL W P-5'-P-CCNC: 41 U/L (ref 12–78)
ANION GAP SERPL CALCULATED.3IONS-SCNC: 10 MMOL/L (ref 4–13)
AST SERPL W P-5'-P-CCNC: 97 U/L (ref 5–45)
BASOPHILS # BLD AUTO: 0.04 THOUSANDS/ΜL (ref 0–0.1)
BASOPHILS NFR BLD AUTO: 0 % (ref 0–1)
BILIRUB SERPL-MCNC: 0.55 MG/DL (ref 0.2–1)
BUN SERPL-MCNC: 28 MG/DL (ref 5–25)
CALCIUM ALBUM COR SERPL-MCNC: 11.3 MG/DL (ref 8.3–10.1)
CALCIUM SERPL-MCNC: 9.9 MG/DL (ref 8.3–10.1)
CHLORIDE SERPL-SCNC: 106 MMOL/L (ref 100–108)
CO2 SERPL-SCNC: 27 MMOL/L (ref 21–32)
CREAT SERPL-MCNC: 0.7 MG/DL (ref 0.6–1.3)
CRP SERPL QL: 51.1 MG/L
EOSINOPHIL # BLD AUTO: 0 THOUSAND/ΜL (ref 0–0.61)
EOSINOPHIL NFR BLD AUTO: 0 % (ref 0–6)
ERYTHROCYTE [DISTWIDTH] IN BLOOD BY AUTOMATED COUNT: 12.3 % (ref 11.6–15.1)
GFR SERPL CREATININE-BSD FRML MDRD: 79 ML/MIN/1.73SQ M
GLUCOSE SERPL-MCNC: 54 MG/DL (ref 65–140)
HCT VFR BLD AUTO: 37.5 % (ref 34.8–46.1)
HGB BLD-MCNC: 11.6 G/DL (ref 11.5–15.4)
IMM GRANULOCYTES # BLD AUTO: 0.16 THOUSAND/UL (ref 0–0.2)
IMM GRANULOCYTES NFR BLD AUTO: 2 % (ref 0–2)
LYMPHOCYTES # BLD AUTO: 1.44 THOUSANDS/ΜL (ref 0.6–4.47)
LYMPHOCYTES NFR BLD AUTO: 13 % (ref 14–44)
MCH RBC QN AUTO: 31.1 PG (ref 26.8–34.3)
MCHC RBC AUTO-ENTMCNC: 30.9 G/DL (ref 31.4–37.4)
MCV RBC AUTO: 101 FL (ref 82–98)
MONOCYTES # BLD AUTO: 1.2 THOUSAND/ΜL (ref 0.17–1.22)
MONOCYTES NFR BLD AUTO: 11 % (ref 4–12)
NEUTROPHILS # BLD AUTO: 8.04 THOUSANDS/ΜL (ref 1.85–7.62)
NEUTS SEG NFR BLD AUTO: 74 % (ref 43–75)
NRBC BLD AUTO-RTO: 0 /100 WBCS
PLATELET # BLD AUTO: 736 THOUSANDS/UL (ref 149–390)
PMV BLD AUTO: 9.8 FL (ref 8.9–12.7)
POTASSIUM SERPL-SCNC: 5 MMOL/L (ref 3.5–5.3)
PROT SERPL-MCNC: 8.2 G/DL (ref 6.4–8.2)
RBC # BLD AUTO: 3.73 MILLION/UL (ref 3.81–5.12)
SODIUM SERPL-SCNC: 143 MMOL/L (ref 136–145)
WBC # BLD AUTO: 10.88 THOUSAND/UL (ref 4.31–10.16)

## 2020-12-30 PROCEDURE — 97530 THERAPEUTIC ACTIVITIES: CPT

## 2020-12-30 PROCEDURE — 80053 COMPREHEN METABOLIC PANEL: CPT | Performed by: INTERNAL MEDICINE

## 2020-12-30 PROCEDURE — 99232 SBSQ HOSP IP/OBS MODERATE 35: CPT | Performed by: INTERNAL MEDICINE

## 2020-12-30 PROCEDURE — 85025 COMPLETE CBC W/AUTO DIFF WBC: CPT | Performed by: INTERNAL MEDICINE

## 2020-12-30 PROCEDURE — 86140 C-REACTIVE PROTEIN: CPT | Performed by: INTERNAL MEDICINE

## 2020-12-30 PROCEDURE — 97110 THERAPEUTIC EXERCISES: CPT

## 2020-12-30 RX ADMIN — POTASSIUM CHLORIDE 20 MEQ: 1500 TABLET, EXTENDED RELEASE ORAL at 08:37

## 2020-12-30 RX ADMIN — LAMOTRIGINE 200 MG: 100 TABLET ORAL at 06:18

## 2020-12-30 RX ADMIN — CLONAZEPAM 0.5 MG: 0.5 TABLET ORAL at 08:37

## 2020-12-30 RX ADMIN — QUETIAPINE FUMARATE 400 MG: 200 TABLET ORAL at 21:26

## 2020-12-30 RX ADMIN — Medication 1 TABLET: at 08:38

## 2020-12-30 RX ADMIN — HEPARIN SODIUM 5000 UNITS: 5000 INJECTION INTRAVENOUS; SUBCUTANEOUS at 14:00

## 2020-12-30 RX ADMIN — TOPIRAMATE 50 MG: 25 TABLET, FILM COATED ORAL at 08:37

## 2020-12-30 RX ADMIN — OXYBUTYNIN 10 MG: 10 TABLET, FILM COATED, EXTENDED RELEASE ORAL at 08:37

## 2020-12-30 RX ADMIN — DOCUSATE SODIUM AND SENNOSIDES 1 TABLET: 8.6; 5 TABLET, FILM COATED ORAL at 08:37

## 2020-12-30 RX ADMIN — PANTOPRAZOLE SODIUM 40 MG: 40 TABLET, DELAYED RELEASE ORAL at 05:20

## 2020-12-30 RX ADMIN — DEXAMETHASONE SODIUM PHOSPHATE 6 MG: 4 INJECTION INTRA-ARTICULAR; INTRALESIONAL; INTRAMUSCULAR; INTRAVENOUS; SOFT TISSUE at 08:36

## 2020-12-30 RX ADMIN — Medication 2000 UNITS: at 08:37

## 2020-12-30 RX ADMIN — ATORVASTATIN CALCIUM 40 MG: 40 TABLET, FILM COATED ORAL at 21:26

## 2020-12-30 RX ADMIN — HEPARIN SODIUM 5000 UNITS: 5000 INJECTION INTRAVENOUS; SUBCUTANEOUS at 21:26

## 2020-12-30 RX ADMIN — FAMOTIDINE 20 MG: 20 TABLET ORAL at 08:37

## 2020-12-30 RX ADMIN — HEPARIN SODIUM 5000 UNITS: 5000 INJECTION INTRAVENOUS; SUBCUTANEOUS at 05:20

## 2020-12-30 RX ADMIN — FERROUS SULFATE TAB 325 MG (65 MG ELEMENTAL FE) 325 MG: 325 (65 FE) TAB at 08:30

## 2020-12-30 NOTE — PLAN OF CARE
Problem: Potential for Falls  Goal: Patient will remain free of falls  Description: INTERVENTIONS:  - Assess patient frequently for physical needs  -  Identify cognitive and physical deficits and behaviors that affect risk of falls    -  [place] fall precautions as indicated by assessment   - Educate patient/family on patient safety including physical limitations  - Instruct patient to call for assistance with activity based on assessment  - Modify environment to reduce risk of injury  - Consider OT/PT consult to assist with strengthening/mobility  Outcome: Progressing     Problem: Prexisting or High Potential for Compromised Skin Integrity  Goal: Skin integrity is maintained or improved  Description: INTERVENTIONS:  - Identify patients at risk for skin breakdown  - Assess and monitor skin integrity  - Assess and monitor nutrition and hydration status  - Monitor labs   - Assess for incontinence   - Turn and reposition patient  - Assist with mobility/ambulation  - Relieve pressure over bony prominences  - Avoid friction and shearing  - Provide appropriate hygiene as needed including keeping skin clean and dry  - Evaluate need for skin moisturizer/barrier cream  - Collaborate with interdisciplinary team   - Patient/family teaching  - Consider wound care consult   Outcome: Progressing     Problem: PAIN - ADULT  Goal: Verbalizes/displays adequate comfort level or baseline comfort level  Description: Interventions:  - Encourage patient to monitor pain and request assistance  - Assess pain using appropriate pain scale  - Administer analgesics based on type and severity of pain and evaluate response  - Implement non-pharmacological measures as appropriate and evaluate response  - Consider cultural and social influences on pain and pain management  - Notify physician/advanced practitioner if interventions unsuccessful or patient reports new pain  Outcome: Progressing     Problem: INFECTION - ADULT  Goal: Absence or prevention of progression during hospitalization  Description: INTERVENTIONS:  - Assess and monitor for signs and symptoms of infection  - Monitor lab/diagnostic results  - Monitor all insertion sites, i e  indwelling lines, tubes, and drains  - Monitor endotracheal if appropriate and nasal secretions for changes in amount and color  - [place] appropriate cooling/warming therapies per order  - Administer medications as ordered  - Instruct and encourage patient and family to use good hand hygiene technique  - Identify and instruct in appropriate isolation precautions for identified infection/condition  Outcome: Progressing     Problem: SAFETY ADULT  Goal: Patient will remain free of falls  Description: INTERVENTIONS:  - Assess patient frequently for physical needs  -  Identify cognitive and physical deficits and behaviors that affect risk of falls    -  [place] fall precautions as indicated by assessment   - Educate patient/family on patient safety including physical limitations  - Instruct patient to call for assistance with activity based on assessment  - Modify environment to reduce risk of injury  - Consider OT/PT consult to assist with strengthening/mobility  Outcome: Progressing  Goal: Maintain or return to baseline ADL function  Description: INTERVENTIONS:  -  Assess patient's ability to carry out ADLs; assess patient's baseline for ADL function and identify physical deficits which impact ability to perform ADLs (bathing, care of mouth/teeth, toileting, grooming, dressing, etc )  - Assess/evaluate cause of self-care deficits   - Assess range of motion  - Assess patient's mobility; develop plan if impaired  - Assess patient's need for assistive devices and provide as appropriate  - Encourage maximum independence but intervene and supervise when necessary  - Involve family in performance of ADLs  - Assess for home care needs following discharge   - Consider OT consult to assist with ADL evaluation and planning for discharge  - Provide patient education as appropriate  Outcome: Progressing  Goal: Maintain or return mobility status to optimal level  Description: INTERVENTIONS:  - Assess patient's baseline mobility status (ambulation, transfers, stairs, etc )    - Identify cognitive and physical deficits and behaviors that affect mobility  - Identify mobility aids required to assist with transfers and/or ambulation (gait belt, sit-to-stand, lift, walker, cane, etc )  - [place] fall precautions as indicated by assessment  - Record patient progress and toleration of activity level on Mobility SBAR; progress patient to next Phase/Stage  - Instruct patient to call for assistance with activity based on assessment  - Consider rehabilitation consult to assist with strengthening/weightbearing, etc   Outcome: Progressing     Problem: DISCHARGE PLANNING  Goal: Discharge to home or other facility with appropriate resources  Description: INTERVENTIONS:  - Identify barriers to discharge w/patient and caregiver  - Arrange for needed discharge resources and transportation as appropriate  - Identify discharge learning needs (meds, wound care, etc )  - Arrange for interpretive services to assist at discharge as needed  - Refer to Case Management Department for coordinating discharge planning if the patient needs post-hospital services based on physician/advanced practitioner order or complex needs related to functional status, cognitive ability, or social support system  Outcome: Progressing     Problem: Knowledge Deficit  Goal: Patient/family/caregiver demonstrates understanding of disease process, treatment plan, medications, and discharge instructions  Description: Complete learning assessment and assess knowledge base    Interventions:  - Provide teaching at level of understanding  - Provide teaching via preferred learning methods  Outcome: Progressing     Problem: Nutrition/Hydration-ADULT  Goal: Nutrient/Hydration intake appropriate for improving, restoring or maintaining nutritional needs  Description: Monitor and assess patient's nutrition/hydration status for malnutrition  Collaborate with interdisciplinary team and initiate plan and interventions as ordered  Monitor patient's weight and dietary intake as ordered or per policy  Utilize nutrition screening tool and intervene as necessary  Determine patient's food preferences and provide high-protein, high-caloric foods as appropriate       INTERVENTIONS:  - Monitor oral intake, urinary output, labs, and treatment plans  - Assess nutrition and hydration status and recommend course of action  - Evaluate amount of meals eaten  - Assist patient with eating if necessary   - Allow adequate time for meals  - Recommend/ encourage appropriate diets, oral nutritional supplements, and vitamin/mineral supplements  - Order, calculate, and assess calorie counts as needed  - Recommend, monitor, and adjust tube feedings and TPN/PPN based on assessed needs  - Assess need for intravenous fluids  - Provide specific nutrition/hydration education as appropriate  - Include patient/family/caregiver in decisions related to nutrition  Outcome: Progressing     Problem: RESPIRATORY - ADULT  Goal: Achieves optimal ventilation and oxygenation  Description: INTERVENTIONS:  - Assess for changes in respiratory status  - Assess for changes in mentation and behavior  - Position to facilitate oxygenation and minimize respiratory effort  - Oxygen administered by appropriate delivery if ordered  - Initiate smoking cessation education as indicated  - Encourage broncho-pulmonary hygiene including cough, deep breathe, Incentive Spirometry  - Assess the need for suctioning and aspirate as needed  - Assess and instruct to report SOB or any respiratory difficulty  - Respiratory Therapy support as indicated  Outcome: Progressing     Problem: GENITOURINARY - ADULT  Goal: Urinary catheter remains patent  Description: INTERVENTIONS:  - Assess patency of urinary catheter  - If patient has a chronic aj, consider changing catheter if non-functioning  - Follow guidelines for intermittent irrigation of non-functioning urinary catheter  Outcome: Progressing     Problem: SKIN/TISSUE INTEGRITY - ADULT  Goal: Skin integrity remains intact  Description: INTERVENTIONS  - Identify patients at risk for skin breakdown  - Assess and monitor skin integrity  - Assess and monitor nutrition and hydration status  - Monitor labs (i e  albumin)  - Assess for incontinence   - Turn and reposition patient  - Assist with mobility/ambulation  - Relieve pressure over bony prominences  - Avoid friction and shearing  - Provide appropriate hygiene as needed including keeping skin clean and dry  - Evaluate need for skin moisturizer/barrier cream  - Collaborate with interdisciplinary team (i e  Nutrition, Rehabilitation, etc )   - Patient/family teaching  Outcome: Progressing

## 2020-12-30 NOTE — PHYSICAL THERAPY NOTE
Physical Therapy Progress Note     12/30/20 1010   Note Type   Note Type Treatment   Pain Assessment   Pain Assessment Tool FLACC   Pain Rating: FLACC (Rest) - Face 0   Pain Rating: FLACC (Rest) - Legs 0   Pain Rating: FLACC (Rest) - Activity 0   Pain Rating: FLACC (Rest) - Cry 0   Pain Rating: FLACC (Rest) - Consolability 0   Score: FLACC (Rest) 0   Pain Rating: FLACC (Activity) - Face 0   Pain Rating: FLACC (Activity) - Legs 0   Pain Rating: FLACC (Activity) - Activity 0   Pain Rating: FLACC (Activity) - Cry 0   Pain Rating: FLACC (Activity) - Consolability 0   Score: FLACC (Activity) 0   Restrictions/Precautions   Weight Bearing Precautions Per Order No   Other Precautions Contact/isolation; Airborne/isolation; Fall Risk;Multiple lines; Restraints;Cognitive; Bed Alarm;O2;Telemetry   General   Chart Reviewed Yes   Response to Previous Treatment Patient unable to report, no changes reported from family or staff   Family/Caregiver Present No   Subjective   Subjective "I want to sleep "   Bed Mobility   Rolling R 4  Minimal assistance   Additional items Assist x 1;HOB elevated; Bedrails;Leg ; Increased time required;Verbal cues;LE management   Rolling L 4  Minimal assistance   Additional items Assist x 1;HOB elevated; Bedrails;Leg ; Increased time required;Verbal cues;LE management   Endurance Deficit   Endurance Deficit Yes   Endurance Deficit Description fatigue/weakness/medical   Activity Tolerance   Activity Tolerance Patient limited by fatigue;Treatment limited secondary to medical complications (Comment)   Nurse Made Aware Yes   Exercises   THR Supine;10 reps;PROM; Bilateral   Assessment   Prognosis Poor   Problem List Decreased strength;Decreased range of motion;Decreased endurance;Decreased mobility; Impaired balance;Decreased cognition;Decreased safety awareness; Impaired judgement;Decreased skin integrity   Assessment Pt  supine in bed upon my arrival  Pt  currently very lethargic requiring constant cueing for attempts at participation in therapeutic intervention  Performance of HEP supine in bed  Pt  remained supine in bed with alarm active and soft restraints in place at end of treatment session  PT will continue to recommend d/c to rehab when medically stable for continued improvement of noted impairments above  Barriers to Discharge Inaccessible home environment;Decreased caregiver support   Barriers to Discharge Comments Level of support at home  Goals   Patient Goals To rest    STG Expiration Date 01/01/21   PT Treatment Day 2   Plan   Treatment/Interventions LE strengthening/ROM; Functional transfer training; Therapeutic exercise; Endurance training;Bed mobility;Spoke to nursing;Spoke to case management   Progress Slow progress, medical status limitations   PT Frequency Other (Comment)  (3-5x/wk)     Enrrique Sorensen, PTA

## 2020-12-30 NOTE — PLAN OF CARE
Problem: Potential for Falls  Goal: Patient will remain free of falls  Description: INTERVENTIONS:  - Assess patient frequently for physical needs  -  Identify cognitive and physical deficits and behaviors that affect risk of falls    -  [place] fall precautions as indicated by assessment   - Educate patient/family on patient safety including physical limitations  - Instruct patient to call for assistance with activity based on assessment  - Modify environment to reduce risk of injury  - Consider OT/PT consult to assist with strengthening/mobility  Outcome: Progressing     Problem: Prexisting or High Potential for Compromised Skin Integrity  Goal: Skin integrity is maintained or improved  Description: INTERVENTIONS:  - Identify patients at risk for skin breakdown  - Assess and monitor skin integrity  - Assess and monitor nutrition and hydration status  - Monitor labs   - Assess for incontinence   - Turn and reposition patient  - Assist with mobility/ambulation  - Relieve pressure over bony prominences  - Avoid friction and shearing  - Provide appropriate hygiene as needed including keeping skin clean and dry  - Evaluate need for skin moisturizer/barrier cream  - Collaborate with interdisciplinary team   - Patient/family teaching  - Consider wound care consult   Outcome: Progressing     Problem: PAIN - ADULT  Goal: Verbalizes/displays adequate comfort level or baseline comfort level  Description: Interventions:  - Encourage patient to monitor pain and request assistance  - Assess pain using appropriate pain scale  - Administer analgesics based on type and severity of pain and evaluate response  - Implement non-pharmacological measures as appropriate and evaluate response  - Consider cultural and social influences on pain and pain management  - Notify physician/advanced practitioner if interventions unsuccessful or patient reports new pain  Outcome: Progressing     Problem: INFECTION - ADULT  Goal: Absence or prevention of progression during hospitalization  Description: INTERVENTIONS:  - Assess and monitor for signs and symptoms of infection  - Monitor lab/diagnostic results  - Monitor all insertion sites, i e  indwelling lines, tubes, and drains  - Monitor endotracheal if appropriate and nasal secretions for changes in amount and color  - [place] appropriate cooling/warming therapies per order  - Administer medications as ordered  - Instruct and encourage patient and family to use good hand hygiene technique  - Identify and instruct in appropriate isolation precautions for identified infection/condition  Outcome: Progressing     Problem: SAFETY ADULT  Goal: Patient will remain free of falls  Description: INTERVENTIONS:  - Assess patient frequently for physical needs  -  Identify cognitive and physical deficits and behaviors that affect risk of falls    -  [place] fall precautions as indicated by assessment   - Educate patient/family on patient safety including physical limitations  - Instruct patient to call for assistance with activity based on assessment  - Modify environment to reduce risk of injury  - Consider OT/PT consult to assist with strengthening/mobility  Outcome: Progressing  Goal: Maintain or return to baseline ADL function  Description: INTERVENTIONS:  -  Assess patient's ability to carry out ADLs; assess patient's baseline for ADL function and identify physical deficits which impact ability to perform ADLs (bathing, care of mouth/teeth, toileting, grooming, dressing, etc )  - Assess/evaluate cause of self-care deficits   - Assess range of motion  - Assess patient's mobility; develop plan if impaired  - Assess patient's need for assistive devices and provide as appropriate  - Encourage maximum independence but intervene and supervise when necessary  - Involve family in performance of ADLs  - Assess for home care needs following discharge   - Consider OT consult to assist with ADL evaluation and planning for discharge  - Provide patient education as appropriate  Outcome: Progressing  Goal: Maintain or return mobility status to optimal level  Description: INTERVENTIONS:  - Assess patient's baseline mobility status (ambulation, transfers, stairs, etc )    - Identify cognitive and physical deficits and behaviors that affect mobility  - Identify mobility aids required to assist with transfers and/or ambulation (gait belt, sit-to-stand, lift, walker, cane, etc )  - [place] fall precautions as indicated by assessment  - Record patient progress and toleration of activity level on Mobility SBAR; progress patient to next Phase/Stage  - Instruct patient to call for assistance with activity based on assessment  - Consider rehabilitation consult to assist with strengthening/weightbearing, etc   Outcome: Progressing     Problem: DISCHARGE PLANNING  Goal: Discharge to home or other facility with appropriate resources  Description: INTERVENTIONS:  - Identify barriers to discharge w/patient and caregiver  - Arrange for needed discharge resources and transportation as appropriate  - Identify discharge learning needs (meds, wound care, etc )  - Arrange for interpretive services to assist at discharge as needed  - Refer to Case Management Department for coordinating discharge planning if the patient needs post-hospital services based on physician/advanced practitioner order or complex needs related to functional status, cognitive ability, or social support system  Outcome: Progressing     Problem: Knowledge Deficit  Goal: Patient/family/caregiver demonstrates understanding of disease process, treatment plan, medications, and discharge instructions  Description: Complete learning assessment and assess knowledge base    Interventions:  - Provide teaching at level of understanding  - Provide teaching via preferred learning methods  Outcome: Progressing     Problem: Nutrition/Hydration-ADULT  Goal: Nutrient/Hydration intake appropriate for improving, restoring or maintaining nutritional needs  Description: Monitor and assess patient's nutrition/hydration status for malnutrition  Collaborate with interdisciplinary team and initiate plan and interventions as ordered  Monitor patient's weight and dietary intake as ordered or per policy  Utilize nutrition screening tool and intervene as necessary  Determine patient's food preferences and provide high-protein, high-caloric foods as appropriate       INTERVENTIONS:  - Monitor oral intake, urinary output, labs, and treatment plans  - Assess nutrition and hydration status and recommend course of action  - Evaluate amount of meals eaten  - Assist patient with eating if necessary   - Allow adequate time for meals  - Recommend/ encourage appropriate diets, oral nutritional supplements, and vitamin/mineral supplements  - Order, calculate, and assess calorie counts as needed  - Recommend, monitor, and adjust tube feedings and TPN/PPN based on assessed needs  - Assess need for intravenous fluids  - Provide specific nutrition/hydration education as appropriate  - Include patient/family/caregiver in decisions related to nutrition  Outcome: Progressing     Problem: RESPIRATORY - ADULT  Goal: Achieves optimal ventilation and oxygenation  Description: INTERVENTIONS:  - Assess for changes in respiratory status  - Assess for changes in mentation and behavior  - Position to facilitate oxygenation and minimize respiratory effort  - Oxygen administered by appropriate delivery if ordered  - Initiate smoking cessation education as indicated  - Encourage broncho-pulmonary hygiene including cough, deep breathe, Incentive Spirometry  - Assess the need for suctioning and aspirate as needed  - Assess and instruct to report SOB or any respiratory difficulty  - Respiratory Therapy support as indicated  Outcome: Progressing     Problem: GENITOURINARY - ADULT  Goal: Urinary catheter remains patent  Description: INTERVENTIONS:  - Assess patency of urinary catheter  - If patient has a chronic aj, consider changing catheter if non-functioning  - Follow guidelines for intermittent irrigation of non-functioning urinary catheter  Outcome: Progressing     Problem: SKIN/TISSUE INTEGRITY - ADULT  Goal: Skin integrity remains intact  Description: INTERVENTIONS  - Identify patients at risk for skin breakdown  - Assess and monitor skin integrity  - Assess and monitor nutrition and hydration status  - Monitor labs (i e  albumin)  - Assess for incontinence   - Turn and reposition patient  - Assist with mobility/ambulation  - Relieve pressure over bony prominences  - Avoid friction and shearing  - Provide appropriate hygiene as needed including keeping skin clean and dry  - Evaluate need for skin moisturizer/barrier cream  - Collaborate with interdisciplinary team (i e  Nutrition, Rehabilitation, etc )   - Patient/family teaching  Outcome: Progressing

## 2020-12-30 NOTE — ASSESSMENT & PLAN NOTE
· Acute metabolic encephalopathy secondary to COVID-19, hypernatremia on top of underlying cognitive decline  · Monitor  · Continue assistance when feeding  · Diet changed to pureed with nectar thickened lid

## 2020-12-30 NOTE — ASSESSMENT & PLAN NOTE
Secondary to poor oral intake  Improved with hypotonic fluid but now is slowly creeping up  Continue diet as tolerated  Recheck labs in a m

## 2020-12-30 NOTE — TREATMENT PLAN
Spoke with son Charlie Campbell who spoke with the rest of the family   They have decided to change her code status to DNR/DNI

## 2020-12-30 NOTE — ASSESSMENT & PLAN NOTE
With high oxygen requirements and poor oral intake  Today she is on mid flow at 15 L only and seemed comfortable  Wean down as tolerated  Per discussion with son Gus Plasencia yesterday 12/29/2020, she is now DNR DNI

## 2020-12-30 NOTE — ASSESSMENT & PLAN NOTE
· Improved  · Hemoglobin now within normal  · Continue vitamin supplements and iron    Results from last 7 days   Lab Units 12/30/20  0431 12/29/20  0907 12/28/20  0613 12/26/20  0633 12/25/20  0940 12/24/20  0635   HEMOGLOBIN g/dL 11 6 11 8 10 7* 11 0* 11 1* 9 8*

## 2020-12-30 NOTE — PLAN OF CARE
Problem: PHYSICAL THERAPY ADULT  Goal: Performs mobility at highest level of function for planned discharge setting  See evaluation for individualized goals  Description: Treatment/Interventions: Functional transfer training, LE strengthening/ROM, Therapeutic exercise, Endurance training, Patient/family training, Cognitive reorientation, Equipment eval/education, Bed mobility, Gait training, Spoke to nursing, Spoke to case management, OT, Elevations  Equipment Recommended: Pradeep Avila       See flowsheet documentation for full assessment, interventions and recommendations  Outcome: Not Progressing  Note: Prognosis: Poor  Problem List: Decreased strength, Decreased range of motion, Decreased endurance, Decreased mobility, Impaired balance, Decreased cognition, Decreased safety awareness, Impaired judgement, Decreased skin integrity  Assessment: Pt  supine in bed upon my arrival  Pt  currently very lethargic requiring constant cueing for attempts at participation in therapeutic intervention  Performance of HEP supine in bed  Pt  remained supine in bed with alarm active and soft restraints in place at end of treatment session  PT will continue to recommend d/c to rehab when medically stable for continued improvement of noted impairments above  Barriers to Discharge: Inaccessible home environment, Decreased caregiver support  Barriers to Discharge Comments: Level of support at home  PT Discharge Recommendation: Post-Acute Rehabilitation Services     PT - OK to Discharge: Yes(if d/c to rehab when medically stable )    See flowsheet documentation for full assessment

## 2020-12-30 NOTE — ASSESSMENT & PLAN NOTE
· On day 10 of dexamethasone      · Ongoing treatment with Lipitor, vitamin-D, heparin and multivitamin  · Remdesivir dose completed

## 2020-12-30 NOTE — ASSESSMENT & PLAN NOTE
Replete   Results from last 7 days   Lab Units 12/30/20  0431 12/29/20  0907 12/28/20  0645 12/26/20  0633 12/25/20  0940 12/24/20  0633   POTASSIUM mmol/L 5 0 3 1* 3 1* 3 8 3 7 3 9

## 2020-12-30 NOTE — PROGRESS NOTES
Progress Note - Darling Diana 1935, 80 y o  female MRN: [id number]    Unit/Bed#: hospitals 68 2 [address] [id number] Encounter: [id number]    Primary Care Provider: Fernando Oliva MD   Date and time admitted to hospital: 12/18/2020  5:50 PM        * Acute respiratory failure due to COVID-19 [facility])  Assessment & Plan  With high oxygen requirements and poor oral intake  Today she is on mid flow at 15 L only and seemed comfortable  Wean down as tolerated  Per discussion with son Olya Dan yesterday 12/29/2020, she is now DNR DNI    Pneumonia due to COVID-19 virus  Assessment & Plan  · On day 10 of dexamethasone  · Ongoing treatment with Lipitor, vitamin-D, heparin and multivitamin  · Remdesivir dose completed    Acute metabolic encephalopathy  Assessment & Plan  · Acute metabolic encephalopathy secondary to COVID-19, hypernatremia on top of underlying cognitive decline  · Monitor  · Continue assistance when feeding  · Diet changed to pureed with nectar thickened lid    Hypernatremia  Assessment & Plan  Secondary to poor oral intake  Improved with hypotonic fluid but now is slowly creeping up  Continue diet as tolerated  Recheck labs in a m      Bipolar 1 disorder ([facility] [address] )  Assessment & Plan  · With metabolic encephalopathy due to acute illness, hypernatremia, hypoxia  · Continue Seroquel 400 mg at bedtime, Topamax 50 mg b i d , Lamictal 200 mg daily    Hyperlipidemia  Assessment & Plan  · Continue Lipitor 40 mg daily     Anemia, macrocytic  Assessment & Plan  · Improved  · Hemoglobin now within normal  · Continue vitamin supplements and iron    Results from last 7 days   Lab Units 12/30/20  0431 12/29/20  0907 12/28/20  0613 12/26/20  0633 12/25/20  0940 12/24/20  0635   HEMOGLOBIN g/dL 11 6 11 8 10 7* 11 0* 11 1* 9 8*       Hypokalemiaresolved as of 12/30/2020  Assessment & Plan  Replete   Results from last 7 days   Lab Units 12/30/20  0431 12/29/20  3084 12/28/20  0645 12/26/20  8693 12/25/20  0940 12/24/20  4231 POTASSIUM mmol/L 5 0 3 1* 3 1* 3 8 3 7 3 9         VTE Pharmacologic Prophylaxis:   Pharmacologic: Heparin  Mechanical VTE Prophylaxis in Place: Yes    Patient Centered Rounds: I have performed bedside rounds with nursing staff today  Discussions with Specialists or Other Care Team Provider:  None    Education and Discussions with Family / Patient:  Spoke with son Ulysses Andes    Time Spent for Care: 25 minutes  More than 50% of total time spent on counseling and coordination of care as described above  Current Length of Stay: 12 day(s)    Current Patient Status: Inpatient   Certification Statement: The patient will continue to require additional inpatient hospital stay due to Shortness of breath    Discharge Plan: To be determined    Code Status: Level 3 - DNAR and DNI      Subjective:   Currently on mid flow nasal cannula  Yesterday evening she was on high-flow and face mask  She was tolerating her pureed and nectar thickened liquid  Patient reports "I am okay"    Objective:     Vitals:   Temp (24hrs), Av 4 °F (36 9 °C), Min:98 1 °F (36 7 °C), Max:98 9 °F (37 2 °C)    Temp:  [98 1 °F (36 7 °C)-98 9 °F (37 2 °C)] 98 1 °F (36 7 °C)  HR:  [] 109  Resp:  [18-20] 18  BP: (120-152)/(75-91) 143/87  SpO2:  [78 %-98 %] 95 %  Body mass index is 19 76 kg/m²  Input and Output Summary (last 24 hours): Intake/Output Summary (Last 24 hours) at 2020 1617  Last data filed at 2020 0401  Gross per 24 hour   Intake    Output 250 ml   Net -250 ml       Physical Exam:     Physical Exam  Constitutional:       Appearance: She is ill-appearing  HENT:      Head: Normocephalic and atraumatic  Nose: No rhinorrhea  Eyes:      General: No scleral icterus  Neck:      Musculoskeletal: Neck supple  Cardiovascular:      Rate and Rhythm: Regular rhythm  Heart sounds: Murmur present  Pulmonary:      Comments: Poor effort  Coarse breath sounds  Abdominal:      General: Abdomen is flat  Palpations: Abdomen is soft  Musculoskeletal:      Right lower leg: No edema  Left lower leg: No edema  Comments: Diminished muscle mass   Neurological:      Comments: Poor recall of recent events   Psychiatric:         Behavior: Behavior normal       Comments: Depressed mood       Additional Data:     Labs:    Results from last 7 days   Lab Units 12/30/20  0431   WBC Thousand/uL 10 88*   HEMOGLOBIN g/dL 11 6   HEMATOCRIT % 37 5   PLATELETS Thousands/uL 736*   NEUTROS PCT % 74   LYMPHS PCT % 13*   MONOS PCT % 11   EOS PCT % 0     Results from last 7 days   Lab Units 12/30/20  0431   SODIUM mmol/L 143   POTASSIUM mmol/L 5 0   CHLORIDE mmol/L 106   CO2 mmol/L 27   BUN mg/dL 28*   CREATININE mg/dL 0 70   ANION GAP mmol/L 10   CALCIUM mg/dL 9 9   ALBUMIN g/dL 2 3*   TOTAL BILIRUBIN mg/dL 0 55   ALK PHOS U/L 95   ALT U/L 41   AST U/L 97*   GLUCOSE RANDOM mg/dL 54*         Results from last 7 days   Lab Units 12/29/20  2103   POC GLUCOSE mg/dl 233*         Results from last 7 days   Lab Units 12/27/20  0445 12/26/20  1139   PROCALCITONIN ng/ml <0 05 0 05           * I Have Reviewed All Lab Data Listed Above  * Additional Pertinent Lab Tests Reviewed:  All Labs Within Last 24 Hours Reviewed    Imaging:    Imaging Reports Reviewed Today Include:  None  Imaging Personally Reviewed by Myself Includes:  None    Recent Cultures (last 7 days):           Last 24 Hours Medication List:   Current Facility-Administered Medications   Medication Dose Route Frequency Provider Last Rate    acetaminophen  650 mg Oral Q6H PRN Lisa Roman, DAISY      atorvastatin  40 mg Oral HS Azalia Hernández PA-C      cholecalciferol  2,000 Units Oral Daily Azalia Hernández PA-C      clonazePAM  0 5 mg Oral BID Azalia Hernández PA-C      dexamethasone  6 mg Intravenous Daily Wili Chavez, DO      dicyclomine  10 mg Oral TID PRN Bam Harvey, DO      famotidine  20 mg Oral Daily Azalia Hernández PA-C      ferrous sulfate  325 mg Oral Daily With Breakfast Kendell Moraes DO      heparin (porcine)  5,000 Units Subcutaneous [facility] Azalia Hernández PA-C      lamoTRIgine  200 mg Oral Daily Before Breakfast Azalia Hernández PA-C      multivitamin-minerals  1 tablet Oral Daily Azalia Hernández PA-C      ondansetron  4 mg Intravenous Q6H PRN Jeff Patel PA-C      oxybutynin  10 mg Oral Daily Azalia Hernández PA-C      pantoprazole  40 mg Oral Early Morning Azalia Hernández PA-C      QUEtiapine  400 mg Oral HS Azalia Hernández PA-C      senna-docusate sodium  1 tablet Oral BID Gerard Abebe DO      topiramate  50 mg Oral BID Jeff Patel PA-C          Today, Patient Was Seen By: Steffen Barraza MD    ** Please Note: Dictation voice to text software may have been used in the creation of this document   **

## 2020-12-31 PROBLEM — K14.3 BLACK TONGUE: Status: ACTIVE | Noted: 2020-12-31

## 2020-12-31 LAB
ALBUMIN SERPL BCP-MCNC: 2.3 G/DL (ref 3.5–5)
ALP SERPL-CCNC: 86 U/L (ref 46–116)
ALT SERPL W P-5'-P-CCNC: 39 U/L (ref 12–78)
ANION GAP SERPL CALCULATED.3IONS-SCNC: 6 MMOL/L (ref 4–13)
AST SERPL W P-5'-P-CCNC: 43 U/L (ref 5–45)
BASOPHILS # BLD AUTO: 0.03 THOUSANDS/ΜL (ref 0–0.1)
BASOPHILS NFR BLD AUTO: 0 % (ref 0–1)
BILIRUB SERPL-MCNC: 0.4 MG/DL (ref 0.2–1)
BUN SERPL-MCNC: 33 MG/DL (ref 5–25)
CALCIUM ALBUM COR SERPL-MCNC: 10.2 MG/DL (ref 8.3–10.1)
CALCIUM SERPL-MCNC: 8.8 MG/DL (ref 8.3–10.1)
CHLORIDE SERPL-SCNC: 110 MMOL/L (ref 100–108)
CO2 SERPL-SCNC: 29 MMOL/L (ref 21–32)
CREAT SERPL-MCNC: 0.88 MG/DL (ref 0.6–1.3)
CRP SERPL QL: 74.5 MG/L
EOSINOPHIL # BLD AUTO: 0 THOUSAND/ΜL (ref 0–0.61)
EOSINOPHIL NFR BLD AUTO: 0 % (ref 0–6)
ERYTHROCYTE [DISTWIDTH] IN BLOOD BY AUTOMATED COUNT: 12.2 % (ref 11.6–15.1)
GFR SERPL CREATININE-BSD FRML MDRD: 60 ML/MIN/1.73SQ M
GLUCOSE SERPL-MCNC: 131 MG/DL (ref 65–140)
HCT VFR BLD AUTO: 36.3 % (ref 34.8–46.1)
HGB BLD-MCNC: 11.1 G/DL (ref 11.5–15.4)
IMM GRANULOCYTES # BLD AUTO: 0.18 THOUSAND/UL (ref 0–0.2)
IMM GRANULOCYTES NFR BLD AUTO: 2 % (ref 0–2)
LYMPHOCYTES # BLD AUTO: 1.17 THOUSANDS/ΜL (ref 0.6–4.47)
LYMPHOCYTES NFR BLD AUTO: 11 % (ref 14–44)
MCH RBC QN AUTO: 30.9 PG (ref 26.8–34.3)
MCHC RBC AUTO-ENTMCNC: 30.6 G/DL (ref 31.4–37.4)
MCV RBC AUTO: 101 FL (ref 82–98)
MONOCYTES # BLD AUTO: 1.15 THOUSAND/ΜL (ref 0.17–1.22)
MONOCYTES NFR BLD AUTO: 10 % (ref 4–12)
NEUTROPHILS # BLD AUTO: 8.62 THOUSANDS/ΜL (ref 1.85–7.62)
NEUTS SEG NFR BLD AUTO: 77 % (ref 43–75)
NRBC BLD AUTO-RTO: 0 /100 WBCS
PLATELET # BLD AUTO: 601 THOUSANDS/UL (ref 149–390)
PMV BLD AUTO: 9.4 FL (ref 8.9–12.7)
POTASSIUM SERPL-SCNC: 3.8 MMOL/L (ref 3.5–5.3)
PROT SERPL-MCNC: 7.4 G/DL (ref 6.4–8.2)
RBC # BLD AUTO: 3.59 MILLION/UL (ref 3.81–5.12)
SODIUM SERPL-SCNC: 145 MMOL/L (ref 136–145)
WBC # BLD AUTO: 11.15 THOUSAND/UL (ref 4.31–10.16)

## 2020-12-31 PROCEDURE — 80053 COMPREHEN METABOLIC PANEL: CPT | Performed by: INTERNAL MEDICINE

## 2020-12-31 PROCEDURE — 97530 THERAPEUTIC ACTIVITIES: CPT

## 2020-12-31 PROCEDURE — 99232 SBSQ HOSP IP/OBS MODERATE 35: CPT | Performed by: NURSE PRACTITIONER

## 2020-12-31 PROCEDURE — 92526 ORAL FUNCTION THERAPY: CPT

## 2020-12-31 PROCEDURE — 97535 SELF CARE MNGMENT TRAINING: CPT

## 2020-12-31 PROCEDURE — 97110 THERAPEUTIC EXERCISES: CPT

## 2020-12-31 PROCEDURE — 99232 SBSQ HOSP IP/OBS MODERATE 35: CPT | Performed by: INTERNAL MEDICINE

## 2020-12-31 PROCEDURE — 85025 COMPLETE CBC W/AUTO DIFF WBC: CPT | Performed by: INTERNAL MEDICINE

## 2020-12-31 PROCEDURE — 86140 C-REACTIVE PROTEIN: CPT | Performed by: INTERNAL MEDICINE

## 2020-12-31 RX ORDER — DEXAMETHASONE SODIUM PHOSPHATE 4 MG/ML
6 INJECTION, SOLUTION INTRA-ARTICULAR; INTRALESIONAL; INTRAMUSCULAR; INTRAVENOUS; SOFT TISSUE DAILY
Status: DISCONTINUED | OUTPATIENT
Start: 2020-12-31 | End: 2021-01-09 | Stop reason: HOSPADM

## 2020-12-31 RX ORDER — SODIUM CHLORIDE 450 MG/100ML
75 INJECTION, SOLUTION INTRAVENOUS CONTINUOUS
Status: DISCONTINUED | OUTPATIENT
Start: 2020-12-31 | End: 2021-01-02

## 2020-12-31 RX ADMIN — DOCUSATE SODIUM AND SENNOSIDES 1 TABLET: 8.6; 5 TABLET, FILM COATED ORAL at 08:09

## 2020-12-31 RX ADMIN — FAMOTIDINE 20 MG: 20 TABLET ORAL at 08:09

## 2020-12-31 RX ADMIN — HEPARIN SODIUM 5000 UNITS: 5000 INJECTION INTRAVENOUS; SUBCUTANEOUS at 21:18

## 2020-12-31 RX ADMIN — ATORVASTATIN CALCIUM 40 MG: 40 TABLET, FILM COATED ORAL at 21:18

## 2020-12-31 RX ADMIN — CLONAZEPAM 0.5 MG: 0.5 TABLET ORAL at 08:09

## 2020-12-31 RX ADMIN — TOPIRAMATE 50 MG: 25 TABLET, FILM COATED ORAL at 17:44

## 2020-12-31 RX ADMIN — PANTOPRAZOLE SODIUM 40 MG: 40 TABLET, DELAYED RELEASE ORAL at 05:49

## 2020-12-31 RX ADMIN — LAMOTRIGINE 200 MG: 100 TABLET ORAL at 06:20

## 2020-12-31 RX ADMIN — CLONAZEPAM 0.5 MG: 0.5 TABLET ORAL at 17:44

## 2020-12-31 RX ADMIN — Medication 2000 UNITS: at 08:10

## 2020-12-31 RX ADMIN — QUETIAPINE FUMARATE 400 MG: 200 TABLET ORAL at 21:18

## 2020-12-31 RX ADMIN — HEPARIN SODIUM 5000 UNITS: 5000 INJECTION INTRAVENOUS; SUBCUTANEOUS at 14:00

## 2020-12-31 RX ADMIN — DEXAMETHASONE SODIUM PHOSPHATE 6 MG: 4 INJECTION INTRA-ARTICULAR; INTRALESIONAL; INTRAMUSCULAR; INTRAVENOUS; SOFT TISSUE at 18:36

## 2020-12-31 RX ADMIN — SODIUM CHLORIDE 75 ML/HR: 0.45 INJECTION, SOLUTION INTRAVENOUS at 17:45

## 2020-12-31 RX ADMIN — OXYBUTYNIN 10 MG: 10 TABLET, FILM COATED, EXTENDED RELEASE ORAL at 08:09

## 2020-12-31 RX ADMIN — HEPARIN SODIUM 5000 UNITS: 5000 INJECTION INTRAVENOUS; SUBCUTANEOUS at 05:49

## 2020-12-31 RX ADMIN — DOCUSATE SODIUM AND SENNOSIDES 1 TABLET: 8.6; 5 TABLET, FILM COATED ORAL at 17:44

## 2020-12-31 RX ADMIN — Medication 1 TABLET: at 08:10

## 2020-12-31 RX ADMIN — TOPIRAMATE 50 MG: 25 TABLET, FILM COATED ORAL at 08:10

## 2020-12-31 RX ADMIN — FERROUS SULFATE TAB 325 MG (65 MG ELEMENTAL FE) 325 MG: 325 (65 FE) TAB at 08:09

## 2020-12-31 NOTE — PLAN OF CARE
Problem: Potential for Falls  Goal: Patient will remain free of falls  Description: INTERVENTIONS:  - Assess patient frequently for physical needs  -  Identify cognitive and physical deficits and behaviors that affect risk of falls    -  [place] fall precautions as indicated by assessment   - Educate patient/family on patient safety including physical limitations  - Instruct patient to call for assistance with activity based on assessment  - Modify environment to reduce risk of injury  - Consider OT/PT consult to assist with strengthening/mobility  Outcome: Progressing     Problem: Prexisting or High Potential for Compromised Skin Integrity  Goal: Skin integrity is maintained or improved  Description: INTERVENTIONS:  - Identify patients at risk for skin breakdown  - Assess and monitor skin integrity  - Assess and monitor nutrition and hydration status  - Monitor labs   - Assess for incontinence   - Turn and reposition patient  - Assist with mobility/ambulation  - Relieve pressure over bony prominences  - Avoid friction and shearing  - Provide appropriate hygiene as needed including keeping skin clean and dry  - Evaluate need for skin moisturizer/barrier cream  - Collaborate with interdisciplinary team   - Patient/family teaching  - Consider wound care consult   Outcome: Progressing     Problem: PAIN - ADULT  Goal: Verbalizes/displays adequate comfort level or baseline comfort level  Description: Interventions:  - Encourage patient to monitor pain and request assistance  - Assess pain using appropriate pain scale  - Administer analgesics based on type and severity of pain and evaluate response  - Implement non-pharmacological measures as appropriate and evaluate response  - Consider cultural and social influences on pain and pain management  - Notify physician/advanced practitioner if interventions unsuccessful or patient reports new pain  Outcome: Progressing     Problem: INFECTION - ADULT  Goal: Absence or prevention of progression during hospitalization  Description: INTERVENTIONS:  - Assess and monitor for signs and symptoms of infection  - Monitor lab/diagnostic results  - Monitor all insertion sites, i e  indwelling lines, tubes, and drains  - Monitor endotracheal if appropriate and nasal secretions for changes in amount and color  - [place] appropriate cooling/warming therapies per order  - Administer medications as ordered  - Instruct and encourage patient and family to use good hand hygiene technique  - Identify and instruct in appropriate isolation precautions for identified infection/condition  Outcome: Progressing     Problem: SAFETY ADULT  Goal: Patient will remain free of falls  Description: INTERVENTIONS:  - Assess patient frequently for physical needs  -  Identify cognitive and physical deficits and behaviors that affect risk of falls    -  [place] fall precautions as indicated by assessment   - Educate patient/family on patient safety including physical limitations  - Instruct patient to call for assistance with activity based on assessment  - Modify environment to reduce risk of injury  - Consider OT/PT consult to assist with strengthening/mobility  Outcome: Progressing  Goal: Maintain or return to baseline ADL function  Description: INTERVENTIONS:  -  Assess patient's ability to carry out ADLs; assess patient's baseline for ADL function and identify physical deficits which impact ability to perform ADLs (bathing, care of mouth/teeth, toileting, grooming, dressing, etc )  - Assess/evaluate cause of self-care deficits   - Assess range of motion  - Assess patient's mobility; develop plan if impaired  - Assess patient's need for assistive devices and provide as appropriate  - Encourage maximum independence but intervene and supervise when necessary  - Involve family in performance of ADLs  - Assess for home care needs following discharge   - Consider OT consult to assist with ADL evaluation and planning for discharge  - Provide patient education as appropriate  Outcome: Progressing  Goal: Maintain or return mobility status to optimal level  Description: INTERVENTIONS:  - Assess patient's baseline mobility status (ambulation, transfers, stairs, etc )    - Identify cognitive and physical deficits and behaviors that affect mobility  - Identify mobility aids required to assist with transfers and/or ambulation (gait belt, sit-to-stand, lift, walker, cane, etc )  - [place] fall precautions as indicated by assessment  - Record patient progress and toleration of activity level on Mobility SBAR; progress patient to next Phase/Stage  - Instruct patient to call for assistance with activity based on assessment  - Consider rehabilitation consult to assist with strengthening/weightbearing, etc   Outcome: Progressing     Problem: DISCHARGE PLANNING  Goal: Discharge to home or other facility with appropriate resources  Description: INTERVENTIONS:  - Identify barriers to discharge w/patient and caregiver  - Arrange for needed discharge resources and transportation as appropriate  - Identify discharge learning needs (meds, wound care, etc )  - Arrange for interpretive services to assist at discharge as needed  - Refer to Case Management Department for coordinating discharge planning if the patient needs post-hospital services based on physician/advanced practitioner order or complex needs related to functional status, cognitive ability, or social support system  Outcome: Progressing     Problem: Knowledge Deficit  Goal: Patient/family/caregiver demonstrates understanding of disease process, treatment plan, medications, and discharge instructions  Description: Complete learning assessment and assess knowledge base    Interventions:  - Provide teaching at level of understanding  - Provide teaching via preferred learning methods  Outcome: Progressing     Problem: Nutrition/Hydration-ADULT  Goal: Nutrient/Hydration intake appropriate for improving, restoring or maintaining nutritional needs  Description: Monitor and assess patient's nutrition/hydration status for malnutrition  Collaborate with interdisciplinary team and initiate plan and interventions as ordered  Monitor patient's weight and dietary intake as ordered or per policy  Utilize nutrition screening tool and intervene as necessary  Determine patient's food preferences and provide high-protein, high-caloric foods as appropriate       INTERVENTIONS:  - Monitor oral intake, urinary output, labs, and treatment plans  - Assess nutrition and hydration status and recommend course of action  - Evaluate amount of meals eaten  - Assist patient with eating if necessary   - Allow adequate time for meals  - Recommend/ encourage appropriate diets, oral nutritional supplements, and vitamin/mineral supplements  - Order, calculate, and assess calorie counts as needed  - Recommend, monitor, and adjust tube feedings and TPN/PPN based on assessed needs  - Assess need for intravenous fluids  - Provide specific nutrition/hydration education as appropriate  - Include patient/family/caregiver in decisions related to nutrition  Outcome: Progressing     Problem: RESPIRATORY - ADULT  Goal: Achieves optimal ventilation and oxygenation  Description: INTERVENTIONS:  - Assess for changes in respiratory status  - Assess for changes in mentation and behavior  - Position to facilitate oxygenation and minimize respiratory effort  - Oxygen administered by appropriate delivery if ordered  - Initiate smoking cessation education as indicated  - Encourage broncho-pulmonary hygiene including cough, deep breathe, Incentive Spirometry  - Assess the need for suctioning and aspirate as needed  - Assess and instruct to report SOB or any respiratory difficulty  - Respiratory Therapy support as indicated  Outcome: Progressing     Problem: GENITOURINARY - ADULT  Goal: Urinary catheter remains patent  Description: INTERVENTIONS:  - Assess patency of urinary catheter  - If patient has a chronic aj, consider changing catheter if non-functioning  - Follow guidelines for intermittent irrigation of non-functioning urinary catheter  Outcome: Progressing     Problem: SKIN/TISSUE INTEGRITY - ADULT  Goal: Skin integrity remains intact  Description: INTERVENTIONS  - Identify patients at risk for skin breakdown  - Assess and monitor skin integrity  - Assess and monitor nutrition and hydration status  - Monitor labs (i e  albumin)  - Assess for incontinence   - Turn and reposition patient  - Assist with mobility/ambulation  - Relieve pressure over bony prominences  - Avoid friction and shearing  - Provide appropriate hygiene as needed including keeping skin clean and dry  - Evaluate need for skin moisturizer/barrier cream  - Collaborate with interdisciplinary team (i e  Nutrition, Rehabilitation, etc )   - Patient/family teaching  Outcome: Progressing

## 2020-12-31 NOTE — ASSESSMENT & PLAN NOTE
Louie removed this morning however she failed urinary retention protocol  Louie had to be reinserted  She also had minimal urine output during the day  Louie will also be used for accurate I's and O's

## 2020-12-31 NOTE — PHYSICAL THERAPY NOTE
PT Treatment Note        12/31/20 1214   Note Type   Note Type Treatment   Pain Assessment   Pain Assessment Tool FLACC   Pain Score No Pain   Pain Rating: FLACC (Rest) - Face 0   Pain Rating: FLACC (Rest) - Legs 0   Pain Rating: FLACC (Rest) - Activity 0   Pain Rating: FLACC (Rest) - Cry 0   Pain Rating: FLACC (Rest) - Consolability 0   Score: FLACC (Rest) 0   Pain Rating: FLACC (Activity) - Face 0   Pain Rating: FLACC (Activity) - Legs 0   Pain Rating: FLACC (Activity) - Activity 0   Pain Rating: FLACC (Activity) - Cry 0   Pain Rating: FLACC (Activity) - Consolability 0   Score: FLACC (Activity) 0   Restrictions/Precautions   Weight Bearing Precautions Per Order No   Other Precautions Contact/isolation; Airborne/isolation;Cognitive; Impulsive;Hard of hearing; Fall Risk;O2;Bed Alarm   General   Chart Reviewed Yes   Response to Previous Treatment Patient unable to report, no changes reported from family or staff   Family/Caregiver Present No   Cognition   Overall Cognitive Status Impaired   Arousal/Participation Alert   Attention Difficulty attending to directions   Orientation Level Oriented to person;Disoriented to place; Disoriented to time;Disoriented to situation   Memory Decreased short term memory;Decreased recall of recent events;Decreased recall of precautions   Following Commands Follows one step commands inconsistently   Comments [place]  Constant redirection with mobility  Pt  inconsistently follows cues  Subjective   Subjective "I want to sleep", "I have to go to the bathroom"   Bed Mobility   Rolling R 4  Minimal assistance   Additional items Assist x 1;HOB elevated; Bedrails;Leg ; Increased time required;Verbal cues   Rolling L 4  Minimal assistance   Additional items Assist x 1;HOB elevated; Increased time required;Verbal cues   Supine to Sit 3  Moderate assistance   Additional items Assist x 1; Increased time required;Verbal cues;LE management;HOB elevated   Sit to Supine 4  Minimal assistance Additional items Assist x 1; Increased time required;Verbal cues;LE management   Additional Comments Pt  completed supine to sit transfer x 2 reps with PT due to having to get increased assist for OOB mobility and pt  ? with safety sitting EOB  Pt  initially on 15 L via NC and destated to 82%  Placed pt  on nonrebreather per nursing and returned to 92%  Pt  in restraints upon entering room and when removed required constant cues to leave nonrebreather mask intact  During mobility with nonrebreather /NC at 15 LO2 pt  SPO2 dropped to 86% throughout without significant s/sx of SOB  Pt   returned BTB s/p with Min A x2  Pt  able to sit EOB x 5 minutes on initial sit up but required consistent cues to remain sitting vs attempting OOB movement  Transfers   Sit to Stand 4  Minimal assistance   Additional items Increased time required;Verbal cues; Assist x 1  (SBA x 1 in addition due to safety; B/L UE support)   Stand to Sit 4  Minimal assistance   Additional items Assist x 2; Increased time required;Verbal cues   Toilet transfer 4  Minimal assistance  (commode)   Additional items Assist x 2; Increased time required;Verbal cues   Additional Comments Recommend A x2 for OOB mobility and transfers at this time  During sitting on commode required pillow posterior and CGAx1 due to L lean at times, pt  able to correct with cues  Ambulation/Elevation   Gait pattern Decreased foot clearance; Short stride; Excessively slow; Improper Weight shift; Inconsistent kristopher;Narrow VLADIMIR   Gait Assistance 3  Moderate assist   Additional items Assist x 2; Tactile cues; Verbal cues   Assistive Device   (UE support )   Distance 3' lateral stepping  Pt  with inconsistent command following during session     Balance   Static Sitting Fair -   Dynamic Sitting Poor +   Static Standing Poor   Dynamic Standing Poor -   Ambulatory Poor -   Endurance Deficit   Endurance Deficit Yes   Endurance Deficit Description fatigue/weakness   Activity Tolerance Activity Tolerance Patient limited by pain   Nurse Made Aware yes   Exercises   Hip Flexion AAROM;20 reps; Supine   Hip Abduction Supine;10 reps;Bilateral;AAROM  (pt resistent at times)   Knee AROM 20 reps; Supine;Bilateral  (flexion/extension)   Ankle Pumps Supine;20 reps;AAROM; Bilateral   Assessment   Prognosis Poor   Problem List Decreased strength;Decreased endurance; Impaired balance;Decreased mobility; Decreased cognition;Decreased coordination;Decreased safety awareness; Impaired hearing   Assessment Pt seen for PT treatment session this date with interventions consisting of gait training w/ emphasis on improving pt's ability to ambulate level surfaces x 2-3 steps lateral with mod A of 2 provided by therapist with B/L UE support, Therapeutic exercise consisting of: AAROM and due to pt  inability to follow cues for exercises but muscle activation noted 20 reps B LE in supine position and therapeutic activity consisting of training: bed mobility, supine<>sit transfers, sit<>stand transfers, static sitting tolerance at EOB for 5 minutes w/ B/L UE support, stand pivot transfers towards L/R direction and commode transfer with Min A x2  Pt agreeable to PT treatment session upon arrival, pt found supine in bed w/ HOB elevated, in no apparent distress  In comparison to previous session, pt with improvements in good participation with mobility; poor command following/significant safety concerns  Post session: pt returned BTB, 3 side rails up, all needs in reach and RN notified of session findings/recommendations Continue to recommend post acute rehabilitation at time of d/c in order to maximize pt's functional independence and safety w/ mobility  Pt continues to be functioning below baseline level, and remains limited 2* factors listed above and including strength deficits, reduced activity tolerance, reduced functional mobility, impaired balance   PT will continue to see pt while here in order to address the deficits listed above and provide interventions consistent w/ POC in effort to achieve STGs  Barriers to Discharge Decreased caregiver support; Inaccessible home environment   Goals   Patient Goals to go to the bathroom   STG Expiration Date 01/10/21   Short Term Goal #1 1  Pt will complete bed mobility with supervision to increase functional mobility  2  Pt will complete sit to stand transfers with supervision to increase functional mobility  3  Pt will ambulate 150 ft with RW with supervision without LOB 4  Pt will increase B/L LE strength by 1 grade to facilitate improved functional mobility with decreased risk of falls  5  Pt will increase standing balance to fair in order to decrease risk of falls  6  Pt  Will tolerate 30 minutes of upright physical activity to increase participation with ADLs and functional mobility  PT Treatment Day 3   Plan   Treatment/Interventions LE strengthening/ROM; Functional transfer training; Therapeutic exercise; Endurance training;Bed mobility;Gait training;Equipment eval/education;Patient/family training;Spoke to nursing   Progress Slow progress, cognitive deficits   PT Frequency   (3-5x/wk)   Recommendation   PT Discharge Recommendation Post-Acute Rehabilitation Services   Equipment Recommended   (walker vs W/C)   PT - OK to Discharge Yes   Additional Comments to rehab when medically stable   Maria T Miller, BLAKE

## 2020-12-31 NOTE — NURSING NOTE
Pt aj cath removed at 0550  Pt tolerated fairly well  Restrains taken off and reapplied  Fluids given to pt  Call bell is within reach  Will continue to monitor and pass on to day shift RN

## 2020-12-31 NOTE — PROGRESS NOTES
Progress Note - Jabari Galeano 1935, 80 y o  female MRN: [id number]    Unit/Bed#: Yossi Hernandez 2 Lu[address] Campos 87 221-01 Encounter: [id number]    Primary Care Provider: Neno Estrella MD   Date and time admitted to hospital: 12/18/2020  5:50 PM        * Acute respiratory failure due to COVID-19 [facility])  Assessment & Plan  Continues to have high oxygen requirements including mid flow nasal cannula and non-rebreather  Wean down as tolerated  Code status is now DNR DNI    Pneumonia due to COVID-19 virus  Assessment & Plan  · Completed initial 10 days of dexamethasone  However due to persistently increased O2 requirement, will continue and will ask Pulmonary to revisit  · Completed 5 days of remdesivir  · Ongoing treatment with Lipitor, vitamin-D, heparin and multivitamin    Acute metabolic encephalopathy  Assessment & Plan  · Acute metabolic encephalopathy secondary to COVID-19, hypernatremia on top of underlying cognitive decline  · Monitor  · Continue assistance when feeding  · Diet changed to pureed with nectar thickened lid    Hypernatremia  Assessment & Plan  Secondary to poor oral intake  Sodium is slowly creeping up  Continue diet as tolerated  Start half-normal saline 75 mL/hour  Recheck labs in a m      Bipolar 1 disorder (HCC)  Assessment & Plan  · With metabolic encephalopathy due to acute illness, hypernatremia, hypoxia  · Continue Seroquel 400 mg at bedtime, Topamax 50 mg b i d , Lamictal 200 mg daily    Hyperlipidemia  Assessment & Plan  · Continue Lipitor 40 mg daily     Black tongue  Assessment & Plan  · Noted to have black upper surface of the tongue  · Without beefy discoloration or enlargement  · Suspect this is due to acute illness/medications such as ferrous sulfate/and debris    Urinary retention  Assessment & Plan  Louie removed this morning however she failed urinary retention protocol  Louie had to be reinserted  She also had minimal urine output during the day  Louie will also be used for accurate I's and O's    Anemia, macrocytic  Assessment & Plan  · Improved  · Hemoglobin now close to normal  · Continue vitamin supplements and iron    Results from last 7 days   Lab Units 20  0600 20  0431 20  0907 20  0613 20  0633 20  0940   HEMOGLOBIN g/dL 11 1* 11 6 11 8 10 7* 11 0* 11 1*       VTE Pharmacologic Prophylaxis:   Pharmacologic: Enoxaparin (Lovenox)  Mechanical VTE Prophylaxis in Place: Yes    Patient Centered Rounds: Discussed with her nurse    Discussions with Specialists or Other Care Team Provider: Palliative    Education and Discussions with Family / Patient:  Spoke with son Latoya Cisneros and gave update    Time Spent for Care: 25 minutes  More than 50% of total time spent on counseling and coordination of care as described above  Current Length of Stay: 13 day(s)    Current Patient Status: Inpatient   Certification Statement: The patient will continue to require additional inpatient hospital stay due to [place]    Discharge Plan: not ready for DC    Code Status: Level 3 - DNAR and DNI    Subjective:   Patient is again back on mid flow and non-rebreather at the time of my exam  She complained of thirst and wanted water  I had to tell her that she could not drink regular water and she could drink nectar instead    Objective:     Vitals:   Temp (24hrs), Av 9 °F (36 6 °C), Min:97 4 °F (36 3 °C), Max:98 5 °F (36 9 °C)    Temp:  [97 4 °F (36 3 °C)-98 5 °F (36 9 °C)] 98 1 °F (36 7 °C)  HR:  [] 115  Resp:  [16-20] 18  BP: (136-149)/(82-87) 136/82  SpO2:  [77 %-97 %] 89 %  Body mass index is 19 76 kg/m²  Input and Output Summary (last 24 hours): Intake/Output Summary (Last 24 hours) at 2020 1807  Last data filed at 2020 0501  Gross per 24 hour   Intake 180 ml   Output 310 ml   Net -130 ml       Physical Exam:     Physical Exam  Constitutional:       Appearance: She is ill-appearing        Comments: Cachectic   HENT:      Head: Normocephalic and atraumatic  Comments: Temporal wasting     Mouth/Throat:      Comments: Dry mouth and tongue  Blackish discoloration of the surface of the tongue without beefy redness  Neck:      Musculoskeletal: Neck supple  Cardiovascular:      Rate and Rhythm: Regular rhythm  Heart sounds: Murmur present  Pulmonary:      Comments: Poor effort  Coarse breath sounds  Abdominal:      General: Abdomen is flat  Palpations: Abdomen is soft  Musculoskeletal:      Right lower leg: No edema  Left lower leg: No edema  Skin:     General: Skin is warm and dry  Neurological:      Mental Status: She is alert  Mental status is at baseline  Psychiatric:         Mood and Affect: Mood normal          Behavior: Behavior normal      Additional Data:     Labs:    Results from last 7 days   Lab Units 12/31/20  0600   WBC Thousand/uL 11 15*   HEMOGLOBIN g/dL 11 1*   HEMATOCRIT % 36 3   PLATELETS Thousands/uL 601*   NEUTROS PCT % 77*   LYMPHS PCT % 11*   MONOS PCT % 10   EOS PCT % 0     Results from last 7 days   Lab Units 12/31/20  0600   SODIUM mmol/L 145   POTASSIUM mmol/L 3 8   CHLORIDE mmol/L 110*   CO2 mmol/L 29   BUN mg/dL 33*   CREATININE mg/dL 0 88   ANION GAP mmol/L 6   CALCIUM mg/dL 8 8   ALBUMIN g/dL 2 3*   TOTAL BILIRUBIN mg/dL 0 40   ALK PHOS U/L 86   ALT U/L 39   AST U/L 43   GLUCOSE RANDOM mg/dL 131         Results from last 7 days   Lab Units 12/29/20  2103   POC GLUCOSE mg/dl 233*         Results from last 7 days   Lab Units 12/27/20  0445 12/26/20  1139   PROCALCITONIN ng/ml <0 05 0 05           * I Have Reviewed All Lab Data Listed Above  * Additional Pertinent Lab Tests Reviewed:  All Labs Within Last 24 Hours Reviewed    Imaging:    Imaging Reports Reviewed Today Include:  X-ray      Recent Cultures (last 7 days):           Last 24 Hours Medication List:   Current Facility-Administered Medications   Medication Dose Route Frequency Provider Last Rate    acetaminophen  650 mg Oral Q6H PRN Berton Sinks DAISY Hernández      atorvastatin  40 mg Oral HS Azalia Pruitt PA-C      cholecalciferol  2,000 Units Oral Daily Azalia Hernández PA-C      clonazePAM  0 5 mg Oral BID Karen Campos PA-C      dexamethasone  6 mg Intravenous Daily Mercedes Sol MD      dicyclomine  10 mg Oral TID PRN Valeria Bland DO      famotidine  20 mg Oral Daily Azalia Hernández PA-C      ferrous sulfate  325 mg Oral Daily With Breakfast Michelle Lemme, DO      heparin (porcine)  5,000 Units Subcutaneous [facility] Azalia Hernández PA-C      lamoTRIgine  200 mg Oral Daily Before Breakfast Azalia Hernández PA-C      multivitamin-minerals  1 tablet Oral Daily Azalia Hernández PA-C      ondansetron  4 mg Intravenous Q6H PRN Karen Campos PA-C      oxybutynin  10 mg Oral Daily Azalia Hernández PA-C      pantoprazole  40 mg Oral Early Morning Azalia Hernández PA-C      QUEtiapine  400 mg Oral HS Azalia Hernández PA-C      senna-docusate sodium  1 tablet Oral BID Valeria Bland DO      sodium chloride  75 mL/hr Intravenous Continuous Mercedes Sol MD 75 mL/hr (12/31/20 9035)    topiramate  50 mg Oral BID Karen Campos PA-C          Today, Patient Was Seen By: Mercedes Sol MD    ** Please Note: Dictation voice to text software may have been used in the creation of this document   **

## 2020-12-31 NOTE — ASSESSMENT & PLAN NOTE
Continues to have high oxygen requirements including mid flow nasal cannula and non-rebreather  Wean down as tolerated  Code status is now DNR DNI

## 2020-12-31 NOTE — PLAN OF CARE
Problem: Potential for Falls  Goal: Patient will remain free of falls  Description: INTERVENTIONS:  - Assess patient frequently for physical needs  -  Identify cognitive and physical deficits and behaviors that affect risk of falls    -  [place] fall precautions as indicated by assessment   - Educate patient/family on patient safety including physical limitations  - Instruct patient to call for assistance with activity based on assessment  - Modify environment to reduce risk of injury  - Consider OT/PT consult to assist with strengthening/mobility  12/31/2020 0945 by Isac Bass RN  Outcome: Progressing  12/31/2020 0943 by Isac Bass RN  Outcome: Progressing     Problem: Prexisting or High Potential for Compromised Skin Integrity  Goal: Skin integrity is maintained or improved  Description: INTERVENTIONS:  - Identify patients at risk for skin breakdown  - Assess and monitor skin integrity  - Assess and monitor nutrition and hydration status  - Monitor labs   - Assess for incontinence   - Turn and reposition patient  - Assist with mobility/ambulation  - Relieve pressure over bony prominences  - Avoid friction and shearing  - Provide appropriate hygiene as needed including keeping skin clean and dry  - Evaluate need for skin moisturizer/barrier cream  - Collaborate with interdisciplinary team   - Patient/family teaching  - Consider wound care consult   12/31/2020 0945 by Isac Bass RN  Outcome: Progressing  12/31/2020 0943 by Isac Bass RN  Outcome: Progressing     Problem: PAIN - ADULT  Goal: Verbalizes/displays adequate comfort level or baseline comfort level  Description: Interventions:  - Encourage patient to monitor pain and request assistance  - Assess pain using appropriate pain scale  - Administer analgesics based on type and severity of pain and evaluate response  - Implement non-pharmacological measures as appropriate and evaluate response  - Consider cultural and social influences on pain and pain management  - Notify physician/advanced practitioner if interventions unsuccessful or patient reports new pain  12/31/2020 0945 by Janae Gan RN  Outcome: Progressing  12/31/2020 0943 by Janae Gan RN  Outcome: Progressing     Problem: INFECTION - ADULT  Goal: Absence or prevention of progression during hospitalization  Description: INTERVENTIONS:  - Assess and monitor for signs and symptoms of infection  - Monitor lab/diagnostic results  - Monitor all insertion sites, i e  indwelling lines, tubes, and drains  - Monitor endotracheal if appropriate and nasal secretions for changes in amount and color  - [place] appropriate cooling/warming therapies per order  - Administer medications as ordered  - Instruct and encourage patient and family to use good hand hygiene technique  - Identify and instruct in appropriate isolation precautions for identified infection/condition  12/31/2020 0945 by Janae Gan RN  Outcome: Progressing  12/31/2020 0943 by Janae Gan RN  Outcome: Progressing     Problem: SAFETY ADULT  Goal: Patient will remain free of falls  Description: INTERVENTIONS:  - Assess patient frequently for physical needs  -  Identify cognitive and physical deficits and behaviors that affect risk of falls    -  [place] fall precautions as indicated by assessment   - Educate patient/family on patient safety including physical limitations  - Instruct patient to call for assistance with activity based on assessment  - Modify environment to reduce risk of injury  - Consider OT/PT consult to assist with strengthening/mobility  12/31/2020 0945 by Janae Gan RN  Outcome: Progressing  12/31/2020 0943 by Janae Gan RN  Outcome: Progressing  Goal: Maintain or return to baseline ADL function  Description: INTERVENTIONS:  -  Assess patient's ability to carry out ADLs; assess patient's baseline for ADL function and identify physical deficits which impact ability to perform ADLs (bathing, care of mouth/teeth, toileting, grooming, dressing, etc )  - Assess/evaluate cause of self-care deficits   - Assess range of motion  - Assess patient's mobility; develop plan if impaired  - Assess patient's need for assistive devices and provide as appropriate  - Encourage maximum independence but intervene and supervise when necessary  - Involve family in performance of ADLs  - Assess for home care needs following discharge   - Consider OT consult to assist with ADL evaluation and planning for discharge  - Provide patient education as appropriate  12/31/2020 0945 by Jim Gleason RN  Outcome: Progressing  12/31/2020 0943 by Jim Gleason RN  Outcome: Progressing  Goal: Maintain or return mobility status to optimal level  Description: INTERVENTIONS:  - Assess patient's baseline mobility status (ambulation, transfers, stairs, etc )    - Identify cognitive and physical deficits and behaviors that affect mobility  - Identify mobility aids required to assist with transfers and/or ambulation (gait belt, sit-to-stand, lift, walker, cane, etc )  - [place] fall precautions as indicated by assessment  - Record patient progress and toleration of activity level on Mobility SBAR; progress patient to next Phase/Stage  - Instruct patient to call for assistance with activity based on assessment  - Consider rehabilitation consult to assist with strengthening/weightbearing, etc   12/31/2020 0945 by Jim Gleason RN  Outcome: Progressing  12/31/2020 0943 by Jim Gleason RN  Outcome: Progressing     Problem: DISCHARGE PLANNING  Goal: Discharge to home or other facility with appropriate resources  Description: INTERVENTIONS:  - Identify barriers to discharge w/patient and caregiver  - Arrange for needed discharge resources and transportation as appropriate  - Identify discharge learning needs (meds, wound care, etc )  - Arrange for interpretive services to assist at discharge as needed  - Refer to Case Management Department for coordinating discharge planning if the patient needs post-hospital services based on physician/advanced practitioner order or complex needs related to functional status, cognitive ability, or social support system  12/31/2020 0945 by Desire Escalera RN  Outcome: Progressing  12/31/2020 0943 by Desire Escalera RN  Outcome: Progressing     Problem: Knowledge Deficit  Goal: Patient/family/caregiver demonstrates understanding of disease process, treatment plan, medications, and discharge instructions  Description: Complete learning assessment and assess knowledge base    Interventions:  - Provide teaching at level of understanding  - Provide teaching via preferred learning methods  12/31/2020 0945 by Desire Escalera RN  Outcome: Progressing  12/31/2020 0943 by Desire Escalera RN  Outcome: Progressing     Problem: RESPIRATORY - ADULT  Goal: Achieves optimal ventilation and oxygenation  Description: INTERVENTIONS:  - Assess for changes in respiratory status  - Assess for changes in mentation and behavior  - Position to facilitate oxygenation and minimize respiratory effort  - Oxygen administered by appropriate delivery if ordered  - Initiate smoking cessation education as indicated  - Encourage broncho-pulmonary hygiene including cough, deep breathe, Incentive Spirometry  - Assess the need for suctioning and aspirate as needed  - Assess and instruct to report SOB or any respiratory difficulty  - Respiratory Therapy support as indicated  12/31/2020 0945 by Desire Escalera RN  Outcome: Progressing  12/31/2020 [facility] by Desire Escalera RN  Outcome: Progressing

## 2020-12-31 NOTE — SPEECH THERAPY NOTE
Speech Language/Pathology    Speech/Language Pathology Progress Note    Patient Name: Desirae Washington  YDJDT'P Date: 12/31/2020     Problem List  Principal Problem:    Acute respiratory failure due to COVID-19 [facility])  Active Problems:    Hyperlipidemia    Bipolar 1 disorder ([facility] )    Acute metabolic encephalopathy    Anemia, macrocytic    Hypernatremia    Pneumonia due to COVID-19 virus    Urinary retention       Past Medical History  Past Medical History:   Diagnosis Date    Anemia 2/26/2019    Anxiety     Bipolar 1 disorder ([place] [address] )     Depression     DVT (deep venous thrombosis) ([place] [address] ) 2008    Left leg    GERD (gastroesophageal reflux disease)     Hypertension     Overactive bladder         Past Surgical History  Past Surgical History:   Procedure Laterality Date    ADENOIDECTOMY      CATARACT EXTRACTION Bilateral     Right 10/2003, left 4/2004    CHOLECYSTECTOMY      DILATION AND CURETTAGE OF UTERUS  1985    HERNIA REPAIR  11/02/2007    Femoral and small bowel resection    HIP SURGERY      L hip    TONSILLECTOMY      As a youth    WRIST SURGERY      L wrist         Subjective:  "I have to pee"  (pt has catheter)  Objective:  Pt seen for po tolerance and further recommendations  On mfnc, has been on and off additional  nrb, sometimes tolerates and sometimes does not  When feeding her lunch she desatted to 80 periodically and nrb had to be placed back on  She was able to tolerate tsps of nectar thick water, ~ 2 ounces of the mighty shake by tsp  Lip seal was weak and she was unable to get the magic cup off of the spoon (more dense)  Ability to take pureed pear off of the spoon was inconsistent  transfer was mildly delayed  Control was mildly impaired  No obvious residue  Swallows were prompt to mildly delayed  Laryngeal rise appeared weak  Cough x 2 on final 2 tsps of nectar thick water and pt kept stating "no more no more"  Overall had less than 25%     Assessment:  Consumed 25% or less of puree/nectar thick at lunch today  (Nurse reported she ate all of her cream of wheat in am), needed non-rebreather placed on periodically throughout meal due to desatting to 80  Must be fed  Plan/Recommendations:  Puree w/ nectar thick by tsp as overall status allows  Must be fed  NRB in between bites as needed

## 2020-12-31 NOTE — PLAN OF CARE
Problem: PHYSICAL THERAPY ADULT  Goal: Performs mobility at highest level of function for planned discharge setting  See evaluation for individualized goals  Description: Treatment/Interventions: Functional transfer training, LE strengthening/ROM, Therapeutic exercise, Endurance training, Patient/family training, Cognitive reorientation, Equipment eval/education, Bed mobility, Gait training, Spoke to nursing, Spoke to case management, OT, Elevations  Equipment Recommended: Angel Scripture       See flowsheet documentation for full assessment, interventions and recommendations  Outcome: Progressing  Note: Prognosis: Poor  Problem List: Decreased strength, Decreased endurance, Impaired balance, Decreased mobility, Decreased cognition, Decreased coordination, Decreased safety awareness, Impaired hearing  Assessment: Pt seen for PT treatment session this date with interventions consisting of gait training w/ emphasis on improving pt's ability to ambulate level surfaces x 2-3 steps lateral with mod A of 2 provided by therapist with B/L UE support, Therapeutic exercise consisting of: AAROM and due to pt  inability to follow cues for exercises but muscle activation noted 20 reps B LE in supine position and therapeutic activity consisting of training: bed mobility, supine<>sit transfers, sit<>stand transfers, static sitting tolerance at EOB for 5 minutes w/ B/L UE support, stand pivot transfers towards L/R direction and commode transfer with Min A x2  Pt agreeable to PT treatment session upon arrival, pt found supine in bed w/ HOB elevated, in no apparent distress  In comparison to previous session, pt with improvements in good participation with mobility; poor command following/significant safety concerns   Post session: pt returned BTB, 3 side rails up, all needs in reach and RN notified of session findings/recommendations Continue to recommend post acute rehabilitation at time of d/c in order to maximize pt's functional independence and safety w/ mobility  Pt continues to be functioning below baseline level, and remains limited 2* factors listed above and including strength deficits, reduced activity tolerance, reduced functional mobility, impaired balance  PT will continue to see pt while here in order to address the deficits listed above and provide interventions consistent w/ POC in effort to achieve STGs  Barriers to Discharge: Decreased caregiver support, Inaccessible home environment  Barriers to Discharge Comments: Level of support at home  PT Discharge Recommendation: Post-Acute Rehabilitation Services     PT - OK to Discharge: Yes    See flowsheet documentation for full assessment

## 2020-12-31 NOTE — OCCUPATIONAL THERAPY NOTE
Occupational Therapy Treatment Note:         12/31/20 1055   OT Last Visit   OT Visit Date 12/31/20   Note Type   Note Type Treatment   Restrictions/Precautions   Weight Bearing Precautions Per Order No   Other Precautions Fall Risk;Pain;Cognitive; Chair Alarm; Bed Alarm;O2   Pain Assessment   Pain Assessment Tool Pain Assessment not indicated - pt denies pain   Pain Score No Pain   Cognition   Overall Cognitive Status Impaired   Arousal/Participation Lethargic   Attention Difficulty attending to directions   Orientation Level Oriented to person;Disoriented to place; Disoriented to time;Disoriented to situation   Memory Decreased short term memory;Decreased recall of recent events;Decreased recall of precautions   Following Commands Follows one step commands with increased time or repetition   Comments Pt without appropriate communication  Off topic verbalization  Additional Activities   Additional Activities Other (Comment)  (attmepted to review basic orientation  )   Additional Activities Comments Pt without recognition of communication  Activity Tolerance   Activity Tolerance Patient limited by fatigue   Medical Staff Made Aware reported all findings to nursing staff   Assessment   Assessment Pt was seen for skilled OT with focus on completion of BUE ROM exercises, basic orientation and review of current plan of care  See above levels of A required for all functional tasks  Pt with limited focus to tasks keeping eyes closes with off topic verbalization  Difficulty to redirect to Pt with appropriate focus to carry over simple commands  PROM to BUE completed with bed positioned as chair  Pt with eyes remaining closed through out activities  Pt may benefit from further rehab pending progression of current goals with focus on achieving optimal performance levels with all functional tasks  Continue to recommend Inpatient rehab   Plan   Treatment Interventions ADL retraining;UE strengthening/ROM; Functional transfer training; Endurance training;Cognitive reorientation   Goal Expiration Date 01/05/21   OT Treatment Day 2   OT Frequency 3-5x/wk   Recommendation   OT Discharge Recommendation Post-Acute Rehabilitation Services   OT - OK to Discharge Yes  (when medically cleared  )   Barthel Index   Feeding 5   Bathing 0   Grooming Score 0   Dressing Score 5   Bladder Score 5   Bowels Score 5   Toilet Use Score 5   Transfers (Bed/Chair) Score 10   Mobility (Level Surface) Score 0   Stairs Score 0   B[place] Index Score 35   Modified [place] Scale   Modified [place] Scale 4   [place], [place]

## 2020-12-31 NOTE — PROGRESS NOTES
Progress note - Palliative and Supportive Care   Joey Castelan 80 y o  female [id number]    Assessment:    -   Patient Active Problem List   Diagnosis    Depression    Gastroesophageal reflux disease    Hyperlipidemia    Essential hypertension    Tobacco dependence syndrome    Sciatica    Diarrhea    Bipolar 1 disorder ([facility] [address] )    Lower abdominal pain    Anemia    Severe protein-calorie malnutrition (HCC)    Vitamin B12 deficiency    Physical deconditioning    Chest pain in adult    Headache    Anxiety state    Acute on chronic cholecystitis    Disorder of gallbladder    Diverticulosis of colon    Umbilical hernia    Prophylactic antibiotic    OAB (overactive bladder)    Constipation    Acute respiratory failure due to COVID-19 (HCC)    Acute metabolic encephalopathy    Anemia, macrocytic    Hypernatremia    Pneumonia due to COVID-19 virus    Urinary retention       Plan:  1  Symptom management  · Per primary team    2  Goals  · Limits set at DNAR/DNI  · Per son Ayesha Rubio, if necessary they would pursue feeding tube (NG/PEG) tube placement  Code Status: DNAR/DNI - Level 3     Decisional apparatus:  Patient is not competent on my exam today  If competence is lost, patient's substitute decision maker would default to adult children by [place]  Advance Directive / Living Will / POLST:  None on file     Interval history:       Per chart review the patient's oxygen requirement had lessened and she had improved PO intake today  Called son Ayesha Rubio offered support  He asked questions about feeding tubes  He said he was surprised there were more questions to answer after the family has decided to change code status to DNR  Explained the clinical course of severe covid is typically long with steps forward and steps backward  Explained there may be more questions to be answered along the clinical course and that she is still very sick       Ayesha Kamaraariel states that he spoke with his sister, Mable Nix last night and they would want to pursue feeding tube insertion if necessary  Charlie Campbell states that his mom made these decisions for her father and that he was on a ventilator with a feeding tube for a year and he recovered  His mom verbalized that she was thankful that she agreed to these things for her father  Charlie Campbell uses this situation as a frame of reference when making decisions for his mom as she never spoke of her specific wishes  Will continue to follow  MEDICATIONS / ALLERGIES:     current meds:   Current Facility-Administered Medications   Medication Dose Route Frequency    acetaminophen (TYLENOL) tablet 650 mg  650 mg Oral Q6H PRN    atorvastatin (LIPITOR) tablet 40 mg  40 mg Oral HS    cholecalciferol (VITAMIN D3) tablet 2,000 Units  2,000 Units Oral Daily    clonazePAM (KlonoPIN) tablet 0 5 mg  0 5 mg Oral BID    dicyclomine (BENTYL) capsule 10 mg  10 mg Oral TID PRN    famotidine (PEPCID) tablet 20 mg  20 mg Oral Daily    ferrous sulfate tablet 325 mg  325 mg Oral Daily With Breakfast    heparin (porcine) subcutaneous injection 5,000 Units  5,000 Units Subcutaneous Q8H [facility]    lamoTRIgine (LaMICtal) tablet 200 mg  200 mg Oral Daily Before Breakfast    multivitamin-minerals (CENTRUM ADULTS) tablet 1 tablet  1 tablet Oral Daily    ondansetron (ZOFRAN) injection 4 mg  4 mg Intravenous Q6H PRN    oxybutynin (DITROPAN-XL) 24 hr tablet 10 mg  10 mg Oral Daily    pantoprazole (PROTONIX) EC tablet 40 mg  40 mg Oral Early Morning    QUEtiapine (SEROquel) tablet 400 mg  400 mg Oral HS    senna-docusate sodium (SENOKOT S) 8 6-50 mg per tablet 1 tablet  1 tablet Oral BID    topiramate (TOPAMAX) tablet 50 mg  50 mg Oral BID       Allergies   Allergen Reactions    Ethanol     Simvastatin        OBJECTIVE:    Physical Exam: exam from door:    Physical Exam  Vitals signs and nursing note reviewed  Neck:      Musculoskeletal: Normal range of motion     Pulmonary:      Effort: Pulmonary effort is normal    Neurological:      Mental Status: She is alert  She is disoriented  Psychiatric:         Attention and Perception: She is inattentive  Lab Results:   CBC:   Lab Results   Component Value Date    WBC 11 15 (H) 12/31/2020    HGB 11 1 (L) 12/31/2020    HCT 36 3 12/31/2020     (H) 12/31/2020     (H) 12/31/2020    MCH 30 9 12/31/2020    MCHC 30 6 (L) 12/31/2020    RDW 12 2 12/31/2020    MPV 9 4 12/31/2020    NRBC 0 12/31/2020   , CMP:   Lab Results   Component Value Date    SODIUM 145 12/31/2020    K 3 8 12/31/2020     (H) 12/31/2020    CO2 29 12/31/2020    BUN 33 (H) 12/31/2020    CREATININE 0 88 12/31/2020    CALCIUM 8 8 12/31/2020    AST 43 12/31/2020    ALT 39 12/31/2020    ALKPHOS 86 12/31/2020    EGFR 60 12/31/2020   , PT/PTT:No results found for: PT, PTT      Counseling / Coordination of Care    Total floor / unit time spent today 30+  minutes  Greater than 50% of total time was spent with the patient and / or family counseling and / or coordination of care  A description of the counseling / coordination of care: family support, encouraged expression, emotional support, goals of care

## 2020-12-31 NOTE — ASSESSMENT & PLAN NOTE
· Noted to have black upper surface of the tongue  · Without beefy discoloration or enlargement  · Suspect this is due to acute illness/medications such as ferrous sulfate/and debris

## 2020-12-31 NOTE — ASSESSMENT & PLAN NOTE
Secondary to poor oral intake  Sodium is slowly creeping up  Continue diet as tolerated  Start half-normal saline 75 mL/hour  Recheck labs in a m

## 2020-12-31 NOTE — PLAN OF CARE
Problem: OCCUPATIONAL THERAPY ADULT  Goal: Performs self-care activities at highest level of function for planned discharge setting  See evaluation for individualized goals  Description: Treatment Interventions: ADL retraining, Functional transfer training, UE strengthening/ROM, Endurance training, Patient/family training, Equipment evaluation/education, Compensatory technique education, Continued evaluation, Activityengagement, Energy conservation          See flowsheet documentation for full assessment, interventions and recommendations  Outcome: Progressing  Note: Limitation: Decreased ADL status, Decreased UE ROM, Decreased UE strength, Decreased cognition, Decreased Safe judgement during ADL, Decreased endurance, Decreased high-level ADLs, Decreased self-care trans  Prognosis: Fair  Assessment: Pt was seen for skilled OT with focus on completion of BUE ROM exercises, basic orientation and review of current plan of care  See above levels of A required for all functional tasks  Pt with limited focus to tasks keeping eyes closes with off topic verbalization  Difficulty to redirect to Pt with appropriate focus to carry over simple commands  PROM to BUE completed with bed positioned as chair  Pt with eyes remaining closed through out activities  Pt may benefit from further rehab pending progression of current goals with focus on achieving optimal performance levels with all functional tasks   Continue to recommend Inpatient rehab     OT Discharge Recommendation: Post-Acute Rehabilitation Services  OT - OK to Discharge: Yes(when medically cleared  )

## 2020-12-31 NOTE — ASSESSMENT & PLAN NOTE
· Improved  · Hemoglobin now close to normal  · Continue vitamin supplements and iron    Results from last 7 days   Lab Units 12/31/20  0600 12/30/20  0431 12/29/20  0907 12/28/20  0613 12/26/20  0633 12/25/20  0940   HEMOGLOBIN g/dL 11 1* 11 6 11 8 10 7* 11 0* 11 1*

## 2020-12-31 NOTE — PROGRESS NOTES
Pt with improved intake today per RN, eating fairly well, consumed mightyshake at B today  ST recommending pureed with NTLs as of 12/29 note, reflected in current diet order  Continuing with level 3 care at this time  Would benefit from current body wt, requested to RN over tiger text  Given tolerance to mightyshake and previous poor intake, will increase to TID  Maintain magic cup daily  No further diet restrictions at this time

## 2020-12-31 NOTE — ASSESSMENT & PLAN NOTE
· Completed initial 10 days of dexamethasone  However due to persistently increased O2 requirement, will continue and will ask Pulmonary to revisit    · Completed 5 days of remdesivir  · Ongoing treatment with Lipitor, vitamin-D, heparin and multivitamin

## 2021-01-01 PROCEDURE — 99232 SBSQ HOSP IP/OBS MODERATE 35: CPT | Performed by: INTERNAL MEDICINE

## 2021-01-01 RX ADMIN — HEPARIN SODIUM 5000 UNITS: 5000 INJECTION INTRAVENOUS; SUBCUTANEOUS at 22:09

## 2021-01-01 RX ADMIN — CLONAZEPAM 0.5 MG: 0.5 TABLET ORAL at 08:09

## 2021-01-01 RX ADMIN — ATORVASTATIN CALCIUM 40 MG: 40 TABLET, FILM COATED ORAL at 22:09

## 2021-01-01 RX ADMIN — FERROUS SULFATE TAB 325 MG (65 MG ELEMENTAL FE) 325 MG: 325 (65 FE) TAB at 08:09

## 2021-01-01 RX ADMIN — DEXAMETHASONE SODIUM PHOSPHATE 6 MG: 4 INJECTION INTRA-ARTICULAR; INTRALESIONAL; INTRAMUSCULAR; INTRAVENOUS; SOFT TISSUE at 08:09

## 2021-01-01 RX ADMIN — SODIUM CHLORIDE 75 ML/HR: 0.45 INJECTION, SOLUTION INTRAVENOUS at 17:42

## 2021-01-01 RX ADMIN — CLONAZEPAM 0.5 MG: 0.5 TABLET ORAL at 17:42

## 2021-01-01 RX ADMIN — Medication 1 TABLET: at 08:09

## 2021-01-01 RX ADMIN — FAMOTIDINE 20 MG: 20 TABLET ORAL at 08:09

## 2021-01-01 RX ADMIN — DOCUSATE SODIUM AND SENNOSIDES 1 TABLET: 8.6; 5 TABLET, FILM COATED ORAL at 17:42

## 2021-01-01 RX ADMIN — TOPIRAMATE 50 MG: 25 TABLET, FILM COATED ORAL at 08:09

## 2021-01-01 RX ADMIN — DOCUSATE SODIUM AND SENNOSIDES 1 TABLET: 8.6; 5 TABLET, FILM COATED ORAL at 08:09

## 2021-01-01 RX ADMIN — OXYBUTYNIN 10 MG: 10 TABLET, FILM COATED, EXTENDED RELEASE ORAL at 08:09

## 2021-01-01 RX ADMIN — HEPARIN SODIUM 5000 UNITS: 5000 INJECTION INTRAVENOUS; SUBCUTANEOUS at 14:20

## 2021-01-01 RX ADMIN — QUETIAPINE FUMARATE 400 MG: 200 TABLET ORAL at 22:09

## 2021-01-01 RX ADMIN — Medication 2000 UNITS: at 08:08

## 2021-01-01 RX ADMIN — TOPIRAMATE 50 MG: 25 TABLET, FILM COATED ORAL at 17:42

## 2021-01-01 NOTE — ASSESSMENT & PLAN NOTE
· Improved  · Hemoglobin now close to normal  · Continue vitamin supplements and iron    Results from last 7 days   Lab Units 12/31/20  0600 12/30/20  0431 12/29/20  0907 12/28/20  0613 12/26/20  0633   HEMOGLOBIN g/dL 11 1* 11 6 11 8 10 7* 11 0*

## 2021-01-01 NOTE — ASSESSMENT & PLAN NOTE
Secondary to poor oral intake  Sodium is slowly creeping up  Continue diet as tolerated  Continue half-normal saline 75 mL/hour  Recheck labs in a m

## 2021-01-01 NOTE — PLAN OF CARE
Problem: Potential for Falls  Goal: Patient will remain free of falls  Description: INTERVENTIONS:  - Assess patient frequently for physical needs  -  Identify cognitive and physical deficits and behaviors that affect risk of falls    -  [place] fall precautions as indicated by assessment   - Educate patient/family on patient safety including physical limitations  - Instruct patient to call for assistance with activity based on assessment  - Modify environment to reduce risk of injury  - Consider OT/PT consult to assist with strengthening/mobility  Outcome: Progressing     Problem: Prexisting or High Potential for Compromised Skin Integrity  Goal: Skin integrity is maintained or improved  Description: INTERVENTIONS:  - Identify patients at risk for skin breakdown  - Assess and monitor skin integrity  - Assess and monitor nutrition and hydration status  - Monitor labs   - Assess for incontinence   - Turn and reposition patient  - Assist with mobility/ambulation  - Relieve pressure over bony prominences  - Avoid friction and shearing  - Provide appropriate hygiene as needed including keeping skin clean and dry  - Evaluate need for skin moisturizer/barrier cream  - Collaborate with interdisciplinary team   - Patient/family teaching  - Consider wound care consult   Outcome: Progressing     Problem: PAIN - ADULT  Goal: Verbalizes/displays adequate comfort level or baseline comfort level  Description: Interventions:  - Encourage patient to monitor pain and request assistance  - Assess pain using appropriate pain scale  - Administer analgesics based on type and severity of pain and evaluate response  - Implement non-pharmacological measures as appropriate and evaluate response  - Consider cultural and social influences on pain and pain management  - Notify physician/advanced practitioner if interventions unsuccessful or patient reports new pain  Outcome: Progressing     Problem: INFECTION - ADULT  Goal: Absence or prevention of progression during hospitalization  Description: INTERVENTIONS:  - Assess and monitor for signs and symptoms of infection  - Monitor lab/diagnostic results  - Monitor all insertion sites, i e  indwelling lines, tubes, and drains  - Monitor endotracheal if appropriate and nasal secretions for changes in amount and color  - [place] appropriate cooling/warming therapies per order  - Administer medications as ordered  - Instruct and encourage patient and family to use good hand hygiene technique  - Identify and instruct in appropriate isolation precautions for identified infection/condition  Outcome: Progressing     Problem: SAFETY ADULT  Goal: Patient will remain free of falls  Description: INTERVENTIONS:  - Assess patient frequently for physical needs  -  Identify cognitive and physical deficits and behaviors that affect risk of falls    -  [place] fall precautions as indicated by assessment   - Educate patient/family on patient safety including physical limitations  - Instruct patient to call for assistance with activity based on assessment  - Modify environment to reduce risk of injury  - Consider OT/PT consult to assist with strengthening/mobility  Outcome: Progressing  Goal: Maintain or return to baseline ADL function  Description: INTERVENTIONS:  -  Assess patient's ability to carry out ADLs; assess patient's baseline for ADL function and identify physical deficits which impact ability to perform ADLs (bathing, care of mouth/teeth, toileting, grooming, dressing, etc )  - Assess/evaluate cause of self-care deficits   - Assess range of motion  - Assess patient's mobility; develop plan if impaired  - Assess patient's need for assistive devices and provide as appropriate  - Encourage maximum independence but intervene and supervise when necessary  - Involve family in performance of ADLs  - Assess for home care needs following discharge   - Consider OT consult to assist with ADL evaluation and planning for discharge  - Provide patient education as appropriate  Outcome: Progressing  Goal: Maintain or return mobility status to optimal level  Description: INTERVENTIONS:  - Assess patient's baseline mobility status (ambulation, transfers, stairs, etc )    - Identify cognitive and physical deficits and behaviors that affect mobility  - Identify mobility aids required to assist with transfers and/or ambulation (gait belt, sit-to-stand, lift, walker, cane, etc )  - [place] fall precautions as indicated by assessment  - Record patient progress and toleration of activity level on Mobility SBAR; progress patient to next Phase/Stage  - Instruct patient to call for assistance with activity based on assessment  - Consider rehabilitation consult to assist with strengthening/weightbearing, etc   Outcome: Progressing     Problem: DISCHARGE PLANNING  Goal: Discharge to home or other facility with appropriate resources  Description: INTERVENTIONS:  - Identify barriers to discharge w/patient and caregiver  - Arrange for needed discharge resources and transportation as appropriate  - Identify discharge learning needs (meds, wound care, etc )  - Arrange for interpretive services to assist at discharge as needed  - Refer to Case Management Department for coordinating discharge planning if the patient needs post-hospital services based on physician/advanced practitioner order or complex needs related to functional status, cognitive ability, or social support system  Outcome: Progressing     Problem: Knowledge Deficit  Goal: Patient/family/caregiver demonstrates understanding of disease process, treatment plan, medications, and discharge instructions  Description: Complete learning assessment and assess knowledge base    Interventions:  - Provide teaching at level of understanding  - Provide teaching via preferred learning methods  Outcome: Progressing     Problem: Nutrition/Hydration-ADULT  Goal: Nutrient/Hydration intake appropriate for improving, restoring or maintaining nutritional needs  Description: Monitor and assess patient's nutrition/hydration status for malnutrition  Collaborate with interdisciplinary team and initiate plan and interventions as ordered  Monitor patient's weight and dietary intake as ordered or per policy  Utilize nutrition screening tool and intervene as necessary  Determine patient's food preferences and provide high-protein, high-caloric foods as appropriate       INTERVENTIONS:  - Monitor oral intake, urinary output, labs, and treatment plans  - Assess nutrition and hydration status and recommend course of action  - Evaluate amount of meals eaten  - Assist patient with eating if necessary   - Allow adequate time for meals  - Recommend/ encourage appropriate diets, oral nutritional supplements, and vitamin/mineral supplements  - Order, calculate, and assess calorie counts as needed  - Recommend, monitor, and adjust tube feedings and TPN/PPN based on assessed needs  - Assess need for intravenous fluids  - Provide specific nutrition/hydration education as appropriate  - Include patient/family/caregiver in decisions related to nutrition  Outcome: Progressing     Problem: RESPIRATORY - ADULT  Goal: Achieves optimal ventilation and oxygenation  Description: INTERVENTIONS:  - Assess for changes in respiratory status  - Assess for changes in mentation and behavior  - Position to facilitate oxygenation and minimize respiratory effort  - Oxygen administered by appropriate delivery if ordered  - Initiate smoking cessation education as indicated  - Encourage broncho-pulmonary hygiene including cough, deep breathe, Incentive Spirometry  - Assess the need for suctioning and aspirate as needed  - Assess and instruct to report SOB or any respiratory difficulty  - Respiratory Therapy support as indicated  Outcome: Progressing     Problem: GENITOURINARY - ADULT  Goal: Urinary catheter remains patent  Description: INTERVENTIONS:  - Assess patency of urinary catheter  - If patient has a chronic aj, consider changing catheter if non-functioning  - Follow guidelines for intermittent irrigation of non-functioning urinary catheter  Outcome: Progressing     Problem: SKIN/TISSUE INTEGRITY - ADULT  Goal: Skin integrity remains intact  Description: INTERVENTIONS  - Identify patients at risk for skin breakdown  - Assess and monitor skin integrity  - Assess and monitor nutrition and hydration status  - Monitor labs (i e  albumin)  - Assess for incontinence   - Turn and reposition patient  - Assist with mobility/ambulation  - Relieve pressure over bony prominences  - Avoid friction and shearing  - Provide appropriate hygiene as needed including keeping skin clean and dry  - Evaluate need for skin moisturizer/barrier cream  - Collaborate with interdisciplinary team (i e  Nutrition, Rehabilitation, etc )   - Patient/family teaching  Outcome: Progressing     Problem: SAFETY,RESTRAINT: NV/NON-SELF DESTRUCTIVE BEHAVIOR  Goal: Remains free of harm/injury (restraint for non violent/non self-detsructive behavior)  Description: INTERVENTIONS:  - Instruct patient/family regarding restraint use   - Assess and monitor physiologic and psychological status   - Provide interventions and comfort measures to meet assessed patient needs   - Identify and implement measures to help patient regain control  - Assess readiness for release of restraint   Outcome: Progressing  Goal: Returns to optimal restraint-free functioning  Description: INTERVENTIONS:  - Assess the patient's behavior and symptoms that indicate continued need for restraint  - Identify and implement measures to help patient regain control  - Assess readiness for release of restraint   Outcome: Progressing

## 2021-01-01 NOTE — ASSESSMENT & PLAN NOTE
Oxygen requirements are better today  She is down to 11 L via mid flow (from 15 L mid flow and non-rebreather yesterday)  Wean down as tolerated  She is on her 2nd course of dexamethasone due to prolonged severe disease  Code status is now DNR DNI

## 2021-01-01 NOTE — ASSESSMENT & PLAN NOTE
· Acute metabolic encephalopathy secondary to COVID-19, hypernatremia on top of underlying cognitive decline  · Monitor  · Continue assistance when feeding  · Diet changed to pureed with nectar thickened liquid

## 2021-01-01 NOTE — ASSESSMENT & PLAN NOTE
· Completed initial 10 days of dexamethasone      · However due to persistently increased O2 requirement, she is started on her 2nd course of dexamethasone, currently day 12  · Completed 5 days of remdesivir  · Ongoing treatment with Lipitor, vitamin-D, heparin and multivitamin

## 2021-01-01 NOTE — ASSESSMENT & PLAN NOTE
· Noted to have black upper surface of the tongue 2 days ago, improved  · Without beefy discoloration or enlargement  · This is due to acute illness/medications such as ferrous sulfate/and cellular debris

## 2021-01-01 NOTE — ASSESSMENT & PLAN NOTE
Failed urinary retention protocol  She had multiple bladder scans with urinary retention measuring 325 mL, 375 mL and 410 mL  Louie catheter replaced today with significant amount of urine drained

## 2021-01-01 NOTE — PROGRESS NOTES
Progress Note - Lu Leyden 1935, 80 y o  female MRN: [id number]    [facility] [address] [facility] 221-01 Encounter: [id number]    Primary Care Provider: Saira Honeycutt MD   Date and time admitted to hospital: 12/18/2020  5:50 PM        * Acute respiratory failure due to COVID-19 [facility])  Assessment & Plan  Oxygen requirements are better today  She is down to 11 L via mid flow (from 15 L mid flow and non-rebreather yesterday)  Wean down as tolerated  She is on her 2nd course of dexamethasone due to prolonged severe disease  Code status is now DNR DNI    Pneumonia due to COVID-19 virus  Assessment & Plan  · Completed initial 10 days of dexamethasone  · However due to persistently increased O2 requirement, she is started on her 2nd course of dexamethasone, currently day 12  · Completed 5 days of remdesivir  · Ongoing treatment with Lipitor, vitamin-D, heparin and multivitamin    Acute metabolic encephalopathy  Assessment & Plan  · Acute metabolic encephalopathy secondary to COVID-19, hypernatremia on top of underlying cognitive decline  · Monitor  · Continue assistance when feeding  · Diet changed to pureed with nectar thickened liquid    Hypernatremia  Assessment & Plan  Secondary to poor oral intake  Sodium is slowly creeping up  Continue diet as tolerated  Continue half-normal saline 75 mL/hour  Recheck labs in a m      Bipolar 1 disorder (HCC)  Assessment & Plan  · With metabolic encephalopathy due to acute illness, hypernatremia, hypoxia  · Continue Seroquel 400 mg at bedtime, Topamax 50 mg b i d , Lamictal 200 mg daily    Hyperlipidemia  Assessment & Plan  · Continue Lipitor 40 mg daily     Black tongue  Assessment & Plan  · Noted to have black upper surface of the tongue 2 days ago, improved  · Without beefy discoloration or enlargement  · This is due to acute illness/medications such as ferrous sulfate/and cellular debris    Urinary retention  Assessment & Plan  Failed urinary retention protocol  She had multiple bladder scans with urinary retention measuring 325 mL, 375 mL and 410 mL  Louie catheter replaced today with significant amount of urine drained    Anemia, macrocytic  Assessment & Plan  · Improved  · Hemoglobin now close to normal  · Continue vitamin supplements and iron    Results from last 7 days   Lab Units 20  0600 20  0431 20  0907 20  0613 20  0633   HEMOGLOBIN g/dL 11 1* 11 6 11 8 10 7* 11 0*       VTE Pharmacologic Prophylaxis:   Pharmacologic: Enoxaparin (Lovenox)  Mechanical VTE Prophylaxis in Place: Yes    Patient Centered Rounds: I have performed bedside rounds with nursing staff today  Discussions with Specialists or Other Care Team Provider:  Pulmonary     Education and Discussions with Family / Patient:  Spoke with Gus Plasencia and gave update    Time Spent for Care: 25 minutes  More than 50% of total time spent on counseling and coordination of care as described above  Current Length of Stay: 14 day(s)    Current Patient Status: Inpatient   Certification Statement: The patient will continue to require additional inpatient hospital stay due to covid    Discharge Plan: To be determined    Code Status: Level 3 - DNAR and DNI      Subjective:   Oxygen requirement is down to 11 L via mid flow  Failed urinary retention protocol and had to have a Louie replaced  Patient denies shortness of breath    Objective:     Vitals:   Temp (24hrs), Av 9 °F (36 6 °C), Min:97 6 °F (36 4 °C), Max:98 2 °F (36 8 °C)    Temp:  [97 6 °F (36 4 °C)-98 2 °F (36 8 °C)] 98 2 °F (36 8 °C)  HR:  [103-105] 105  Resp:  [19] 19  BP: (146-155)/(77-92) 146/77  SpO2:  [87 %-98 %] 98 %  Body mass index is 19 76 kg/m²  Input and Output Summary (last 24 hours):        Intake/Output Summary (Last 24 hours) at 2021 1617  Last data filed at 2021 1301  Gross per 24 hour   Intake    Output 1250 ml   Net -1250 ml       Physical Exam:     Physical Exam  Constitutional:       Comments: Cachectic   HENT:      Head: Normocephalic and atraumatic  Nose: No rhinorrhea  Mouth/Throat:      Comments: Improved black discoloration of tongue  Eyes:      General: No scleral icterus  Neck:      Musculoskeletal: Neck supple  Cardiovascular:      Rate and Rhythm: Regular rhythm  Heart sounds: No murmur  No gallop  Pulmonary:      Comments: Poor effort  Decreased at bases  Abdominal:      General: Abdomen is flat  Palpations: Abdomen is soft  Musculoskeletal:      Right lower leg: No edema  Left lower leg: No edema  Skin:     General: Skin is warm and dry  Neurological:      Mental Status: She is alert  Comments: Confused   Psychiatric:         Mood and Affect: Mood normal          Behavior: Behavior normal            Additional Data:     Labs:    Results from last 7 days   Lab Units 12/31/20  0600   WBC Thousand/uL 11 15*   HEMOGLOBIN g/dL 11 1*   HEMATOCRIT % 36 3   PLATELETS Thousands/uL 601*   NEUTROS PCT % 77*   LYMPHS PCT % 11*   MONOS PCT % 10   EOS PCT % 0     Results from last 7 days   Lab Units 12/31/20  0600   SODIUM mmol/L 145   POTASSIUM mmol/L 3 8   CHLORIDE mmol/L 110*   CO2 mmol/L 29   BUN mg/dL 33*   CREATININE mg/dL 0 88   ANION GAP mmol/L 6   CALCIUM mg/dL 8 8   ALBUMIN g/dL 2 3*   TOTAL BILIRUBIN mg/dL 0 40   ALK PHOS U/L 86   ALT U/L 39   AST U/L 43   GLUCOSE RANDOM mg/dL 131         Results from last 7 days   Lab Units 12/29/20  2103   POC GLUCOSE mg/dl 233*         Results from last 7 days   Lab Units 12/27/20  0445 12/26/20  1139   PROCALCITONIN ng/ml <0 05 0 05           * I Have Reviewed All Lab Data Listed Above  * Additional Pertinent Lab Tests Reviewed:  All Labs Within Last 24 Hours Reviewed    Imaging:    Imaging Reports Reviewed Today Include:  Last chest x-ray      Recent Cultures (last 7 days):           Last 24 Hours Medication List:   Current Facility-Administered Medications   Medication Dose Route Frequency Provider Last Rate    acetaminophen  650 mg Oral Q6H PRN Jaylan Macias PA-C      atorvastatin  40 mg Oral HS Azalia Mack PA-C      cholecalciferol  2,000 Units Oral Daily Azalia Hernández PA-C      clonazePAM  0 5 mg Oral BID Azalia Hernández PA-C      dexamethasone  6 mg Intravenous Daily Pamela Saba MD      dicyclomine  10 mg Oral TID PRN Jeannine Ishiram DO      famotidine  20 mg Oral Daily Azalia Hernández PA-C      ferrous sulfate  325 mg Oral Daily With Breakfast Argentina Mckinley DO      heparin (porcine)  5,000 Units Subcutaneous [facility] Azalia Hernández PA-C      lamoTRIgine  200 mg Oral Daily Before Breakfast Azalia Hernández PA-C      multivitamin-minerals  1 tablet Oral Daily Azalia Hernández PA-C      ondansetron  4 mg Intravenous Q6H PRN Jaylan Macias PA-C      oxybutynin  10 mg Oral Daily Azalia Hernández PA-C      pantoprazole  40 mg Oral Early Morning Azalia Hernández PA-C      QUEtiapine  400 mg Oral HS Azalia Hernández PA-C      senna-docusate sodium  1 tablet Oral BID Jeannine Gay DO      sodium chloride  75 mL/hr Intravenous Continuous Pamela Saba MD 75 mL/hr (12/31/20 7385)    topiramate  50 mg Oral BID Jaylan Macias PA-C          Today, Patient Was Seen By: Pamela Saba MD    ** Please Note: Dictation voice to text software may have been used in the creation of this document   **

## 2021-01-02 LAB
ALBUMIN SERPL BCP-MCNC: 2 G/DL (ref 3.5–5)
ALP SERPL-CCNC: 63 U/L (ref 46–116)
ALT SERPL W P-5'-P-CCNC: 23 U/L (ref 12–78)
ANION GAP SERPL CALCULATED.3IONS-SCNC: 6 MMOL/L (ref 4–13)
ANISOCYTOSIS BLD QL SMEAR: PRESENT
AST SERPL W P-5'-P-CCNC: 20 U/L (ref 5–45)
BASOPHILS # BLD MANUAL: 0 THOUSAND/UL (ref 0–0.1)
BASOPHILS NFR MAR MANUAL: 0 % (ref 0–1)
BILIRUB SERPL-MCNC: 0.33 MG/DL (ref 0.2–1)
BUN SERPL-MCNC: 24 MG/DL (ref 5–25)
CALCIUM ALBUM COR SERPL-MCNC: 10.1 MG/DL (ref 8.3–10.1)
CALCIUM SERPL-MCNC: 8.5 MG/DL (ref 8.3–10.1)
CHLORIDE SERPL-SCNC: 105 MMOL/L (ref 100–108)
CO2 SERPL-SCNC: 28 MMOL/L (ref 21–32)
CREAT SERPL-MCNC: 0.61 MG/DL (ref 0.6–1.3)
CRP SERPL QL: 12.3 MG/L
EOSINOPHIL # BLD MANUAL: 0.1 THOUSAND/UL (ref 0–0.4)
EOSINOPHIL NFR BLD MANUAL: 1 % (ref 0–6)
ERYTHROCYTE [DISTWIDTH] IN BLOOD BY AUTOMATED COUNT: 12.5 % (ref 11.6–15.1)
GFR SERPL CREATININE-BSD FRML MDRD: 83 ML/MIN/1.73SQ M
GLUCOSE SERPL-MCNC: 75 MG/DL (ref 65–140)
HCT VFR BLD AUTO: 29.9 % (ref 34.8–46.1)
HGB BLD-MCNC: 9.2 G/DL (ref 11.5–15.4)
LYMPHOCYTES # BLD AUTO: 1.9 THOUSAND/UL (ref 0.6–4.47)
LYMPHOCYTES # BLD AUTO: 19 % (ref 14–44)
MACROCYTES BLD QL AUTO: PRESENT
MCH RBC QN AUTO: 31.4 PG (ref 26.8–34.3)
MCHC RBC AUTO-ENTMCNC: 30.8 G/DL (ref 31.4–37.4)
MCV RBC AUTO: 102 FL (ref 82–98)
MONOCYTES # BLD AUTO: 0.6 THOUSAND/UL (ref 0–1.22)
MONOCYTES NFR BLD: 6 % (ref 4–12)
NEUTROPHILS # BLD MANUAL: 7.39 THOUSAND/UL (ref 1.85–7.62)
NEUTS BAND NFR BLD MANUAL: 2 % (ref 0–8)
NEUTS SEG NFR BLD AUTO: 72 % (ref 43–75)
NRBC BLD AUTO-RTO: 0 /100 WBCS
PLATELET # BLD AUTO: 431 THOUSANDS/UL (ref 149–390)
PLATELET BLD QL SMEAR: ABNORMAL
PMV BLD AUTO: 9.2 FL (ref 8.9–12.7)
POTASSIUM SERPL-SCNC: 3.4 MMOL/L (ref 3.5–5.3)
PROT SERPL-MCNC: 6.2 G/DL (ref 6.4–8.2)
RBC # BLD AUTO: 2.93 MILLION/UL (ref 3.81–5.12)
SODIUM SERPL-SCNC: 139 MMOL/L (ref 136–145)
TOTAL CELLS COUNTED SPEC: 100
WBC # BLD AUTO: 9.98 THOUSAND/UL (ref 4.31–10.16)

## 2021-01-02 PROCEDURE — 86140 C-REACTIVE PROTEIN: CPT | Performed by: INTERNAL MEDICINE

## 2021-01-02 PROCEDURE — 80053 COMPREHEN METABOLIC PANEL: CPT | Performed by: INTERNAL MEDICINE

## 2021-01-02 PROCEDURE — 85007 BL SMEAR W/DIFF WBC COUNT: CPT | Performed by: INTERNAL MEDICINE

## 2021-01-02 PROCEDURE — 85027 COMPLETE CBC AUTOMATED: CPT | Performed by: INTERNAL MEDICINE

## 2021-01-02 PROCEDURE — 99232 SBSQ HOSP IP/OBS MODERATE 35: CPT | Performed by: INTERNAL MEDICINE

## 2021-01-02 RX ADMIN — Medication 1 TABLET: at 08:50

## 2021-01-02 RX ADMIN — DOCUSATE SODIUM AND SENNOSIDES 1 TABLET: 8.6; 5 TABLET, FILM COATED ORAL at 08:46

## 2021-01-02 RX ADMIN — DOCUSATE SODIUM AND SENNOSIDES 1 TABLET: 8.6; 5 TABLET, FILM COATED ORAL at 17:02

## 2021-01-02 RX ADMIN — OXYBUTYNIN 10 MG: 10 TABLET, FILM COATED, EXTENDED RELEASE ORAL at 08:46

## 2021-01-02 RX ADMIN — CLONAZEPAM 0.5 MG: 0.5 TABLET ORAL at 08:46

## 2021-01-02 RX ADMIN — HEPARIN SODIUM 5000 UNITS: 5000 INJECTION INTRAVENOUS; SUBCUTANEOUS at 05:03

## 2021-01-02 RX ADMIN — FERROUS SULFATE TAB 325 MG (65 MG ELEMENTAL FE) 325 MG: 325 (65 FE) TAB at 08:47

## 2021-01-02 RX ADMIN — TOPIRAMATE 50 MG: 25 TABLET, FILM COATED ORAL at 08:46

## 2021-01-02 RX ADMIN — TOPIRAMATE 50 MG: 25 TABLET, FILM COATED ORAL at 17:02

## 2021-01-02 RX ADMIN — CLONAZEPAM 0.5 MG: 0.5 TABLET ORAL at 17:02

## 2021-01-02 RX ADMIN — ACETAMINOPHEN 650 MG: 325 TABLET ORAL at 17:02

## 2021-01-02 RX ADMIN — LAMOTRIGINE 200 MG: 100 TABLET ORAL at 08:46

## 2021-01-02 RX ADMIN — SODIUM CHLORIDE 75 ML/HR: 0.45 INJECTION, SOLUTION INTRAVENOUS at 08:53

## 2021-01-02 RX ADMIN — DEXAMETHASONE SODIUM PHOSPHATE 6 MG: 4 INJECTION INTRA-ARTICULAR; INTRALESIONAL; INTRAMUSCULAR; INTRAVENOUS; SOFT TISSUE at 08:47

## 2021-01-02 RX ADMIN — QUETIAPINE FUMARATE 400 MG: 200 TABLET ORAL at 21:27

## 2021-01-02 RX ADMIN — ATORVASTATIN CALCIUM 40 MG: 40 TABLET, FILM COATED ORAL at 21:27

## 2021-01-02 RX ADMIN — FAMOTIDINE 20 MG: 20 TABLET ORAL at 08:47

## 2021-01-02 RX ADMIN — HEPARIN SODIUM 5000 UNITS: 5000 INJECTION INTRAVENOUS; SUBCUTANEOUS at 21:27

## 2021-01-02 RX ADMIN — Medication 2000 UNITS: at 08:46

## 2021-01-02 RX ADMIN — HEPARIN SODIUM 5000 UNITS: 5000 INJECTION INTRAVENOUS; SUBCUTANEOUS at 14:21

## 2021-01-02 NOTE — PROGRESS NOTES
Progress Note - Olga Orlando 1935, 80 y o  female MRN: [id number]    Unit/Bed#: [place] [address] 221-01 Encounter: [id number]    Primary Care Provider: Shubham Torres MD   Date and time admitted to hospital: 12/18/2020  5:50 PM        * Acute respiratory failure due to COVID-19 [facility])  Assessment & Plan  Oxygen requirements are slightly worse today  She is up to 15 L via mid flow (from 11 L mid flow yesterday)  Wean down as tolerated  She is on her 2nd course of dexamethasone due to prolonged severe disease  Code status: DNR DNI    Pneumonia due to COVID-19 virus  Assessment & Plan  · Improving inflammatory markers but still with high O2 requirement  · Completed initial 10 days of dexamethasone  · However due to persistently increased O2 requirement, she is started on her 2nd course of dexamethasone, currently day 13  · Completed 5 days of remdesivir  · Ongoing treatment with Lipitor, vitamin-D, heparin and multivitamin    Acute metabolic encephalopathy  Assessment & Plan  · Acute metabolic encephalopathy secondary to COVID-19, hypernatremia on top of underlying cognitive decline  · Monitor  · Continue assistance when feeding  · Diet changed to pureed with nectar thickened liquid    Hypernatremia  Assessment & Plan  Secondary to poor oral intake  Sodium is better half normal saline infusion  Continue diet as tolerated  DC IV fluid and avoid hypovolemia in the setting of COVID  Recheck labs in a m      Bipolar 1 disorder ([facility] [address] )  Assessment & Plan  · With metabolic encephalopathy due to acute illness, hypernatremia, hypoxia  · Continue Seroquel 400 mg at bedtime, Topamax 50 mg b i d , Lamictal 200 mg daily    Hyperlipidemia  Assessment & Plan  · Continue Lipitor 40 mg daily     Urinary retention  Assessment & Plan  Failed urinary retention protocol  She had multiple bladder scans with urinary retention measuring 325 mL, 375 mL and 410 mL  Louie catheter replaced 01/01/2021   Outpatient Urology follow-up    Anemia, macrocytic  Assessment & Plan  · Hemoglobin slightly low today, suspect this is from a diluted sample  · Continue vitamin supplements and iron  · Repeat labs in a m  Results from last 7 days   Lab Units 21  0545 20  0600 20  0431 20  0907 20  0613   HEMOGLOBIN g/dL 9 2* 11 1* 11 6 11 8 10 7*     VTE Pharmacologic Prophylaxis:   Pharmacologic: Heparin  Mechanical VTE Prophylaxis in Place: No    Patient Centered Rounds: Discussed with her nurse    Education and Discussions with Family / Patient: spoke with Pj Mccormick     Time Spent for Care: 20 minutes  More than 50% of total time spent on counseling and coordination of care as described above  Current Length of Stay: 15 day(s)    Current Patient Status: Inpatient   Certification Statement: The patient will continue to require additional inpatient hospital stay due to covid    Discharge Plan: to be determined    Code Status: Level 3 - DNAR and DNI      Subjective:   Per nurse she would eat 25 % of her meal  + discomfort from aj which is draining properly  Still on midflow @ 15L  Denies SOB    Objective:     Vitals:   Temp (24hrs), Av 1 °F (36 7 °C), Min:98 1 °F (36 7 °C), Max:98 1 °F (36 7 °C)    Temp:  [98 1 °F (36 7 °C)] 98 1 °F (36 7 °C)  HR:  [] 102  Resp:  [16-18] 16  BP: (110-136)/(68-81) 136/81  SpO2:  [83 %-90 %] 90 %  Body mass index is 19 76 kg/m²  Input and Output Summary (last 24 hours): Intake/Output Summary (Last 24 hours) at 2021 1800  Last data filed at 2021 1501  Gross per 24 hour   Intake 2935 ml   Output 1750 ml   Net 1185 ml       Physical Exam:     Physical Exam  Constitutional:       Appearance: She is ill-appearing  Comments: cachectic   HENT:      Head: Normocephalic  Nose: No rhinorrhea  Mouth/Throat:      Comments: Dry mouth  Eyes:      General: No scleral icterus  Neck:      Musculoskeletal: Neck supple     Cardiovascular:      Rate and Rhythm: Regular rhythm  Heart sounds: No murmur  Pulmonary:      Effort: No respiratory distress  Breath sounds: No wheezing or rales  Comments: Poor effort  Decreased at bases  Abdominal:      General: Abdomen is flat  Palpations: Abdomen is soft  Musculoskeletal:      Right lower leg: No edema  Left lower leg: No edema  Skin:     General: Skin is warm and dry  Neurological:      Comments: confused   Psychiatric:      Comments: Depressed mood     Additional Data:     Labs:    Results from last 7 days   Lab Units 01/02/21  0545 12/31/20  0600   WBC Thousand/uL 9 98 11 15*   HEMOGLOBIN g/dL 9 2* 11 1*   HEMATOCRIT % 29 9* 36 3   PLATELETS Thousands/uL 431* 601*   BANDS PCT % 2  --    NEUTROS PCT %  --  77*   LYMPHS PCT %  --  11*   LYMPHO PCT % 19  --    MONOS PCT %  --  10   MONO PCT % 6  --    EOS PCT % 1 0     Results from last 7 days   Lab Units 01/02/21  0545   SODIUM mmol/L 139   POTASSIUM mmol/L 3 4*   CHLORIDE mmol/L 105   CO2 mmol/L 28   BUN mg/dL 24   CREATININE mg/dL 0 61   ANION GAP mmol/L 6   CALCIUM mg/dL 8 5   ALBUMIN g/dL 2 0*   TOTAL BILIRUBIN mg/dL 0 33   ALK PHOS U/L 63   ALT U/L 23   AST U/L 20   GLUCOSE RANDOM mg/dL 75         Results from last 7 days   Lab Units 12/29/20  2103   POC GLUCOSE mg/dl 233*         Results from last 7 days   Lab Units 12/27/20  0445   PROCALCITONIN ng/ml <0 05           * I Have Reviewed All Lab Data Listed Above  * Additional Pertinent Lab Tests Reviewed:  All Labs Within Last 24 Hours Reviewed    Imaging:    Imaging Reports Reviewed Today Include: none    Recent Cultures (last 7 days):           Last 24 Hours Medication List:   Current Facility-Administered Medications   Medication Dose Route Frequency Provider Last Rate    acetaminophen  650 mg Oral Q6H PRN Azalia Hernández PA-C      atorvastatin  40 mg Oral HS Azalia Tatum PA-C      cholecalciferol  2,000 Units Oral Daily Azalia Hernández PA-C      clonazePAM  0 5 mg Oral BID Joanne Batres PA-C      dexamethasone  6 mg Intravenous Daily Misha Munoz MD      dicyclomine  10 mg Oral TID PRN Quinton Farooq,       famotidine  20 mg Oral Daily Azalia Hernández PA-C      ferrous sulfate  325 mg Oral Daily With Breakfast Clara January, DO      heparin (porcine)  5,000 Units Subcutaneous [facility] Azalia Hernández PA-C      lamoTRIgine  200 mg Oral Daily Before Breakfast Azalia Hernández PA-C      multivitamin-minerals  1 tablet Oral Daily Azalia Hernández PA-C      ondansetron  4 mg Intravenous Q6H PRN Joanne Batres PA-C      oxybutynin  10 mg Oral Daily Azalia Hernández PA-C      pantoprazole  40 mg Oral Early Morning Azalia Hernández PA-C      QUEtiapine  400 mg Oral HS Azalia Hernández PA-C      senna-docusate sodium  1 tablet Oral BID Quinton Farooq DO      topiramate  50 mg Oral BID Joanne Batres PA-C          Today, Patient Was Seen By: Misha Munoz MD    ** Please Note: Dictation voice to text software may have been used in the creation of this document   **

## 2021-01-02 NOTE — ASSESSMENT & PLAN NOTE
· Hemoglobin slightly low today, suspect this is from a diluted sample  · Continue vitamin supplements and iron  · Repeat labs in a m      Results from last 7 days   Lab Units 01/02/21  0545 12/31/20  0600 12/30/20  0431 12/29/20  0907 12/28/20  0613   HEMOGLOBIN g/dL 9 2* 11 1* 11 6 11 8 10 7*

## 2021-01-02 NOTE — ASSESSMENT & PLAN NOTE
· Improving inflammatory markers but still with high O2 requirement  · Completed initial 10 days of dexamethasone      · However due to persistently increased O2 requirement, she is started on her 2nd course of dexamethasone, currently day 13  · Completed 5 days of remdesivir  · Ongoing treatment with Lipitor, vitamin-D, heparin and multivitamin

## 2021-01-02 NOTE — ASSESSMENT & PLAN NOTE
Failed urinary retention protocol  She had multiple bladder scans with urinary retention measuring 325 mL, 375 mL and 410 mL  Louie catheter replaced 01/01/2021   Outpatient Urology follow-up

## 2021-01-02 NOTE — ASSESSMENT & PLAN NOTE
Secondary to poor oral intake  Sodium is better half normal saline infusion  Continue diet as tolerated  DC IV fluid and avoid hypovolemia in the setting of COVID  Recheck labs in a m

## 2021-01-02 NOTE — NURSING NOTE
Patient complaining of some abdominal pain  Louie catheter draining well  PRN tylenol given and Dr Brayan Jade with [facility] aware  Will continue to monitor

## 2021-01-02 NOTE — PLAN OF CARE
Problem: Potential for Falls  Goal: Patient will remain free of falls  Description: INTERVENTIONS:  - Assess patient frequently for physical needs  -  Identify cognitive and physical deficits and behaviors that affect risk of falls    -  [place] fall precautions as indicated by assessment   - Educate patient/family on patient safety including physical limitations  - Instruct patient to call for assistance with activity based on assessment  - Modify environment to reduce risk of injury  - Consider OT/PT consult to assist with strengthening/mobility  Outcome: Progressing     Problem: Prexisting or High Potential for Compromised Skin Integrity  Goal: Skin integrity is maintained or improved  Description: INTERVENTIONS:  - Identify patients at risk for skin breakdown  - Assess and monitor skin integrity  - Assess and monitor nutrition and hydration status  - Monitor labs   - Assess for incontinence   - Turn and reposition patient  - Assist with mobility/ambulation  - Relieve pressure over bony prominences  - Avoid friction and shearing  - Provide appropriate hygiene as needed including keeping skin clean and dry  - Evaluate need for skin moisturizer/barrier cream  - Collaborate with interdisciplinary team   - Patient/family teaching  - Consider wound care consult   Outcome: Progressing     Problem: PAIN - ADULT  Goal: Verbalizes/displays adequate comfort level or baseline comfort level  Description: Interventions:  - Encourage patient to monitor pain and request assistance  - Assess pain using appropriate pain scale  - Administer analgesics based on type and severity of pain and evaluate response  - Implement non-pharmacological measures as appropriate and evaluate response  - Consider cultural and social influences on pain and pain management  - Notify physician/advanced practitioner if interventions unsuccessful or patient reports new pain  Outcome: Progressing     Problem: INFECTION - ADULT  Goal: Absence or prevention of progression during hospitalization  Description: INTERVENTIONS:  - Assess and monitor for signs and symptoms of infection  - Monitor lab/diagnostic results  - Monitor all insertion sites, i e  indwelling lines, tubes, and drains  - Monitor endotracheal if appropriate and nasal secretions for changes in amount and color  - [place] appropriate cooling/warming therapies per order  - Administer medications as ordered  - Instruct and encourage patient and family to use good hand hygiene technique  - Identify and instruct in appropriate isolation precautions for identified infection/condition  Outcome: Progressing     Problem: SAFETY ADULT  Goal: Patient will remain free of falls  Description: INTERVENTIONS:  - Assess patient frequently for physical needs  -  Identify cognitive and physical deficits and behaviors that affect risk of falls    -  [place] fall precautions as indicated by assessment   - Educate patient/family on patient safety including physical limitations  - Instruct patient to call for assistance with activity based on assessment  - Modify environment to reduce risk of injury  - Consider OT/PT consult to assist with strengthening/mobility  Outcome: Progressing  Goal: Maintain or return to baseline ADL function  Description: INTERVENTIONS:  -  Assess patient's ability to carry out ADLs; assess patient's baseline for ADL function and identify physical deficits which impact ability to perform ADLs (bathing, care of mouth/teeth, toileting, grooming, dressing, etc )  - Assess/evaluate cause of self-care deficits   - Assess range of motion  - Assess patient's mobility; develop plan if impaired  - Assess patient's need for assistive devices and provide as appropriate  - Encourage maximum independence but intervene and supervise when necessary  - Involve family in performance of ADLs  - Assess for home care needs following discharge   - Consider OT consult to assist with ADL evaluation and planning for discharge  - Provide patient education as appropriate  Outcome: Progressing  Goal: Maintain or return mobility status to optimal level  Description: INTERVENTIONS:  - Assess patient's baseline mobility status (ambulation, transfers, stairs, etc )    - Identify cognitive and physical deficits and behaviors that affect mobility  - Identify mobility aids required to assist with transfers and/or ambulation (gait belt, sit-to-stand, lift, walker, cane, etc )  - [place] fall precautions as indicated by assessment  - Record patient progress and toleration of activity level on Mobility SBAR; progress patient to next Phase/Stage  - Instruct patient to call for assistance with activity based on assessment  - Consider rehabilitation consult to assist with strengthening/weightbearing, etc   Outcome: Progressing     Problem: DISCHARGE PLANNING  Goal: Discharge to home or other facility with appropriate resources  Description: INTERVENTIONS:  - Identify barriers to discharge w/patient and caregiver  - Arrange for needed discharge resources and transportation as appropriate  - Identify discharge learning needs (meds, wound care, etc )  - Arrange for interpretive services to assist at discharge as needed  - Refer to Case Management Department for coordinating discharge planning if the patient needs post-hospital services based on physician/advanced practitioner order or complex needs related to functional status, cognitive ability, or social support system  Outcome: Progressing     Problem: Knowledge Deficit  Goal: Patient/family/caregiver demonstrates understanding of disease process, treatment plan, medications, and discharge instructions  Description: Complete learning assessment and assess knowledge base    Interventions:  - Provide teaching at level of understanding  - Provide teaching via preferred learning methods  Outcome: Progressing     Problem: Nutrition/Hydration-ADULT  Goal: Nutrient/Hydration intake appropriate for improving, restoring or maintaining nutritional needs  Description: Monitor and assess patient's nutrition/hydration status for malnutrition  Collaborate with interdisciplinary team and initiate plan and interventions as ordered  Monitor patient's weight and dietary intake as ordered or per policy  Utilize nutrition screening tool and intervene as necessary  Determine patient's food preferences and provide high-protein, high-caloric foods as appropriate       INTERVENTIONS:  - Monitor oral intake, urinary output, labs, and treatment plans  - Assess nutrition and hydration status and recommend course of action  - Evaluate amount of meals eaten  - Assist patient with eating if necessary   - Allow adequate time for meals  - Recommend/ encourage appropriate diets, oral nutritional supplements, and vitamin/mineral supplements  - Order, calculate, and assess calorie counts as needed  - Recommend, monitor, and adjust tube feedings and TPN/PPN based on assessed needs  - Assess need for intravenous fluids  - Provide specific nutrition/hydration education as appropriate  - Include patient/family/caregiver in decisions related to nutrition  Outcome: Progressing     Problem: RESPIRATORY - ADULT  Goal: Achieves optimal ventilation and oxygenation  Description: INTERVENTIONS:  - Assess for changes in respiratory status  - Assess for changes in mentation and behavior  - Position to facilitate oxygenation and minimize respiratory effort  - Oxygen administered by appropriate delivery if ordered  - Initiate smoking cessation education as indicated  - Encourage broncho-pulmonary hygiene including cough, deep breathe, Incentive Spirometry  - Assess the need for suctioning and aspirate as needed  - Assess and instruct to report SOB or any respiratory difficulty  - Respiratory Therapy support as indicated  Outcome: Progressing     Problem: GENITOURINARY - ADULT  Goal: Urinary catheter remains patent  Description: INTERVENTIONS:  - Assess patency of urinary catheter  - If patient has a chronic aj, consider changing catheter if non-functioning  - Follow guidelines for intermittent irrigation of non-functioning urinary catheter  Outcome: Progressing     Problem: SKIN/TISSUE INTEGRITY - ADULT  Goal: Skin integrity remains intact  Description: INTERVENTIONS  - Identify patients at risk for skin breakdown  - Assess and monitor skin integrity  - Assess and monitor nutrition and hydration status  - Monitor labs (i e  albumin)  - Assess for incontinence   - Turn and reposition patient  - Assist with mobility/ambulation  - Relieve pressure over bony prominences  - Avoid friction and shearing  - Provide appropriate hygiene as needed including keeping skin clean and dry  - Evaluate need for skin moisturizer/barrier cream  - Collaborate with interdisciplinary team (i e  Nutrition, Rehabilitation, etc )   - Patient/family teaching  Outcome: Progressing     Problem: SAFETY,RESTRAINT: NV/NON-SELF DESTRUCTIVE BEHAVIOR  Goal: Remains free of harm/injury (restraint for non violent/non self-detsructive behavior)  Description: INTERVENTIONS:  - Instruct patient/family regarding restraint use   - Assess and monitor physiologic and psychological status   - Provide interventions and comfort measures to meet assessed patient needs   - Identify and implement measures to help patient regain control  - Assess readiness for release of restraint   Outcome: Progressing  Goal: Returns to optimal restraint-free functioning  Description: INTERVENTIONS:  - Assess the patient's behavior and symptoms that indicate continued need for restraint  - Identify and implement measures to help patient regain control  - Assess readiness for release of restraint   Outcome: Progressing

## 2021-01-02 NOTE — ASSESSMENT & PLAN NOTE
Oxygen requirements are slightly worse today  She is up to 15 L via mid flow (from 11 L mid flow yesterday)  Wean down as tolerated  She is on her 2nd course of dexamethasone due to prolonged severe disease  Code status: DNR DNI

## 2021-01-02 NOTE — PLAN OF CARE
Problem: Potential for Falls  Goal: Patient will remain free of falls  Description: INTERVENTIONS:  - Assess patient frequently for physical needs  -  Identify cognitive and physical deficits and behaviors that affect risk of falls    -  [place] fall precautions as indicated by assessment   - Educate patient/family on patient safety including physical limitations  - Instruct patient to call for assistance with activity based on assessment  - Modify environment to reduce risk of injury  - Consider OT/PT consult to assist with strengthening/mobility  Outcome: Progressing     Problem: Prexisting or High Potential for Compromised Skin Integrity  Goal: Skin integrity is maintained or improved  Description: INTERVENTIONS:  - Identify patients at risk for skin breakdown  - Assess and monitor skin integrity  - Assess and monitor nutrition and hydration status  - Monitor labs   - Assess for incontinence   - Turn and reposition patient  - Assist with mobility/ambulation  - Relieve pressure over bony prominences  - Avoid friction and shearing  - Provide appropriate hygiene as needed including keeping skin clean and dry  - Evaluate need for skin moisturizer/barrier cream  - Collaborate with interdisciplinary team   - Patient/family teaching  - Consider wound care consult   Outcome: Progressing     Problem: PAIN - ADULT  Goal: Verbalizes/displays adequate comfort level or baseline comfort level  Description: Interventions:  - Encourage patient to monitor pain and request assistance  - Assess pain using appropriate pain scale  - Administer analgesics based on type and severity of pain and evaluate response  - Implement non-pharmacological measures as appropriate and evaluate response  - Consider cultural and social influences on pain and pain management  - Notify physician/advanced practitioner if interventions unsuccessful or patient reports new pain  Outcome: Progressing     Problem: INFECTION - ADULT  Goal: Absence or prevention of progression during hospitalization  Description: INTERVENTIONS:  - Assess and monitor for signs and symptoms of infection  - Monitor lab/diagnostic results  - Monitor all insertion sites, i e  indwelling lines, tubes, and drains  - Monitor endotracheal if appropriate and nasal secretions for changes in amount and color  - [place] appropriate cooling/warming therapies per order  - Administer medications as ordered  - Instruct and encourage patient and family to use good hand hygiene technique  - Identify and instruct in appropriate isolation precautions for identified infection/condition  Outcome: Progressing     Problem: SAFETY ADULT  Goal: Patient will remain free of falls  Description: INTERVENTIONS:  - Assess patient frequently for physical needs  -  Identify cognitive and physical deficits and behaviors that affect risk of falls    -  [place] fall precautions as indicated by assessment   - Educate patient/family on patient safety including physical limitations  - Instruct patient to call for assistance with activity based on assessment  - Modify environment to reduce risk of injury  - Consider OT/PT consult to assist with strengthening/mobility  Outcome: Progressing  Goal: Maintain or return to baseline ADL function  Description: INTERVENTIONS:  -  Assess patient's ability to carry out ADLs; assess patient's baseline for ADL function and identify physical deficits which impact ability to perform ADLs (bathing, care of mouth/teeth, toileting, grooming, dressing, etc )  - Assess/evaluate cause of self-care deficits   - Assess range of motion  - Assess patient's mobility; develop plan if impaired  - Assess patient's need for assistive devices and provide as appropriate  - Encourage maximum independence but intervene and supervise when necessary  - Involve family in performance of ADLs  - Assess for home care needs following discharge   - Consider OT consult to assist with ADL evaluation and planning for discharge  - Provide patient education as appropriate  Outcome: Progressing  Goal: Maintain or return mobility status to optimal level  Description: INTERVENTIONS:  - Assess patient's baseline mobility status (ambulation, transfers, stairs, etc )    - Identify cognitive and physical deficits and behaviors that affect mobility  - Identify mobility aids required to assist with transfers and/or ambulation (gait belt, sit-to-stand, lift, walker, cane, etc )  - [place] fall precautions as indicated by assessment  - Record patient progress and toleration of activity level on Mobility SBAR; progress patient to next Phase/Stage  - Instruct patient to call for assistance with activity based on assessment  - Consider rehabilitation consult to assist with strengthening/weightbearing, etc   Outcome: Progressing     Problem: DISCHARGE PLANNING  Goal: Discharge to home or other facility with appropriate resources  Description: INTERVENTIONS:  - Identify barriers to discharge w/patient and caregiver  - Arrange for needed discharge resources and transportation as appropriate  - Identify discharge learning needs (meds, wound care, etc )  - Arrange for interpretive services to assist at discharge as needed  - Refer to Case Management Department for coordinating discharge planning if the patient needs post-hospital services based on physician/advanced practitioner order or complex needs related to functional status, cognitive ability, or social support system  Outcome: Progressing     Problem: Knowledge Deficit  Goal: Patient/family/caregiver demonstrates understanding of disease process, treatment plan, medications, and discharge instructions  Description: Complete learning assessment and assess knowledge base    Interventions:  - Provide teaching at level of understanding  - Provide teaching via preferred learning methods  Outcome: Progressing     Problem: Nutrition/Hydration-ADULT  Goal: Nutrient/Hydration intake appropriate for improving, restoring or maintaining nutritional needs  Description: Monitor and assess patient's nutrition/hydration status for malnutrition  Collaborate with interdisciplinary team and initiate plan and interventions as ordered  Monitor patient's weight and dietary intake as ordered or per policy  Utilize nutrition screening tool and intervene as necessary  Determine patient's food preferences and provide high-protein, high-caloric foods as appropriate       INTERVENTIONS:  - Monitor oral intake, urinary output, labs, and treatment plans  - Assess nutrition and hydration status and recommend course of action  - Evaluate amount of meals eaten  - Assist patient with eating if necessary   - Allow adequate time for meals  - Recommend/ encourage appropriate diets, oral nutritional supplements, and vitamin/mineral supplements  - Order, calculate, and assess calorie counts as needed  - Recommend, monitor, and adjust tube feedings and TPN/PPN based on assessed needs  - Assess need for intravenous fluids  - Provide specific nutrition/hydration education as appropriate  - Include patient/family/caregiver in decisions related to nutrition  Outcome: Progressing     Problem: RESPIRATORY - ADULT  Goal: Achieves optimal ventilation and oxygenation  Description: INTERVENTIONS:  - Assess for changes in respiratory status  - Assess for changes in mentation and behavior  - Position to facilitate oxygenation and minimize respiratory effort  - Oxygen administered by appropriate delivery if ordered  - Initiate smoking cessation education as indicated  - Encourage broncho-pulmonary hygiene including cough, deep breathe, Incentive Spirometry  - Assess the need for suctioning and aspirate as needed  - Assess and instruct to report SOB or any respiratory difficulty  - Respiratory Therapy support as indicated  Outcome: Progressing     Problem: GENITOURINARY - ADULT  Goal: Urinary catheter remains patent  Description: INTERVENTIONS:  - Assess patency of urinary catheter  - If patient has a chronic aj, consider changing catheter if non-functioning  - Follow guidelines for intermittent irrigation of non-functioning urinary catheter  Outcome: Progressing     Problem: SKIN/TISSUE INTEGRITY - ADULT  Goal: Skin integrity remains intact  Description: INTERVENTIONS  - Identify patients at risk for skin breakdown  - Assess and monitor skin integrity  - Assess and monitor nutrition and hydration status  - Monitor labs (i e  albumin)  - Assess for incontinence   - Turn and reposition patient  - Assist with mobility/ambulation  - Relieve pressure over bony prominences  - Avoid friction and shearing  - Provide appropriate hygiene as needed including keeping skin clean and dry  - Evaluate need for skin moisturizer/barrier cream  - Collaborate with interdisciplinary team (i e  Nutrition, Rehabilitation, etc )   - Patient/family teaching  Outcome: Progressing     Problem: SAFETY,RESTRAINT: NV/NON-SELF DESTRUCTIVE BEHAVIOR  Goal: Remains free of harm/injury (restraint for non violent/non self-detsructive behavior)  Description: INTERVENTIONS:  - Instruct patient/family regarding restraint use   - Assess and monitor physiologic and psychological status   - Provide interventions and comfort measures to meet assessed patient needs   - Identify and implement measures to help patient regain control  - Assess readiness for release of restraint   Outcome: Progressing  Goal: Returns to optimal restraint-free functioning  Description: INTERVENTIONS:  - Assess the patient's behavior and symptoms that indicate continued need for restraint  - Identify and implement measures to help patient regain control  - Assess readiness for release of restraint   Outcome: Progressing

## 2021-01-03 LAB
ALBUMIN SERPL BCP-MCNC: 2.2 G/DL (ref 3.5–5)
ALP SERPL-CCNC: 73 U/L (ref 46–116)
ALT SERPL W P-5'-P-CCNC: 22 U/L (ref 12–78)
ANION GAP SERPL CALCULATED.3IONS-SCNC: 8 MMOL/L (ref 4–13)
AST SERPL W P-5'-P-CCNC: 24 U/L (ref 5–45)
BILIRUB SERPL-MCNC: 0.31 MG/DL (ref 0.2–1)
BUN SERPL-MCNC: 18 MG/DL (ref 5–25)
CALCIUM ALBUM COR SERPL-MCNC: 10.4 MG/DL (ref 8.3–10.1)
CALCIUM SERPL-MCNC: 9 MG/DL (ref 8.3–10.1)
CHLORIDE SERPL-SCNC: 105 MMOL/L (ref 100–108)
CO2 SERPL-SCNC: 28 MMOL/L (ref 21–32)
CREAT SERPL-MCNC: 0.82 MG/DL (ref 0.6–1.3)
ERYTHROCYTE [DISTWIDTH] IN BLOOD BY AUTOMATED COUNT: 12.4 % (ref 11.6–15.1)
GFR SERPL CREATININE-BSD FRML MDRD: 65 ML/MIN/1.73SQ M
GLUCOSE SERPL-MCNC: 85 MG/DL (ref 65–140)
HCT VFR BLD AUTO: 33.6 % (ref 34.8–46.1)
HGB BLD-MCNC: 10.3 G/DL (ref 11.5–15.4)
MCH RBC QN AUTO: 31.1 PG (ref 26.8–34.3)
MCHC RBC AUTO-ENTMCNC: 30.7 G/DL (ref 31.4–37.4)
MCV RBC AUTO: 102 FL (ref 82–98)
NRBC BLD AUTO-RTO: 0 /100 WBCS
PLATELET # BLD AUTO: 488 THOUSANDS/UL (ref 149–390)
PMV BLD AUTO: 9.8 FL (ref 8.9–12.7)
POTASSIUM SERPL-SCNC: 3.6 MMOL/L (ref 3.5–5.3)
PROT SERPL-MCNC: 6.7 G/DL (ref 6.4–8.2)
RBC # BLD AUTO: 3.31 MILLION/UL (ref 3.81–5.12)
SODIUM SERPL-SCNC: 141 MMOL/L (ref 136–145)
WBC # BLD AUTO: 14.64 THOUSAND/UL (ref 4.31–10.16)

## 2021-01-03 PROCEDURE — 80053 COMPREHEN METABOLIC PANEL: CPT | Performed by: INTERNAL MEDICINE

## 2021-01-03 PROCEDURE — 85027 COMPLETE CBC AUTOMATED: CPT | Performed by: INTERNAL MEDICINE

## 2021-01-03 PROCEDURE — 99232 SBSQ HOSP IP/OBS MODERATE 35: CPT | Performed by: INTERNAL MEDICINE

## 2021-01-03 RX ADMIN — DEXAMETHASONE SODIUM PHOSPHATE 6 MG: 4 INJECTION INTRA-ARTICULAR; INTRALESIONAL; INTRAMUSCULAR; INTRAVENOUS; SOFT TISSUE at 09:44

## 2021-01-03 RX ADMIN — CLONAZEPAM 0.5 MG: 0.5 TABLET ORAL at 09:43

## 2021-01-03 RX ADMIN — TOPIRAMATE 50 MG: 25 TABLET, FILM COATED ORAL at 09:43

## 2021-01-03 RX ADMIN — HEPARIN SODIUM 5000 UNITS: 5000 INJECTION INTRAVENOUS; SUBCUTANEOUS at 22:42

## 2021-01-03 RX ADMIN — HEPARIN SODIUM 5000 UNITS: 5000 INJECTION INTRAVENOUS; SUBCUTANEOUS at 05:04

## 2021-01-03 RX ADMIN — DOCUSATE SODIUM AND SENNOSIDES 1 TABLET: 8.6; 5 TABLET, FILM COATED ORAL at 09:44

## 2021-01-03 RX ADMIN — OXYBUTYNIN 10 MG: 10 TABLET, FILM COATED, EXTENDED RELEASE ORAL at 09:44

## 2021-01-03 RX ADMIN — FERROUS SULFATE TAB 325 MG (65 MG ELEMENTAL FE) 325 MG: 325 (65 FE) TAB at 09:44

## 2021-01-03 RX ADMIN — DOCUSATE SODIUM AND SENNOSIDES 1 TABLET: 8.6; 5 TABLET, FILM COATED ORAL at 18:18

## 2021-01-03 RX ADMIN — TOPIRAMATE 50 MG: 25 TABLET, FILM COATED ORAL at 18:18

## 2021-01-03 RX ADMIN — CLONAZEPAM 0.5 MG: 0.5 TABLET ORAL at 18:18

## 2021-01-03 RX ADMIN — Medication 2000 UNITS: at 09:44

## 2021-01-03 RX ADMIN — Medication 1 TABLET: at 09:44

## 2021-01-03 RX ADMIN — HEPARIN SODIUM 5000 UNITS: 5000 INJECTION INTRAVENOUS; SUBCUTANEOUS at 14:15

## 2021-01-03 RX ADMIN — FAMOTIDINE 20 MG: 20 TABLET ORAL at 09:43

## 2021-01-03 NOTE — PLAN OF CARE
Problem: Potential for Falls  Goal: Patient will remain free of falls  Description: INTERVENTIONS:  - Assess patient frequently for physical needs  -  Identify cognitive and physical deficits and behaviors that affect risk of falls    -  [place] fall precautions as indicated by assessment   - Educate patient/family on patient safety including physical limitations  - Instruct patient to call for assistance with activity based on assessment  - Modify environment to reduce risk of injury  - Consider OT/PT consult to assist with strengthening/mobility  Outcome: Progressing     Problem: Prexisting or High Potential for Compromised Skin Integrity  Goal: Skin integrity is maintained or improved  Description: INTERVENTIONS:  - Identify patients at risk for skin breakdown  - Assess and monitor skin integrity  - Assess and monitor nutrition and hydration status  - Monitor labs   - Assess for incontinence   - Turn and reposition patient  - Assist with mobility/ambulation  - Relieve pressure over bony prominences  - Avoid friction and shearing  - Provide appropriate hygiene as needed including keeping skin clean and dry  - Evaluate need for skin moisturizer/barrier cream  - Collaborate with interdisciplinary team   - Patient/family teaching  - Consider wound care consult   Outcome: Progressing     Problem: PAIN - ADULT  Goal: Verbalizes/displays adequate comfort level or baseline comfort level  Description: Interventions:  - Encourage patient to monitor pain and request assistance  - Assess pain using appropriate pain scale  - Administer analgesics based on type and severity of pain and evaluate response  - Implement non-pharmacological measures as appropriate and evaluate response  - Consider cultural and social influences on pain and pain management  - Notify physician/advanced practitioner if interventions unsuccessful or patient reports new pain  Outcome: Progressing     Problem: INFECTION - ADULT  Goal: Absence or prevention of progression during hospitalization  Description: INTERVENTIONS:  - Assess and monitor for signs and symptoms of infection  - Monitor lab/diagnostic results  - Monitor all insertion sites, i e  indwelling lines, tubes, and drains  - Monitor endotracheal if appropriate and nasal secretions for changes in amount and color  - [place] appropriate cooling/warming therapies per order  - Administer medications as ordered  - Instruct and encourage patient and family to use good hand hygiene technique  - Identify and instruct in appropriate isolation precautions for identified infection/condition  Outcome: Progressing     Problem: SAFETY ADULT  Goal: Patient will remain free of falls  Description: INTERVENTIONS:  - Assess patient frequently for physical needs  -  Identify cognitive and physical deficits and behaviors that affect risk of falls    -  [place] fall precautions as indicated by assessment   - Educate patient/family on patient safety including physical limitations  - Instruct patient to call for assistance with activity based on assessment  - Modify environment to reduce risk of injury  - Consider OT/PT consult to assist with strengthening/mobility  Outcome: Progressing  Goal: Maintain or return to baseline ADL function  Description: INTERVENTIONS:  -  Assess patient's ability to carry out ADLs; assess patient's baseline for ADL function and identify physical deficits which impact ability to perform ADLs (bathing, care of mouth/teeth, toileting, grooming, dressing, etc )  - Assess/evaluate cause of self-care deficits   - Assess range of motion  - Assess patient's mobility; develop plan if impaired  - Assess patient's need for assistive devices and provide as appropriate  - Encourage maximum independence but intervene and supervise when necessary  - Involve family in performance of ADLs  - Assess for home care needs following discharge   - Consider OT consult to assist with ADL evaluation and planning for discharge  - Provide patient education as appropriate  Outcome: Progressing  Goal: Maintain or return mobility status to optimal level  Description: INTERVENTIONS:  - Assess patient's baseline mobility status (ambulation, transfers, stairs, etc )    - Identify cognitive and physical deficits and behaviors that affect mobility  - Identify mobility aids required to assist with transfers and/or ambulation (gait belt, sit-to-stand, lift, walker, cane, etc )  - [place] fall precautions as indicated by assessment  - Record patient progress and toleration of activity level on Mobility SBAR; progress patient to next Phase/Stage  - Instruct patient to call for assistance with activity based on assessment  - Consider rehabilitation consult to assist with strengthening/weightbearing, etc   Outcome: Progressing     Problem: DISCHARGE PLANNING  Goal: Discharge to home or other facility with appropriate resources  Description: INTERVENTIONS:  - Identify barriers to discharge w/patient and caregiver  - Arrange for needed discharge resources and transportation as appropriate  - Identify discharge learning needs (meds, wound care, etc )  - Arrange for interpretive services to assist at discharge as needed  - Refer to Case Management Department for coordinating discharge planning if the patient needs post-hospital services based on physician/advanced practitioner order or complex needs related to functional status, cognitive ability, or social support system  Outcome: Progressing     Problem: Knowledge Deficit  Goal: Patient/family/caregiver demonstrates understanding of disease process, treatment plan, medications, and discharge instructions  Description: Complete learning assessment and assess knowledge base    Interventions:  - Provide teaching at level of understanding  - Provide teaching via preferred learning methods  Outcome: Progressing     Problem: Nutrition/Hydration-ADULT  Goal: Nutrient/Hydration intake appropriate for improving, restoring or maintaining nutritional needs  Description: Monitor and assess patient's nutrition/hydration status for malnutrition  Collaborate with interdisciplinary team and initiate plan and interventions as ordered  Monitor patient's weight and dietary intake as ordered or per policy  Utilize nutrition screening tool and intervene as necessary  Determine patient's food preferences and provide high-protein, high-caloric foods as appropriate       INTERVENTIONS:  - Monitor oral intake, urinary output, labs, and treatment plans  - Assess nutrition and hydration status and recommend course of action  - Evaluate amount of meals eaten  - Assist patient with eating if necessary   - Allow adequate time for meals  - Recommend/ encourage appropriate diets, oral nutritional supplements, and vitamin/mineral supplements  - Order, calculate, and assess calorie counts as needed  - Recommend, monitor, and adjust tube feedings and TPN/PPN based on assessed needs  - Assess need for intravenous fluids  - Provide specific nutrition/hydration education as appropriate  - Include patient/family/caregiver in decisions related to nutrition  Outcome: Progressing     Problem: RESPIRATORY - ADULT  Goal: Achieves optimal ventilation and oxygenation  Description: INTERVENTIONS:  - Assess for changes in respiratory status  - Assess for changes in mentation and behavior  - Position to facilitate oxygenation and minimize respiratory effort  - Oxygen administered by appropriate delivery if ordered  - Initiate smoking cessation education as indicated  - Encourage broncho-pulmonary hygiene including cough, deep breathe, Incentive Spirometry  - Assess the need for suctioning and aspirate as needed  - Assess and instruct to report SOB or any respiratory difficulty  - Respiratory Therapy support as indicated  Outcome: Progressing     Problem: GENITOURINARY - ADULT  Goal: Urinary catheter remains patent  Description: INTERVENTIONS:  - Assess patency of urinary catheter  - If patient has a chronic aj, consider changing catheter if non-functioning  - Follow guidelines for intermittent irrigation of non-functioning urinary catheter  Outcome: Progressing     Problem: SKIN/TISSUE INTEGRITY - ADULT  Goal: Skin integrity remains intact  Description: INTERVENTIONS  - Identify patients at risk for skin breakdown  - Assess and monitor skin integrity  - Assess and monitor nutrition and hydration status  - Monitor labs (i e  albumin)  - Assess for incontinence   - Turn and reposition patient  - Assist with mobility/ambulation  - Relieve pressure over bony prominences  - Avoid friction and shearing  - Provide appropriate hygiene as needed including keeping skin clean and dry  - Evaluate need for skin moisturizer/barrier cream  - Collaborate with interdisciplinary team (i e  Nutrition, Rehabilitation, etc )   - Patient/family teaching  Outcome: Progressing     Problem: SAFETY,RESTRAINT: NV/NON-SELF DESTRUCTIVE BEHAVIOR  Goal: Remains free of harm/injury (restraint for non violent/non self-detsructive behavior)  Description: INTERVENTIONS:  - Instruct patient/family regarding restraint use   - Assess and monitor physiologic and psychological status   - Provide interventions and comfort measures to meet assessed patient needs   - Identify and implement measures to help patient regain control  - Assess readiness for release of restraint   Outcome: Progressing  Goal: Returns to optimal restraint-free functioning  Description: INTERVENTIONS:  - Assess the patient's behavior and symptoms that indicate continued need for restraint  - Identify and implement measures to help patient regain control  - Assess readiness for release of restraint   Outcome: Progressing

## 2021-01-03 NOTE — PLAN OF CARE
Problem: Potential for Falls  Goal: Patient will remain free of falls  Description: INTERVENTIONS:  - Assess patient frequently for physical needs  -  Identify cognitive and physical deficits and behaviors that affect risk of falls    -  [place] fall precautions as indicated by assessment   - Educate patient/family on patient safety including physical limitations  - Instruct patient to call for assistance with activity based on assessment  - Modify environment to reduce risk of injury  - Consider OT/PT consult to assist with strengthening/mobility  Outcome: Progressing     Problem: Prexisting or High Potential for Compromised Skin Integrity  Goal: Skin integrity is maintained or improved  Description: INTERVENTIONS:  - Identify patients at risk for skin breakdown  - Assess and monitor skin integrity  - Assess and monitor nutrition and hydration status  - Monitor labs   - Assess for incontinence   - Turn and reposition patient  - Assist with mobility/ambulation  - Relieve pressure over bony prominences  - Avoid friction and shearing  - Provide appropriate hygiene as needed including keeping skin clean and dry  - Evaluate need for skin moisturizer/barrier cream  - Collaborate with interdisciplinary team   - Patient/family teaching  - Consider wound care consult   Outcome: Progressing     Problem: PAIN - ADULT  Goal: Verbalizes/displays adequate comfort level or baseline comfort level  Description: Interventions:  - Encourage patient to monitor pain and request assistance  - Assess pain using appropriate pain scale  - Administer analgesics based on type and severity of pain and evaluate response  - Implement non-pharmacological measures as appropriate and evaluate response  - Consider cultural and social influences on pain and pain management  - Notify physician/advanced practitioner if interventions unsuccessful or patient reports new pain  Outcome: Progressing     Problem: INFECTION - ADULT  Goal: Absence or prevention of progression during hospitalization  Description: INTERVENTIONS:  - Assess and monitor for signs and symptoms of infection  - Monitor lab/diagnostic results  - Monitor all insertion sites, i e  indwelling lines, tubes, and drains  - Monitor endotracheal if appropriate and nasal secretions for changes in amount and color  - [place] appropriate cooling/warming therapies per order  - Administer medications as ordered  - Instruct and encourage patient and family to use good hand hygiene technique  - Identify and instruct in appropriate isolation precautions for identified infection/condition  Outcome: Progressing     Problem: SAFETY ADULT  Goal: Patient will remain free of falls  Description: INTERVENTIONS:  - Assess patient frequently for physical needs  -  Identify cognitive and physical deficits and behaviors that affect risk of falls    -  [place] fall precautions as indicated by assessment   - Educate patient/family on patient safety including physical limitations  - Instruct patient to call for assistance with activity based on assessment  - Modify environment to reduce risk of injury  - Consider OT/PT consult to assist with strengthening/mobility  Outcome: Progressing  Goal: Maintain or return to baseline ADL function  Description: INTERVENTIONS:  -  Assess patient's ability to carry out ADLs; assess patient's baseline for ADL function and identify physical deficits which impact ability to perform ADLs (bathing, care of mouth/teeth, toileting, grooming, dressing, etc )  - Assess/evaluate cause of self-care deficits   - Assess range of motion  - Assess patient's mobility; develop plan if impaired  - Assess patient's need for assistive devices and provide as appropriate  - Encourage maximum independence but intervene and supervise when necessary  - Involve family in performance of ADLs  - Assess for home care needs following discharge   - Consider OT consult to assist with ADL evaluation and planning for discharge  - Provide patient education as appropriate  Outcome: Progressing  Goal: Maintain or return mobility status to optimal level  Description: INTERVENTIONS:  - Assess patient's baseline mobility status (ambulation, transfers, stairs, etc )    - Identify cognitive and physical deficits and behaviors that affect mobility  - Identify mobility aids required to assist with transfers and/or ambulation (gait belt, sit-to-stand, lift, walker, cane, etc )  - [place] fall precautions as indicated by assessment  - Record patient progress and toleration of activity level on Mobility SBAR; progress patient to next Phase/Stage  - Instruct patient to call for assistance with activity based on assessment  - Consider rehabilitation consult to assist with strengthening/weightbearing, etc   Outcome: Progressing     Problem: DISCHARGE PLANNING  Goal: Discharge to home or other facility with appropriate resources  Description: INTERVENTIONS:  - Identify barriers to discharge w/patient and caregiver  - Arrange for needed discharge resources and transportation as appropriate  - Identify discharge learning needs (meds, wound care, etc )  - Arrange for interpretive services to assist at discharge as needed  - Refer to Case Management Department for coordinating discharge planning if the patient needs post-hospital services based on physician/advanced practitioner order or complex needs related to functional status, cognitive ability, or social support system  Outcome: Progressing     Problem: Knowledge Deficit  Goal: Patient/family/caregiver demonstrates understanding of disease process, treatment plan, medications, and discharge instructions  Description: Complete learning assessment and assess knowledge base    Interventions:  - Provide teaching at level of understanding  - Provide teaching via preferred learning methods  Outcome: Progressing     Problem: Nutrition/Hydration-ADULT  Goal: Nutrient/Hydration intake appropriate for improving, restoring or maintaining nutritional needs  Description: Monitor and assess patient's nutrition/hydration status for malnutrition  Collaborate with interdisciplinary team and initiate plan and interventions as ordered  Monitor patient's weight and dietary intake as ordered or per policy  Utilize nutrition screening tool and intervene as necessary  Determine patient's food preferences and provide high-protein, high-caloric foods as appropriate       INTERVENTIONS:  - Monitor oral intake, urinary output, labs, and treatment plans  - Assess nutrition and hydration status and recommend course of action  - Evaluate amount of meals eaten  - Assist patient with eating if necessary   - Allow adequate time for meals  - Recommend/ encourage appropriate diets, oral nutritional supplements, and vitamin/mineral supplements  - Order, calculate, and assess calorie counts as needed  - Recommend, monitor, and adjust tube feedings and TPN/PPN based on assessed needs  - Assess need for intravenous fluids  - Provide specific nutrition/hydration education as appropriate  - Include patient/family/caregiver in decisions related to nutrition  Outcome: Progressing     Problem: RESPIRATORY - ADULT  Goal: Achieves optimal ventilation and oxygenation  Description: INTERVENTIONS:  - Assess for changes in respiratory status  - Assess for changes in mentation and behavior  - Position to facilitate oxygenation and minimize respiratory effort  - Oxygen administered by appropriate delivery if ordered  - Initiate smoking cessation education as indicated  - Encourage broncho-pulmonary hygiene including cough, deep breathe, Incentive Spirometry  - Assess the need for suctioning and aspirate as needed  - Assess and instruct to report SOB or any respiratory difficulty  - Respiratory Therapy support as indicated  Outcome: Progressing     Problem: GENITOURINARY - ADULT  Goal: Urinary catheter remains patent  Description: INTERVENTIONS:  - Assess patency of urinary catheter  - If patient has a chronic aj, consider changing catheter if non-functioning  - Follow guidelines for intermittent irrigation of non-functioning urinary catheter  Outcome: Progressing     Problem: SKIN/TISSUE INTEGRITY - ADULT  Goal: Skin integrity remains intact  Description: INTERVENTIONS  - Identify patients at risk for skin breakdown  - Assess and monitor skin integrity  - Assess and monitor nutrition and hydration status  - Monitor labs (i e  albumin)  - Assess for incontinence   - Turn and reposition patient  - Assist with mobility/ambulation  - Relieve pressure over bony prominences  - Avoid friction and shearing  - Provide appropriate hygiene as needed including keeping skin clean and dry  - Evaluate need for skin moisturizer/barrier cream  - Collaborate with interdisciplinary team (i e  Nutrition, Rehabilitation, etc )   - Patient/family teaching  Outcome: Progressing     Problem: SAFETY,RESTRAINT: NV/NON-SELF DESTRUCTIVE BEHAVIOR  Goal: Remains free of harm/injury (restraint for non violent/non self-detsructive behavior)  Description: INTERVENTIONS:  - Instruct patient/family regarding restraint use   - Assess and monitor physiologic and psychological status   - Provide interventions and comfort measures to meet assessed patient needs   - Identify and implement measures to help patient regain control  - Assess readiness for release of restraint   Outcome: Progressing  Goal: Returns to optimal restraint-free functioning  Description: INTERVENTIONS:  - Assess the patient's behavior and symptoms that indicate continued need for restraint  - Identify and implement measures to help patient regain control  - Assess readiness for release of restraint   Outcome: Progressing

## 2021-01-03 NOTE — ASSESSMENT & PLAN NOTE
Oxygen requirements are better today  She is down 5 L via mid flow (from 15L mid flow yesterday)  Wean down as tolerated  She is on her 2nd course of dexamethasone due to prolonged severe disease  Code status: DNR DNI

## 2021-01-03 NOTE — PROGRESS NOTES
*    Progress Note - Nelson Teixeira 1935, 80 y o  female MRN: [id number]    [facility] [address] 221-01 Encounter: [id number]    Primary Care Provider: Rosanne Bond MD   Date and time admitted to hospital: 12/18/2020  5:50 PM        * Acute respiratory failure due to COVID-19 [facility])  Assessment & Plan  Oxygen requirements are better today  She is down 5 L via mid flow (from 15L mid flow yesterday)  Wean down as tolerated  She is on her 2nd course of dexamethasone due to prolonged severe disease  Code status: DNR DNI    Pneumonia due to COVID-19 virus  Assessment & Plan  · Improving inflammatory markers but still with high O2 requirement  · Completed initial 10 days of dexamethasone  · However due to persistently increased O2 requirement, she is started on her 2nd course of dexamethasone, currently day 14  · Completed 5 days of remdesivir  · Ongoing treatment with Lipitor, vitamin-D, heparin and multivitamin    Acute metabolic encephalopathy  Assessment & Plan  · Acute metabolic encephalopathy secondary to COVID-19, hypernatremia on top of underlying cognitive decline  · Monitor  · Continue assistance when feeding  · Diet changed to pureed with nectar thickened liquid    Hypernatremia  Assessment & Plan  Secondary to poor oral intake  Sodium is better half normal saline infusion  Continue diet as tolerated  Recheck labs in a m    High likelihood of recurrence due to poor po intake    Bipolar 1 disorder (HCC)  Assessment & Plan  · With metabolic encephalopathy due to acute illness, hypernatremia, hypoxia  · Continue Seroquel 400 mg at bedtime, Topamax 50 mg b i d , Lamictal 200 mg daily    Hyperlipidemia  Assessment & Plan  · Continue Lipitor 40 mg daily     Urinary retention  Assessment & Plan  Failed urinary retention protocol  She had multiple bladder scans with urinary retention measuring 325 mL, 375 mL and 410 mL  Louie catheter replaced 01/01/2021   Outpatient Urology follow-up    Anemia, macrocytic  Assessment & Plan  · Hemoglobin stable at 10 2  · Continue vitamin supplements and iron  · Repeat labs in a m  Results from last 7 days   Lab Units 21  0501 21  0545 20  0600 20  0431 20  0907 20  0613   HEMOGLOBIN g/dL 10 3* 9 2* 11 1* 11 6 11 8 10 7*         VTE Pharmacologic Prophylaxis:   Pharmacologic: Heparin  Mechanical VTE Prophylaxis in Place: Yes    Patient Centered Rounds: Discussed with her nurse    Discussions with Specialists or Other Care Team Provider:  Discussed with pulmonary    Education and Discussions with Family / Patient: spoke with son Olya Dan    Time Spent for Care: 20 minutes  More than 50% of total time spent on counseling and coordination of care as described above  Current Length of Stay: 16 day(s)    Current Patient Status: Inpatient   Certification Statement: The patient will continue to require additional inpatient hospital stay due to covid    Discharge Plan: Likely STR    Code Status: Level 3 - DNAR and DNI      Subjective:   O2 down to 5L today (from 15)  Still with poor po intake per nurse  Weak and unable to get up without assistance    Objective:     Vitals:   Temp (24hrs), Av 2 °F (36 8 °C), Min:97 3 °F (36 3 °C), Max:99 °F (37 2 °C)    Temp:  [97 3 °F (36 3 °C)-99 °F (37 2 °C)] 97 3 °F (36 3 °C)  HR:  [93-94] 94  Resp:  [19-24] 24  BP: (144-146)/(83-84) 144/83  SpO2:  [94 %-95 %] 95 %  Body mass index is 19 76 kg/m²  Input and Output Summary (last 24 hours): Intake/Output Summary (Last 24 hours) at 1/3/2021 1525  Last data filed at 1/3/2021 0201  Gross per 24 hour   Intake 708 75 ml   Output 1200 ml   Net -491 25 ml       Physical Exam:     Physical Exam  Constitutional:       Appearance: She is not ill-appearing or diaphoretic  HENT:      Head: Normocephalic and atraumatic  Comments: Temporal wasting     Nose: No rhinorrhea        Mouth/Throat:      Comments: dry  Eyes:      General: No scleral icterus  Neck:      Musculoskeletal: Neck supple  Cardiovascular:      Rate and Rhythm: Regular rhythm  Heart sounds: Murmur present  Pulmonary:      Effort: No respiratory distress  Breath sounds: No wheezing or rales  Comments: Poor effort  Abdominal:      General: Abdomen is flat  Palpations: Abdomen is soft  Comments: No hypogastric fullness   Musculoskeletal:      Right lower leg: No edema  Left lower leg: No edema  Comments: Decreased muscle mass   Neurological:      Mental Status: She is alert  Comments: Awake but confused   Psychiatric:      Comments: Mood is depressed     Additional Data:     Labs:    Results from last 7 days   Lab Units 01/03/21  0501 01/02/21  0545 12/31/20  0600   WBC Thousand/uL 14 64* 9 98 11 15*   HEMOGLOBIN g/dL 10 3* 9 2* 11 1*   HEMATOCRIT % 33 6* 29 9* 36 3   PLATELETS Thousands/uL 488* 431* 601*   BANDS PCT %  --  2  --    NEUTROS PCT %  --   --  77*   LYMPHS PCT %  --   --  11*   LYMPHO PCT %  --  19  --    MONOS PCT %  --   --  10   MONO PCT %  --  6  --    EOS PCT %  --  1 0     Results from last 7 days   Lab Units 01/03/21  0501   SODIUM mmol/L 141   POTASSIUM mmol/L 3 6   CHLORIDE mmol/L 105   CO2 mmol/L 28   BUN mg/dL 18   CREATININE mg/dL 0 82   ANION GAP mmol/L 8   CALCIUM mg/dL 9 0   ALBUMIN g/dL 2 2*   TOTAL BILIRUBIN mg/dL 0 31   ALK PHOS U/L 73   ALT U/L 22   AST U/L 24   GLUCOSE RANDOM mg/dL 85         Results from last 7 days   Lab Units 12/29/20  2103   POC GLUCOSE mg/dl 233*                   * I Have Reviewed All Lab Data Listed Above  * Additional Pertinent Lab Tests Reviewed:  All Labs Within Last 24 Hours Reviewed    Imaging:    Imaging Reports Reviewed Today Include:  none    Recent Cultures (last 7 days):           Last 24 Hours Medication List:   Current Facility-Administered Medications   Medication Dose Route Frequency Provider Last Rate    acetaminophen  650 mg Oral Q6H PRN Anthony Castillo PA-C      atorvastatin  40 mg Oral HS Azalia Mack PA-C      cholecalciferol  2,000 Units Oral Daily Azalia Hernández PA-C      clonazePAM  0 5 mg Oral BID Jaylan Macias PA-C      dexamethasone  6 mg Intravenous Daily Pamela Saba MD      dicyclomine  10 mg Oral TID PRN Jeannine Isle, DO      famotidine  20 mg Oral Daily Azalia Hernández PA-C      ferrous sulfate  325 mg Oral Daily With Breakfast Argentina Mckinley DO      heparin (porcine)  5,000 Units Subcutaneous [facility] Azalia Hernández PA-C      lamoTRIgine  200 mg Oral Daily Before Breakfast Azalia Hernández PA-C      multivitamin-minerals  1 tablet Oral Daily Azalia Hernández PA-C      ondansetron  4 mg Intravenous Q6H PRN Jaylan Macias PA-C      oxybutynin  10 mg Oral Daily Azalia Hernández PA-C      pantoprazole  40 mg Oral Early Morning Azalia Hernández PA-C      QUEtiapine  400 mg Oral HS Azalia Hernández PA-C      senna-docusate sodium  1 tablet Oral BID Jeannine Isle, DO      topiramate  50 mg Oral BID Jaylan Macias PA-C          Today, Patient Was Seen By: Pamela Saba MD    ** Please Note: Dictation voice to text software may have been used in the creation of this document   **

## 2021-01-03 NOTE — ASSESSMENT & PLAN NOTE
Secondary to poor oral intake  Sodium is better half normal saline infusion  Continue diet as tolerated  Recheck labs in a m    High likelihood of recurrence due to poor po intake

## 2021-01-03 NOTE — ASSESSMENT & PLAN NOTE
· Hemoglobin stable at 10 2  · Continue vitamin supplements and iron  · Repeat labs in a m      Results from last 7 days   Lab Units 01/03/21  0501 01/02/21  0545 12/31/20  0600 12/30/20  0431 12/29/20  0907 12/28/20  0613   HEMOGLOBIN g/dL 10 3* 9 2* 11 1* 11 6 11 8 10 7*

## 2021-01-03 NOTE — ASSESSMENT & PLAN NOTE
· Improving inflammatory markers but still with high O2 requirement  · Completed initial 10 days of dexamethasone      · However due to persistently increased O2 requirement, she is started on her 2nd course of dexamethasone, currently day 14  · Completed 5 days of remdesivir  · Ongoing treatment with Lipitor, vitamin-D, heparin and multivitamin

## 2021-01-04 PROBLEM — K14.3 BLACK TONGUE: Status: RESOLVED | Noted: 2020-12-31 | Resolved: 2021-01-04

## 2021-01-04 LAB
ALBUMIN SERPL BCP-MCNC: 2.3 G/DL (ref 3.5–5)
ALP SERPL-CCNC: 72 U/L (ref 46–116)
ALT SERPL W P-5'-P-CCNC: 24 U/L (ref 12–78)
ANION GAP SERPL CALCULATED.3IONS-SCNC: 8 MMOL/L (ref 4–13)
AST SERPL W P-5'-P-CCNC: 28 U/L (ref 5–45)
BASOPHILS # BLD AUTO: 0.06 THOUSANDS/ΜL (ref 0–0.1)
BASOPHILS NFR BLD AUTO: 1 % (ref 0–1)
BILIRUB SERPL-MCNC: 0.31 MG/DL (ref 0.2–1)
BUN SERPL-MCNC: 20 MG/DL (ref 5–25)
CALCIUM ALBUM COR SERPL-MCNC: 10.5 MG/DL (ref 8.3–10.1)
CALCIUM SERPL-MCNC: 9.1 MG/DL (ref 8.3–10.1)
CHLORIDE SERPL-SCNC: 103 MMOL/L (ref 100–108)
CO2 SERPL-SCNC: 29 MMOL/L (ref 21–32)
CREAT SERPL-MCNC: 0.71 MG/DL (ref 0.6–1.3)
EOSINOPHIL # BLD AUTO: 0 THOUSAND/ΜL (ref 0–0.61)
EOSINOPHIL NFR BLD AUTO: 0 % (ref 0–6)
ERYTHROCYTE [DISTWIDTH] IN BLOOD BY AUTOMATED COUNT: 12.4 % (ref 11.6–15.1)
GFR SERPL CREATININE-BSD FRML MDRD: 78 ML/MIN/1.73SQ M
GLUCOSE SERPL-MCNC: 129 MG/DL (ref 65–140)
HCT VFR BLD AUTO: 33.8 % (ref 34.8–46.1)
HGB BLD-MCNC: 10.6 G/DL (ref 11.5–15.4)
IMM GRANULOCYTES # BLD AUTO: 0.25 THOUSAND/UL (ref 0–0.2)
IMM GRANULOCYTES NFR BLD AUTO: 2 % (ref 0–2)
LYMPHOCYTES # BLD AUTO: 1.07 THOUSANDS/ΜL (ref 0.6–4.47)
LYMPHOCYTES NFR BLD AUTO: 10 % (ref 14–44)
MCH RBC QN AUTO: 32 PG (ref 26.8–34.3)
MCHC RBC AUTO-ENTMCNC: 31.4 G/DL (ref 31.4–37.4)
MCV RBC AUTO: 102 FL (ref 82–98)
MONOCYTES # BLD AUTO: 0.57 THOUSAND/ΜL (ref 0.17–1.22)
MONOCYTES NFR BLD AUTO: 5 % (ref 4–12)
NEUTROPHILS # BLD AUTO: 8.51 THOUSANDS/ΜL (ref 1.85–7.62)
NEUTS SEG NFR BLD AUTO: 82 % (ref 43–75)
NRBC BLD AUTO-RTO: 0 /100 WBCS
PLATELET # BLD AUTO: 450 THOUSANDS/UL (ref 149–390)
PMV BLD AUTO: 9.2 FL (ref 8.9–12.7)
POTASSIUM SERPL-SCNC: 3.1 MMOL/L (ref 3.5–5.3)
PROT SERPL-MCNC: 6.9 G/DL (ref 6.4–8.2)
RBC # BLD AUTO: 3.31 MILLION/UL (ref 3.81–5.12)
SODIUM SERPL-SCNC: 140 MMOL/L (ref 136–145)
WBC # BLD AUTO: 10.46 THOUSAND/UL (ref 4.31–10.16)

## 2021-01-04 PROCEDURE — 97530 THERAPEUTIC ACTIVITIES: CPT

## 2021-01-04 PROCEDURE — 80053 COMPREHEN METABOLIC PANEL: CPT | Performed by: INTERNAL MEDICINE

## 2021-01-04 PROCEDURE — 92526 ORAL FUNCTION THERAPY: CPT

## 2021-01-04 PROCEDURE — 85025 COMPLETE CBC W/AUTO DIFF WBC: CPT | Performed by: INTERNAL MEDICINE

## 2021-01-04 PROCEDURE — 97535 SELF CARE MNGMENT TRAINING: CPT

## 2021-01-04 PROCEDURE — 99232 SBSQ HOSP IP/OBS MODERATE 35: CPT | Performed by: INTERNAL MEDICINE

## 2021-01-04 RX ADMIN — FAMOTIDINE 20 MG: 20 TABLET ORAL at 09:05

## 2021-01-04 RX ADMIN — HEPARIN SODIUM 5000 UNITS: 5000 INJECTION INTRAVENOUS; SUBCUTANEOUS at 15:00

## 2021-01-04 RX ADMIN — DOCUSATE SODIUM AND SENNOSIDES 1 TABLET: 8.6; 5 TABLET, FILM COATED ORAL at 09:04

## 2021-01-04 RX ADMIN — CLONAZEPAM 0.5 MG: 0.5 TABLET ORAL at 17:24

## 2021-01-04 RX ADMIN — CLONAZEPAM 0.5 MG: 0.5 TABLET ORAL at 09:05

## 2021-01-04 RX ADMIN — OXYBUTYNIN 10 MG: 10 TABLET, FILM COATED, EXTENDED RELEASE ORAL at 09:04

## 2021-01-04 RX ADMIN — Medication 1 TABLET: at 09:04

## 2021-01-04 RX ADMIN — TOPIRAMATE 50 MG: 25 TABLET, FILM COATED ORAL at 09:05

## 2021-01-04 RX ADMIN — FERROUS SULFATE TAB 325 MG (65 MG ELEMENTAL FE) 325 MG: 325 (65 FE) TAB at 09:05

## 2021-01-04 RX ADMIN — DEXAMETHASONE SODIUM PHOSPHATE 6 MG: 4 INJECTION INTRA-ARTICULAR; INTRALESIONAL; INTRAMUSCULAR; INTRAVENOUS; SOFT TISSUE at 09:05

## 2021-01-04 RX ADMIN — Medication 2000 UNITS: at 09:05

## 2021-01-04 RX ADMIN — DOCUSATE SODIUM AND SENNOSIDES 1 TABLET: 8.6; 5 TABLET, FILM COATED ORAL at 17:24

## 2021-01-04 RX ADMIN — HEPARIN SODIUM 5000 UNITS: 5000 INJECTION INTRAVENOUS; SUBCUTANEOUS at 05:37

## 2021-01-04 RX ADMIN — TOPIRAMATE 50 MG: 25 TABLET, FILM COATED ORAL at 17:24

## 2021-01-04 RX ADMIN — HEPARIN SODIUM 5000 UNITS: 5000 INJECTION INTRAVENOUS; SUBCUTANEOUS at 21:06

## 2021-01-04 NOTE — CASE MANAGEMENT
Pjbernardino Mccormick called at 2:48 PM and spoke with this case management assistant about the best contact number to reach him at  He provided me with his address that was added to the patient's chart  He asked to have his cell phone number removed from the chart, because he stated he doesn't use his cellphone much  Pjbernardino Mccormick stated that him and his sister, Velvet Beard are both POA's for their mother so that was added to the chart as well  Pj Mccormick asked if we had the POA paperwork for his mother  I explained to Pj Mccormick that I did not see any paperwork scanned in his mother's chart in regards to POA's listed  He is going to check in with Velvet Beard about the [place] paperwork

## 2021-01-04 NOTE — OCCUPATIONAL THERAPY NOTE
OccupationalTherapy Progress Note     Patient Name: Tano Fernandes  VIFHL'Z Date: 1/4/2021  Problem List  Principal Problem:    Acute respiratory failure due to COVID-19 [facility])  Active Problems:    Hyperlipidemia    Bipolar 1 disorder ([facility] [address] )    Acute metabolic encephalopathy    Anemia, macrocytic    Hypernatremia    Pneumonia due to COVID-19 virus    Urinary retention            01/04/21 1418   OT Last Visit   OT Visit Date 01/04/21   Note Type   Note Type Treatment   Restrictions/Precautions   Weight Bearing Precautions Per Order No   Other Precautions Contact/isolation; Airborne/isolation;Cognitive; Chair Alarm; Bed Alarm;O2;Fall Risk;Multiple lines;Hard of hearing   General   Response to Previous Treatment Patient with no complaints from previous session   Lifestyle   Autonomy At baseline, pt was I w/ ADLs and functional mobility/transfers w/ use of RW, required assist w/ IADLs, and (-)   Unknown fall hx, pt unable to report  Reciprocal Relationships 2 sons, dtr   Service to Others Retired   Intrinsic Gratification Watching TV   Pain Assessment   Pain Assessment Tool Pain Assessment not indicated - pt denies pain   Pain Score No Pain   ADL   Grooming Assistance 4  Minimal Assistance   Grooming Deficit Setup;Verbal cueing;Supervision/safety; Increased time to complete;Wash/dry hands; Wash/dry face;Brushing hair   Grooming Comments Grooming tasks completed with Min A with cues for initiation/sequencing and assist for thoroughness  UB Dressing Assistance 4  Minimal Assistance   UB Dressing Deficit Setup;Verbal cueing; Increased time to complete;Supervision/safety;Pull around back;Pull down in back   UB Dressing Comments UB dressing completed while seated EOB with Min A 2* cues for sequencing and assist to bring over shoulders and down in back  LB Dressing Assistance 2  Maximal Assistance   LB Dressing Deficit Setup;Verbal cueing;Supervision/safety; Increased time to complete; Don/doff R sock; Don/doff L sock   Functional Standing Tolerance   Time 1 min   Activity Dynamic standing balance activity   Comments Mod A for balance/steadying   Bed Mobility   Supine to Sit 3  Moderate assistance   Additional items Assist x 1;HOB elevated; Bedrails; Increased time required;Verbal cues;LE management   Sit to Supine 3  Moderate assistance   Additional items Assist x 1; Increased time required;Verbal cues;LE management   Additional Comments  Pt lying supine at end of session with call bell and phone within reach  Bed alarm activated  All needs met and pt reports no further questions for OT at this time  Transfers   Sit to Stand 3  Moderate assistance   Additional items Assist x 1; Increased time required;Verbal cues   Stand to Sit 3  Moderate assistance   Additional items Assist x 1; Increased time required;Verbal cues   Additional Comments Cues for safe technique and hand placement   Functional Mobility   Functional Mobility 3  Moderate assistance   Additional Comments Assist x1; Lateral stepping towards HOB, decreased safety awareness demonstrated   Additional items Rolling walker   Therapeutic Exercise - ROM   UE-ROM Yes   ROM- Right Upper Extremities   R Shoulder AROM; Flexion; Extension   R Elbow AROM;Elbow flexion;Elbow extension   R Position Supine   R Weight/Reps/Sets 10 reps x1    RUE ROM Comment Pt required frequent verbal and visual cues for carryover of exercises  No c/o pain   ROM - Left Upper Extremities    L Shoulder AROM; Flexion; Extension   L Elbow AROM;Elbow flexion;Elbow extension   L Position Supine   L Weight/Reps/Sets 10 reps x1   LUE ROM Comment Pt required frequent verbal and visual cues for carryover of exercises  No c/o pain   Cognition   Overall Cognitive Status Impaired   Arousal/Participation Alert   Attention Attends with cues to redirect   Orientation Level Oriented to person;Disoriented to place; Disoriented to time;Disoriented to situation   Memory Decreased recall of precautions;Decreased recall of recent events;Decreased short term memory   Following Commands Follows one step commands with increased time or repetition   Activity Tolerance   Activity Tolerance Patient limited by fatigue;Patient limited by pain   Medical Staff Made Alyssia Luis RN   Assessment   Assessment Pt seen for OT treatment session focusing on functional activity tolerance, bed mobility, ADLs, and functional transfers/mobility  Pt w/ increased alertness from previous session  Pt lying supine at start of session  Resting SPO2: 97% on 15L midflow O2  Grooming tasks completed with Min A with cues for initiation/sequencing and assist for thoroughness  Bed mobility (sit>supine) completed w/ Mod A with assist for LE management and trunk control  UB dressing completed while seated EOB with Min A 2* cues for sequencing and assist to bring over shoulders and down in back  Transfers (sit<>stand) completed w/ Mod A with cues for safe technique and hand placement  Mod A required for lateral stepping towards [facility] with hand held assistance provided  SPO2 post-activity: 92% on 15L Midflow O2  Pt returned to supine position with Mod A A with cues for safety awareness  UE exercises completed while lying supine to increase UE ROM/strength needed for ADLs/transfers  Pt required frequent verbal and visual cues for carryover of exercises  Pt lowered to 6L Midflow O2 per nursing request at end of session  SPO2: 95% on 6L Midflow O2  Pt lying supine at end of session with call bell and phone within reach  Bed alarm activated  All needs met and pt reports no further questions for OT at this time  Continue to recommend STR when medically cleared  OT goals remain appropriate, will extend by 10 additional days  OT to follow pt on caseload  Plan   Treatment Interventions ADL retraining;Functional transfer training;UE strengthening/ROM; Endurance training;Patient/family training;Equipment evaluation/education; Compensatory technique education; Energy conservation; Activityengagement   Goal Expiration Date 01/14/21   OT Treatment Day 3   OT Frequency 3-5x/wk   Recommendation   OT Discharge Recommendation Post-Acute Rehabilitation Services   OT - OK to Discharge Yes  (when medically cleared)   Barthel Index   Feeding 5   Bathing 0   Grooming Score 0   Dressing Score 5   Bladder Score 5   Bowels Score 5   Toilet Use Score 5   Transfers (Bed/Chair) Score 10   Mobility (Level Surface) Score 0   Stairs Score 0   Barthel Index Score 35   Modified Swain Scale   Modified Swain Scale 4       Lauren Anguiano, OTR/L

## 2021-01-04 NOTE — PROGRESS NOTES
Progress Note - Poncho Galvin 1935, 80 y o  female MRN: [id number]    Unit/Bed#: [facility] 68 2 [address] 87 221-01 Encounter: [id number]    Primary Care Provider: Victor M Delgado MD   Date and time admitted to hospital: 12/18/2020  5:50 PM        * Acute respiratory failure due to COVID-19 [facility])  Assessment & Plan  Oxygen requirements are relatively stable at 6 L today  She is slightly up from 5 L via mid flow yesterday  Wean down as tolerated  She is on her 2nd course of dexamethasone due to prolonged severe disease  Code status: DNR DNI    Pneumonia due to COVID-19 virus  Assessment & Plan  · Improving inflammatory markers but still with high O2 requirement  · Completed initial 10 days of dexamethasone      · However due to persistently increased O2 requirement, she is started on her 2nd course of dexamethasone, currently day 15  · Completed 5 days of remdesivir  · Ongoing treatment with Lipitor, vitamin-D, heparin and multivitamin    Acute metabolic encephalopathy  Assessment & Plan  · Acute metabolic encephalopathy secondary to COVID-19, hypernatremia on top of underlying cognitive decline  · Monitor  · Continue assistance when feeding  · Diet changed to pureed with nectar thickened liquid    Hypernatremia  Assessment & Plan  Secondary to poor oral intake  Sodium is better half normal saline infusion  Continue diet as tolerated  Monitor labs  High likelihood of recurrence due to poor oral intake and encephalopathy    Bipolar 1 disorder (HCC)  Assessment & Plan  · With metabolic encephalopathy due to acute illness, hypernatremia, hypoxia  · Continue Seroquel 400 mg at bedtime, Topamax 50 mg b i d , Lamictal 200 mg daily    Hyperlipidemia  Assessment & Plan  · Continue Lipitor 40 mg daily     Urinary retention  Assessment & Plan  Failed urinary retention protocol  She had multiple bladder scans with urinary retention measuring 325 mL, 375 mL and 410 mL  Louie catheter replaced 01/01/2021   Outpatient Urology follow-up    Black tongueresolved as of 2021  Assessment & Plan  · Without beefy discoloration or enlargement  · This is due to acute illness/medications such as ferrous sulfate/and cellular debris  · Resolved      VTE Pharmacologic Prophylaxis:   Pharmacologic: Heparin  Mechanical VTE Prophylaxis in Place: Yes    Patient Centered Rounds: I have performed bedside rounds with nursing staff today  Discussions with Specialists or Other Care Team Provider:  None    Education and Discussions with Family / Patient:  Spoke with Roddy Carrasco and gave update    Time Spent for Care: 20 minutes  More than 50% of total time spent on counseling and coordination of care as described above  Current Length of Stay: 17 day(s)    Current Patient Status: Inpatient   Certification Statement: The patient will continue to require additional inpatient hospital stay due to [place]    Discharge Plan:  Discussed with case management regarding possible LTAC placement    Code Status: Level 3 - DNAR and DNI      Subjective:   "I feel terrible"  "Can not breathe"  "I am dying"  While she is saying all this, she appeared comfortable, not in distress, about to start eating with speech therapy    Objective:     Vitals:   Temp (24hrs), Av 5 °F (36 9 °C), Min:98 °F (36 7 °C), Max:99 1 °F (37 3 °C)    Temp:  [98 °F (36 7 °C)-99 1 °F (37 3 °C)] 98 °F (36 7 °C)  HR:  [] 93  Resp:  [19-21] 20  BP: (135-138)/(81) 135/81  SpO2:  [90 %-95 %] 95 %  Body mass index is 19 76 kg/m²  Input and Output Summary (last 24 hours): Intake/Output Summary (Last 24 hours) at 2021 1125  Last data filed at 2021 0300  Gross per 24 hour   Intake    Output 1100 ml   Net -1100 ml       Physical Exam:     Physical Exam  Vitals signs reviewed  Constitutional:       Appearance: She is not ill-appearing or diaphoretic  Comments: Cachectic   HENT:      Head: Normocephalic and atraumatic  Nose: No rhinorrhea        Mouth/Throat: Comments: Resolved black discoloration of tongue  Eyes:      General: No scleral icterus  Cardiovascular:      Rate and Rhythm: Regular rhythm  Heart sounds: Murmur present  Pulmonary:      Comments: Poor effort  Diminished at bases  Prominent intercoastal and clavicle  Abdominal:      General: Abdomen is flat  Palpations: Abdomen is soft  Musculoskeletal:      Right lower leg: No edema  Left lower leg: No edema  Comments: Diminished muscle mass   Neurological:      Comments: Confused time and date   Psychiatric:      Comments: Depressed mood     Additional Data:     Labs:    Results from last 7 days   Lab Units 01/04/21  1109  01/02/21  0545   WBC Thousand/uL 10 46*   < > 9 98   HEMOGLOBIN g/dL 10 6*   < > 9 2*   HEMATOCRIT % 33 8*   < > 29 9*   PLATELETS Thousands/uL 450*   < > 431*   BANDS PCT %  --   --  2   NEUTROS PCT % 82*  --   --    LYMPHS PCT % 10*  --   --    LYMPHO PCT %  --   --  19   MONOS PCT % 5  --   --    MONO PCT %  --   --  6   EOS PCT % 0  --  1    < > = values in this interval not displayed  Results from last 7 days   Lab Units 01/03/21  0501   SODIUM mmol/L 141   POTASSIUM mmol/L 3 6   CHLORIDE mmol/L 105   CO2 mmol/L 28   BUN mg/dL 18   CREATININE mg/dL 0 82   ANION GAP mmol/L 8   CALCIUM mg/dL 9 0   ALBUMIN g/dL 2 2*   TOTAL BILIRUBIN mg/dL 0 31   ALK PHOS U/L 73   ALT U/L 22   AST U/L 24   GLUCOSE RANDOM mg/dL 85         Results from last 7 days   Lab Units 12/29/20  2103   POC GLUCOSE mg/dl 233*               * I Have Reviewed All Lab Data Listed Above  * Additional Pertinent Lab Tests Reviewed:  All Labs Within Last 24 Hours Reviewed    Imaging:    Imaging Reports Reviewed Today Include:  None      Recent Cultures (last 7 days):           Last 24 Hours Medication List:   Current Facility-Administered Medications   Medication Dose Route Frequency Provider Last Rate    acetaminophen  650 mg Oral Q6H PRN Azalia Hernández PA-C      atorvastatin  40 mg Oral HS Karen Campos PA-C      cholecalciferol  2,000 Units Oral Daily Azalia Hernández PA-C      clonazePAM  0 5 mg Oral BID Karen Campos PA-C      dexamethasone  6 mg Intravenous Daily Mercedes Sol MD      dicyclomine  10 mg Oral TID PRN Etradha Luaric, DO      famotidine  20 mg Oral Daily Azalia Hernández PA-C      ferrous sulfate  325 mg Oral Daily With Breakfast Michelle Lemme, DO      heparin (porcine)  5,000 Units Subcutaneous [facility] Azalia Hernández PA-C      lamoTRIgine  200 mg Oral Daily Before Breakfast Azalia Hernández PA-C      multivitamin-minerals  1 tablet Oral Daily Azalia Hernández PA-C      ondansetron  4 mg Intravenous Q6H PRN Karen Campos PA-C      oxybutynin  10 mg Oral Daily Azalia Hernández PA-C      pantoprazole  40 mg Oral Early Morning Azalia Hernández PA-C      QUEtiapine  400 mg Oral HS Azalia Hernández PA-C      senna-docusate sodium  1 tablet Oral BID Valeria Bland,       topiramate  50 mg Oral BID Karen Campos PA-C          Today, Patient Was Seen By: Mercedes Sol MD    ** Please Note: Dictation voice to text software may have been used in the creation of this document   **

## 2021-01-04 NOTE — ASSESSMENT & PLAN NOTE
Oxygen requirements are relatively stable at 6 L today  She is slightly up from 5 L via mid flow yesterday  Wean down as tolerated  She is on her 2nd course of dexamethasone due to prolonged severe disease  Code status: DNR DNI

## 2021-01-04 NOTE — ASSESSMENT & PLAN NOTE
Secondary to poor oral intake  Sodium is better half normal saline infusion  Continue diet as tolerated  Monitor labs  High likelihood of recurrence due to poor oral intake and encephalopathy

## 2021-01-04 NOTE — PLAN OF CARE
Problem: Potential for Falls  Goal: Patient will remain free of falls  Description: INTERVENTIONS:  - Assess patient frequently for physical needs  -  Identify cognitive and physical deficits and behaviors that affect risk of falls    -  [place] fall precautions as indicated by assessment   - Educate patient/family on patient safety including physical limitations  - Instruct patient to call for assistance with activity based on assessment  - Modify environment to reduce risk of injury  - Consider OT/PT consult to assist with strengthening/mobility  Outcome: Progressing     Problem: Prexisting or High Potential for Compromised Skin Integrity  Goal: Skin integrity is maintained or improved  Description: INTERVENTIONS:  - Identify patients at risk for skin breakdown  - Assess and monitor skin integrity  - Assess and monitor nutrition and hydration status  - Monitor labs   - Assess for incontinence   - Turn and reposition patient  - Assist with mobility/ambulation  - Relieve pressure over bony prominences  - Avoid friction and shearing  - Provide appropriate hygiene as needed including keeping skin clean and dry  - Evaluate need for skin moisturizer/barrier cream  - Collaborate with interdisciplinary team   - Patient/family teaching  - Consider wound care consult   Outcome: Progressing     Problem: PAIN - ADULT  Goal: Verbalizes/displays adequate comfort level or baseline comfort level  Description: Interventions:  - Encourage patient to monitor pain and request assistance  - Assess pain using appropriate pain scale  - Administer analgesics based on type and severity of pain and evaluate response  - Implement non-pharmacological measures as appropriate and evaluate response  - Consider cultural and social influences on pain and pain management  - Notify physician/advanced practitioner if interventions unsuccessful or patient reports new pain  Outcome: Progressing     Problem: INFECTION - ADULT  Goal: Absence or prevention of progression during hospitalization  Description: INTERVENTIONS:  - Assess and monitor for signs and symptoms of infection  - Monitor lab/diagnostic results  - Monitor all insertion sites, i e  indwelling lines, tubes, and drains  - Monitor endotracheal if appropriate and nasal secretions for changes in amount and color  - [place] appropriate cooling/warming therapies per order  - Administer medications as ordered  - Instruct and encourage patient and family to use good hand hygiene technique  - Identify and instruct in appropriate isolation precautions for identified infection/condition  Outcome: Progressing     Problem: SAFETY ADULT  Goal: Patient will remain free of falls  Description: INTERVENTIONS:  - Assess patient frequently for physical needs  -  Identify cognitive and physical deficits and behaviors that affect risk of falls    -  [place] fall precautions as indicated by assessment   - Educate patient/family on patient safety including physical limitations  - Instruct patient to call for assistance with activity based on assessment  - Modify environment to reduce risk of injury  - Consider OT/PT consult to assist with strengthening/mobility  Outcome: Progressing  Goal: Maintain or return to baseline ADL function  Description: INTERVENTIONS:  -  Assess patient's ability to carry out ADLs; assess patient's baseline for ADL function and identify physical deficits which impact ability to perform ADLs (bathing, care of mouth/teeth, toileting, grooming, dressing, etc )  - Assess/evaluate cause of self-care deficits   - Assess range of motion  - Assess patient's mobility; develop plan if impaired  - Assess patient's need for assistive devices and provide as appropriate  - Encourage maximum independence but intervene and supervise when necessary  - Involve family in performance of ADLs  - Assess for home care needs following discharge   - Consider OT consult to assist with ADL evaluation and planning for discharge  - Provide patient education as appropriate  Outcome: Progressing  Goal: Maintain or return mobility status to optimal level  Description: INTERVENTIONS:  - Assess patient's baseline mobility status (ambulation, transfers, stairs, etc )    - Identify cognitive and physical deficits and behaviors that affect mobility  - Identify mobility aids required to assist with transfers and/or ambulation (gait belt, sit-to-stand, lift, walker, cane, etc )  - [place] fall precautions as indicated by assessment  - Record patient progress and toleration of activity level on Mobility SBAR; progress patient to next Phase/Stage  - Instruct patient to call for assistance with activity based on assessment  - Consider rehabilitation consult to assist with strengthening/weightbearing, etc   Outcome: Progressing     Problem: DISCHARGE PLANNING  Goal: Discharge to home or other facility with appropriate resources  Description: INTERVENTIONS:  - Identify barriers to discharge w/patient and caregiver  - Arrange for needed discharge resources and transportation as appropriate  - Identify discharge learning needs (meds, wound care, etc )  - Arrange for interpretive services to assist at discharge as needed  - Refer to Case Management Department for coordinating discharge planning if the patient needs post-hospital services based on physician/advanced practitioner order or complex needs related to functional status, cognitive ability, or social support system  Outcome: Progressing     Problem: Knowledge Deficit  Goal: Patient/family/caregiver demonstrates understanding of disease process, treatment plan, medications, and discharge instructions  Description: Complete learning assessment and assess knowledge base    Interventions:  - Provide teaching at level of understanding  - Provide teaching via preferred learning methods  Outcome: Progressing     Problem: Nutrition/Hydration-ADULT  Goal: Nutrient/Hydration intake appropriate for improving, restoring or maintaining nutritional needs  Description: Monitor and assess patient's nutrition/hydration status for malnutrition  Collaborate with interdisciplinary team and initiate plan and interventions as ordered  Monitor patient's weight and dietary intake as ordered or per policy  Utilize nutrition screening tool and intervene as necessary  Determine patient's food preferences and provide high-protein, high-caloric foods as appropriate       INTERVENTIONS:  - Monitor oral intake, urinary output, labs, and treatment plans  - Assess nutrition and hydration status and recommend course of action  - Evaluate amount of meals eaten  - Assist patient with eating if necessary   - Allow adequate time for meals  - Recommend/ encourage appropriate diets, oral nutritional supplements, and vitamin/mineral supplements  - Order, calculate, and assess calorie counts as needed  - Recommend, monitor, and adjust tube feedings and TPN/PPN based on assessed needs  - Assess need for intravenous fluids  - Provide specific nutrition/hydration education as appropriate  - Include patient/family/caregiver in decisions related to nutrition  Outcome: Progressing     Problem: RESPIRATORY - ADULT  Goal: Achieves optimal ventilation and oxygenation  Description: INTERVENTIONS:  - Assess for changes in respiratory status  - Assess for changes in mentation and behavior  - Position to facilitate oxygenation and minimize respiratory effort  - Oxygen administered by appropriate delivery if ordered  - Initiate smoking cessation education as indicated  - Encourage broncho-pulmonary hygiene including cough, deep breathe, Incentive Spirometry  - Assess the need for suctioning and aspirate as needed  - Assess and instruct to report SOB or any respiratory difficulty  - Respiratory Therapy support as indicated  Outcome: Progressing     Problem: GENITOURINARY - ADULT  Goal: Urinary catheter remains patent  Description: INTERVENTIONS:  - Assess patency of urinary catheter  - If patient has a chronic aj, consider changing catheter if non-functioning  - Follow guidelines for intermittent irrigation of non-functioning urinary catheter  Outcome: Progressing     Problem: SKIN/TISSUE INTEGRITY - ADULT  Goal: Skin integrity remains intact  Description: INTERVENTIONS  - Identify patients at risk for skin breakdown  - Assess and monitor skin integrity  - Assess and monitor nutrition and hydration status  - Monitor labs (i e  albumin)  - Assess for incontinence   - Turn and reposition patient  - Assist with mobility/ambulation  - Relieve pressure over bony prominences  - Avoid friction and shearing  - Provide appropriate hygiene as needed including keeping skin clean and dry  - Evaluate need for skin moisturizer/barrier cream  - Collaborate with interdisciplinary team (i e  Nutrition, Rehabilitation, etc )   - Patient/family teaching  Outcome: Progressing     Problem: SAFETY,RESTRAINT: NV/NON-SELF DESTRUCTIVE BEHAVIOR  Goal: Remains free of harm/injury (restraint for non violent/non self-detsructive behavior)  Description: INTERVENTIONS:  - Instruct patient/family regarding restraint use   - Assess and monitor physiologic and psychological status   - Provide interventions and comfort measures to meet assessed patient needs   - Identify and implement measures to help patient regain control  - Assess readiness for release of restraint   Outcome: Progressing  Goal: Returns to optimal restraint-free functioning  Description: INTERVENTIONS:  - Assess the patient's behavior and symptoms that indicate continued need for restraint  - Identify and implement measures to help patient regain control  - Assess readiness for release of restraint   Outcome: Progressing

## 2021-01-04 NOTE — PLAN OF CARE
Problem: OCCUPATIONAL THERAPY ADULT  Goal: Performs self-care activities at highest level of function for planned discharge setting  See evaluation for individualized goals  Description: Treatment Interventions: ADL retraining, Functional transfer training, UE strengthening/ROM, Endurance training, Patient/family training, Equipment evaluation/education, Compensatory technique education, Continued evaluation, Activityengagement, Energy conservation          See flowsheet documentation for full assessment, interventions and recommendations  Outcome: Progressing  Note: Limitation: Decreased ADL status, Decreased UE ROM, Decreased UE strength, Decreased cognition, Decreased Safe judgement during ADL, Decreased endurance, Decreased high-level ADLs, Decreased self-care trans  Prognosis: Fair  Assessment: Pt seen for OT treatment session focusing on functional activity tolerance, bed mobility, ADLs, and functional transfers/mobility  Pt w/ increased alertness from previous session  Pt lying supine at start of session  Resting SPO2: 97% on 15L midflow O2  Grooming tasks completed with Min A with cues for initiation/sequencing and assist for thoroughness  Bed mobility (sit>supine) completed w/ Mod A with assist for LE management and trunk control  UB dressing completed while seated EOB with Min A 2* cues for sequencing and assist to bring over shoulders and down in back  Transfers (sit<>stand) completed w/ Mod A with cues for safe technique and hand placement  Mod A required for lateral stepping towards [facility] with hand held assistance provided  SPO2 post-activity: 92% on 15L Midflow O2  Pt returned to supine position with Mod A A with cues for safety awareness  UE exercises completed while lying supine to increase UE ROM/strength needed for ADLs/transfers  Pt required frequent verbal and visual cues for carryover of exercises  Pt lowered to 6L Midflow O2 per nursing request at end of session  SPO2: 95% on 6L Midflow O2  Pt lying supine at end of session with call bell and phone within reach  Bed alarm activated  All needs met and pt reports no further questions for OT at this time  Continue to recommend STR when medically cleared  OT goals remain appropriate, will extend by 10 additional days  OT to follow pt on caseload       OT Discharge Recommendation: Post-Acute Rehabilitation Services  OT - OK to Discharge: Yes(when medically cleared)

## 2021-01-04 NOTE — ASSESSMENT & PLAN NOTE
· Without beefy discoloration or enlargement  · This is due to acute illness/medications such as ferrous sulfate/and cellular debris  · Resolved

## 2021-01-04 NOTE — PLAN OF CARE
Problem: Potential for Falls  Goal: Patient will remain free of falls  Description: INTERVENTIONS:  - Assess patient frequently for physical needs  -  Identify cognitive and physical deficits and behaviors that affect risk of falls    -  [place] fall precautions as indicated by assessment   - Educate patient/family on patient safety including physical limitations  - Instruct patient to call for assistance with activity based on assessment  - Modify environment to reduce risk of injury  - Consider OT/PT consult to assist with strengthening/mobility  Outcome: Progressing     Problem: Prexisting or High Potential for Compromised Skin Integrity  Goal: Skin integrity is maintained or improved  Description: INTERVENTIONS:  - Identify patients at risk for skin breakdown  - Assess and monitor skin integrity  - Assess and monitor nutrition and hydration status  - Monitor labs   - Assess for incontinence   - Turn and reposition patient  - Assist with mobility/ambulation  - Relieve pressure over bony prominences  - Avoid friction and shearing  - Provide appropriate hygiene as needed including keeping skin clean and dry  - Evaluate need for skin moisturizer/barrier cream  - Collaborate with interdisciplinary team   - Patient/family teaching  - Consider wound care consult   Outcome: Progressing     Problem: PAIN - ADULT  Goal: Verbalizes/displays adequate comfort level or baseline comfort level  Description: Interventions:  - Encourage patient to monitor pain and request assistance  - Assess pain using appropriate pain scale  - Administer analgesics based on type and severity of pain and evaluate response  - Implement non-pharmacological measures as appropriate and evaluate response  - Consider cultural and social influences on pain and pain management  - Notify physician/advanced practitioner if interventions unsuccessful or patient reports new pain  Outcome: Progressing     Problem: INFECTION - ADULT  Goal: Absence or prevention of progression during hospitalization  Description: INTERVENTIONS:  - Assess and monitor for signs and symptoms of infection  - Monitor lab/diagnostic results  - Monitor all insertion sites, i e  indwelling lines, tubes, and drains  - Monitor endotracheal if appropriate and nasal secretions for changes in amount and color  - [place] appropriate cooling/warming therapies per order  - Administer medications as ordered  - Instruct and encourage patient and family to use good hand hygiene technique  - Identify and instruct in appropriate isolation precautions for identified infection/condition  Outcome: Progressing     Problem: SAFETY ADULT  Goal: Patient will remain free of falls  Description: INTERVENTIONS:  - Assess patient frequently for physical needs  -  Identify cognitive and physical deficits and behaviors that affect risk of falls    -  [place] fall precautions as indicated by assessment   - Educate patient/family on patient safety including physical limitations  - Instruct patient to call for assistance with activity based on assessment  - Modify environment to reduce risk of injury  - Consider OT/PT consult to assist with strengthening/mobility  Outcome: Progressing  Goal: Maintain or return to baseline ADL function  Description: INTERVENTIONS:  -  Assess patient's ability to carry out ADLs; assess patient's baseline for ADL function and identify physical deficits which impact ability to perform ADLs (bathing, care of mouth/teeth, toileting, grooming, dressing, etc )  - Assess/evaluate cause of self-care deficits   - Assess range of motion  - Assess patient's mobility; develop plan if impaired  - Assess patient's need for assistive devices and provide as appropriate  - Encourage maximum independence but intervene and supervise when necessary  - Involve family in performance of ADLs  - Assess for home care needs following discharge   - Consider OT consult to assist with ADL evaluation and planning for discharge  - Provide patient education as appropriate  Outcome: Progressing  Goal: Maintain or return mobility status to optimal level  Description: INTERVENTIONS:  - Assess patient's baseline mobility status (ambulation, transfers, stairs, etc )    - Identify cognitive and physical deficits and behaviors that affect mobility  - Identify mobility aids required to assist with transfers and/or ambulation (gait belt, sit-to-stand, lift, walker, cane, etc )  - [place] fall precautions as indicated by assessment  - Record patient progress and toleration of activity level on Mobility SBAR; progress patient to next Phase/Stage  - Instruct patient to call for assistance with activity based on assessment  - Consider rehabilitation consult to assist with strengthening/weightbearing, etc   Outcome: Progressing     Problem: DISCHARGE PLANNING  Goal: Discharge to home or other facility with appropriate resources  Description: INTERVENTIONS:  - Identify barriers to discharge w/patient and caregiver  - Arrange for needed discharge resources and transportation as appropriate  - Identify discharge learning needs (meds, wound care, etc )  - Arrange for interpretive services to assist at discharge as needed  - Refer to Case Management Department for coordinating discharge planning if the patient needs post-hospital services based on physician/advanced practitioner order or complex needs related to functional status, cognitive ability, or social support system  Outcome: Progressing     Problem: Knowledge Deficit  Goal: Patient/family/caregiver demonstrates understanding of disease process, treatment plan, medications, and discharge instructions  Description: Complete learning assessment and assess knowledge base    Interventions:  - Provide teaching at level of understanding  - Provide teaching via preferred learning methods  Outcome: Progressing     Problem: Nutrition/Hydration-ADULT  Goal: Nutrient/Hydration intake appropriate for improving, restoring or maintaining nutritional needs  Description: Monitor and assess patient's nutrition/hydration status for malnutrition  Collaborate with interdisciplinary team and initiate plan and interventions as ordered  Monitor patient's weight and dietary intake as ordered or per policy  Utilize nutrition screening tool and intervene as necessary  Determine patient's food preferences and provide high-protein, high-caloric foods as appropriate       INTERVENTIONS:  - Monitor oral intake, urinary output, labs, and treatment plans  - Assess nutrition and hydration status and recommend course of action  - Evaluate amount of meals eaten  - Assist patient with eating if necessary   - Allow adequate time for meals  - Recommend/ encourage appropriate diets, oral nutritional supplements, and vitamin/mineral supplements  - Order, calculate, and assess calorie counts as needed  - Recommend, monitor, and adjust tube feedings and TPN/PPN based on assessed needs  - Assess need for intravenous fluids  - Provide specific nutrition/hydration education as appropriate  - Include patient/family/caregiver in decisions related to nutrition  Outcome: Progressing     Problem: RESPIRATORY - ADULT  Goal: Achieves optimal ventilation and oxygenation  Description: INTERVENTIONS:  - Assess for changes in respiratory status  - Assess for changes in mentation and behavior  - Position to facilitate oxygenation and minimize respiratory effort  - Oxygen administered by appropriate delivery if ordered  - Initiate smoking cessation education as indicated  - Encourage broncho-pulmonary hygiene including cough, deep breathe, Incentive Spirometry  - Assess the need for suctioning and aspirate as needed  - Assess and instruct to report SOB or any respiratory difficulty  - Respiratory Therapy support as indicated  Outcome: Progressing     Problem: GENITOURINARY - ADULT  Goal: Urinary catheter remains patent  Description: INTERVENTIONS:  - Assess patency of urinary catheter  - If patient has a chronic aj, consider changing catheter if non-functioning  - Follow guidelines for intermittent irrigation of non-functioning urinary catheter  Outcome: Progressing     Problem: SKIN/TISSUE INTEGRITY - ADULT  Goal: Skin integrity remains intact  Description: INTERVENTIONS  - Identify patients at risk for skin breakdown  - Assess and monitor skin integrity  - Assess and monitor nutrition and hydration status  - Monitor labs (i e  albumin)  - Assess for incontinence   - Turn and reposition patient  - Assist with mobility/ambulation  - Relieve pressure over bony prominences  - Avoid friction and shearing  - Provide appropriate hygiene as needed including keeping skin clean and dry  - Evaluate need for skin moisturizer/barrier cream  - Collaborate with interdisciplinary team (i e  Nutrition, Rehabilitation, etc )   - Patient/family teaching  Outcome: Progressing     Problem: SAFETY,RESTRAINT: NV/NON-SELF DESTRUCTIVE BEHAVIOR  Goal: Remains free of harm/injury (restraint for non violent/non self-detsructive behavior)  Description: INTERVENTIONS:  - Instruct patient/family regarding restraint use   - Assess and monitor physiologic and psychological status   - Provide interventions and comfort measures to meet assessed patient needs   - Identify and implement measures to help patient regain control  - Assess readiness for release of restraint   Outcome: Progressing  Goal: Returns to optimal restraint-free functioning  Description: INTERVENTIONS:  - Assess the patient's behavior and symptoms that indicate continued need for restraint  - Identify and implement measures to help patient regain control  - Assess readiness for release of restraint   Outcome: Progressing

## 2021-01-04 NOTE — CASE MANAGEMENT
This case management assistant (CMA) called the patient's son at 12:08 PM, and left him a VM, in error in regards to discussing the patient's medicare rights with him  The patient is going to be d/c'd to LTAC placement which is another acute level of care, so the medicare rights do not have to be presented to the patient's son at this time

## 2021-01-04 NOTE — SPEECH THERAPY NOTE
Speech Language/Pathology    Speech/Language Pathology Progress Note    Patient Name: Yecenia Jaime  IVBHS'P Date: 1/4/2021     Problem List  Principal Problem:    Acute respiratory failure due to COVID-19 [facility])  Active Problems:    Hyperlipidemia    Bipolar 1 disorder ([facility] )    Acute metabolic encephalopathy    Anemia, macrocytic    Hypernatremia    Pneumonia due to COVID-19 virus    Urinary retention    Black tongue       Past Medical History  Past Medical History:   Diagnosis Date    Anemia 2/26/2019    Anxiety     Bipolar 1 disorder ([place] [address] )     Depression     DVT (deep venous thrombosis) ([place] [address] ) 2008    Left leg    GERD (gastroesophageal reflux disease)     Hypertension     Overactive bladder         Past Surgical History  Past Surgical History:   Procedure Laterality Date    ADENOIDECTOMY      CATARACT EXTRACTION Bilateral     Right 10/2003, left 4/2004    CHOLECYSTECTOMY      DILATION AND CURETTAGE OF UTERUS  1985    HERNIA REPAIR  11/02/2007    Femoral and small bowel resection    HIP SURGERY      L hip    TONSILLECTOMY      As a youth    WRIST SURGERY      L wrist         Subjective:  "i'm gonna die" when I asked how she was  I told her she was doing better  Only on nc today  Objective:  Pt seen for po tolerance and further recommendations  "I don't want to eat anything hot" Trialed the pureed pear  Took one tsp and stated she didn't like it  (ate some last visit)  Able to suck from straw today  Had some nectar thick cranberry juice and mighty shake  Needs cues to bring head down, as she tends to hyperextend  Transfers were more prompt as was swallow response  No oral residue noted  Took meds crushed in applesauce  She ate only a small amt  And refused any more  Wanted "cold water" which the nurse was getting for her  Dry copugh x 4-5 trhoughout session  Oral care completed  Lips very dry  Applied moisturizer  Unable to remove dried thick skin (lips may have bled)   Nurse going back in and will work on it  Assessment:  More alert and verbal w/ less o2 requirement  Reduced intake  Refuses anything hot and likes her drinks very cold  Plan/Recommendations:  Continue puree w/ nectar thick for now  ST to f/u for toelrance and further recs  Please try to bring tray in room when drinks are cold

## 2021-01-04 NOTE — ASSESSMENT & PLAN NOTE
· Improving inflammatory markers but still with high O2 requirement  · Completed initial 10 days of dexamethasone      · However due to persistently increased O2 requirement, she is started on her 2nd course of dexamethasone, currently day 15  · Completed 5 days of remdesivir  · Ongoing treatment with Lipitor, vitamin-D, heparin and multivitamin

## 2021-01-05 LAB
ANION GAP SERPL CALCULATED.3IONS-SCNC: 8 MMOL/L (ref 4–13)
BUN SERPL-MCNC: 19 MG/DL (ref 5–25)
CALCIUM SERPL-MCNC: 9.5 MG/DL (ref 8.3–10.1)
CHLORIDE SERPL-SCNC: 105 MMOL/L (ref 100–108)
CO2 SERPL-SCNC: 29 MMOL/L (ref 21–32)
CREAT SERPL-MCNC: 0.58 MG/DL (ref 0.6–1.3)
GFR SERPL CREATININE-BSD FRML MDRD: 84 ML/MIN/1.73SQ M
GLUCOSE SERPL-MCNC: 91 MG/DL (ref 65–140)
POTASSIUM SERPL-SCNC: 3.5 MMOL/L (ref 3.5–5.3)
SODIUM SERPL-SCNC: 142 MMOL/L (ref 136–145)

## 2021-01-05 PROCEDURE — 80048 BASIC METABOLIC PNL TOTAL CA: CPT | Performed by: INTERNAL MEDICINE

## 2021-01-05 PROCEDURE — 99232 SBSQ HOSP IP/OBS MODERATE 35: CPT | Performed by: INTERNAL MEDICINE

## 2021-01-05 PROCEDURE — 92526 ORAL FUNCTION THERAPY: CPT

## 2021-01-05 RX ADMIN — HEPARIN SODIUM 5000 UNITS: 5000 INJECTION INTRAVENOUS; SUBCUTANEOUS at 14:38

## 2021-01-05 RX ADMIN — TOPIRAMATE 50 MG: 25 TABLET, FILM COATED ORAL at 17:44

## 2021-01-05 RX ADMIN — CLONAZEPAM 0.5 MG: 0.5 TABLET ORAL at 17:44

## 2021-01-05 RX ADMIN — DOCUSATE SODIUM AND SENNOSIDES 1 TABLET: 8.6; 5 TABLET, FILM COATED ORAL at 17:44

## 2021-01-05 RX ADMIN — QUETIAPINE FUMARATE 400 MG: 200 TABLET ORAL at 21:12

## 2021-01-05 RX ADMIN — TOPIRAMATE 50 MG: 25 TABLET, FILM COATED ORAL at 08:56

## 2021-01-05 RX ADMIN — ATORVASTATIN CALCIUM 40 MG: 40 TABLET, FILM COATED ORAL at 21:12

## 2021-01-05 RX ADMIN — DEXAMETHASONE SODIUM PHOSPHATE 6 MG: 4 INJECTION INTRA-ARTICULAR; INTRALESIONAL; INTRAMUSCULAR; INTRAVENOUS; SOFT TISSUE at 08:57

## 2021-01-05 RX ADMIN — HEPARIN SODIUM 5000 UNITS: 5000 INJECTION INTRAVENOUS; SUBCUTANEOUS at 21:12

## 2021-01-05 RX ADMIN — CLONAZEPAM 0.5 MG: 0.5 TABLET ORAL at 08:56

## 2021-01-05 RX ADMIN — DOCUSATE SODIUM AND SENNOSIDES 1 TABLET: 8.6; 5 TABLET, FILM COATED ORAL at 08:56

## 2021-01-05 RX ADMIN — ONDANSETRON 4 MG: 2 INJECTION INTRAMUSCULAR; INTRAVENOUS at 21:26

## 2021-01-05 RX ADMIN — OXYBUTYNIN 10 MG: 10 TABLET, FILM COATED, EXTENDED RELEASE ORAL at 08:56

## 2021-01-05 RX ADMIN — FAMOTIDINE 20 MG: 20 TABLET ORAL at 09:02

## 2021-01-05 RX ADMIN — Medication 2000 UNITS: at 08:57

## 2021-01-05 RX ADMIN — Medication 1 TABLET: at 08:56

## 2021-01-05 NOTE — PLAN OF CARE
Problem: Potential for Falls  Goal: Patient will remain free of falls  Description: INTERVENTIONS:  - Assess patient frequently for physical needs  -  Identify cognitive and physical deficits and behaviors that affect risk of falls    -  [place] fall precautions as indicated by assessment   - Educate patient/family on patient safety including physical limitations  - Instruct patient to call for assistance with activity based on assessment  - Modify environment to reduce risk of injury  - Consider OT/PT consult to assist with strengthening/mobility  Outcome: Progressing     Problem: Prexisting or High Potential for Compromised Skin Integrity  Goal: Skin integrity is maintained or improved  Description: INTERVENTIONS:  - Identify patients at risk for skin breakdown  - Assess and monitor skin integrity  - Assess and monitor nutrition and hydration status  - Monitor labs   - Assess for incontinence   - Turn and reposition patient  - Assist with mobility/ambulation  - Relieve pressure over bony prominences  - Avoid friction and shearing  - Provide appropriate hygiene as needed including keeping skin clean and dry  - Evaluate need for skin moisturizer/barrier cream  - Collaborate with interdisciplinary team   - Patient/family teaching  - Consider wound care consult   Outcome: Progressing     Problem: PAIN - ADULT  Goal: Verbalizes/displays adequate comfort level or baseline comfort level  Description: Interventions:  - Encourage patient to monitor pain and request assistance  - Assess pain using appropriate pain scale  - Administer analgesics based on type and severity of pain and evaluate response  - Implement non-pharmacological measures as appropriate and evaluate response  - Consider cultural and social influences on pain and pain management  - Notify physician/advanced practitioner if interventions unsuccessful or patient reports new pain  Outcome: Progressing     Problem: INFECTION - ADULT  Goal: Absence or prevention of progression during hospitalization  Description: INTERVENTIONS:  - Assess and monitor for signs and symptoms of infection  - Monitor lab/diagnostic results  - Monitor all insertion sites, i e  indwelling lines, tubes, and drains  - Monitor endotracheal if appropriate and nasal secretions for changes in amount and color  - [place] appropriate cooling/warming therapies per order  - Administer medications as ordered  - Instruct and encourage patient and family to use good hand hygiene technique  - Identify and instruct in appropriate isolation precautions for identified infection/condition  Outcome: Progressing     Problem: SAFETY ADULT  Goal: Patient will remain free of falls  Description: INTERVENTIONS:  - Assess patient frequently for physical needs  -  Identify cognitive and physical deficits and behaviors that affect risk of falls    -  [place] fall precautions as indicated by assessment   - Educate patient/family on patient safety including physical limitations  - Instruct patient to call for assistance with activity based on assessment  - Modify environment to reduce risk of injury  - Consider OT/PT consult to assist with strengthening/mobility  Outcome: Progressing  Goal: Maintain or return to baseline ADL function  Description: INTERVENTIONS:  -  Assess patient's ability to carry out ADLs; assess patient's baseline for ADL function and identify physical deficits which impact ability to perform ADLs (bathing, care of mouth/teeth, toileting, grooming, dressing, etc )  - Assess/evaluate cause of self-care deficits   - Assess range of motion  - Assess patient's mobility; develop plan if impaired  - Assess patient's need for assistive devices and provide as appropriate  - Encourage maximum independence but intervene and supervise when necessary  - Involve family in performance of ADLs  - Assess for home care needs following discharge   - Consider OT consult to assist with ADL evaluation and planning for discharge  - Provide patient education as appropriate  Outcome: Progressing  Goal: Maintain or return mobility status to optimal level  Description: INTERVENTIONS:  - Assess patient's baseline mobility status (ambulation, transfers, stairs, etc )    - Identify cognitive and physical deficits and behaviors that affect mobility  - Identify mobility aids required to assist with transfers and/or ambulation (gait belt, sit-to-stand, lift, walker, cane, etc )  - [place] fall precautions as indicated by assessment  - Record patient progress and toleration of activity level on Mobility SBAR; progress patient to next Phase/Stage  - Instruct patient to call for assistance with activity based on assessment  - Consider rehabilitation consult to assist with strengthening/weightbearing, etc   Outcome: Progressing     Problem: DISCHARGE PLANNING  Goal: Discharge to home or other facility with appropriate resources  Description: INTERVENTIONS:  - Identify barriers to discharge w/patient and caregiver  - Arrange for needed discharge resources and transportation as appropriate  - Identify discharge learning needs (meds, wound care, etc )  - Arrange for interpretive services to assist at discharge as needed  - Refer to Case Management Department for coordinating discharge planning if the patient needs post-hospital services based on physician/advanced practitioner order or complex needs related to functional status, cognitive ability, or social support system  Outcome: Progressing     Problem: Knowledge Deficit  Goal: Patient/family/caregiver demonstrates understanding of disease process, treatment plan, medications, and discharge instructions  Description: Complete learning assessment and assess knowledge base    Interventions:  - Provide teaching at level of understanding  - Provide teaching via preferred learning methods  Outcome: Progressing     Problem: Nutrition/Hydration-ADULT  Goal: Nutrient/Hydration intake appropriate for improving, restoring or maintaining nutritional needs  Description: Monitor and assess patient's nutrition/hydration status for malnutrition  Collaborate with interdisciplinary team and initiate plan and interventions as ordered  Monitor patient's weight and dietary intake as ordered or per policy  Utilize nutrition screening tool and intervene as necessary  Determine patient's food preferences and provide high-protein, high-caloric foods as appropriate       INTERVENTIONS:  - Monitor oral intake, urinary output, labs, and treatment plans  - Assess nutrition and hydration status and recommend course of action  - Evaluate amount of meals eaten  - Assist patient with eating if necessary   - Allow adequate time for meals  - Recommend/ encourage appropriate diets, oral nutritional supplements, and vitamin/mineral supplements  - Order, calculate, and assess calorie counts as needed  - Recommend, monitor, and adjust tube feedings and TPN/PPN based on assessed needs  - Assess need for intravenous fluids  - Provide specific nutrition/hydration education as appropriate  - Include patient/family/caregiver in decisions related to nutrition  Outcome: Progressing     Problem: RESPIRATORY - ADULT  Goal: Achieves optimal ventilation and oxygenation  Description: INTERVENTIONS:  - Assess for changes in respiratory status  - Assess for changes in mentation and behavior  - Position to facilitate oxygenation and minimize respiratory effort  - Oxygen administered by appropriate delivery if ordered  - Initiate smoking cessation education as indicated  - Encourage broncho-pulmonary hygiene including cough, deep breathe, Incentive Spirometry  - Assess the need for suctioning and aspirate as needed  - Assess and instruct to report SOB or any respiratory difficulty  - Respiratory Therapy support as indicated  Outcome: Progressing     Problem: GENITOURINARY - ADULT  Goal: Urinary catheter remains patent  Description: INTERVENTIONS:  - Assess patency of urinary catheter  - If patient has a chronic aj, consider changing catheter if non-functioning  - Follow guidelines for intermittent irrigation of non-functioning urinary catheter  Outcome: Progressing     Problem: SKIN/TISSUE INTEGRITY - ADULT  Goal: Skin integrity remains intact  Description: INTERVENTIONS  - Identify patients at risk for skin breakdown  - Assess and monitor skin integrity  - Assess and monitor nutrition and hydration status  - Monitor labs (i e  albumin)  - Assess for incontinence   - Turn and reposition patient  - Assist with mobility/ambulation  - Relieve pressure over bony prominences  - Avoid friction and shearing  - Provide appropriate hygiene as needed including keeping skin clean and dry  - Evaluate need for skin moisturizer/barrier cream  - Collaborate with interdisciplinary team (i e  Nutrition, Rehabilitation, etc )   - Patient/family teaching  Outcome: Progressing     Problem: SAFETY,RESTRAINT: [place]/NON-SELF DESTRUCTIVE BEHAVIOR  Goal: Remains free of harm/injury (restraint for non violent/non self-detsructive behavior)  Description: INTERVENTIONS:  - Instruct patient/family regarding restraint use   - Assess and monitor physiologic and psychological status   - Provide interventions and comfort measures to meet assessed patient needs   - Identify and implement measures to help patient regain control  - Assess readiness for release of restraint   Outcome: Progressing  Goal: Returns to optimal restraint-free functioning  Description: INTERVENTIONS:  - Assess the patient's behavior and symptoms that indicate continued need for restraint  - Identify and implement measures to help patient regain control  - Assess readiness for release of restraint   Outcome: Progressing

## 2021-01-05 NOTE — SPEECH THERAPY NOTE
Speech Language/Pathology    Speech/Language Pathology Progress Note    Patient Name: Dorothy Alcala  FRBQV'F Date: 1/5/2021     Problem List  Principal Problem:    Acute respiratory failure due to COVID-19 [facility])  Active Problems:    Hyperlipidemia    Bipolar 1 disorder ([facility] )    Acute metabolic encephalopathy    Anemia, macrocytic    Hypernatremia    Pneumonia due to COVID-19 virus    Urinary retention       Past Medical History  Past Medical History:   Diagnosis Date    Anemia 2/26/2019    Anxiety     Bipolar 1 disorder ([place] [address] )     Depression     DVT (deep venous thrombosis) ([place] [address] ) 2008    Left leg    GERD (gastroesophageal reflux disease)     Hypertension     Overactive bladder         Past Surgical History  Past Surgical History:   Procedure Laterality Date    ADENOIDECTOMY      CATARACT EXTRACTION Bilateral     Right 10/2003, left 4/2004    CHOLECYSTECTOMY      DILATION AND CURETTAGE OF UTERUS  1985    HERNIA REPAIR  11/02/2007    Femoral and small bowel resection    HIP SURGERY      L hip    TONSILLECTOMY      As a youth    WRIST SURGERY      L wrist         Subjective: Increased lethargy this am, though able to state "it's my mouth you know" in a weak voice as I was doing oral care  Objective:  Seen for po tolerance and further recommendations  Minimal intake this am per nurse  Pt w/open mouth posture when I arrived  Oral care completed, lower dentures cleaned  Trialed small amts of nectar juice by piped straw sip and very small cup sip  Needed cues for transfer and swallow, otherwise held the bolus frontally  No overt s/s aspiration w/ min amt  Eventually she stopped transfering  The juice was suctioned back out  Assessment:  Increased lethargy this am  Minimal po intake  Plan/Recommendations:  Puree w/ nectar thick as tolerated  Doubt she is meeting nutrition/hydration needs orally  ? Plan

## 2021-01-05 NOTE — ASSESSMENT & PLAN NOTE
Malnutrition Findings:           BMI Findings: Body mass index is 19 76 kg/m²       Poor oral intake in setting of acute illness poor oral intake in setting of acute illness, encephalopathy  Will perform calorie count  Continue Ensure supplementation

## 2021-01-05 NOTE — UTILIZATION REVIEW
Continued Stay Review    Date: 1/5/21                     Current Patient Class: IP  Current Level of Care: [facility]    HPI:85 y o  female initially admitted on 12/18 to IP for COVID 19, hypoxia, acute metabolic encephalopathy, hypokalemia  Completed Remdesivir 12/22    Assessment/Plan: Pt lethargic,weak quiet voice,  poor oral intake this am  Calorie ct ordered, continue ensure supplementation  She is on her 2nd course of dexamethasone due to prolonged severe disease  Currently requiring O2 @ 5 L n  Louie cath placed 1/1 for urinary retention  CBC, BMP in am   Discussed poor prognosis with patient's POA    Pertinent Labs/Diagnostic Results:     Results from last 7 days   Lab Units 01/04/21  1109 01/03/21  0501 01/02/21  0545 12/31/20  0600 12/30/20  0431   WBC Thousand/uL 10 46* 14 64* 9 98 11 15* 10 88*   HEMOGLOBIN g/dL 10 6* 10 3* 9 2* 11 1* 11 6   HEMATOCRIT % 33 8* 33 6* 29 9* 36 3 37 5   PLATELETS Thousands/uL 450* 488* 431* 601* 736*   NEUTROS ABS Thousands/µL 8 51*  --   --  8 62* 8 04*   BANDS PCT %  --   --  2  --   --      Results from last 7 days   Lab Units 01/05/21  1055 01/04/21  1109 01/03/21  0501 01/02/21  0545 12/31/20  0600   SODIUM mmol/L 142 140 141 139 145   POTASSIUM mmol/L 3 5 3 1* 3 6 3 4* 3 8   CHLORIDE mmol/L 105 103 105 105 110*   CO2 mmol/L 29 29 28 28 29   ANION GAP mmol/L 8 8 8 6 6   BUN mg/dL 19 20 18 24 33*   CREATININE mg/dL 0 58* 0 71 0 82 0 61 0 88   EGFR ml/min/1 73sq m 84 78 65 83 60   CALCIUM mg/dL 9 5 9 1 9 0 8 5 8 8     Results from last 7 days   Lab Units 01/04/21  1109 01/03/21  0501 01/02/21  0545 12/31/20  0600 12/30/20  0431   AST U/L 28 24 20 43 97*   ALT U/L 24 22 23 39 41   ALK PHOS U/L 72 73 63 86 95   TOTAL PROTEIN g/dL 6 9 6 7 6 2* 7 4 8 2   ALBUMIN g/dL 2 3* 2 2* 2 0* 2 3* 2 3*   TOTAL BILIRUBIN mg/dL 0 31 0 31 0 33 0 40 0 55     Results from last 7 days   Lab Units 12/29/20  2103   POC GLUCOSE mg/dl 233*     Results from last 7 days   Lab Units 01/05/21  1055 01/04/21  1109 01/03/21  0501 01/02/21  0545 12/31/20  0600 12/30/20  0431   GLUCOSE RANDOM mg/dL 91 129 85 75 131 54*       Results from last 7 days   Lab Units 01/02/21  0545 12/31/20  0600 12/30/20  0431   CRP mg/L 12 3* 74 5* 51 1*     Vital Signs:   01/05/21 14:26:41 97 7 °F (36 5 °C) 87 24Abnormal  134/73 92 %      01/05/21 14:25:31  85 24Abnormal  134/73 93 %      01/05/21 09:06:26 97 8 °F (36 6 °C) 79 20 137/76 93 % 5 L/min Nasal cannula Lying   01/04/21 20:05:35 98 3 °F (36 8 °C) 98 20 135/71 90 %      01/04/21 1500     97 % 4 L/min Nasal cannula    01/04/21 14:55:16 98 3 °F (36 8 °C) 91 16 114/66 94 % 6 L/min Mid flow nasal cannula Lying   01/04/21 0900     95 % 6 L/min Mid flow nasal cannula    01/04/21 08:09:12 98 °F (36 7 °C) 93 20 135/81 93 %      01/04/21 02:06:01  106Abnormal    90 % 6 L/min Mid flow nasal cannula      Medications:   Scheduled Medications:  atorvastatin, 40 mg, Oral, HS  cholecalciferol, 2,000 Units, Oral, Daily  clonazePAM, 0 5 mg, Oral, BID  dexamethasone, 6 mg, Intravenous, Daily  famotidine, 20 mg, Oral, Daily  ferrous sulfate, 325 mg, Oral, Daily With Breakfast  heparin (porcine), 5,000 Units, Subcutaneous, Q8H CARSON  lamoTRIgine, 200 mg, Oral, Daily Before Breakfast  multivitamin-minerals, 1 tablet, Oral, Daily  oxybutynin, 10 mg, Oral, Daily  pantoprazole, 40 mg, Oral, Early Morning  QUEtiapine, 400 mg, Oral, HS  senna-docusate sodium, 1 tablet, Oral, BID  topiramate, 50 mg, Oral, BID    Continuous IV Infusions: n/a  PRN Meds:  acetaminophen, 650 mg, Oral, Q6H PRN  dicyclomine, 10 mg, Oral, TID PRN  ondansetron, 4 mg, Intravenous, Q6H PRN    Discharge Plan: TBD    Network Utilization Review Department  ATTENTION: Please call with any questions or concerns to [phone] and carefully listen to the prompts so that you are directed to the right person   All voicemails are confidential   Bernard Phillips all requests for admission clinical reviews, approved or denied determinations and any other requests to dedicated fax number below belonging to the campus where the patient is receiving treatment   List of dedicated fax numbers for the Facilities:  [address] [place] [address] DENIALS (Administrative/Medical Necessity) [phone]   [address] (Maternity/NICU/Pediatrics) [phone] [address] [place] [address] Dr Compa Paul 8628 (  [facility]) [address] [place] [address] Juwan Torres [address] [address] [facility]) [address] [place], [place] [address] [phone]

## 2021-01-05 NOTE — PLAN OF CARE
Problem: Potential for Falls  Goal: Patient will remain free of falls  Description: INTERVENTIONS:  - Assess patient frequently for physical needs  -  Identify cognitive and physical deficits and behaviors that affect risk of falls    -  [place] fall precautions as indicated by assessment   - Educate patient/family on patient safety including physical limitations  - Instruct patient to call for assistance with activity based on assessment  - Modify environment to reduce risk of injury  - Consider OT/PT consult to assist with strengthening/mobility  Outcome: Progressing     Problem: Prexisting or High Potential for Compromised Skin Integrity  Goal: Skin integrity is maintained or improved  Description: INTERVENTIONS:  - Identify patients at risk for skin breakdown  - Assess and monitor skin integrity  - Assess and monitor nutrition and hydration status  - Monitor labs   - Assess for incontinence   - Turn and reposition patient  - Assist with mobility/ambulation  - Relieve pressure over bony prominences  - Avoid friction and shearing  - Provide appropriate hygiene as needed including keeping skin clean and dry  - Evaluate need for skin moisturizer/barrier cream  - Collaborate with interdisciplinary team   - Patient/family teaching  - Consider wound care consult   Outcome: Progressing     Problem: PAIN - ADULT  Goal: Verbalizes/displays adequate comfort level or baseline comfort level  Description: Interventions:  - Encourage patient to monitor pain and request assistance  - Assess pain using appropriate pain scale  - Administer analgesics based on type and severity of pain and evaluate response  - Implement non-pharmacological measures as appropriate and evaluate response  - Consider cultural and social influences on pain and pain management  - Notify physician/advanced practitioner if interventions unsuccessful or patient reports new pain  Outcome: Progressing     Problem: INFECTION - ADULT  Goal: Absence or prevention of progression during hospitalization  Description: INTERVENTIONS:  - Assess and monitor for signs and symptoms of infection  - Monitor lab/diagnostic results  - Monitor all insertion sites, i e  indwelling lines, tubes, and drains  - Monitor endotracheal if appropriate and nasal secretions for changes in amount and color  - [place] appropriate cooling/warming therapies per order  - Administer medications as ordered  - Instruct and encourage patient and family to use good hand hygiene technique  - Identify and instruct in appropriate isolation precautions for identified infection/condition  Outcome: Progressing     Problem: SAFETY ADULT  Goal: Patient will remain free of falls  Description: INTERVENTIONS:  - Assess patient frequently for physical needs  -  Identify cognitive and physical deficits and behaviors that affect risk of falls    -  [place] fall precautions as indicated by assessment   - Educate patient/family on patient safety including physical limitations  - Instruct patient to call for assistance with activity based on assessment  - Modify environment to reduce risk of injury  - Consider OT/PT consult to assist with strengthening/mobility  Outcome: Progressing  Goal: Maintain or return to baseline ADL function  Description: INTERVENTIONS:  -  Assess patient's ability to carry out ADLs; assess patient's baseline for ADL function and identify physical deficits which impact ability to perform ADLs (bathing, care of mouth/teeth, toileting, grooming, dressing, etc )  - Assess/evaluate cause of self-care deficits   - Assess range of motion  - Assess patient's mobility; develop plan if impaired  - Assess patient's need for assistive devices and provide as appropriate  - Encourage maximum independence but intervene and supervise when necessary  - Involve family in performance of ADLs  - Assess for home care needs following discharge   - Consider OT consult to assist with ADL evaluation and planning for discharge  - Provide patient education as appropriate  Outcome: Progressing  Goal: Maintain or return mobility status to optimal level  Description: INTERVENTIONS:  - Assess patient's baseline mobility status (ambulation, transfers, stairs, etc )    - Identify cognitive and physical deficits and behaviors that affect mobility  - Identify mobility aids required to assist with transfers and/or ambulation (gait belt, sit-to-stand, lift, walker, cane, etc )  - [place] fall precautions as indicated by assessment  - Record patient progress and toleration of activity level on Mobility SBAR; progress patient to next Phase/Stage  - Instruct patient to call for assistance with activity based on assessment  - Consider rehabilitation consult to assist with strengthening/weightbearing, etc   Outcome: Progressing     Problem: DISCHARGE PLANNING  Goal: Discharge to home or other facility with appropriate resources  Description: INTERVENTIONS:  - Identify barriers to discharge w/patient and caregiver  - Arrange for needed discharge resources and transportation as appropriate  - Identify discharge learning needs (meds, wound care, etc )  - Arrange for interpretive services to assist at discharge as needed  - Refer to Case Management Department for coordinating discharge planning if the patient needs post-hospital services based on physician/advanced practitioner order or complex needs related to functional status, cognitive ability, or social support system  Outcome: Progressing     Problem: Knowledge Deficit  Goal: Patient/family/caregiver demonstrates understanding of disease process, treatment plan, medications, and discharge instructions  Description: Complete learning assessment and assess knowledge base    Interventions:  - Provide teaching at level of understanding  - Provide teaching via preferred learning methods  Outcome: Progressing     Problem: Nutrition/Hydration-ADULT  Goal: Nutrient/Hydration intake appropriate for improving, restoring or maintaining nutritional needs  Description: Monitor and assess patient's nutrition/hydration status for malnutrition  Collaborate with interdisciplinary team and initiate plan and interventions as ordered  Monitor patient's weight and dietary intake as ordered or per policy  Utilize nutrition screening tool and intervene as necessary  Determine patient's food preferences and provide high-protein, high-caloric foods as appropriate       INTERVENTIONS:  - Monitor oral intake, urinary output, labs, and treatment plans  - Assess nutrition and hydration status and recommend course of action  - Evaluate amount of meals eaten  - Assist patient with eating if necessary   - Allow adequate time for meals  - Recommend/ encourage appropriate diets, oral nutritional supplements, and vitamin/mineral supplements  - Order, calculate, and assess calorie counts as needed  - Recommend, monitor, and adjust tube feedings and TPN/PPN based on assessed needs  - Assess need for intravenous fluids  - Provide specific nutrition/hydration education as appropriate  - Include patient/family/caregiver in decisions related to nutrition  Outcome: Progressing     Problem: RESPIRATORY - ADULT  Goal: Achieves optimal ventilation and oxygenation  Description: INTERVENTIONS:  - Assess for changes in respiratory status  - Assess for changes in mentation and behavior  - Position to facilitate oxygenation and minimize respiratory effort  - Oxygen administered by appropriate delivery if ordered  - Initiate smoking cessation education as indicated  - Encourage broncho-pulmonary hygiene including cough, deep breathe, Incentive Spirometry  - Assess the need for suctioning and aspirate as needed  - Assess and instruct to report SOB or any respiratory difficulty  - Respiratory Therapy support as indicated  Outcome: Progressing     Problem: GENITOURINARY - ADULT  Goal: Urinary catheter remains patent  Description: INTERVENTIONS:  - Assess patency of urinary catheter  - If patient has a chronic aj, consider changing catheter if non-functioning  - Follow guidelines for intermittent irrigation of non-functioning urinary catheter  Outcome: Progressing     Problem: SKIN/TISSUE INTEGRITY - ADULT  Goal: Skin integrity remains intact  Description: INTERVENTIONS  - Identify patients at risk for skin breakdown  - Assess and monitor skin integrity  - Assess and monitor nutrition and hydration status  - Monitor labs (i e  albumin)  - Assess for incontinence   - Turn and reposition patient  - Assist with mobility/ambulation  - Relieve pressure over bony prominences  - Avoid friction and shearing  - Provide appropriate hygiene as needed including keeping skin clean and dry  - Evaluate need for skin moisturizer/barrier cream  - Collaborate with interdisciplinary team (i e  Nutrition, Rehabilitation, etc )   - Patient/family teaching  Outcome: Progressing     Problem: SAFETY,RESTRAINT: NV/NON-SELF DESTRUCTIVE BEHAVIOR  Goal: Remains free of harm/injury (restraint for non violent/non self-detsructive behavior)  Description: INTERVENTIONS:  - Instruct patient/family regarding restraint use   - Assess and monitor physiologic and psychological status   - Provide interventions and comfort measures to meet assessed patient needs   - Identify and implement measures to help patient regain control  - Assess readiness for release of restraint   Outcome: Progressing  Goal: Returns to optimal restraint-free functioning  Description: INTERVENTIONS:  - Assess the patient's behavior and symptoms that indicate continued need for restraint  - Identify and implement measures to help patient regain control  - Assess readiness for release of restraint   Outcome: Progressing

## 2021-01-05 NOTE — ASSESSMENT & PLAN NOTE
Secondary to poor oral intake, encephalopathy/acute illness  Continue diet as tolerated  High likelihood of recurrence due to poor oral intake and encephalopathy  Follow BMP

## 2021-01-05 NOTE — PROGRESS NOTES
Progress Note - Alyssia Marsh 1935, 80 y o  female MRN: [id number]    [facility] [address] [id number] Encounter: [id number]    Primary Care Provider: Gabriel Levi MD   Date and time admitted to hospital: 12/18/2020  5:50 PM        * Acute respiratory failure due to COVID-19 [facility])  Assessment & Plan  Oxygen requirements have remained relatively stable over the last few days at 5 L nasal cannula  Wean down as tolerated  She is on her 2nd course of dexamethasone due to prolonged severe disease  Code status: DNR DNI  Ultimate plan for discharge to LTAC  Discussed poor prognosis with patient's POA, I believe patient would be appropriate for hospice level care    Urinary retention  Assessment & Plan  Failed urinary retention protocol  Catheter placed 1/1/2021  Outpatient Urology follow-up    Hypernatremia  Assessment & Plan  Secondary to poor oral intake, encephalopathy/acute illness  Continue diet as tolerated  High likelihood of recurrence due to poor oral intake and encephalopathy  Follow BMP    Acute metabolic encephalopathy  Assessment & Plan  Acute metabolic encephalopathy secondary to COVID-19, hypernatremia, hospital delerium   Continue assistance when feeding  Diet changed to pureed with nectar thickened liquid  Regulate sleep wake cycles   Delirium precautions     Severe protein-calorie malnutrition ([facility] [address] )  Assessment & Plan  Malnutrition Findings:           BMI Findings: Body mass index is 19 76 kg/m²       Poor oral intake in setting of acute illness poor oral intake in setting of acute illness, encephalopathy  Will perform calorie count  Continue Ensure supplementation    Bipolar 1 disorder (HCC)  Assessment & Plan  With metabolic encephalopathy due to acute illness, hypernatremia, hypoxia, hospital delirium   Continue Seroquel 400 mg at bedtime, Topamax 50 mg b i d , Lamictal 200 mg daily    VTE Pharmacologic Prophylaxis: heparin   Pharmacologic: Heparin  Mechanical VTE Prophylaxis in Place: Yes    Patient Centered Rounds: I have performed bedside rounds with nursing staff today  Education and Discussions with Family / Patient:  Plan discussed with patient's son Charlie Campbell    Time Spent for Care: 30 minutes  More than 50% of total time spent on counseling and coordination of care as described above  Current Length of Stay: 18 day(s)    Current Patient Status: Inpatient   Certification Statement: The patient will continue to require additional inpatient hospital stay due to Ongoing management of acute hypoxic respiratory failure COVID pneumonia, awaiting placement to LTAC    Discharge Plan:  Awaiting placement to LTAC    Code Status: Level 3 - DNAR and DNI      Subjective:   Patient seen evaluated at bedside  Nursing reports poor oral intake for breakfast this morning  Patient is lethargic at the bedside and poorly interactive  Voice is quiet and weak  Objective:     Vitals:   Temp (24hrs), Av 9 °F (36 6 °C), Min:97 7 °F (36 5 °C), Max:98 3 °F (36 8 °C)    Temp:  [97 7 °F (36 5 °C)-98 3 °F (36 8 °C)] 97 7 °F (36 5 °C)  HR:  [79-98] 87  Resp:  [20-24] 24  BP: (134-137)/(71-76) 134/73  SpO2:  [90 %-93 %] 92 %  Body mass index is 19 76 kg/m²  Input and Output Summary (last 24 hours): Intake/Output Summary (Last 24 hours) at 2021 1517  Last data filed at 2021 0500  Gross per 24 hour   Intake    Output 675 ml   Net -675 ml       Physical Exam:     Physical Exam  Constitutional:       General: She is not in acute distress  Appearance: She is well-developed  She is ill-appearing  She is not diaphoretic  Comments: Lethargic   HENT:      Head: Normocephalic and atraumatic  Mouth/Throat:      Mouth: Mucous membranes are dry  Eyes:      General:         Right eye: No discharge  Left eye: No discharge  Conjunctiva/sclera: Conjunctivae normal    Cardiovascular:      Rate and Rhythm: Normal rate and regular rhythm     Pulmonary:      Effort: Pulmonary effort is normal  No respiratory distress  Abdominal:      General: Abdomen is flat  There is no distension  Musculoskeletal:         General: No swelling, tenderness or deformity  Neurological:      Comments: Too tired to respond to orientation questions, weak quiet voice         Additional Data:     Labs: I have reviewed pertinent results     Results from last 7 days   Lab Units 01/04/21  1109  01/02/21  0545   WBC Thousand/uL 10 46*   < > 9 98   HEMOGLOBIN g/dL 10 6*   < > 9 2*   HEMATOCRIT % 33 8*   < > 29 9*   PLATELETS Thousands/uL 450*   < > 431*   BANDS PCT %  --   --  2   NEUTROS PCT % 82*  --   --    LYMPHS PCT % 10*  --   --    LYMPHO PCT %  --   --  19   MONOS PCT % 5  --   --    MONO PCT %  --   --  6   EOS PCT % 0  --  1    < > = values in this interval not displayed       Results from last 7 days   Lab Units 01/05/21  1055 01/04/21  1109   SODIUM mmol/L 142 140   POTASSIUM mmol/L 3 5 3 1*   CHLORIDE mmol/L 105 103   CO2 mmol/L 29 29   BUN mg/dL 19 20   CREATININE mg/dL 0 58* 0 71   ANION GAP mmol/L 8 8   CALCIUM mg/dL 9 5 9 1   ALBUMIN g/dL  --  2 3*   TOTAL BILIRUBIN mg/dL  --  0 31   ALK PHOS U/L  --  72   ALT U/L  --  24   AST U/L  --  28   GLUCOSE RANDOM mg/dL 91 129         Results from last 7 days   Lab Units 12/29/20  2103   POC GLUCOSE mg/dl 233*                 Imaging: I have reviewed pertinent imaging       Recent Cultures (last 7 days):           Last 24 Hours Medication List:   Current Facility-Administered Medications   Medication Dose Route Frequency Provider Last Rate    acetaminophen  650 mg Oral Q6H PRN Azalia Hernández PA-C      atorvastatin  40 mg Oral HS Azalia Hernández PA-C      cholecalciferol  2,000 Units Oral Daily Azalia Hernández PA-C      clonazePAM  0 5 mg Oral BID Azalia Hernández PA-C      dexamethasone  6 mg Intravenous Daily Gretchen Pickard MD      dicyclomine  10 mg Oral TID PRN Kraig Tristan DO      famotidine  20 mg Oral Daily Azalia Hernández DAISY      ferrous sulfate  325 mg Oral Daily With Breakfast Sandrine Barreto DO      heparin (porcine)  5,000 Units Subcutaneous [address] [facility] Azalia Hern[facility]dez PA-C      lamoTRIgine  200 mg Oral Daily Before Breakfast Azalia Hernández PA-C      multivitamin-minerals  1 tablet Oral Daily Azalia Hernández PA-C      ondansetron  4 mg Intravenous Q6H PRN Saul Tapia PA-C      oxybutynin  10 mg Oral Daily Azalia Hernández PA-C      pantoprazole  40 mg Oral Early Morning Azalia Hernández PA-C      QUEtiapine  400 mg Oral HS Azalia Hernández PA-C      senna-docusate sodium  1 tablet Oral BID Марина meyer DO      topiramate  50 mg Oral BID Saul Tapia PA-C          Today, Patient Was Seen By: Sandrine Barreto DO    ** Please Note: Dictation voice to text software may have been used in the creation of this document   **

## 2021-01-05 NOTE — ASSESSMENT & PLAN NOTE
Acute metabolic encephalopathy secondary to COVID-19, hypernatremia, hospital delerium   Continue assistance when feeding  Diet changed to pureed with nectar thickened liquid  Regulate sleep wake cycles   Delirium precautions

## 2021-01-05 NOTE — ASSESSMENT & PLAN NOTE
Oxygen requirements have remained relatively stable over the last few days at 5 L nasal cannula  Wean down as tolerated  She is on her 2nd course of dexamethasone due to prolonged severe disease  Code status: DNR DNI  Ultimate plan for discharge to LTAC  Discussed poor prognosis with patient's POA, I believe patient would be appropriate for hospice level care

## 2021-01-05 NOTE — ASSESSMENT & PLAN NOTE
With metabolic encephalopathy due to acute illness, hypernatremia, hypoxia, hospital delirium   Continue Seroquel 400 mg at bedtime, Topamax 50 mg b i d , Lamictal 200 mg daily

## 2021-01-06 LAB
ANION GAP SERPL CALCULATED.3IONS-SCNC: 6 MMOL/L (ref 4–13)
BASOPHILS # BLD AUTO: 0.03 THOUSANDS/ΜL (ref 0–0.1)
BASOPHILS NFR BLD AUTO: 0 % (ref 0–1)
BUN SERPL-MCNC: 18 MG/DL (ref 5–25)
CALCIUM SERPL-MCNC: 9.2 MG/DL (ref 8.3–10.1)
CHLORIDE SERPL-SCNC: 105 MMOL/L (ref 100–108)
CO2 SERPL-SCNC: 32 MMOL/L (ref 21–32)
CREAT SERPL-MCNC: 0.65 MG/DL (ref 0.6–1.3)
EOSINOPHIL # BLD AUTO: 0 THOUSAND/ΜL (ref 0–0.61)
EOSINOPHIL NFR BLD AUTO: 0 % (ref 0–6)
ERYTHROCYTE [DISTWIDTH] IN BLOOD BY AUTOMATED COUNT: 12.5 % (ref 11.6–15.1)
GFR SERPL CREATININE-BSD FRML MDRD: 81 ML/MIN/1.73SQ M
GLUCOSE SERPL-MCNC: 91 MG/DL (ref 65–140)
HCT VFR BLD AUTO: 32.5 % (ref 34.8–46.1)
HGB BLD-MCNC: 10 G/DL (ref 11.5–15.4)
IMM GRANULOCYTES # BLD AUTO: 0.14 THOUSAND/UL (ref 0–0.2)
IMM GRANULOCYTES NFR BLD AUTO: 2 % (ref 0–2)
LYMPHOCYTES # BLD AUTO: 1.57 THOUSANDS/ΜL (ref 0.6–4.47)
LYMPHOCYTES NFR BLD AUTO: 18 % (ref 14–44)
MCH RBC QN AUTO: 31.4 PG (ref 26.8–34.3)
MCHC RBC AUTO-ENTMCNC: 30.8 G/DL (ref 31.4–37.4)
MCV RBC AUTO: 102 FL (ref 82–98)
MONOCYTES # BLD AUTO: 0.75 THOUSAND/ΜL (ref 0.17–1.22)
MONOCYTES NFR BLD AUTO: 9 % (ref 4–12)
NEUTROPHILS # BLD AUTO: 6.22 THOUSANDS/ΜL (ref 1.85–7.62)
NEUTS SEG NFR BLD AUTO: 71 % (ref 43–75)
NRBC BLD AUTO-RTO: 0 /100 WBCS
PLATELET # BLD AUTO: 404 THOUSANDS/UL (ref 149–390)
PMV BLD AUTO: 9.9 FL (ref 8.9–12.7)
POTASSIUM SERPL-SCNC: 3.6 MMOL/L (ref 3.5–5.3)
RBC # BLD AUTO: 3.18 MILLION/UL (ref 3.81–5.12)
SODIUM SERPL-SCNC: 143 MMOL/L (ref 136–145)
WBC # BLD AUTO: 8.71 THOUSAND/UL (ref 4.31–10.16)

## 2021-01-06 PROCEDURE — 97530 THERAPEUTIC ACTIVITIES: CPT

## 2021-01-06 PROCEDURE — 85025 COMPLETE CBC W/AUTO DIFF WBC: CPT | Performed by: INTERNAL MEDICINE

## 2021-01-06 PROCEDURE — 99232 SBSQ HOSP IP/OBS MODERATE 35: CPT | Performed by: INTERNAL MEDICINE

## 2021-01-06 PROCEDURE — 97164 PT RE-EVAL EST PLAN CARE: CPT

## 2021-01-06 PROCEDURE — 97535 SELF CARE MNGMENT TRAINING: CPT

## 2021-01-06 PROCEDURE — 80048 BASIC METABOLIC PNL TOTAL CA: CPT | Performed by: INTERNAL MEDICINE

## 2021-01-06 PROCEDURE — 97110 THERAPEUTIC EXERCISES: CPT

## 2021-01-06 RX ADMIN — ATORVASTATIN CALCIUM 40 MG: 40 TABLET, FILM COATED ORAL at 23:04

## 2021-01-06 RX ADMIN — DEXAMETHASONE SODIUM PHOSPHATE 6 MG: 4 INJECTION INTRA-ARTICULAR; INTRALESIONAL; INTRAMUSCULAR; INTRAVENOUS; SOFT TISSUE at 09:33

## 2021-01-06 RX ADMIN — TOPIRAMATE 50 MG: 25 TABLET, FILM COATED ORAL at 09:38

## 2021-01-06 RX ADMIN — HEPARIN SODIUM 5000 UNITS: 5000 INJECTION INTRAVENOUS; SUBCUTANEOUS at 13:32

## 2021-01-06 RX ADMIN — QUETIAPINE FUMARATE 400 MG: 200 TABLET ORAL at 23:04

## 2021-01-06 RX ADMIN — OXYBUTYNIN 10 MG: 10 TABLET, FILM COATED, EXTENDED RELEASE ORAL at 09:33

## 2021-01-06 RX ADMIN — Medication 1 TABLET: at 09:33

## 2021-01-06 RX ADMIN — CLONAZEPAM 0.5 MG: 0.5 TABLET ORAL at 17:37

## 2021-01-06 RX ADMIN — PANTOPRAZOLE SODIUM 40 MG: 40 TABLET, DELAYED RELEASE ORAL at 05:32

## 2021-01-06 RX ADMIN — DOCUSATE SODIUM AND SENNOSIDES 1 TABLET: 8.6; 5 TABLET, FILM COATED ORAL at 09:33

## 2021-01-06 RX ADMIN — DOCUSATE SODIUM AND SENNOSIDES 1 TABLET: 8.6; 5 TABLET, FILM COATED ORAL at 17:37

## 2021-01-06 RX ADMIN — TOPIRAMATE 50 MG: 25 TABLET, FILM COATED ORAL at 17:37

## 2021-01-06 RX ADMIN — LAMOTRIGINE 200 MG: 100 TABLET ORAL at 05:32

## 2021-01-06 RX ADMIN — FAMOTIDINE 20 MG: 20 TABLET ORAL at 09:33

## 2021-01-06 RX ADMIN — HEPARIN SODIUM 5000 UNITS: 5000 INJECTION INTRAVENOUS; SUBCUTANEOUS at 23:04

## 2021-01-06 RX ADMIN — FERROUS SULFATE TAB 325 MG (65 MG ELEMENTAL FE) 325 MG: 325 (65 FE) TAB at 09:33

## 2021-01-06 RX ADMIN — HEPARIN SODIUM 5000 UNITS: 5000 INJECTION INTRAVENOUS; SUBCUTANEOUS at 05:33

## 2021-01-06 RX ADMIN — CLONAZEPAM 0.5 MG: 0.5 TABLET ORAL at 09:33

## 2021-01-06 RX ADMIN — Medication 2000 UNITS: at 09:33

## 2021-01-06 NOTE — ASSESSMENT & PLAN NOTE
Hemoglobin stable   Continue vitamin supplements and iron      Results from last 7 days   Lab Units 01/06/21  0545 01/04/21  1109 01/03/21  0501 01/02/21  0545 12/31/20  0600   HEMOGLOBIN g/dL 10 0* 10 6* 10 3* 9 2* 11 1*

## 2021-01-06 NOTE — ASSESSMENT & PLAN NOTE
Oxygen requirements have remained around 5 L nasal cannula over the last few days but did require 12 overnight intermittently  Wean down as tolerated  She is on her 2nd course of dexamethasone due to prolonged severe disease  Code status: DNR DNI  Ultimate plan for discharge to LTAC regionally patient is requiring greater than 10 L overnight

## 2021-01-06 NOTE — ASSESSMENT & PLAN NOTE
Improving inflammatory markers but still with high O2 requirement  Completed initial 10 days of dexamethasone      However due to persistently increased O2 requirement, she is started on her 2nd course of dexamethasone, day 7 of 10 dexamethasone  Completed 5 days of remdesivir  Ongoing treatment with Lipitor, vitamin-D, heparin and multivitamin

## 2021-01-06 NOTE — PLAN OF CARE
Problem: Potential for Falls  Goal: Patient will remain free of falls  Description: INTERVENTIONS:  - Assess patient frequently for physical needs  -  Identify cognitive and physical deficits and behaviors that affect risk of falls    -  [place] fall precautions as indicated by assessment   - Educate patient/family on patient safety including physical limitations  - Instruct patient to call for assistance with activity based on assessment  - Modify environment to reduce risk of injury  - Consider OT/PT consult to assist with strengthening/mobility  1/6/2021 1039 by Wilver Fraga RN  Outcome: Progressing  1/6/2021 1039 by Wilver Fraga RN  Outcome: Progressing     Problem: Prexisting or High Potential for Compromised Skin Integrity  Goal: Skin integrity is maintained or improved  Description: INTERVENTIONS:  - Identify patients at risk for skin breakdown  - Assess and monitor skin integrity  - Assess and monitor nutrition and hydration status  - Monitor labs   - Assess for incontinence   - Turn and reposition patient  - Assist with mobility/ambulation  - Relieve pressure over bony prominences  - Avoid friction and shearing  - Provide appropriate hygiene as needed including keeping skin clean and dry  - Evaluate need for skin moisturizer/barrier cream  - Collaborate with interdisciplinary team   - Patient/family teaching  - Consider wound care consult   1/6/2021 1039 by Wilver Fraga RN  Outcome: Progressing  1/6/2021 1039 by Wilver Fraga RN  Outcome: Progressing     Problem: PAIN - ADULT  Goal: Verbalizes/displays adequate comfort level or baseline comfort level  Description: Interventions:  - Encourage patient to monitor pain and request assistance  - Assess pain using appropriate pain scale  - Administer analgesics based on type and severity of pain and evaluate response  - Implement non-pharmacological measures as appropriate and evaluate response  - Consider cultural and social influences on pain and pain management  - Notify physician/advanced practitioner if interventions unsuccessful or patient reports new pain  1/6/2021 1039 by Tanvi Tinsley RN  Outcome: Progressing  1/6/2021 1039 by Tanvi Tinsley RN  Outcome: Progressing     Problem: INFECTION - ADULT  Goal: Absence or prevention of progression during hospitalization  Description: INTERVENTIONS:  - Assess and monitor for signs and symptoms of infection  - Monitor lab/diagnostic results  - Monitor all insertion sites, i e  indwelling lines, tubes, and drains  - Monitor endotracheal if appropriate and nasal secretions for changes in amount and color  - [place] appropriate cooling/warming therapies per order  - Administer medications as ordered  - Instruct and encourage patient and family to use good hand hygiene technique  - Identify and instruct in appropriate isolation precautions for identified infection/condition  1/6/2021 1039 by Tanvi Tinsley RN  Outcome: Progressing  1/6/2021 1039 by Tanvi Tinsley RN  Outcome: Progressing     Problem: SAFETY ADULT  Goal: Patient will remain free of falls  Description: INTERVENTIONS:  - Assess patient frequently for physical needs  -  Identify cognitive and physical deficits and behaviors that affect risk of falls    -  [place] fall precautions as indicated by assessment   - Educate patient/family on patient safety including physical limitations  - Instruct patient to call for assistance with activity based on assessment  - Modify environment to reduce risk of injury  - Consider OT/PT consult to assist with strengthening/mobility  1/6/2021 1039 by Tanvi Tinsley RN  Outcome: Progressing  1/6/2021 1039 by Tanvi Tinsley RN  Outcome: Progressing  Goal: Maintain or return to baseline ADL function  Description: INTERVENTIONS:  -  Assess patient's ability to carry out ADLs; assess patient's baseline for ADL function and identify physical deficits which impact ability to perform ADLs (bathing, care of mouth/teeth, toileting, grooming, dressing, etc )  - Assess/evaluate cause of self-care deficits   - Assess range of motion  - Assess patient's mobility; develop plan if impaired  - Assess patient's need for assistive devices and provide as appropriate  - Encourage maximum independence but intervene and supervise when necessary  - Involve family in performance of ADLs  - Assess for home care needs following discharge   - Consider OT consult to assist with ADL evaluation and planning for discharge  - Provide patient education as appropriate  1/6/2021 1039 by Akhil Daugherty RN  Outcome: Progressing  1/6/2021 1039 by Akhil Daugherty RN  Outcome: Progressing  Goal: Maintain or return mobility status to optimal level  Description: INTERVENTIONS:  - Assess patient's baseline mobility status (ambulation, transfers, stairs, etc )    - Identify cognitive and physical deficits and behaviors that affect mobility  - Identify mobility aids required to assist with transfers and/or ambulation (gait belt, sit-to-stand, lift, walker, cane, etc )  - [place] fall precautions as indicated by assessment  - Record patient progress and toleration of activity level on Mobility SBAR; progress patient to next Phase/Stage  - Instruct patient to call for assistance with activity based on assessment  - Consider rehabilitation consult to assist with strengthening/weightbearing, etc   1/6/2021 1039 by Akhil Daugherty RN  Outcome: Progressing  1/6/2021 1039 by Akhil Daugherty RN  Outcome: Progressing     Problem: DISCHARGE PLANNING  Goal: Discharge to home or other facility with appropriate resources  Description: INTERVENTIONS:  - Identify barriers to discharge w/patient and caregiver  - Arrange for needed discharge resources and transportation as appropriate  - Identify discharge learning needs (meds, wound care, etc )  - Arrange for interpretive services to assist at discharge as needed  - Refer to Case Management Department for coordinating discharge planning if the patient needs post-hospital services based on physician/advanced practitioner order or complex needs related to functional status, cognitive ability, or social support system  1/6/2021 1039 by Jojo Cain RN  Outcome: Progressing  1/6/2021 1039 by Jojo Cain RN  Outcome: Progressing     Problem: Knowledge Deficit  Goal: Patient/family/caregiver demonstrates understanding of disease process, treatment plan, medications, and discharge instructions  Description: Complete learning assessment and assess knowledge base  Interventions:  - Provide teaching at level of understanding  - Provide teaching via preferred learning methods  1/6/2021 1039 by Jojo Cain RN  Outcome: Progressing  1/6/2021 1039 by Jojo Cain RN  Outcome: Progressing     Problem: Nutrition/Hydration-ADULT  Goal: Nutrient/Hydration intake appropriate for improving, restoring or maintaining nutritional needs  Description: Monitor and assess patient's nutrition/hydration status for malnutrition  Collaborate with interdisciplinary team and initiate plan and interventions as ordered  Monitor patient's weight and dietary intake as ordered or per policy  Utilize nutrition screening tool and intervene as necessary  Determine patient's food preferences and provide high-protein, high-caloric foods as appropriate       INTERVENTIONS:  - Monitor oral intake, urinary output, labs, and treatment plans  - Assess nutrition and hydration status and recommend course of action  - Evaluate amount of meals eaten  - Assist patient with eating if necessary   - Allow adequate time for meals  - Recommend/ encourage appropriate diets, oral nutritional supplements, and vitamin/mineral supplements  - Order, calculate, and assess calorie counts as needed  - Recommend, monitor, and adjust tube feedings and TPN/PPN based on assessed needs  - Assess need for intravenous fluids  - Provide specific nutrition/hydration education as appropriate  - Include patient/family/caregiver in decisions related to nutrition  1/6/2021 1039 by Hayes Craft RN  Outcome: Progressing  1/6/2021 1039 by Hayes Craft RN  Outcome: Progressing     Problem: RESPIRATORY - ADULT  Goal: Achieves optimal ventilation and oxygenation  Description: INTERVENTIONS:  - Assess for changes in respiratory status  - Assess for changes in mentation and behavior  - Position to facilitate oxygenation and minimize respiratory effort  - Oxygen administered by appropriate delivery if ordered  - Initiate smoking cessation education as indicated  - Encourage broncho-pulmonary hygiene including cough, deep breathe, Incentive Spirometry  - Assess the need for suctioning and aspirate as needed  - Assess and instruct to report SOB or any respiratory difficulty  - Respiratory Therapy support as indicated  1/6/2021 1039 by Hayes Craft RN  Outcome: Progressing  1/6/2021 1039 by Hayes Craft RN  Outcome: Progressing     Problem: GENITOURINARY - ADULT  Goal: Urinary catheter remains patent  Description: INTERVENTIONS:  - Assess patency of urinary catheter  - If patient has a chronic aj, consider changing catheter if non-functioning  - Follow guidelines for intermittent irrigation of non-functioning urinary catheter  1/6/2021 1039 by Hayes Craft RN  Outcome: Progressing  1/6/2021 1039 by Hayes Craft RN  Outcome: Progressing     Problem: SKIN/TISSUE INTEGRITY - ADULT  Goal: Skin integrity remains intact  Description: INTERVENTIONS  - Identify patients at risk for skin breakdown  - Assess and monitor skin integrity  - Assess and monitor nutrition and hydration status  - Monitor labs (i e  albumin)  - Assess for incontinence   - Turn and reposition patient  - Assist with mobility/ambulation  - Relieve pressure over bony prominences  - Avoid friction and shearing  - Provide appropriate hygiene as needed including keeping skin clean and dry  - Evaluate need for skin moisturizer/barrier cream  - Collaborate with interdisciplinary team (i e  Nutrition, Rehabilitation, etc )   - Patient/family teaching  1/6/2021 1039 by Mahesh Kendall RN  Outcome: Progressing  1/6/2021 1039 by Mahesh Kendall RN  Outcome: Progressing     Problem: SAFETY,RESTRAINT: NV/NON-SELF DESTRUCTIVE BEHAVIOR  Goal: Remains free of harm/injury (restraint for non violent/non self-detsructive behavior)  Description: INTERVENTIONS:  - Instruct patient/family regarding restraint use   - Assess and monitor physiologic and psychological status   - Provide interventions and comfort measures to meet assessed patient needs   - Identify and implement measures to help patient regain control  - Assess readiness for release of restraint   1/6/2021 1039 by Mahesh Kendall RN  Outcome: Progressing  1/6/2021 1039 by Mahesh Kendall RN  Outcome: Progressing  Goal: Returns to optimal restraint-free functioning  Description: INTERVENTIONS:  - Assess the patient's behavior and symptoms that indicate continued need for restraint  - Identify and implement measures to help patient regain control  - Assess readiness for release of restraint   1/6/2021 1039 by Mahesh Kendall RN  Outcome: Progressing  1/6/2021 1039 by Mahesh Kendall RN  Outcome: Progressing

## 2021-01-06 NOTE — OCCUPATIONAL THERAPY NOTE
OccupationalTherapy Progress Note     Patient Name: Zahra Lares  FSWSU'U Date: 1/6/2021  Problem List  Principal Problem:    Acute respiratory failure due to COVID-19 [facility])  Active Problems:    Hyperlipidemia    Bipolar 1 disorder ([facility] [address] )    Severe protein-calorie malnutrition ([facility] [address] )    Acute metabolic encephalopathy    Anemia, macrocytic    Hypernatremia    Pneumonia due to COVID-19 virus    Urinary retention          01/06/21 1113   OT Last Visit   OT Visit Date 01/06/21   Note Type   Note Type Treatment   Restrictions/Precautions   Weight Bearing Precautions Per Order No   Other Precautions Contact/isolation; Airborne/isolation;Cognitive; Chair Alarm; Bed Alarm; Fall Risk;O2;Multiple lines;Hard of hearing  (15L Midflow O2)   General   Response to Previous Treatment Patient with no complaints from previous session   Lifestyle   Autonomy At baseline, pt was I w/ ADLs and functional mobility/transfers w/ use of RW, required assist w/ IADLs, and (-)   Unknown fall hx, pt unable to report  Reciprocal Relationships 2 sons, dtr   Service to Others Retired   Intrinsic Gratification Watching TV   Pain Assessment   Pain Assessment Tool FLACC   Pain Rating: FLACC (Rest) - Face 0   Pain Rating: FLACC (Rest) - Legs 0   Pain Rating: FLACC (Rest) - Activity 0   Pain Rating: FLACC (Rest) - Cry 0   Pain Rating: FLACC (Rest) - Consolability 0   Score: FLACC (Rest) 0   Pain Rating: FLACC (Activity) - Face 0   Pain Rating: FLACC (Activity) - Legs 0   Pain Rating: FLACC (Activity) - Activity 0   Pain Rating: FLACC (Activity) - Cry 0   Pain Rating: FLACC (Activity) - Consolability 0   Score: FLACC (Activity) 0   ADL   Where Assessed Edge of bed   Grooming Assistance 4  Minimal Assistance   Grooming Deficit Setup;Verbal cueing;Supervision/safety; Increased time to complete;Wash/dry hands; Wash/dry face;Brushing hair   Grooming Comments Grooming tasks completed while seated EOB with Min A with cues for initiation/sequencing and assist for thoroughness  LB Dressing Assistance 2  Maximal Assistance   LB Dressing Deficit Setup;Verbal cueing;Supervision/safety; Increased time to complete; Don/doff R sock; Don/doff L sock   LB Dressing Comments LB dressing completed w/ Max A with impaired functional reach demonstrated to don/doff B/L socks   Bed Mobility   Supine to Sit 3  Moderate assistance   Additional items Assist x 2;HOB elevated; Bedrails; Increased time required;Verbal cues;LE management   Sit to Supine 4  Minimal assistance   Additional items Assist x 1; Increased time required;Verbal cues;LE management   Additional Comments Pt lying supine at end of session with PT present  Bed alarm activated  All needs met and pt reports no further questions for OT at this time  Transfers   Sit to Stand 4  Minimal assistance   Additional items Assist x 2; Increased time required;Verbal cues   Stand to Sit 4  Minimal assistance   Additional items Assist x 2; Increased time required;Verbal cues   Additional Comments Cues for safe technique and hand placement   Functional Mobility   Functional Mobility 4  Minimal assistance   Additional Comments Assist x2; Lateral stepping towards HOB, max cues for sequencing   Additional items Hand hold assistance   Therapeutic Exercise - ROM   UE-ROM Yes   ROM- Right Upper Extremities   R Shoulder AROM; Flexion; Extension;Horizontal ABduction   R Elbow AROM;AAROM;Elbow flexion;Elbow extension   R Position Supine   R Weight/Reps/Sets 10 reps x1   RUE ROM Comment Verbal and tactile cues required for proper form and pacing with Fair carryover demonstrated  No c/o pain or fatigue with exercises  ROM - Left Upper Extremities    L Shoulder AROM; Flexion; Extension;Horizontal ABduction   L Elbow AROM;AAROM;Elbow flexion;Elbow extension   L Position Supine   L Weight/Reps/Sets 10 reps x1   LUE ROM Comment Verbal and tactile cues required for proper form and pacing with Fair carryover demonstrated   No c/o pain or fatigue with exercises  Cognition   Overall Cognitive Status Impaired   Arousal/Participation Alert   Attention Attends with cues to redirect   Orientation Level Oriented to person;Disoriented to place; Disoriented to time;Disoriented to situation   Memory Decreased recall of precautions;Decreased recall of recent events;Decreased short term memory   Following Commands Follows one step commands with increased time or repetition   Activity Tolerance   Activity Tolerance Patient limited by fatigue;Treatment limited secondary to medical complications (Comment); Patient tolerated treatment well   Medical Staff Made Aware [place], PT; Conchis RN   Assessment   Assessment Pt seen for OT treatment session focusing on functional activity tolerance, bed mobility, ADLs, UE ROM/strengthening, and functional mobility/transfers  Pt lying supine at start of session and agreeable to OT treatment session  Vitals at start of session: HR- 116 bpm, BP- 110/69, SPO2- 91% on 15L Midflow O2  Bed mobility (supine>sit) completed with Mod A of 2with cues for sequencing and assist for LE management and trunk control  Grooming tasks completed while seated EOB with Min A with cues for initiation/sequencing and assist for thoroughness  LB dressing completed w/ Max A with impaired functional reach demonstrated to don/doff B/L socks  Transfers (sit<>stand) completed with Min A of 2  Min A of 2 required for functional mobility with B/L hand held assistance provided and max cues for sequencing  SPO2 post-mobility: 91% on 15L Midflow O2  Pt returned to supine position with Min A  UE exercises completed while lying supine to increase UE ROM/strength needed for ADLs/transfers  Verbal and tactile cues required for proper form and pacing with Fair carryover demonstrated  No c/o pain or fatigue with exercises  Pt lying supine at end of session with PT present  Bed alarm activated  All needs met and pt reports no further questions for OT at this time   Continue to recommend STR when medically cleared  OT to follow pt on caseload  Plan   Treatment Interventions ADL retraining;Functional transfer training;UE strengthening/ROM; Endurance training;Patient/family training;Equipment evaluation/education; Compensatory technique education; Energy conservation; Activityengagement   Goal Expiration Date 01/14/21   OT Treatment Day 4   OT Frequency 3-5x/wk   Recommendation   OT Discharge Recommendation Post-Acute Rehabilitation Services   OT - OK to Discharge Yes  (when medically cleared to rehab)   Barthel Index   Feeding 5   Bathing 0   Grooming Score 0   Dressing Score 5   Bladder Score 5   Bowels Score 5   Toilet Use Score 5   Transfers (Bed/Chair) Score 5   Mobility (Level Surface) Score 0   Stairs Score 0   [place] Index Score 30   Modified [place] Scale   Modified [place] Scale 4         Lauren Anguiano OTR/L

## 2021-01-06 NOTE — PROGRESS NOTES
Progress Note - Kaleigh Carlson 1935, 80 y o  female MRN: [id number]    Unit/Bed#: [facility] 2 [address] 87 221-01 Encounter: [id number]    Primary Care Provider: Valeria Armenta MD   Date and time admitted to hospital: 12/18/2020  5:50 PM        * Acute respiratory failure due to COVID-19 [facility])  Assessment & Plan  Oxygen requirements have remained around 5 L nasal cannula over the last few days but did require 12 overnight intermittently  Wean down as tolerated  She is on her 2nd course of dexamethasone due to prolonged severe disease  Code status: DNR DNI  Ultimate plan for discharge to LTAC regionally patient is requiring greater than 10 L overnight    Urinary retention  Assessment & Plan  Failed urinary retention protocol  Catheter placed 1/1/2021  Outpatient Urology follow-up    Pneumonia due to COVID-19 virus  Assessment & Plan  Improving inflammatory markers but still with high O2 requirement  Completed initial 10 days of dexamethasone      However due to persistently increased O2 requirement, she is started on her 2nd course of dexamethasone, day 7 of 10 dexamethasone  Completed 5 days of remdesivir  Ongoing treatment with Lipitor, vitamin-D, heparin and multivitamin    Hypernatremia  Assessment & Plan  Secondary to poor oral intake, encephalopathy/acute illness  Continue diet as tolerated  High likelihood of recurrence due to poor oral intake and encephalopathy  Follow BMP    Anemia, macrocytic  Assessment & Plan  Hemoglobin stable   Continue vitamin supplements and iron      Results from last 7 days   Lab Units 01/06/21  0545 01/04/21  1109 01/03/21  0501 01/02/21  0545 12/31/20  0600   HEMOGLOBIN g/dL 10 0* 10 6* 10 3* 9 2* 11 1*       Acute metabolic encephalopathy  Assessment & Plan  Acute metabolic encephalopathy secondary to COVID-19, hypernatremia, hospital delerium   Continue assistance when feeding  Diet changed to pureed with nectar thickened liquid  Regulate sleep wake cycles   Delirium precautions Severe protein-calorie malnutrition ([facility] )  Assessment & Plan  Malnutrition Findings:           BMI Findings: Body mass index is 19 76 kg/m²  Poor oral intake in setting of acute illness poor oral intake in setting of acute illness, encephalopathy  Will perform calorie count  Continue Ensure supplementation    Bipolar 1 disorder (HCC)  Assessment & Plan  With metabolic encephalopathy due to acute illness, hypernatremia, hypoxia, hospital delirium   Continue Seroquel 400 mg at bedtime, Topamax 50 mg b i d , Lamictal 200 mg daily      VTE Pharmacologic Prophylaxis:  Heparin  Pharmacologic: Heparin  Mechanical VTE Prophylaxis in Place: Yes    Patient Centered Rounds: I have performed bedside rounds with nursing staff today  Education and Discussions with Family / Patient:  Called son Ishan Vanegas at the bedside    Time Spent for Care: 30 minutes  More than 50% of total time spent on counseling and coordination of care as described above  Current Length of Stay: 19 day(s)    Current Patient Status: Inpatient   Certification Statement: The patient will continue to require additional inpatient hospital stay due to Ongoing management COVID pneumonia    Discharge Plan:  Continue inpatient monitoring, hopeful discharge to LTAC    Code Status: Level 3 - DNAR and DNI      Subjective:   Patient seen evaluated at bedside  This was the most lucid I have seen patient in several days  She was able to hold a full conversation with me  We discussed why she was in the hospital   She feels like she is not getting better  I called son from the room and she spoke with him via telephone  Objective:     Vitals:   Temp (24hrs), Av °F (36 7 °C), Min:97 7 °F (36 5 °C), Max:98 2 °F (36 8 °C)    Temp:  [97 7 °F (36 5 °C)-98 2 °F (36 8 °C)] 98 2 °F (36 8 °C)  HR:  [101-116] 111  Resp:  [20-24] 24  BP: (110-119)/(69-84) 119/84  SpO2:  [83 %-99 %] 96 %  Body mass index is 19 76 kg/m²       Input and Output Summary (last 24 hours):     No intake or output data in the 24 hours ending 01/06/21 1528    Physical Exam:     Physical Exam  Constitutional:       General: She is not in acute distress  Appearance: She is well-developed  She is not diaphoretic  HENT:      Head: Normocephalic and atraumatic  Eyes:      General:         Right eye: No discharge  Left eye: No discharge  Conjunctiva/sclera: Conjunctivae normal    Cardiovascular:      Rate and Rhythm: Normal rate and regular rhythm  Pulmonary:      Effort: Pulmonary effort is normal  No respiratory distress  Musculoskeletal:         General: No swelling or deformity  Skin:     Findings: No erythema or rash  Neurological:      Mental Status: She is alert  Comments: Alert oriented x2 to place and self but not the hospital situation   Psychiatric:         Behavior: Behavior normal          Additional Data:     Labs: I have reviewed pertinent results     Results from last 7 days   Lab Units 01/06/21  0545  01/02/21  0545   WBC Thousand/uL 8 71   < > 9 98   HEMOGLOBIN g/dL 10 0*   < > 9 2*   HEMATOCRIT % 32 5*   < > 29 9*   PLATELETS Thousands/uL 404*   < > 431*   BANDS PCT %  --   --  2   NEUTROS PCT % 71   < >  --    LYMPHS PCT % 18   < >  --    LYMPHO PCT %  --   --  19   MONOS PCT % 9   < >  --    MONO PCT %  --   --  6   EOS PCT % 0   < > 1    < > = values in this interval not displayed  Results from last 7 days   Lab Units 01/06/21  0545  01/04/21  1109   SODIUM mmol/L 143   < > 140   POTASSIUM mmol/L 3 6   < > 3 1*   CHLORIDE mmol/L 105   < > 103   CO2 mmol/L 32   < > 29   BUN mg/dL 18   < > 20   CREATININE mg/dL 0 65   < > 0 71   ANION GAP mmol/L 6   < > 8   CALCIUM mg/dL 9 2   < > 9 1   ALBUMIN g/dL  --   --  2 3*   TOTAL BILIRUBIN mg/dL  --   --  0 31   ALK PHOS U/L  --   --  72   ALT U/L  --   --  24   AST U/L  --   --  28   GLUCOSE RANDOM mg/dL 91   < > 129    < > = values in this interval not displayed  Imaging:  I have reviewed pertinent imaging       Recent Cultures (last 7 days):           Last 24 Hours Medication List:   Current Facility-Administered Medications   Medication Dose Route Frequency Provider Last Rate    acetaminophen  650 mg Oral Q6H PRN Azalia Hernández PA-C      atorvastatin  40 mg Oral HS Azalia Hernández PA-C      cholecalciferol  2,000 Units Oral Daily Azalia Hernández PA-C      clonazePAM  0 5 mg Oral BID Azalia Hernández PA-C      dexamethasone  6 mg Intravenous Daily Loraine Muñiz MD      dicyclomine  10 mg Oral TID PRN Vandana Stalls, DO      famotidine  20 mg Oral Daily Azalia Hernández PA-C      ferrous sulfate  325 mg Oral Daily With Breakfast Mary Ramirez DO      heparin (porcine)  5,000 Units Subcutaneous [facility] Azalia Hernández PA-C      lamoTRIgine  200 mg Oral Daily Before Breakfast Azalia Hernández PA-C      multivitamin-minerals  1 tablet Oral Daily Azalia Hernández PA-C      ondansetron  4 mg Intravenous Q6H PRN Klaus Ortega PA-C      oxybutynin  10 mg Oral Daily Azalia Hernández PA-C      pantoprazole  40 mg Oral Early Morning Azalia Hernández PA-C      QUEtiapine  400 mg Oral HS Azalia Hernández PA-C      senna-docusate sodium  1 tablet Oral BID [place] Stalls,       topiramate  50 mg Oral BID Klaus Ortega PA-C          Today, Patient Was Seen By: Mary Ramirez DO    ** Please Note: Dictation voice to text software may have been used in the creation of this document   **

## 2021-01-06 NOTE — PLAN OF CARE
Problem: Potential for Falls  Goal: Patient will remain free of falls  Description: INTERVENTIONS:  - Assess patient frequently for physical needs  -  Identify cognitive and physical deficits and behaviors that affect risk of falls    -  [place] fall precautions as indicated by assessment   - Educate patient/family on patient safety including physical limitations  - Instruct patient to call for assistance with activity based on assessment  - Modify environment to reduce risk of injury  - Consider OT/PT consult to assist with strengthening/mobility  Outcome: Progressing     Problem: Prexisting or High Potential for Compromised Skin Integrity  Goal: Skin integrity is maintained or improved  Description: INTERVENTIONS:  - Identify patients at risk for skin breakdown  - Assess and monitor skin integrity  - Assess and monitor nutrition and hydration status  - Monitor labs   - Assess for incontinence   - Turn and reposition patient  - Assist with mobility/ambulation  - Relieve pressure over bony prominences  - Avoid friction and shearing  - Provide appropriate hygiene as needed including keeping skin clean and dry  - Evaluate need for skin moisturizer/barrier cream  - Collaborate with interdisciplinary team   - Patient/family teaching  - Consider wound care consult   Outcome: Progressing     Problem: PAIN - ADULT  Goal: Verbalizes/displays adequate comfort level or baseline comfort level  Description: Interventions:  - Encourage patient to monitor pain and request assistance  - Assess pain using appropriate pain scale  - Administer analgesics based on type and severity of pain and evaluate response  - Implement non-pharmacological measures as appropriate and evaluate response  - Consider cultural and social influences on pain and pain management  - Notify physician/advanced practitioner if interventions unsuccessful or patient reports new pain  Outcome: Progressing     Problem: INFECTION - ADULT  Goal: Absence or prevention of progression during hospitalization  Description: INTERVENTIONS:  - Assess and monitor for signs and symptoms of infection  - Monitor lab/diagnostic results  - Monitor all insertion sites, i e  indwelling lines, tubes, and drains  - Monitor endotracheal if appropriate and nasal secretions for changes in amount and color  - [place] appropriate cooling/warming therapies per order  - Administer medications as ordered  - Instruct and encourage patient and family to use good hand hygiene technique  - Identify and instruct in appropriate isolation precautions for identified infection/condition  Outcome: Progressing     Problem: SAFETY ADULT  Goal: Patient will remain free of falls  Description: INTERVENTIONS:  - Assess patient frequently for physical needs  -  Identify cognitive and physical deficits and behaviors that affect risk of falls    -  [place] fall precautions as indicated by assessment   - Educate patient/family on patient safety including physical limitations  - Instruct patient to call for assistance with activity based on assessment  - Modify environment to reduce risk of injury  - Consider OT/PT consult to assist with strengthening/mobility  Outcome: Progressing  Goal: Maintain or return to baseline ADL function  Description: INTERVENTIONS:  -  Assess patient's ability to carry out ADLs; assess patient's baseline for ADL function and identify physical deficits which impact ability to perform ADLs (bathing, care of mouth/teeth, toileting, grooming, dressing, etc )  - Assess/evaluate cause of self-care deficits   - Assess range of motion  - Assess patient's mobility; develop plan if impaired  - Assess patient's need for assistive devices and provide as appropriate  - Encourage maximum independence but intervene and supervise when necessary  - Involve family in performance of ADLs  - Assess for home care needs following discharge   - Consider OT consult to assist with ADL evaluation and planning for discharge  - Provide patient education as appropriate  Outcome: Progressing  Goal: Maintain or return mobility status to optimal level  Description: INTERVENTIONS:  - Assess patient's baseline mobility status (ambulation, transfers, stairs, etc )    - Identify cognitive and physical deficits and behaviors that affect mobility  - Identify mobility aids required to assist with transfers and/or ambulation (gait belt, sit-to-stand, lift, walker, cane, etc )  - [place] fall precautions as indicated by assessment  - Record patient progress and toleration of activity level on Mobility SBAR; progress patient to next Phase/Stage  - Instruct patient to call for assistance with activity based on assessment  - Consider rehabilitation consult to assist with strengthening/weightbearing, etc   Outcome: Progressing     Problem: DISCHARGE PLANNING  Goal: Discharge to home or other facility with appropriate resources  Description: INTERVENTIONS:  - Identify barriers to discharge w/patient and caregiver  - Arrange for needed discharge resources and transportation as appropriate  - Identify discharge learning needs (meds, wound care, etc )  - Arrange for interpretive services to assist at discharge as needed  - Refer to Case Management Department for coordinating discharge planning if the patient needs post-hospital services based on physician/advanced practitioner order or complex needs related to functional status, cognitive ability, or social support system  Outcome: Progressing     Problem: Knowledge Deficit  Goal: Patient/family/caregiver demonstrates understanding of disease process, treatment plan, medications, and discharge instructions  Description: Complete learning assessment and assess knowledge base    Interventions:  - Provide teaching at level of understanding  - Provide teaching via preferred learning methods  Outcome: Progressing     Problem: Nutrition/Hydration-ADULT  Goal: Nutrient/Hydration intake appropriate for improving, restoring or maintaining nutritional needs  Description: Monitor and assess patient's nutrition/hydration status for malnutrition  Collaborate with interdisciplinary team and initiate plan and interventions as ordered  Monitor patient's weight and dietary intake as ordered or per policy  Utilize nutrition screening tool and intervene as necessary  Determine patient's food preferences and provide high-protein, high-caloric foods as appropriate       INTERVENTIONS:  - Monitor oral intake, urinary output, labs, and treatment plans  - Assess nutrition and hydration status and recommend course of action  - Evaluate amount of meals eaten  - Assist patient with eating if necessary   - Allow adequate time for meals  - Recommend/ encourage appropriate diets, oral nutritional supplements, and vitamin/mineral supplements  - Order, calculate, and assess calorie counts as needed  - Recommend, monitor, and adjust tube feedings and TPN/PPN based on assessed needs  - Assess need for intravenous fluids  - Provide specific nutrition/hydration education as appropriate  - Include patient/family/caregiver in decisions related to nutrition  Outcome: Progressing     Problem: RESPIRATORY - ADULT  Goal: Achieves optimal ventilation and oxygenation  Description: INTERVENTIONS:  - Assess for changes in respiratory status  - Assess for changes in mentation and behavior  - Position to facilitate oxygenation and minimize respiratory effort  - Oxygen administered by appropriate delivery if ordered  - Initiate smoking cessation education as indicated  - Encourage broncho-pulmonary hygiene including cough, deep breathe, Incentive Spirometry  - Assess the need for suctioning and aspirate as needed  - Assess and instruct to report SOB or any respiratory difficulty  - Respiratory Therapy support as indicated  Outcome: Progressing     Problem: GENITOURINARY - ADULT  Goal: Urinary catheter remains patent  Description: INTERVENTIONS:  - Assess patency of urinary catheter  - If patient has a chronic aj, consider changing catheter if non-functioning  - Follow guidelines for intermittent irrigation of non-functioning urinary catheter  Outcome: Progressing     Problem: SKIN/TISSUE INTEGRITY - ADULT  Goal: Skin integrity remains intact  Description: INTERVENTIONS  - Identify patients at risk for skin breakdown  - Assess and monitor skin integrity  - Assess and monitor nutrition and hydration status  - Monitor labs (i e  albumin)  - Assess for incontinence   - Turn and reposition patient  - Assist with mobility/ambulation  - Relieve pressure over bony prominences  - Avoid friction and shearing  - Provide appropriate hygiene as needed including keeping skin clean and dry  - Evaluate need for skin moisturizer/barrier cream  - Collaborate with interdisciplinary team (i e  Nutrition, Rehabilitation, etc )   - Patient/family teaching  Outcome: Progressing     Problem: SAFETY,RESTRAINT: NV/NON-SELF DESTRUCTIVE BEHAVIOR  Goal: Remains free of harm/injury (restraint for non violent/non self-detsructive behavior)  Description: INTERVENTIONS:  - Instruct patient/family regarding restraint use   - Assess and monitor physiologic and psychological status   - Provide interventions and comfort measures to meet assessed patient needs   - Identify and implement measures to help patient regain control  - Assess readiness for release of restraint   Outcome: Progressing  Goal: Returns to optimal restraint-free functioning  Description: INTERVENTIONS:  - Assess the patient's behavior and symptoms that indicate continued need for restraint  - Identify and implement measures to help patient regain control  - Assess readiness for release of restraint   Outcome: Progressing

## 2021-01-06 NOTE — PROGRESS NOTES
Patient refused to take medication  Patient states she is nauseous and can not take pills  Patient also refused Heparin  Administered Zofran and will attempt to give meds later

## 2021-01-06 NOTE — PLAN OF CARE
Problem: PHYSICAL THERAPY ADULT  Goal: Performs mobility at highest level of function for planned discharge setting  See evaluation for individualized goals  Description: Treatment/Interventions: Functional transfer training, LE strengthening/ROM, Therapeutic exercise, Endurance training, Patient/family training, Cognitive reorientation, Equipment eval/education, Bed mobility, Gait training, Spoke to nursing, Spoke to case management, OT, Elevations  Equipment Recommended: Susanne Hudson       See flowsheet documentation for full assessment, interventions and recommendations  Outcome: Progressing  Note: Prognosis: Guarded  Problem List: Decreased strength, Decreased endurance, Impaired balance, Decreased mobility, Decreased cognition, Impaired judgement, Decreased safety awareness, Decreased skin integrity, Impaired hearing  Assessment: Seen for PT re-eval to re-establish goals and POC  Continues to demonstrate impairments in pulmonary status and hypoxia requiring 15L midflow, decreased strength,balance, activity tolerance, functional mobility and locomotion  Encouragement needed to participate  O2 sat with ambulation 86% returning to 91% upon supine  Performed supine AP, HS, hip abd/add x 10 reps  O2 sats on 15L 100% resting  Given continued impairments will require ongoing PT and rehab at d/c in order to progress  See updated goals below  Barriers to Discharge: Decreased caregiver support, Inaccessible home environment  Barriers to Discharge Comments: Level of support at home  PT Discharge Recommendation: Post-Acute Rehabilitation Services     PT - OK to Discharge: Yes(to rhb when medically stable )    See flowsheet documentation for full assessment

## 2021-01-06 NOTE — PHYSICAL THERAPY NOTE
PT RE-EVALUATION 10:50-11:05  Treatment for supine ex x 15 minutes 11:05-11:20    85 y o     [id number]    Leg pain [M79 606]  Altered mental status [R41 82]  Hypoxia [R09 02]  Pneumonia due to COVID-19 virus [U07 1, J12 82]    Past Medical History:   Diagnosis Date    Anemia 2/26/2019    Anxiety     Bipolar 1 disorder (HCC)     Depression     DVT (deep venous thrombosis) ([facility] [address] ) 2008    Left leg    GERD (gastroesophageal reflux disease)     Hypertension     Overactive bladder          Past Surgical History:   Procedure Laterality Date    ADENOIDECTOMY      CATARACT EXTRACTION Bilateral     Right 10/2003, left 4/2004    CHOLECYSTECTOMY      DILATION AND CURETTAGE OF UTERUS  1985    HERNIA REPAIR  11/02/2007    Femoral and small bowel resection    HIP SURGERY      L hip    TONSILLECTOMY      As a youth    WRIST SURGERY      L wrist        01/06/21 1120   PT Last Visit   PT Visit Date 01/06/21   Note Type   Note type Re-Evaluation   Pain Assessment   Pain Rating: FLACC (Rest) - Face 0   Pain Rating: FLACC (Rest) - Legs 0   Pain Rating: FLACC (Rest) - Activity 0   Pain Rating: FLACC (Rest) - Cry 0   Pain Rating: FLACC (Rest) - Consolability 0   Score: FLACC (Rest) 0   Pain Rating: FLACC (Activity) - Face 0   Pain Rating: FLACC (Activity) - Legs 0   Pain Rating: FLACC (Activity) - Activity 1   Pain Rating: FLACC (Activity) - Cry 1   Pain Rating: FLACC (Activity) - Consolability 1   Score: FLACC (Activity) 3   Home Living   Type of Home Assisted living  ([facility])   Home Equipment Walker   Additional Comments limited historian  Prior Function   Level of [place] Independent with ADLs and functional mobility   Lives With Facility staff   Receives Help From Personal care attendant   ADL Assistance Independent   IADLs Needs assistance   Falls in the last 6 months   (pt unable to report )   Vocational Retired   Comments Per CM independent with ADLs and RW     Restrictions/Precautions   Weight Bearing Precautions Per Order No   Other Precautions Contact/isolation; Airborne/isolation;Cognitive; Chair Alarm; Bed Alarm; Fall Risk;O2;Hard of hearing  (15L Midflow)   General   Additional Pertinent History Seen for PT re-eval and revision of POC and goals  Family/Caregiver Present No   Cognition   Overall Cognitive Status Impaired   Orientation Level Oriented to person   Comments [place]   RUE Assessment   RUE Assessment WFL  (grossly at least 3+/5 observed with function )   LUE Assessment   LUE Assessment WFL  (grossly at least 3+/5 )   RLE Assessment   RLE Assessment WFL  (grossly 3+/5)   LLE Assessment   LLE Assessment WFL  (grossly at least 3+/5)   Bed Mobility   Supine to Sit 3  Moderate assistance   Additional items Assist x 2; Increased time required;Verbal cues;LE management; Bedrails;HOB elevated   Sit to Supine 4  Minimal assistance   Additional items Assist x 1; Increased time required;Verbal cues;LE management   Additional Comments increased time to complete transitions  Transfers   Sit to Stand 4  Minimal assistance   Additional items Assist x 2; Increased time required;Verbal cues   Stand to Sit 4  Minimal assistance   Additional items Assist x 2; Increased time required;Verbal cues   Additional Comments cues for safe transitions  Ambulation/Elevation   Gait pattern Decreased foot clearance; Short stride; Excessively slow;Narrow VLADIMIR; Improper Weight shift   Gait Assistance 4  Minimal assist   Additional items Assist x 2;Verbal cues; Tactile cues   Assistive Device Other (Comment)  (hand held assistance )   Distance Amb 4 ft [place] [facility]  O2 sats to 86% returning to 91% upon seated    Balance   Static Sitting Fair -   Dynamic Sitting Poor +   Static Standing Poor +   Dynamic Standing Poor   Ambulatory Poor   Endurance Deficit   Endurance Deficit Yes   Endurance Deficit Description fatigue, weakness, increased O2 demand at 15L midflow  hypoxia     Activity Tolerance   Activity Tolerance Patient limited by fatigue;Treatment limited secondary to medical complications (Comment)   Medical Staff Made Aware Nurse, Shweta Carrera   Nurse Made Aware yes   Assessment   Prognosis Guarded   Problem List Decreased strength;Decreased endurance; Impaired balance;Decreased mobility; Decreased cognition; Impaired judgement;Decreased safety awareness;Decreased skin integrity; Impaired hearing   Assessment Seen for PT re-eval to re-establish goals and POC  Continues to demonstrate impairments in pulmonary status and hypoxia requiring 15L midflow, decreased strength,balance, activity tolerance, functional mobility and locomotion  Encouragement needed to participate  O2 sat with ambulation 86% returning to 91% upon supine  Performed supine AP, HS, hip abd/add x 10 reps  O2 sats on 15L 100% resting  Given continued impairments will require ongoing PT and rehab at d/c in order to progress  See updated goals below  Goals   Patient Goals to have some cold water  STG Expiration Date 01/20/21   Short Term Goal #1 14 days: 1)  Pt will perform bed mobility with Selvin demonstrating appropriate technique 100% of the time in order to improve function and lessen caregiver burden  2)  Perform all transfers with Selvin demonstrating safe and appropriate technique 100% of the time in order to improve ability to negotiate safely in home environment with less reliance on caregiver  3) Amb with least restrictive AD > 50'x1 with Selvin in order to demonstrate ability to negotiate in home environment  4)  Improve overall strength and balance 1/2 grade in order to optimize ability to perform functional tasks and reduce fall risk  5) Increase activity tolerance to 30 minutes in order to improve endurance to functional tasks  6) PT for ongoing patient and family/caregiver education, DME needs and d/c planning in order to promote highest level of function in least restrictive environment     PT Treatment Day 4   Plan   Treatment/Interventions LE strengthening/ROM; Functional transfer training; Therapeutic exercise; Endurance training;Patient/family training;Equipment eval/education; Bed mobility;Gait training; Compensatory technique education;Continued evaluation;Spoke to nursing;OT   PT Frequency Other (Comment)  (3-5x/wk)   Recommendation   PT Discharge Recommendation Post-Acute Rehabilitation Services   PT - OK to Discharge Yes  (to rhb when medically stable )   Modified Lexa Scale   Modified Lexa Scale 4   Barthel Index   Feeding 5   Bathing 0   Grooming Score 0   Dressing Score 5   Bladder Score 5   Bowels Score 5   Toilet Use Score 5   Transfers (Bed/Chair) Score 5   Mobility (Level Surface) Score 0   Stairs Score 0   Barthel Index Score 30       Nickolas Adkins, PT

## 2021-01-06 NOTE — PLAN OF CARE
Problem: OCCUPATIONAL THERAPY ADULT  Goal: Performs self-care activities at highest level of function for planned discharge setting  See evaluation for individualized goals  Description: Treatment Interventions: ADL retraining, Functional transfer training, UE strengthening/ROM, Endurance training, Patient/family training, Equipment evaluation/education, Compensatory technique education, Continued evaluation, Activityengagement, Energy conservation          See flowsheet documentation for full assessment, interventions and recommendations  Outcome: Progressing  Note: Limitation: Decreased ADL status, Decreased UE ROM, Decreased UE strength, Decreased cognition, Decreased Safe judgement during ADL, Decreased endurance, Decreased high-level ADLs, Decreased self-care trans  Prognosis: Fair  Assessment: Pt seen for OT treatment session focusing on functional activity tolerance, bed mobility, ADLs, UE ROM/strengthening, and functional mobility/transfers  Pt lying supine at start of session and agreeable to OT treatment session  Vitals at start of session: HR- 116 bpm, BP- 110/69, SPO2- 91% on 15L Midflow O2  Bed mobility (supine>sit) completed with Mod A of 2with cues for sequencing and assist for LE management and trunk control  Grooming tasks completed while seated EOB with Min A with cues for initiation/sequencing and assist for thoroughness  LB dressing completed w/ Max A with impaired functional reach demonstrated to don/doff B/L socks  Transfers (sit<>stand) completed with Min A of 2  Min A of 2 required for functional mobility with B/L hand held assistance provided and max cues for sequencing  SPO2 post-mobility: 91% on 15L Midflow O2  Pt returned to supine position with Min A  UE exercises completed while lying supine to increase UE ROM/strength needed for ADLs/transfers  Verbal and tactile cues required for proper form and pacing with Fair carryover demonstrated  No c/o pain or fatigue with exercises   Pt lying supine at end of session with PT present  Bed alarm activated  All needs met and pt reports no further questions for OT at this time  Continue to recommend STR when medically cleared  OT to follow pt on caseload        OT Discharge Recommendation: Post-Acute Rehabilitation Services  OT - OK to Discharge: Yes(when medically cleared to rehab)

## 2021-01-06 NOTE — PROGRESS NOTES
Patient on 5 L mid flow at the start of my shift  Pt's spo2 mid 80's  Patient is now on 12 L of mid flow and spo2 90%-91%  Will continue to monitor  Call bell at reach

## 2021-01-07 PROCEDURE — 97535 SELF CARE MNGMENT TRAINING: CPT

## 2021-01-07 PROCEDURE — 99232 SBSQ HOSP IP/OBS MODERATE 35: CPT | Performed by: INTERNAL MEDICINE

## 2021-01-07 PROCEDURE — 97530 THERAPEUTIC ACTIVITIES: CPT

## 2021-01-07 RX ORDER — NYSTATIN 100000 [USP'U]/G
POWDER TOPICAL 2 TIMES DAILY
Status: DISCONTINUED | OUTPATIENT
Start: 2021-01-07 | End: 2021-01-09 | Stop reason: HOSPADM

## 2021-01-07 RX ADMIN — FERROUS SULFATE TAB 325 MG (65 MG ELEMENTAL FE) 325 MG: 325 (65 FE) TAB at 07:59

## 2021-01-07 RX ADMIN — DOCUSATE SODIUM AND SENNOSIDES 1 TABLET: 8.6; 5 TABLET, FILM COATED ORAL at 17:04

## 2021-01-07 RX ADMIN — DOCUSATE SODIUM AND SENNOSIDES 1 TABLET: 8.6; 5 TABLET, FILM COATED ORAL at 08:00

## 2021-01-07 RX ADMIN — HEPARIN SODIUM 5000 UNITS: 5000 INJECTION INTRAVENOUS; SUBCUTANEOUS at 13:26

## 2021-01-07 RX ADMIN — OXYBUTYNIN 10 MG: 10 TABLET, FILM COATED, EXTENDED RELEASE ORAL at 08:00

## 2021-01-07 RX ADMIN — CLONAZEPAM 0.5 MG: 0.5 TABLET ORAL at 17:04

## 2021-01-07 RX ADMIN — TOPIRAMATE 50 MG: 25 TABLET, FILM COATED ORAL at 08:00

## 2021-01-07 RX ADMIN — TOPIRAMATE 50 MG: 25 TABLET, FILM COATED ORAL at 17:04

## 2021-01-07 RX ADMIN — CLONAZEPAM 0.5 MG: 0.5 TABLET ORAL at 08:00

## 2021-01-07 RX ADMIN — Medication 1 TABLET: at 08:00

## 2021-01-07 RX ADMIN — ATORVASTATIN CALCIUM 40 MG: 40 TABLET, FILM COATED ORAL at 21:37

## 2021-01-07 RX ADMIN — Medication 2000 UNITS: at 08:00

## 2021-01-07 RX ADMIN — FAMOTIDINE 20 MG: 20 TABLET ORAL at 08:00

## 2021-01-07 RX ADMIN — QUETIAPINE FUMARATE 400 MG: 200 TABLET ORAL at 21:36

## 2021-01-07 RX ADMIN — LAMOTRIGINE 200 MG: 100 TABLET ORAL at 05:50

## 2021-01-07 RX ADMIN — NYSTATIN: 100000 POWDER TOPICAL at 10:52

## 2021-01-07 RX ADMIN — PANTOPRAZOLE SODIUM 40 MG: 40 TABLET, DELAYED RELEASE ORAL at 05:50

## 2021-01-07 RX ADMIN — DEXAMETHASONE SODIUM PHOSPHATE 6 MG: 4 INJECTION INTRA-ARTICULAR; INTRALESIONAL; INTRAMUSCULAR; INTRAVENOUS; SOFT TISSUE at 08:00

## 2021-01-07 NOTE — PLAN OF CARE
Problem: Potential for Falls  Goal: Patient will remain free of falls  Description: INTERVENTIONS:  - Assess patient frequently for physical needs  -  Identify cognitive and physical deficits and behaviors that affect risk of falls    -  [place] fall precautions as indicated by assessment   - Educate patient/family on patient safety including physical limitations  - Instruct patient to call for assistance with activity based on assessment  - Modify environment to reduce risk of injury  - Consider OT/PT consult to assist with strengthening/mobility  Outcome: Progressing     Problem: Prexisting or High Potential for Compromised Skin Integrity  Goal: Skin integrity is maintained or improved  Description: INTERVENTIONS:  - Identify patients at risk for skin breakdown  - Assess and monitor skin integrity  - Assess and monitor nutrition and hydration status  - Monitor labs   - Assess for incontinence   - Turn and reposition patient  - Assist with mobility/ambulation  - Relieve pressure over bony prominences  - Avoid friction and shearing  - Provide appropriate hygiene as needed including keeping skin clean and dry  - Evaluate need for skin moisturizer/barrier cream  - Collaborate with interdisciplinary team   - Patient/family teaching  - Consider wound care consult   Outcome: Progressing     Problem: PAIN - ADULT  Goal: Verbalizes/displays adequate comfort level or baseline comfort level  Description: Interventions:  - Encourage patient to monitor pain and request assistance  - Assess pain using appropriate pain scale  - Administer analgesics based on type and severity of pain and evaluate response  - Implement non-pharmacological measures as appropriate and evaluate response  - Consider cultural and social influences on pain and pain management  - Notify physician/advanced practitioner if interventions unsuccessful or patient reports new pain  Outcome: Progressing     Problem: INFECTION - ADULT  Goal: Absence or prevention of progression during hospitalization  Description: INTERVENTIONS:  - Assess and monitor for signs and symptoms of infection  - Monitor lab/diagnostic results  - Monitor all insertion sites, i e  indwelling lines, tubes, and drains  - Monitor endotracheal if appropriate and nasal secretions for changes in amount and color  - [place] appropriate cooling/warming therapies per order  - Administer medications as ordered  - Instruct and encourage patient and family to use good hand hygiene technique  - Identify and instruct in appropriate isolation precautions for identified infection/condition  Outcome: Progressing     Problem: SAFETY ADULT  Goal: Patient will remain free of falls  Description: INTERVENTIONS:  - Assess patient frequently for physical needs  -  Identify cognitive and physical deficits and behaviors that affect risk of falls    -  [place] fall precautions as indicated by assessment   - Educate patient/family on patient safety including physical limitations  - Instruct patient to call for assistance with activity based on assessment  - Modify environment to reduce risk of injury  - Consider OT/PT consult to assist with strengthening/mobility  Outcome: Progressing  Goal: Maintain or return to baseline ADL function  Description: INTERVENTIONS:  -  Assess patient's ability to carry out ADLs; assess patient's baseline for ADL function and identify physical deficits which impact ability to perform ADLs (bathing, care of mouth/teeth, toileting, grooming, dressing, etc )  - Assess/evaluate cause of self-care deficits   - Assess range of motion  - Assess patient's mobility; develop plan if impaired  - Assess patient's need for assistive devices and provide as appropriate  - Encourage maximum independence but intervene and supervise when necessary  - Involve family in performance of ADLs  - Assess for home care needs following discharge   - Consider OT consult to assist with ADL evaluation and planning for discharge  - Provide patient education as appropriate  Outcome: Progressing  Goal: Maintain or return mobility status to optimal level  Description: INTERVENTIONS:  - Assess patient's baseline mobility status (ambulation, transfers, stairs, etc )    - Identify cognitive and physical deficits and behaviors that affect mobility  - Identify mobility aids required to assist with transfers and/or ambulation (gait belt, sit-to-stand, lift, walker, cane, etc )  - [place] fall precautions as indicated by assessment  - Record patient progress and toleration of activity level on Mobility SBAR; progress patient to next Phase/Stage  - Instruct patient to call for assistance with activity based on assessment  - Consider rehabilitation consult to assist with strengthening/weightbearing, etc   Outcome: Progressing     Problem: DISCHARGE PLANNING  Goal: Discharge to home or other facility with appropriate resources  Description: INTERVENTIONS:  - Identify barriers to discharge w/patient and caregiver  - Arrange for needed discharge resources and transportation as appropriate  - Identify discharge learning needs (meds, wound care, etc )  - Arrange for interpretive services to assist at discharge as needed  - Refer to Case Management Department for coordinating discharge planning if the patient needs post-hospital services based on physician/advanced practitioner order or complex needs related to functional status, cognitive ability, or social support system  Outcome: Progressing     Problem: Knowledge Deficit  Goal: Patient/family/caregiver demonstrates understanding of disease process, treatment plan, medications, and discharge instructions  Description: Complete learning assessment and assess knowledge base    Interventions:  - Provide teaching at level of understanding  - Provide teaching via preferred learning methods  Outcome: Progressing     Problem: Nutrition/Hydration-ADULT  Goal: Nutrient/Hydration intake appropriate for improving, restoring or maintaining nutritional needs  Description: Monitor and assess patient's nutrition/hydration status for malnutrition  Collaborate with interdisciplinary team and initiate plan and interventions as ordered  Monitor patient's weight and dietary intake as ordered or per policy  Utilize nutrition screening tool and intervene as necessary  Determine patient's food preferences and provide high-protein, high-caloric foods as appropriate       INTERVENTIONS:  - Monitor oral intake, urinary output, labs, and treatment plans  - Assess nutrition and hydration status and recommend course of action  - Evaluate amount of meals eaten  - Assist patient with eating if necessary   - Allow adequate time for meals  - Recommend/ encourage appropriate diets, oral nutritional supplements, and vitamin/mineral supplements  - Order, calculate, and assess calorie counts as needed  - Recommend, monitor, and adjust tube feedings and TPN/PPN based on assessed needs  - Assess need for intravenous fluids  - Provide specific nutrition/hydration education as appropriate  - Include patient/family/caregiver in decisions related to nutrition  Outcome: Progressing     Problem: RESPIRATORY - ADULT  Goal: Achieves optimal ventilation and oxygenation  Description: INTERVENTIONS:  - Assess for changes in respiratory status  - Assess for changes in mentation and behavior  - Position to facilitate oxygenation and minimize respiratory effort  - Oxygen administered by appropriate delivery if ordered  - Initiate smoking cessation education as indicated  - Encourage broncho-pulmonary hygiene including cough, deep breathe, Incentive Spirometry  - Assess the need for suctioning and aspirate as needed  - Assess and instruct to report SOB or any respiratory difficulty  - Respiratory Therapy support as indicated  Outcome: Progressing     Problem: GENITOURINARY - ADULT  Goal: Urinary catheter remains patent  Description: INTERVENTIONS:  - Assess patency of urinary catheter  - If patient has a chronic aj, consider changing catheter if non-functioning  - Follow guidelines for intermittent irrigation of non-functioning urinary catheter  Outcome: Progressing     Problem: SKIN/TISSUE INTEGRITY - ADULT  Goal: Skin integrity remains intact  Description: INTERVENTIONS  - Identify patients at risk for skin breakdown  - Assess and monitor skin integrity  - Assess and monitor nutrition and hydration status  - Monitor labs (i e  albumin)  - Assess for incontinence   - Turn and reposition patient  - Assist with mobility/ambulation  - Relieve pressure over bony prominences  - Avoid friction and shearing  - Provide appropriate hygiene as needed including keeping skin clean and dry  - Evaluate need for skin moisturizer/barrier cream  - Collaborate with interdisciplinary team (i e  Nutrition, Rehabilitation, etc )   - Patient/family teaching  Outcome: Progressing     Problem: SAFETY,RESTRAINT: NV/NON-SELF DESTRUCTIVE BEHAVIOR  Goal: Remains free of harm/injury (restraint for non violent/non self-detsructive behavior)  Description: INTERVENTIONS:  - Instruct patient/family regarding restraint use   - Assess and monitor physiologic and psychological status   - Provide interventions and comfort measures to meet assessed patient needs   - Identify and implement measures to help patient regain control  - Assess readiness for release of restraint   Outcome: Progressing  Goal: Returns to optimal restraint-free functioning  Description: INTERVENTIONS:  - Assess the patient's behavior and symptoms that indicate continued need for restraint  - Identify and implement measures to help patient regain control  - Assess readiness for release of restraint   Outcome: Progressing

## 2021-01-07 NOTE — PLAN OF CARE
Problem: OCCUPATIONAL THERAPY ADULT  Goal: Performs self-care activities at highest level of function for planned discharge setting  See evaluation for individualized goals  Description: Treatment Interventions: ADL retraining, Functional transfer training, UE strengthening/ROM, Endurance training, Patient/family training, Equipment evaluation/education, Compensatory technique education, Continued evaluation, Activityengagement, Energy conservation          See flowsheet documentation for full assessment, interventions and recommendations  Note: Limitation: Decreased ADL status, Decreased UE ROM, Decreased UE strength, Decreased cognition, Decreased Safe judgement during ADL, Decreased endurance, Decreased high-level ADLs, Decreased self-care trans  Prognosis: Fair  Assessment: Pt seen for 15 min tx session with focus on functional balance, functional mobility, transfer safety, ADL status, and cognition  Pt able to tolerate OOB mobility; sitting balance=f/f+, standing balance=f/f-  Pt found getting OOB to use the commode--poor transfer safety  Pt required verbal/physical cues to maintain transfer safety  Pt demonstrate Selvin with simple toileting tasks  Currently pt would benefit from a supervised environment for final d/c destination 2* home-safety/cognitive deficits  Will continue        OT Discharge Recommendation: Post-Acute Rehabilitation Services  OT - OK to Discharge: Yes(when medically cleared to rehab)

## 2021-01-07 NOTE — ASSESSMENT & PLAN NOTE
Oxygen requirements are 4L NC  Wean down as tolerated  She is on her 2nd course of dexamethasone due to prolonged severe disease  Remove precautions as patient is greater than 20 days past diagnosis   Code status: DNR DNI  Ultimate plan for discharge to [facility]

## 2021-01-07 NOTE — CASE MANAGEMENT
CM contacted SNF re: possible admission however pt's O2 requirements have been up and down in the last 48 hrs and now on 4L midflow  It is being requested to monitor overnight to show O2 requirement stability  Attending notified of need for same

## 2021-01-07 NOTE — PROGRESS NOTES
Progress Note - Poncho Galvin 1935, 80 y o  female MRN: [id number]    Unit/Bed#: [facility] 68 2 [address] 87 [id number] Encounter: [id number]    Primary Care Provider: Victor M Delgado MD   Date and time admitted to hospital: 12/18/2020  5:50 PM        * Acute respiratory failure due to COVID-19 [facility])  Assessment & Plan  Oxygen requirements are 4L NC  Wean down as tolerated  She is on her 2nd course of dexamethasone due to prolonged severe disease  Remove precautions as patient is greater than 20 days past diagnosis   Code status: DNR DNI  Ultimate plan for discharge to [facility]     Urinary retention  Assessment & Plan  Failed urinary retention protocol  Catheter placed 1/1/2021  Outpatient Urology follow-up    Pneumonia due to COVID-19 virus  Assessment & Plan  Improving inflammatory markers but still with high O2 requirement  Completed initial 10 days of dexamethasone  However due to persistently increased O2 requirement, she is started on her 2nd course of dexamethasone, day 8 of 10 dexamethasone  Completed 5 days of remdesivir  Ongoing treatment with Lipitor, vitamin-D, heparin and multivitamin    Acute metabolic encephalopathy  Assessment & Plan  Acute metabolic encephalopathy secondary to COVID-19, hypernatremia, hospital delerium   Continue assistance when feeding  Diet changed to pureed with nectar thickened liquid  Regulate sleep wake cycles   Delirium precautions     Bipolar 1 disorder ([facility] [address] )  Assessment & Plan  With metabolic encephalopathy due to acute illness, hypernatremia, hypoxia, hospital delirium   Continue Seroquel 400 mg at bedtime, Topamax 50 mg b i d , Lamictal 200 mg daily      VTE Pharmacologic Prophylaxis:  Lovenox  Pharmacologic: Enoxaparin (Lovenox)  Mechanical VTE Prophylaxis in Place: Yes    Patient Centered Rounds: I have performed bedside rounds with nursing staff today  Education and Discussions with Family / Patient:  Called joseph Carrasco in left voicemail    Time Spent for Care: 30 minutes  More than 50% of total time spent on counseling and coordination of care as described above  Current Length of Stay: 20 day(s)    Current Patient Status: Inpatient   Certification Statement: The patient will continue to require additional inpatient hospital stay due to Awaiting placement to LTAC    Discharge Plan:  Hopeful discharge to LTAC    Code Status: Level 3 - DNAR and DNI      Subjective:   Patient seen evaluated at bedside  She is complaining that she has to go the bathroom  I explained to her that she has a Louie catheter in place  She is complaining of some itching and burning in her groin area  Objective:     Vitals:   Temp (24hrs), Av °F (36 7 °C), Min:97 5 °F (36 4 °C), Max:98 2 °F (36 8 °C)    Temp:  [97 5 °F (36 4 °C)-98 2 °F (36 8 °C)] 97 5 °F (36 4 °C)  HR:  [] 98  Resp:  [20-24] 20  BP: (110-126)/(69-85) 126/73  SpO2:  [91 %-99 %] 92 %  Body mass index is 19 76 kg/m²  Input and Output Summary (last 24 hours): Intake/Output Summary (Last 24 hours) at 2021 0908  Last data filed at 2021 0601  Gross per 24 hour   Intake    Output 500 ml   Net -500 ml       Physical Exam:     Physical Exam  Constitutional:       General: She is not in acute distress  Appearance: She is well-developed  She is ill-appearing  She is not diaphoretic  Comments: Frail-appearing   HENT:      Head: Normocephalic and atraumatic  Mouth/Throat:      Mouth: Mucous membranes are dry  Eyes:      General:         Right eye: No discharge  Left eye: No discharge  Conjunctiva/sclera: Conjunctivae normal    Cardiovascular:      Rate and Rhythm: Normal rate and regular rhythm  Pulmonary:      Effort: Pulmonary effort is normal  No respiratory distress  Musculoskeletal:         General: No deformity  Skin:     Findings: No erythema or rash  Neurological:      Mental Status: She is alert and oriented to person, place, and time     Psychiatric:         Behavior: Behavior normal          Additional Data:     Labs: I have reviewed pertinent results     Results from last 7 days   Lab Units 01/06/21  0545  01/02/21  0545   WBC Thousand/uL 8 71   < > 9 98   HEMOGLOBIN g/dL 10 0*   < > 9 2*   HEMATOCRIT % 32 5*   < > 29 9*   PLATELETS Thousands/uL 404*   < > 431*   BANDS PCT %  --   --  2   NEUTROS PCT % 71   < >  --    LYMPHS PCT % 18   < >  --    LYMPHO PCT %  --   --  19   MONOS PCT % 9   < >  --    MONO PCT %  --   --  6   EOS PCT % 0   < > 1    < > = values in this interval not displayed  Results from last 7 days   Lab Units 01/06/21  0545  01/04/21  1109   SODIUM mmol/L 143   < > 140   POTASSIUM mmol/L 3 6   < > 3 1*   CHLORIDE mmol/L 105   < > 103   CO2 mmol/L 32   < > 29   BUN mg/dL 18   < > 20   CREATININE mg/dL 0 65   < > 0 71   ANION GAP mmol/L 6   < > 8   CALCIUM mg/dL 9 2   < > 9 1   ALBUMIN g/dL  --   --  2 3*   TOTAL BILIRUBIN mg/dL  --   --  0 31   ALK PHOS U/L  --   --  72   ALT U/L  --   --  24   AST U/L  --   --  28   GLUCOSE RANDOM mg/dL 91   < > 129    < > = values in this interval not displayed                           Imaging: I have reviewed pertinent imaging       Recent Cultures (last 7 days):           Last 24 Hours Medication List:   Current Facility-Administered Medications   Medication Dose Route Frequency Provider Last Rate    acetaminophen  650 mg Oral Q6H PRN Azalia Hernández PA-C      atorvastatin  40 mg Oral HS Azalia Hernández PA-C      cholecalciferol  2,000 Units Oral Daily Azalia Hernández PA-C      clonazePAM  0 5 mg Oral BID Azalia Hernández PA-C      dexamethasone  6 mg Intravenous Daily Shaji No MD      dicyclomine  10 mg Oral TID PRN Kulwant Hannon DO      famotidine  20 mg Oral Daily Azalia Hernández PA-C      ferrous sulfate  325 mg Oral Daily With Breakfast Tea Forrest DO      heparin (porcine)  5,000 Units Subcutaneous [facility] Azalia Hernández PA-C      lamoTRIgine  200 mg Oral Daily Before Breakfast Azalia Dhaliwal PA-C      multivitamin-minerals  1 tablet Oral Daily Azalia Hernández PA-C      nystatin   Topical BID Briseida Heard DO      ondansetron  4 mg Intravenous Q6H PRN Lisa Lunch, DAISY      oxybutynin  10 mg Oral Daily Azalia Hernández PA-C      pantoprazole  40 mg Oral Early Morning Azalia Hernández PA-C      QUEtiapine  400 mg Oral HS Azalia Hernández PA-C      senna-docusate sodium  1 tablet Oral BID Bam Harvey DO      topiramate  50 mg Oral BID Lisa Lunch, DAISY          Today, Patient Was Seen By: Briseida Heard DO    ** Please Note: Dictation voice to text software may have been used in the creation of this document   **

## 2021-01-07 NOTE — PLAN OF CARE
Problem: PHYSICAL THERAPY ADULT  Goal: Performs mobility at highest level of function for planned discharge setting  See evaluation for individualized goals  Description: Treatment/Interventions: Functional transfer training, LE strengthening/ROM, Therapeutic exercise, Endurance training, Patient/family training, Cognitive reorientation, Equipment eval/education, Bed mobility, Gait training, Spoke to nursing, Spoke to case management, OT, Elevations  Equipment Recommended: Dee Rodriguez       See flowsheet documentation for full assessment, interventions and recommendations  Outcome: Progressing  Note: Prognosis: Guarded  Problem List: Decreased strength, Decreased endurance, Decreased mobility, Impaired balance, Decreased cognition, Impaired judgement, Decreased safety awareness, Impaired hearing, Decreased skin integrity  Assessment: Entered room as bed alarm sounding  Exiting bed without assistance  Transfers OOB to [facility] and then returns to bed with close S/Selvin for safety  Difficulty redirecting to tasks and poor safety awareness  Decreased O2 sats to 86% on 4L , however compared to previous session in which on 15L, now on 4L  Recovers with return to supine 91% or greater  Cues needed for safety  Impulsive and high risk for falls  Will require rehab at d/c given impairments  Returned to supine and bed alarm engaged  Barriers to Discharge: Decreased caregiver support  Barriers to Discharge Comments: Level of support at home  PT Discharge Recommendation: Post-Acute Rehabilitation Services     PT - OK to Discharge: Yes(to rhb when medically stable )    See flowsheet documentation for full assessment

## 2021-01-07 NOTE — PLAN OF CARE
Problem: Potential for Falls  Goal: Patient will remain free of falls  Description: INTERVENTIONS:  - Assess patient frequently for physical needs  -  Identify cognitive and physical deficits and behaviors that affect risk of falls    -  [place] fall precautions as indicated by assessment   - Educate patient/family on patient safety including physical limitations  - Instruct patient to call for assistance with activity based on assessment  - Modify environment to reduce risk of injury  - Consider OT/PT consult to assist with strengthening/mobility  Outcome: Progressing     Problem: Prexisting or High Potential for Compromised Skin Integrity  Goal: Skin integrity is maintained or improved  Description: INTERVENTIONS:  - Identify patients at risk for skin breakdown  - Assess and monitor skin integrity  - Assess and monitor nutrition and hydration status  - Monitor labs   - Assess for incontinence   - Turn and reposition patient  - Assist with mobility/ambulation  - Relieve pressure over bony prominences  - Avoid friction and shearing  - Provide appropriate hygiene as needed including keeping skin clean and dry  - Evaluate need for skin moisturizer/barrier cream  - Collaborate with interdisciplinary team   - Patient/family teaching  - Consider wound care consult   Outcome: Progressing     Problem: PAIN - ADULT  Goal: Verbalizes/displays adequate comfort level or baseline comfort level  Description: Interventions:  - Encourage patient to monitor pain and request assistance  - Assess pain using appropriate pain scale  - Administer analgesics based on type and severity of pain and evaluate response  - Implement non-pharmacological measures as appropriate and evaluate response  - Consider cultural and social influences on pain and pain management  - Notify physician/advanced practitioner if interventions unsuccessful or patient reports new pain  Outcome: Progressing     Problem: INFECTION - ADULT  Goal: Absence or prevention of progression during hospitalization  Description: INTERVENTIONS:  - Assess and monitor for signs and symptoms of infection  - Monitor lab/diagnostic results  - Monitor all insertion sites, i e  indwelling lines, tubes, and drains  - Monitor endotracheal if appropriate and nasal secretions for changes in amount and color  - Wallace appropriate cooling/warming therapies per order  - Administer medications as ordered  - Instruct and encourage patient and family to use good hand hygiene technique  - Identify and instruct in appropriate isolation precautions for identified infection/condition  Outcome: Progressing     Problem: SAFETY ADULT  Goal: Patient will remain free of falls  Description: INTERVENTIONS:  - Assess patient frequently for physical needs  -  Identify cognitive and physical deficits and behaviors that affect risk of falls    -  [place] fall precautions as indicated by assessment   - Educate patient/family on patient safety including physical limitations  - Instruct patient to call for assistance with activity based on assessment  - Modify environment to reduce risk of injury  - Consider OT/PT consult to assist with strengthening/mobility  Outcome: Progressing  Goal: Maintain or return to baseline ADL function  Description: INTERVENTIONS:  -  Assess patient's ability to carry out ADLs; assess patient's baseline for ADL function and identify physical deficits which impact ability to perform ADLs (bathing, care of mouth/teeth, toileting, grooming, dressing, etc )  - Assess/evaluate cause of self-care deficits   - Assess range of motion  - Assess patient's mobility; develop plan if impaired  - Assess patient's need for assistive devices and provide as appropriate  - Encourage maximum independence but intervene and supervise when necessary  - Involve family in performance of ADLs  - Assess for home care needs following discharge   - Consider OT consult to assist with ADL evaluation and planning for discharge  - Provide patient education as appropriate  Outcome: Progressing  Goal: Maintain or return mobility status to optimal level  Description: INTERVENTIONS:  - Assess patient's baseline mobility status (ambulation, transfers, stairs, etc )    - Identify cognitive and physical deficits and behaviors that affect mobility  - Identify mobility aids required to assist with transfers and/or ambulation (gait belt, sit-to-stand, lift, walker, cane, etc )  - [place] fall precautions as indicated by assessment  - Record patient progress and toleration of activity level on Mobility SBAR; progress patient to next Phase/Stage  - Instruct patient to call for assistance with activity based on assessment  - Consider rehabilitation consult to assist with strengthening/weightbearing, etc   Outcome: Progressing     Problem: DISCHARGE PLANNING  Goal: Discharge to home or other facility with appropriate resources  Description: INTERVENTIONS:  - Identify barriers to discharge w/patient and caregiver  - Arrange for needed discharge resources and transportation as appropriate  - Identify discharge learning needs (meds, wound care, etc )  - Arrange for interpretive services to assist at discharge as needed  - Refer to Case Management Department for coordinating discharge planning if the patient needs post-hospital services based on physician/advanced practitioner order or complex needs related to functional status, cognitive ability, or social support system  Outcome: Progressing     Problem: Knowledge Deficit  Goal: Patient/family/caregiver demonstrates understanding of disease process, treatment plan, medications, and discharge instructions  Description: Complete learning assessment and assess knowledge base    Interventions:  - Provide teaching at level of understanding  - Provide teaching via preferred learning methods  Outcome: Progressing     Problem: Nutrition/Hydration-ADULT  Goal: Nutrient/Hydration intake appropriate for improving, restoring or maintaining nutritional needs  Description: Monitor and assess patient's nutrition/hydration status for malnutrition  Collaborate with interdisciplinary team and initiate plan and interventions as ordered  Monitor patient's weight and dietary intake as ordered or per policy  Utilize nutrition screening tool and intervene as necessary  Determine patient's food preferences and provide high-protein, high-caloric foods as appropriate       INTERVENTIONS:  - Monitor oral intake, urinary output, labs, and treatment plans  - Assess nutrition and hydration status and recommend course of action  - Evaluate amount of meals eaten  - Assist patient with eating if necessary   - Allow adequate time for meals  - Recommend/ encourage appropriate diets, oral nutritional supplements, and vitamin/mineral supplements  - Order, calculate, and assess calorie counts as needed  - Recommend, monitor, and adjust tube feedings and TPN/PPN based on assessed needs  - Assess need for intravenous fluids  - Provide specific nutrition/hydration education as appropriate  - Include patient/family/caregiver in decisions related to nutrition  Outcome: Progressing     Problem: RESPIRATORY - ADULT  Goal: Achieves optimal ventilation and oxygenation  Description: INTERVENTIONS:  - Assess for changes in respiratory status  - Assess for changes in mentation and behavior  - Position to facilitate oxygenation and minimize respiratory effort  - Oxygen administered by appropriate delivery if ordered  - Initiate smoking cessation education as indicated  - Encourage broncho-pulmonary hygiene including cough, deep breathe, Incentive Spirometry  - Assess the need for suctioning and aspirate as needed  - Assess and instruct to report SOB or any respiratory difficulty  - Respiratory Therapy support as indicated  Outcome: Progressing     Problem: GENITOURINARY - ADULT  Goal: Urinary catheter remains patent  Description: INTERVENTIONS:  - Assess patency of urinary catheter  - If patient has a chronic aj, consider changing catheter if non-functioning  - Follow guidelines for intermittent irrigation of non-functioning urinary catheter  Outcome: Progressing     Problem: SKIN/TISSUE INTEGRITY - ADULT  Goal: Skin integrity remains intact  Description: INTERVENTIONS  - Identify patients at risk for skin breakdown  - Assess and monitor skin integrity  - Assess and monitor nutrition and hydration status  - Monitor labs (i e  albumin)  - Assess for incontinence   - Turn and reposition patient  - Assist with mobility/ambulation  - Relieve pressure over bony prominences  - Avoid friction and shearing  - Provide appropriate hygiene as needed including keeping skin clean and dry  - Evaluate need for skin moisturizer/barrier cream  - Collaborate with interdisciplinary team (i e  Nutrition, Rehabilitation, etc )   - Patient/family teaching  Outcome: Progressing     Problem: SAFETY,RESTRAINT: NV/NON-SELF DESTRUCTIVE BEHAVIOR  Goal: Remains free of harm/injury (restraint for non violent/non self-detsructive behavior)  Description: INTERVENTIONS:  - Instruct patient/family regarding restraint use   - Assess and monitor physiologic and psychological status   - Provide interventions and comfort measures to meet assessed patient needs   - Identify and implement measures to help patient regain control  - Assess readiness for release of restraint   Outcome: Progressing  Goal: Returns to optimal restraint-free functioning  Description: INTERVENTIONS:  - Assess the patient's behavior and symptoms that indicate continued need for restraint  - Identify and implement measures to help patient regain control  - Assess readiness for release of restraint   Outcome: Progressing

## 2021-01-07 NOTE — SPEECH THERAPY NOTE
Speech Language/Pathology  Pt not seen formally this am but noted to be able to get up and transfer to bedside commode  More verbal  Intake improving per nurse  Able to take liquids via straw  Continue puree w/ nectar for now  F/u at d/c facility

## 2021-01-07 NOTE — OCCUPATIONAL THERAPY NOTE
OccupationalTherapy Progress Note(time=1210-1225)     Patient Name: Jabari Galeano  [facility] Date: 1/7/2021  Problem List  Principal Problem:    Acute respiratory failure due to COVID-19 [facility])  Active Problems:    Hyperlipidemia    Bipolar 1 disorder (HCC)    Severe protein-calorie malnutrition ([facility] [address] )    Acute metabolic encephalopathy    Anemia, macrocytic    Hypernatremia    Pneumonia due to COVID-19 virus    Urinary retention          01/07/21 1225   Note Type   Note Type Treatment   Restrictions/Precautions   Weight Bearing Precautions Per Order No   Other Precautions Contact/isolation; Airborne/isolation; Chair Alarm; Bed Alarm;Cognitive; Fall Risk   Pain Assessment   Pain Assessment Tool FLACC   Pain Rating: FLACC (Rest) - Face 0   Pain Rating: FLACC (Rest) - Legs 0   Pain Rating: FLACC (Rest) - Activity 0   Pain Rating: FLACC (Rest) - Cry 0   Pain Rating: FLACC (Rest) - Consolability 0   Score: FLACC (Rest) 0   ADL   Where Assessed Commode   Toileting Assistance  4  Minimal Assistance   Toileting Deficit Clothing management up;Clothing management down   Functional Standing Tolerance   Time 1 min   Bed Mobility   Supine to Sit 5  Supervision   Additional items HOB elevated;Verbal cues; Impulsive   Sit to Supine 5  Supervision   Additional items Increased time required;LE management;Verbal cues   Transfers   Sit to Stand 5  Supervision   Additional items Impulsive;Verbal cues   Stand to Sit 4  Minimal assistance   Additional items Assist x 1; Increased time required;Verbal cues   Stand pivot 4  Minimal assistance   Additional items Assist x 1; Increased time required;Verbal cues   Toilet transfer 4  Minimal assistance   Additional items Assist x 1; Increased time required;Commode   Functional Mobility   Functional Mobility 4  Minimal assistance   Additional Comments x1   Additional items Hand hold assistance   Cognition   Overall Cognitive Status Impaired   Arousal/Participation Alert   Attention Difficulty attending to directions   Orientation Level Oriented to person   Memory Decreased short term memory;Decreased recall of recent events;Decreased recall of precautions   Following Commands Follows one step commands with increased time or repetition   Comments demonstrating poor judgement/safety with transfers   Activity Tolerance   Activity Tolerance Patient tolerated treatment well   Medical Staff Made Aware nsg, P T  Assessment   Assessment Pt seen for 15 min tx session with focus on functional balance, functional mobility, transfer safety, ADL status, and cognition  Pt able to tolerate OOB mobility; sitting balance=f/f+, standing balance=f/f-  Pt found getting OOB to use the commode--poor transfer safety  Pt required verbal/physical cues to maintain transfer safety  Pt demonstrate Selvin with simple toileting tasks  Currently pt would benefit from a supervised environment for final d/c destination 2* home-safety/cognitive deficits  Will continue  Plan   Treatment Interventions ADL retraining;Functional transfer training;Cognitive reorientation;Patient/family training; Compensatory technique education   Goal Expiration Date 01/14/21   OT Treatment Day 5   OT Frequency 3-5x/wk   Recommendation   OT Discharge Recommendation Post-Acute Rehabilitation Services   Barthel Index   Feeding 0   Bathing 0   Grooming Score 0   Dressing Score 5   Bladder Score 5   Bowels Score 5   Toilet Use Score 5   Transfers (Bed/Chair) Score 5   Mobility (Level Surface) Score 0   Stairs Score 0   Barthel Index Score 25   Milena Garcia, OT

## 2021-01-08 LAB
ANION GAP SERPL CALCULATED.3IONS-SCNC: 6 MMOL/L (ref 4–13)
BUN SERPL-MCNC: 18 MG/DL (ref 5–25)
CALCIUM SERPL-MCNC: 9.5 MG/DL (ref 8.3–10.1)
CHLORIDE SERPL-SCNC: 106 MMOL/L (ref 100–108)
CO2 SERPL-SCNC: 34 MMOL/L (ref 21–32)
CREAT SERPL-MCNC: 0.72 MG/DL (ref 0.6–1.3)
GFR SERPL CREATININE-BSD FRML MDRD: 77 ML/MIN/1.73SQ M
GLUCOSE SERPL-MCNC: 94 MG/DL (ref 65–140)
POTASSIUM SERPL-SCNC: 3.6 MMOL/L (ref 3.5–5.3)
SODIUM SERPL-SCNC: 146 MMOL/L (ref 136–145)

## 2021-01-08 PROCEDURE — 99232 SBSQ HOSP IP/OBS MODERATE 35: CPT | Performed by: INTERNAL MEDICINE

## 2021-01-08 PROCEDURE — 80048 BASIC METABOLIC PNL TOTAL CA: CPT | Performed by: INTERNAL MEDICINE

## 2021-01-08 RX ORDER — POTASSIUM CHLORIDE 20 MEQ/1
40 TABLET, EXTENDED RELEASE ORAL ONCE
Status: COMPLETED | OUTPATIENT
Start: 2021-01-08 | End: 2021-01-08

## 2021-01-08 RX ADMIN — DOCUSATE SODIUM AND SENNOSIDES 1 TABLET: 8.6; 5 TABLET, FILM COATED ORAL at 17:26

## 2021-01-08 RX ADMIN — FERROUS SULFATE TAB 325 MG (65 MG ELEMENTAL FE) 325 MG: 325 (65 FE) TAB at 08:18

## 2021-01-08 RX ADMIN — Medication 2000 UNITS: at 08:17

## 2021-01-08 RX ADMIN — PANTOPRAZOLE SODIUM 40 MG: 40 TABLET, DELAYED RELEASE ORAL at 06:00

## 2021-01-08 RX ADMIN — ATORVASTATIN CALCIUM 40 MG: 40 TABLET, FILM COATED ORAL at 22:30

## 2021-01-08 RX ADMIN — LAMOTRIGINE 200 MG: 100 TABLET ORAL at 06:00

## 2021-01-08 RX ADMIN — POTASSIUM CHLORIDE 40 MEQ: 1500 TABLET, EXTENDED RELEASE ORAL at 08:39

## 2021-01-08 RX ADMIN — DEXAMETHASONE SODIUM PHOSPHATE 6 MG: 4 INJECTION INTRA-ARTICULAR; INTRALESIONAL; INTRAMUSCULAR; INTRAVENOUS; SOFT TISSUE at 08:18

## 2021-01-08 RX ADMIN — NYSTATIN: 100000 POWDER TOPICAL at 08:19

## 2021-01-08 RX ADMIN — NYSTATIN: 100000 POWDER TOPICAL at 17:27

## 2021-01-08 RX ADMIN — HEPARIN SODIUM 5000 UNITS: 5000 INJECTION INTRAVENOUS; SUBCUTANEOUS at 22:30

## 2021-01-08 RX ADMIN — CLONAZEPAM 0.5 MG: 0.5 TABLET ORAL at 08:18

## 2021-01-08 RX ADMIN — HEPARIN SODIUM 5000 UNITS: 5000 INJECTION INTRAVENOUS; SUBCUTANEOUS at 14:45

## 2021-01-08 RX ADMIN — CLONAZEPAM 0.5 MG: 0.5 TABLET ORAL at 17:26

## 2021-01-08 RX ADMIN — Medication 1 TABLET: at 08:18

## 2021-01-08 RX ADMIN — DOCUSATE SODIUM AND SENNOSIDES 1 TABLET: 8.6; 5 TABLET, FILM COATED ORAL at 08:17

## 2021-01-08 RX ADMIN — TOPIRAMATE 50 MG: 25 TABLET, FILM COATED ORAL at 17:26

## 2021-01-08 RX ADMIN — FAMOTIDINE 20 MG: 20 TABLET ORAL at 08:18

## 2021-01-08 RX ADMIN — DEXTROSE 500 ML: 5 SOLUTION INTRAVENOUS at 08:39

## 2021-01-08 RX ADMIN — QUETIAPINE FUMARATE 400 MG: 200 TABLET ORAL at 22:30

## 2021-01-08 RX ADMIN — TOPIRAMATE 50 MG: 25 TABLET, FILM COATED ORAL at 08:18

## 2021-01-08 RX ADMIN — OXYBUTYNIN 10 MG: 10 TABLET, FILM COATED, EXTENDED RELEASE ORAL at 08:18

## 2021-01-08 NOTE — ASSESSMENT & PLAN NOTE
Acute metabolic encephalopathy secondary to COVID-19, hypernatremia, hospital delerium   Improving  Diet changed to pureed with nectar thickened liquid  Regulate sleep wake cycles   Delirium precautions

## 2021-01-08 NOTE — ASSESSMENT & PLAN NOTE
Malnutrition Findings:           BMI Findings: Body mass index is 19 76 kg/m²       Poor oral intake in setting of acute illness poor oral intake in setting of acute illness, encephalopathy  Improving  Continue Ensure supplementation

## 2021-01-08 NOTE — CASE MANAGEMENT
Pt continues to improve on O2 requirements remaining stable- per [facility], as long as remains stable overnight will be accepted at \[facility]\""  CM to arrange transport ([facility]) for tomorrow after lunch

## 2021-01-08 NOTE — PROGRESS NOTES
Progress Note - Nelson Teixeira 1935, 80 y o  female MRN: [id number]    Unit/Bed#: [address] [id number] Encounter: [id number]    Primary Care Provider: Rosanne Bond MD   Date and time admitted to hospital: 12/18/2020  5:50 PM        * Acute respiratory failure due to COVID-19 [facility])  Assessment & Plan  Oxygen requirements are 4L NC  Wean down as tolerated  She is on her 2nd course of dexamethasone due to prolonged severe disease  Remove precautions as patient is greater than 20 days past diagnosis   Code status: DNR DNI  Is medically stable for discharge to [facility]    Urinary retention  Assessment & Plan  Failed urinary retention protocol  Catheter placed 1/1/2021  Outpatient Urology follow-up    Pneumonia due to COVID-19 virus  Assessment & Plan  Improving inflammatory markers but still with high O2 requirement  Completed initial 10 days of dexamethasone  However due to persistently increased O2 requirement, she is started on her 2nd course of dexamethasone, day 9 of 10 dexamethasone    Will plan to taper  Completed 5 days of remdesivir  Ongoing treatment with Lipitor, vitamin-D, heparin and multivitamin    Hypernatremia  Assessment & Plan  Secondary to poor oral intake, encephalopathy/acute illness  Continue diet as tolerated  High likelihood of recurrence due to poor oral intake and encephalopathy  Give 500 cc bolus today  Follow BMP    Anemia, macrocytic  Assessment & Plan  Hemoglobin stable   Continue vitamin supplements and iron      Results from last 7 days   Lab Units 01/06/21  0545 01/04/21  1109 01/03/21  0501 01/02/21  0545   HEMOGLOBIN g/dL 10 0* 10 6* 10 3* 9 2*       Acute metabolic encephalopathy  Assessment & Plan  Acute metabolic encephalopathy secondary to COVID-19, hypernatremia, hospital delerium   Improving  Diet changed to pureed with nectar thickened liquid  Regulate sleep wake cycles   Delirium precautions     Severe protein-calorie malnutrition ([address] )  Assessment & Plan  Malnutrition Findings:           BMI Findings: Body mass index is 19 76 kg/m²  Poor oral intake in setting of acute illness poor oral intake in setting of acute illness, encephalopathy  Improving  Continue Ensure supplementation    Bipolar 1 disorder (HCC)  Assessment & Plan  With metabolic encephalopathy due to acute illness, hypernatremia, hypoxia, hospital delirium   Continue Seroquel 400 mg at bedtime, Topamax 50 mg b i d , Lamictal 200 mg daily      VTE Pharmacologic Prophylaxis: Lovenox   Pharmacologic: Enoxaparin (Lovenox)  Mechanical VTE Prophylaxis in Place: Yes    Patient Centered Rounds: I have performed bedside rounds with nursing staff today  Education and Discussions with Family / Patient: Called son Johanne Cropwell     Time Spent for Care: 30 minutes  More than 50% of total time spent on counseling and coordination of care as described above  Current Length of Stay: 21 day(s)    Current Patient Status: Inpatient   Certification Statement: The patient will continue to require additional inpatient hospital stay due to Awaiting placement    Discharge Plan:  Patient is medically stable for discharge to LTAC    Code Status: Level 3 - DNAR and DNI      Subjective:   Patient seen and evaluated at bedside  She says that she is not doing well and does not feel like she will be able to get out of the hospital   I encouraged her that she is medically stable and the plan is to go to LTAC  Objective:     Vitals:   Temp (24hrs), Av 8 °F (36 6 °C), Min:97 2 °F (36 2 °C), Max:99 °F (37 2 °C)    Temp:  [97 2 °F (36 2 °C)-99 °F (37 2 °C)] 97 5 °F (36 4 °C)  HR:  [] 101  Resp:  [16-19] 19  BP: ()/(59-80) 113/73  SpO2:  [84 %-99 %] 92 %  Body mass index is 19 76 kg/m²  Input and Output Summary (last 24 hours):     No intake or output data in the 24 hours ending 21 1033    Physical Exam:     Physical Exam  Constitutional:       General: She is not in acute distress  Appearance: She is well-developed  She is not diaphoretic  Comments: Frail   HENT:      Head: Normocephalic and atraumatic  Eyes:      General:         Right eye: No discharge  Left eye: No discharge  Conjunctiva/sclera: Conjunctivae normal    Pulmonary:      Effort: Pulmonary effort is normal  No respiratory distress  Musculoskeletal:         General: No deformity  Skin:     Findings: No erythema or rash  Neurological:      Mental Status: She is alert and oriented to person, place, and time  Psychiatric:         Behavior: Behavior normal          Additional Data:     Labs: I have reviewed pertinent results     Results from last 7 days   Lab Units 01/06/21  0545  01/02/21  0545   WBC Thousand/uL 8 71   < > 9 98   HEMOGLOBIN g/dL 10 0*   < > 9 2*   HEMATOCRIT % 32 5*   < > 29 9*   PLATELETS Thousands/uL 404*   < > 431*   BANDS PCT %  --   --  2   NEUTROS PCT % 71   < >  --    LYMPHS PCT % 18   < >  --    LYMPHO PCT %  --   --  19   MONOS PCT % 9   < >  --    MONO PCT %  --   --  6   EOS PCT % 0   < > 1    < > = values in this interval not displayed  Results from last 7 days   Lab Units 01/08/21  0455  01/04/21  1109   SODIUM mmol/L 146*   < > 140   POTASSIUM mmol/L 3 6   < > 3 1*   CHLORIDE mmol/L 106   < > 103   CO2 mmol/L 34*   < > 29   BUN mg/dL 18   < > 20   CREATININE mg/dL 0 72   < > 0 71   ANION GAP mmol/L 6   < > 8   CALCIUM mg/dL 9 5   < > 9 1   ALBUMIN g/dL  --   --  2 3*   TOTAL BILIRUBIN mg/dL  --   --  0 31   ALK PHOS U/L  --   --  72   ALT U/L  --   --  24   AST U/L  --   --  28   GLUCOSE RANDOM mg/dL 94   < > 129    < > = values in this interval not displayed                           Imaging: I have reviewed pertinent imaging       Recent Cultures (last 7 days):           Last 24 Hours Medication List:   Current Facility-Administered Medications   Medication Dose Route Frequency Provider Last Rate    acetaminophen  650 mg Oral Q6H PRN Jeff Patel PA-C      atorvastatin  40 mg Oral HS Azalia Wakefield PA-C      cholecalciferol  2,000 Units Oral Daily Azalia Hernández PA-C      clonazePAM  0 5 mg Oral BID [place] DAISY Champion      dexamethasone  6 mg Intravenous Daily Ruby Whitehead MD      dextrose  500 mL Intravenous Once Renee Ortiz DO      dicyclomine  10 mg Oral TID PRN Aman Mckeon DO      famotidine  20 mg Oral Daily Azalia Hernández PA-C      ferrous sulfate  325 mg Oral Daily With Breakfast Renee Ortiz DO      heparin (porcine)  5,000 Units Subcutaneous [facility] Azalia Hernández PA-C      lamoTRIgine  200 mg Oral Daily Before Breakfast Azalia Hernández PA-C      multivitamin-minerals  1 tablet Oral Daily Azalia Hernández PA-C      nystatin   Topical BID Renee Ortiz DO      ondansetron  4 mg Intravenous Q6H PRN Alberto Champion PA-C      oxybutynin  10 mg Oral Daily Azalia Hernández PA-C      pantoprazole  40 mg Oral Early Morning Azalia Hernández PA-C      QUEtiapine  400 mg Oral HS Azalia Hernández PA-C      senna-docusate sodium  1 tablet Oral BID Aman Mckeon DO      topiramate  50 mg Oral BID Albertodorita Champion PA-C          Today, Patient Was Seen By: Renee Ortiz DO    ** Please Note: Dictation voice to text software may have been used in the creation of this document   **

## 2021-01-08 NOTE — ASSESSMENT & PLAN NOTE
Oxygen requirements are 4L NC  Wean down as tolerated  She is on her 2nd course of dexamethasone due to prolonged severe disease  Remove precautions as patient is greater than 20 days past diagnosis   Code status: DNR DNI  Is medically stable for discharge to [facility]

## 2021-01-08 NOTE — PROGRESS NOTES
Interval Progress note:    Patient will require less than 30 calender days of NF service before returning to her BREANNA and her symptoms or behaviors are stable

## 2021-01-08 NOTE — ASSESSMENT & PLAN NOTE
Improving inflammatory markers but still with high O2 requirement  Completed initial 10 days of dexamethasone  However due to persistently increased O2 requirement, she is started on her 2nd course of dexamethasone, day 9 of 10 dexamethasone    Will plan to taper  Completed 5 days of remdesivir  Ongoing treatment with Lipitor, vitamin-D, heparin and multivitamin

## 2021-01-08 NOTE — PLAN OF CARE
Problem: Potential for Falls  Goal: Patient will remain free of falls  Description: INTERVENTIONS:  - Assess patient frequently for physical needs  -  Identify cognitive and physical deficits and behaviors that affect risk of falls    -  [place] fall precautions as indicated by assessment   - Educate patient/family on patient safety including physical limitations  - Instruct patient to call for assistance with activity based on assessment  - Modify environment to reduce risk of injury  - Consider OT/PT consult to assist with strengthening/mobility  1/8/2021 1039 by Arnav Dhaliwal RN  Outcome: Progressing  1/8/2021 1037 by Arnav Dhaliwal RN  Outcome: Progressing     Problem: Prexisting or High Potential for Compromised Skin Integrity  Goal: Skin integrity is maintained or improved  Description: INTERVENTIONS:  - Identify patients at risk for skin breakdown  - Assess and monitor skin integrity  - Assess and monitor nutrition and hydration status  - Monitor labs   - Assess for incontinence   - Turn and reposition patient  - Assist with mobility/ambulation  - Relieve pressure over bony prominences  - Avoid friction and shearing  - Provide appropriate hygiene as needed including keeping skin clean and dry  - Evaluate need for skin moisturizer/barrier cream  - Collaborate with interdisciplinary team   - Patient/family teaching  - Consider wound care consult   1/8/2021 1039 by Arnav Dhaliwal RN  Outcome: Progressing  1/8/2021 1037 by Arnav Dhaliwal RN  Outcome: Progressing     Problem: PAIN - ADULT  Goal: Verbalizes/displays adequate comfort level or baseline comfort level  Description: Interventions:  - Encourage patient to monitor pain and request assistance  - Assess pain using appropriate pain scale  - Administer analgesics based on type and severity of pain and evaluate response  - Implement non-pharmacological measures as appropriate and evaluate response  - Consider cultural and social influences on pain and pain management  - Notify physician/advanced practitioner if interventions unsuccessful or patient reports new pain  1/8/2021 1039 by Maximus Ibarra RN  Outcome: Progressing  1/8/2021 1037 by Maximus Ibarra RN  Outcome: Progressing     Problem: INFECTION - ADULT  Goal: Absence or prevention of progression during hospitalization  Description: INTERVENTIONS:  - Assess and monitor for signs and symptoms of infection  - Monitor lab/diagnostic results  - Monitor all insertion sites, i e  indwelling lines, tubes, and drains  - Monitor endotracheal if appropriate and nasal secretions for changes in amount and color  - [place] appropriate cooling/warming therapies per order  - Administer medications as ordered  - Instruct and encourage patient and family to use good hand hygiene technique  - Identify and instruct in appropriate isolation precautions for identified infection/condition  1/8/2021 1039 by Maximus Ibarra RN  Outcome: Progressing  1/8/2021 1037 by Maximus Ibarra RN  Outcome: Progressing     Problem: SAFETY ADULT  Goal: Patient will remain free of falls  Description: INTERVENTIONS:  - Assess patient frequently for physical needs  -  Identify cognitive and physical deficits and behaviors that affect risk of falls    -  [place] fall precautions as indicated by assessment   - Educate patient/family on patient safety including physical limitations  - Instruct patient to call for assistance with activity based on assessment  - Modify environment to reduce risk of injury  - Consider OT/PT consult to assist with strengthening/mobility  1/8/2021 1039 by Maximus Ibarra RN  Outcome: Progressing  1/8/2021 1037 by Maximus Ibarra RN  Outcome: Progressing  Goal: Maintain or return to baseline ADL function  Description: INTERVENTIONS:  -  Assess patient's ability to carry out ADLs; assess patient's baseline for ADL function and identify physical deficits which impact ability to perform ADLs (bathing, care of mouth/teeth, toileting, grooming, dressing, etc )  - Assess/evaluate cause of self-care deficits   - Assess range of motion  - Assess patient's mobility; develop plan if impaired  - Assess patient's need for assistive devices and provide as appropriate  - Encourage maximum independence but intervene and supervise when necessary  - Involve family in performance of ADLs  - Assess for home care needs following discharge   - Consider OT consult to assist with ADL evaluation and planning for discharge  - Provide patient education as appropriate  1/8/2021 1039 by Gayathri Sawyer RN  Outcome: Progressing  1/8/2021 1037 by Gayathri Sawyer RN  Outcome: Progressing  Goal: Maintain or return mobility status to optimal level  Description: INTERVENTIONS:  - Assess patient's baseline mobility status (ambulation, transfers, stairs, etc )    - Identify cognitive and physical deficits and behaviors that affect mobility  - Identify mobility aids required to assist with transfers and/or ambulation (gait belt, sit-to-stand, lift, walker, cane, etc )  - [place] fall precautions as indicated by assessment  - Record patient progress and toleration of activity level on Mobility SBAR; progress patient to next Phase/Stage  - Instruct patient to call for assistance with activity based on assessment  - Consider rehabilitation consult to assist with strengthening/weightbearing, etc   1/8/2021 1039 by Gayathri Sawyer RN  Outcome: Progressing  1/8/2021 1037 by Gayathri Sawyer RN  Outcome: Progressing     Problem: DISCHARGE PLANNING  Goal: Discharge to home or other facility with appropriate resources  Description: INTERVENTIONS:  - Identify barriers to discharge w/patient and caregiver  - Arrange for needed discharge resources and transportation as appropriate  - Identify discharge learning needs (meds, wound care, etc )  - Arrange for interpretive services to assist at discharge as needed  - Refer to Case Management Department for coordinating discharge planning if the patient needs post-hospital services based on physician/advanced practitioner order or complex needs related to functional status, cognitive ability, or social support system  1/8/2021 1039 by Bunny White RN  Outcome: Progressing  1/8/2021 1037 by Bunny White RN  Outcome: Progressing     Problem: Knowledge Deficit  Goal: Patient/family/caregiver demonstrates understanding of disease process, treatment plan, medications, and discharge instructions  Description: Complete learning assessment and assess knowledge base  Interventions:  - Provide teaching at level of understanding  - Provide teaching via preferred learning methods  1/8/2021 1039 by Bunny White RN  Outcome: Progressing  1/8/2021 1037 by Bunny White RN  Outcome: Progressing     Problem: Nutrition/Hydration-ADULT  Goal: Nutrient/Hydration intake appropriate for improving, restoring or maintaining nutritional needs  Description: Monitor and assess patient's nutrition/hydration status for malnutrition  Collaborate with interdisciplinary team and initiate plan and interventions as ordered  Monitor patient's weight and dietary intake as ordered or per policy  Utilize nutrition screening tool and intervene as necessary  Determine patient's food preferences and provide high-protein, high-caloric foods as appropriate       INTERVENTIONS:  - Monitor oral intake, urinary output, labs, and treatment plans  - Assess nutrition and hydration status and recommend course of action  - Evaluate amount of meals eaten  - Assist patient with eating if necessary   - Allow adequate time for meals  - Recommend/ encourage appropriate diets, oral nutritional supplements, and vitamin/mineral supplements  - Order, calculate, and assess calorie counts as needed  - Recommend, monitor, and adjust tube feedings and TPN/PPN based on assessed needs  - Assess need for intravenous fluids  - Provide specific nutrition/hydration education as appropriate  - Include patient/family/caregiver in decisions related to nutrition  1/8/2021 1039 by Rylee Santiago RN  Outcome: Progressing  1/8/2021 1037 by Rylee Santiago RN  Outcome: Progressing     Problem: RESPIRATORY - ADULT  Goal: Achieves optimal ventilation and oxygenation  Description: INTERVENTIONS:  - Assess for changes in respiratory status  - Assess for changes in mentation and behavior  - Position to facilitate oxygenation and minimize respiratory effort  - Oxygen administered by appropriate delivery if ordered  - Initiate smoking cessation education as indicated  - Encourage broncho-pulmonary hygiene including cough, deep breathe, Incentive Spirometry  - Assess the need for suctioning and aspirate as needed  - Assess and instruct to report SOB or any respiratory difficulty  - Respiratory Therapy support as indicated  1/8/2021 1039 by Rylee Santiago RN  Outcome: Progressing  1/8/2021 1037 by Rylee Santiago RN  Outcome: Progressing     Problem: GENITOURINARY - ADULT  Goal: Urinary catheter remains patent  Description: INTERVENTIONS:  - Assess patency of urinary catheter  - If patient has a chronic aj, consider changing catheter if non-functioning  - Follow guidelines for intermittent irrigation of non-functioning urinary catheter  1/8/2021 1039 by Rylee Santiago RN  Outcome: Progressing  1/8/2021 1037 by Rylee Santiago RN  Outcome: Progressing     Problem: SKIN/TISSUE INTEGRITY - ADULT  Goal: Skin integrity remains intact  Description: INTERVENTIONS  - Identify patients at risk for skin breakdown  - Assess and monitor skin integrity  - Assess and monitor nutrition and hydration status  - Monitor labs (i e  albumin)  - Assess for incontinence   - Turn and reposition patient  - Assist with mobility/ambulation  - Relieve pressure over bony prominences  - Avoid friction and shearing  - Provide appropriate hygiene as needed including keeping skin clean and dry  - Evaluate need for skin moisturizer/barrier cream  - Collaborate with interdisciplinary team (i e  Nutrition, Rehabilitation, etc )   - Patient/family teaching  1/8/2021 1039 by Sharri Garber RN  Outcome: Progressing  1/8/2021 1037 by Sharri Garber RN  Outcome: Progressing     Problem: SAFETY,RESTRAINT: NV/NON-SELF DESTRUCTIVE BEHAVIOR  Goal: Remains free of harm/injury (restraint for non violent/non self-detsructive behavior)  Description: INTERVENTIONS:  - Instruct patient/family regarding restraint use   - Assess and monitor physiologic and psychological status   - Provide interventions and comfort measures to meet assessed patient needs   - Identify and implement measures to help patient regain control  - Assess readiness for release of restraint   1/8/2021 1039 by Sharri Garber RN  Outcome: Progressing  1/8/2021 1037 by Sharri Garber RN  Outcome: Progressing  Goal: Returns to optimal restraint-free functioning  Description: INTERVENTIONS:  - Assess the patient's behavior and symptoms that indicate continued need for restraint  - Identify and implement measures to help patient regain control  - Assess readiness for release of restraint   1/8/2021 1039 by Sharri Garber RN  Outcome: Progressing  1/8/2021 1037 by Sharri Garber RN  Outcome: Progressing

## 2021-01-08 NOTE — PLAN OF CARE
Problem: Potential for Falls  Goal: Patient will remain free of falls  Description: INTERVENTIONS:  - Assess patient frequently for physical needs  -  Identify cognitive and physical deficits and behaviors that affect risk of falls    -  [place] fall precautions as indicated by assessment   - Educate patient/family on patient safety including physical limitations  - Instruct patient to call for assistance with activity based on assessment  - Modify environment to reduce risk of injury  - Consider OT/PT consult to assist with strengthening/mobility  Outcome: Progressing     Problem: Prexisting or High Potential for Compromised Skin Integrity  Goal: Skin integrity is maintained or improved  Description: INTERVENTIONS:  - Identify patients at risk for skin breakdown  - Assess and monitor skin integrity  - Assess and monitor nutrition and hydration status  - Monitor labs   - Assess for incontinence   - Turn and reposition patient  - Assist with mobility/ambulation  - Relieve pressure over bony prominences  - Avoid friction and shearing  - Provide appropriate hygiene as needed including keeping skin clean and dry  - Evaluate need for skin moisturizer/barrier cream  - Collaborate with interdisciplinary team   - Patient/family teaching  - Consider wound care consult   Outcome: Progressing     Problem: PAIN - ADULT  Goal: Verbalizes/displays adequate comfort level or baseline comfort level  Description: Interventions:  - Encourage patient to monitor pain and request assistance  - Assess pain using appropriate pain scale  - Administer analgesics based on type and severity of pain and evaluate response  - Implement non-pharmacological measures as appropriate and evaluate response  - Consider cultural and social influences on pain and pain management  - Notify physician/advanced practitioner if interventions unsuccessful or patient reports new pain  Outcome: Progressing     Problem: INFECTION - ADULT  Goal: Absence or prevention of progression during hospitalization  Description: INTERVENTIONS:  - Assess and monitor for signs and symptoms of infection  - Monitor lab/diagnostic results  - Monitor all insertion sites, i e  indwelling lines, tubes, and drains  - Monitor endotracheal if appropriate and nasal secretions for changes in amount and color  - [place] appropriate cooling/warming therapies per order  - Administer medications as ordered  - Instruct and encourage patient and family to use good hand hygiene technique  - Identify and instruct in appropriate isolation precautions for identified infection/condition  Outcome: Progressing     Problem: SAFETY ADULT  Goal: Patient will remain free of falls  Description: INTERVENTIONS:  - Assess patient frequently for physical needs  -  Identify cognitive and physical deficits and behaviors that affect risk of falls    -  [place] fall precautions as indicated by assessment   - Educate patient/family on patient safety including physical limitations  - Instruct patient to call for assistance with activity based on assessment  - Modify environment to reduce risk of injury  - Consider OT/PT consult to assist with strengthening/mobility  Outcome: Progressing  Goal: Maintain or return to baseline ADL function  Description: INTERVENTIONS:  -  Assess patient's ability to carry out ADLs; assess patient's baseline for ADL function and identify physical deficits which impact ability to perform ADLs (bathing, care of mouth/teeth, toileting, grooming, dressing, etc )  - Assess/evaluate cause of self-care deficits   - Assess range of motion  - Assess patient's mobility; develop plan if impaired  - Assess patient's need for assistive devices and provide as appropriate  - Encourage maximum independence but intervene and supervise when necessary  - Involve family in performance of ADLs  - Assess for home care needs following discharge   - Consider OT consult to assist with ADL evaluation and planning for discharge  - Provide patient education as appropriate  Outcome: Progressing  Goal: Maintain or return mobility status to optimal level  Description: INTERVENTIONS:  - Assess patient's baseline mobility status (ambulation, transfers, stairs, etc )    - Identify cognitive and physical deficits and behaviors that affect mobility  - Identify mobility aids required to assist with transfers and/or ambulation (gait belt, sit-to-stand, lift, walker, cane, etc )  - [place] fall precautions as indicated by assessment  - Record patient progress and toleration of activity level on Mobility SBAR; progress patient to next Phase/Stage  - Instruct patient to call for assistance with activity based on assessment  - Consider rehabilitation consult to assist with strengthening/weightbearing, etc   Outcome: Progressing     Problem: DISCHARGE PLANNING  Goal: Discharge to home or other facility with appropriate resources  Description: INTERVENTIONS:  - Identify barriers to discharge w/patient and caregiver  - Arrange for needed discharge resources and transportation as appropriate  - Identify discharge learning needs (meds, wound care, etc )  - Arrange for interpretive services to assist at discharge as needed  - Refer to Case Management Department for coordinating discharge planning if the patient needs post-hospital services based on physician/advanced practitioner order or complex needs related to functional status, cognitive ability, or social support system  Outcome: Progressing     Problem: Knowledge Deficit  Goal: Patient/family/caregiver demonstrates understanding of disease process, treatment plan, medications, and discharge instructions  Description: Complete learning assessment and assess knowledge base    Interventions:  - Provide teaching at level of understanding  - Provide teaching via preferred learning methods  Outcome: Progressing     Problem: Nutrition/Hydration-ADULT  Goal: Nutrient/Hydration intake appropriate for improving, restoring or maintaining nutritional needs  Description: Monitor and assess patient's nutrition/hydration status for malnutrition  Collaborate with interdisciplinary team and initiate plan and interventions as ordered  Monitor patient's weight and dietary intake as ordered or per policy  Utilize nutrition screening tool and intervene as necessary  Determine patient's food preferences and provide high-protein, high-caloric foods as appropriate       INTERVENTIONS:  - Monitor oral intake, urinary output, labs, and treatment plans  - Assess nutrition and hydration status and recommend course of action  - Evaluate amount of meals eaten  - Assist patient with eating if necessary   - Allow adequate time for meals  - Recommend/ encourage appropriate diets, oral nutritional supplements, and vitamin/mineral supplements  - Order, calculate, and assess calorie counts as needed  - Recommend, monitor, and adjust tube feedings and TPN/PPN based on assessed needs  - Assess need for intravenous fluids  - Provide specific nutrition/hydration education as appropriate  - Include patient/family/caregiver in decisions related to nutrition  Outcome: Progressing     Problem: RESPIRATORY - ADULT  Goal: Achieves optimal ventilation and oxygenation  Description: INTERVENTIONS:  - Assess for changes in respiratory status  - Assess for changes in mentation and behavior  - Position to facilitate oxygenation and minimize respiratory effort  - Oxygen administered by appropriate delivery if ordered  - Initiate smoking cessation education as indicated  - Encourage broncho-pulmonary hygiene including cough, deep breathe, Incentive Spirometry  - Assess the need for suctioning and aspirate as needed  - Assess and instruct to report SOB or any respiratory difficulty  - Respiratory Therapy support as indicated  Outcome: Progressing     Problem: GENITOURINARY - ADULT  Goal: Urinary catheter remains patent  Description: INTERVENTIONS:  - Assess patency of urinary catheter  - If patient has a chronic aj, consider changing catheter if non-functioning  - Follow guidelines for intermittent irrigation of non-functioning urinary catheter  Outcome: Progressing     Problem: SKIN/TISSUE INTEGRITY - ADULT  Goal: Skin integrity remains intact  Description: INTERVENTIONS  - Identify patients at risk for skin breakdown  - Assess and monitor skin integrity  - Assess and monitor nutrition and hydration status  - Monitor labs (i e  albumin)  - Assess for incontinence   - Turn and reposition patient  - Assist with mobility/ambulation  - Relieve pressure over bony prominences  - Avoid friction and shearing  - Provide appropriate hygiene as needed including keeping skin clean and dry  - Evaluate need for skin moisturizer/barrier cream  - Collaborate with interdisciplinary team (i e  Nutrition, Rehabilitation, etc )   - Patient/family teaching  Outcome: Progressing     Problem: SAFETY,RESTRAINT: NV/NON-SELF DESTRUCTIVE BEHAVIOR  Goal: Remains free of harm/injury (restraint for non violent/non self-detsructive behavior)  Description: INTERVENTIONS:  - Instruct patient/family regarding restraint use   - Assess and monitor physiologic and psychological status   - Provide interventions and comfort measures to meet assessed patient needs   - Identify and implement measures to help patient regain control  - Assess readiness for release of restraint   Outcome: Progressing  Goal: Returns to optimal restraint-free functioning  Description: INTERVENTIONS:  - Assess the patient's behavior and symptoms that indicate continued need for restraint  - Identify and implement measures to help patient regain control  - Assess readiness for release of restraint   Outcome: Progressing

## 2021-01-08 NOTE — ASSESSMENT & PLAN NOTE
Hemoglobin stable   Continue vitamin supplements and iron      Results from last 7 days   Lab Units 01/06/21  0545 01/04/21  1109 01/03/21  0501 01/02/21  0545   HEMOGLOBIN g/dL 10 0* 10 6* 10 3* 9 2*

## 2021-01-08 NOTE — CASE MANAGEMENT
Pt to go to \[facility]"" Saturday being picked up by YOSVANY ALONSO at 1400- CMN completed, sent to [facility] and placed in DC folder  Attending/Facility/ pt's son Ayesha Rubio (VM Left) made aware of same  Call to [facility] from [facility] notifying her of dc disposition with PT/OT/ST notes faxed as requested  No further d/c needs identified

## 2021-01-08 NOTE — ASSESSMENT & PLAN NOTE
Secondary to poor oral intake, encephalopathy/acute illness  Continue diet as tolerated  High likelihood of recurrence due to poor oral intake and encephalopathy  Give 500 cc bolus today  Follow BMP

## 2021-01-09 VITALS
RESPIRATION RATE: 20 BRPM | HEIGHT: 63 IN | OXYGEN SATURATION: 91 % | BODY MASS INDEX: 19.77 KG/M2 | HEART RATE: 92 BPM | WEIGHT: 111.55 LBS | DIASTOLIC BLOOD PRESSURE: 60 MMHG | SYSTOLIC BLOOD PRESSURE: 105 MMHG | TEMPERATURE: 96.9 F

## 2021-01-09 PROCEDURE — 99239 HOSP IP/OBS DSCHRG MGMT >30: CPT | Performed by: INTERNAL MEDICINE

## 2021-01-09 RX ORDER — CLONAZEPAM 0.5 MG/1
0.5 TABLET ORAL 2 TIMES DAILY
Qty: 14 TABLET | Refills: 0 | Status: ON HOLD | OUTPATIENT
Start: 2021-01-09 | End: 2021-02-08 | Stop reason: SDUPTHER

## 2021-01-09 RX ORDER — PREDNISONE 20 MG/1
TABLET ORAL
Qty: 20 TABLET | Refills: 0
Start: 2021-01-09 | End: 2021-05-13

## 2021-01-09 RX ORDER — FAMOTIDINE 20 MG/1
20 TABLET, FILM COATED ORAL DAILY
Qty: 14 TABLET | Refills: 0
Start: 2021-01-10 | End: 2021-01-24

## 2021-01-09 RX ORDER — FERROUS SULFATE 325(65) MG
325 TABLET ORAL
Qty: 30 TABLET | Refills: 0
Start: 2021-01-10

## 2021-01-09 RX ORDER — ATORVASTATIN CALCIUM 40 MG/1
40 TABLET, FILM COATED ORAL
Qty: 14 TABLET | Refills: 0
Start: 2021-01-09 | End: 2021-12-06 | Stop reason: HOSPADM

## 2021-01-09 RX ADMIN — CLONAZEPAM 0.5 MG: 0.5 TABLET ORAL at 08:58

## 2021-01-09 RX ADMIN — DEXAMETHASONE SODIUM PHOSPHATE 6 MG: 4 INJECTION INTRA-ARTICULAR; INTRALESIONAL; INTRAMUSCULAR; INTRAVENOUS; SOFT TISSUE at 08:59

## 2021-01-09 RX ADMIN — Medication 1 TABLET: at 08:59

## 2021-01-09 RX ADMIN — NYSTATIN: 100000 POWDER TOPICAL at 09:11

## 2021-01-09 RX ADMIN — FAMOTIDINE 20 MG: 20 TABLET ORAL at 08:58

## 2021-01-09 RX ADMIN — Medication 2000 UNITS: at 08:58

## 2021-01-09 RX ADMIN — DOCUSATE SODIUM AND SENNOSIDES 1 TABLET: 8.6; 5 TABLET, FILM COATED ORAL at 08:58

## 2021-01-09 RX ADMIN — FERROUS SULFATE TAB 325 MG (65 MG ELEMENTAL FE) 325 MG: 325 (65 FE) TAB at 08:30

## 2021-01-09 RX ADMIN — TOPIRAMATE 50 MG: 25 TABLET, FILM COATED ORAL at 08:58

## 2021-01-09 RX ADMIN — OXYBUTYNIN 10 MG: 10 TABLET, FILM COATED, EXTENDED RELEASE ORAL at 08:58

## 2021-01-09 NOTE — PLAN OF CARE
Problem: Potential for Falls  Goal: Patient will remain free of falls  Description: INTERVENTIONS:  - Assess patient frequently for physical needs  -  Identify cognitive and physical deficits and behaviors that affect risk of falls    -  [place] fall precautions as indicated by assessment   - Educate patient/family on patient safety including physical limitations  - Instruct patient to call for assistance with activity based on assessment  - Modify environment to reduce risk of injury  - Consider OT/PT consult to assist with strengthening/mobility  Outcome: Progressing     Problem: Prexisting or High Potential for Compromised Skin Integrity  Goal: Skin integrity is maintained or improved  Description: INTERVENTIONS:  - Identify patients at risk for skin breakdown  - Assess and monitor skin integrity  - Assess and monitor nutrition and hydration status  - Monitor labs   - Assess for incontinence   - Turn and reposition patient  - Assist with mobility/ambulation  - Relieve pressure over bony prominences  - Avoid friction and shearing  - Provide appropriate hygiene as needed including keeping skin clean and dry  - Evaluate need for skin moisturizer/barrier cream  - Collaborate with interdisciplinary team   - Patient/family teaching  - Consider wound care consult   Outcome: Progressing     Problem: PAIN - ADULT  Goal: Verbalizes/displays adequate comfort level or baseline comfort level  Description: Interventions:  - Encourage patient to monitor pain and request assistance  - Assess pain using appropriate pain scale  - Administer analgesics based on type and severity of pain and evaluate response  - Implement non-pharmacological measures as appropriate and evaluate response  - Consider cultural and social influences on pain and pain management  - Notify physician/advanced practitioner if interventions unsuccessful or patient reports new pain  Outcome: Progressing     Problem: INFECTION - ADULT  Goal: Absence or prevention of progression during hospitalization  Description: INTERVENTIONS:  - Assess and monitor for signs and symptoms of infection  - Monitor lab/diagnostic results  - Monitor all insertion sites, i e  indwelling lines, tubes, and drains  - Monitor endotracheal if appropriate and nasal secretions for changes in amount and color  - [place] appropriate cooling/warming therapies per order  - Administer medications as ordered  - Instruct and encourage patient and family to use good hand hygiene technique  - Identify and instruct in appropriate isolation precautions for identified infection/condition  Outcome: Progressing     Problem: SAFETY ADULT  Goal: Patient will remain free of falls  Description: INTERVENTIONS:  - Assess patient frequently for physical needs  -  Identify cognitive and physical deficits and behaviors that affect risk of falls    -  [place] fall precautions as indicated by assessment   - Educate patient/family on patient safety including physical limitations  - Instruct patient to call for assistance with activity based on assessment  - Modify environment to reduce risk of injury  - Consider OT/PT consult to assist with strengthening/mobility  Outcome: Progressing  Goal: Maintain or return to baseline ADL function  Description: INTERVENTIONS:  -  Assess patient's ability to carry out ADLs; assess patient's baseline for ADL function and identify physical deficits which impact ability to perform ADLs (bathing, care of mouth/teeth, toileting, grooming, dressing, etc )  - Assess/evaluate cause of self-care deficits   - Assess range of motion  - Assess patient's mobility; develop plan if impaired  - Assess patient's need for assistive devices and provide as appropriate  - Encourage maximum independence but intervene and supervise when necessary  - Involve family in performance of ADLs  - Assess for home care needs following discharge   - Consider OT consult to assist with ADL evaluation and planning for discharge  - Provide patient education as appropriate  Outcome: Progressing  Goal: Maintain or return mobility status to optimal level  Description: INTERVENTIONS:  - Assess patient's baseline mobility status (ambulation, transfers, stairs, etc )    - Identify cognitive and physical deficits and behaviors that affect mobility  - Identify mobility aids required to assist with transfers and/or ambulation (gait belt, sit-to-stand, lift, walker, cane, etc )  - [place] fall precautions as indicated by assessment  - Record patient progress and toleration of activity level on Mobility SBAR; progress patient to next Phase/Stage  - Instruct patient to call for assistance with activity based on assessment  - Consider rehabilitation consult to assist with strengthening/weightbearing, etc   Outcome: Progressing     Problem: DISCHARGE PLANNING  Goal: Discharge to home or other facility with appropriate resources  Description: INTERVENTIONS:  - Identify barriers to discharge w/patient and caregiver  - Arrange for needed discharge resources and transportation as appropriate  - Identify discharge learning needs (meds, wound care, etc )  - Arrange for interpretive services to assist at discharge as needed  - Refer to Case Management Department for coordinating discharge planning if the patient needs post-hospital services based on physician/advanced practitioner order or complex needs related to functional status, cognitive ability, or social support system  Outcome: Progressing     Problem: Knowledge Deficit  Goal: Patient/family/caregiver demonstrates understanding of disease process, treatment plan, medications, and discharge instructions  Description: Complete learning assessment and assess knowledge base    Interventions:  - Provide teaching at level of understanding  - Provide teaching via preferred learning methods  Outcome: Progressing     Problem: Nutrition/Hydration-ADULT  Goal: Nutrient/Hydration intake appropriate for improving, restoring or maintaining nutritional needs  Description: Monitor and assess patient's nutrition/hydration status for malnutrition  Collaborate with interdisciplinary team and initiate plan and interventions as ordered  Monitor patient's weight and dietary intake as ordered or per policy  Utilize nutrition screening tool and intervene as necessary  Determine patient's food preferences and provide high-protein, high-caloric foods as appropriate       INTERVENTIONS:  - Monitor oral intake, urinary output, labs, and treatment plans  - Assess nutrition and hydration status and recommend course of action  - Evaluate amount of meals eaten  - Assist patient with eating if necessary   - Allow adequate time for meals  - Recommend/ encourage appropriate diets, oral nutritional supplements, and vitamin/mineral supplements  - Order, calculate, and assess calorie counts as needed  - Recommend, monitor, and adjust tube feedings and TPN/PPN based on assessed needs  - Assess need for intravenous fluids  - Provide specific nutrition/hydration education as appropriate  - Include patient/family/caregiver in decisions related to nutrition  Outcome: Progressing     Problem: RESPIRATORY - ADULT  Goal: Achieves optimal ventilation and oxygenation  Description: INTERVENTIONS:  - Assess for changes in respiratory status  - Assess for changes in mentation and behavior  - Position to facilitate oxygenation and minimize respiratory effort  - Oxygen administered by appropriate delivery if ordered  - Initiate smoking cessation education as indicated  - Encourage broncho-pulmonary hygiene including cough, deep breathe, Incentive Spirometry  - Assess the need for suctioning and aspirate as needed  - Assess and instruct to report SOB or any respiratory difficulty  - Respiratory Therapy support as indicated  Outcome: Progressing     Problem: GENITOURINARY - ADULT  Goal: Urinary catheter remains patent  Description: INTERVENTIONS:  - Assess patency of urinary catheter  - If patient has a chronic aj, consider changing catheter if non-functioning  - Follow guidelines for intermittent irrigation of non-functioning urinary catheter  Outcome: Progressing     Problem: SKIN/TISSUE INTEGRITY - ADULT  Goal: Skin integrity remains intact  Description: INTERVENTIONS  - Identify patients at risk for skin breakdown  - Assess and monitor skin integrity  - Assess and monitor nutrition and hydration status  - Monitor labs (i e  albumin)  - Assess for incontinence   - Turn and reposition patient  - Assist with mobility/ambulation  - Relieve pressure over bony prominences  - Avoid friction and shearing  - Provide appropriate hygiene as needed including keeping skin clean and dry  - Evaluate need for skin moisturizer/barrier cream  - Collaborate with interdisciplinary team (i e  Nutrition, Rehabilitation, etc )   - Patient/family teaching  Outcome: Progressing     Problem: SAFETY,RESTRAINT: NV/NON-SELF DESTRUCTIVE BEHAVIOR  Goal: Remains free of harm/injury (restraint for non violent/non self-detsructive behavior)  Description: INTERVENTIONS:  - Instruct patient/family regarding restraint use   - Assess and monitor physiologic and psychological status   - Provide interventions and comfort measures to meet assessed patient needs   - Identify and implement measures to help patient regain control  - Assess readiness for release of restraint   Outcome: Progressing  Goal: Returns to optimal restraint-free functioning  Description: INTERVENTIONS:  - Assess the patient's behavior and symptoms that indicate continued need for restraint  - Identify and implement measures to help patient regain control  - Assess readiness for release of restraint   Outcome: Progressing

## 2021-01-09 NOTE — PLAN OF CARE
Problem: Potential for Falls  Goal: Patient will remain free of falls  Description: INTERVENTIONS:  - Assess patient frequently for physical needs  -  Identify cognitive and physical deficits and behaviors that affect risk of falls    -  [place] fall precautions as indicated by assessment   - Educate patient/family on patient safety including physical limitations  - Instruct patient to call for assistance with activity based on assessment  - Modify environment to reduce risk of injury  - Consider OT/PT consult to assist with strengthening/mobility  1/9/2021 1449 by Rajiv Santiago RN  Outcome: Adequate for Discharge  1/9/2021 1445 by Rajiv Santiago RN  Outcome: Progressing     Problem: Prexisting or High Potential for Compromised Skin Integrity  Goal: Skin integrity is maintained or improved  Description: INTERVENTIONS:  - Identify patients at risk for skin breakdown  - Assess and monitor skin integrity  - Assess and monitor nutrition and hydration status  - Monitor labs   - Assess for incontinence   - Turn and reposition patient  - Assist with mobility/ambulation  - Relieve pressure over bony prominences  - Avoid friction and shearing  - Provide appropriate hygiene as needed including keeping skin clean and dry  - Evaluate need for skin moisturizer/barrier cream  - Collaborate with interdisciplinary team   - Patient/family teaching  - Consider wound care consult   1/9/2021 1449 by Rajiv Santiago RN  Outcome: Adequate for Discharge  1/9/2021 1445 by Rajiv Santiago RN  Outcome: Progressing     Problem: PAIN - ADULT  Goal: Verbalizes/displays adequate comfort level or baseline comfort level  Description: Interventions:  - Encourage patient to monitor pain and request assistance  - Assess pain using appropriate pain scale  - Administer analgesics based on type and severity of pain and evaluate response  - Implement non-pharmacological measures as appropriate and evaluate response  - Consider cultural and social influences on pain and pain management  - Notify physician/advanced practitioner if interventions unsuccessful or patient reports new pain  1/9/2021 1449 by Rachele Mi RN  Outcome: Adequate for Discharge  1/9/2021 1445 by Rachele Mi RN  Outcome: Progressing     Problem: INFECTION - ADULT  Goal: Absence or prevention of progression during hospitalization  Description: INTERVENTIONS:  - Assess and monitor for signs and symptoms of infection  - Monitor lab/diagnostic results  - Monitor all insertion sites, i e  indwelling lines, tubes, and drains  - Monitor endotracheal if appropriate and nasal secretions for changes in amount and color  - [place] appropriate cooling/warming therapies per order  - Administer medications as ordered  - Instruct and encourage patient and family to use good hand hygiene technique  - Identify and instruct in appropriate isolation precautions for identified infection/condition  1/9/2021 1449 by Rachele Mi RN  Outcome: Adequate for Discharge  1/9/2021 1445 by Rachele Mi RN  Outcome: Progressing     Problem: SAFETY ADULT  Goal: Patient will remain free of falls  Description: INTERVENTIONS:  - Assess patient frequently for physical needs  -  Identify cognitive and physical deficits and behaviors that affect risk of falls    -  [place] fall precautions as indicated by assessment   - Educate patient/family on patient safety including physical limitations  - Instruct patient to call for assistance with activity based on assessment  - Modify environment to reduce risk of injury  - Consider OT/PT consult to assist with strengthening/mobility  1/9/2021 1449 by Rachele Mi RN  Outcome: Adequate for Discharge  1/9/2021 1445 by Rachele Mi RN  Outcome: Progressing  Goal: Maintain or return to baseline ADL function  Description: INTERVENTIONS:  -  Assess patient's ability to carry out ADLs; assess patient's baseline for ADL function and identify physical deficits which impact ability to perform ADLs (bathing, care of mouth/teeth, toileting, grooming, dressing, etc )  - Assess/evaluate cause of self-care deficits   - Assess range of motion  - Assess patient's mobility; develop plan if impaired  - Assess patient's need for assistive devices and provide as appropriate  - Encourage maximum independence but intervene and supervise when necessary  - Involve family in performance of ADLs  - Assess for home care needs following discharge   - Consider OT consult to assist with ADL evaluation and planning for discharge  - Provide patient education as appropriate  1/9/2021 1449 by Paul Barrera RN  Outcome: Adequate for Discharge  1/9/2021 1445 by Paul Barrera RN  Outcome: Progressing  Goal: Maintain or return mobility status to optimal level  Description: INTERVENTIONS:  - Assess patient's baseline mobility status (ambulation, transfers, stairs, etc )    - Identify cognitive and physical deficits and behaviors that affect mobility  - Identify mobility aids required to assist with transfers and/or ambulation (gait belt, sit-to-stand, lift, walker, cane, etc )  - [place] fall precautions as indicated by assessment  - Record patient progress and toleration of activity level on Mobility SBAR; progress patient to next Phase/Stage  - Instruct patient to call for assistance with activity based on assessment  - Consider rehabilitation consult to assist with strengthening/weightbearing, etc   1/9/2021 1449 by Paul Barrera RN  Outcome: Adequate for Discharge  1/9/2021 1445 by Paul Barrera RN  Outcome: Progressing     Problem: DISCHARGE PLANNING  Goal: Discharge to home or other facility with appropriate resources  Description: INTERVENTIONS:  - Identify barriers to discharge w/patient and caregiver  - Arrange for needed discharge resources and transportation as appropriate  - Identify discharge learning needs (meds, wound care, etc )  - Arrange for interpretive services to assist at discharge as needed  - Refer to Case Management Department for coordinating discharge planning if the patient needs post-hospital services based on physician/advanced practitioner order or complex needs related to functional status, cognitive ability, or social support system  1/9/2021 1449 by Cheri Mcgill RN  Outcome: Adequate for Discharge  1/9/2021 1445 by Cheri Mcgill RN  Outcome: Progressing     Problem: Knowledge Deficit  Goal: Patient/family/caregiver demonstrates understanding of disease process, treatment plan, medications, and discharge instructions  Description: Complete learning assessment and assess knowledge base  Interventions:  - Provide teaching at level of understanding  - Provide teaching via preferred learning methods  1/9/2021 1449 by Cheri Mcgill RN  Outcome: Adequate for Discharge  1/9/2021 1445 by Cheri Mcgill RN  Outcome: Progressing     Problem: Nutrition/Hydration-ADULT  Goal: Nutrient/Hydration intake appropriate for improving, restoring or maintaining nutritional needs  Description: Monitor and assess patient's nutrition/hydration status for malnutrition  Collaborate with interdisciplinary team and initiate plan and interventions as ordered  Monitor patient's weight and dietary intake as ordered or per policy  Utilize nutrition screening tool and intervene as necessary  Determine patient's food preferences and provide high-protein, high-caloric foods as appropriate       INTERVENTIONS:  - Monitor oral intake, urinary output, labs, and treatment plans  - Assess nutrition and hydration status and recommend course of action  - Evaluate amount of meals eaten  - Assist patient with eating if necessary   - Allow adequate time for meals  - Recommend/ encourage appropriate diets, oral nutritional supplements, and vitamin/mineral supplements  - Order, calculate, and assess calorie counts as needed  - Recommend, monitor, and adjust tube feedings and TPN/PPN based on assessed needs  - Assess need for intravenous fluids  - Provide specific nutrition/hydration education as appropriate  - Include patient/family/caregiver in decisions related to nutrition  1/9/2021 1449 by Kayleen Jung RN  Outcome: Adequate for Discharge  1/9/2021 1445 by Kayleen Jung RN  Outcome: Progressing     Problem: RESPIRATORY - ADULT  Goal: Achieves optimal ventilation and oxygenation  Description: INTERVENTIONS:  - Assess for changes in respiratory status  - Assess for changes in mentation and behavior  - Position to facilitate oxygenation and minimize respiratory effort  - Oxygen administered by appropriate delivery if ordered  - Initiate smoking cessation education as indicated  - Encourage broncho-pulmonary hygiene including cough, deep breathe, Incentive Spirometry  - Assess the need for suctioning and aspirate as needed  - Assess and instruct to report SOB or any respiratory difficulty  - Respiratory Therapy support as indicated  1/9/2021 1449 by Kayleen Jung RN  Outcome: Adequate for Discharge  1/9/2021 1445 by Kayleen Jung RN  Outcome: Progressing     Problem: GENITOURINARY - ADULT  Goal: Urinary catheter remains patent  Description: INTERVENTIONS:  - Assess patency of urinary catheter  - If patient has a chronic aj, consider changing catheter if non-functioning  - Follow guidelines for intermittent irrigation of non-functioning urinary catheter  1/9/2021 1449 by Kayleen Jung RN  Outcome: Adequate for Discharge  1/9/2021 1445 by Kayleen Jung RN  Outcome: Progressing     Problem: SKIN/TISSUE INTEGRITY - ADULT  Goal: Skin integrity remains intact  Description: INTERVENTIONS  - Identify patients at risk for skin breakdown  - Assess and monitor skin integrity  - Assess and monitor nutrition and hydration status  - Monitor labs (i e  albumin)  - Assess for incontinence   - Turn and reposition patient  - Assist with mobility/ambulation  - Relieve pressure over bony prominences  - Avoid friction and shearing  - Provide appropriate hygiene as needed including keeping skin clean and dry  - Evaluate need for skin moisturizer/barrier cream  - Collaborate with interdisciplinary team (i e  Nutrition, Rehabilitation, etc )   - Patient/family teaching  1/9/2021 1449 by Rajiv Santiago RN  Outcome: Adequate for Discharge  1/9/2021 1445 by Rajiv Santiago RN  Outcome: Progressing     Problem: SAFETY,RESTRAINT: NV/NON-SELF DESTRUCTIVE BEHAVIOR  Goal: Remains free of harm/injury (restraint for non violent/non self-detsructive behavior)  Description: INTERVENTIONS:  - Instruct patient/family regarding restraint use   - Assess and monitor physiologic and psychological status   - Provide interventions and comfort measures to meet assessed patient needs   - Identify and implement measures to help patient regain control  - Assess readiness for release of restraint   1/9/2021 1449 by Rajiv Santiago RN  Outcome: Adequate for Discharge  1/9/2021 1445 by Rajiv Santiago RN  Outcome: Progressing  Goal: Returns to optimal restraint-free functioning  Description: INTERVENTIONS:  - Assess the patient's behavior and symptoms that indicate continued need for restraint  - Identify and implement measures to help patient regain control  - Assess readiness for release of restraint   1/9/2021 1449 by Rajiv Santiago RN  Outcome: Adequate for Discharge  1/9/2021 1445 by Rajiv Santiago RN  Outcome: Progressing

## 2021-01-09 NOTE — ASSESSMENT & PLAN NOTE
Hemoglobin stable   Continue vitamin supplements and iron      Results from last 7 days   Lab Units 01/06/21  0545 01/04/21  1109 01/03/21  0501   HEMOGLOBIN g/dL 10 0* 10 6* 10 3*

## 2021-01-09 NOTE — DISCHARGE INSTRUCTIONS

## 2021-01-09 NOTE — ASSESSMENT & PLAN NOTE
Improving inflammatory markers but still with high O2 requirement  Completed initial 10 days of dexamethasone      However due to persistently increased O2 requirement, she was started on 2nd course of IV dexamethasone  Completed 5 days of remdesivir  Received convalescent plasma  Ongoing treatment with Lipitor, vitamin-D, heparin and multivitamin

## 2021-01-09 NOTE — DISCHARGE SUMMARY
Discharge- Traci Stack 1935, 80 y o  female MRN: [id number]    Unit/Bed#: [facility] [address] 221-01 Encounter: [id number]    Primary Care Provider: Terrance Greene MD   Date and time admitted to hospital: 12/18/2020  5:50 PM        * Acute respiratory failure due to COVID-19 [facility])  Assessment & Plan  Oxygen requirements are 4L NC   She is on her 2nd course of dexamethasone due to prolonged severe disease  Remove precautions as patient is greater than 20 days past diagnosis   Will taper prednisone on discharge  Continue Pepcid prophylaxis while on prednisone  Wean down oxygen as tolerated to maintain saturations greater than 89%  Code status: DNR DNI  Is medically stable for discharge to [facility]    Urinary retention  Assessment & Plan  Failed urinary retention protocol  Catheter placed 1/1/2021  Outpatient Urology follow-up    Pneumonia due to COVID-19 virus  Assessment & Plan  Improving inflammatory markers but still with high O2 requirement  Completed initial 10 days of dexamethasone      However due to persistently increased O2 requirement, she was started on 2nd course of IV dexamethasone  Completed 5 days of remdesivir  Received convalescent plasma  Ongoing treatment with Lipitor, vitamin-D, heparin and multivitamin    Hypernatremia  Assessment & Plan  Secondary to poor oral intake, encephalopathy/acute illness  Continue diet as tolerated  High likelihood of recurrence due to poor oral intake and encephalopathy    Anemia, macrocytic  Assessment & Plan  Hemoglobin stable   Continue vitamin supplements and iron      Results from last 7 days   Lab Units 01/06/21  0545 01/04/21  1109 01/03/21  0501   HEMOGLOBIN g/dL 10 0* 10 6* 10 3*       Acute metabolic encephalopathy  Assessment & Plan  Acute metabolic encephalopathy secondary to COVID-19, hypernatremia, hospital delerium   Improving  Diet changed to pureed with nectar thickened liquid  Regulate sleep wake cycles   Delirium precautions     Severe protein-calorie malnutrition ([facility] )  Assessment & Plan  Malnutrition Findings:   Adult Malnutrition type: Acute illness(r/t medical condition, as evidenced by intake meeting <50% estimated needs > 5 days and severe fat loss noted at orbitals  Treatment: PO diet/nutrition supplements)  Adult Degree of Malnutrition: Other severe protein calorie malnutrition    BMI Findings: Body mass index is 19 76 kg/m²  Poor oral intake in setting of acute illness poor oral intake in setting of acute illness, encephalopathy  Improving  Continue Ensure supplementation    Bipolar 1 disorder ([facility] )  Assessment & Plan  With metabolic encephalopathy due to acute illness, hypernatremia, hypoxia, hospital delirium   Continue Seroquel 400 mg at bedtime, Topamax 50 mg b i d , Lamictal 200 mg daily      Discharging Physician / Practitioner: Lisette Armenta DO  PCP: Nimesh Whaley MD  Admission Date:   Admission Orders (From admission, onward)     Ordered        12/18/20 2008  Inpatient Admission  Once                   Discharge Date: 01/09/21    Resolved Problems  Date Reviewed: 1/3/2021          Resolved    Hypokalemia 12/30/2020     Resolved by  Susan Talbot MD    Black tongue 1/4/2021     Resolved by  Susan Talbot MD          Consultations During Hospital Stay:  Pulmonology, palliative care    Procedures Performed: None    Significant Findings / Test Results:     Xr Chest Portable    Result Date: 12/22/2020  Impression: Worsening multifocal airspace disease compatible with known Covid 19 pneumonia  Workstation performed: [id number]     Xr Chest 1 View Portable    Result Date: 12/19/2020  Impression: New patchy airspace disease within the bilateral mid and lower lung fields consistent with the patient's clinical history of Covid-19 pneumonia  Workstation performed: [id number]       Incidental Findings: None    Test Results Pending at Discharge (will require follow up):  None     Outpatient Tests Requested:None    Complications:  None    Reason for Admission: COVID-19    Hospital Course:     Chago Govea is a 80 y o  female With past medical history of bipolar disorder, hypertension, hyperlipidemia who presented with acute hypoxic respiratory failure and metabolic encephalopathy  Patient presented from above and beyond  Patient was found to have COVID pneumonia  Chest x-ray with bilateral infiltrates  She had prolonged hospital stay due to high oxygen requirements  At 1 point patient was requiring 15 L of mid flow to maintain saturations  Patient received course of remdesivir as well as convalescent plasma  She uses treated with vitamins per protocol  She received 2 courses of IV dexamethasone given significant did disease and oxygen requirement  Patient was seen in consultation by pulmonology as well as palliative Care during hospital stay  In discussions with patient and family code status was updated to level 3 DNR/DNI  On day of discharge patient is oxygen stable at 4 L nasal cannula to maintain saturations  I I discussed with family patient is frail with significant deconditioning from prolonged hospitalization  Will likely be a long recovery and very possible that patient will not be able to wean from oxygen  Patient will be transferred to [facility]  Please see above list of diagnoses and related plan for additional information  Condition at Discharge: stable     Discharge Day Visit / Exam:     Subjective:    Patient seen and evaluated at bedside  No overnight events  Patient is resting comfortably in bed    Vitals: Blood Pressure: 105/60 (01/09/21 0819)  Pulse: 92 (01/09/21 0819)  Temperature: (!) 96 9 °F (36 1 °C) (01/09/21 0819)  Temp Source: Axillary (01/08/21 1930)  Respirations: 20 (01/09/21 0819)  Height: 5' 3" (160 cm) (12/22/20 1259)  Weight - Scale: 50 6 kg (111 lb 8 8 oz) (12/18/20 9416)  SpO2: 91 % (01/09/21 0819)  Exam:   Physical Exam  Constitutional: General: She is not in acute distress  Appearance: She is well-developed  She is not diaphoretic  HENT:      Head: Normocephalic and atraumatic  Eyes:      General:         Right eye: No discharge  Left eye: No discharge  Conjunctiva/sclera: Conjunctivae normal    Cardiovascular:      Rate and Rhythm: Normal rate and regular rhythm  Pulmonary:      Effort: Pulmonary effort is normal  No respiratory distress  Musculoskeletal:         General: No deformity  Skin:     Findings: No erythema or rash  Neurological:      Mental Status: She is alert and oriented to person, place, and time  Psychiatric:         Behavior: Behavior normal          Discussion with Family:  Plan discussed with son Lorenzo Terrazas via telephone    Discharge instructions/Information to patient and family:   See after visit summary for information provided to patient and family  Provisions for Follow-Up Care:  See after visit summary for information related to follow-up care and any pertinent home health orders  Disposition:     Other: [facility] [place]     Discharge Statement:  I spent 60 minutes discharging the patient  This time was spent on the day of discharge  I had direct contact with the patient on the day of discharge  Greater than 50% of the total time was spent examining patient, answering all patient questions, arranging and discussing plan of care with patient as well as directly providing post-discharge instructions  Additional time then spent on discharge activities  Discharge Medications:  See after visit summary for reconciled discharge medications provided to patient and family        ** Please Note: This note has been constructed using a voice recognition system **

## 2021-01-09 NOTE — PLAN OF CARE
Problem: Potential for Falls  Goal: Patient will remain free of falls  Description: INTERVENTIONS:  - Assess patient frequently for physical needs  -  Identify cognitive and physical deficits and behaviors that affect risk of falls    -  [place] fall precautions as indicated by assessment   - Educate patient/family on patient safety including physical limitations  - Instruct patient to call for assistance with activity based on assessment  - Modify environment to reduce risk of injury  - Consider OT/PT consult to assist with strengthening/mobility  Outcome: Progressing     Problem: Prexisting or High Potential for Compromised Skin Integrity  Goal: Skin integrity is maintained or improved  Description: INTERVENTIONS:  - Identify patients at risk for skin breakdown  - Assess and monitor skin integrity  - Assess and monitor nutrition and hydration status  - Monitor labs   - Assess for incontinence   - Turn and reposition patient  - Assist with mobility/ambulation  - Relieve pressure over bony prominences  - Avoid friction and shearing  - Provide appropriate hygiene as needed including keeping skin clean and dry  - Evaluate need for skin moisturizer/barrier cream  - Collaborate with interdisciplinary team   - Patient/family teaching  - Consider wound care consult   Outcome: Progressing     Problem: PAIN - ADULT  Goal: Verbalizes/displays adequate comfort level or baseline comfort level  Description: Interventions:  - Encourage patient to monitor pain and request assistance  - Assess pain using appropriate pain scale  - Administer analgesics based on type and severity of pain and evaluate response  - Implement non-pharmacological measures as appropriate and evaluate response  - Consider cultural and social influences on pain and pain management  - Notify physician/advanced practitioner if interventions unsuccessful or patient reports new pain  Outcome: Progressing     Problem: INFECTION - ADULT  Goal: Absence or prevention of progression during hospitalization  Description: INTERVENTIONS:  - Assess and monitor for signs and symptoms of infection  - Monitor lab/diagnostic results  - Monitor all insertion sites, i e  indwelling lines, tubes, and drains  - Monitor endotracheal if appropriate and nasal secretions for changes in amount and color  - [place] appropriate cooling/warming therapies per order  - Administer medications as ordered  - Instruct and encourage patient and family to use good hand hygiene technique  - Identify and instruct in appropriate isolation precautions for identified infection/condition  Outcome: Progressing     Problem: SAFETY ADULT  Goal: Patient will remain free of falls  Description: INTERVENTIONS:  - Assess patient frequently for physical needs  -  Identify cognitive and physical deficits and behaviors that affect risk of falls    -  [place] fall precautions as indicated by assessment   - Educate patient/family on patient safety including physical limitations  - Instruct patient to call for assistance with activity based on assessment  - Modify environment to reduce risk of injury  - Consider OT/PT consult to assist with strengthening/mobility  Outcome: Progressing  Goal: Maintain or return to baseline ADL function  Description: INTERVENTIONS:  -  Assess patient's ability to carry out ADLs; assess patient's baseline for ADL function and identify physical deficits which impact ability to perform ADLs (bathing, care of mouth/teeth, toileting, grooming, dressing, etc )  - Assess/evaluate cause of self-care deficits   - Assess range of motion  - Assess patient's mobility; develop plan if impaired  - Assess patient's need for assistive devices and provide as appropriate  - Encourage maximum independence but intervene and supervise when necessary  - Involve family in performance of ADLs  - Assess for home care needs following discharge   - Consider OT consult to assist with ADL evaluation and planning for discharge  - Provide patient education as appropriate  Outcome: Progressing  Goal: Maintain or return mobility status to optimal level  Description: INTERVENTIONS:  - Assess patient's baseline mobility status (ambulation, transfers, stairs, etc )    - Identify cognitive and physical deficits and behaviors that affect mobility  - Identify mobility aids required to assist with transfers and/or ambulation (gait belt, sit-to-stand, lift, walker, cane, etc )  - [place] fall precautions as indicated by assessment  - Record patient progress and toleration of activity level on Mobility SBAR; progress patient to next Phase/Stage  - Instruct patient to call for assistance with activity based on assessment  - Consider rehabilitation consult to assist with strengthening/weightbearing, etc   Outcome: Progressing     Problem: DISCHARGE PLANNING  Goal: Discharge to home or other facility with appropriate resources  Description: INTERVENTIONS:  - Identify barriers to discharge w/patient and caregiver  - Arrange for needed discharge resources and transportation as appropriate  - Identify discharge learning needs (meds, wound care, etc )  - Arrange for interpretive services to assist at discharge as needed  - Refer to Case Management Department for coordinating discharge planning if the patient needs post-hospital services based on physician/advanced practitioner order or complex needs related to functional status, cognitive ability, or social support system  Outcome: Progressing     Problem: Knowledge Deficit  Goal: Patient/family/caregiver demonstrates understanding of disease process, treatment plan, medications, and discharge instructions  Description: Complete learning assessment and assess knowledge base    Interventions:  - Provide teaching at level of understanding  - Provide teaching via preferred learning methods  Outcome: Progressing     Problem: Nutrition/Hydration-ADULT  Goal: Nutrient/Hydration intake appropriate for improving, restoring or maintaining nutritional needs  Description: Monitor and assess patient's nutrition/hydration status for malnutrition  Collaborate with interdisciplinary team and initiate plan and interventions as ordered  Monitor patient's weight and dietary intake as ordered or per policy  Utilize nutrition screening tool and intervene as necessary  Determine patient's food preferences and provide high-protein, high-caloric foods as appropriate       INTERVENTIONS:  - Monitor oral intake, urinary output, labs, and treatment plans  - Assess nutrition and hydration status and recommend course of action  - Evaluate amount of meals eaten  - Assist patient with eating if necessary   - Allow adequate time for meals  - Recommend/ encourage appropriate diets, oral nutritional supplements, and vitamin/mineral supplements  - Order, calculate, and assess calorie counts as needed  - Recommend, monitor, and adjust tube feedings and TPN/PPN based on assessed needs  - Assess need for intravenous fluids  - Provide specific nutrition/hydration education as appropriate  - Include patient/family/caregiver in decisions related to nutrition  Outcome: Progressing     Problem: RESPIRATORY - ADULT  Goal: Achieves optimal ventilation and oxygenation  Description: INTERVENTIONS:  - Assess for changes in respiratory status  - Assess for changes in mentation and behavior  - Position to facilitate oxygenation and minimize respiratory effort  - Oxygen administered by appropriate delivery if ordered  - Initiate smoking cessation education as indicated  - Encourage broncho-pulmonary hygiene including cough, deep breathe, Incentive Spirometry  - Assess the need for suctioning and aspirate as needed  - Assess and instruct to report SOB or any respiratory difficulty  - Respiratory Therapy support as indicated  Outcome: Progressing     Problem: GENITOURINARY - ADULT  Goal: Urinary catheter remains patent  Description: INTERVENTIONS:  - Assess patency of urinary catheter  - If patient has a chronic aj, consider changing catheter if non-functioning  - Follow guidelines for intermittent irrigation of non-functioning urinary catheter  Outcome: Progressing     Problem: SKIN/TISSUE INTEGRITY - ADULT  Goal: Skin integrity remains intact  Description: INTERVENTIONS  - Identify patients at risk for skin breakdown  - Assess and monitor skin integrity  - Assess and monitor nutrition and hydration status  - Monitor labs (i e  albumin)  - Assess for incontinence   - Turn and reposition patient  - Assist with mobility/ambulation  - Relieve pressure over bony prominences  - Avoid friction and shearing  - Provide appropriate hygiene as needed including keeping skin clean and dry  - Evaluate need for skin moisturizer/barrier cream  - Collaborate with interdisciplinary team (i e  Nutrition, Rehabilitation, etc )   - Patient/family teaching  Outcome: Progressing     Problem: SAFETY,RESTRAINT: NV/NON-SELF DESTRUCTIVE BEHAVIOR  Goal: Remains free of harm/injury (restraint for non violent/non self-detsructive behavior)  Description: INTERVENTIONS:  - Instruct patient/family regarding restraint use   - Assess and monitor physiologic and psychological status   - Provide interventions and comfort measures to meet assessed patient needs   - Identify and implement measures to help patient regain control  - Assess readiness for release of restraint   Outcome: Progressing  Goal: Returns to optimal restraint-free functioning  Description: INTERVENTIONS:  - Assess the patient's behavior and symptoms that indicate continued need for restraint  - Identify and implement measures to help patient regain control  - Assess readiness for release of restraint   Outcome: Progressing

## 2021-01-09 NOTE — ASSESSMENT & PLAN NOTE
Secondary to poor oral intake, encephalopathy/acute illness  Continue diet as tolerated  High likelihood of recurrence due to poor oral intake and encephalopathy

## 2021-01-09 NOTE — NURSING NOTE
IV removed   VSS   AVS reviewed with the receiving facility and report given to the receiving facility

## 2021-01-09 NOTE — ASSESSMENT & PLAN NOTE
Malnutrition Findings:   Adult Malnutrition type: Acute illness(r/t medical condition, as evidenced by intake meeting <50% estimated needs > 5 days and severe fat loss noted at orbitals  Treatment: PO diet/nutrition supplements)  Adult Degree of Malnutrition: Other severe protein calorie malnutrition    BMI Findings: Body mass index is 19 76 kg/m²       Poor oral intake in setting of acute illness poor oral intake in setting of acute illness, encephalopathy  Improving  Continue Ensure supplementation

## 2021-01-09 NOTE — ASSESSMENT & PLAN NOTE
Oxygen requirements are 4L NC   She is on her 2nd course of dexamethasone due to prolonged severe disease  Remove precautions as patient is greater than 20 days past diagnosis   Will taper prednisone on discharge  Continue Pepcid prophylaxis while on prednisone  Wean down oxygen as tolerated to maintain saturations greater than 89%  Code status: DNR DNI  Is medically stable for discharge to [facility]

## 2021-01-11 ENCOUNTER — TELEPHONE (OUTPATIENT)
Dept: UROLOGY | Facility: MEDICAL CENTER | Age: 86
End: 2021-01-11

## 2021-01-11 ENCOUNTER — NURSING HOME VISIT (OUTPATIENT)
Dept: GERIATRICS | Facility: OTHER | Age: 86
End: 2021-01-11
Payer: COMMERCIAL

## 2021-01-11 DIAGNOSIS — U07.1 PNEUMONIA DUE TO COVID-19 VIRUS: ICD-10-CM

## 2021-01-11 DIAGNOSIS — R33.9 URINARY RETENTION: ICD-10-CM

## 2021-01-11 DIAGNOSIS — R13.12 OROPHARYNGEAL DYSPHAGIA: ICD-10-CM

## 2021-01-11 DIAGNOSIS — E87.0 HYPERNATREMIA: ICD-10-CM

## 2021-01-11 DIAGNOSIS — K21.9 GASTROESOPHAGEAL REFLUX DISEASE, UNSPECIFIED WHETHER ESOPHAGITIS PRESENT: ICD-10-CM

## 2021-01-11 DIAGNOSIS — J12.82 PNEUMONIA DUE TO COVID-19 VIRUS: ICD-10-CM

## 2021-01-11 DIAGNOSIS — D50.0 IRON DEFICIENCY ANEMIA DUE TO CHRONIC BLOOD LOSS: Chronic | ICD-10-CM

## 2021-01-11 DIAGNOSIS — R26.2 AMBULATORY DYSFUNCTION: Primary | ICD-10-CM

## 2021-01-11 PROCEDURE — 99306 1ST NF CARE HIGH MDM 50: CPT | Performed by: FAMILY MEDICINE

## 2021-01-11 NOTE — ASSESSMENT & PLAN NOTE
Most likely due to neurogenic bladder 2nd to prolonged hospitalization and immobilization  Needs f/u with urology, aj cath in

## 2021-01-11 NOTE — TELEPHONE ENCOUNTER
Call placed to Nguyễn Garnett at [facility]  Offered appointment for 1st available on 1- with Dr Aly Hwang at [facility] accepted this appointment

## 2021-01-11 NOTE — ASSESSMENT & PLAN NOTE
Requires O2, on prednisone with weaning dose  Will change Eliquis for 30 days treatment ( I didn't find any supporting documentation for unlimited use)  On Famotidine and Statin till 1/24

## 2021-01-11 NOTE — TELEPHONE ENCOUNTER
[facility] follow up failed urinary retention protocol and catheter placed 1/1/21  history of cancer no  outside testing no   previous urologist no   Insurance [employer]   records requested in [facility]   location [place]     This is a new patient and was in [facility] and patient failed urinary retention protocol and catheter placed 1/1/21  Please call nurse back at [phone]

## 2021-01-11 NOTE — ASSESSMENT & PLAN NOTE
Multifactorial with prolonged hospitalization, needs NH care  Ordered PT/OT to improve gait, strength, transfer, ADLs

## 2021-01-11 NOTE — PROGRESS NOTES
[facility]  [address] [place] [address]  Facility: [facility]      NAME: Elisa Rush  AGE: 80 y o  SEX: female    DATE OF ENCOUNTER: 1/11/2021    Code status:  No CPR D/W pt, and she confirms that she wants to be DRN/DNI    Assessment and Plan   Ambulatory dysfunction  Multifactorial with prolonged hospitalization, needs NH care  Ordered PT/OT to improve gait, strength, transfer, ADLs  Oropharyngeal dysphagia  Ordered ST , on pureed, thick liquid diet  Will continue with monitoring  Pneumonia due to COVID-19 virus  Requires O2, on prednisone with weaning dose  Will change Eliquis for 30 days treatment ( I didn't find any supporting documentation for unlimited use)  On Famotidine and Statin till 1/24  Hypernatremia  Ordered labs for tomorrow  Will f/u on results   Per hospital more 2nd to pt's poor po intake  Anemia  On Iron, ordered CBC for tomorrow  Gastroesophageal reflux disease  On PPI  Famotidine is part of covid-19 treatment protocol with end date  Urinary retention  Most likely due to neurogenic bladder 2nd to prolonged hospitalization and immobilization  Needs f/u with urology, aj cath in  All medications and routine orders were reviewed and updated as needed  Plan discussed with: Patient, and nursing staff     Chief Complaint     "not good!"    History of Present Illness   Pt with [facility] and medical problem as this note who was admitted to [facility] [place] on 12/18/2020 with hypoxic respiratory failure,and metabolic due to [id number]-[id number] pneumonia, pt required O2 all the time and is was discharged from the hospital on 4 lit of  O2  Pt is now at [facility] since 01/9/2021 for STR  Pt had a long hospitalization  Received Remdesivir, and convalescent plasma, and steroid  Pt was on Heparin at the hospital as DVT prophylaxis   Pt is now on Eliquis with no end date  based on hospital discharge med list, but no enough supporting documentation for it  Pt was also had some difficulty with urination and aj cath was put in (failed urinary retention protocol), and needs urology appointment  Pt also is on pureed, thick liquid diet  Per staff pt was off O2 today while in bed and SAo2 at 94%, but with activity gets sob and O2 sat drops  Pt states "I am going to die, I'm not going to get better!" per staff report, pt also gets very anxious  Pt also has been having elevated Na level      HISTORY:  Past Medical History:   Diagnosis Date    Anemia 2/26/2019    Anxiety     Bipolar 1 disorder ([facility] )     Depression     DVT (deep venous thrombosis) ([place] [address] ) 2008    Left leg    GERD (gastroesophageal reflux disease)     Hypertension     Overactive bladder      Family History   Problem Relation Age of Onset    Heart disease Father      Social History     Socioeconomic History    Marital status: /Civil Union     Spouse name: None    Number of children: None    Years of education: None    Highest education level: None   Occupational History    None   Social Needs    Financial resource strain: None    Food insecurity     Worry: None     Inability: None    Transportation needs     Medical: None     Non-medical: None   Tobacco Use    Smoking status: Former Smoker     Packs/day: 1 00     Types: Cigarettes    Smokeless tobacco: Former User    Tobacco comment: Per [employer]: Heavy tobacco smoker   Substance and Sexual Activity    Alcohol use: Not Currently     Alcohol/week: 0 0 standard drinks     Frequency: Never     Drinks per session: Patient refused     Binge frequency: Never    Drug use: Not Currently    Sexual activity: Not Currently   Lifestyle    Physical activity     Days per week: None     Minutes per session: None    Stress: None   Relationships    Social connections     Talks on phone: None     Gets together: None     Attends Zoroastrianism service: None     Active member of club or organization: None     Attends meetings of clubs or organizations: None     Relationship status: None    Intimate partner violence     Fear of current or ex partner: None     Emotionally abused: None     Physically abused: None     Forced sexual activity: None   Other Topics Concern    None   Social History Narrative    None       Allergies: Allergies   Allergen Reactions    Ethanol     Simvastatin        Review of Systems     Review of Systems   Constitutional: Negative  "I am going to die, I am not going to get better!" "my mind is foggy today"   HENT: Negative  Eyes: Negative  Respiratory: Positive for shortness of breath  Negative for apnea, cough, choking, chest tightness, wheezing and stridor  Cardiovascular: Negative  Gastrointestinal: Negative  Endocrine: Negative  Genitourinary: Negative  Genital discomfort    Musculoskeletal: Negative  Skin: Negative  Allergic/Immunologic: Negative  Neurological: Negative  Hematological: Negative  Psychiatric/Behavioral: Negative  All other systems reviewed and are negative  Medications and orders     All medications reviewed and updated in Nursing Home EMR  Objective     Vitals: wt:94 8Ibs             BP:115/71            IL:88        RR:16    Afebrile     Physical Exam  Vitals signs and nursing note reviewed  Constitutional:       General: She is not in acute distress  Appearance: Normal appearance  She is well-developed  She is not ill-appearing, toxic-appearing or diaphoretic  HENT:      Head: Normocephalic and atraumatic  Right Ear: External ear normal       Left Ear: External ear normal       Ears:      Comments: [place]     Nose: Nose normal  No congestion or rhinorrhea  Mouth/Throat:      Mouth: Mucous membranes are dry  Eyes:      General: No scleral icterus  Right eye: No discharge  Left eye: No discharge  Extraocular Movements: Extraocular movements intact        Conjunctiva/sclera: Conjunctivae normal  Pupils: Pupils are equal, round, and reactive to light  Neck:      Musculoskeletal: Normal range of motion and neck supple  No neck rigidity or muscular tenderness  Cardiovascular:      Rate and Rhythm: Normal rate and regular rhythm  Heart sounds: Murmur (systolic murmur ) present  No friction rub  No gallop  Pulmonary:      Effort: Pulmonary effort is normal  No respiratory distress  Breath sounds: Normal breath sounds  No stridor  No wheezing, rhonchi or rales  Chest:      Chest wall: No tenderness  Abdominal:      General: Bowel sounds are normal  There is no distension  Palpations: Abdomen is soft  There is no mass  Tenderness: There is no abdominal tenderness  There is no guarding or rebound  Hernia: No hernia is present  Musculoskeletal:         General: No swelling, tenderness, deformity or signs of injury  Right lower leg: No edema  Left lower leg: No edema  Comments: In bed, lying, limited ROM of UEs and LEs  Lymphadenopathy:      Cervical: No cervical adenopathy  Skin:     General: Skin is warm and dry  Coloration: Skin is not jaundiced or pale  Findings: No bruising, erythema, lesion or rash  Neurological:      Mental Status: She is alert and oriented to person, place, and time  Cranial Nerves: Cranial nerve deficit ([place]) present  Comments: Forgetful          Pertinent Laboratory/Diagnostic Studies: The following labs/studies were reviewed please see chart or hospital paperwork for details      Ref Range & Units 1/8/21 0455    Sodium 136 - 145 mmol/L 146High     Potassium 3 5 - 5 3 mmol/L 3 6    Chloride 100 - 108 mmol/L 106    CO2 21 - 32 mmol/L 34High     ANION GAP 4 - 13 mmol/L 6    BUN 5 - 25 mg/dL 18    Creatinine 0 60 - 1 30 mg/dL 0 72    Comment: Standardized to IDMS reference method   Glucose 65 - 140 mg/dL 94      Ref Range & Units 1/6/21 0545    WBC 4 31 - 10 16 Thousand/uL 8 71    RBC 3 81 - 5 12 Million/uL 3 18Low     Hemoglobin 11 5 - 15 4 g/dL 10 0Low     Hematocrit 34 8 - 46 1 % 32 5Low     MCV 82 - 98 fL 102High     MCH 26 8 - 34 3 pg 31 4    MCHC 31 4 - 37 4 g/dL 30 8Low     RDW 11 6 - 15 1 % 12 5    MPV 8 9 - 12 7 fL 9 9    Platelets 876 - 115 Thousands/uL 404High     nRBC /100 WBCs 0    Neutrophils Relative 43 - 75 % 71    Immat GRANS % 0 - 2 % 2    Lymphocytes Relative 14 - 44 % 18    Monocytes Relative 4 - 12 % 9    Eosinophils Relative 0 - 6 % 0    Basophils Relative 0 - 1 % 0    Neutrophils Absolute 1 85 - 7 62 Thousands/µL 6 22    Immature Grans Absolute 0 00 - 0 20 Thousand/uL 0 14    Lymphocytes Absolute 0 60 - 4 47 Thousands/µL 1 57    Monocytes Absolute 0 17 - 1 22 Thousand/µL 0 75    Eosinophils Absolute 0 00 - 0 61 Thousand/µL 0 00    Basophils Absolute 0 00 - 0 10 Thousands/µL 0 03          Specimen Collected: 01/06/21 05:45   Last Resulted: 01/06/21 06:47        Ref Range & Units 1/2/21 0545    CRP <3 0 mg/L 12  3High           Specimen Collected: 01/02/21 05:45   Last Resulted: 01/02/21 07:09        Ref Range & Units 1/2/21 0545    CRP <3 0 mg/L 12  3High           Specimen Collected: 01/02/21 05:45   Last Resulted: 01/02/21 07:09         Ref Range & Units 12/28/20 0645   D-Dimer, Quant <0 50 ug/ml FEU 1  06High       Ref Range & Units     SARS-CoV-2 Negative PositiveAbnormal     Comment:     INFLUENZA A PCR Negative Negative    Comment:     INFLUENZA B PCR Negative Negative    Comment:     RSV PCR Negative Negative    Comment:        Narrative            CHEST      INDICATION:   dyspnea   Patient has confirmed COVID-19      COMPARISON:  12/22/2020     EXAM PERFORMED/VIEWS:  XR CHEST PORTABLE        FINDINGS:     Cardiomediastinal silhouette appears unremarkable      There is diffuse reticular and groundglass opacity, similar to the previous study      Osseous structures appear within normal limits for patient age      IMPRESSION:     Stable chest with findings most consistent with COVID-19 pneumonia  Study Result    CHEST      INDICATION:   Patient has confirmed COVID-19        COMPARISON:  12/18/2020     EXAM PERFORMED/VIEWS:  XR CHEST PORTABLE        FINDINGS:     Heart size is within normal limits  Thoracic aorta is tortuous and calcified      Multifocal airspace again seen in the mid and lower lungs is unchanged  New patchy airspace disease in the upper lungs, left greater than right  No pneumothorax or pleural effusion      Osseous structures appear within normal limits for patient age      IMPRESSION:     Worsening multifocal airspace disease compatible with known Covid 19 pneumonia           Ortiz Roy MD  1/11/2021 6:43 PM

## 2021-01-12 ENCOUNTER — TELEPHONE (OUTPATIENT)
Dept: OTHER | Facility: OTHER | Age: 86
End: 2021-01-12

## 2021-01-12 ENCOUNTER — NURSING HOME VISIT (OUTPATIENT)
Dept: GERIATRICS | Facility: OTHER | Age: 86
End: 2021-01-12
Payer: COMMERCIAL

## 2021-01-12 ENCOUNTER — APPOINTMENT (EMERGENCY)
Dept: CT IMAGING | Facility: HOSPITAL | Age: 86
End: 2021-01-12
Payer: COMMERCIAL

## 2021-01-12 ENCOUNTER — HOSPITAL ENCOUNTER (EMERGENCY)
Facility: HOSPITAL | Age: 86
Discharge: HOME | End: 2021-01-12
Attending: EMERGENCY MEDICINE | Admitting: EMERGENCY MEDICINE
Payer: COMMERCIAL

## 2021-01-12 VITALS
OXYGEN SATURATION: 97 % | TEMPERATURE: 97.6 F | RESPIRATION RATE: 20 BRPM | DIASTOLIC BLOOD PRESSURE: 78 MMHG | SYSTOLIC BLOOD PRESSURE: 149 MMHG | HEART RATE: 102 BPM

## 2021-01-12 DIAGNOSIS — E86.0 DEHYDRATION: ICD-10-CM

## 2021-01-12 DIAGNOSIS — S09.90XA CLOSED HEAD INJURY, INITIAL ENCOUNTER: ICD-10-CM

## 2021-01-12 DIAGNOSIS — R26.2 AMBULATORY DYSFUNCTION: ICD-10-CM

## 2021-01-12 DIAGNOSIS — N30.00 ACUTE CYSTITIS WITHOUT HEMATURIA: Primary | ICD-10-CM

## 2021-01-12 DIAGNOSIS — Z97.8 INDWELLING FOLEY CATHETER PRESENT: ICD-10-CM
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
## 2021-01-12 DIAGNOSIS — N39.0 UTI (URINARY TRACT INFECTION): Primary | ICD-10-CM

## 2021-01-12 DIAGNOSIS — S01.111A LACERATION OF RIGHT EYEBROW, INITIAL ENCOUNTER: ICD-10-CM

## 2021-01-12 LAB
ALBUMIN SERPL BCP-MCNC: 2.6 G/DL (ref 3.5–5)
ALP SERPL-CCNC: 85 U/L (ref 46–116)
ALT SERPL W P-5'-P-CCNC: 22 U/L (ref 12–78)
ANION GAP SERPL CALCULATED.3IONS-SCNC: 5 MMOL/L (ref 4–13)
APTT PPP: 31 SECONDS (ref 23–37)
AST SERPL W P-5'-P-CCNC: 19 U/L (ref 5–45)
ATRIAL RATE: 104 BPM
BACTERIA UR QL AUTO: ABNORMAL /HPF
BASOPHILS # BLD AUTO: 0.02 THOUSANDS/ΜL (ref 0–0.1)
BASOPHILS NFR BLD AUTO: 0 % (ref 0–1)
BILIRUB SERPL-MCNC: 0.29 MG/DL (ref 0.2–1)
BILIRUB UR QL STRIP: NEGATIVE
BUN SERPL-MCNC: 16 MG/DL (ref 5–25)
CALCIUM ALBUM COR SERPL-MCNC: 10.6 MG/DL (ref 8.3–10.1)
CALCIUM SERPL-MCNC: 9.5 MG/DL (ref 8.3–10.1)
CHLORIDE SERPL-SCNC: 102 MMOL/L (ref 100–108)
CLARITY UR: ABNORMAL
CO2 SERPL-SCNC: 32 MMOL/L (ref 21–32)
COLOR UR: YELLOW
CREAT SERPL-MCNC: 0.85 MG/DL (ref 0.6–1.3)
EOSINOPHIL # BLD AUTO: 0 THOUSAND/ΜL (ref 0–0.61)
EOSINOPHIL NFR BLD AUTO: 0 % (ref 0–6)
ERYTHROCYTE [DISTWIDTH] IN BLOOD BY AUTOMATED COUNT: 13.2 % (ref 11.6–15.1)
FINE GRAN CASTS URNS QL MICRO: ABNORMAL /LPF
GFR SERPL CREATININE-BSD FRML MDRD: 62 ML/MIN/1.73SQ M
GLUCOSE SERPL-MCNC: 95 MG/DL (ref 65–140)
GLUCOSE UR STRIP-MCNC: NEGATIVE MG/DL
HCT VFR BLD AUTO: 37.2 % (ref 34.8–46.1)
HGB BLD-MCNC: 11.3 G/DL (ref 11.5–15.4)
HGB UR QL STRIP.AUTO: ABNORMAL
IMM GRANULOCYTES # BLD AUTO: 0.1 THOUSAND/UL (ref 0–0.2)
IMM GRANULOCYTES NFR BLD AUTO: 1 % (ref 0–2)
INR PPP: 1.32 (ref 0.84–1.19)
KETONES UR STRIP-MCNC: NEGATIVE MG/DL
LACTATE SERPL-SCNC: 0.9 MMOL/L (ref 0.5–2)
LEUKOCYTE ESTERASE UR QL STRIP: ABNORMAL
LYMPHOCYTES # BLD AUTO: 1.13 THOUSANDS/ΜL (ref 0.6–4.47)
LYMPHOCYTES NFR BLD AUTO: 14 % (ref 14–44)
MAGNESIUM SERPL-MCNC: 2.2 MG/DL (ref 1.6–2.6)
MCH RBC QN AUTO: 31.7 PG (ref 26.8–34.3)
MCHC RBC AUTO-ENTMCNC: 30.4 G/DL (ref 31.4–37.4)
MCV RBC AUTO: 104 FL (ref 82–98)
MONOCYTES # BLD AUTO: 0.47 THOUSAND/ΜL (ref 0.17–1.22)
MONOCYTES NFR BLD AUTO: 6 % (ref 4–12)
NEUTROPHILS # BLD AUTO: 6.25 THOUSANDS/ΜL (ref 1.85–7.62)
NEUTS SEG NFR BLD AUTO: 79 % (ref 43–75)
NITRITE UR QL STRIP: POSITIVE
NON-SQ EPI CELLS URNS QL MICRO: ABNORMAL /HPF
NRBC BLD AUTO-RTO: 0 /100 WBCS
P AXIS: 52 DEGREES
PH UR STRIP.AUTO: 6.5 [PH]
PLATELET # BLD AUTO: 351 THOUSANDS/UL (ref 149–390)
PMV BLD AUTO: 9.3 FL (ref 8.9–12.7)
POTASSIUM SERPL-SCNC: 4.1 MMOL/L (ref 3.5–5.3)
PR INTERVAL: 168 MS
PROT SERPL-MCNC: 7.3 G/DL (ref 6.4–8.2)
PROT UR STRIP-MCNC: ABNORMAL MG/DL
PROTHROMBIN TIME: 16.1 SECONDS (ref 11.6–14.5)
QRS AXIS: 19 DEGREES
QRSD INTERVAL: 92 MS
QT INTERVAL: 326 MS
QTC INTERVAL: 428 MS
RBC # BLD AUTO: 3.57 MILLION/UL (ref 3.81–5.12)
RBC #/AREA URNS AUTO: ABNORMAL /HPF
SODIUM SERPL-SCNC: 139 MMOL/L (ref 136–145)
SP GR UR STRIP.AUTO: 1.01 (ref 1–1.03)
T WAVE AXIS: 76 DEGREES
TROPONIN I SERPL-MCNC: <0.02 NG/ML
UROBILINOGEN UR QL STRIP.AUTO: 1 E.U./DL
VENTRICULAR RATE: 104 BPM
WBC # BLD AUTO: 7.97 THOUSAND/UL (ref 4.31–10.16)
WBC #/AREA URNS AUTO: ABNORMAL /HPF

## 2021-01-12 PROCEDURE — 87077 CULTURE AEROBIC IDENTIFY: CPT | Performed by: NURSE PRACTITIONER

## 2021-01-12 PROCEDURE — 99284 EMERGENCY DEPT VISIT MOD MDM: CPT

## 2021-01-12 PROCEDURE — 84484 ASSAY OF TROPONIN QUANT: CPT | Performed by: NURSE PRACTITIONER

## 2021-01-12 PROCEDURE — 99285 EMERGENCY DEPT VISIT HI MDM: CPT | Performed by: NURSE PRACTITIONER

## 2021-01-12 PROCEDURE — 93005 ELECTROCARDIOGRAM TRACING: CPT

## 2021-01-12 PROCEDURE — G1004 CDSM NDSC: HCPCS

## 2021-01-12 PROCEDURE — 83605 ASSAY OF LACTIC ACID: CPT | Performed by: NURSE PRACTITIONER

## 2021-01-12 PROCEDURE — 81001 URINALYSIS AUTO W/SCOPE: CPT | Performed by: NURSE PRACTITIONER

## 2021-01-12 PROCEDURE — 96361 HYDRATE IV INFUSION ADD-ON: CPT

## 2021-01-12 PROCEDURE — 85025 COMPLETE CBC W/AUTO DIFF WBC: CPT | Performed by: NURSE PRACTITIONER

## 2021-01-12 PROCEDURE — 36415 COLL VENOUS BLD VENIPUNCTURE: CPT | Performed by: NURSE PRACTITIONER

## 2021-01-12 PROCEDURE — 85730 THROMBOPLASTIN TIME PARTIAL: CPT | Performed by: NURSE PRACTITIONER

## 2021-01-12 PROCEDURE — 87186 SC STD MICRODIL/AGAR DIL: CPT | Performed by: NURSE PRACTITIONER

## 2021-01-12 PROCEDURE — 70450 CT HEAD/BRAIN W/O DYE: CPT

## 2021-01-12 PROCEDURE — 99308 SBSQ NF CARE LOW MDM 20: CPT | Performed by: FAMILY MEDICINE

## 2021-01-12 PROCEDURE — 87040 BLOOD CULTURE FOR BACTERIA: CPT | Performed by: NURSE PRACTITIONER

## 2021-01-12 PROCEDURE — 80053 COMPREHEN METABOLIC PANEL: CPT | Performed by: NURSE PRACTITIONER

## 2021-01-12 PROCEDURE — 96366 THER/PROPH/DIAG IV INF ADDON: CPT

## 2021-01-12 PROCEDURE — 93010 ELECTROCARDIOGRAM REPORT: CPT | Performed by: INTERNAL MEDICINE

## 2021-01-12 PROCEDURE — 85610 PROTHROMBIN TIME: CPT | Performed by: NURSE PRACTITIONER

## 2021-01-12 PROCEDURE — 96365 THER/PROPH/DIAG IV INF INIT: CPT

## 2021-01-12 PROCEDURE — 72125 CT NECK SPINE W/O DYE: CPT

## 2021-01-12 PROCEDURE — 83735 ASSAY OF MAGNESIUM: CPT | Performed by: NURSE PRACTITIONER

## 2021-01-12 PROCEDURE — 87086 URINE CULTURE/COLONY COUNT: CPT | Performed by: NURSE PRACTITIONER

## 2021-01-12 RX ORDER — CEPHALEXIN 500 MG/1
500 CAPSULE ORAL EVERY 12 HOURS SCHEDULED
Qty: 14 CAPSULE | Refills: 0 | Status: SHIPPED | OUTPATIENT
Start: 2021-01-12 | End: 2021-01-19

## 2021-01-12 RX ADMIN — CEFTRIAXONE SODIUM 1000 MG: 10 INJECTION, POWDER, FOR SOLUTION INTRAVENOUS at 03:43

## 2021-01-12 RX ADMIN — SODIUM CHLORIDE 250 ML: 0.9 INJECTION, SOLUTION INTRAVENOUS at 03:09

## 2021-01-12 NOTE — ED PROVIDER NOTES
History  Chief Complaint   Patient presents with   Anita Bonilla     pt arrived via ems from [facility]; staff reported that the pt fell attempting to go to the bathroom even though pt has a aj; pt has a small lac to R eye; staff told ems that her current mental status is baseline; no LOC; covid positive     This is an 80year old female who resides at [facility] and staff reported that pt had fallen attempting to get up to use the bathroom however pt has an indwelling aj in place  Staff heard a thump and went to pt room and found her on the floor  Pt is on eliquis  She has a superficial laceration to the right eye brow  Last Td recorded as 2011  Pt is not able to elaborate on events  Staff told EMS that pt is at normal baseline  Pt was + covid 12/18/2020      History provided by:  Medical records, EMS personnel and nursing home  History limited by:  Acuity of condition and age   used: No    Fall  Mechanism of injury: fall    Injury location:  Head/neck  Fall:     Fall occurred:  Unable to specify    Impact surface:  Unable to specify    Point of impact:  Unable to specify  Tetanus status:  Out of date      Prior to Admission Medications   Prescriptions Last Dose Informant Patient Reported? Taking?    QUEtiapine (SEROquel) 200 mg tablet  Self No Yes   Sig: Take 2 tablets (400 mg total) by mouth daily at bedtime   Sennosides-Docusate Sodium (SENNA-PLUS PO)   Yes Yes   Sig: Take 8 6 mg by mouth 2 (two) times a day as needed   acetaminophen (TYLENOL) 325 mg tablet   No Yes   Sig: Take 2 tablets (650 mg total) by mouth 3 (three) times a day   apixaban (ELIQUIS) 2 5 mg   No Yes   Sig: Take 1 tablet (2 5 mg total) by mouth 2 (two) times a day   atorvastatin (LIPITOR) 40 mg tablet   No Yes   Sig: Take 1 tablet (40 mg total) by mouth daily at bedtime for 14 days   bisacodyl (Biscolax) 10 mg suppository   Yes No   Sig: Insert 10 mg into the rectum as needed for constipation   clonazePAM (KlonoPIN) 0 5 mg tablet   No Yes   Sig: Take 1 tablet (0 5 mg total) by mouth 2 (two) times a day   estradiol (ESTRACE VAGINAL) 0 1 mg/g vaginal cream   Yes Yes   Sig: Insert 0 5 g into the vagina daily   famotidine (PEPCID) 20 mg tablet   No Yes   Sig: Take 1 tablet (20 mg total) by mouth daily for 14 days   ferrous sulfate 325 (65 Fe) mg tablet   No Yes   Sig: Take 1 tablet (325 mg total) by mouth daily with breakfast   hydrocortisone 2 5 % cream   Yes No   Sig: Apply topically 4 (four) times a day as needed   lamoTRIgine (LaMICtal) 200 MG tablet   No Yes   Sig: Take 1 tablet (200 mg total) by mouth daily before breakfast   lidocaine (LMX) 4 % cream   No No   Sig: q6hr prn rib pain  pantoprazole (PROTONIX) 40 mg tablet   No Yes   Sig: Take 1 tablet (40 mg total) by mouth daily in the early morning   predniSONE 20 mg tablet   No Yes   Sig: Take 40 mg daily x 3 days, then 30 mg x 3 days, then 20 mg x 3 days, then 10 mg x 3 days   topiramate (TOPAMAX) 50 MG tablet  Self No Yes   Sig: Take 1 tablet (50 mg total) by mouth 2 (two) times a day      Facility-Administered Medications: None       Past Medical History:   Diagnosis Date    Anemia 2/26/2019    Anxiety     [facility]     Depression     DVT (deep venous thrombosis) ([facility] ) 2008    Left leg    GERD (gastroesophageal reflux disease)     Hypertension     Overactive bladder        Past Surgical History:   Procedure Laterality Date    ADENOIDECTOMY      CATARACT EXTRACTION Bilateral     Right 10/2003, left 4/2004    CHOLECYSTECTOMY      DILATION AND CURETTAGE OF UTERUS  1985    HERNIA REPAIR  11/02/2007    Femoral and small bowel resection    HIP SURGERY      L hip    TONSILLECTOMY      As a youth    WRIST SURGERY      L wrist       Family History   Problem Relation Age of Onset    Heart disease Father      I have reviewed and agree with the history as documented      E-Cigarette/Vaping    E-Cigarette Use Never User      E-Cigarette/Vaping Substances  Nicotine No     THC No     CBD No     Flavoring No      Social History     Tobacco Use    Smoking status: Former Smoker     Packs/day: 1 00     Types: Cigarettes    Smokeless tobacco: Former User    Tobacco comment: Per [employer]: Heavy tobacco smoker   Substance Use Topics    Alcohol use: Not Currently     Alcohol/week: 0 0 standard drinks     Frequency: Never     Drinks per session: Patient refused     Binge frequency: Never    Drug use: Not Currently       Review of Systems   Unable to perform ROS: Acuity of condition       Physical Exam  Physical Exam  Vitals signs and nursing note reviewed  Constitutional:       Comments: Cachetic appearing 80year old female      HENT:      Head:        Nose: Nose normal       Mouth/Throat:      Mouth: Mucous membranes are moist    Eyes:      Pupils: Pupils are equal, round, and reactive to light  Neck:      Musculoskeletal: Normal range of motion  Cardiovascular:      Rate and Rhythm: Normal rate and regular rhythm  Pulmonary:      Effort: Pulmonary effort is normal       Breath sounds: Normal breath sounds  Abdominal:      General: There is no distension  Tenderness: There is no abdominal tenderness  Genitourinary:     Comments: Louie intact with brown spec sediment in tubing  Dark urine   Musculoskeletal: Normal range of motion  Comments: Legs flexed at knees   Body is stiff and contractured     Skin:     General: Skin is warm and dry  Coloration: Skin is pale  Neurological:      Mental Status: She is alert  Comments: Unable to assess due to condition  Pt does not follow commands - she moans with movement and speaks/mumbles       Psychiatric:      Comments: Unable to assess          Vital Signs  ED Triage Vitals [01/12/21 0135]   Temperature Pulse Respirations Blood Pressure SpO2   97 6 °F (36 4 °C) (!) 118 16 109/72 94 %      Temp Source Heart Rate Source Patient Position - Orthostatic VS BP Location FiO2 (%)   Temporal Monitor Lying Right arm --      Pain Score       --           Vitals:    01/12/21 0135 01/12/21 0343 01/12/21 0545   BP: 109/72 117/64 112/61   Pulse: (!) 118 (!) 107 100   Patient Position - Orthostatic VS: Lying Lying Lying         Visual Acuity  Visual Acuity      Most Recent Value   L Pupil Size (mm)  3   R Pupil Size (mm)  3          ED Medications  Medications   sodium chloride 0 9 % bolus 250 mL (0 mL Intravenous Stopped 1/12/21 0343)   ceftriaxone (ROCEPHIN) 1 g/50 mL in dextrose IVPB (0 mg Intravenous Stopped 1/12/21 0544)       Diagnostic Studies  Results Reviewed     Procedure Component Value Units Date/Time    Urine culture [[id number]] Collected: 01/12/21 0315    Lab Status:  In process Specimen: Urine, Indwelling Louie Catheter Updated: 01/12/21 0552    Urine Microscopic [[id number]]  (Abnormal) Collected: 01/12/21 0315    Lab Status: Final result Specimen: Urine, Indwelling Louie Catheter Updated: 01/12/21 0423     RBC, UA Innumerable /hpf      WBC, UA 30-50 /hpf      Epithelial Cells Occasional /hpf      Bacteria, UA Moderate /hpf      Fine granular casts 2-3 /lpf     Protime-INR [[id number]]  (Abnormal) Collected: 01/12/21 0315    Lab Status: Final result Specimen: Blood from Arm, Right Updated: 01/12/21 0343     Protime 16 1 seconds      INR 1 32    APTT [[id number]]  (Normal) Collected: 01/12/21 0315    Lab Status: Final result Specimen: Blood from Arm, Right Updated: 01/12/21 0343     PTT 31 seconds     UA (URINE) with reflex to Scope [[id number]]  (Abnormal) Collected: 01/12/21 0315    Lab Status: Final result Specimen: Urine, Indwelling Louie Catheter Updated: 01/12/21 0332     Color, UA Yellow     Clarity, UA Turbid     Specific [place], UA 1 010     pH, UA 6 5     Leukocytes, UA Small     Nitrite, UA Positive     Protein, UA 30 (1+) mg/dl      Glucose, UA Negative mg/dl      Ketones, UA Negative mg/dl      Urobilinogen, UA 1 0 E U /dl      Bilirubin, UA Negative     Blood, UA Large    Troponin I [[id number]]  (Normal) Collected: 01/12/21 0213    Lab Status: Final result Specimen: Blood from Arm, Left Updated: 01/12/21 0303     Troponin I <0 02 ng/mL     Lactic acid [[id number]]  (Normal) Collected: 01/12/21 6052    Lab Status: Final result Specimen: Blood from Arm, Left Updated: 01/12/21 0303     LACTIC ACID 0 9 mmol/L     Narrative:      Result may be elevated if tourniquet was used during collection      Comprehensive metabolic panel [[id number]]  (Abnormal) Collected: 01/12/21 0213    Lab Status: Final result Specimen: Blood from Arm, Left Updated: 01/12/21 0255     Sodium 139 mmol/L      Potassium 4 1 mmol/L      Chloride 102 mmol/L      CO2 32 mmol/L      ANION GAP 5 mmol/L      BUN 16 mg/dL      Creatinine 0 85 mg/dL      Glucose 95 mg/dL      Calcium 9 5 mg/dL      Corrected Calcium 10 6 mg/dL      AST 19 U/L      ALT 22 U/L      Alkaline Phosphatase 85 U/L      Total Protein 7 3 g/dL      Albumin 2 6 g/dL      Total Bilirubin 0 29 mg/dL      eGFR 62 ml/min/1 73sq m     Narrative:      [place] guidelines for Chronic Kidney Disease (CKD):     Stage 1 with normal or high GFR (GFR > 90 mL/min/1 73 square meters)    Stage 2 Mild CKD (GFR = 60-89 mL/min/1 73 square meters)    Stage 3A Moderate CKD (GFR = 45-59 mL/min/1 73 square meters)    Stage 3B Moderate CKD (GFR = 30-44 mL/min/1 73 square meters)    Stage 4 Severe CKD (GFR = 15-29 mL/min/1 73 square meters)    Stage 5 End Stage CKD (GFR <15 mL/min/1 73 square meters)  Note: GFR calculation is accurate only with a steady state creatinine    Magnesium [[id number]]  (Normal) Collected: 01/12/21 0213    Lab Status: Final result Specimen: Blood from Arm, Left Updated: 01/12/21 0255     Magnesium 2 2 mg/dL     CBC and differential [[id number]]  (Abnormal) Collected: 01/12/21 0213    Lab Status: Final result Specimen: Blood from Arm, Left Updated: 01/12/21 0234     WBC 7 97 Thousand/uL      RBC 3 57 Million/uL      Hemoglobin 11 3 g/dL      Hematocrit 37 2 %       fL      MCH 31 7 pg      MCHC 30 4 g/dL      RDW 13 2 %      MPV 9 3 fL      Platelets 663 Thousands/uL      nRBC 0 /100 WBCs      Neutrophils Relative 79 %      Immat GRANS % 1 %      Lymphocytes Relative 14 %      Monocytes Relative 6 %      Eosinophils Relative 0 %      Basophils Relative 0 %      Neutrophils Absolute 6 25 Thousands/µL      Immature Grans Absolute 0 10 Thousand/uL      Lymphocytes Absolute 1 13 Thousands/µL      Monocytes Absolute 0 47 Thousand/µL      Eosinophils Absolute 0 00 Thousand/µL      Basophils Absolute 0 02 Thousands/µL     Blood culture #2 [[id number]] Collected: 01/12/21 7200    Lab Status: In process Specimen: Blood from Arm, Left Updated: 01/12/21 0231    Blood culture #1 [[id number]] Collected: 01/12/21 0213    Lab Status: In process Specimen: Blood from Arm, Left Updated: 01/12/21 0231                 CT head without contrast   Final Result by Radhika Darnell MD (01/12 0307)      No acute intracranial abnormality  Microangiopathic changes  Workstation performed: [id number]         CT cervical spine without contrast   Final Result by Radhika Darnell MD (01/12 0642)      No cervical spine fracture or traumatic malalignment                     Workstation performed: [id number]                    Procedures  ECG 12 Lead Documentation Only    Date/Time: 1/12/2021 2:33 AM  Performed by: ALBERTO Siu  Authorized by: ALBERTO Siu     Indications / Diagnosis:  Unwitnessed fall   ECG reviewed by me, the ED Provider: yes (Dr CALABRESE [facility] )    Patient location:  ED  Previous ECG:     Previous ECG:  Compared to current    Similarity:  No change    Comparison to cardiac monitor: Yes    Interpretation:     Interpretation: abnormal    Rate:     ECG rate:  104    ECG rate assessment: normal    Rhythm:     Rhythm: sinus rhythm    Ectopy:     Ectopy: none    QRS:     QRS axis:  Normal    QRS intervals:  Normal  Conduction: Conduction: normal    ST segments:     ST segments:  Normal  T waves:     T waves: normal    Other findings:     Other findings: LAE               ED Course  ED Course as of Jan 12 0609   Tue Jan 12, 2021   0306 LACTIC ACID: 0 9   0307 Pt still tachy at 110 and no urine in new aj bag - will give small bolus - 250ml   Hgb 11 3 above normal, pt is dry  2849 CT head and C spine negative for acute process  1280 + UTI 10/2020 with e coli - susceptible to cefazolin  Will give IV rocephin and dc on keflex       4581 HR 99 after IVF  Pt is talking more and converses  Pt slightly dehydrated  Urine is yellow appearing  Stable for discharge back to [address] [place] [address] WBC, UA(!): 30-50   0424 RBC, UA(!): Innumerable   0424 Pt sent with discharge instructions back to [facility]  Bacteria, UA(!): Moderate   0608 Pt waiting for transfer back [facility]  Number of Diagnoses or Management Options  Diagnosis management comments: Unwitnessed fall, superficial laceration to right eye brow - on elquis    DDX:  Mechanical fall  UTI  Cardiac event  Trauma     Plan  EKG  Tele  Labs  Urine  CT head and c spine  Monitor reassess        Amount and/or Complexity of Data Reviewed  Clinical lab tests: ordered and reviewed  Tests in the radiology section of CPT®: ordered and reviewed  Review and summarize past medical records: yes  Independent visualization of images, tracings, or specimens: yes        Disposition  Final diagnoses:   Fall, initial encounter   Closed head injury, initial encounter   Laceration of right eyebrow, initial encounter   UTI (urinary tract infection)   Indwelling Aj catheter present   Dehydration     Time reflects when diagnosis was documented in both MDM as applicable and the Disposition within this note     Time User Action Codes Description Comment    1/12/2021  3:48 AM Milly Holliday Add Evaristo Camarena  WXXT] Fall, initial encounter     1/12/2021 3:49 AM Jason Sallie Add [S09 90XA] Closed head injury, initial encounter     1/12/2021  3:49 AM Jason Sallie Add [D53 297Z] Laceration of right eyebrow, initial encounter     1/12/2021  3:49 AM Jason Sallie Add [N39 0] UTI (urinary tract infection)     1/12/2021  3:54 AM Jason Sallie Add [Z97 8] Indwelling Louie catheter present     1/12/2021  3:55 AM Jason Sallie Add [E86 0] Dehydration     1/12/2021  4:25 AM Jason Sallie Modify [Z49  XXXA] Fall, initial encounter     1/12/2021  4:25 AM Jason Sallie Modify [N39 0] UTI (urinary tract infection)       ED Disposition     ED Disposition Condition Date/Time Comment    Discharge Stable Tue Jan 12, 2021  4:25 AM Zahra Baseman discharge to home/self care  Follow-up Information     Follow up With Specialties Details Why Contact Info Additional Information    Candy Mireles MD Family Medicine Schedule an appointment as soon as possible for a visit in 2 days  [facility] [address] [place] [address] Emergency Department Emergency Medicine  If symptoms worsen [facility] [phone]  [place] [address] ED, [address], [place], [facility]          Patient's Medications   Discharge Prescriptions    CEPHALEXIN (KEFLEX) 500 MG CAPSULE    Take 1 capsule (500 mg total) by mouth every 12 (twelve) hours for 7 days       Start Date: 1/12/2021 End Date: 1/19/2021       Order Dose: 500 mg       Quantity: 14 capsule    Refills: 0     No discharge procedures on file      PDMP Review     None          ED Provider  Electronically Signed by           Giovana Main  01/12/21 Giovana Jones  01/12/21 8963

## 2021-01-12 NOTE — ED NOTES
Patient transported to [facility], [address] [place] [address]  01/12/21 0168

## 2021-01-12 NOTE — ASSESSMENT & PLAN NOTE
With fall this am, Above right eyebrow laceration line(glued)  Close monitoring  CT negative for acute finding

## 2021-01-12 NOTE — DISCHARGE INSTRUCTIONS
You have a urinary tract infection  You are being prescribed keflex - take as prescribed  A urine culture is pending  You will be notified if the keflex is not the appropriate drug  You also appear to be slightly dehydrated  Drink more water  You have a superficial laceration to the right eye brow - it has been glued - do not get it wet for 24 hours  Monitor for any behavioral changes - return to ED if symptoms worsen    Follow up with your PCP

## 2021-01-12 NOTE — TELEPHONE ENCOUNTER
[phone]/JOSEPHINE from [facility]/Pt is Rodney Figueroa  35/ Pt fell and has an open wound over her eye and her aj cath is dislodged  Dave Clemente was paged at 3125    Please allow 20-30 mins for the provider to call back  If you do not hear from them by the, please call us back

## 2021-01-12 NOTE — PROGRESS NOTES
[facility]  [address]  [phone]  Facility: [place] [address] Care/[address]          NAME: Kaleigh Carlson  AGE: 80 y o  SEX: female    DATE OF ENCOUNTER: 1/12/2021    Chief Complaint     Right side of chest pain     History of Present Illness     HPI    The following portions of the patient's history were reviewed and updated as appropriate (from facility chart and hospital records): allergies, current medications, past family history, past medical history, past social history, past surgical history and problem list  I was called to pt's bed side by nursing staff due to pt's complaint of chest pain  Pt was arrived by EMS to the facility after ER visit due to a fall  Pt had above R eyebrow laceration which was glued  CT head negative for any acute finding  Pt was sent back to the facility with UTI presumption based on her UA, and Uc+s is pending  When I saw her she complained of R side chest pain  No SOB  Vitals stable  Sao2:off O2:885, on O2 Sao2 at high 90's  Pt's HR was at 100  Pt also reported R shoulder pain but had good ROM  Review of Systems     Review of Systems   Respiratory: Positive for shortness of breath (WHITE)  Negative for apnea, cough, choking, chest tightness and stridor  Right chest wall pain    Cardiovascular: Positive for chest pain (rgith side chest wall pain  )  Genitourinary:        "I have to go but it doesn't go!" pt also reports some dysuria          Active Problem List     Patient Active Problem List   Diagnosis    Depression    Gastroesophageal reflux disease    Hyperlipidemia    Essential hypertension    Tobacco dependence syndrome    Sciatica    Diarrhea    Bipolar 1 disorder ([address] )    Lower abdominal pain    Anemia    Severe protein-calorie malnutrition (HCC)    Vitamin B12 deficiency    Physical deconditioning    Chest pain in adult    Headache    Anxiety state    Acute on chronic cholecystitis    Disorder of gallbladder    Diverticulosis of colon    Umbilical hernia    Prophylactic antibiotic    OAB (overactive bladder)    Constipation    Acute respiratory failure due to COVID-19 (HCC)    Acute metabolic encephalopathy    Anemia, macrocytic    Hypernatremia    Pneumonia due to COVID-19 virus    Urinary retention    Ambulatory dysfunction    Oropharyngeal dysphagia    Acute cystitis without hematuria       Objective     Vitals: BP:132/72     MN:97  Sao2:0n 2lit 97%   RR:20      Afebrile     Physical Exam  Constitutional:       General: She is not in acute distress  Appearance: She is not ill-appearing, toxic-appearing or diaphoretic  HENT:      Right Ear: External ear normal       Left Ear: External ear normal       Ears:      Comments: [place]  Eyes:      General: No scleral icterus  Right eye: No discharge  Left eye: No discharge  Extraocular Movements: Extraocular movements intact  Conjunctiva/sclera: Conjunctivae normal       Comments: Eyes were initially closed but opened them on command  Neck:      Comments: In bed, limited ROM  Cardiovascular:      Rate and Rhythm: Normal rate and regular rhythm  Heart sounds: Murmur (systolic murmur ) present  No friction rub  No gallop  Pulmonary:      Effort: Pulmonary effort is normal  No respiratory distress  Breath sounds: No stridor  No wheezing, rhonchi or rales  Comments: Distant breathing sounds  Chest:      Chest wall: No tenderness  Abdominal:      General: Abdomen is flat  There is no distension  Palpations: Abdomen is soft  There is no mass  Tenderness: There is no abdominal tenderness  There is no guarding or rebound  Hernia: No hernia is present  Comments: Louie cath in, urine cloudy and dark  Musculoskeletal:         General: No swelling, tenderness (tender on touch on mid/lower R chest wall side, no bruisis on the site of tenderness ), deformity or signs of injury        Right lower leg: No edema  Left lower leg: No edema  Comments: In bed, moves extremities  Crepitus over B/L shoulders  In bed, limited ROM  Skin:     Comments: R side corner above eyebrow linear open which is glued  Neurological:      Mental Status: She is alert  Cranial Nerves: Cranial nerve deficit ([place]) present  Comments: Follows commands  Pertinent Laboratory/Diagnostic Studies:  CT CERVICAL SPINE - WITHOUT CONTRAST     INDICATION:   Neck trauma (Age => 65y)  fall  Patient has confirmed COVID-19      COMPARISON:  None      TECHNIQUE:  CT examination of the cervical spine was performed without intravenous contrast   Contiguous axial images were obtained  Sagittal and coronal reconstructions were performed        Radiation dose length product (DLP) for this visit:  266 mGy-cm   This examination, like all CT scans performed in the [facility], was performed utilizing techniques to minimize radiation dose exposure, including the use of iterative   reconstruction and automated exposure control        IMAGE QUALITY:  Diagnostic      FINDINGS:     ALIGNMENT:  Normal alignment of the cervical spine  No subluxation      VERTEBRAL BODIES:  No fracture      DEGENERATIVE CHANGES:  Moderate multilevel cervical degenerative changes are noted  No critical central canal stenosis  Anterior subluxation of the dens relative to the basion with thinning of the anterior C1 arch  Cystic change throughout the anterior   C1 arch, basion and C2 consistent with the history of inflammatory arthropathy such as rheumatoid arthritis      PREVERTEBRAL AND PARASPINAL SOFT TISSUES:  Unremarkable      THORACIC INLET:  Normal      IMPRESSION:     No cervical spine fracture or traumatic malalignment    CT BRAIN - WITHOUT CONTRAST     INDICATION:   Head trauma, minor (Age => 65y)  fall superficial laceration right eye  on eliquis  R/O ICH      COMPARISON:  None      TECHNIQUE:  CT examination of the brain was performed  In addition to axial images, sagittal and coronal 2D reformatted images were created and submitted for interpretation      Radiation dose length product (DLP) for this visit:  860 mGy-cm   This examination, like all CT scans performed in the [facility], was performed utilizing techniques to minimize radiation dose exposure, including the use of iterative   reconstruction and automated exposure control        IMAGE QUALITY:  Diagnostic      FINDINGS:     PARENCHYMA: Decreased attenuation is noted in periventricular and subcortical white matter demonstrating an appearance that is statistically most likely to represent mild microangiopathic change  Chronic lacunar infarction(s) are noted in basal ganglia      No CT signs of acute infarction  No intracranial mass, mass effect or midline shift  No acute parenchymal hemorrhage      VENTRICLES AND EXTRA-AXIAL SPACES:  Normal for the patient's age      VISUALIZED ORBITS AND PARANASAL SINUSES:  Unremarkable      CALVARIUM AND EXTRACRANIAL SOFT TISSUES:  Normal      IMPRESSION:     No acute intracranial abnormality  Microangiopathic changes  Current Medications   Medication list in facility chart was reviewed and necessary changes made  Assessment and Plan   Acute cystitis without hematuria  Ordered pt on Keflex per ER recommendation (emperic treatment)  Will f/u on urine culture  Ambulatory dysfunction  With fall this am, Above right eyebrow laceration line(glued)  Close monitoring  CT negative for acute finding         Michelle Gonzales MD  1/55/16100:85 PM

## 2021-01-14 LAB
BACTERIA UR CULT: ABNORMAL

## 2021-01-17 LAB
BACTERIA BLD CULT: NORMAL
BACTERIA BLD CULT: NORMAL

## 2021-01-21 ENCOUNTER — NURSING HOME VISIT (OUTPATIENT)
Dept: GERIATRICS | Facility: OTHER | Age: 86
End: 2021-01-21
Payer: COMMERCIAL

## 2021-01-21 DIAGNOSIS — R26.2 AMBULATORY DYSFUNCTION: Primary | ICD-10-CM

## 2021-01-21 DIAGNOSIS — R33.9 URINARY RETENTION: ICD-10-CM

## 2021-01-21 DIAGNOSIS — R13.12 OROPHARYNGEAL DYSPHAGIA: ICD-10-CM

## 2021-01-21 DIAGNOSIS — E87.0 HYPERNATREMIA: ICD-10-CM

## 2021-01-21 DIAGNOSIS — K21.9 GASTROESOPHAGEAL REFLUX DISEASE, UNSPECIFIED WHETHER ESOPHAGITIS PRESENT: ICD-10-CM

## 2021-01-21 DIAGNOSIS — F31.9 BIPOLAR 1 DISORDER (HCC): ICD-10-CM

## 2021-01-21 PROCEDURE — 99309 SBSQ NF CARE MODERATE MDM 30: CPT | Performed by: FAMILY MEDICINE

## 2021-01-21 NOTE — PROGRESS NOTES
[facility]  [address]  [phone]  Facility: [facility]/          NAME: Lu Leyden  AGE: 80 y o  SEX: female    DATE OF ENCOUNTER: 1/21/2021    Chief Complaint     Pt has no new complaint     History of Present Illness     HPI    The following portions of the patient's history were reviewed and updated as appropriate (from facility chart and hospital records): allergies, current medications, past family history, past medical history, past social history, past surgical history and problem list   Pt was seen and examined for f/u on Dysphagia, GERED, Anemia, bipolar disorder,ambulatory dysfunction,  and Urinary retention  Pt continues with NH care, and therapy  Pt walks with therapy, has symptoms of anxiety focusing on her BM and urination  Tolerates pureed, thick liquid diet  Pt still has obsession and being anxious about her BM and her urination  No lab results in [facility]  No issues with aj cath  GERD symptoms stable  Review of Systems     Review of Systems   Reason unable to perform ROS: limited with pt's cogntition    Constitutional: Negative  HENT: Negative  Eyes: Negative  Respiratory: Negative  Cardiovascular: Negative  Gastrointestinal: Negative          " I had 2 BM today"   Endocrine: Negative  Genitourinary: Negative  Musculoskeletal: Negative  Skin: Negative  Allergic/Immunologic: Negative  Neurological: Negative  Hematological: Negative  Psychiatric/Behavioral: Negative  All other systems reviewed and are negative        Active Problem List     Patient Active Problem List   Diagnosis    Depression    Gastroesophageal reflux disease    Hyperlipidemia    Essential hypertension    Tobacco dependence syndrome    Sciatica    Diarrhea    Bipolar 1 disorder ([address] )    Lower abdominal pain    Anemia    Severe protein-calorie malnutrition ([address] )    Vitamin B12 deficiency    Physical deconditioning    Chest pain in adult    Headache    Anxiety state    Acute on chronic cholecystitis    Disorder of gallbladder    Diverticulosis of colon    Umbilical hernia    Prophylactic antibiotic    OAB (overactive bladder)    Constipation    Acute respiratory failure due to COVID-19 (HCC)    Acute metabolic encephalopathy    Anemia, macrocytic    Hypernatremia    Pneumonia due to COVID-19 virus    Urinary retention    Ambulatory dysfunction    Oropharyngeal dysphagia    Acute cystitis without hematuria       Objective     Vitals: wt:94 8Ib     106/69    Physical Exam  Vitals signs and nursing note reviewed  Constitutional:       General: She is not in acute distress  Appearance: She is well-developed  She is not ill-appearing, toxic-appearing or diaphoretic  HENT:      Head: Normocephalic and atraumatic  Right Ear: External ear normal       Left Ear: External ear normal       Nose: Nose normal  No congestion or rhinorrhea  Mouth/Throat:      Comments: Dry, missing teeth   Eyes:      General: No scleral icterus  Right eye: No discharge  Left eye: No discharge  Conjunctiva/sclera: Conjunctivae normal       Pupils: Pupils are equal, round, and reactive to light  Neck:      Musculoskeletal: Normal range of motion and neck supple  No neck rigidity or muscular tenderness  Cardiovascular:      Rate and Rhythm: Normal rate and regular rhythm  Heart sounds: Murmur (systolic) present  No friction rub  No gallop  Pulmonary:      Effort: Pulmonary effort is normal  No respiratory distress  Breath sounds: Normal breath sounds  No stridor  No wheezing, rhonchi or rales  Chest:      Chest wall: No tenderness  Abdominal:      General: Abdomen is flat  Bowel sounds are normal  There is no distension  Palpations: Abdomen is soft  There is no mass  Tenderness: There is no abdominal tenderness  There is no guarding or rebound  Hernia: No hernia is present  Comments: Aj bag to floor  Musculoskeletal:         General: No swelling, tenderness, deformity or signs of injury  Right lower leg: No edema  Left lower leg: No edema  Comments: In bed, moving extremities, limited, lying in bed  Lymphadenopathy:      Cervical: No cervical adenopathy  Skin:     General: Skin is warm and dry  Coloration: Skin is not jaundiced or pale  Findings: No bruising, erythema, lesion or rash  Neurological:      Mental Status: She is alert and oriented to person, place, and time  Cranial Nerves: Cranial nerve deficit ([place]) present  Comments: Forgetful  Pertinent Laboratory/Diagnostic Studies:  No labs for this visit     Current Medications   Medication list in facility chart was reviewed and no changes made  Assessment and Plan   Ambulatory dysfunction  Continues with NH care, stable, improving slowly  Bipolar 1 disorder ([address] )  Has episodes of anxiety, and paranoia  On Lamictal, Topamax, Seroquel and clonazepam      Urinary retention  2nd to neurogenic bladder, no issues with aj  Oropharyngeal dysphagia  Tolerates pureed, thick liquid diet  Hypernatremia  No lab results in [facility], will f/u on results  Gastroesophageal reflux disease  Stable with Famotidine  Will continue with monitoring  Anemia  No lab results in [facility], will f/u on results           Pete Ojeda MD  3/70/35642:17 PM

## 2021-01-22 ENCOUNTER — OFFICE VISIT (OUTPATIENT)
Dept: UROLOGY | Facility: MEDICAL CENTER | Age: 86
End: 2021-01-22
Payer: COMMERCIAL

## 2021-01-22 DIAGNOSIS — R33.9 URINARY RETENTION: ICD-10-CM

## 2021-01-22 DIAGNOSIS — R33.9 URINARY RETENTION WITH INCOMPLETE BLADDER EMPTYING: Primary | ICD-10-CM

## 2021-01-22 PROBLEM — N40.1 BPH WITH URINARY OBSTRUCTION: Status: ACTIVE | Noted: 2021-01-22

## 2021-01-22 PROBLEM — N13.8 BPH WITH URINARY OBSTRUCTION: Status: ACTIVE | Noted: 2021-01-22

## 2021-01-22 PROCEDURE — 51700 IRRIGATION OF BLADDER: CPT

## 2021-01-22 NOTE — PROGRESS NOTES
Universal Protocol:  Consent: Verbal consent obtained  Consent given by: patient  Patient identity confirmed: verbally with patient      Bladder catheterization    Date/Time: 1/22/2021 9:44 AM  Performed by: Jabier Silverio RN  Authorized by: Jane Posadas MD     Patient location:  Other (comment)  Consent:     Consent given by:  Patient  Universal protocol:     Patient identity confirmed:  Verbally with patient  Pre-procedure details:     Procedure purpose:  Therapeutic  Anesthesia (see MAR for exact dosages): Anesthesia method:  None  Procedure details:     Bladder irrigation: yes      Catheter insertion:  Indwelling    Catheter type:  Latex and Louie    Catheter size:  20 Fr    Number of attempts:  1    Successful placement: yes      Urine characteristics:  Blood-tinged  Post-procedure details:     Patient tolerance of procedure: Tolerated well, no immediate complications    Bladder irrigated with blood tinged return with 120 mls sterile water  Attached to 2000 ml drainage bag

## 2021-01-22 NOTE — PROGRESS NOTES
HISTORY:  Long-term urinary retention, bladder stent on numerous CT scans going back to 2019  Caretaker says Louie's have been in an out the past 12 months or so  This catheter currently in since hospitalization for COVID just before [place]  Patient not a good historian  ASSESSMENT / PLAN:    I recommend long-term Louie, changed every 4-6 weeks  We changed today  The following portions of the patient's history were reviewed and updated as appropriate: allergies, current medications, past family history, past medical history, past social history, past surgical history and problem list     Review of Systems   All other systems reviewed and are negative  Objective:     Physical Exam  Constitutional:       General: She is not in acute distress  Appearance: She is well-developed  She is not diaphoretic  Comments: Frail   HENT:      Head: Normocephalic and atraumatic  Eyes:      General: No scleral icterus  Pulmonary:      Effort: Pulmonary effort is normal    Skin:     Coloration: Skin is not pale  Neurological:      Mental Status: She is alert  She is disoriented  Psychiatric:         Behavior: Behavior normal          Thought Content:  Thought content normal          Judgment: Judgment normal          Abdomen soft nontender  No results found for: PSA]  BUN   Date Value Ref Range Status   01/12/2021 16 5 - 25 mg/dL Final   12/06/2017 13 7 - 25 mg/dL Final     Creatinine   Date Value Ref Range Status   01/12/2021 0 85 0 60 - 1 30 mg/dL Final     Comment:     Standardized to IDMS reference method     No components found for: CBC      Patient Active Problem List   Diagnosis    Depression    Gastroesophageal reflux disease    Hyperlipidemia    Essential hypertension    Tobacco dependence syndrome    Sciatica    Diarrhea    Bipolar 1 disorder ([address] )    Lower abdominal pain    Anemia    Severe protein-calorie malnutrition [facility]    Vitamin B12 deficiency    Physical deconditioning    Chest pain in adult    Headache    Anxiety state    Acute on chronic cholecystitis    Disorder of gallbladder    Diverticulosis of colon    Umbilical hernia    Prophylactic antibiotic    OAB (overactive bladder)    Constipation    Acute respiratory failure due to COVID-19 (HCC)    Acute metabolic encephalopathy    Anemia, macrocytic    Hypernatremia    Pneumonia due to COVID-19 virus    Urinary retention    Ambulatory dysfunction    Oropharyngeal dysphagia    Acute cystitis without hematuria    BPH with urinary obstruction        Diagnoses and all orders for this visit:    Urinary retention with incomplete bladder emptying    Urinary retention  -     Ambulatory referral to Urology           Patient ID: Zahra Lares is a 80 y o  female        Current Outpatient Medications:     acetaminophen (TYLENOL) 325 mg tablet, Take 2 tablets (650 mg total) by mouth 3 (three) times a day, Disp: 100 tablet, Rfl: 6    apixaban (ELIQUIS) 2 5 mg, Take 1 tablet (2 5 mg total) by mouth 2 (two) times a day, Disp: 60 tablet, Rfl: 0    atorvastatin (LIPITOR) 40 mg tablet, Take 1 tablet (40 mg total) by mouth daily at bedtime for 14 days, Disp: 14 tablet, Rfl: 0    bisacodyl (Biscolax) 10 mg suppository, Insert 10 mg into the rectum as needed for constipation, Disp: , Rfl:     clonazePAM (KlonoPIN) 0 5 mg tablet, Take 1 tablet (0 5 mg total) by mouth 2 (two) times a day, Disp: 14 tablet, Rfl: 0    estradiol (ESTRACE VAGINAL) 0 1 mg/g vaginal cream, Insert 0 5 g into the vagina daily, Disp: , Rfl:     famotidine (PEPCID) 20 mg tablet, Take 1 tablet (20 mg total) by mouth daily for 14 days, Disp: 14 tablet, Rfl: 0    ferrous sulfate 325 (65 Fe) mg tablet, Take 1 tablet (325 mg total) by mouth daily with breakfast, Disp: 30 tablet, Rfl: 0    hydrocortisone 2 5 % cream, Apply topically 4 (four) times a day as needed, Disp: , Rfl:     lamoTRIgine (LaMICtal) 200 MG tablet, Take 1 tablet (200 mg total) by mouth daily before breakfast, Disp: 30 tablet, Rfl: 12    lidocaine (LMX) 4 % cream, q6hr prn rib pain , Disp: 30 g, Rfl: 12    pantoprazole (PROTONIX) 40 mg tablet, Take 1 tablet (40 mg total) by mouth daily in the early morning, Disp: 90 tablet, Rfl: 3    predniSONE 20 mg tablet, Take 40 mg daily x 3 days, then 30 mg x 3 days, then 20 mg x 3 days, then 10 mg x 3 days, Disp: 20 tablet, Rfl: 0    QUEtiapine (SEROquel) 200 mg tablet, Take 2 tablets (400 mg total) by mouth daily at bedtime, Disp: 60 tablet, Rfl: 0    Sennosides-Docusate Sodium (SENNA-PLUS PO), Take 8 6 mg by mouth 2 (two) times a day as needed, Disp: , Rfl:     topiramate (TOPAMAX) 50 MG tablet, Take 1 tablet (50 mg total) by mouth 2 (two) times a day, Disp: 60 tablet, Rfl: 0    Past Medical History:   Diagnosis Date    Anemia 2/26/2019    Anxiety     Bipolar 1 disorder (HCC)     Depression     DVT (deep venous thrombosis) [facility] ) 2008    Left leg    GERD (gastroesophageal reflux disease)     Hypertension     Overactive bladder        Past Surgical History:   Procedure Laterality Date    ADENOIDECTOMY      CATARACT EXTRACTION Bilateral     Right 10/2003, left 4/2004    CHOLECYSTECTOMY      DILATION AND CURETTAGE OF UTERUS  1985    HERNIA REPAIR  11/02/2007    Femoral and small bowel resection    HIP SURGERY      L hip    TONSILLECTOMY      As a youth    WRIST SURGERY      L wrist       Social History

## 2021-01-28 ENCOUNTER — NURSING HOME VISIT (OUTPATIENT)
Dept: GERIATRICS | Facility: OTHER | Age: 86
End: 2021-01-28
Payer: COMMERCIAL

## 2021-01-28 DIAGNOSIS — U07.1 PNEUMONIA DUE TO COVID-19 VIRUS: ICD-10-CM

## 2021-01-28 DIAGNOSIS — R33.9 URINARY RETENTION: ICD-10-CM

## 2021-01-28 DIAGNOSIS — F31.9 BIPOLAR 1 DISORDER (HCC): ICD-10-CM

## 2021-01-28 DIAGNOSIS — J12.82 PNEUMONIA DUE TO COVID-19 VIRUS: ICD-10-CM

## 2021-01-28 DIAGNOSIS — R13.12 OROPHARYNGEAL DYSPHAGIA: ICD-10-CM

## 2021-01-28 DIAGNOSIS — K21.9 GASTROESOPHAGEAL REFLUX DISEASE, UNSPECIFIED WHETHER ESOPHAGITIS PRESENT: ICD-10-CM

## 2021-01-28 DIAGNOSIS — N40.1 BPH WITH URINARY OBSTRUCTION: Primary | ICD-10-CM

## 2021-01-28 DIAGNOSIS — K59.00 CONSTIPATION, UNSPECIFIED CONSTIPATION TYPE: ICD-10-CM

## 2021-01-28 DIAGNOSIS — R26.2 AMBULATORY DYSFUNCTION: ICD-10-CM

## 2021-01-28 DIAGNOSIS — N13.8 BPH WITH URINARY OBSTRUCTION: Primary | ICD-10-CM

## 2021-01-28 PROCEDURE — 99309 SBSQ NF CARE MODERATE MDM 30: CPT | Performed by: FAMILY MEDICINE

## 2021-01-29 NOTE — PROGRESS NOTES
[facility]  [address]  [phone]  Facility: [facility]/          NAME: Chiara Wright  AGE: 80 y o  SEX: female    DATE OF ENCOUNTER: 1/28/2021    Chief Complaint     Pt has no new complaint     History of Present Illness     HPI    The following portions of the patient's history were reviewed and updated as appropriate (from facility chart and hospital records): allergies, current medications, past family history, past medical history, past social history, past surgical history and problem list  Pt was seen and examined for f/u on Bipolar disorder, dysphagia, HNT, urinary retntion, Hypernatremia, GERD, and anemia  Pt has f/u with urology in near future  Mood stable  Walks with therapy  Continues with being anxious with fixation on her bowel movement and urinary symptoms  No issues with aj cath  Staff are checking [place] to wean her off O2  Last lab on 1/26 stable  Continues with NH care  No pain  Tolerates mechanical soft, thick liquid diet  Pt has lost some wt but overall meal completion is good  BP stable  Review of Systems     Review of Systems   Constitutional: Negative  "I'm the same"   HENT: Negative  Eyes: Negative  Respiratory: Negative  Cardiovascular: Negative  Gastrointestinal: Negative  "I haven't had any bowel movement for 2 days!"   Endocrine: Negative  Genitourinary: Negative  Musculoskeletal: Negative  Skin: Negative  Allergic/Immunologic: Negative  Neurological: Negative  Hematological: Negative  Psychiatric/Behavioral: Negative  All other systems reviewed and are negative        Active Problem List     Patient Active Problem List   Diagnosis    Depression    Gastroesophageal reflux disease    Hyperlipidemia    Essential hypertension    Tobacco dependence syndrome    Sciatica    Diarrhea    Bipolar 1 disorder ([address] )    Lower abdominal pain    Anemia    Severe protein-calorie malnutrition ([facility] )    Vitamin B12 deficiency    Physical deconditioning    Chest pain in adult    Headache    Anxiety state    Acute on chronic cholecystitis    Disorder of gallbladder    Diverticulosis of colon    Umbilical hernia    Prophylactic antibiotic    OAB (overactive bladder)    Constipation    Acute respiratory failure due to COVID-19 (HCC)    Acute metabolic encephalopathy    Anemia, macrocytic    Hypernatremia    Pneumonia due to COVID-19 virus    Urinary retention    Ambulatory dysfunction    Oropharyngeal dysphagia    Acute cystitis without hematuria       Objective     Vitals: wt:90 2Ibs             BP:102/50        Temp:98 5    Physical Exam  Vitals signs and nursing note reviewed  Constitutional:       General: She is not in acute distress  Appearance: Normal appearance  She is well-developed  She is not ill-appearing, toxic-appearing or diaphoretic  HENT:      Head: Normocephalic and atraumatic  Right Ear: External ear normal       Left Ear: External ear normal       Nose: Nose normal    Eyes:      General: No scleral icterus  Right eye: No discharge  Left eye: No discharge  Conjunctiva/sclera: Conjunctivae normal       Pupils: Pupils are equal, round, and reactive to light  Neck:      Musculoskeletal: Normal range of motion and neck supple  No neck rigidity or muscular tenderness  Cardiovascular:      Rate and Rhythm: Normal rate and regular rhythm  Heart sounds: Normal heart sounds  No murmur  No friction rub  No gallop  Pulmonary:      Effort: Pulmonary effort is normal  No respiratory distress  Breath sounds: Normal breath sounds  No stridor  No wheezing, rhonchi or rales  Chest:      Chest wall: No tenderness  Abdominal:      General: Abdomen is flat  Bowel sounds are normal  There is no distension  Palpations: Abdomen is soft  There is no mass  Tenderness: There is no abdominal tenderness   There is no guarding or rebound  Hernia: No hernia is present  Comments: Aj cath in place  Musculoskeletal:         General: No swelling, tenderness, deformity or signs of injury  Right lower leg: No edema  Left lower leg: No edema  Comments: In bed, moves UEs and LEs  Lymphadenopathy:      Cervical: No cervical adenopathy  Skin:     General: Skin is warm and dry  Coloration: Skin is not jaundiced or pale  Findings: No bruising, erythema, lesion or rash  Neurological:      Mental Status: She is alert and oriented to person, place, and time  Cranial Nerves: Cranial nerve deficit present  Comments: Forgetful  Pertinent Laboratory/Diagnostic Studies:  1/26/2021: CBC, CMP    Current Medications   Medication list in facility chart was reviewed and no changes made  Assessment and Plan     Oropharyngeal dysphagia  Tolerates mechanical soft , thick liquid diet  ST on board  Urinary retention  No issues with aj cath, f/u with urology in near future  Ambulatory dysfunction  Improving with therapy  Bipolar 1 disorder (HCC)  Stable with Seroquel, Lamictal, and Topamax  Constipation  Pt is fixated with BM, forgetful about when has last BM, stable on Senna  Gastroesophageal reflux disease  On Esmoprzole  Stable  Pneumonia due to COVID-19 virus  Staff to wean pt off O2, last day of Eliquis is on 2/8/2021      Tera Soria MD  1/97/11810:15 PM

## 2021-02-02 ENCOUNTER — NURSING HOME VISIT (OUTPATIENT)
Dept: GERIATRICS | Facility: OTHER | Age: 86
End: 2021-02-02
Payer: COMMERCIAL

## 2021-02-02 DIAGNOSIS — R13.12 OROPHARYNGEAL DYSPHAGIA: ICD-10-CM

## 2021-02-02 DIAGNOSIS — U07.1 PNEUMONIA DUE TO COVID-19 VIRUS: ICD-10-CM

## 2021-02-02 DIAGNOSIS — J12.82 PNEUMONIA DUE TO COVID-19 VIRUS: ICD-10-CM

## 2021-02-02 DIAGNOSIS — R26.2 AMBULATORY DYSFUNCTION: Primary | ICD-10-CM

## 2021-02-02 DIAGNOSIS — F31.9 BIPOLAR 1 DISORDER (HCC): ICD-10-CM

## 2021-02-02 DIAGNOSIS — R33.9 URINARY RETENTION: ICD-10-CM

## 2021-02-02 DIAGNOSIS — K21.9 GASTROESOPHAGEAL REFLUX DISEASE, UNSPECIFIED WHETHER ESOPHAGITIS PRESENT: ICD-10-CM

## 2021-02-02 PROCEDURE — 99316 NF DSCHRG MGMT 30 MIN+: CPT | Performed by: FAMILY MEDICINE

## 2021-02-02 NOTE — ASSESSMENT & PLAN NOTE
Improving with therapy, will be DC to an [facility], will need to continue with PT/OT to improve ambulation, strength, endurance and ADLs  Referral made

## 2021-02-02 NOTE — PROGRESS NOTES
[facility]  [address]  [phone]  Facility: [place] [address] Care/31  Discharge Note        NAME: Olga Orlando  AGE: 80 y o  SEX: female    DATE OF ENCOUNTER: 2/2/2021    Chief Complaint     Pt has no new or specific complaint  History of Present Illness     HPI    The following portions of the patient's history were reviewed and updated as appropriate (from facility chart and hospital records): allergies, current medications, past family history, past medical history, past social history, past surgical history and problem list  Pt was seen and examined for f/u on dysphagia, urinary retention, ambulatory dysfunction, Bipolar disorder, GERD, and pneumonia 2nd to covid-19  Pt is now off O2  No issues with aj cath  Pt's diet is upgraded to mechanical soft from pureed, and tolerating it well  Walks with therapy using a walker, overall improving with therapy  Still some anxiety focusing on her urination and BM but overall stable and better  Pt will be discharged to [address] [place] [address] on 2/4/2021, and will need to continue with therapy at [facility]  Review of Systems     Review of Systems   Constitutional: Negative for appetite change ("I'm eating well")  "I'm trying my best"   HENT: Negative  Eyes: Negative  Respiratory: Negative  Cardiovascular: Negative  Gastrointestinal: Negative  "I don't go!"   Endocrine: Negative  Genitourinary: Negative  Musculoskeletal: Negative  Skin: Negative  Allergic/Immunologic: Negative  Neurological: Negative  Hematological: Negative  Psychiatric/Behavioral: Negative  All other systems reviewed and are negative        Active Problem List     Patient Active Problem List   Diagnosis    Depression    Gastroesophageal reflux disease    Hyperlipidemia    Essential hypertension    Tobacco dependence syndrome    Sciatica    Diarrhea    Bipolar 1 disorder ([facility] [facility] )    Lower abdominal pain    Anemia    Severe protein-calorie malnutrition (HCC)    Vitamin B12 deficiency    Physical deconditioning    Chest pain in adult    Headache    Anxiety state    Acute on chronic cholecystitis    Disorder of gallbladder    Diverticulosis of colon    Umbilical hernia    Prophylactic antibiotic    OAB (overactive bladder)    Constipation    Acute respiratory failure due to COVID-19 (HCC)    Acute metabolic encephalopathy    Anemia, macrocytic    Hypernatremia    Pneumonia due to COVID-19 virus    Urinary retention    Ambulatory dysfunction    Oropharyngeal dysphagia    Acute cystitis without hematuria       Objective     Vitals: wt:90 4Ibs         BP:118/60           MO:98  RR:16     Physical Exam  Vitals signs and nursing note reviewed  Constitutional:       General: She is not in acute distress  Appearance: Normal appearance  She is well-developed  She is not ill-appearing, toxic-appearing or diaphoretic  HENT:      Head: Normocephalic and atraumatic  Right Ear: External ear normal       Left Ear: External ear normal       Nose: Nose normal  No congestion or rhinorrhea  Mouth/Throat:      Mouth: Mucous membranes are moist       Pharynx: No oropharyngeal exudate or posterior oropharyngeal erythema  Eyes:      General: No scleral icterus  Right eye: No discharge  Left eye: No discharge  Conjunctiva/sclera: Conjunctivae normal       Pupils: Pupils are equal, round, and reactive to light  Neck:      Musculoskeletal: Normal range of motion and neck supple  No neck rigidity or muscular tenderness  Cardiovascular:      Rate and Rhythm: Normal rate and regular rhythm  Heart sounds: Murmur (systolic ) present  No friction rub  No gallop  Pulmonary:      Effort: Pulmonary effort is normal  No respiratory distress  Breath sounds: Normal breath sounds  No stridor  No wheezing, rhonchi or rales  Chest:      Chest wall: No tenderness     Abdominal: General: Abdomen is flat  Bowel sounds are normal  There is no distension  Palpations: Abdomen is soft  There is no mass  Tenderness: There is no abdominal tenderness  There is no guarding or rebound  Hernia: No hernia is present  Comments: Aj cath in place, aj bag to floor   Genitourinary:     Comments: Deferred  Musculoskeletal: Normal range of motion  General: No swelling, tenderness, deformity or signs of injury  Right lower leg: No edema  Left lower leg: No edema  Lymphadenopathy:      Cervical: No cervical adenopathy  Skin:     General: Skin is warm and dry  Coloration: Skin is not jaundiced or pale  Findings: No bruising, erythema, lesion or rash  Comments: Didn't examine sacral area  Neurological:      Mental Status: She is alert and oriented to person, place, and time  Cranial Nerves: Cranial nerve deficit ([place]) present  Psychiatric:         Behavior: Behavior normal          Pertinent Laboratory/Diagnostic Studies:  1/26/2021: CBC, CMP     Current Medications   Medication list in facility chart was reviewed and necessary changes made  Assessment and Plan   Ambulatory dysfunction  Improving with therapy, will be DC to an [facility], will need to continue with PT/OT to improve ambulation, strength, endurance and ADLs  Referral made  Oropharyngeal dysphagia  On mechanical soft, thick liquid diet  Stable  Gastroesophageal reflux disease  Stable with Esmeprazole  Urinary retention  2nd to neurogenic bladder, needs Aj cath, no specific issues  Bipolar 1 disorder ([address] )  Long term, on Lamictal and Seroquel  Pneumonia due to COVID-19 virus  Off O2, asymptomatic, pt will continue with ELisquis till 2/8/2021        Total time spent was 38 mins with more than 50% of time spent on counseling and coordinating care and on discharge plan and arrangement  Script for all meds written  Referral for PT/OT done   DME form for [facility] completed, order for hospital bed written       Manuel Ordoñez MD  0/1/81197:18 PM

## 2021-02-05 ENCOUNTER — TELEPHONE (OUTPATIENT)
Dept: FAMILY MEDICINE CLINIC | Facility: CLINIC | Age: 86
End: 2021-02-05

## 2021-02-05 NOTE — TELEPHONE ENCOUNTER
Nas Ochoa from [facility] called and wants a call back regarding patient and what she needs now that she is at [facility]    Nas Ochoa [phone]

## 2021-02-05 NOTE — TELEPHONE ENCOUNTER
Patient newly admitted to [facility], needs [facility] for aj care, PT, OT ,ST eval and treat, order for US C+S to r/o UTI, and a laxative suppository for constipation  Ok to generate orders?

## 2021-02-06 ENCOUNTER — TELEPHONE (OUTPATIENT)
Dept: OTHER | Facility: OTHER | Age: 86
End: 2021-02-06

## 2021-02-06 ENCOUNTER — HOSPITAL ENCOUNTER (EMERGENCY)
Facility: HOSPITAL | Age: 86
End: 2021-02-07
Attending: EMERGENCY MEDICINE
Payer: COMMERCIAL

## 2021-02-06 ENCOUNTER — APPOINTMENT (EMERGENCY)
Dept: CT IMAGING | Facility: HOSPITAL | Age: 86
End: 2021-02-06
Payer: COMMERCIAL

## 2021-02-06 DIAGNOSIS — I62.03 ACUTE ON CHRONIC INTRACRANIAL SUBDURAL HEMATOMA (HCC): Primary | ICD-10-CM

## 2021-02-06 DIAGNOSIS — N39.0 UTI (URINARY TRACT INFECTION): ICD-10-CM

## 2021-02-06 DIAGNOSIS — I62.01 ACUTE ON CHRONIC INTRACRANIAL SUBDURAL HEMATOMA (HCC): Primary | ICD-10-CM

## 2021-02-06 DIAGNOSIS — R29.6 UNWITNESSED FALL: ICD-10-CM

## 2021-02-06 DIAGNOSIS — S51.819A SKIN TEAR OF FOREARM WITHOUT COMPLICATION: ICD-10-CM

## 2021-02-06 DIAGNOSIS — R00.0 TACHYCARDIA: ICD-10-CM

## 2021-02-06 DIAGNOSIS — I95.9 HYPOTENSION: ICD-10-CM

## 2021-02-06 LAB
ALBUMIN SERPL BCP-MCNC: 2.8 G/DL (ref 3.5–5)
ALP SERPL-CCNC: 112 U/L (ref 46–116)
ALT SERPL W P-5'-P-CCNC: 18 U/L (ref 12–78)
ANION GAP SERPL CALCULATED.3IONS-SCNC: 15 MMOL/L (ref 4–13)
APTT PPP: 32 SECONDS (ref 23–37)
AST SERPL W P-5'-P-CCNC: 30 U/L (ref 5–45)
BASOPHILS # BLD AUTO: 0.02 THOUSANDS/ΜL (ref 0–0.1)
BASOPHILS NFR BLD AUTO: 0 % (ref 0–1)
BILIRUB SERPL-MCNC: 0.37 MG/DL (ref 0.2–1)
BUN SERPL-MCNC: 30 MG/DL (ref 5–25)
CALCIUM ALBUM COR SERPL-MCNC: 10 MG/DL (ref 8.3–10.1)
CALCIUM SERPL-MCNC: 9 MG/DL (ref 8.3–10.1)
CHLORIDE SERPL-SCNC: 107 MMOL/L (ref 100–108)
CO2 SERPL-SCNC: 22 MMOL/L (ref 21–32)
CREAT SERPL-MCNC: 1.13 MG/DL (ref 0.6–1.3)
EOSINOPHIL # BLD AUTO: 0 THOUSAND/ΜL (ref 0–0.61)
EOSINOPHIL NFR BLD AUTO: 0 % (ref 0–6)
ERYTHROCYTE [DISTWIDTH] IN BLOOD BY AUTOMATED COUNT: 15 % (ref 11.6–15.1)
GFR SERPL CREATININE-BSD FRML MDRD: 44 ML/MIN/1.73SQ M
GLUCOSE SERPL-MCNC: 102 MG/DL (ref 65–140)
HCT VFR BLD AUTO: 31.1 % (ref 34.8–46.1)
HGB BLD-MCNC: 9.6 G/DL (ref 11.5–15.4)
IMM GRANULOCYTES # BLD AUTO: 0.05 THOUSAND/UL (ref 0–0.2)
IMM GRANULOCYTES NFR BLD AUTO: 1 % (ref 0–2)
INR PPP: 1.42 (ref 0.84–1.19)
LYMPHOCYTES # BLD AUTO: 0.45 THOUSANDS/ΜL (ref 0.6–4.47)
LYMPHOCYTES NFR BLD AUTO: 5 % (ref 14–44)
MCH RBC QN AUTO: 32.1 PG (ref 26.8–34.3)
MCHC RBC AUTO-ENTMCNC: 30.9 G/DL (ref 31.4–37.4)
MCV RBC AUTO: 104 FL (ref 82–98)
MONOCYTES # BLD AUTO: 0.37 THOUSAND/ΜL (ref 0.17–1.22)
MONOCYTES NFR BLD AUTO: 4 % (ref 4–12)
NEUTROPHILS # BLD AUTO: 7.76 THOUSANDS/ΜL (ref 1.85–7.62)
NEUTS SEG NFR BLD AUTO: 90 % (ref 43–75)
NRBC BLD AUTO-RTO: 0 /100 WBCS
PLATELET # BLD AUTO: 438 THOUSANDS/UL (ref 149–390)
PMV BLD AUTO: 9.2 FL (ref 8.9–12.7)
POTASSIUM SERPL-SCNC: 3.9 MMOL/L (ref 3.5–5.3)
PROT SERPL-MCNC: 6.8 G/DL (ref 6.4–8.2)
PROTHROMBIN TIME: 17.1 SECONDS (ref 11.6–14.5)
RBC # BLD AUTO: 2.99 MILLION/UL (ref 3.81–5.12)
SODIUM SERPL-SCNC: 144 MMOL/L (ref 136–145)
TROPONIN I SERPL-MCNC: 0.02 NG/ML
WBC # BLD AUTO: 8.65 THOUSAND/UL (ref 4.31–10.16)

## 2021-02-06 PROCEDURE — 93005 ELECTROCARDIOGRAM TRACING: CPT

## 2021-02-06 PROCEDURE — 86901 BLOOD TYPING SEROLOGIC RH(D): CPT | Performed by: EMERGENCY MEDICINE

## 2021-02-06 PROCEDURE — 99285 EMERGENCY DEPT VISIT HI MDM: CPT | Performed by: EMERGENCY MEDICINE

## 2021-02-06 PROCEDURE — 71260 CT THORAX DX C+: CPT

## 2021-02-06 PROCEDURE — 85610 PROTHROMBIN TIME: CPT | Performed by: EMERGENCY MEDICINE

## 2021-02-06 PROCEDURE — 87077 CULTURE AEROBIC IDENTIFY: CPT | Performed by: EMERGENCY MEDICINE

## 2021-02-06 PROCEDURE — 36415 COLL VENOUS BLD VENIPUNCTURE: CPT | Performed by: EMERGENCY MEDICINE

## 2021-02-06 PROCEDURE — 0241U HB NFCT DS VIR RESP RNA 4 TRGT: CPT | Performed by: EMERGENCY MEDICINE

## 2021-02-06 PROCEDURE — 80053 COMPREHEN METABOLIC PANEL: CPT | Performed by: EMERGENCY MEDICINE

## 2021-02-06 PROCEDURE — 83605 ASSAY OF LACTIC ACID: CPT | Performed by: EMERGENCY MEDICINE

## 2021-02-06 PROCEDURE — 85730 THROMBOPLASTIN TIME PARTIAL: CPT | Performed by: EMERGENCY MEDICINE

## 2021-02-06 PROCEDURE — 85025 COMPLETE CBC W/AUTO DIFF WBC: CPT | Performed by: EMERGENCY MEDICINE

## 2021-02-06 PROCEDURE — 86900 BLOOD TYPING SEROLOGIC ABO: CPT | Performed by: EMERGENCY MEDICINE

## 2021-02-06 PROCEDURE — 84484 ASSAY OF TROPONIN QUANT: CPT | Performed by: EMERGENCY MEDICINE

## 2021-02-06 PROCEDURE — 99285 EMERGENCY DEPT VISIT HI MDM: CPT

## 2021-02-06 PROCEDURE — 87186 SC STD MICRODIL/AGAR DIL: CPT | Performed by: EMERGENCY MEDICINE

## 2021-02-06 PROCEDURE — 70450 CT HEAD/BRAIN W/O DYE: CPT

## 2021-02-06 PROCEDURE — 74177 CT ABD & PELVIS W/CONTRAST: CPT

## 2021-02-06 PROCEDURE — 86850 RBC ANTIBODY SCREEN: CPT | Performed by: EMERGENCY MEDICINE

## 2021-02-06 PROCEDURE — 87040 BLOOD CULTURE FOR BACTERIA: CPT | Performed by: EMERGENCY MEDICINE

## 2021-02-06 PROCEDURE — 96361 HYDRATE IV INFUSION ADD-ON: CPT

## 2021-02-06 PROCEDURE — 72125 CT NECK SPINE W/O DYE: CPT

## 2021-02-06 RX ADMIN — SODIUM CHLORIDE 500 ML: 0.9 INJECTION, SOLUTION INTRAVENOUS at 23:03

## 2021-02-07 ENCOUNTER — TELEPHONE (OUTPATIENT)
Dept: FAMILY MEDICINE CLINIC | Facility: CLINIC | Age: 86
End: 2021-02-07

## 2021-02-07 ENCOUNTER — HOSPITAL ENCOUNTER (INPATIENT)
Facility: HOSPITAL | Age: 86
LOS: 11 days | DRG: 085 | End: 2021-02-18
Attending: SURGERY | Admitting: SURGERY
Payer: COMMERCIAL

## 2021-02-07 ENCOUNTER — APPOINTMENT (INPATIENT)
Dept: RADIOLOGY | Facility: HOSPITAL | Age: 86
DRG: 085 | End: 2021-02-07
Payer: COMMERCIAL

## 2021-02-07 VITALS
SYSTOLIC BLOOD PRESSURE: 102 MMHG | RESPIRATION RATE: 18 BRPM | OXYGEN SATURATION: 97 % | DIASTOLIC BLOOD PRESSURE: 60 MMHG | HEART RATE: 110 BPM | TEMPERATURE: 98.5 F

## 2021-02-07 DIAGNOSIS — F31.9 BIPOLAR 1 DISORDER (HCC): ICD-10-CM

## 2021-02-07 DIAGNOSIS — Z79.899 POLYPHARMACY: Primary | ICD-10-CM

## 2021-02-07 DIAGNOSIS — N39.0 SEPSIS DUE TO URINARY TRACT INFECTION (HCC): ICD-10-CM

## 2021-02-07 DIAGNOSIS — S06.5X9A SUBDURAL HEMATOMA (HCC): ICD-10-CM

## 2021-02-07 DIAGNOSIS — A41.9 SEPSIS DUE TO URINARY TRACT INFECTION (HCC): ICD-10-CM

## 2021-02-07 PROBLEM — S06.5XAA SUBDURAL HEMATOMA: Status: ACTIVE | Noted: 2021-02-07
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
## 2021-02-07 LAB
ABO GROUP BLD: NORMAL
ANION GAP SERPL CALCULATED.3IONS-SCNC: 4 MMOL/L (ref 4–13)
ATRIAL RATE: 125 BPM
BACTERIA UR QL AUTO: ABNORMAL /HPF
BILIRUB UR QL STRIP: ABNORMAL
BLD GP AB SCN SERPL QL: NEGATIVE
BUN SERPL-MCNC: 20 MG/DL (ref 5–25)
CALCIUM SERPL-MCNC: 8.8 MG/DL (ref 8.3–10.1)
CHLORIDE SERPL-SCNC: 117 MMOL/L (ref 100–108)
CLARITY UR: ABNORMAL
CO2 SERPL-SCNC: 27 MMOL/L (ref 21–32)
COLOR UR: ABNORMAL
CREAT SERPL-MCNC: 0.62 MG/DL (ref 0.6–1.3)
ERYTHROCYTE [DISTWIDTH] IN BLOOD BY AUTOMATED COUNT: 15.8 % (ref 11.6–15.1)
FLUAV RNA RESP QL NAA+PROBE: NEGATIVE
FLUBV RNA RESP QL NAA+PROBE: NEGATIVE
GFR SERPL CREATININE-BSD FRML MDRD: 82 ML/MIN/1.73SQ M
GLUCOSE SERPL-MCNC: 88 MG/DL (ref 65–140)
GLUCOSE UR STRIP-MCNC: NEGATIVE MG/DL
HCT VFR BLD AUTO: 28.5 % (ref 34.8–46.1)
HGB BLD-MCNC: 8.8 G/DL (ref 11.5–15.4)
HGB UR QL STRIP.AUTO: ABNORMAL
KETONES UR STRIP-MCNC: ABNORMAL MG/DL
LACTATE SERPL-SCNC: 0.6 MMOL/L (ref 0.5–2)
LACTATE SERPL-SCNC: 2.9 MMOL/L (ref 0.5–2)
LEUKOCYTE ESTERASE UR QL STRIP: ABNORMAL
MCH RBC QN AUTO: 31.5 PG (ref 26.8–34.3)
MCHC RBC AUTO-ENTMCNC: 30.9 G/DL (ref 31.4–37.4)
MCV RBC AUTO: 102 FL (ref 82–98)
NITRITE UR QL STRIP: POSITIVE
NON-SQ EPI CELLS URNS QL MICRO: ABNORMAL /HPF
OTHER STN SPEC: ABNORMAL
P AXIS: 65 DEGREES
PH UR STRIP.AUTO: 5.5 [PH]
PLATELET # BLD AUTO: 389 THOUSANDS/UL (ref 149–390)
PMV BLD AUTO: 9.1 FL (ref 8.9–12.7)
POTASSIUM SERPL-SCNC: 4.4 MMOL/L (ref 3.5–5.3)
PR INTERVAL: 140 MS
PROT UR STRIP-MCNC: >=300 MG/DL
QRS AXIS: 5 DEGREES
QRSD INTERVAL: 86 MS
QT INTERVAL: 320 MS
QTC INTERVAL: 461 MS
RBC # BLD AUTO: 2.79 MILLION/UL (ref 3.81–5.12)
RBC #/AREA URNS AUTO: ABNORMAL /HPF
RH BLD: POSITIVE
RSV RNA RESP QL NAA+PROBE: NEGATIVE
SARS-COV-2 RNA RESP QL NAA+PROBE: NEGATIVE
SODIUM SERPL-SCNC: 148 MMOL/L (ref 136–145)
SP GR UR STRIP.AUTO: 1.02 (ref 1–1.03)
SPECIMEN EXPIRATION DATE: NORMAL
T WAVE AXIS: 70 DEGREES
UROBILINOGEN UR QL STRIP.AUTO: 1 E.U./DL
VENTRICULAR RATE: 125 BPM
WBC # BLD AUTO: 7.06 THOUSAND/UL (ref 4.31–10.16)
WBC #/AREA URNS AUTO: ABNORMAL /HPF

## 2021-02-07 PROCEDURE — 81001 URINALYSIS AUTO W/SCOPE: CPT | Performed by: EMERGENCY MEDICINE

## 2021-02-07 PROCEDURE — 93010 ELECTROCARDIOGRAM REPORT: CPT | Performed by: INTERNAL MEDICINE

## 2021-02-07 PROCEDURE — 96365 THER/PROPH/DIAG IV INF INIT: CPT

## 2021-02-07 PROCEDURE — 93005 ELECTROCARDIOGRAM TRACING: CPT

## 2021-02-07 PROCEDURE — 99285 EMERGENCY DEPT VISIT HI MDM: CPT

## 2021-02-07 PROCEDURE — 99223 1ST HOSP IP/OBS HIGH 75: CPT | Performed by: SURGERY

## 2021-02-07 PROCEDURE — 70450 CT HEAD/BRAIN W/O DYE: CPT

## 2021-02-07 PROCEDURE — C9132 KCENTRA, PER I.U.: HCPCS | Performed by: SURGERY

## 2021-02-07 PROCEDURE — NC001 PR NO CHARGE: Performed by: NURSE PRACTITIONER

## 2021-02-07 PROCEDURE — 83605 ASSAY OF LACTIC ACID: CPT | Performed by: EMERGENCY MEDICINE

## 2021-02-07 PROCEDURE — 87086 URINE CULTURE/COLONY COUNT: CPT | Performed by: EMERGENCY MEDICINE

## 2021-02-07 PROCEDURE — 36415 COLL VENOUS BLD VENIPUNCTURE: CPT | Performed by: EMERGENCY MEDICINE

## 2021-02-07 PROCEDURE — G1004 CDSM NDSC: HCPCS

## 2021-02-07 PROCEDURE — 85027 COMPLETE CBC AUTOMATED: CPT | Performed by: SURGERY

## 2021-02-07 PROCEDURE — C9113 INJ PANTOPRAZOLE SODIUM, VIA: HCPCS | Performed by: SURGERY

## 2021-02-07 PROCEDURE — 80048 BASIC METABOLIC PNL TOTAL CA: CPT | Performed by: SURGERY

## 2021-02-07 PROCEDURE — 99205 OFFICE O/P NEW HI 60 MIN: CPT | Performed by: PHYSICIAN ASSISTANT

## 2021-02-07 PROCEDURE — 96361 HYDRATE IV INFUSION ADD-ON: CPT

## 2021-02-07 RX ORDER — QUETIAPINE FUMARATE 100 MG/1
400 TABLET, FILM COATED ORAL
Status: DISCONTINUED | OUTPATIENT
Start: 2021-02-07 | End: 2021-02-18 | Stop reason: HOSPADM

## 2021-02-07 RX ORDER — ESOMEPRAZOLE MAGNESIUM 40 MG/1
40 FOR SUSPENSION ORAL
COMMUNITY
End: 2021-02-18 | Stop reason: HOSPADM

## 2021-02-07 RX ORDER — TOPIRAMATE 25 MG/1
50 TABLET ORAL 2 TIMES DAILY
Status: DISCONTINUED | OUTPATIENT
Start: 2021-02-07 | End: 2021-02-18 | Stop reason: HOSPADM

## 2021-02-07 RX ORDER — MINERAL OIL 100 G/100G
1 OIL RECTAL 3 TIMES DAILY
Status: DISCONTINUED | OUTPATIENT
Start: 2021-02-07 | End: 2021-02-09

## 2021-02-07 RX ORDER — PANTOPRAZOLE SODIUM 40 MG/1
40 INJECTION, POWDER, FOR SOLUTION INTRAVENOUS
Status: DISCONTINUED | OUTPATIENT
Start: 2021-02-07 | End: 2021-02-08

## 2021-02-07 RX ORDER — SODIUM CHLORIDE, SODIUM LACTATE, POTASSIUM CHLORIDE, CALCIUM CHLORIDE 600; 310; 30; 20 MG/100ML; MG/100ML; MG/100ML; MG/100ML
125 INJECTION, SOLUTION INTRAVENOUS CONTINUOUS
Status: DISCONTINUED | OUTPATIENT
Start: 2021-02-07 | End: 2021-02-09

## 2021-02-07 RX ORDER — AMOXICILLIN 250 MG
2 CAPSULE ORAL 2 TIMES DAILY PRN
Status: DISCONTINUED | OUTPATIENT
Start: 2021-02-07 | End: 2021-02-09

## 2021-02-07 RX ORDER — BISACODYL 10 MG
10 SUPPOSITORY, RECTAL RECTAL DAILY
Status: DISCONTINUED | OUTPATIENT
Start: 2021-02-07 | End: 2021-02-09

## 2021-02-07 RX ORDER — LAMOTRIGINE 100 MG/1
200 TABLET ORAL
Status: DISCONTINUED | OUTPATIENT
Start: 2021-02-07 | End: 2021-02-18 | Stop reason: HOSPADM

## 2021-02-07 RX ORDER — ATORVASTATIN CALCIUM 20 MG/1
40 TABLET, FILM COATED ORAL
Status: DISCONTINUED | OUTPATIENT
Start: 2021-02-07 | End: 2021-02-07

## 2021-02-07 RX ADMIN — SODIUM CHLORIDE, SODIUM LACTATE, POTASSIUM CHLORIDE, AND CALCIUM CHLORIDE 75 ML/HR: .6; .31; .03; .02 INJECTION, SOLUTION INTRAVENOUS at 06:22

## 2021-02-07 RX ADMIN — PANTOPRAZOLE SODIUM 40 MG: 40 INJECTION, POWDER, FOR SOLUTION INTRAVENOUS at 04:32

## 2021-02-07 RX ADMIN — LEVETIRACETAM 500 MG: 100 INJECTION, SOLUTION INTRAVENOUS at 21:27

## 2021-02-07 RX ADMIN — CEFTRIAXONE SODIUM 2000 MG: 2 INJECTION, POWDER, FOR SOLUTION INTRAMUSCULAR; INTRAVENOUS at 00:38

## 2021-02-07 RX ADMIN — Medication 2500 UNITS: at 04:24

## 2021-02-07 RX ADMIN — MINERAL OIL 1 ENEMA: 100 ENEMA RECTAL at 04:16

## 2021-02-07 RX ADMIN — MINERAL OIL 1 ENEMA: 100 ENEMA RECTAL at 23:38

## 2021-02-07 RX ADMIN — TOPIRAMATE 50 MG: 25 TABLET ORAL at 13:15

## 2021-02-07 RX ADMIN — SODIUM CHLORIDE, SODIUM LACTATE, POTASSIUM CHLORIDE, AND CALCIUM CHLORIDE 500 ML: .6; .31; .03; .02 INJECTION, SOLUTION INTRAVENOUS at 04:00

## 2021-02-07 RX ADMIN — LAMOTRIGINE 200 MG: 100 TABLET ORAL at 13:15

## 2021-02-07 RX ADMIN — SODIUM CHLORIDE, SODIUM LACTATE, POTASSIUM CHLORIDE, AND CALCIUM CHLORIDE 500 ML: .6; .31; .03; .02 INJECTION, SOLUTION INTRAVENOUS at 03:30

## 2021-02-07 RX ADMIN — LEVETIRACETAM 500 MG: 100 INJECTION, SOLUTION INTRAVENOUS at 04:16

## 2021-02-07 RX ADMIN — IOHEXOL 100 ML: 350 INJECTION, SOLUTION INTRAVENOUS at 00:15

## 2021-02-07 RX ADMIN — SODIUM CHLORIDE 500 ML: 0.9 INJECTION, SOLUTION INTRAVENOUS at 00:45

## 2021-02-07 NOTE — QUICK NOTE
Surgery    Updated and discussed pt's plan of care and answered all questions with pt's son via telephone conversation       Darlene Ventura Md  2/7/21  6:34AM

## 2021-02-07 NOTE — H&P
H&P Exam - Brooks Moshe 80 y o  female MRN: [id number]  Unit/Bed#: ED 23 Encounter: [id number]    Assessment/Plan   Trauma Alert: Evaluation  Model of Arrival: Transfer from [address] [place] [address] Team: Attending Tameka Fine, Resident: Quincy William  Consultants: Neurosurgery: Kushal Hall  Time Called 3:20    Trauma Active Problems:   Subdural hematoma  Coagulopathy  Urosepsis  Constipation      Trauma Plan:   Admit to trauma step down 2 HOT  Reverse eliquis w Colin Smiley  Neurosurgery consult  keppra prophylaxis  Continue rocephin for urosepsis  Mineral oil enema for rectal dilation    Chief Complaint: back pain    History of Present Illness   HPI:  Liz Trimble is a 80 y o  female past medical history GERD, DVT on Eliquis, bipolar 1, who presents with on witnessed fall at her nursing home  Patient denied loss of consciousness  Denied head strike  However CT imaging demonstrating acute on chronic left subdural hematoma overlying left frontal lobe 9 mm maximal width  No midline shift  CT C-spine negative  CT abdomen demonstrating enlargement of the rectum with large amount of fecal material   Of note prior echocardiogram in 2020 with ejection fraction of 70%  Of note patient also with urosepsis  Patient started on ceftriaxone  Mechanism:Fall    Review of Systems   Constitutional: Negative  Negative for chills and fatigue  HENT: Negative  Eyes: Negative  Respiratory: Negative  Cardiovascular: Negative  Gastrointestinal: Positive for constipation  Negative for abdominal distention and abdominal pain  Endocrine: Negative  Genitourinary: Negative  Musculoskeletal: Negative  Skin: Negative  Allergic/Immunologic: Negative  Neurological: Negative  Hematological: Negative  Psychiatric/Behavioral: Negative  12-point, complete review of systems was reviewed and negative except as stated above         Historical Information   History is unobtainable from the patient due to none    Efforts to obtain history included the following sources: other medical personnel, obtained from other records    Past Medical History:   Diagnosis Date    Anemia 2/26/2019    Anxiety     Bipolar 1 disorder ([facility] )     Depression     DVT (deep venous thrombosis) ([facility] ) 2008    Left leg    GERD (gastroesophageal reflux disease)     Hypertension     Overactive bladder      Past Surgical History:   Procedure Laterality Date    ADENOIDECTOMY      CATARACT EXTRACTION Bilateral     Right 10/2003, left 4/2004    CHOLECYSTECTOMY      DILATION AND CURETTAGE OF UTERUS  1985    HERNIA REPAIR  11/02/2007    Femoral and small bowel resection    HIP SURGERY      L hip    TONSILLECTOMY      As a youth    WRIST SURGERY      L wrist     Social History   Social History     Substance and Sexual Activity   Alcohol Use Not Currently    Alcohol/week: 0 0 standard drinks    Frequency: Never    Drinks per session: Patient refused    Binge frequency: Never     Social History     Substance and Sexual Activity   Drug Use Not Currently     Social History     Tobacco Use   Smoking Status Former Smoker    Packs/day: 1 00    Types: Cigarettes   Smokeless Tobacco Former User   Tobacco Comment    Per [employer]: Heavy tobacco smoker     E-Cigarette/Vaping    E-Cigarette Use Never User      E-Cigarette/Vaping Substances    Nicotine No     THC No     CBD No     Flavoring No      Immunization History   Administered Date(s) Administered    Pneumococcal Conjugate 13-Valent 04/17/2015    Pneumococcal Polysaccharide PPV23 03/06/2000    Td (adult), Unspecified 12/14/2011    Td (adult), adsorbed 12/14/2011    Tuberculin Skin Test-PPD Intradermal 04/05/2019    Typhoid, Unspecified 01/01/1999    influenza, injectable, quadrivalent 11/01/2019     Last Tetanus: unknown  Family History: Non-contributory  Unable to obtain/limited by none      Meds/Allergies   all current active meds have been reviewed    Allergies Allergen Reactions    Ethanol     Simvastatin          PHYSICAL EXAM    PE limited by: none    Objective   Vitals:   First set: Pulse: (!) 118 (02/07/21 0255)  Respirations: 18 (02/07/21 0255)  Blood Pressure: 110/65 (02/07/21 0255)    Primary Survey:   (A) Airway: intact  (B) Breathing: intact  (C) Circulation: Pulses:   normal  (D) Disabliity:  GCS Total:  15  (E) Expose:  Completed    Secondary Survey: (Click on Physical Exam tab above)  Physical Exam  Vitals signs reviewed  Constitutional:       Appearance: Normal appearance  HENT:      Head: Normocephalic and atraumatic  Nose: Nose normal    Eyes:      General: No scleral icterus  Pupils: Pupils are equal, round, and reactive to light  Neck:      Musculoskeletal: Normal range of motion  Cardiovascular:      Rate and Rhythm: Normal rate  Pulses: Normal pulses  Pulmonary:      Effort: Pulmonary effort is normal    Abdominal:      General: There is no distension  Palpations: Abdomen is soft  Tenderness: There is no abdominal tenderness  Genitourinary:     Comments: deferred  Musculoskeletal: Normal range of motion  Skin:     General: Skin is warm  Capillary Refill: Capillary refill takes 2 to 3 seconds  Neurological:      General: No focal deficit present  Mental Status: She is alert and oriented to person, place, and time     Psychiatric:         Mood and Affect: Mood normal          Invasive Devices     Peripheral Intravenous Line            Peripheral IV 02/06/21 Left Forearm less than 1 day    Peripheral IV 02/06/21 Right Antecubital less than 1 day          Drain            Urethral Catheter Temperature probe 16 Fr  less than 1 day                Lab Results:   Results: I have personally reviewed pertinent reports   , BMP/CMP:   Lab Results   Component Value Date    SODIUM 144 02/06/2021    K 3 9 02/06/2021     02/06/2021    CO2 22 02/06/2021    BUN 30 (H) 02/06/2021    CREATININE 1 13 02/06/2021 CALCIUM 9 0 02/06/2021    AST 30 02/06/2021    ALT 18 02/06/2021    ALKPHOS 112 02/06/2021    EGFR 44 02/06/2021   , CBC:   Lab Results   Component Value Date    WBC 8 65 02/06/2021    HGB 9 6 (L) 02/06/2021    HCT 31 1 (L) 02/06/2021     (H) 02/06/2021     (H) 02/06/2021    MCH 32 1 02/06/2021    MCHC 30 9 (L) 02/06/2021    RDW 15 0 02/06/2021    MPV 9 2 02/06/2021    NRBC 0 02/06/2021    and Coagulation:   Lab Results   Component Value Date    INR 1 42 (H) 02/06/2021     Imaging/EKG Studies: Results: I have personally reviewed pertinent reports  Other Studies: none       Code Status: Prior  Advance Directive and Living Will:      Power of : Yes  POLST:

## 2021-02-07 NOTE — CASE MANAGEMENT
Pt is NOT a Bundle  Pt IS A 30 Day Readmission    CM spoke to pt's son Adiel Arellano to discuss the role of CM  Pt lives at [facility] and has been there for 3 days  Prior to moving to [facility], the pt was at [facility] for rehab due to COVID+ (12/18/20)  [facility] is 1 floor with 0STE  Pt has a walk-in shower with grab bars, bench, and raised toilet  Pt's son claim that [facility] report the pt as independent for most ADLs except for toileting  Pt needs assistance with all IADLs  Pt doesn't drive and is a retired   Pt uses a walker for all ambulation  Pt's had 7 falls in the last 6 months  As per the son, the pt's "only hobbies are her obsessions with urinating and her constipation"  Pt has a living will  Pt obtains her medication on site  Pt has a hx of Bi-Polar, Depression, and Anxiety  Pt's been hospitalized at [facility] over 30 years ago where she received some ECT sessions  Pt's most recent IP Psych admission was 08/2018 at [facility]  Pt's been to [facility] for SNF  Pt's son is agreeable to SNF again should the pt be recommended  CM will appreciate therapy recommendations  CM reviewed d/c planning process including the following: identifying help at home, patient preference for d/c planning needs, Discharge Lounge, [facility] Meds to Bed program, availability of treatment team to discuss questions or concerns patient and/or family may have regarding understanding medications and recognizing signs and symptoms once discharged  CM also encouraged patient to follow up with all recommended appointments after discharge  Patient advised of importance for patient and family to participate in managing patients medical well being

## 2021-02-07 NOTE — ASSESSMENT & PLAN NOTE
Left greater than right SDH  · Recurrent falls on Eliquis  · Received Kcentra    Imaging:   · CT head without 2/6/21:  Acute on chronic left-sided subdural over the frontal region measuring approximately 9 mm  In addition there is a small extra-axial collection in the right frontal region  No midline shift or mass effect  · CT head January 12, 2021 (completed for evaluation following fall):  No evidence of subdural hematoma    Plan:  · Continue monitor neurological exam   Currently GCS is 15 able to follow commands and moving all extremities  · Hold all anti-platelet anticoagulation medications  · Patient on Eliquis for history of DVT documented 2008  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

## 2021-02-07 NOTE — ED NOTES
Called Pharmacy for morning medications that where due from 0700 till present time   They will work on them and send to us      Mitra Mares RN  02/07/21 6601

## 2021-02-07 NOTE — CONSULTS
Consult- Talya Nogueira 1935, 80 y o  female MRN: [id number]    Unit/Bed#: ED 23 Encounter: [id number]    Primary Care Provider: Mikayla Desouza MD   Date and time admitted to hospital: 2/7/2021  2:38 AM      Inpatient consult to Neurosurgery  Consult performed by: Yeni Ramirez PA-C  Consult ordered by: Lona Maldonado MD          Subdural hematoma [facility])  Assessment & Plan  Left greater than right SDH  · Recurrent falls on Eliquis  · Received Kcentra    Imaging:   · CT head without 2/6/21:  Acute on chronic left-sided subdural over the frontal region measuring approximately 9 mm  In addition there is a small extra-axial collection in the right frontal region  No midline shift or mass effect  · CT head January 12, 2021 (completed for evaluation following fall):  No evidence of subdural hematoma    Plan:  · Continue monitor neurological exam   Currently GCS is 15 able to follow commands and moving all extremities  · Hold all anti-platelet anticoagulation medications  · Patient on Eliquis for history of DVT documented 2008 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  Received Kcentra for reversal   · Keppra x1 week for seizure prophylaxis  · Repeat CT head to ensure stability  · Mobilize with physical and occupational therapy  · DVT prophylaxis:  Bilateral SCDs  Defer pharmacological DVT prophylaxis until stable CT head obtained  · No neurosurgical intervention anticipated  Will plan for follow-up in two weeks with repeat CT head without contrast        Sepsis due to urinary tract infection [facility])  Assessment & Plan  Rocephin per primary team     Fall  Assessment & Plan  · CT cervical spine wo 2/6/21:  Age-related moderate multilevel cervical degenerative changes  No fracture or traumatic malalignment appreciated  · CT chest abd pelvis w 2/6/21:  Limited secondary to motion artifact  No obvious evidence of spinal fracture  History of Present Illness     HPI: Talya Nogueira is a 80 y o  female with PMH including HTN, GERD, DVT 2008 on Elqiuis, bipolar, depression, anemia and Aneixty who presents following fall at nursing home  Patient poor historian  She is unable to provide further details  Per report, no loss of consciousness and denies head straight  Workup included CT head which demonstrated subdural hematoma without midline shift  No evidence of spinal fractures  Patient also diagnosed with urosepsis and started on antibiotics  At this time she complains of back pain with chronic left-sided leg pain  Per chart review showed diagnosis of prior sciatica  Patient complaining of being cold and tired  Denies any headache vision or speech difficulties  Denies any weakness or sensory changes  Review of Systems   Constitutional: Negative for activity change, fatigue and fever  HENT: Negative for trouble swallowing and voice change  Eyes: Negative for photophobia and visual disturbance  Respiratory: Negative for cough and shortness of breath  Cardiovascular: Negative for chest pain and leg swelling  Gastrointestinal: Negative for abdominal pain, nausea and vomiting  Genitourinary: Negative for decreased urine volume and difficulty urinating  Musculoskeletal: Positive for back pain  Negative for gait problem and neck pain  Skin: Negative for pallor, rash and wound  Neurological: Negative for dizziness, tremors, seizures, speech difficulty, weakness, light-headedness, numbness and headaches  Psychiatric/Behavioral: Negative for agitation and confusion     questionable reliability    Historical Information   Past Medical History:   Diagnosis Date    Anemia 2/26/2019    Anxiety     Bipolar 1 disorder ([facility] )     Depression     DVT (deep venous thrombosis) ([facility] ) 2008    Left leg    GERD (gastroesophageal reflux disease)     Hypertension     Overactive bladder      Past Surgical History:   Procedure Laterality Date    ADENOIDECTOMY      CATARACT EXTRACTION Bilateral Right 10/2003, left 4/2004    CHOLECYSTECTOMY      DILATION AND CURETTAGE OF UTERUS  1985    HERNIA REPAIR  11/02/2007    Femoral and small bowel resection    HIP SURGERY      L hip    TONSILLECTOMY      As a youth    WRIST SURGERY      L wrist     Social History     Substance and Sexual Activity   Alcohol Use Not Currently    Alcohol/week: 0 0 standard drinks    Frequency: Never    Drinks per session: Patient refused    Binge frequency: Never     Social History     Substance and Sexual Activity   Drug Use Not Currently     Social History     Tobacco Use   Smoking Status Former Smoker    Packs/day: 1 00    Types: Cigarettes   Smokeless Tobacco Former User   Tobacco Comment    Per [employer]: Heavy tobacco smoker     Family History   Problem Relation Age of Onset    Heart disease Father        Meds/Allergies   all current active meds have been reviewed, current meds:   Current Facility-Administered Medications   Medication Dose Route Frequency    bisacodyl (DULCOLAX) rectal suppository 10 mg  10 mg Rectal Daily    [START ON 2/8/2021] ceftriaxone (ROCEPHIN) 1 g/50 mL in dextrose IVPB  1,000 mg Intravenous Q24H    lactated ringers infusion  75 mL/hr Intravenous Continuous    lamoTRIgine (LaMICtal) tablet 200 mg  200 mg Oral Daily Before Breakfast    levETIRAcetam (KEPPRA) 500 mg in sodium chloride 0 9 % 100 mL IVPB  500 mg Intravenous Q12H [address]    mineral oil enema 1 enema  1 enema Rectal TID    pantoprazole (PROTONIX) injection 40 mg  40 mg Intravenous Q24H [address]    QUEtiapine (SEROquel) tablet 400 mg  400 mg Oral HS    senna-docusate sodium (SENOKOT S) 8 6-50 mg per tablet 2 tablet  2 tablet Oral BID PRN    topiramate (TOPAMAX) tablet 50 mg  50 mg Oral BID    and PTA meds:   Prior to Admission Medications   Prescriptions Last Dose Informant Patient Reported? Taking?    QUEtiapine (SEROquel) 200 mg tablet  Self No No   Sig: Take 2 tablets (400 mg total) by mouth daily at bedtime Sennosides-Docusate Sodium (SENNA-PLUS PO)   Yes No   Sig: Take 8 6 mg by mouth 2 (two) times a day as needed   acetaminophen (TYLENOL) 325 mg tablet   No No   Sig: Take 2 tablets (650 mg total) by mouth 3 (three) times a day   apixaban (ELIQUIS) 2 5 mg   No No   Sig: Take 1 tablet (2 5 mg total) by mouth 2 (two) times a day   atorvastatin (LIPITOR) 40 mg tablet   No No   Sig: Take 1 tablet (40 mg total) by mouth daily at bedtime for 14 days   bisacodyl (Biscolax) 10 mg suppository   Yes No   Sig: Insert 10 mg into the rectum as needed for constipation   clonazePAM (KlonoPIN) 0 5 mg tablet   No No   Sig: Take 1 tablet (0 5 mg total) by mouth 2 (two) times a day   esomeprazole (NexIUM) 40 mg packet   Yes No   Sig: Take 40 mg by mouth every morning before breakfast   estradiol (ESTRACE VAGINAL) 0 1 mg/g vaginal cream   Yes No   Sig: Insert 0 5 g into the vagina daily   famotidine (PEPCID) 20 mg tablet   No No   Sig: Take 1 tablet (20 mg total) by mouth daily for 14 days   ferrous sulfate 325 (65 Fe) mg tablet   No No   Sig: Take 1 tablet (325 mg total) by mouth daily with breakfast   hydrocortisone 2 5 % cream   Yes No   Sig: Apply topically 4 (four) times a day as needed   lamoTRIgine (LaMICtal) 200 MG tablet   No No   Sig: Take 1 tablet (200 mg total) by mouth daily before breakfast   lidocaine (LMX) 4 % cream   No No   Sig: q6hr prn rib pain     pantoprazole (PROTONIX) 40 mg tablet   No No   Sig: Take 1 tablet (40 mg total) by mouth daily in the early morning   predniSONE 20 mg tablet   No No   Sig: Take 40 mg daily x 3 days, then 30 mg x 3 days, then 20 mg x 3 days, then 10 mg x 3 days   topiramate (TOPAMAX) 50 MG tablet  Self No No   Sig: Take 1 tablet (50 mg total) by mouth 2 (two) times a day      Facility-Administered Medications: None     Allergies   Allergen Reactions    Ethanol     Simvastatin        Objective   I/O     None          Physical Exam  Constitutional:       General: She is not in acute distress  Appearance: Normal appearance  She is well-developed and underweight  HENT:      Head: Normocephalic and atraumatic  Nose: Nose normal       Mouth/Throat:      Mouth: Mucous membranes are moist    Eyes:      General: No scleral icterus  Right eye: No discharge  Left eye: No discharge  Extraocular Movements: Extraocular movements intact and EOM normal       Conjunctiva/sclera: Conjunctivae normal       Pupils: Pupils are equal, round, and reactive to light  Cardiovascular:      Rate and Rhythm: Normal rate  Pulmonary:      Effort: Pulmonary effort is normal    Abdominal:      General: There is no distension  Palpations: Abdomen is soft  Musculoskeletal:         General: No deformity  Skin:     General: Skin is warm and dry  Findings: No rash  Neurological:      Mental Status: She is alert and oriented to person, place, and time  Psychiatric:         Speech: Speech normal          Behavior: Behavior normal          Thought Content: Thought content normal          Judgment: Judgment normal        Neurologic Exam     Mental Status   Oriented to person, place, and time  Follows 1 step commands  Attention: normal  Concentration: normal    Speech: speech is normal   Level of consciousness: alert  Knowledge: good  Normal comprehension  Cranial Nerves     CN III, IV, VI   Pupils are equal, round, and reactive to light  Extraocular motions are normal    Nystagmus: none   Upgaze: normal  Conjugate gaze: present    CN V   Facial sensation intact  CN VII   Facial expression full, symmetric       CN VIII   Hearing: impaired    CN XI   Right trapezius strength: normal  Left trapezius strength: normal    CN XII   Tongue: not atrophic  Fasciculations: absent  Tongue deviation: none    Motor Exam   Muscle bulk: normal  Overall muscle tone: normal  Right arm pronator drift: absent  Left arm pronator drift: absentGrossly moving all extremities without focal weakness  Sensory Exam   Light touch normal      Gait, Coordination, and Reflexes     Tremor   Resting tremor: absent  Intention tremor: absent  Action tremor: absent    Reflexes   Right Jack: absent  Left Jack: absent  Right ankle clonus: absent  Left ankle clonus: absent        Vitals:Blood pressure 126/69, pulse 104, resp  rate 16, SpO2 95 %  ,There is no height or weight on file to calculate BMI  Lab Results:   Results from last 7 days   Lab Units 02/06/21  2300   WBC Thousand/uL 8 65   HEMOGLOBIN g/dL 9 6*   HEMATOCRIT % 31 1*   PLATELETS Thousands/uL 438*   NEUTROS PCT % 90*   MONOS PCT % 4     Results from last 7 days   Lab Units 02/06/21  2300   POTASSIUM mmol/L 3 9   CHLORIDE mmol/L 107   CO2 mmol/L 22   BUN mg/dL 30*   CREATININE mg/dL 1 13   CALCIUM mg/dL 9 0   ALK PHOS U/L 112   ALT U/L 18   AST U/L 30             Results from last 7 days   Lab Units 02/06/21  2300   INR  1 42*   PTT seconds 32     No results found for: TROPONINT  ABG:No results found for: PHART, CKO8JKM, PO2ART, JKY9ATP, L1LYIBLR, BEART, SOURCE    Imaging Studies: I have personally reviewed pertinent reports  Ct Head Without Contrast    Result Date: 2/7/2021  Impression: 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 overlying the left frontal lobe greatest width measuring 9 mm  No midline shift  I personally discussed this study with [address] [place] [address] on 2/7/2021 at 12:34 AM  Workstation performed: [id number]     Ct Cervical Spine Without Contrast    Result Date: 2/7/2021  Impression: No cervical spine fracture or traumatic malalignment  Workstation performed: [id number]     Ct Chest Abdomen Pelvis W Contrast    Result Date: 2/7/2021  Impression: Extremely limited study due to severe motion artifact Scattered airspace opacities within the periphery of the lung of unclear etiology and may represent infection   Marked distention of the rectum with large amount fecal material  Workstation performed: [id number]       EKG, Pathology, and Other Studies: none    VTE Prophylaxis: Reason for no pharmacologic prophylaxis SDH    Code Status: Level 3 - DNAR and DNI  Advance Directive and Living Will:      Power of : Yes  POLST:      Counseling / Coordination of Care  I spent 20 minutes with the patient

## 2021-02-07 NOTE — EMTALA/ACUTE CARE TRANSFER
[facility]  [address] [address]  Dept: [phone]      EMTALA TRANSFER CONSENT    NAME Emory Christianson                                         1935                              MRN [id number]    I have been informed of my rights regarding examination, treatment, and transfer   by Dr Bryce Perez DO    Benefits: Specialized equipment and/or services available at the receiving facility (Include comment)________________________(trauma, NS)    Risks: Potential deterioration of medical condition      Consent for Transfer:  I acknowledge that my medical condition has been evaluated and explained to me by the emergency department physician or other qualified medical person and/or my attending physician, who has recommended that I be transferred to the service of  Accepting Physician: Gerrit Leventhal at [address], [address] : [facility]  The above potential benefits of such transfer, the potential risks associated with such transfer, and the probable risks of not being transferred have been explained to me, and I fully understand them  The doctor has explained that, in my case, the benefits of transfer outweigh the risks  I agree to be transferred  I authorize the performance of emergency medical procedures and treatments upon me in both transit and upon arrival at the receiving facility  Additionally, I authorize the release of any and all medical records to the receiving facility and request they be transported with me, if possible  I understand that the safest mode of transportation during a medical emergency is an ambulance and that the Hospital advocates the use of this mode of transport  Risks of traveling to the receiving facility by car, including absence of medical control, life sustaining equipment, such as oxygen, and medical personnel has been explained to me and I fully understand them      (MARIANO CORRECT BOX BELOW)  [ x ]  I consent to the stated transfer and to be transported by ambulance/helicopter  [  ]  I consent to the stated transfer, but refuse transportation by ambulance and accept full responsibility for my transportation by car  I understand the risks of non-ambulance transfers and I exonerate the Hospital and its staff from any deterioration in my condition that results from this refusal     X___________________________________________    DATE  21  TIME________  Signature of patient or legally responsible individual signing on patient behalf           RELATIONSHIP TO PATIENT_________________________          Provider Certification    NAME Zoraida Carrera                                         1935                              MRN [id number]    A medical screening exam was performed on the above named patient  Based on the examination:    Condition Necessitating Transfer The primary encounter diagnosis was Acute on chronic intracranial subdural hematoma ([address] )  Diagnoses of Skin tear of forearm without complication, Unwitnessed fall, UTI (urinary tract infection), Hypotension, and Tachycardia were also pertinent to this visit      Patient Condition: The patient has been stabilized such that within reasonable medical probability, no material deterioration of the patient condition or the condition of the unborn child(chinedu) is likely to result from the transfer    Reason for Transfer: Level of Care needed not available at this facility    Transfer Requirements: Facility [facility]   · Space available and qualified personnel available for treatment as acknowledged by Benji Leach  · Agreed to accept transfer and to provide appropriate medical treatment as acknowledged by       Sylvia Sims  · Appropriate medical records of the examination and treatment of the patient are provided at the time of transfer   [address], [address] _______  · Transfer will be performed by qualified personnel from    and appropriate transfer equipment as required, including the use of necessary and appropriate life support measures  Provider Certification: I have examined the patient and explained the following risks and benefits of being transferred/refusing transfer to the patient/family:  Risk of worsening condition      Based on these reasonable risks and benefits to the patient and/or the unborn child(chinedu), and based upon the information available at the time of the patients examination, I certify that the medical benefits reasonably to be expected from the provision of appropriate medical treatments at another medical facility outweigh the increasing risks, if any, to the individuals medical condition, and in the case of labor to the unborn child, from effecting the transfer      X____________________________________________ DATE 02/07/21        TIME_______      ORIGINAL - SEND TO MEDICAL RECORDS   COPY - SEND WITH PATIENT DURING TRANSFER

## 2021-02-07 NOTE — ED PROVIDER NOTES
History  Chief Complaint   Patient presents with    Fall     Pt from NH, found on floor, unwitnessed fall  Pt c/o lower back pain  Pt on thinners  Denies LOC or headstrike  79 yo female with unwitnessed fall at [facility]  Pt is alert and oriented, + eliquis  Currently denies any pain but told NH and EMS she had back pain and mentioned L hip pain to tech partner  Small skin tear L forearm  Pt tachycardic and hypotensive - EF 70% July 2020  History provided by:  Patient   used: No    Fall  Mechanism of injury: fall    Incident location:  Nursing home  Arrived directly from scene: yes    Fall:     Fall occurred:  Walking    Impact surface:  Hard floor    Point of impact:  Unable to specify    Entrapped after fall: yes    Suspicion of alcohol use: no    Suspicion of drug use: no    Tetanus status:  Up to date  Prior to arrival data:     Patient ambulatory at scene: no      Blood loss:  None    Responsiveness at scene:  Alert    Orientation at scene:  Person, place, situation and time    Loss of consciousness: no    Associated symptoms: back pain (per EMS was c/o some lower back pain)    Associated symptoms: no abdominal pain, no chest pain, no difficulty breathing, no headaches, no nausea, no neck pain and no vomiting    Risk factors: anticoagulation therapy        Prior to Admission Medications   Prescriptions Last Dose Informant Patient Reported? Taking?    QUEtiapine (SEROquel) 200 mg tablet  Self No Yes   Sig: Take 2 tablets (400 mg total) by mouth daily at bedtime   Sennosides-Docusate Sodium (SENNA-PLUS PO)   Yes Yes   Sig: Take 8 6 mg by mouth 2 (two) times a day as needed   acetaminophen (TYLENOL) 325 mg tablet   No Yes   Sig: Take 2 tablets (650 mg total) by mouth 3 (three) times a day   apixaban (ELIQUIS) 2 5 mg   No Yes   Sig: Take 1 tablet (2 5 mg total) by mouth 2 (two) times a day   atorvastatin (LIPITOR) 40 mg tablet   No No   Sig: Take 1 tablet (40 mg total) by mouth daily at bedtime for 14 days   bisacodyl (Biscolax) 10 mg suppository   Yes No   Sig: Insert 10 mg into the rectum as needed for constipation   clonazePAM (KlonoPIN) 0 5 mg tablet   No Yes   Sig: Take 1 tablet (0 5 mg total) by mouth 2 (two) times a day   esomeprazole (NexIUM) 40 mg packet   Yes Yes   Sig: Take 40 mg by mouth every morning before breakfast   estradiol (ESTRACE VAGINAL) 0 1 mg/g vaginal cream   Yes Yes   Sig: Insert 0 5 g into the vagina daily   famotidine (PEPCID) 20 mg tablet   No No   Sig: Take 1 tablet (20 mg total) by mouth daily for 14 days   ferrous sulfate 325 (65 Fe) mg tablet   No Yes   Sig: Take 1 tablet (325 mg total) by mouth daily with breakfast   hydrocortisone 2 5 % cream   Yes No   Sig: Apply topically 4 (four) times a day as needed   lamoTRIgine (LaMICtal) 200 MG tablet   No Yes   Sig: Take 1 tablet (200 mg total) by mouth daily before breakfast   lidocaine (LMX) 4 % cream   No No   Sig: q6hr prn rib pain     pantoprazole (PROTONIX) 40 mg tablet   No No   Sig: Take 1 tablet (40 mg total) by mouth daily in the early morning   predniSONE 20 mg tablet   No No   Sig: Take 40 mg daily x 3 days, then 30 mg x 3 days, then 20 mg x 3 days, then 10 mg x 3 days   topiramate (TOPAMAX) 50 MG tablet  Self No Yes   Sig: Take 1 tablet (50 mg total) by mouth 2 (two) times a day      Facility-Administered Medications: None       Past Medical History:   Diagnosis Date    Anemia 2/26/2019    Anxiety     [facility]     Depression     DVT (deep venous thrombosis) ([facility] ) 2008    Left leg    GERD (gastroesophageal reflux disease)     Hypertension     Overactive bladder        Past Surgical History:   Procedure Laterality Date    ADENOIDECTOMY      CATARACT EXTRACTION Bilateral     Right 10/2003, left 4/2004    CHOLECYSTECTOMY      DILATION AND CURETTAGE OF UTERUS  1985    HERNIA REPAIR  11/02/2007    Femoral and small bowel resection    HIP SURGERY      L hip    TONSILLECTOMY      As a youth    WRIST SURGERY      L wrist       Family History   Problem Relation Age of Onset    Heart disease Father      I have reviewed and agree with the history as documented  E-Cigarette/Vaping    E-Cigarette Use Never User      E-Cigarette/Vaping Substances    Nicotine No     THC No     CBD No     Flavoring No      Social History     Tobacco Use    Smoking status: Former Smoker     Packs/day: 1 00     Types: Cigarettes    Smokeless tobacco: Former User    Tobacco comment: Per [employer]: Heavy tobacco smoker   Substance Use Topics    Alcohol use: Not Currently     Alcohol/week: 0 0 standard drinks     Frequency: Never     Drinks per session: Patient refused     Binge frequency: Never    Drug use: Not Currently       Review of Systems   Constitutional: Negative for appetite change, chills, fatigue and fever  HENT: Negative for sore throat  Eyes: Negative for visual disturbance  Respiratory: Negative for shortness of breath  Cardiovascular: Negative for chest pain  Gastrointestinal: Negative for abdominal pain, diarrhea, nausea and vomiting  Genitourinary: Negative for dysuria, frequency, vaginal bleeding and vaginal discharge  Musculoskeletal: Positive for back pain (per EMS was c/o some lower back pain)  Negative for neck pain and neck stiffness  Skin: Negative for pallor and rash  Allergic/Immunologic: Negative for immunocompromised state  Neurological: Negative for light-headedness and headaches  Psychiatric/Behavioral: Negative for confusion  All other systems reviewed and are negative  Physical Exam  Physical Exam  Vitals signs and nursing note reviewed  Constitutional:       General: She is not in acute distress  Appearance: She is well-developed  HENT:      Head: Normocephalic and atraumatic        Right Ear: External ear normal       Left Ear: External ear normal       Mouth/Throat:      Mouth: Mucous membranes are moist    Eyes:      Extraocular Movements: Extraocular movements intact  Neck:      Musculoskeletal: Neck supple  Cardiovascular:      Rate and Rhythm: Normal rate and regular rhythm  Heart sounds: No murmur  Pulmonary:      Effort: Pulmonary effort is normal  No respiratory distress  Breath sounds: Normal breath sounds  Chest:      Chest wall: No tenderness  Abdominal:      General: Bowel sounds are normal       Palpations: Abdomen is soft  Tenderness: There is no abdominal tenderness  Musculoskeletal: Normal range of motion  General: No tenderness  Skin:     General: Skin is warm  Coloration: Skin is not pale  Findings: No rash  Comments: Skin tear L forearm   Neurological:      General: No focal deficit present  Mental Status: She is alert and oriented to person, place, and time  Mental status is at baseline     Psychiatric:         Behavior: Behavior normal          Vital Signs  ED Triage Vitals   Temperature Pulse Respirations Blood Pressure SpO2   02/06/21 2300 02/06/21 2247 02/06/21 2247 02/06/21 2247 02/06/21 2247   98 5 °F (36 9 °C) (!) 125 20 91/57 93 %      Temp Source Heart Rate Source Patient Position - Orthostatic VS BP Location FiO2 (%)   02/06/21 2300 02/06/21 2247 02/06/21 2247 02/06/21 2247 --   Rectal Monitor Lying Right arm       Pain Score       --                  Vitals:    02/06/21 2325 02/07/21 0027 02/07/21 0115 02/07/21 0141   BP: 102/56 95/52 110/59 102/60   Pulse: (!) 122 (!) 118 (!) 112 (!) 110   Patient Position - Orthostatic VS: Lying Lying Lying Lying         Visual Acuity  Visual Acuity      Most Recent Value   L Pupil Size (mm)  2   R Pupil Size (mm)  2          ED Medications  Medications   sodium chloride 0 9 % bolus 500 mL (0 mL Intravenous Stopped 2/7/21 0126)   iohexol (OMNIPAQUE) 350 MG/ML injection (MULTI-DOSE) 100 mL (100 mL Intravenous Given 2/7/21 0015)   ceftriaxone (ROCEPHIN) 2 g/50 mL in dextrose IVPB (0 mg Intravenous Stopped 2/7/21 0136)   sodium chloride 0 9 % bolus 500 mL (0 mL Intravenous Stopped 2/7/21 0126)       Diagnostic Studies  Results Reviewed     Procedure Component Value Units Date/Time    Lactic acid 2 Hours [[id number]]  (Normal) Collected: 02/07/21 0121    Lab Status: Final result Specimen: Blood from Arm, Right Updated: 02/07/21 0150     LACTIC ACID 0 6 mmol/L     Narrative:      Result may be elevated if tourniquet was used during collection  Urine Microscopic [[id number]]  (Abnormal) Collected: 02/07/21 0016    Lab Status: Final result Specimen: Urine, Indwelling Louie Catheter Updated: 02/07/21 0102     RBC, UA Innumerable /hpf      WBC, UA Innumerable /hpf      Epithelial Cells Moderate /hpf      Bacteria, UA Moderate /hpf      OTHER OBSERVATIONS Renal Epithelial Cells Present  Renal Tubule Epithelial Cells Present    Urine culture [[id number]] Collected: 02/07/21 0016    Lab Status: In process Specimen: Urine, Indwelling Louie Catheter Updated: 02/07/21 0102    COVID19, Influenza A/B, RSV PCR, [facility] [[id number]]  (Normal) Collected: 02/06/21 2309    Lab Status: Final result Specimen: Nares from Nasopharyngeal Swab Updated: 02/07/21 0031     SARS-CoV-2 Negative     INFLUENZA A PCR Negative     INFLUENZA B PCR Negative     RSV PCR Negative    Narrative: This test has been authorized by FDA under an EUA (Emergency Use Assay) for use by authorized laboratories  Clinical caution and judgement should be used with the interpretation of these results with consideration of the clinical impression and other laboratory testing  Testing reported as "Positive" or "Negative" has been proven to be accurate according to standard laboratory validation requirements  All testing is performed with control materials showing appropriate reactivity at standard intervals      Lactic acid [[id number]]  (Abnormal) Collected: 02/06/21 2300    Lab Status: Final result Specimen: Blood from Arm, Right Updated: 02/07/21 0028     LACTIC ACID 2 9 mmol/L Narrative:      Result may be elevated if tourniquet was used during collection  UA w Reflex to Microscopic w Reflex to Culture [[id number]]  (Abnormal) Collected: 02/07/21 0016    Lab Status: Final result Specimen: Urine, Indwelling Louie Catheter Updated: 02/07/21 0025     Color, UA Trina     Clarity, UA Cloudy     Specific George, UA 1 020     pH, UA 5 5     Leukocytes, UA Trace     Nitrite, UA Positive     Protein, UA >=300 mg/dl      Glucose, UA Negative mg/dl      Ketones, UA 15 (1+) mg/dl      Urobilinogen, UA 1 0 E U /dl      Bilirubin, UA Interference- unable to analyze     Blood, UA Large    CBC and differential [[id number]]  (Abnormal) Collected: 02/06/21 2300    Lab Status: Final result Specimen: Blood from Arm, Right Updated: 02/06/21 2356     WBC 8 65 Thousand/uL      RBC 2 99 Million/uL      Hemoglobin 9 6 g/dL      Hematocrit 31 1 %       fL      MCH 32 1 pg      MCHC 30 9 g/dL      RDW 15 0 %      MPV 9 2 fL      Platelets 651 Thousands/uL      nRBC 0 /100 WBCs      Neutrophils Relative 90 %      Immat GRANS % 1 %      Lymphocytes Relative 5 %      Monocytes Relative 4 %      Eosinophils Relative 0 %      Basophils Relative 0 %      Neutrophils Absolute 7 76 Thousands/µL      Immature Grans Absolute 0 05 Thousand/uL      Lymphocytes Absolute 0 45 Thousands/µL      Monocytes Absolute 0 37 Thousand/µL      Eosinophils Absolute 0 00 Thousand/µL      Basophils Absolute 0 02 Thousands/µL     Narrative: This is an appended report  These results have been appended to a previously verified report      Troponin I [[id number]]  (Normal) Collected: 02/06/21 2300    Lab Status: Final result Specimen: Blood from Arm, Right Updated: 02/06/21 2344     Troponin I 0 02 ng/mL     Comprehensive metabolic panel [[id number]]  (Abnormal) Collected: 02/06/21 2300    Lab Status: Final result Specimen: Blood from Arm, Right Updated: 02/06/21 2342     Sodium 144 mmol/L      Potassium 3 9 mmol/L      Chloride 107 mmol/L      CO2 22 mmol/L      ANION GAP 15 mmol/L      BUN 30 mg/dL      Creatinine 1 13 mg/dL      Glucose 102 mg/dL      Calcium 9 0 mg/dL      Corrected Calcium 10 0 mg/dL      AST 30 U/L      ALT 18 U/L      Alkaline Phosphatase 112 U/L      Total Protein 6 8 g/dL      Albumin 2 8 g/dL      Total Bilirubin 0 37 mg/dL      eGFR 44 ml/min/1 73sq m     Narrative:      [place] guidelines for Chronic Kidney Disease (CKD):     Stage 1 with normal or high GFR (GFR > 90 mL/min/1 73 square meters)    Stage 2 Mild CKD (GFR = 60-89 mL/min/1 73 square meters)    Stage 3A Moderate CKD (GFR = 45-59 mL/min/1 73 square meters)    Stage 3B Moderate CKD (GFR = 30-44 mL/min/1 73 square meters)    Stage 4 Severe CKD (GFR = 15-29 mL/min/1 73 square meters)    Stage 5 End Stage CKD (GFR <15 mL/min/1 73 square meters)  Note: GFR calculation is accurate only with a steady state creatinine    Protime-INR [[id number]]  (Abnormal) Collected: 02/06/21 2300    Lab Status: Final result Specimen: Blood from Arm, Right Updated: 02/06/21 2335     Protime 17 1 seconds      INR 1 42    APTT [[id number]]  (Normal) Collected: 02/06/21 2300    Lab Status: Final result Specimen: Blood from Arm, Right Updated: 02/06/21 2335     PTT 32 seconds     Blood culture #1 [[id number]] Collected: 02/06/21 2300    Lab Status: In process Specimen: Blood from Arm, Right Updated: 02/06/21 2311    Blood culture #2 [[id number]] Collected: 02/06/21 2302    Lab Status: In process Specimen: Blood from Arm, Left Updated: 02/06/21 2310                 CT head without contrast   Final Result by Emma Rodriguez DO (02/07 9271)      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 overlying the left frontal lobe greatest width measuring 9 mm  No midline shift               I personally discussed this study with [address] [place] [address] on 2/7/2021 at 12:34 AM                            Workstation performed: [id number]         CT cervical spine without contrast Final Result by Dimitris Kenyon DO (02/07 9274)      No cervical spine fracture or traumatic malalignment  Workstation performed: [id number]         CT chest abdomen pelvis w contrast   Final Result by Dimitris Kenyon DO (02/07 5942)   Extremely limited study due to severe motion artifact      Scattered airspace opacities within the periphery of the lung of unclear etiology and may represent infection  Marked distention of the rectum with large amount fecal material                   Workstation performed: [id number]                    Procedures  ECG 12 Lead Documentation Only    Date/Time: 2/6/2021 11:11 PM  Performed by: Margaret Jacobs DO  Authorized by: Margaret Jacobs DO     Indications / Diagnosis:  S/p unwitnessed fall - no complaints  Patient location:  ED  Previous ECG:     Previous ECG:  Compared to current    Similarity:  No change  Interpretation:     Interpretation: non-specific    Rate:     ECG rate:  125    ECG rate assessment: tachycardic    Rhythm:     Rhythm: sinus rhythm and sinus tachycardia    Ectopy:     Ectopy: none    QRS:     QRS axis:  Normal    QRS intervals:  Normal  Conduction:     Conduction: normal    ST segments:     ST segments:  Normal  T waves:     T waves: normal               ED Course  ED Course as of Feb 07 0209   Sat Feb 06, 2021   2247 Pt seen and examined  81 yo female with unwitnessed fall at [facility] [place], operated by [facility]  Pt is alert and oriented, + eliquis  Currently denies any pain but told NH and EMS she had back pain and mentioned L hip pain to tech partner  Small skin tear L forearm  Pt tachycardic and hypotensive - EF 70% July 2020  Will give 500cc IVF bolus and check labs, EKG, trauma CT scans  2322 9 6/31 1 was 11 3/37 2 three weeks ago  Aj bag all blood (had intact aj with blood in bag on arrival - was replaced here)  2343 INR 1 42, trop 0 02  Pt taken for CT scans          Benjamín Tellez Feb 07, 2021   0026 + UTI from urine obtained with new aj - cx sent, will start IV ceftriaxone  95/52 - another 500cc IVF  1274 CT csp - No cervical spine fracture or traumatic malalignment  6146 CT c/a/p - Extremely limited study due to severe motion artifact  Scattered airspace opacities within the periphery of the lung of unclear etiology and may represent infection  Marked distention of the rectum with large amount fecal material     Lung findings likely remnant of COVID which she had in Dec 2020        0055 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 overlying the left frontal lobe greatest width measuring 9 mm  No midline shift       0105 Lengthy discussion with pts son Karina Valenzuela - pt is DNR/DNI, he is unable to tell me at this time whether he would want surgery to release pressure  Will d/w Trauma  0107 97/54, , IVF infusing  0118 Discussed with Trauma Dr Blair Jefferson - accepts as trauma transfer  0206 BP and HR improved, lactate 0 6  EMS giving 500cc isolyte and transporting on cardiac monitor  SBIRT 20yo+      Most Recent Value   SBIRT (22 yo +)   In order to provide better care to our patients, we are screening all of our patients for alcohol and drug use  Would it be okay to ask you these screening questions? Yes Filed at: 02/06/2021 2311   Initial Alcohol Screen: US AUDIT-C    1  How often do you have a drink containing alcohol?  0 Filed at: 02/06/2021 2311   2  How many drinks containing alcohol do you have on a typical day you are drinking? 0 Filed at: 02/06/2021 2311   3b  FEMALE Any Age, or MALE 65+: How often do you have 4 or more drinks on one occassion? 0 Filed at: 02/06/2021 2311   Audit-C Score  0 Filed at: 02/06/2021 2311   VENKAT: How many times in the past year have you    Used an illegal drug or used a prescription medication for non-medical reasons?   Never Filed at: 02/06/2021 2311                    [facility]    Disposition  Final diagnoses:   Acute on chronic intracranial subdural hematoma ([facility] [address] ) Skin tear of forearm without complication   Unwitnessed fall   UTI (urinary tract infection)   Hypotension   Tachycardia     Time reflects when diagnosis was documented in both MDM as applicable and the Disposition within this note     Time User Action Codes Description Comment    2/7/2021  1:19 AM Pamla Pee Add [U68  XXXA] Fall     2/7/2021  1:19 AM Charlett Haymaker M Add [I62 01,  I62 03] Acute on chronic intracranial subdural hematoma (HCC)     2/7/2021  1:19 AM Charlett Haymaker M Add [I62 332C] Skin tear of forearm without complication     2/5/4940  1:19 AM Charlett Haymaker M Add [R29 6] Unwitnessed fall     2/7/2021  1:19 AM Pamla Pee Modify [I62 01,  I62 03] Acute on chronic intracranial subdural hematoma (HCC)     2/7/2021  1:19 AM Charlett Haymaker M Remove [E34  XXXA] Fall     2/7/2021  1:19 AM Charlett Haymaker M Add [N39 0] UTI (urinary tract infection)     2/7/2021  1:19 AM Charlett Haymaker M Add [I95 9] Hypotension     2/7/2021  1:19 AM Charlett Haymaker M Add [R00 0] Tachycardia       ED Disposition     ED Disposition Condition Date/Time Comment    Transfer to Another Facility-In Network  [place] Feb 7, 2021  1:19 AM Tad Montague should be transferred out to [facility] Trauma          MD Documentation      Most Recent Value   Patient Condition  The patient has been stabilized such that within reasonable medical probability, no material deterioration of the patient condition or the condition of the unborn child(chinedu) is likely to result from the transfer   Reason for Transfer  Level of Care needed not available at this facility   Benefits of Transfer  Specialized equipment and/or services available at the receiving facility (Include comment)________________________ TEDDY Pichardo]   Risks of Transfer  Potential deterioration of medical condition   Accepting Physician  [address] Name, [address]    (Name & Tel number)  Genevieve Robertson MD  [facility]   Provider Certification Risk of worsening condition      RN Documentation      Most Recent Value   Accepting Facility Name, [facility]   SLB    (Name & Tel number)  Kenneth Mickey   Level of Care  Advanced life support      Follow-up Information    None         Patient's Medications   Discharge Prescriptions    No medications on file     No discharge procedures on file      PDMP Review     None          ED Provider  Electronically Signed by           Moses Montague DO  02/07/21 0203

## 2021-02-07 NOTE — ASSESSMENT & PLAN NOTE
Neurosurgery consulted and following patient  Easily distracted  Confused to time and place, is aware of who she is and her  and children  Will follow 1 step command until frustrated then tells you to leave and stop confusing her

## 2021-02-07 NOTE — ASSESSMENT & PLAN NOTE
· CT cervical spine wo 2/6/21:  Age-related moderate multilevel cervical degenerative changes  No fracture or traumatic malalignment appreciated  · CT chest abd pelvis w 2/6/21:  Limited secondary to motion artifact  No obvious evidence of spinal fracture

## 2021-02-07 NOTE — PROGRESS NOTES
Progress Note - Abbie Patton 1935, 80 y o  female MRN: [id number]    Unit/Bed#: ED 23 Encounter: [id number]    Primary Care Provider: dA Brown MD   Date and time admitted to hospital: 2/7/2021  2:38 AM        Sepsis due to urinary tract infection [facility])  Assessment & Plan  Louie catheter in place  Monitor I/O    [address] and unaware of where she is  Easily agitated    Constipation  Assessment & Plan  Large fecal stool ball on scan  Pateint actively receiving mineral enemas  Asking for something to drink       * Subdural hematoma [facility])  Assessment & Plan  Neurosurgery consulted and following patient  Easily distracted  Confused to time and place, is aware of who she is and her  and children  Will follow 1 step command until frustrated then tells you to leave and stop confusing her        TERTIARY TRAUMA SURVEY NOTE    Prophylaxis: Sequential compression device [facility])     Disposition: case management and therapy consulted    Code status:  Level 3 - DNAR and DNI    Consultants: Neurosurgery, geriatrics    Is the patient 65 years or older?: YES:    1  Before the illness or injury that brought you to the Emergency, did you need someone to help you on a regular basis? 1=Yes   2  Since the illness or injury that brought you to the Emergency, have you needed more help than usual to take care of yourself? 1=Yes   3  Have you been hospitalized for one or more nights during the past 6 months (excluding a stay in the Emergency Department)? 0=No   4  In general, do you see well? 0=Yes   5  In general, do you have serious problems with your memory? 1=Yes   6  Do you take more than three different medications everyday?  1=Yes   TOTAL   4     Did you order a geriatric consult if the score was 2 or greater?: yes          SUBJECTIVE:     Transfer from: Nursing facility  Outside Films Received: no  Tertiary Exam Due on: 2/7/21    Mechanism of Injury:Fall    Details related to Injury: +LOC:  unknown    Chief Complaint: wants to go home    HPI/Last 24 hour events: admitted to trauma, frequent enemas, Neurosurgery consult    Active medications:           Current Facility-Administered Medications:     bisacodyl (DULCOLAX) rectal suppository 10 mg, 10 mg, Rectal, Daily    [START ON 2/8/2021] ceftriaxone (ROCEPHIN) 1 g/50 mL in dextrose IVPB, 1,000 mg, Intravenous, Q24H    lactated ringers infusion, 75 mL/hr, Intravenous, Continuous, 75 mL/hr at 02/07/21 0622    lamoTRIgine (LaMICtal) tablet 200 mg, 200 mg, Oral, Daily Before Breakfast    levETIRAcetam (KEPPRA) 500 mg in sodium chloride 0 9 % 100 mL IVPB, 500 mg, Intravenous, Q12H CARSON, 500 mg at 02/07/21 0416    mineral oil enema 1 enema, 1 enema, Rectal, TID, 1 enema at 02/07/21 0416    pantoprazole (PROTONIX) injection 40 mg, 40 mg, Intravenous, Q24H CARSON, 40 mg at 02/07/21 0432    QUEtiapine (SEROquel) tablet 400 mg, 400 mg, Oral, HS    senna-docusate sodium (SENOKOT S) 8 6-50 mg per tablet 2 tablet, 2 tablet, Oral, BID PRN    topiramate (TOPAMAX) tablet 50 mg, 50 mg, Oral, BID    Current Outpatient Medications:     acetaminophen (TYLENOL) 325 mg tablet    apixaban (ELIQUIS) 2 5 mg    atorvastatin (LIPITOR) 40 mg tablet    bisacodyl (Biscolax) 10 mg suppository    clonazePAM (KlonoPIN) 0 5 mg tablet    esomeprazole (NexIUM) 40 mg packet    estradiol (ESTRACE VAGINAL) 0 1 mg/g vaginal cream    famotidine (PEPCID) 20 mg tablet    ferrous sulfate 325 (65 Fe) mg tablet    hydrocortisone 2 5 % cream    lamoTRIgine (LaMICtal) 200 MG tablet    lidocaine (LMX) 4 % cream    pantoprazole (PROTONIX) 40 mg tablet    predniSONE 20 mg tablet    QUEtiapine (SEROquel) 200 mg tablet    Sennosides-Docusate Sodium (SENNA-PLUS PO)    topiramate (TOPAMAX) 50 MG tablet      OBJECTIVE:     Vitals:   Vitals:    02/07/21 1200   BP: 118/63   Pulse: 104   Resp: (!) 29   SpO2: 96%       Physical Exam:   GENERAL APPEARANCE: easily agitated  NEURO: GCS - 14  HEENT: Eom's intact  CV: RRR, no complaint of chest pain  LUNGS: bascially CTA bilaterally  GI: hungry, diet ordered  : Louie catheter intact  MSK: moving extremities  SKIN: warm and dry    I/O:   I/O     None          Invasive Devices: Invasive Devices     Peripheral Intravenous Line            Peripheral IV 02/06/21 Right Antecubital less than 1 day          Drain            Urethral Catheter Temperature probe 16 Fr  less than 1 day                  Imaging:   Ct Head Wo Contrast    Result Date: 2/7/2021  Impression: Acute bilateral subdural hemorrhages are larger and more hyperdense compared with February 6, 2021  There remains no midline shift  Close clinical correlation and follow-up is recommended  I personally discussed this study with [facility] on 2/7/2021 at 8:19 AM  Workstation performed: [id number]     Ct Head Without Contrast    Result Date: 2/7/2021  Impression: 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 overlying the left frontal lobe greatest width measuring 9 mm  No midline shift  I personally discussed this study with [address] [place] [address] on 2/7/2021 at 12:34 AM  Workstation performed: [id number]     Ct Cervical Spine Without Contrast    Result Date: 2/7/2021  Impression: No cervical spine fracture or traumatic malalignment  Workstation performed: [id number]     Ct Chest Abdomen Pelvis W Contrast    Result Date: 2/7/2021  Impression: Extremely limited study due to severe motion artifact Scattered airspace opacities within the periphery of the lung of unclear etiology and may represent infection  Marked distention of the rectum with large amount fecal material  Workstation performed: [id number]       Labs: Results for Amber Salazar (MRN [id number]) as of 2/7/2021 13:21   Ref   Range 2/7/2021 01:21   LACTIC ACID Latest Ref Range: 0 5 - 2 0 mmol/L 0 6     Am labs ordered for tomorrow

## 2021-02-07 NOTE — ED NOTES
Pt refused blood work stated she was resting and for me to go away now      Erika Fitzgerald, JAIDA  02/07/21 9799

## 2021-02-07 NOTE — ASSESSMENT & PLAN NOTE
Large fecal stool ball on scan  Pateint actively receiving mineral enemas  Asking for something to drink

## 2021-02-07 NOTE — ED NOTES
Pt placed on bedpan on arrival to ED and left on bedpan for about 15 minutes  Pt was encouraged to come off of bedpan and educated that it will cause skin breakdown along with many other problems  Pt appeared agitated and refused to come off bedpan stating "I told that other girl, I know my own body " Trauma attending in room during this conversation       Yury Zavala RN  02/07/21 6811

## 2021-02-08 ENCOUNTER — DOCUMENTATION (OUTPATIENT)
Dept: FAMILY MEDICINE CLINIC | Facility: CLINIC | Age: 86
End: 2021-02-08

## 2021-02-08 ENCOUNTER — TRANSCRIBE ORDERS (OUTPATIENT)
Dept: RADIOLOGY | Facility: HOSPITAL | Age: 86
End: 2021-02-08

## 2021-02-08 ENCOUNTER — APPOINTMENT (INPATIENT)
Dept: RADIOLOGY | Facility: HOSPITAL | Age: 86
DRG: 085 | End: 2021-02-08
Payer: COMMERCIAL

## 2021-02-08 DIAGNOSIS — R53.81 PHYSICAL DECONDITIONING: ICD-10-CM

## 2021-02-08 DIAGNOSIS — R31.9 HEMATURIA, UNSPECIFIED TYPE: ICD-10-CM

## 2021-02-08 DIAGNOSIS — R13.12 OROPHARYNGEAL DYSPHAGIA: Primary | ICD-10-CM

## 2021-02-08 DIAGNOSIS — F41.1 ANXIETY STATE: ICD-10-CM

## 2021-02-08 DIAGNOSIS — K59.00 CONSTIPATION, UNSPECIFIED CONSTIPATION TYPE: Primary | ICD-10-CM

## 2021-02-08 PROBLEM — R19.7 DIARRHEA: Status: RESOLVED | Noted: 2018-08-08 | Resolved: 2021-02-08

## 2021-02-08 LAB
ANION GAP SERPL CALCULATED.3IONS-SCNC: 4 MMOL/L (ref 4–13)
ATRIAL RATE: 116 BPM
ATRIAL RATE: 116 BPM
ATRIAL RATE: 141 BPM
BACTERIA UR CULT: NORMAL
BASOPHILS # BLD AUTO: 0.02 THOUSANDS/ΜL (ref 0–0.1)
BASOPHILS NFR BLD AUTO: 0 % (ref 0–1)
BUN SERPL-MCNC: 14 MG/DL (ref 5–25)
CALCIUM SERPL-MCNC: 8.7 MG/DL (ref 8.3–10.1)
CHLORIDE SERPL-SCNC: 110 MMOL/L (ref 100–108)
CO2 SERPL-SCNC: 29 MMOL/L (ref 21–32)
CREAT SERPL-MCNC: 0.5 MG/DL (ref 0.6–1.3)
EOSINOPHIL # BLD AUTO: 0.04 THOUSAND/ΜL (ref 0–0.61)
EOSINOPHIL NFR BLD AUTO: 1 % (ref 0–6)
ERYTHROCYTE [DISTWIDTH] IN BLOOD BY AUTOMATED COUNT: 15.7 % (ref 11.6–15.1)
GFR SERPL CREATININE-BSD FRML MDRD: 88 ML/MIN/1.73SQ M
GLUCOSE SERPL-MCNC: 73 MG/DL (ref 65–140)
HCT VFR BLD AUTO: 31.8 % (ref 34.8–46.1)
HGB BLD-MCNC: 9.7 G/DL (ref 11.5–15.4)
IMM GRANULOCYTES # BLD AUTO: 0.01 THOUSAND/UL (ref 0–0.2)
IMM GRANULOCYTES NFR BLD AUTO: 0 % (ref 0–2)
LYMPHOCYTES # BLD AUTO: 1.17 THOUSANDS/ΜL (ref 0.6–4.47)
LYMPHOCYTES NFR BLD AUTO: 20 % (ref 14–44)
MCH RBC QN AUTO: 31.3 PG (ref 26.8–34.3)
MCHC RBC AUTO-ENTMCNC: 30.5 G/DL (ref 31.4–37.4)
MCV RBC AUTO: 103 FL (ref 82–98)
MONOCYTES # BLD AUTO: 0.49 THOUSAND/ΜL (ref 0.17–1.22)
MONOCYTES NFR BLD AUTO: 9 % (ref 4–12)
NEUTROPHILS # BLD AUTO: 4.04 THOUSANDS/ΜL (ref 1.85–7.62)
NEUTS SEG NFR BLD AUTO: 70 % (ref 43–75)
NRBC BLD AUTO-RTO: 0 /100 WBCS
P AXIS: 51 DEGREES
PLATELET # BLD AUTO: 440 THOUSANDS/UL (ref 149–390)
PMV BLD AUTO: 9.4 FL (ref 8.9–12.7)
POTASSIUM SERPL-SCNC: 3.4 MMOL/L (ref 3.5–5.3)
PR INTERVAL: 170 MS
QRS AXIS: 3 DEGREES
QRS AXIS: 4 DEGREES
QRS AXIS: 7 DEGREES
QRSD INTERVAL: 80 MS
QRSD INTERVAL: 82 MS
QRSD INTERVAL: 84 MS
QT INTERVAL: 320 MS
QT INTERVAL: 322 MS
QT INTERVAL: 324 MS
QTC INTERVAL: 444 MS
QTC INTERVAL: 446 MS
QTC INTERVAL: 455 MS
RBC # BLD AUTO: 3.1 MILLION/UL (ref 3.81–5.12)
SODIUM SERPL-SCNC: 143 MMOL/L (ref 136–145)
T WAVE AXIS: 46 DEGREES
T WAVE AXIS: 52 DEGREES
T WAVE AXIS: 53 DEGREES
VENTRICULAR RATE: 114 BPM
VENTRICULAR RATE: 116 BPM
VENTRICULAR RATE: 120 BPM
WBC # BLD AUTO: 5.77 THOUSAND/UL (ref 4.31–10.16)

## 2021-02-08 PROCEDURE — 97167 OT EVAL HIGH COMPLEX 60 MIN: CPT

## 2021-02-08 PROCEDURE — C9113 INJ PANTOPRAZOLE SODIUM, VIA: HCPCS | Performed by: SURGERY

## 2021-02-08 PROCEDURE — 92610 EVALUATE SWALLOWING FUNCTION: CPT

## 2021-02-08 PROCEDURE — 93010 ELECTROCARDIOGRAM REPORT: CPT | Performed by: INTERNAL MEDICINE

## 2021-02-08 PROCEDURE — 99223 1ST HOSP IP/OBS HIGH 75: CPT | Performed by: INTERNAL MEDICINE

## 2021-02-08 PROCEDURE — 99233 SBSQ HOSP IP/OBS HIGH 50: CPT | Performed by: SURGERY

## 2021-02-08 PROCEDURE — NC001 PR NO CHARGE: Performed by: INTERNAL MEDICINE

## 2021-02-08 PROCEDURE — 85025 COMPLETE CBC W/AUTO DIFF WBC: CPT | Performed by: NURSE PRACTITIONER

## 2021-02-08 PROCEDURE — 70450 CT HEAD/BRAIN W/O DYE: CPT

## 2021-02-08 PROCEDURE — 99232 SBSQ HOSP IP/OBS MODERATE 35: CPT | Performed by: PHYSICIAN ASSISTANT

## 2021-02-08 PROCEDURE — G1004 CDSM NDSC: HCPCS

## 2021-02-08 PROCEDURE — 80048 BASIC METABOLIC PNL TOTAL CA: CPT | Performed by: NURSE PRACTITIONER

## 2021-02-08 PROCEDURE — 97163 PT EVAL HIGH COMPLEX 45 MIN: CPT

## 2021-02-08 RX ORDER — CLONAZEPAM 0.5 MG/1
0.5 TABLET ORAL 2 TIMES DAILY
Qty: 60 TABLET | Refills: 5 | Status: SHIPPED | OUTPATIENT
Start: 2021-02-08 | End: 2021-05-04 | Stop reason: SDUPTHER

## 2021-02-08 RX ORDER — BISACODYL 10 MG
10 SUPPOSITORY, RECTAL RECTAL AS NEEDED
Qty: 12 SUPPOSITORY | Refills: 3 | Status: SHIPPED | OUTPATIENT
Start: 2021-02-08 | End: 2021-05-13

## 2021-02-08 RX ORDER — PANTOPRAZOLE SODIUM 40 MG/1
40 TABLET, DELAYED RELEASE ORAL
Status: DISCONTINUED | OUTPATIENT
Start: 2021-02-09 | End: 2021-02-18 | Stop reason: HOSPADM

## 2021-02-08 RX ORDER — ONDANSETRON 2 MG/ML
4 INJECTION INTRAMUSCULAR; INTRAVENOUS EVERY 6 HOURS PRN
Status: DISCONTINUED | OUTPATIENT
Start: 2021-02-08 | End: 2021-02-18 | Stop reason: HOSPADM

## 2021-02-08 RX ADMIN — DOCUSATE SODIUM AND SENNOSIDES 2 TABLET: 8.6; 5 TABLET ORAL at 18:20

## 2021-02-08 RX ADMIN — SODIUM CHLORIDE, SODIUM LACTATE, POTASSIUM CHLORIDE, AND CALCIUM CHLORIDE 75 ML/HR: .6; .31; .03; .02 INJECTION, SOLUTION INTRAVENOUS at 08:10

## 2021-02-08 RX ADMIN — LAMOTRIGINE 200 MG: 100 TABLET ORAL at 08:07

## 2021-02-08 RX ADMIN — QUETIAPINE 400 MG: 100 TABLET, FILM COATED ORAL at 22:03

## 2021-02-08 RX ADMIN — TOPIRAMATE 50 MG: 25 TABLET ORAL at 08:07

## 2021-02-08 RX ADMIN — TOPIRAMATE 50 MG: 25 TABLET ORAL at 17:32

## 2021-02-08 RX ADMIN — ONDANSETRON 4 MG: 2 INJECTION INTRAMUSCULAR; INTRAVENOUS at 18:20

## 2021-02-08 RX ADMIN — LEVETIRACETAM 500 MG: 100 INJECTION, SOLUTION INTRAVENOUS at 08:07

## 2021-02-08 RX ADMIN — LEVETIRACETAM 500 MG: 100 INJECTION, SOLUTION INTRAVENOUS at 22:20

## 2021-02-08 RX ADMIN — CEFTRIAXONE SODIUM 1000 MG: 10 INJECTION, POWDER, FOR SOLUTION INTRAVENOUS at 02:16

## 2021-02-08 RX ADMIN — PANTOPRAZOLE SODIUM 40 MG: 40 INJECTION, POWDER, FOR SOLUTION INTRAVENOUS at 08:07

## 2021-02-08 NOTE — ASSESSMENT & PLAN NOTE
- Continue home medications Lamictal, Seroquel, Topamax   Stop home med Klonopin during admission  because of confusion present on admission

## 2021-02-08 NOTE — PROGRESS NOTES
Progress Note - Scott Sanchez 1935, 80 y o  female MRN: [id number]    Unit/Bed#: [facility] 349-95 Encounter: [id number]    Primary Care Provider: Quincy Trevizo MD   Date and time admitted to hospital: 2/7/2021  2:38 AM        Sepsis due to urinary tract infection [facility])  Assessment & Plan  - Confused, tachycardic, hypotensive on admission  Afebrile, no leukocytosis  - UA positive leuks, nitrites  - Urine culture pending  - Blood cultures positive for gram negative  - GCS 15, no confusion    - Louie catheter in place  - Monitor WBC/ fever curve  - Monitor I/O  - IV fluids  - IV Rocephin    Fall  Assessment & Plan  - Patient had an unwitnessed fall at nursing home, arrived to B confused and unaware of where she is, easily agitated  - Injuries as stated  - PT/OT evaluation and treatment    Constipation  Assessment & Plan  - Large fecal burden on trauma imaging  - Bowel regimen including mineral enemas, dulcolax supp successful, patient is stooling as expected  - Continue PRN senokot  - Measure I/O, closely monitor PO intake and hydration status      Bipolar 1 disorder (HCC)  Assessment & Plan  - Continue home medications Lamictal, Seroquel, Topamax  Stop home med Klonopin during admission  because of confusion present on admission    * Subdural hematoma ([facility] [address] )  Assessment & Plan  - CT Head: 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  - Repeat CT Head: Acute bilateral subdural hemorrhages are larger and more hyperdense   - HOT Protocol  - Appreciate neurosurgery evaluation and recommendations  - Repeat CT head stat if GCS declines more than 2 points in 1 hour  - Pt on Eliquis on admission for previous DVT, received Kcentra for reversal    - Keppra x 7 days for seizure prophylaxis  - Follow up NSG in 2 weeks with repeat CT head  - Previously on Eliquis for previous DVT   Eliquis was stopped, may resume after repeat CT head 2/8 if stable  - GCS 15, Alert and oriented x 3  - Q2H Neuro checks  - PT/ OT      TERTIARY TRAUMA SURVEY NOTE    Prophylaxis: Sequential compression device (Venodyne)     Disposition: Continue stepdown level 2, HOT protocol, start Q2H neuro checks  Continue antibiotic management for urosepsis  Neurosurgery signed off, repeat CT Head and start chemical DVT ppx if SDH stable  Continue management for constipation, monitor I/O  Code status:  Level 3 - DNAR and DNI    Consultants: Speech, Gerontology    Is the patient 72 years or older?: YES:    1  Before the illness or injury that brought you to the Emergency, did you need someone to help you on a regular basis? 1=Yes   2  Since the illness or injury that brought you to the Emergency, have you needed more help than usual to take care of yourself? 1=Yes   3  Have you been hospitalized for one or more nights during the past 6 months (excluding a stay in the Emergency Department)? 1=Yes   4  In general, do you see well? 1=No   5  In general, do you have serious problems with your memory? 0=No   6  Do you take more than three different medications everyday? 1=Yes   TOTAL   5     Did you order a geriatric consult if the score was 2 or greater?: yes          SUBJECTIVE:     Transfer from: n/a  Outside Films Received: not applicable  Tertiary Exam Due on: 2/8/2021    Mechanism of Injury:Fall    Details related to Injury: +LOC:  unknown    Chief Complaint: "I feel better"    HPI/Last 24 hour events: Patient admits that she feels better today after feeling confused yesterday  She admits that she has some dizziness with standing, and that she was having difficulty swallowing with breakfast  She states that she has had three bowel movements today and is having some abdominal pain because of it  She denies headaches, changes in vision, numbness, tingling, chest pain, shortness of breath, nausea, vomiting, bloody or black stools        Active medications:           Current Facility-Administered Medications:     bisacodyl (DULCOLAX) rectal suppository 10 mg, 10 mg, Rectal, Daily    ceftriaxone (ROCEPHIN) 1 g/50 mL in dextrose IVPB, 1,000 mg, Intravenous, Q24H, 1,000 mg at 02/08/21 0216    lactated ringers infusion, 75 mL/hr, Intravenous, Continuous, 75 mL/hr at 02/08/21 0810    lamoTRIgine (LaMICtal) tablet 200 mg, 200 mg, Oral, Daily Before Breakfast, 200 mg at 02/08/21 0807    levETIRAcetam (KEPPRA) 500 mg in sodium chloride 0 9 % 100 mL IVPB, 500 mg, Intravenous, Q12H CARSON, 500 mg at 02/08/21 0807    mineral oil enema 1 enema, 1 enema, Rectal, TID, 1 enema at 02/07/21 2338    [START ON 2/9/2021] pantoprazole (PROTONIX) EC tablet 40 mg, 40 mg, Oral, Early Morning    QUEtiapine (SEROquel) tablet 400 mg, 400 mg, Oral, HS    senna-docusate sodium (SENOKOT S) 8 6-50 mg per tablet 2 tablet, 2 tablet, Oral, BID PRN    topiramate (TOPAMAX) tablet 50 mg, 50 mg, Oral, BID, 50 mg at 02/08/21 0807      OBJECTIVE:     Vitals:   Vitals:    02/08/21 1113   BP: 99/64   Pulse: 65   Resp: 16   Temp:    SpO2: 95%       Physical Exam:   GENERAL APPEARANCE: No acute distress, alert and oriented x 3  NEURO: GCS 15, no focal deficits, neurovascularly intact  HEENT: Normocephalic, atraumatic, EOMI, PERRLA  CV: Regular heart rate and rhythm, no murmurs, rubs, or gallops  LUNGS: CTAB, breathing effort normal  GI: Bowel sounds normal in all quadrants, mild tenderness is LLQ, non distended  No ecchymosis or deformities  : Louie catheter in place  MSK: ROM and motor function intact, decreased strength  No lower extremity edema or erythema  SKIN: Pink, warm, dry    I/O:   I/O       02/06 0701 - 02/07 0700 02/07 0701 - 02/08 0700 02/08 0701 - 02/09 0700    I V  (mL/kg)   1000 (20)    IV Piggyback 600      Total Intake(mL/kg) 600  1000 (20)    Urine (mL/kg/hr)  1750 (1 5)     Stool  0     Total Output  1750     Net +600 -1750 +1000           Unmeasured Stool Occurrence  3 x           Invasive Devices:    Invasive Devices     Peripheral Intravenous Line            Peripheral IV 02/06/21 Right Antecubital 1 day          Drain            Urethral Catheter Temperature probe 16 Fr  1 day                  Imaging:   Ct Head Wo Contrast    Result Date: 2/7/2021  Impression: Acute bilateral subdural hemorrhages are larger and more hyperdense compared with February 6, 2021  There remains no midline shift  Close clinical correlation and follow-up is recommended  I personally discussed this study with Johnson Bussing on 2/7/2021 at 8:19 AM  Workstation performed: [id number]     Ct Head Without Contrast    Result Date: 2/7/2021  Impression: 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 overlying the left frontal lobe greatest width measuring 9 mm  No midline shift  I personally discussed this study with [address] [place] [address] on 2/7/2021 at 12:34 AM  Workstation performed: [id number]     Ct Cervical Spine Without Contrast    Result Date: 2/7/2021  Impression: No cervical spine fracture or traumatic malalignment  Workstation performed: [id number]     Ct Chest Abdomen Pelvis W Contrast    Result Date: 2/7/2021  Impression: Extremely limited study due to severe motion artifact Scattered airspace opacities within the periphery of the lung of unclear etiology and may represent infection   Marked distention of the rectum with large amount fecal material  Workstation performed: [id number]       Labs:   CBC:   Lab Results   Component Value Date    WBC 5 77 02/08/2021    HGB 9 7 (L) 02/08/2021    HCT 31 8 (L) 02/08/2021     (H) 02/08/2021     (H) 02/08/2021    MCH 31 3 02/08/2021    MCHC 30 5 (L) 02/08/2021    RDW 15 7 (H) 02/08/2021    MPV 9 4 02/08/2021    NRBC 0 02/08/2021     CMP:   Lab Results   Component Value Date     (H) 02/08/2021    CO2 29 02/08/2021    BUN 14 02/08/2021    CREATININE 0 50 (L) 02/08/2021    CALCIUM 8 7 02/08/2021    EGFR 88 02/08/2021     Urinalysis: No results found for: [facility], [facility], [address] [facility], [address] [facility], [place], [address] [facility], [address], [place]

## 2021-02-08 NOTE — ASSESSMENT & PLAN NOTE
- Continue home medications Lamictal, Seroquel   Stop home med Klonopin during admission  because of confusion present on admission

## 2021-02-08 NOTE — PLAN OF CARE
Problem: PHYSICAL THERAPY ADULT  Goal: Performs mobility at highest level of function for planned discharge setting  See evaluation for individualized goals  Description: Treatment/Interventions: ADL retraining, Functional transfer training, LE strengthening/ROM, Therapeutic exercise, Endurance training, Cognitive reorientation, Patient/family training, Equipment eval/education, Bed mobility, Gait training, Spoke to nursing, OT  Equipment Recommended: Walker(RW)       See flowsheet documentation for full assessment, interventions and recommendations  Note: Prognosis: Fair  Problem List: Decreased strength, Decreased endurance, Impaired balance, Decreased mobility, Decreased coordination, Impaired judgement, Decreased cognition, Decreased safety awareness, Impaired vision, Impaired hearing  Assessment: Pt is a 80 y o  female admitted to [facility] on 2/6/2021 s/p unwitnessed fall at nursing home  Pt had been living at [facility] for 3 days prior to fall and was previously residing at [facility] to receive rehab s/p COVID  Pt received a primary medical dx of subdural hematoma  Neurosurgery does not anticipate intervention and will plan for follow up head CT  Other active problems include sepsis due to UTI  Pt has the following comorbidities which affect her treatment: anemia, anxiety, bipolar 1 disorder, depression, H/O LLE DVT, GERD, Hypertension, overactive bladder, and former smoker as well as personal factors including decreased independence with ADLs and IADLS at nursing home  Pt has a high complexity clinical presentation due to ongoing medical management, extensive PMH, decreased cognition, decreased mobility compared to baseline, continuous SPO2 monitoring, multiple lines, and decreased activity tolerance  PT was consulted to evaluate pt's functional mobility and discharge needs  Upon entry into room, pt was complaining about her breakfast being stuck in her throat   She demonstrated no SOB and was still communicating clearly  She was instructed to take a few sips of water which resolved her complaints  Upon evaluation, patient required MinAx1 for bed mobility and ModAx1 for sit to stand transfers and ambulation  VC's were required for hand placement and to stand upright with transfers  B knee blocks were required to prevent pt's feet from sliding forward during sit to stand transfers  Upon sitting, pt expressed urgency to use the bathroom  She ambulated 8' with RW and mod Ax1 to bathroom with VC for RW management  She required ModAx1 for toilet transfers with RW and B knee blocks  Upon returning to room, pt expressed fatigue after ambulating 4' and a chair was brought for pt to rest   Pt's functional impairments include: decreased BLE strength, decreased balance, endurance, mobility, coordination, and activity tolerance, decreased cognition, judgement, and safety awareness, and gait deviations  At conclusion of eval, pt remained seated in chair with chair alarm, phone, call bell, and all other personal needs within reach  Pt requested her breakfast tray and upon taking a sip of water, she started coughing  Pt returned to baseline and nurse was made aware; follow up with SLP is suggested  Pt would benefit from skilled PT to address her functional mobility deficits  D/C recommendations are rehab  Barriers to Discharge: None     PT Discharge Recommendation: Post-Acute Rehabilitation Services     PT - OK to Discharge: Yes(to rehab when medically cleared)    See flowsheet documentation for full assessment

## 2021-02-08 NOTE — ASSESSMENT & PLAN NOTE
Left greater than right SDH  · Recurrent falls on Eliquis  · Received Kcentra    Imaging:   · CT head without 2/7/2021:   Acute bilateral subdural hemorrhages are larger and more hyperdense compared with February 6, 2021  There remains no midline shift  Close clinical correlation and follow-up is recommended  Plan:  · Continue monitor neurological exam   Currently GCS is 15 able to follow commands and moving all extremities  · Repeat CT head stat if GCS declines more than 2 points in 1 hour or if pharmacological DVT prophylaxis is warranted given slightly larger collections bilaterally  · Hold all anti-platelet anticoagulation medications  · Patient on Eliquis for history of DVT documented 2008  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

## 2021-02-08 NOTE — CONSULTS
Consultation - Geriatric Medicine   Nelson Ricks 80 y o  female MRN: [id number]  Unit/Bed#: [facility] 636-42 Encounter: [id number]      Assessment/Plan     Ambulatory dysfunction with fall  -pt reports mechanical fall due to rushing to restroom   -admits to head strike, denies loss of consciousness   -at least one other fall in past six months per documentation review although patient denies hx falls  -requires use of walker for ambulation at baseline, encourage use at all appropriate times  -encourage good body mechanics  -assist with all transfers  -keep personal items and call bell close to prevent reaching  -PT/OT consult pending     Left frontal lobe subdural hematoma  -s/p fall at long term care facility  -confirmed on [address] on admission, reportedly larger and more hyperdense on follow-up imaging 2/7/21  -s/p Arlette Gubler as pt maintained on Eliquis as o/p  -AC/AP on hold  -Keppra for 7 days week for seizure ppx   -Neurochecks per protocol  -Nsx following     Sepsis due to UTI  -UA on admission bacteria WBC, nitrites and ketones  -Blood Cx/Ucx pending  -currently on Rocephin day #3 Abx  -continue to encourage hydration and good hygiene, chronic urinary catheter for urinary retention in place    Urinary retention  -chronic, pt with chronic indwelling ja catheter  -continue to encourage good hygiene and catheter care     Constipation  -multifactorial including oral iron and dehydration  -currently on bowel regimen including Senakot-S and mineral oil enema with resulting BM  -consider continuing to hold iron, change to ferrous gluconate if requires continuation of iron therapy as less likely to contribute to constipation than ferrous sulfate  -aj catheter in place for chronic urinary retention which untreated may contribute to constipation     Cognitive impairment   -MCI vs early dementia, 13/30 on MOCA 4/2019  -TSH 1 350 (2019), consider recheck  -B12 5101 (12/20)  -mostly independent for ADLs, dependent for iADLs  -resides at [facility]  -encourage patient to remain physically, socially and cognitively active and engaged to maintain cognitive acuity  -consider rechecking cognitive screening such as MOCA as o/p following recovery from acute hospital stay especially given risk more pronounced cognitive decline following COVID infection which patient had recently     Bipolar I disorder  -home regimen includes clonazepam, lamotrigine, Seroquel, and Topirimate  -continue close o/p f/u with PCP/Psych    Impaired Vision  -recommend use of corrective lenses at all appropriate times, ask family to bring for patient use as impaired vision may contribute to increased risk of falling and delirium in elderly population   -encourage adequate lighting and encourage use of assistance with ambulation  -keep personal belongings close to person to avoid reaching  -encourage appropriate footwear at all times    Impaired Hearing  -Encourage use of hearing aids at all appropriate times  -encourage providers and caregivers to speak slowly and clearly directly to patient  -minimize background noise to encourage patient engagement  -consider use of hearing amplifier to reduce risk of straining to hear if hearing aids not sufficient     Impaired Mastication  -requires use of dentures for mastication, uppers in place at time of evaluation   -also with oropharyngeal dysphagia  -ensure diet appropriate for abilities   -continue aspiration precautions     Deconditioning/debility/frailty  -clinical frailty scale stage 6, moderately frail  -multifactorial including age, recent COVID infection, and multiple chronic medical co-morbidities  -albumin low at 2 8  -encourage well balanced nutrition, consider boost or ensure shakes between meals  -consider nutrition consult  -continue good control chronic medical conditions     Delirium precautions  -Patient is high risk of delirium due to age, UTI, cognitive impairment and hospitalization in frail elderly individual  -Initiate delirium precautions  -maintain normal sleep/wake cycle  -minimize overnight interruptions, group overnight vitals/labs/nursing checks as possible  -dim lights, close blinds and turn off tv to minimize stimulation and encourage sleep environment in evenings  -ensure that pain is well controlled   -monitor for fecal and urinary retention which may precipitate delirium, currently with chronic indwelling catheter for urinary retention and s/p enema on admission for constipation   -encourage early mobilization and ambulation with assistance  -provide frequent reorientation and redirection as indicated and appropriate   -encourage family and friends at the bedside to help help calm patient if anxious  -limit use of medications which may precipitate or worsen delirium such as tramadol, benzodiazepine, anticholinergics, and benadryl, however do not abruptly d/c home medications such as benzodiazepines as may precipitate delirium would need to be tapered if considering d/c as well as discussed with o/p primary pre scriber to ensure continuity of plan  -encourage hydration and nutrition   -redirect unwanted behaviors as first line, avoid physical restraints, use chemical restraint only if all other attempts have been unsuccessful, would consider Zyprexa 2 5mg IM Q, monitor for orthostatic hypotension and QTc prolongation with repeat dosing, recommend lowest dose possible for shortest duration possible, QTc  460 on admission EKG    Home medication review  Personally confirmed with medication list obtained from [facility]:    Acetaminophen 650 mg Q8H  Clonazepam 0 5 mg BID  Eliquis 2 5 mg BID until 2/8/21  Estradiol cream 0 01% insert 0 5 g vaginally at bedtime  Ferrous sulfate 325 mg daily  Lamotrigine 200 mg daily  Nexium oral suspension 40mg daily  Quetiapine 400 mg daily at bedtime  Senna 8 6 mg 2 tabs at bedtime  Topirimate 50 mg BID    Care coordination: rounded at bedside with Norma Szymanski (RN)    History of Present Illness   Physician Requesting Consult: Melody Herzog MD  Reason for Consult / Principal Problem: Fall  Hx and PE limited by: N/A   Additional history obtained from: Patient interview and chart review, outside record review from prior admission at [facility] and current facility [facility]    HPI: Liz Trimble is a 80y o  year old female with bipolar 1 disorder, diverticulosis, ambulatory dysfunction with fall, history of pneumonia due to COVID-19 virus, oropharyngeal dysphagia, GERD, and recurrent urinary retention with cognitive impairment who is admitted to the trauma team as a transfer from [facility] where she presented following unwitnessed fall at [facility] found to have subdural hemorrhage on admission imaging  She is being seen in consultation by Geriatrics for high risk of developing delirium during hospitalization  She is seen and examined at bedside where she is lying resting comfortably  She reports that the fall occurred Saturday at her new care facility when she was rushing to get to the restroom and lost her footing falling into the doorway, she admits to headstrike but denies loss of consciousness  She reports that she was also confused for most of the weekend but feels that this has resolved and she denies memory concerns  She requires use of walker for ambulation at baseline, has glasses for distance which she admits that she does not use frequently, uses dentures and hearing aids  She resides at [facility]  Inpatient consult to Gerontology  Consult performed by: Yadi Portillo DO  Consult ordered by: Abner Jung MD        Review of Systems   Constitutional: Negative for appetite change (poor appetite due to fear of diarrhea), chills and fever  HENT: Positive for dental problem (dentures) and hearing loss (hearing aids)  Negative for trouble swallowing  Eyes: Negative for visual disturbance  Respiratory: Negative for chest tightness, shortness of breath and wheezing  Cardiovascular: Negative for chest pain and palpitations  Gastrointestinal: Positive for abdominal pain (left lower abdomen, intermittent past day), constipation and diarrhea  Negative for nausea and vomiting  Endocrine: Negative  Genitourinary: Negative for dysuria  Musculoskeletal: Positive for gait problem  Skin: Negative  Allergic/Immunologic: Negative  Neurological: Positive for dizziness, weakness and light-headedness  Negative for numbness  Hematological: Bruises/bleeds easily  Psychiatric/Behavioral: Negative for confusion, decreased concentration and sleep disturbance  All other systems reviewed and are negative      Historical Information   Past Medical History:   Diagnosis Date    Anemia 2/26/2019    Anxiety     Bipolar 1 disorder ([facility] )     Depression     DVT (deep venous thrombosis) ([facility] ) 2008    Left leg    GERD (gastroesophageal reflux disease)     Hypertension     Overactive bladder      Past Surgical History:   Procedure Laterality Date    ADENOIDECTOMY      CATARACT EXTRACTION Bilateral     Right 10/2003, left 4/2004    CHOLECYSTECTOMY      DILATION AND CURETTAGE OF UTERUS  1985    HERNIA REPAIR  11/02/2007    Femoral and small bowel resection    HIP SURGERY      L hip    TONSILLECTOMY      As a youth    WRIST SURGERY      L wrist     Social History   Social History     Substance and Sexual Activity   Alcohol Use Not Currently    Alcohol/week: 0 0 standard drinks    Frequency: Never    Drinks per session: Patient refused    Binge frequency: Never     Social History     Substance and Sexual Activity   Drug Use Not Currently     Social History     Tobacco Use   Smoking Status Former Smoker    Packs/day: 1 00    Types: Cigarettes   Smokeless Tobacco Former User   Tobacco Comment    Per [employer]: Heavy tobacco smoker     Family History:   Family History   Problem Relation Age of Onset    Heart disease Father      Meds/Allergies   all current active meds have been reviewed    Allergies   Allergen Reactions    Ethanol     Simvastatin      Objective     Intake/Output Summary (Last 24 hours) at 2/8/2021 4369  Last data filed at 2/8/2021 1306  Gross per 24 hour   Intake 600 ml   Output 1750 ml   Net -1150 ml     Invasive Devices     Peripheral Intravenous Line            Peripheral IV 02/06/21 Right Antecubital 1 day          Drain            Urethral Catheter Temperature probe 16 Fr  1 day              Physical Exam  Vitals signs and nursing note reviewed  Constitutional:       Comments: Thin, frail, cachetic appearing female lying on hospital bed, NAD   HENT:      Head: Normocephalic  Comments: Not wearing glasses at time of evaluation, none at bedside  Right hearing aid in place     Nose: Nose normal       Mouth/Throat:      Mouth: Mucous membranes are dry  Pharynx: Oropharynx is clear  Comments: Upper dentures in place  Eyes:      General: No scleral icterus  Right eye: No discharge  Left eye: No discharge  Conjunctiva/sclera: Conjunctivae normal       Comments: Pupils equal and round   Neck:      Musculoskeletal: Neck supple  Comments: Phonation normal  Cardiovascular:      Rate and Rhythm: Normal rate  Heart sounds: Murmur (Systolic) present  Pulmonary:      Effort: Pulmonary effort is normal  No respiratory distress  Breath sounds: No wheezing or rales  Abdominal:      Tenderness: There is no abdominal tenderness  Comments: Abdomen feels full and firm, mildly nodular left lower abd    Musculoskeletal:      Comments: Severe diffuse subcutaneous fat and muscle wasting   Skin:     General: Skin is warm and dry  Comments: Thin and friable    Neurological:      Mental Status: She is alert and oriented to person, place, and time  Mental status is at baseline     Psychiatric:         Mood and Affect: Mood normal  Behavior: Behavior normal        Lab Results:   I have personally reviewed pertinent lab results including the following:  -sodium 143, potassium 3 4, chloride 110, CO2 29, BUN 14, creatinine 0 5, glucose 73, calcium 8 7, GFR 88  -lactic acid 0 6  -hemoglobin 9 7, hematocrit 31 8, WBC 5 77, platelets 090  -TSH 0 97  -B12 5101  -UA on admission: WBC, nitrites and ketones  -UCx/Blood cx pending    I have personally reviewed the following imaging study reports in PACS:    CT head without contrast 02/07/2021:  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 overlying left frontal lobe greatest measuring 9 mm, no midline shift  CT cervical spine without contrast 02/07/2021:  No cervical spine fracture or traumatic malalignment  CT chest abdomen pelvis with contrast 02/07/2021:  Extremely limited study due to severe motion artifact, scattered a airspace opacities in periphery of lung of unclear etiology, marked distention rectum with large amount fecal material  CT head contrast 2/7/21:  Acute bilateral subdural hemorrhages larger and more hyperdense compared to Feb 6 imaging, no midline shift    Therapies:   PT: Pending  OT: Pending     VTE Prophylaxis: Reason for no pharmacologic prophylaxis SDH    Code Status: Level 3 - DNAR and DNI  Advance Directive and Living Will:      Power of : Yes  POLST:      Family and Social Support:   Living Arrangements: Alone  Support Systems: Family members  Assistance Needed: unknown  Type of Current Residence: Nursing home  Current Home Care Services: No  Freedom of Choice: Yes  CM Handoff Comments: 2/7/21- from [facility]  COVID+ on 12/18/20, fall SDH  transport tbd  d/c tbd      Goals of Care: Stop continuous urge to have BM

## 2021-02-08 NOTE — PHYSICAL THERAPY NOTE
PHYSICAL THERAPY EVALUATION          Patient Name: Talya Nogueira  HBSJL'A Date: 2/8/2021 02/08/21 0908   PT Last Visit   PT Visit Date 02/08/21   Note Type   Note type Evaluation   Pain Assessment   Pain Assessment Tool Pain Assessment not indicated - pt denies pain   Hospital Pain Intervention(s) Repositioned; Ambulation/increased activity   Home Living   Type of Home Assisted living  ([facility] 3days PTA; prior [facility])   Home Layout One level; Able to live on main level with bedroom/bathroom; Performs ADLs on one level  (0STE)   Bathroom Shower/Tub Walk-in shower   Bathroom Toilet Raised   Bathroom Equipment Grab bars in shower; Tub transfer bench   [facility]  (RW)   Additional Comments pt is a poor historian; home set up obtained from case management note; Pt was living in [address] [place] [address] 3 days PTA and previously at [facility] for rehab s/p COVID; pt used  for ambulation PTA per case management note   Prior Function   Level of [place] Needs assistance with IADLs; Needs assistance with ADLs and functional mobility  (Assist for toileting and all IADLS per case management note)   Lives With Facility staff   Receives Help From Other (Comment)  (staff at [address] [place] [address])   ADL Assistance Needs assistance   IADLs Needs assistance   Falls in the last 6 months 5 to 10  (7 as per case management note)   Vocational Retired   Comments pt is a poor historian, need to clarify PLOF   Restrictions/Precautions   Weight Bearing Precautions Per Order No   Other Precautions Cognitive; Chair Alarm; Bed Alarm; Impulsive;Aspiration;Multiple lines;Telemetry; Fall Risk;Hard of hearing;Visual impairment  (Louie, IV pole )   General   Additional Pertinent History Upon entry, pt was coughing on breakfast  She was not SOB and was talking   She was instructed to take sips of water which resolved her complaints  At end of eval, pt was also coughing after drinking liquids  Nurse was informed and will follow up with SLP; pt obsessive over bowels   Cognition   Overall Cognitive Status Impaired   Arousal/Participation Cooperative   Orientation Level Oriented X4   Memory Decreased long term memory;Decreased recall of biographical information;Decreased short term memory;Decreased recall of recent events   Following Commands Follows one step commands with increased time or repetition   Comments pt is pleasant to work with    RLE Assessment   RLE Assessment X   Strength RLE   RLE Overall Strength 3+/5   LLE Assessment   LLE Assessment X   Strength LLE   LLE Overall Strength 3+/5   Bed Mobility   Supine to Sit 4  Minimal assistance   Additional items Assist x 1; Increased time required;LE management   Sit to Supine Unable to assess  (pt in chair at conclusion of PT eval)   Transfers   Sit to Stand 3  Moderate assistance   Additional items Assist x 1; Increased time required;Verbal cues  (SBA B knee blocks; VC for hand placement to ensure safety )   Stand to Sit 3  Moderate assistance   Additional items Assist x 1; Increased time required; Impulsive;Verbal cues   Toilet transfer 3  Moderate assistance   Additional items Assist x 1; Increased time required; Impulsive;Standard toilet;Verbal cues  (SBA B knee blocks; VC for hand placement for safety)   Additional Comments Pt's feet slide forward upon sit to stand transfers; B feet/knee blocks required   Ambulation/Elevation   Gait pattern Improper Weight shift;Decreased foot clearance; Foward flexed; Short stride; Excessively slow   Gait Assistance 3  Moderate assist   Additional items Assist x 1;Verbal cues  (VC's for RW management; SBA for IV pole and safety)   Assistive Device Rolling walker   Distance 8' to bathroom +4' to chair   (pt fatigued on way back to room, chair brought to sit )   Stair Management Assistance Not tested   Balance   Static Sitting Poor +   Static Standing Poor   Ambulatory Poor   Endurance Deficit   Endurance Deficit Yes   Endurance Deficit Description secondary to fatigue    Activity Tolerance   Activity Tolerance Patient limited by fatigue   Medical Staff Made Aware Nicol Narayanan, PT; Sukhi OT; Kinjal Davies OT student   Nurse Made Aware pt appropriate to see and mobilize per nursing   Assessment   Prognosis Fair   Problem List Decreased strength;Decreased endurance; Impaired balance;Decreased mobility; Decreased coordination; Impaired judgement;Decreased cognition;Decreased safety awareness; Impaired vision; Impaired hearing   Assessment Pt is a 80 y o  female admitted to [facility] on 2/6/2021 s/p unwitnessed fall at nursing home  Pt had been living at [facility] for 3 days prior to fall and was previously residing at \[facility]\"" to receive rehab s/p COVID  Pt received a primary medical dx of subdural hematoma  Neurosurgery does not anticipate intervention and will plan for follow up head CT  Other active problems include sepsis due to UTI  Pt has the following comorbidities which affect her treatment: anemia, anxiety, bipolar 1 disorder, depression, H/O LLE DVT, GERD, Hypertension, overactive bladder, and former smoker as well as personal factors including decreased independence with ADLs and IADLS at nursing home  Pt has a high complexity clinical presentation due to ongoing medical management, extensive PMH, decreased cognition, decreased mobility compared to baseline, continuous SPO2 monitoring, multiple lines, and decreased activity tolerance  PT was consulted to evaluate pt's functional mobility and discharge needs  Upon entry into room, pt was complaining about her breakfast being stuck in her throat  She demonstrated no SOB and was still communicating clearly  She was instructed to take a few sips of water which resolved her complaints   Upon evaluation, patient required MinAx1 for bed mobility and ModAx1 for sit to stand transfers and ambulation  VC's were required for hand placement and to stand upright with transfers  B knee blocks were required to prevent pt's feet from sliding forward during sit to stand transfers  Upon sitting, pt expressed urgency to use the bathroom  She ambulated 8' with RW and mod Ax1 to bathroom with VC for RW management  She required ModAx1 for toilet transfers with RW and B knee blocks  Upon returning to room, pt expressed fatigue after ambulating 4' and a chair was brought for pt to rest   Pt's functional impairments include: decreased BLE strength, decreased balance, endurance, mobility, coordination, and activity tolerance, decreased cognition, judgement, and safety awareness, and gait deviations  At conclusion of eval, pt remained seated in chair with chair alarm, phone, call bell, and all other personal needs within reach  Pt requested her breakfast tray and upon taking a sip of water, she started coughing  Pt returned to baseline and nurse was made aware; follow up with SLP is suggested  Pt would benefit from skilled PT to address her functional mobility deficits  D/C recommendations are rehab  Barriers to Discharge None   Goals   Patient Goals to eat breakfast   STG Expiration Date 02/18/21   Short Term Goal #1 In 10 days, pt will: 1) perform bed mobility with Mod I to improve functional mobility and decrease caregiver burden  2) perform transfers with S to promote functional mobility, safety, independence, and QOL  3) ambulate 200' with S and least restrictive device to promote return to nursing home environment  5) improve balance grades by 1/2 to promote safety with functional mobility  6) improve strength grades by 1/2 to promote safety with functional mobility  Plan   Treatment/Interventions ADL retraining;Functional transfer training;LE strengthening/ROM; Therapeutic exercise; Endurance training;Cognitive reorientation;Patient/family training;Equipment eval/education; Bed mobility;Gait training;Spoke to nursing;OT   PT Frequency Other (Comment)  (3-6x/week)   Recommendation   PT Discharge Recommendation Post-Acute Rehabilitation Services   Equipment Recommended Walker  (RW)   PT - OK to Discharge Yes  (to rehab when medically cleared)   AM-PAC Basic Mobility Inpatient   Turning in Bed Without Bedrails 3   Lying on Back to Sitting on Edge of Flat Bed 3   Moving Bed to Chair 2   Standing Up From Chair 2   Walk in Room 2   Climb 3-5 Stairs 2   Basic Mobility Inpatient Raw Score 14   Basic Mobility Standardized Score 35 55   Modified Clinton Scale   Modified Clinton Scale 4   Barthel Index   Feeding 5   Bathing 0   Grooming Score 0   Dressing Score 5   Bladder Score 0   Bowels Score 5   Toilet Use Score 5   Transfers (Bed/Chair) Score 5   Mobility (Level Surface) Score 0   Stairs Score 0   Barthel Index Score 25   Beck Jackson, SPT

## 2021-02-08 NOTE — ASSESSMENT & PLAN NOTE
- Patient had an unwitnessed fall at nursing home, arrived to [facility] confused and unaware of where she is, easily agitated     - Injuries as stated  - PT/OT evaluation and treatment

## 2021-02-08 NOTE — PROGRESS NOTES
Progress Note - Mayo Howell 1935, 80 y o  female MRN: [id number]    Unit/Bed#: [facility] 984-67 Encounter: [id number]    Primary Care Provider: Ken Gupta MD   Date and time admitted to hospital: 2/7/2021  2:38 AM        * Subdural hematoma [facility])  Assessment & Plan  Left greater than right SDH  · Recurrent falls on Eliquis  · Received Kcentra    Imaging:   · CT head without 2/7/2021:   Acute bilateral subdural hemorrhages are larger and more hyperdense compared with February 6, 2021  There remains no midline shift  Close clinical correlation and follow-up is recommended  Plan:  · Continue monitor neurological exam   Currently GCS is 15 able to follow commands and moving all extremities  · Repeat CT head stat if GCS declines more than 2 points in 1 hour or if pharmacological DVT prophylaxis is warranted given slightly larger collections bilaterally  · Hold all anti-platelet anticoagulation medications  · Patient on Eliquis for history of DVT documented 2008  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  Sepsis due to urinary tract infection [facility])  Assessment & Plan  Rocephin per primary team     Fall  Assessment & Plan  · CT cervical spine wo 2/6/21:  Age-related moderate multilevel cervical degenerative changes  No fracture or traumatic malalignment appreciated  · CT chest abd pelvis w 2/6/21:  Limited secondary to motion artifact  No obvious evidence of spinal fracture        Subjective/Objective   Chief Complaint: "I feel dizzy "    Subjective:  Patient states this morning was the 1st time she was up in the chair  She feels dizzy and shaky  Some abdominal pain but no nausea or vomiting  She is eating breakfast currently and states she has been choking on her food slightly and she is trying to eat slower intake smaller bites  She has some intermittent issues in her left hand but states this is old and not related to her most recent fall  She has some urinary complaints however states this has been going on for several years  She otherwise denies any headaches, chest pain or shortness of breath, any new numbness/tingling/weakness  Objective:  Sitting up in chair, eating, NAD    I/O       02/06 0701 - 02/07 0700 02/07 0701 - 02/08 0700 02/08 0701 - 02/09 0700    I V  (mL/kg)   1000 (20)    IV Piggyback 600      Total Intake(mL/kg) 600  1000 (20)    Urine (mL/kg/hr)  1750 (1 5)     Stool  0     Total Output  1750     Net +600 -1750 +1000           Unmeasured Stool Occurrence  3 x           Invasive Devices     Peripheral Intravenous Line            Peripheral IV 02/06/21 Right Antecubital 1 day          Drain            Urethral Catheter Temperature probe 16 Fr  1 day                Physical Exam:  Vitals: Blood pressure 125/77, pulse 86, temperature 98 1 °F (36 7 °C), resp  rate 16, height 5' 4" (1 626 m), weight 49 9 kg (110 lb), SpO2 96 %  ,Body mass index is 18 88 kg/m²        General appearance: alert, appears stated age, cooperative and no distress  Head: Normocephalic, without obvious abnormality, atraumatic  Eyes: conjugate gaze  Neck: supple, symmetrical, trachea midline   Lungs: non labored breathing  Heart: regular heart rate  Neurologic:   Mental status: Alert, oriented x4, follows commands, able to do simple calculations, thought content appropriate  Cranial nerves: grossly intact (Cranial nerves II-XII)  Sensory: normal to LT  Motor: moving all extremities with diffuse mild generalized weakness, 4-4+/5 throughout  Reflexes: no mauricio or clonus noted  Coordination: finger to nose abnormal bilaterally with dysmetria noted, no drift bilaterally      Lab Results:  Results from last 7 days   Lab Units 02/08/21 0449 02/07/21  1524 02/06/21  2300   WBC Thousand/uL 5 77 7 06 8 65   HEMOGLOBIN g/dL 9 7* 8 8* 9 6*   HEMATOCRIT % 31 8* 28 5* 31 1*   PLATELETS Thousands/uL 440* 389 438*   NEUTROS PCT % 70  --  90*   MONOS PCT % 9  --  4     Results from last 7 days   Lab Units 02/08/21  0449 02/07/21  1524 02/06/21  2300   POTASSIUM mmol/L 3 4* 4 4 3 9   CHLORIDE mmol/L 110* 117* 107   CO2 mmol/L 29 27 22   BUN mg/dL 14 20 30*   CREATININE mg/dL 0 50* 0 62 1 13   CALCIUM mg/dL 8 7 8 8 9 0   ALK PHOS U/L  --   --  112   ALT U/L  --   --  18   AST U/L  --   --  30             Results from last 7 days   Lab Units 02/06/21  2300   INR  1 42*   PTT seconds 32       Imaging Studies: I have personally reviewed pertinent reports  and I have personally reviewed pertinent films in PACS    Ct Head Wo Contrast    Result Date: 2/7/2021  Impression: Acute bilateral subdural hemorrhages are larger and more hyperdense compared with February 6, 2021  There remains no midline shift  Close clinical correlation and follow-up is recommended  I personally discussed this study with Jas Albert on 2/7/2021 at 8:19 AM  Workstation performed: [id number]     Ct Head Without Contrast    Result Date: 2/7/2021  Impression: 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 overlying the left frontal lobe greatest width measuring 9 mm  No midline shift  I personally discussed this study with [address] [place] [address] on 2/7/2021 at 12:34 AM  Workstation performed: [id number]     Ct Cervical Spine Without Contrast    Result Date: 2/7/2021  Impression: No cervical spine fracture or traumatic malalignment    Workstation performed: [id number]     Ct Chest Abdomen Pelvis W Contrast    Result Date: 2/7/2021  Impression: Extremely limited study due to severe motion artifact Scattered airspace opacities within the periphery of the lung of unclear etiology and may represent infection  Marked distention of the rectum with large amount fecal material  Workstation performed: [id number]       EKG, Pathology, and Other Studies: I have personally reviewed pertinent reports        VTE Pharmacologic Prophylaxis: Reason for no pharmacologic prophylaxis - SDH    VTE Mechanical Prophylaxis: sequential compression device

## 2021-02-08 NOTE — OCCUPATIONAL THERAPY NOTE
Occupational Therapy Evaluation     Patient Name: Kirit Walker  FMORO'X Date: 2/8/2021  Problem List  Principal Problem:    Subdural hematoma ([facility] [address] )  Active Problems:    Bipolar 1 disorder ([facility] )    Constipation    Fall    Sepsis due to urinary tract infection [facility])    Past Medical History  Past Medical History:   Diagnosis Date    Anemia 2/26/2019    Anxiety     Bipolar 1 disorder ([facility] )     Depression     DVT (deep venous thrombosis) ([facility] ) 2008    Left leg    GERD (gastroesophageal reflux disease)     Hypertension     Overactive bladder      Past Surgical History  Past Surgical History:   Procedure Laterality Date    ADENOIDECTOMY      CATARACT EXTRACTION Bilateral     Right 10/2003, left 4/2004    CHOLECYSTECTOMY      DILATION AND CURETTAGE OF UTERUS  1985    HERNIA REPAIR  11/02/2007    Femoral and small bowel resection    HIP SURGERY      L hip    TONSILLECTOMY      As a youth    WRIST SURGERY      L wrist         02/08/21 0909   OT Last Visit   OT Visit Date 02/08/21   Note Type   Note type Evaluation   Restrictions/Precautions   Weight Bearing Precautions Per Order No   Other Precautions Cognitive; Chair Alarm;Multiple lines;Telemetry; Fall Risk;Pain;Hard of hearing; Impulsive   Pain Assessment   Pain Assessment Tool 0-10   Pain Score No Pain   Hospital Pain Intervention(s) Repositioned; Ambulation/increased activity; Elevated   Home Living   Type of Home Assisted living  ([facility], PREVIOUSLY AT [address] [place] [address])   Home Layout One level; Able to live on main level with bedroom/bathroom   Bathroom Shower/Tub Walk-in shower   Bathroom Toilet Raised   Bathroom Equipment Grab bars in shower; Tub transfer bench   [address]  (RW; PT WAS USING RW AT BASELINE )   Additional Comments PT IS A POOR HISTORIAN, HOME SET UP INFO OBTAINED FROM CM NOTE     Prior Function   Level of [place] Needs assistance with IADLs   Lives With Facility staff   Receives Help From Other (Comment)  (FACILITY STAFF)   ADL Assistance Independent  (NEEDS ASSISTANCE WITH TOILETING)   IADLs Needs assistance   Falls in the last 6 months 5 to 10  (7 PER CM NOTE)   Vocational Retired   Lifestyle   Autonomy PT REPORTS BEING INDEPENDENT [address]  PT REPORTS ARE INCONSISTENT WITH CM NOTE, WHICH REPORTED THAT SHE NEEDED ASSISTANCE WITH TOILETING  Reciprocal Relationships PT RECEIVES ASSISTANCE FROM FACILITY STAFF, AND REPORTS THAT HER CHILDREN LIVE LOCALLY  Service to Others PT REPORTS THAT SHE IS A RETIRED   Intrinsic Gratification PT ENJOYS READING AND WATCHING TV  Psychosocial   Psychosocial (WDL) WDL   Subjective   Subjective "IF I GET UP MORE IS GOING TO COME OUT "  (REGARDING TOILETING)   ADL   Eating Assistance 5  Supervision/Setup   Grooming Assistance 4  Minimal Assistance   UB Bathing Assistance 4  Minimal Assistance   LB Bathing Assistance 2  Maximal Assistance   UB Dressing Assistance 4  Minimal Assistance   LB Dressing Assistance 2  Maximal Assistance   Toileting Assistance  2  Maximal Assistance   Functional Assistance 2  Maximal Assistance   Bed Mobility   Supine to Sit 4  Minimal assistance   Additional items Assist x 1; Increased time required;Verbal cues;HOB elevated;LE management   Sit to Supine Unable to assess  (PT LEFT SEATED IN CHAIR W/CHAIR ALARM AND ALL NEEDS IN REACH)   Transfers   Sit to Stand 3  Moderate assistance   Additional items Assist x 1; Increased time required;Verbal cues  (WITH SBA)   Stand to Sit 3  Moderate assistance   Additional items Assist x 1; Increased time required;Verbal cues  (WITH SBA)   Toilet transfer 3  Moderate assistance   Additional items Assist x 1; Increased time required;Verbal cues  (WITH SBA)   Functional Mobility   Functional Mobility 3  Moderate assistance   Additional Comments + ADDITIONAL SBA FOR LE MANAGEMENT AND SAFETY     Additional items Rolling walker   Balance   Static Sitting Poor +   Static Standing Poor   Ambulatory Poor   Activity Tolerance   Activity Tolerance Patient limited by fatigue   Medical Staff Made Aware JOCE, OT; PALLAVI, PT; SPT   Nurse Made Aware PT APPROPRIATE TO SEE PER NURSING  PT NOTED TO BE COUGHING WITH LIQUIDS AND SOLIDS AT THE BEGINNING OF THE SESSION, RN MADE AWARE  RECOMMEND SPEECH CONSULT  RUE Assessment   RUE Assessment WNL   LUE Assessment   LUE Assessment WNL   Cognition   Overall Cognitive Status Impaired   Arousal/Participation Arousable; Cooperative   Attention Attends with cues to redirect   Orientation Level Oriented X4   Memory Decreased long term memory;Decreased recall of recent events   Following Commands Follows one step commands with increased time or repetition   Comments PT IS PLEASANT AND COOPERATIVE  PT STAYED ON THE TOILET FOR AN EXTENDED PERIOD OF TIME DUE TO OBSESSION WITH URINATION AND BOWEL MOVEMENTS  PT PROVIDED INCONSISTENT INFORMATION REGARDING HOME SET-UP WHEN COMPARED TO CM NOTE  PT WAS IMPULSIVE AND HAD MILD INSIGHT DEFICIT  Assessment   Limitation Decreased ADL status; Decreased Safe judgement during ADL;Decreased cognition;Decreased endurance;Decreased self-care trans;Decreased high-level ADLs   Prognosis Fair   Assessment PT 81 YO SEEN FOR OT EVALUATION [facility] FOLLOWING A FALL RESULTING IN A SUBDURAL HEMATOMA  PT HAS A HISTORY OF BIPOLAR 1 DISORDER, DEPRESSION, ANXIETY, DVT, GERD, HTN, COVID-19, OVERACTIVE BLADDER, AND ANEMIA  PT RECENTLY MOVED INTO [place]'S HOME ASSISTED LIVING AND REPORTS THAT SHE HAS LOCAL FAMILY  PT REPORTS THAT SHE WAS INDEPENDENT WITH ADLS AND NEEDED ASSISTANCE FOR IADLS  HOWEVER, PT'S SON REPORTS THAT SHE NEEDED ASSISTANCE WITH TOILETING  PT CURRENTLY IS MAX A FOR LB ADLS AND MIN A FOR UB ADLS   PT IS MIN A FOR BED MOBILITY, MOD A FOR FUNCTIONAL TRANSFERS, AND MOD A FOR FUNCTIONAL MOBILITY WITH RW  PT LIMITED BY IMPAIRED COGNITION, DECREASED INSIGHT, DECREASED SAFETY AWARENESS, IMPULSIVE, FATIGUE, DECREASED ENDURANCE, TOILETING OBSESSIONS, GENERALIZED WEAKNESS, DECREASED BALANCE, HIGH FALL RISK, AND HARD OF HEARING  PT EDUCATED ON BODY MECHANICS, COMPENSATORY STRATEGIES, AND ENERGY CONSERVATION TECHNIQUES  The patient's raw score on the AM-PAC Daily Activity inpatient short form is 15, standardized score is 34 69, less than 39 4  Patients at this level are likely to benefit from DC to post-acute rehabilitation services  Please refer to the recommendation of the Occupational Therapist for safe DC planning  FROM OT PERSPECTIVE, D/C RECOMMENDATION REHAB  RECOMMEND SPEECH CONSULT DUE TO COUGHING WITH LIQUIDS AND SOLIDS  FOLLOW UP 3-5X/WEEK TO ADDRESS THE FOLLOWING GOALS  Goals   Patient Goals TO GO HOME   LTG Time Frame 10-14   Long Term Goal #1 SEE BELOW   Plan   Treatment Interventions ADL retraining;Functional transfer training; Endurance training;Cognitive reorientation;Patient/family training; Compensatory technique education; Energy conservation; Activityengagement   Goal Expiration Date 02/22/21   OT Frequency 3-5x/wk   Recommendation   Recommendation Speech consult   OT Discharge Recommendation Post-Acute Rehabilitation Services   OT - OK to Discharge Yes   AM-PAC Daily Activity Inpatient   Lower Body Dressing 2   Bathing 2   Toileting 2   Upper Body Dressing 3   Grooming 3   Eating 3   Daily Activity Raw Score 15   Daily Activity Standardized Score (Calc for Raw Score >=11) 34 69   AM-PAC Applied Cognition Inpatient   Following a Speech/Presentation 2   Understanding Ordinary Conversation 3   Taking Medications 2   Remembering Where Things Are Placed or Put Away 2   Remembering List of 4-5 Errands 2   Taking Care of Complicated Tasks 2   Applied Cognition Raw Score 13   Applied Cognition Standardized Score 30 46   Modified Washburn Scale   Modified Lexa Scale 4       OT GOALS:    PT WILL BE ATTENTIVE 100% DURING FORMAL COGNITIVE ASSESSMENT TO IMPROVE UPON D/C PLANNING  PT WILL FOLLOW 3 STEP COMMANDS WITH 100% ACCURACY TO ENGAGE IN ADLS  PT WILL COMPLETE BED MOBILITY AT MOD I LEVEL TO ENGAGE IN ADLS AT EOB  PT WILL COMPLETE FUNCTIONAL TRANSFERS AT MOD I LEVEL TO ENGAGE IN ADLS DEMONSTRATING G BALANCE, SAFETY AWARENESS, AND CARRY OVER OF LEARNED BODY MECHANICS  PT WILL COMPLETE FUNCTIONAL MOBILITY AT MOD I LEVEL TO ENGAGE IN ADLS DEMONSTRATING G BALANCE AND SAFETY AWARENESS  PT WILL COMPLETE ADLS AT MOD I LEVEL SITTING IN CHAIR DEMONSTRATING G BALANCE, SAFETY AWARENESS, AND CARRY OVER OF LEARNED COMPENSATORY STRATEGIES  PT WILL USE JUDGEMENT WITH MIN V C  TO TERMINATE TOILETING ACTIVITY  PT WILL TOLERATE ACTIVITY FOR 30 MIN TO COMPLETE ADLS DEMONSTRATING G CARRY OVER OF LEARNED ENERGY CONSERVATION TECHNIQUES        Mary Burns, OTS

## 2021-02-08 NOTE — ASSESSMENT & PLAN NOTE
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

## 2021-02-08 NOTE — PLAN OF CARE
Problem: OCCUPATIONAL THERAPY ADULT  Goal: Performs self-care activities at highest level of function for planned discharge setting  See evaluation for individualized goals  Description: Treatment Interventions: ADL retraining, Functional transfer training, Endurance training, Cognitive reorientation, Patient/family training, Compensatory technique education, Energy conservation, Activityengagement          See flowsheet documentation for full assessment, interventions and recommendations  Note: Limitation: Decreased ADL status, Decreased Safe judgement during ADL, Decreased cognition, Decreased endurance, Decreased self-care trans, Decreased high-level ADLs  Prognosis: Fair  Assessment: PT 85 YO SEEN FOR OT EVALUATION WAS ADMITTED TO [facility] FROM [facility] FOLLOWING A FALL RESULTING IN A SUBDURAL HEMATOMA  PT HAS A HISTORY OF BIPOLAR 1 DISORDER, DEPRESSION, ANXIETY, DVT, GERD, HTN, COVID-19, OVERACTIVE BLADDER, AND ANEMIA  PT RECENTLY MOVED INTO [place]'S HOME ASSISTED LIVING AND REPORTS THAT SHE HAS LOCAL FAMILY  PT REPORTS THAT SHE WAS INDEPENDENT WITH ADLS AND NEEDED ASSISTANCE FOR IADLS  HOWEVER, PT'S SON REPORTS THAT SHE NEEDED ASSISTANCE WITH TOILETING  PT CURRENTLY IS MAX A FOR LB ADLS AND MIN A FOR UB ADLS  PT IS MIN A FOR BED MOBILITY, MOD A FOR FUNCTIONAL TRANSFERS, AND MOD A FOR FUNCTIONAL MOBILITY WITH RW  PT LIMITED BY IMPAIRED COGNITION, DECREASED INSIGHT, DECREASED SAFETY AWARENESS, IMPULSIVE, FATIGUE, DECREASED ENDURANCE, TOILETING OBSESSIONS, GENERALIZED WEAKNESS, DECREASED BALANCE, HIGH FALL RISK, AND HARD OF HEARING  PT EDUCATED ON BODY MECHANICS, COMPENSATORY STRATEGIES, AND ENERGY CONSERVATION TECHNIQUES  The patient's raw score on the AM-PAC Daily Activity inpatient short form is 15, standardized score is 34 69, less than 39 4  Patients at this level are likely to benefit from DC to post-acute rehabilitation services   Please refer to the recommendation of the Occupational Therapist for safe DC planning  FROM OT PERSPECTIVE, D/C RECOMMENDATION REHAB  RECOMMEND SPEECH CONSULT DUE TO COUGHING WITH LIQUIDS AND SOLIDS  FOLLOW UP 3-5X/WEEK TO ADDRESS THE FOLLOWING GOALS  Recommendation: Speech consult  OT Discharge Recommendation: Post-Acute Rehabilitation Services  OT - OK to Discharge:  Yes

## 2021-02-08 NOTE — CASE MANAGEMENT
CM spoke to pt's son Jolene Alejo regarding d/c plan  Pt is recommended for IP rehab  Cm educated pt's son on SNF  A post acute care recommendation was made by your care team for STR  Discussed Freedom of Choice with caregiver  List of facilities given to caregiver via in person  caregiver aware the list is custom filtered for them by preference  and that [facility] post acute providers are designated  Pt's son would like [facility] [address] , [facility], and then [facility] in that order  CM placed referrals and will follow up

## 2021-02-08 NOTE — PLAN OF CARE
Problem: Potential for Falls  Goal: Patient will remain free of falls  Description: INTERVENTIONS:  - Assess patient frequently for physical needs  -  Identify cognitive and physical deficits and behaviors that affect risk of falls    -  [place] fall precautions as indicated by assessment   - Educate patient/family on patient safety including physical limitations  - Instruct patient to call for assistance with activity based on assessment  - Modify environment to reduce risk of injury  - Consider OT/PT consult to assist with strengthening/mobility  Outcome: Progressing     Problem: Prexisting or High Potential for Compromised Skin Integrity  Goal: Skin integrity is maintained or improved  Description: INTERVENTIONS:  - Identify patients at risk for skin breakdown  - Assess and monitor skin integrity  - Assess and monitor nutrition and hydration status  - Monitor labs   - Assess for incontinence   - Turn and reposition patient  - Assist with mobility/ambulation  - Relieve pressure over bony prominences  - Avoid friction and shearing  - Provide appropriate hygiene as needed including keeping skin clean and dry  - Evaluate need for skin moisturizer/barrier cream  - Collaborate with interdisciplinary team   - Patient/family teaching  - Consider wound care consult   Outcome: Progressing     Problem: INFECTION - ADULT  Goal: Absence or prevention of progression during hospitalization  Description: INTERVENTIONS:  - Assess and monitor for signs and symptoms of infection  - Monitor lab/diagnostic results  - Monitor all insertion sites, i e  indwelling lines, tubes, and drains  - Monitor endotracheal if appropriate and nasal secretions for changes in amount and color  - [place] appropriate cooling/warming therapies per order  - Administer medications as ordered  - Instruct and encourage patient and family to use good hand hygiene technique  - Identify and instruct in appropriate isolation precautions for identified infection/condition  Outcome: Progressing  Goal: Absence of fever/infection during neutropenic period  Description: INTERVENTIONS:  - Monitor WBC    Outcome: Progressing     Problem: DISCHARGE PLANNING  Goal: Discharge to home or other facility with appropriate resources  Description: INTERVENTIONS:  - Identify barriers to discharge w/patient and caregiver  - Arrange for needed discharge resources and transportation as appropriate  - Identify discharge learning needs (meds, wound care, etc )  - Arrange for interpretive services to assist at discharge as needed  - Refer to Case Management Department for coordinating discharge planning if the patient needs post-hospital services based on physician/advanced practitioner order or complex needs related to functional status, cognitive ability, or social support system  Outcome: Progressing     Problem: Knowledge Deficit  Goal: Patient/family/caregiver demonstrates understanding of disease process, treatment plan, medications, and discharge instructions  Description: Complete learning assessment and assess knowledge base  Interventions:  - Provide teaching at level of understanding  - Provide teaching via preferred learning methods  Outcome: Progressing     Problem: CONFUSION/THOUGHT DISTURBANCE  Goal: Thought disturbances (confusion, delirium, depression, dementia or psychosis) are managed to maintain or return to baseline mental status and functional level  Description: INTERVENTIONS:  - Assess for possible contributors to  thought disturbance, including but not limited to medications, infection, impaired vision or hearing, underlying metabolic abnormalities, dehydration, respiratory compromise,  psychiatric diagnoses and notify attending PHYSICAN/AP  - Monitor and intervene to maintain adequate nutrition, hydration, elimination, sleep and activity  - Decrease environmental stimuli, including noise as appropriate    - Provide frequent contacts to provide refocusing, direction and reassurance as needed  Approach patient calmly with eye contact and at their level  - [place] high risk fall precautions, aspiration precautions and other safety measures, as indicated  - If delirium suspected, notify physician/AP of change in condition and request immediate in-person evaluation  - Pursue consults as appropriate including Geriatric (campus dependent), OT for cognitive evaluation/activity planning, psychiatric, pastoral care, etc   Outcome: Progressing     Problem: Nutrition/Hydration-ADULT  Goal: Nutrient/Hydration intake appropriate for improving, restoring or maintaining nutritional needs  Description: Monitor and assess patient's nutrition/hydration status for malnutrition  Collaborate with interdisciplinary team and initiate plan and interventions as ordered  Monitor patient's weight and dietary intake as ordered or per policy  Utilize nutrition screening tool and intervene as necessary  Determine patient's food preferences and provide high-protein, high-caloric foods as appropriate       INTERVENTIONS:  - Monitor oral intake, urinary output, labs, and treatment plans  - Assess nutrition and hydration status and recommend course of action  - Evaluate amount of meals eaten  - Assist patient with eating if necessary   - Allow adequate time for meals  - Recommend/ encourage appropriate diets, oral nutritional supplements, and vitamin/mineral supplements  - Order, calculate, and assess calorie counts as needed  - Recommend, monitor, and adjust tube feedings and TPN/PPN based on assessed needs  - Assess need for intravenous fluids  - Provide specific nutrition/hydration education as appropriate  - Include patient/family/caregiver in decisions related to nutrition  Outcome: Progressing

## 2021-02-08 NOTE — ASSESSMENT & PLAN NOTE
- Confused, tachycardic, hypotensive on admission   Afebrile, no leukocytosis  - UA positive leuks, nitrites  - Urine culture pending  - Blood cultures positive for gram negative  - GCS 15, no confusion    - Louie catheter in place  - Monitor WBC/ fever curve  - Monitor I/O  - IV fluids  - IV Rocephin

## 2021-02-08 NOTE — ASSESSMENT & PLAN NOTE
- Large fecal burden on trauma imaging  - Bowel regimen including mineral enemas, dulcolax supp successful, patient is stooling as expected  - Continue PRN senokot  - Measure I/O, closely monitor PO intake and hydration status

## 2021-02-08 NOTE — PLAN OF CARE
Problem: Potential for Falls  Goal: Patient will remain free of falls  Description: INTERVENTIONS:  - Assess patient frequently for physical needs  -  Identify cognitive and physical deficits and behaviors that affect risk of falls    -  [place] fall precautions as indicated by assessment   - Educate patient/family on patient safety including physical limitations  - Instruct patient to call for assistance with activity based on assessment  - Modify environment to reduce risk of injury  - Consider OT/PT consult to assist with strengthening/mobility  Outcome: Progressing     Problem: Prexisting or High Potential for Compromised Skin Integrity  Goal: Skin integrity is maintained or improved  Description: INTERVENTIONS:  - Identify patients at risk for skin breakdown  - Assess and monitor skin integrity  - Assess and monitor nutrition and hydration status  - Monitor labs   - Assess for incontinence   - Turn and reposition patient  - Assist with mobility/ambulation  - Relieve pressure over bony prominences  - Avoid friction and shearing  - Provide appropriate hygiene as needed including keeping skin clean and dry  - Evaluate need for skin moisturizer/barrier cream  - Collaborate with interdisciplinary team   - Patient/family teaching  - Consider wound care consult   Outcome: Progressing     Problem: INFECTION - ADULT  Goal: Absence or prevention of progression during hospitalization  Description: INTERVENTIONS:  - Assess and monitor for signs and symptoms of infection  - Monitor lab/diagnostic results  - Monitor all insertion sites, i e  indwelling lines, tubes, and drains  - Monitor endotracheal if appropriate and nasal secretions for changes in amount and color  - [place] appropriate cooling/warming therapies per order  - Administer medications as ordered  - Instruct and encourage patient and family to use good hand hygiene technique  - Identify and instruct in appropriate isolation precautions for identified infection/condition  Outcome: Progressing  Goal: Absence of fever/infection during neutropenic period  Description: INTERVENTIONS:  - Monitor WBC    Outcome: Progressing     Problem: DISCHARGE PLANNING  Goal: Discharge to home or other facility with appropriate resources  Description: INTERVENTIONS:  - Identify barriers to discharge w/patient and caregiver  - Arrange for needed discharge resources and transportation as appropriate  - Identify discharge learning needs (meds, wound care, etc )  - Arrange for interpretive services to assist at discharge as needed  - Refer to Case Management Department for coordinating discharge planning if the patient needs post-hospital services based on physician/advanced practitioner order or complex needs related to functional status, cognitive ability, or social support system  Outcome: Progressing     Problem: Knowledge Deficit  Goal: Patient/family/caregiver demonstrates understanding of disease process, treatment plan, medications, and discharge instructions  Description: Complete learning assessment and assess knowledge base  Interventions:  - Provide teaching at level of understanding  - Provide teaching via preferred learning methods  Outcome: Progressing     Problem: CONFUSION/THOUGHT DISTURBANCE  Goal: Thought disturbances (confusion, delirium, depression, dementia or psychosis) are managed to maintain or return to baseline mental status and functional level  Description: INTERVENTIONS:  - Assess for possible contributors to  thought disturbance, including but not limited to medications, infection, impaired vision or hearing, underlying metabolic abnormalities, dehydration, respiratory compromise,  psychiatric diagnoses and notify attending PHYSICAN/AP  - Monitor and intervene to maintain adequate nutrition, hydration, elimination, sleep and activity  - Decrease environmental stimuli, including noise as appropriate    - Provide frequent contacts to provide refocusing, direction and reassurance as needed  Approach patient calmly with eye contact and at their level    - [place] high risk fall precautions, aspiration precautions and other safety measures, as indicated  - If delirium suspected, notify physician/AP of change in condition and request immediate in-person evaluation  - Pursue consults as appropriate including Geriatric (campus dependent), OT for cognitive evaluation/activity planning, psychiatric, pastoral care, etc   Outcome: Progressing

## 2021-02-08 NOTE — SPEECH THERAPY NOTE
Speech Language/Pathology  Speech/Language Pathology  Assessment    Patient Name: Guillermina COTTRELL Date: 2/8/2021     Problem List  Principal Problem:    Subdural hematoma ([facility] [address] )  Active Problems:    Bipolar 1 disorder ([facility] [address] )    Constipation    Fall    Sepsis due to urinary tract infection [facility])    Past Medical History  Past Medical History:   Diagnosis Date    Anemia 2/26/2019    Anxiety     Bipolar 1 disorder ([facility] [address] )     Depression     DVT (deep venous thrombosis) ([facility] ) 2008    Left leg    GERD (gastroesophageal reflux disease)     Hypertension     Overactive bladder      Past Surgical History  Past Surgical History:   Procedure Laterality Date    ADENOIDECTOMY      CATARACT EXTRACTION Bilateral     Right 10/2003, left 4/2004    CHOLECYSTECTOMY      DILATION AND CURETTAGE OF UTERUS  1985    HERNIA REPAIR  11/02/2007    Femoral and small bowel resection    HIP SURGERY      L hip    TONSILLECTOMY      As a youth    WRIST SURGERY      L wrist        Bedside Swallow Evaluation:    Summary:  Pt presents w/ mild oral, suspected mild pharyngeal dysphagia  Oral dysphagia vinicius by mildly prolonged organization and reduced bolus propulsion resulting in lingual residue after transfer  Swallows appear timely, hyolaryngeal elevation was palpated and judged to be WNL  Pt w/ throat clearing x1 w/ thin liquids, wet vocal quality x2 w/ thin liquids  Pt well-tolerated soft solids and nectar thick liquids without overt s/s aspiration or oropharyngeal difficulties  Recommend aspiration precautions, dysphagia diet modifications, and ST f/u  Recommendations:  Diet: Mechanical soft  Liquid:  Nectar  Meds: As tolerated  Supervision: Full   Positioning:Upright  Strategies: Pt to take PO/Meds only when fully alert and upright     Oral care: 3-4x daily   Aspiration precautions  Reflux precautions    Therapy Prognosis: Fair  Prognosis considerations: Age, current medical, past medical   Frequency: As able Goal(s):  Pt will tolerate least restrictive diet w/out s/s aspiration or oral/pharyngeal difficulties  Patient's goal: "I just want a nap"     Consider consult w/:  -    HPI:  80y o  year old female with bipolar 1 disorder, diverticulosis, ambulatory dysfunction with fall, history of pneumonia due to COVID-19 virus, oropharyngeal dysphagia, GERD, and recurrent urinary retention with cognitive impairment who is admitted to the trauma team as a transfer from [facility] where she presented following unwitnessed fall at [facility] found to have subdural hemorrhage on admission imaging  She is being seen in consultation by Geriatrics for high risk of developing delirium during hospitalization  She is seen and examined at bedside where she is lying resting comfortably  She reports that the fall occurred Saturday at her new care facility when she was rushing to get to the restroom and lost her footing falling into the doorway, she admits to headstrike but denies loss of consciousness  She reports that she was also confused for most of the weekend but feels that this has resolved and she denies memory concerns  She requires use of walker for ambulation at baseline, has glasses for distance which she admits that she does not use frequently, uses dentures and hearing aids  [facility] at [facility]  CT Head 2/7/21: Acute bilateral subdural hemorrhages are larger and more hyperdense compared with February 6, 2021  There remains no midline shift  Close clinical correlation and follow-up is recommended      Reason for consult:  R/o aspiration  Determine safest and least restrictive diet  Nursing reported cough w/ PO  h/o dysphagia       Precautions:  -    Current diet:  Puree w/ NTL, switched from regular w/ thin this morning    Premorbid diet[de-identified]  Mechanical soft w/ NTL    Previous VBS:  None on record    O2 requirement:  NC    Voice/Speech:  Strong, clear    Social:  [employer]    Follows commands: Yes                          Cognitive Status:  Awake, alert    Oral [facility] exam:  Dentition: upper and lower dentures, in place   Labial strength and ROM: wfl   Lingual strength and ROM: wfl   Mandibular strength and ROM: wfl   Velum: symmetrical   Secretion management: wfl   Volitional cough: strong   Volitional swallow: +   Oral care: good     Items administered:  Puree, soft solid, hard solid,, nectar thick liquid, thin liquids  Liquids were taken by straw/cup       Oral stage:  Lip closure: complete   Mastication: wfl   Bolus formation: wfl   Bolus control: wfl   Transfer: reduced   Oral residue: mild w/ hard solids   Pocketing: none     Pharyngeal stage:  Swallow promptness: fairly prompt   Laryngeal rise: reduced to palpation   Wet voice: w/ thin   Throat clear: w/ thin   Cough: -  Secondary swallows: -    Audible swallows: w/ all     Esophageal stage:  No s/s reported  H/o GERD    Aspiration precautions posted    Results d/w:  Pt, nursing, physician

## 2021-02-08 NOTE — UTILIZATION REVIEW
Initial Clinical Review    Admission: Date/Time/Statement:   Admission Orders (From admission, onward)     Ordered        02/07/21 0350  INPATIENT ADMISSION  Once                   Orders Placed This Encounter   Procedures    INPATIENT ADMISSION     Standing Status:   Standing     Number of Occurrences:   1     Order Specific Question:   Level of Care     Answer:   Level 2 Stepdown / HOT [14]     Order Specific Question:   Estimated length of stay     Answer:   More than 2 Midnights     Order Specific Question:   Certification     Answer:   I certify that inpatient services are medically necessary for this patient for a duration of greater than two midnights  See H&P and MD Progress Notes for additional information about the patient's course of treatment  ED Arrival Information     Expected Arrival Acuity Means of Arrival Escorted By Service Admission Type    2/7/2021 2/7/2021 02:38 Emergent Ambulance SLETS DEPARTMENT [facility]) Trauma Urgent    Arrival Complaint    fall subdural        Chief Complaint   Patient presents with    Fall     tx from [facility], unwitnessed fall fromving facility w/subarachnoid bleed     Assessment/Plan: 80 y o  female with PMhx of GERD, DVT on Eliquis, bipolar 1, who presents to ED via EMS with unwitnessed fall at her nursing home  Patient denied head stroke or loc, however CT imaging demonstrating acute on chronic left SDH overlying left frontal lobe 9 mm maximal width  No midline shift  CT C-spine negative  CT abdomen demonstrating enlargement of the rectum with large amount of fecal material   Of note prior echocardiogram in 2020 with EFof 70%  Tachycardic  Some confusion, GCS 15  Pt also with urosepsis & started on ceftriaxone IV in ED  Admit inpatient to Lvl2 step down unit under trauma service with SDH, coagulopathy, urosepsis, constipation  Plan: neuro checks q2h  Reverse Eliquis with Kcentra  keppra ppx  Continue ceftriaxone  Mineral oil enema    NSx & gerontology consult  Dysphagia diet with thick liquids  NSx consult -- Plan:  · Continue monitor neurological exam   Currently GCS is 15 able to follow commands and moving all extremities  · Hold all anti-platelet anticoagulation medications  · Patient on Eliquis for history of DVT documented 2008  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  Received Kcentra for reversal   · Keppra x1 week for seizure prophylaxis  · Repeat CT head to ensure stability  · Mobilize with PT/OT  · DVT prophylaxis:  Bilateral SCDs  Defer pharmacological DVT prophylaxis until stable CT head obtained  · No neurosurgical intervention anticipated  Will plan for f/u in 2 weeks with repeat CT head without contrast      2/8 -- GCS 15, no confusion today  q2h neuro checks  Continue keppra x 7 days  UCx pending  Blood cxs positive for gram neg  Continue IV Rocephin  Louie cath  I/O's  IVF's  Bowel regimen including mineral enemas, dulcolax supp successful, patient is stooling as expected   Continue PRN senokot       ED Triage Vitals   Temperature Pulse Respirations Blood Pressure SpO2   02/07/21 0700 02/07/21 0255 02/07/21 0255 02/07/21 0255 02/07/21 0255   98 4 °F (36 9 °C) (!) 118 18 110/65 98 %      Temp Source Heart Rate Source Patient Position - Orthostatic VS BP Location FiO2 (%)   02/07/21 0255 02/07/21 0255 02/07/21 0255 02/07/21 2228 --   Oral Monitor Lying Left arm       Pain Score       02/07/21 0256       No Pain          Wt Readings from Last 1 Encounters:   02/08/21 49 9 kg (110 lb)     Additional Vital Signs:   Date/Time  Temp  Pulse  Resp  BP  MAP (mmHg)  SpO2  O2 Device  Patient Position - Orthostatic VS   02/08/21 16:18:44  --  --  --  121/68  86  --  --  --   02/08/21 11:13:09  --  65  16  99/64  76  95 %  --  --   02/08/21 0807  --  --  --  --  --  --  None (Room air)  --   02/08/21 07:36:44  98 1 °F (36 7 °C)  86  16  125/77  93  96 %  --  --   02/08/21 0441  --  86  --  --  --  97 %  --  --   02/08/21 0430  --  --  --  --  --  --  None (Room air)  --   02/08/21 04:25:31  98 7 °F (37 1 °C)  --  16  167/88  114  --  None (Room air)  Lying   02/07/21 2228  --  88  18  136/65  --  98 %  None (Room air)  Lying   02/07/21 1600  --  102  27Abnormal   144/77  --  97 %  --  --   02/07/21 1400  --  100  23Abnormal   140/73  --  --  --  --   02/07/21 1300  --  106Abnormal   26Abnormal   135/68  --  --  --  --   02/07/21 1200  --  104  29Abnormal   118/63  83  96 %  --  --   02/07/21 0900  --  106Abnormal   15  114/58  --  95 %  --  --   02/07/21 0800  --  106Abnormal   24Abnormal   127/66  91  96 %  --  --   02/07/21 0700  98 4 °F (36 9 °C)  104  15  105/57  --  94 %  --  --   02/07/21 0645  --  104  16  126/69  91  95 %  --  --   02/07/21 0615  --  104  14  100/58  76  94 %  --  --   02/07/21 0600  --  104  14  100/55  71  94 %  --  --   02/07/21 0400  --  114Abnormal   18  126/71  93  --  --  --   02/07/21 0330  --  118Abnormal   18  103/55  --  98 %  --  --   02/07/21 02:55:33  --  118Abnormal   18  110/65  --  98 %  --  Lying       Pertinent Labs/Diagnostic Test Results:   · CT head without 2/7/21:  Acute on chronic left-sided subdural over the frontal region measuring approximately 9 mm  In addition there is a small extra-axial collection in the right frontal region  No midline shift or mass effect  · CT cervical spine wo 2/7/21:  Age-related moderate multilevel cervical degenerative changes  No fracture or traumatic malalignment appreciated  · CT chest abd pelvis w 2/7/21:  Limited secondary to motion artifact  No obvious evidence of spinal fracture  · CT head without 2/8/2021: No  interval change in subacute bilateral subdural hematomas       Results from last 7 days   Lab Units 02/06/21  2309   SARS-COV-2  Negative     Results from last 7 days   Lab Units 02/08/21  0449 02/07/21  1524 02/06/21  2300   WBC Thousand/uL 5 77 7 06 8 65   HEMOGLOBIN g/dL 9 7* 8 8* 9 6*   HEMATOCRIT % 31 8* 28 5* 31 1*   PLATELETS Thousands/uL 440* 389 438*   NEUTROS ABS Thousands/µL 4 04  --  7 76*     Results from last 7 days   Lab Units 02/08/21  0449 02/07/21  1524 02/06/21  2300   SODIUM mmol/L 143 148* 144   POTASSIUM mmol/L 3 4* 4 4 3 9   CHLORIDE mmol/L 110* 117* 107   CO2 mmol/L 29 27 22   ANION GAP mmol/L 4 4 15*   BUN mg/dL 14 20 30*   CREATININE mg/dL 0 50* 0 62 1 13   EGFR ml/min/1 73sq m 88 82 44   CALCIUM mg/dL 8 7 8 8 9 0     Results from last 7 days   Lab Units 02/06/21  2300   AST U/L 30   ALT U/L 18   ALK PHOS U/L 112   TOTAL PROTEIN g/dL 6 8   ALBUMIN g/dL 2 8*   TOTAL BILIRUBIN mg/dL 0 37     Results from last 7 days   Lab Units 02/08/21  0449 02/07/21  1524 02/06/21  2300   GLUCOSE RANDOM mg/dL 73 88 102     Results from last 7 days   Lab Units 02/06/21  2300   TROPONIN I ng/mL 0 02     Results from last 7 days   Lab Units 02/06/21  2300   PROTIME seconds 17 1*   INR  1 42*   PTT seconds 32       Results from last 7 days   Lab Units 02/07/21  0121 02/06/21  2300   LACTIC ACID mmol/L 0 6 2 9*     Results from last 7 days   Lab Units 02/07/21  0016   CLARITY UA  Cloudy   COLOR UA  Trina   SPEC GRAV UA  1 020   PH UA  5 5   GLUCOSE UA mg/dl Negative   KETONES UA mg/dl 15 (1+)*   BLOOD UA  Large*   PROTEIN UA mg/dl >=300*   NITRITE UA  Positive*   BILIRUBIN UA  Interference- unable to analyze*   UROBILINOGEN UA E U /dl 1 0   LEUKOCYTES UA  Trace*   WBC UA /hpf Innumerable*   RBC UA /hpf Innumerable*   BACTERIA UA /hpf Moderate*   EPITHELIAL CELLS WET PREP /hpf Moderate*     Results from last 7 days   Lab Units 02/06/21  2309   INFLUENZA A PCR  Negative   INFLUENZA B PCR  Negative   RSV PCR  Negative     Results from last 7 days   Lab Units 02/07/21  0016 02/06/21  2302 02/06/21  2300   BLOOD CULTURE   --  No Growth at 24 hrs   --    GRAM STAIN RESULT   --   --  Gram negative rods*   URINE CULTURE  >100,000 cfu/ml   --   --      ED Treatment:   Medication Administration from 02/07/2021 0219 to 02/08/2021 0400       Date/Time Order Dose Route Action 02/08/2021 0216 ceftriaxone (ROCEPHIN) 1 g/50 mL in dextrose IVPB 1,000 mg Intravenous New Bag     02/07/2021 0622 lactated ringers infusion 75 mL/hr Intravenous New Bag     02/07/2021 0400 lactated ringers bolus 500 mL 500 mL Intravenous New Bag     02/07/2021 2127 levETIRAcetam (KEPPRA) 500 mg in sodium chloride 0 9 % 100 mL IVPB 500 mg Intravenous New Bag     02/07/2021 0416 levETIRAcetam (KEPPRA) 500 mg in sodium chloride 0 9 % 100 mL IVPB 500 mg Intravenous New Bag     02/07/2021 2338 mineral oil enema 1 enema 1 enema Rectal Given     02/07/2021 0416 mineral oil enema 1 enema 1 enema Rectal Given     02/07/2021 0330 lactated ringers bolus 500 mL 500 mL Intravenous New Bag     02/07/2021 1505 bisacodyl (DULCOLAX) rectal suppository 10 mg 10 mg Rectal Not Given     02/07/2021 0432 pantoprazole (PROTONIX) injection 40 mg 40 mg Intravenous Given     02/07/2021 1315 lamoTRIgine (LaMICtal) tablet 200 mg 200 mg Oral Given     02/07/2021 1315 topiramate (TOPAMAX) tablet 50 mg 50 mg Oral Given     02/07/2021 0424 prothrombin complex conc human ([facility]) 2,500 Units 2,500 Units Intravenous Given     Past Medical History:   Diagnosis Date    Anemia 2/26/2019    Anxiety     Bipolar 1 disorder ([facility] )     Depression     DVT (deep venous thrombosis) ([facility] ) 2008    Left leg    GERD (gastroesophageal reflux disease)     Hypertension     Overactive bladder      Present on Admission:   Constipation   (Resolved) Diarrhea   Bipolar 1 disorder ([facility] )      Admitting Diagnosis: Subdural hematoma ([place] [address] ) [S06 5X9A]  Polypharmacy [Z79 [id number]]  Other injury of unspecified body region, initial encounter [T14  8XXA]  Age/Sex: 80 y o  female  Admission Orders:  Scheduled Medications:  bisacodyl, 10 mg, Rectal, Daily  cefTRIAXone, 1,000 mg, Intravenous, Q24H  lamoTRIgine, 200 mg, Oral, Daily Before Breakfast  levETIRAcetam, 500 mg, Intravenous, Q12H CARSON  mineral oil, 1 enema, Rectal, TID  [START ON 2/9/2021] pantoprazole, 40 mg, Oral, Early Morning  QUEtiapine, 400 mg, Oral, HS  topiramate, 50 mg, Oral, BID    Continuous IV Infusions:  lactated ringers, 75 mL/hr, Intravenous, Continuous      PRN Meds:  senna-docusate sodium, 2 tablet, Oral, BID PRN        IP CONSULT TO GERONTOLOGY  IP CONSULT TO NEUROSURGERY  IP CONSULT TO CASE MANAGEMENT  IP CONSULT TO GERONTOLOGY    Network Utilization Review Department  ATTENTION: Please call with any questions or concerns to [phone] and carefully listen to the prompts so that you are directed to the right person  All voicemails are confidential   Emilia Diez all requests for admission clinical reviews, approved or denied determinations and any other requests to dedicated fax number below belonging to the campus where the patient is receiving treatment   List of dedicated fax numbers for the Facilities:  [address] DENIALS (Administrative/Medical Necessity) [phone]   [address] (Maternity/NICU/Pediatrics) [phone]   [address] [facility] [address] ([facility]) [address] [place] [address] Juwan Acosta [address] [place] [address] [phone]

## 2021-02-08 NOTE — PROGRESS NOTES
The pantoprazole has / have been converted to oral per [facility] IV-to-PO Auto-Conversion Protocol for Adults as approved by the Pharmacy and Therapeutics Committee  The patient met all eligible criteria:  3 Age = 25years old   2) Received at least one dose of the IV form   3) Receiving at least one other scheduled oral/enteral medication   4) Tolerating an oral/enteral diet   and did not have any exclusions:   1) Critical care patient   2) Active GI bleed (IF assessing H2RAs or PPIs)   3) Continuous tube feeding (IF assessing cipro, doxycycline, levofloxacin, minocycline, rifampin, or voriconazole)   4) Receiving PO vancomycin (IF assessing metronidazole)   5) Persistent nausea and/or vomiting   6) Ileus or gastrointestinal obstruction   7) Solomon/nasogastric tube set for continuous suction   8) Specific order not to automatically convert to PO (in the order's comments or if discussed in the most recent Infectious Disease or primary team's progress notes)       Mariah Decker, AnmolD

## 2021-02-09 ENCOUNTER — APPOINTMENT (INPATIENT)
Dept: RADIOLOGY | Facility: HOSPITAL | Age: 86
DRG: 085 | End: 2021-02-09
Payer: COMMERCIAL

## 2021-02-09 PROBLEM — I95.9 HYPOTENSION: Status: ACTIVE | Noted: 2021-02-09

## 2021-02-09 PROBLEM — I95.9 HYPOTENSION: Status: RESOLVED | Noted: 2021-02-09 | Resolved: 2021-02-09

## 2021-02-09 LAB
ANION GAP SERPL CALCULATED.3IONS-SCNC: 3 MMOL/L (ref 4–13)
ANION GAP SERPL CALCULATED.3IONS-SCNC: 3 MMOL/L (ref 4–13)
BASOPHILS # BLD AUTO: 0.02 THOUSANDS/ΜL (ref 0–0.1)
BASOPHILS NFR BLD AUTO: 1 % (ref 0–1)
BUN SERPL-MCNC: 11 MG/DL (ref 5–25)
BUN SERPL-MCNC: 8 MG/DL (ref 5–25)
CA-I BLD-SCNC: 1.14 MMOL/L (ref 1.12–1.32)
CALCIUM SERPL-MCNC: 8.2 MG/DL (ref 8.3–10.1)
CALCIUM SERPL-MCNC: 8.4 MG/DL (ref 8.3–10.1)
CHLORIDE SERPL-SCNC: 114 MMOL/L (ref 100–108)
CHLORIDE SERPL-SCNC: 115 MMOL/L (ref 100–108)
CO2 SERPL-SCNC: 29 MMOL/L (ref 21–32)
CO2 SERPL-SCNC: 30 MMOL/L (ref 21–32)
CREAT SERPL-MCNC: 0.46 MG/DL (ref 0.6–1.3)
CREAT SERPL-MCNC: 0.5 MG/DL (ref 0.6–1.3)
EOSINOPHIL # BLD AUTO: 0.07 THOUSAND/ΜL (ref 0–0.61)
EOSINOPHIL NFR BLD AUTO: 2 % (ref 0–6)
ERYTHROCYTE [DISTWIDTH] IN BLOOD BY AUTOMATED COUNT: 15.5 % (ref 11.6–15.1)
GFR SERPL CREATININE-BSD FRML MDRD: 88 ML/MIN/1.73SQ M
GFR SERPL CREATININE-BSD FRML MDRD: 90 ML/MIN/1.73SQ M
GLUCOSE SERPL-MCNC: 101 MG/DL (ref 65–140)
GLUCOSE SERPL-MCNC: 99 MG/DL (ref 65–140)
HCT VFR BLD AUTO: 25.5 % (ref 34.8–46.1)
HGB BLD-MCNC: 7.9 G/DL (ref 11.5–15.4)
IMM GRANULOCYTES # BLD AUTO: 0.01 THOUSAND/UL (ref 0–0.2)
IMM GRANULOCYTES NFR BLD AUTO: 0 % (ref 0–2)
LACTATE SERPL-SCNC: 1.3 MMOL/L (ref 0.5–2)
LYMPHOCYTES # BLD AUTO: 1.38 THOUSANDS/ΜL (ref 0.6–4.47)
LYMPHOCYTES NFR BLD AUTO: 40 % (ref 14–44)
MAGNESIUM SERPL-MCNC: 1.8 MG/DL (ref 1.6–2.6)
MCH RBC QN AUTO: 31.5 PG (ref 26.8–34.3)
MCHC RBC AUTO-ENTMCNC: 31 G/DL (ref 31.4–37.4)
MCV RBC AUTO: 102 FL (ref 82–98)
MONOCYTES # BLD AUTO: 0.38 THOUSAND/ΜL (ref 0.17–1.22)
MONOCYTES NFR BLD AUTO: 11 % (ref 4–12)
NEUTROPHILS # BLD AUTO: 1.62 THOUSANDS/ΜL (ref 1.85–7.62)
NEUTS SEG NFR BLD AUTO: 46 % (ref 43–75)
NRBC BLD AUTO-RTO: 0 /100 WBCS
PHOSPHATE SERPL-MCNC: 2.7 MG/DL (ref 2.3–4.1)
PLATELET # BLD AUTO: 322 THOUSANDS/UL (ref 149–390)
PLATELET # BLD AUTO: 338 THOUSANDS/UL (ref 149–390)
PMV BLD AUTO: 8.9 FL (ref 8.9–12.7)
PMV BLD AUTO: 9.5 FL (ref 8.9–12.7)
POTASSIUM SERPL-SCNC: 2.8 MMOL/L (ref 3.5–5.3)
POTASSIUM SERPL-SCNC: 3.8 MMOL/L (ref 3.5–5.3)
PROCALCITONIN SERPL-MCNC: <0.05 NG/ML
RBC # BLD AUTO: 2.51 MILLION/UL (ref 3.81–5.12)
SODIUM SERPL-SCNC: 146 MMOL/L (ref 136–145)
SODIUM SERPL-SCNC: 148 MMOL/L (ref 136–145)
WBC # BLD AUTO: 3.48 THOUSAND/UL (ref 4.31–10.16)

## 2021-02-09 PROCEDURE — 85025 COMPLETE CBC W/AUTO DIFF WBC: CPT | Performed by: SURGERY

## 2021-02-09 PROCEDURE — 84100 ASSAY OF PHOSPHORUS: CPT | Performed by: SURGERY

## 2021-02-09 PROCEDURE — 94640 AIRWAY INHALATION TREATMENT: CPT

## 2021-02-09 PROCEDURE — 82330 ASSAY OF CALCIUM: CPT | Performed by: SURGERY

## 2021-02-09 PROCEDURE — 84145 PROCALCITONIN (PCT): CPT | Performed by: SURGERY

## 2021-02-09 PROCEDURE — NC001 PR NO CHARGE: Performed by: EMERGENCY MEDICINE

## 2021-02-09 PROCEDURE — 92526 ORAL FUNCTION THERAPY: CPT

## 2021-02-09 PROCEDURE — 83735 ASSAY OF MAGNESIUM: CPT | Performed by: SURGERY

## 2021-02-09 PROCEDURE — 71045 X-RAY EXAM CHEST 1 VIEW: CPT

## 2021-02-09 PROCEDURE — 94760 N-INVAS EAR/PLS OXIMETRY 1: CPT

## 2021-02-09 PROCEDURE — 87040 BLOOD CULTURE FOR BACTERIA: CPT | Performed by: STUDENT IN AN ORGANIZED HEALTH CARE EDUCATION/TRAINING PROGRAM

## 2021-02-09 PROCEDURE — 80048 BASIC METABOLIC PNL TOTAL CA: CPT | Performed by: STUDENT IN AN ORGANIZED HEALTH CARE EDUCATION/TRAINING PROGRAM

## 2021-02-09 PROCEDURE — 99233 SBSQ HOSP IP/OBS HIGH 50: CPT | Performed by: INTERNAL MEDICINE

## 2021-02-09 PROCEDURE — 80048 BASIC METABOLIC PNL TOTAL CA: CPT | Performed by: SURGERY

## 2021-02-09 PROCEDURE — 83605 ASSAY OF LACTIC ACID: CPT | Performed by: SURGERY

## 2021-02-09 PROCEDURE — 99233 SBSQ HOSP IP/OBS HIGH 50: CPT | Performed by: EMERGENCY MEDICINE

## 2021-02-09 PROCEDURE — 85049 AUTOMATED PLATELET COUNT: CPT | Performed by: SURGERY

## 2021-02-09 RX ORDER — AMOXICILLIN 250 MG
2 CAPSULE ORAL 2 TIMES DAILY
Status: DISCONTINUED | OUTPATIENT
Start: 2021-02-09 | End: 2021-02-18 | Stop reason: HOSPADM

## 2021-02-09 RX ORDER — HEPARIN SODIUM 5000 [USP'U]/ML
5000 INJECTION, SOLUTION INTRAVENOUS; SUBCUTANEOUS EVERY 8 HOURS SCHEDULED
Status: DISCONTINUED | OUTPATIENT
Start: 2021-02-09 | End: 2021-02-18 | Stop reason: HOSPADM

## 2021-02-09 RX ORDER — HEPARIN SODIUM 5000 [USP'U]/ML
5000 INJECTION, SOLUTION INTRAVENOUS; SUBCUTANEOUS EVERY 8 HOURS SCHEDULED
Status: DISCONTINUED | OUTPATIENT
Start: 2021-02-09 | End: 2021-02-09 | Stop reason: SDUPTHER

## 2021-02-09 RX ORDER — CLONAZEPAM 0.5 MG/1
0.5 TABLET ORAL 2 TIMES DAILY
Status: DISCONTINUED | OUTPATIENT
Start: 2021-02-09 | End: 2021-02-18 | Stop reason: HOSPADM

## 2021-02-09 RX ORDER — MAGNESIUM SULFATE HEPTAHYDRATE 40 MG/ML
2 INJECTION, SOLUTION INTRAVENOUS ONCE
Status: COMPLETED | OUTPATIENT
Start: 2021-02-09 | End: 2021-02-09

## 2021-02-09 RX ORDER — LEVETIRACETAM 500 MG/1
500 TABLET ORAL EVERY 12 HOURS SCHEDULED
Status: COMPLETED | OUTPATIENT
Start: 2021-02-09 | End: 2021-02-13

## 2021-02-09 RX ORDER — IPRATROPIUM BROMIDE AND ALBUTEROL SULFATE 2.5; .5 MG/3ML; MG/3ML
3 SOLUTION RESPIRATORY (INHALATION)
Status: DISCONTINUED | OUTPATIENT
Start: 2021-02-09 | End: 2021-02-09

## 2021-02-09 RX ORDER — POLYETHYLENE GLYCOL 3350 17 G/17G
17 POWDER, FOR SOLUTION ORAL DAILY
Status: DISCONTINUED | OUTPATIENT
Start: 2021-02-09 | End: 2021-02-18 | Stop reason: HOSPADM

## 2021-02-09 RX ORDER — POTASSIUM CHLORIDE 20MEQ/15ML
40 LIQUID (ML) ORAL ONCE
Status: COMPLETED | OUTPATIENT
Start: 2021-02-09 | End: 2021-02-09

## 2021-02-09 RX ORDER — POTASSIUM CHLORIDE 14.9 MG/ML
20 INJECTION INTRAVENOUS ONCE
Status: COMPLETED | OUTPATIENT
Start: 2021-02-09 | End: 2021-02-09

## 2021-02-09 RX ORDER — POTASSIUM CHLORIDE 20 MEQ/1
40 TABLET, EXTENDED RELEASE ORAL ONCE
Status: DISCONTINUED | OUTPATIENT
Start: 2021-02-09 | End: 2021-02-09

## 2021-02-09 RX ORDER — POTASSIUM CHLORIDE 20MEQ/15ML
20 LIQUID (ML) ORAL ONCE
Status: DISCONTINUED | OUTPATIENT
Start: 2021-02-09 | End: 2021-02-09

## 2021-02-09 RX ORDER — MINERAL OIL 100 G/100G
1 OIL RECTAL DAILY PRN
Status: DISCONTINUED | OUTPATIENT
Start: 2021-02-09 | End: 2021-02-18 | Stop reason: HOSPADM

## 2021-02-09 RX ORDER — IPRATROPIUM BROMIDE AND ALBUTEROL SULFATE 2.5; .5 MG/3ML; MG/3ML
3 SOLUTION RESPIRATORY (INHALATION) EVERY 6 HOURS PRN
Status: DISCONTINUED | OUTPATIENT
Start: 2021-02-09 | End: 2021-02-09

## 2021-02-09 RX ADMIN — HEPARIN SODIUM 5000 UNITS: 5000 INJECTION INTRAVENOUS; SUBCUTANEOUS at 07:40

## 2021-02-09 RX ADMIN — POTASSIUM CHLORIDE 40 MEQ: 20 SOLUTION ORAL at 09:30

## 2021-02-09 RX ADMIN — POLYETHYLENE GLYCOL 3350 17 G: 17 POWDER, FOR SOLUTION ORAL at 09:33

## 2021-02-09 RX ADMIN — MAGNESIUM SULFATE HEPTAHYDRATE 2 G: 40 INJECTION, SOLUTION INTRAVENOUS at 09:25

## 2021-02-09 RX ADMIN — HEPARIN SODIUM 5000 UNITS: 5000 INJECTION INTRAVENOUS; SUBCUTANEOUS at 14:34

## 2021-02-09 RX ADMIN — IPRATROPIUM BROMIDE AND ALBUTEROL SULFATE 3 ML: 2.5; .5 SOLUTION RESPIRATORY (INHALATION) at 00:39

## 2021-02-09 RX ADMIN — TOPIRAMATE 50 MG: 25 TABLET ORAL at 17:33

## 2021-02-09 RX ADMIN — LEVETIRACETAM 500 MG: 500 TABLET, FILM COATED ORAL at 09:24

## 2021-02-09 RX ADMIN — POTASSIUM CHLORIDE 20 MEQ: 14.9 INJECTION, SOLUTION INTRAVENOUS at 05:07

## 2021-02-09 RX ADMIN — HEPARIN SODIUM 5000 UNITS: 5000 INJECTION INTRAVENOUS; SUBCUTANEOUS at 21:18

## 2021-02-09 RX ADMIN — CEFEPIME HYDROCHLORIDE 2000 MG: 2 INJECTION, POWDER, FOR SOLUTION INTRAVENOUS at 06:08

## 2021-02-09 RX ADMIN — PANTOPRAZOLE SODIUM 40 MG: 40 TABLET, DELAYED RELEASE ORAL at 09:16

## 2021-02-09 RX ADMIN — TOPIRAMATE 50 MG: 25 TABLET ORAL at 09:13

## 2021-02-09 RX ADMIN — SODIUM CHLORIDE, SODIUM LACTATE, POTASSIUM CHLORIDE, AND CALCIUM CHLORIDE 1000 ML: .6; .31; .03; .02 INJECTION, SOLUTION INTRAVENOUS at 05:29

## 2021-02-09 RX ADMIN — CEFEPIME HYDROCHLORIDE 2000 MG: 2 INJECTION, POWDER, FOR SOLUTION INTRAVENOUS at 17:47

## 2021-02-09 RX ADMIN — DOCUSATE SODIUM AND SENNOSIDES 2 TABLET: 8.6; 5 TABLET ORAL at 17:34

## 2021-02-09 RX ADMIN — CEFTRIAXONE SODIUM 1000 MG: 10 INJECTION, POWDER, FOR SOLUTION INTRAVENOUS at 00:20

## 2021-02-09 RX ADMIN — CLONAZEPAM 0.5 MG: 0.5 TABLET ORAL at 17:33

## 2021-02-09 RX ADMIN — QUETIAPINE 400 MG: 100 TABLET, FILM COATED ORAL at 21:15

## 2021-02-09 RX ADMIN — LEVETIRACETAM 500 MG: 500 TABLET, FILM COATED ORAL at 21:15

## 2021-02-09 RX ADMIN — SODIUM CHLORIDE, SODIUM LACTATE, POTASSIUM CHLORIDE, AND CALCIUM CHLORIDE 1000 ML: .6; .31; .03; .02 INJECTION, SOLUTION INTRAVENOUS at 03:51

## 2021-02-09 RX ADMIN — LAMOTRIGINE 200 MG: 100 TABLET ORAL at 09:12

## 2021-02-09 NOTE — PROGRESS NOTES
Transfer Note - Jane Flowers 1935, 80 y o  female MRN: [id number]    Unit/Bed#: ICU 12 Encounter: [id number]    Primary Care Provider: Clare Smith MD   Date and time admitted to hospital: 2/7/2021  2:38 AM        Sepsis due to urinary tract infection ([facility] )  Assessment & Plan  · Blood cx 2/6: 1/2 GNR  · Ucx 2/6: +nitrites, +leukos, >100,000 CFU mixed contaminants  · Plan:   · Continue cefepime (changed from rocephin)  · Pending blood cultures   · Follow fever/WBC curve   · WBC 3 48 from 5 77  · AF    Hypotension, Resolved  Assessment & Plan  · Patient experienced an episode of hypotension early on the morning of 2/9  · After 2L, patient's BP improved  · Was subsequently transferred [facility], where she remained hemodynamically stable without use of pressors  · Plan  · Continue to monitor BP      Fall  Assessment & Plan  · Patient had an unwitnessed fall at nursing home, arrived to hospitals confused and unaware of where she is, easily agitated     · Injuries as stated  · PT/OT evaluation and treatment    Constipation  Assessment & Plan  · CTAP on presentation with marked distention of the rectum with large amount of fecal material  · Plan:   · Two bowel movements yesterday  · Continue miralax, senokot, PRN mineral oil enemas      Bipolar 1 disorder (HCC)  Assessment & Plan  · Plan:   · Continue home Lamictal, Seroquel,and Topamax  · Home klonopin held    * Subdural hematoma (HCC)  Assessment & Plan  · Repeat head CT on 2/8 demonstrated no interval change in subacute b/l SDH hematomas compared to 2/7  · Plan:   · Neurosurgery on board, appreciate their recommendations  · Currently GCS 15, repeat head CT if GCS declines more than 2 points in 1 hour  · Keppra 500mg BID x7 days for seizure ppx  · Q2H neuro checks  · PT/OT        Code Status: Level 3 - DNAR and DNI  POA: Yes  POLST:      Reason for ICU admission:   Hyoptension, Increased confusion    Active problems:   Principal Problem:    Subdural hematoma [facility])  Active Problems:    Bipolar 1 disorder (HCC)    Constipation    Fall    Sepsis due to urinary tract infection ([facility] )    Hypotension  Resolved Problems:    Diarrhea      Consultants:   None    History of Present Illness:   Per Dr Zack Salcedo note on 2/9: "Nasrin Aguilar is a 80 y o  female OMHx of GERD, DVT on Eliquis, bipolar 1, who presented to [facility] as a transfer from [facility] on 2/7/2021 for a witnessed fall at her nursing home  At the time patient denied head strike or LOC  CT imaging on admission demonstrated an acute on chronic left subdural hematoma with overlying left frontal lobe 9 mm maximal width, no midline shift  CTAP demonstrated enlarged rectum with large amount of fecal material, on admission patient was noted to have urosepsis and was started on Rocephin  Patient received Kcentra for reversal on admission  Patient noted to be hypotensive and confused by nursing this AM  On examination patient with a GCS of 15 and was easily awaken, O2 sats of 95% on 1L NC  Patient hypotensive with MAPs in the 60s despite 1L bolus of LR  Patient transferred to [facility] for likely low urine output and urosepsis "    Summary of clinical course:   Upon arrival to the ICU, patient received another liter of fluid  Antibiotics were broadened to cefepime from rocephin  Repeat blood cultures were drawn  Repeat MAPs have all been >65 without pressor requirement  Pt remained hemodynamically stable while in the ICU  She is stable for transfer to [place] [address]       Recent or scheduled procedures:   None     Outstanding/pending diagnostics:   Repeat blood cultures    Cultures:   2/6 Blood Cultures: Gram negative rods  2/7 Urine Cultures: >071602 cfu/ml mixed contaminants       Mobilization Plan:   OOB as tolerated with assistance    Nutrition Plan:   Full diet    Invasive Devices Review  Invasive Devices     Peripheral Intravenous Line            Peripheral IV 02/08/21 Left Antecubital less than 1 day    Peripheral IV 02/09/21 Right Antecubital less than 1 day    Peripheral IV 02/09/21 Right Forearm less than 1 day          Drain            Urethral Catheter Temperature probe 16 Fr  2 days                Rationale for remaining devices: Access    VTE Pharmacologic Prophylaxis: Heparin  VTE Mechanical Prophylaxis: sequential compression device      Spoke with [facility]  regarding transfer  Please contact critical care via [employer] with any questions or concerns  Portions of the record may have been created with voice recognition software  Occasional wrong word or "sound a like" substitutions may have occurred due to the inherent limitations of voice recognition software  Read the chart carefully and recognize, using context, where substitutions have occurred

## 2021-02-09 NOTE — ASSESSMENT & PLAN NOTE
· Patient experienced an episode of hypotension early on the morning of 2/9  · After 2L, patient's BP improved  · Was subsequently transferred to [facility], where she remained hemodynamically stable without use of pressors  · Plan  · Continue to monitor BP

## 2021-02-09 NOTE — RESPIRATORY THERAPY NOTE
RT Protocol Note  Liz Trimble 80 y o  female MRN: [id number]  Unit/Bed#: [facility] 463-86 Encounter: [id number]    Assessment    Principal Problem:    Subdural hematoma (HCC)  Active Problems:    Bipolar 1 disorder ([place] [address] )    Constipation    Fall    Sepsis due to urinary tract infection ([place] [address] )      Home Pulmonary Medications:  None       Past Medical History:   Diagnosis Date    Anemia 2/26/2019    Anxiety     Bipolar 1 disorder ([place] [address] )     Depression     DVT (deep venous thrombosis) ([place] [address] ) 2008    Left leg    GERD (gastroesophageal reflux disease)     Hypertension     Overactive bladder      Social History     Socioeconomic History    Marital status: /Civil Union     Spouse name: None    Number of children: None    Years of education: None    Highest education level: None   Occupational History    None   Social Needs    Financial resource strain: None    Food insecurity     Worry: None     Inability: None    Transportation needs     Medical: None     Non-medical: None   Tobacco Use    Smoking status: Former Smoker     Packs/day: 1 00     Types: Cigarettes    Smokeless tobacco: Former User    Tobacco comment: Per [employer]: Heavy tobacco smoker   Substance and Sexual Activity    Alcohol use: Not Currently     Alcohol/week: 0 0 standard drinks     Frequency: Never     Drinks per session: Patient refused     Binge frequency: Never    Drug use: Not Currently    Sexual activity: Not Currently   Lifestyle    Physical activity     Days per week: None     Minutes per session: None    Stress: None   Relationships    Social connections     Talks on phone: None     Gets together: None     Attends Bahai service: None     Active member of club or organization: None     Attends meetings of clubs or organizations: None     Relationship status: None    Intimate partner violence     Fear of current or ex partner: None     Emotionally abused: None     Physically abused: None     Forced sexual activity: None   Other Topics Concern    None   Social History Narrative    None       Subjective         Objective    Physical Exam:   Assessment Type: During-treatment  General Appearance: Drowsy, Alert, Sleeping, Eyes open/responds to voice  Respiratory Pattern: Normal, Dyspnea with exertion, Symmetrical, Spontaneous  Chest Assessment: Chest expansion symmetrical, Trachea midline  Bilateral Breath Sounds: Clear  Cough: None  O2 Device: 1L NC    Vitals:  Blood pressure 129/67, pulse 100, temperature 98 1 °F (36 7 °C), resp  rate 18, height 5' 4" (1 626 m), weight 49 9 kg (110 lb), SpO2 96 %  Imaging and other studies: I have personally reviewed pertinent reports  O2 Device: 1L NC     Plan    Respiratory Plan: Discontinue Protocol        Resp Comments: Saw pt for TX and protocol  Pt states she cant breathe but sleeping comfortable when arrived and fast to fall asleep while in room  Pt has no hx on file of resp meds at home  No cough noted and BS clear  pt only on 1L 02

## 2021-02-09 NOTE — PROGRESS NOTES
Progress Note - Geriatric Medicine   Denilson Rubin 80 y o  female MRN: [id number]  Unit/Bed#: ICU 12 Encounter: [id number]      Assessment/Plan:    Acute metabolic encephalopathy  -multifactorial including acute on chronic subdural hematoma and sepsis 2/2 UTI requiring tx to ICU overnight for hypotension and increased confusion  -Abx broadened from Rocephin to Cefepime (day #1), day #4 abx overall   -repeat CTH last evening, no interval change in subacute bilateral hematomas per radiology report   -continue tx UTI  -ensure pain well controlled  -maintain normal circadian rhythm  -monitor for fecal and urinary retention   -continue frequent reorientation and redirection as appropriate and indicated  -consider restarting home clonazepam 0 5mg BID at reduced dosing to prevent withdrawal symptoms as this is chronic medication and consistently taking per facility records and PDMP    Sepsis 2/2 UTI  -currently on cefepime, broadened from rocephin last evening  -hypotension improved with IVF and antibiotic regimen change  -blood cultures pending   -now afebrile with no leukocytosis     Left frontal lobe subdural hematoma  -s/p fall at long term care facility  -s/p [place] as maintained on Eliquis as o/p for DVT until 2/8/21  -repeat CTH last evening for worsening encephalopathy suspected due to sepsis reported stable SDH  -neurochecks per protocol   -Neurosurgery following     Systemic anticoagulation with Eliquis  -per [facility]) documentation patient was to remain on Eliquis until 2/8/21 as part of treatment regimen for COVID-19, no further documentation to support ongoing use, cross check with current long term care facility [facility]) who also document same and have end date in orders for Eliquis for 2/8/21, discussed with trauma AP resident     Ambulatory dysfunction with fall  -s/p fall at long term care facility  -requires use of walker for ambulation at baseline, encourage use at all appropriate times  -encourage good body mechanics and assist with all tx  -monitor for orthostatic bp changes anna given hypotension overnight requiring IVF  -PT/OT    Urinary retention  -chronic with chronic indwelling aj catheter  -continue catheter care and hygiene     Cognitive impairment  -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  -MOCA 13/30 (4/2019)  -continue to encourage pt to remain physically, socially and cognitively active and engaged to maintain cognitive acuity    Bipolar I Disorder  -home medication regimen includes clonazepam, lamotrigene, seroquel and topirimate  -currently not on home clonazepam, recommend restarting to prevent withdrawal symptoms, may consider reduced dose if concerned for somnolence   -continue close o/p f/u with Psych/PCP    Impaired hearing   -encourage use hearing aid encourage use of hearing aids at all appropriate times   -recommend use of teach back method to ensure clear communication    Impaired mastication   -requires use of dentures for mastication, encourage use of appropriate times  -ensure meal consistency appropriate for abilities  -continue aspiration precautions    Impaired vision  -encourage use glasses at all appropriate times  -recommend large print font for all materials provided to pt    Constipation   -continue bowel regimen to reduce risk recurrent constipation which is chronic and recurrent for patient, per SNF documentation pt perseverates on when she is able to or not have BM frequently as o/p as well as at times since admission  -continue education regarding ways to reduce risk recurrent constipation such as mobility and hydration when appropriate     Deconditioning/debility/frailty  -continue tx acute metabolic derangements such as UTI, hypotension and sepsis, continue to monitor for constipation, monitor urine output, has chronic indwelling aj due to retention   -encourage well balanced nutrition  -encourage ongoing participation with PT/OT for strengthening, endurance and gait training as well as fall risk reduction, given degree of debility would likely benefit from ongoing therapies     Subjective:     Patient seen and examined at the bedside where she is lying resting comfortably, she was transferred to ICU overnight due to worsening encephalopathy and hypotension now improved with IVF and broadening antibiotic coverage  She remains more confused than initial encounter and reluctant to participate in evaluation  Review of Systems   Unable to perform ROS: Mental status change (pt reports that she doesnt know how she feels except confused, unwilling to particiapte with ROS further )     Objective:     Vitals: Blood pressure 117/69, pulse 88, temperature 97 9 °F (36 6 °C), temperature source Oral, resp  rate 20, height 5' 4" (1 626 m), weight 48 3 kg (106 lb 7 7 oz), SpO2 97 %  ,Body mass index is 18 28 kg/m²  Intake/Output Summary (Last 24 hours) at 2/9/2021 0954  Last data filed at 2/9/2021 8292  Gross per 24 hour   Intake 2108 75 ml   Output 1800 ml   Net 308 75 ml     Current Medications: Reviewed    Physical Exam:   Physical Exam  Vitals signs and nursing note reviewed  Constitutional:       General: She is not in acute distress  Comments: Thin, frail, elderly appearing female lying on bed in ICU   HENT:      Head: Normocephalic and atraumatic  Nose: Nose normal       Mouth/Throat:      Mouth: Mucous membranes are dry  Eyes:      General:         Right eye: No discharge  Left eye: No discharge  Conjunctiva/sclera: Conjunctivae normal    Neck:      Musculoskeletal: Neck supple  Comments: Trachea midline, phonation normal   Cardiovascular:      Rate and Rhythm: Normal rate  Pulses: Normal pulses  Heart sounds: Murmur present  Pulmonary:      Effort: Pulmonary effort is normal  No respiratory distress  Abdominal:      General: There is no distension  Palpations: Abdomen is soft        Tenderness: There is no abdominal tenderness  Musculoskeletal:      Comments: Diffuse subcutaneous fat and muscle wasting noted, moves all 4 ext spontaneously    Skin:     General: Skin is warm and dry  Comments: Skin thin and friable    Neurological:      Mental Status: She is alert  Comments: Awake and alert, oriented to self, declines to participate further    Psychiatric:      Comments: Flat and withdrawn        Invasive Devices     Peripheral Intravenous Line            Peripheral IV 02/08/21 Left Antecubital less than 1 day    Peripheral IV 02/09/21 Right Antecubital less than 1 day    Peripheral IV 02/09/21 Right Forearm less than 1 day          Drain            Urethral Catheter Temperature probe 16 Fr  2 days              Lab, Imaging and other studies: I have personally reviewed pertinent reports

## 2021-02-09 NOTE — QUICK NOTE
Surgery   Quick note    Called to evaluate patient due to hypotension and increased confusion  Went to see and evaluate patient  Pt calm and sleeping comfortably  Cooperative  Pt easily awaken on exam and GCS 15  Saturating 95% on 1LNC  Lungs equal b/l  Pt denied any current pain  BP 88/47 MAP 61  Will give 1L bolus of LR now  And recheck bp  Suspect this is secondary to low urine output and urosepsis  BP still in 80s/40s after giving one liter bolus  Pt now with increased lethargy and hypoxic episode  Discussed with critical care fellow  Broadened antibiotics to vanco and cefepime  Pt will be transferred to ICU for further monitoring      Marvel Foreman MD  5:11 AM  2/9/21

## 2021-02-09 NOTE — QUICK NOTE
Patient's son Jhoana Rayo [facility]) updated on daily plan/status via phone  All questions answered       Rosemary Clay MD  [id number]

## 2021-02-09 NOTE — RESPIRATORY THERAPY NOTE
02/09/21 1714   Respiratory Assessment   Resp Comments Pt noted to be a smoker, no PFTs on file, takes no meds at home  RT assessment today was unremarkable, plan to DC from PRN tx and from resp protocol

## 2021-02-09 NOTE — ASSESSMENT & PLAN NOTE
? Repeat head CT on 2/8 demonstrated no interval change in subacute b/l SDH hematomas compared to 2/7  ?  Plan:   § Neurosurgery on board, appreciate their recommendations  § Currently GCS 15, repeat head CT if GCS declines more than 2 points in 1 hour  § Keppra 500mg BID x7 days for seizure ppx  § Q2H neuro checks  § PT/OT

## 2021-02-09 NOTE — CONSULTS
Israel Wright 80 y o  female MRN: [id number]  [facility] Encounter: [id number]      -------------------------------------------------------------------------------------------------------------  Chief Complaint: Confusion/AMS    History of Present Illness   HX and PE limited by: confusion,AMS  Woodrow Collins is a 80 y o  female OMHx of GERD, DVT on Eliquis, bipolar 1, who presented to [facility] as a transfer from [facility] on 2/7/2021 for a witnessed fall at her nursing home  At the time patient denied head strike or LOC  CT imaging on admission demonstrated an acute on chronic left subdural hematoma with overlying left frontal lobe 9 mm maximal width, no midline shift  CTAP demonstrated enlarged rectum with large amount of fecal material, on admission patient was noted to have urosepsis and was started on Rocephin  Patient received Kcentra for reversal on admission  Patient noted to be hypotensive and confused by nursing this AM  On examination patient with a GCS of 15 and was easily awaken, O2 sats of 95% on 1L NC  Patient hypotensive with MAPs in the 60s despite 1L bolus of LR   Patient transferred to [facility] for likely low urine output and urosepsis       -------------------------------------------------------------------------------------------------------------  Assessment and Plan:    Neuro:    Diagnosis: Acute on chronic left frontoparietal subdural hematoma present on admission  o Repeat head CT on 2/8 demonstrated no interval change in subacute b/l SDH hematomas compared to 2/7  o Plan:   - Neurosurgery on board, appreciate their recommendations  - Currently GCS 15, repeat head CT if GCS declines more than 2 points in 1 hour  - Keppra 500mg BID x7 days for seizure ppx  - Q2H neuro checks  - PT/OT   Diagnosis: Bipolar 1  o Plan:   - Continue home Lamictal, Seroquel,and Topamax  - Home klonopin held      CV:    Diagnosis: Sepsis 2/2 to urosepsis  o Patient with hypotensive events overnight received 2L LR bolus  o Plan:   - Maintain MAP>65  - Can start pressors as needed with does not respond to 2nd L of LR    - Would start with levo  - Could consider stress dose streroids if no response to fluid    patient was recently treated for severe COVID and received 2x 10 day courses of dexamethasone with prednisone taper on discharge ending on 1/21  - Lactic acid-1 3  - Goals of care discussion with POA   Diagnosis: hx of htn  o Plan:   - Last ECHO in 7/2020 EF of 70%      Pulm:   Diagnosis: Hypoxia  o Hx of acute respiratory failure duet to COVID-19 in 1/9/2021  o Plan:   - Maintain O2 sats >92% with prn NC  - Duo-nebs prn for wheezing  - Follow up CXR      GI:    Diagnosis: Constipation  o CTAP on presentation with marked distention of the rectum with large amount of fecal material  o Plan:   - Two bowel movements yesterday  - Mineral oil enema, dulcolax suppository, and prn senokot   Diagnosis: GERD  o Plan:   - Continue home protonix      :    Diagnosis: Urinary tract infection  o Plan:   - Continue antibiotics as outlined in ID  - Maintain aj catheter for strict I&Os   UOP- 1,350 in 24H   Strict I/Os   Diagnosis: Urinary retention  o Plan:   - Long-term urinary retention managed with urology with long term Aj catheter      F/E/N:    F: Received 2L LR bolus overnight   [email or web address] cc/hr   E: replete electrolytes prn K>4, P>3, Mg>2   N: Regular diet      Heme/Onc:    Diagnosis: Hx of DVT on Eliquis  o Eliquis reversed on admission w/ k centra  o Plan:   - Continue to hold   Diagnosis: DVT ppx  o Plan:   - Restart SQH 2/2 to stable CT head  - SCDs   Diagnosis: Anemia  o POA, likely multifactorial  o Plan:  - Hg 7 9 from 9 7-likely dilutional        Endo:    Diagnosis: No active issues  o Plan:   - BS monitoring per ICU protocol  - Maintain BS <180        ID:    Diagnosis: Sepsis 2/2 to urinary tract infection  o Blood cx 2/6: 1/2 GNR  o Ucx 2/6: +nitrites, +leukos, >100,000 CFU mixed contaminants  o Plan:   - Broaden antibiotics vanc/cefepime from rocephin  - Follow up procalcitonin  - Repeat blood cultures   - Repeat urine cultures  - Follow fever/WBC curve    WBC 3 48 from 5 77   AF      MSK/Skin:    Diagnosis: No active issues  o Plan:   - PT/OT  - Frequent repositioning  o     Disposition: Transfer to Stepdown Level 1   Code Status: Level 3 - DNAR and DNI  --------------------------------------------------------------------------------------------------------------  Review of Systems   Constitutional: Negative for chills and fever  Eyes: Negative  Respiratory: Negative for shortness of breath  Cardiovascular: Negative for chest pain and leg swelling  Gastrointestinal: Positive for abdominal pain  Negative for constipation  Endocrine: Negative  Genitourinary:        Chronic aj catheter   Neurological: Negative for light-headedness and headaches  All other systems reviewed and are negative  Physical Exam  Vitals signs and nursing note reviewed  Constitutional:       General: She is not in acute distress  Appearance: She is well-developed  HENT:      Head: Normocephalic and atraumatic  Right Ear: External ear normal       Left Ear: External ear normal       Nose: No congestion or rhinorrhea  Mouth/Throat:      Mouth: Mucous membranes are dry  Eyes:      General: No scleral icterus  Conjunctiva/sclera: Conjunctivae normal       Pupils: Pupils are equal, round, and reactive to light  Neck:      Musculoskeletal: Neck supple  Cardiovascular:      Rate and Rhythm: Normal rate and regular rhythm  Pulmonary:      Effort: Pulmonary effort is normal       Breath sounds: Normal breath sounds  Comments: 95% on 1L NC  Abdominal:      General: There is no distension  Palpations: Abdomen is soft  Tenderness: There is no abdominal tenderness  There is no guarding or rebound     Genitourinary:     Comments: Louie catheter in place  Skin:     General: Skin is warm  Capillary Refill: Capillary refill takes less than 2 seconds  Neurological:      Mental Status: She is alert and oriented to person, place, and time  GCS: GCS eye subscore is 4  GCS verbal subscore is 5  GCS motor subscore is 6  Cranial Nerves: Cranial nerves are intact  Sensory: Sensation is intact  Motor: Motor function is intact        --------------------------------------------------------------------------------------------------------------  Vitals:   Vitals:    02/09/21 0039 02/09/21 0300 02/09/21 0334 02/09/21 0459   BP:  (!) 86/52 (!) 88/47 (!) 86/45   BP Location:  Right arm     Pulse: 100 97 97 85   Resp: 18 16     Temp:  98 °F (36 7 °C)     TempSrc:  Oral     SpO2: 96% 95% 95% 91%   Weight:       Height:         Temp  Min: 97 5 °F (36 4 °C)  Max: 98 7 °F (37 1 °C)  IBW: 54 7 kg  Height: 5' 4" (162 6 cm)  Body mass index is 18 88 kg/m²    N/A    Laboratory and Diagnostics:  Results from last 7 days   Lab Units 02/09/21 0348 02/08/21 0449 02/07/21  1524 02/06/21  2300   WBC Thousand/uL 3 48* 5 77 7 06 8 65   HEMOGLOBIN g/dL 7 9* 9 7* 8 8* 9 6*   HEMATOCRIT % 25 5* 31 8* 28 5* 31 1*   PLATELETS Thousands/uL 322 440* 389 438*   NEUTROS PCT % 46 70  --  90*   MONOS PCT % 11 9  --  4     Results from last 7 days   Lab Units 02/09/21 0348 02/08/21 0449 02/07/21  1524 02/06/21  2300   SODIUM mmol/L 148* 143 148* 144   POTASSIUM mmol/L 2 8* 3 4* 4 4 3 9   CHLORIDE mmol/L 115* 110* 117* 107   CO2 mmol/L 30 29 27 22   ANION GAP mmol/L 3* 4 4 15*   BUN mg/dL 11 14 20 30*   CREATININE mg/dL 0 50* 0 50* 0 62 1 13   CALCIUM mg/dL 8 4 8 7 8 8 9 0   GLUCOSE RANDOM mg/dL 99 73 88 102   ALT U/L  --   --   --  18   AST U/L  --   --   --  30   ALK PHOS U/L  --   --   --  112   ALBUMIN g/dL  --   --   --  2 8*   TOTAL BILIRUBIN mg/dL  --   --   --  0 37          Results from last 7 days   Lab Units 02/06/21  2300   INR  1 42*   PTT seconds 32      Results from last 7 days   Lab Units 02/06/21  2300   TROPONIN I ng/mL 0 02     Results from last 7 days   Lab Units 02/07/21  0121 02/06/21  2300   LACTIC ACID mmol/L 0 6 2 9*     ABG:    VBG:          Micro:  Results from last 7 days   Lab Units 02/07/21  0016 02/06/21  2302 02/06/21  2300   BLOOD CULTURE   --  No Growth at 24 hrs   --    GRAM STAIN RESULT   --   --  Gram negative rods*   URINE CULTURE  >100,000 cfu/ml   --   --        EKG:   Imaging: I have personally reviewed pertinent films in PACS    Historical Information   Past Medical History:   Diagnosis Date    Anemia 2/26/2019    Anxiety     Bipolar 1 disorder (HCC)     Depression     [facility]    Left leg    GERD (gastroesophageal reflux disease)     Hypertension     Overactive bladder      Past Surgical History:   Procedure Laterality Date    ADENOIDECTOMY      CATARACT EXTRACTION Bilateral     Right 10/2003, left 4/2004    CHOLECYSTECTOMY      DILATION AND CURETTAGE OF UTERUS  1985    HERNIA REPAIR  11/02/2007    Femoral and small bowel resection    HIP SURGERY      L hip    TONSILLECTOMY      As a youth    WRIST SURGERY      L wrist     Social History   Social History     Substance and Sexual Activity   Alcohol Use Not Currently    Alcohol/week: 0 0 standard drinks    Frequency: Never    Drinks per session: Patient refused    Binge frequency: Never     Social History     Substance and Sexual Activity   Drug Use Not Currently     Social History     Tobacco Use   Smoking Status Former Smoker    Packs/day: 1 00    Types: Cigarettes   Smokeless Tobacco Former User   Tobacco Comment    Per [employer]: Heavy tobacco smoker     Exercise History:   Family History:   Family History   Problem Relation Age of Onset    Heart disease Father      I have reviewed this patient's family history and commented on sigificant items within the HPI      Medications:  Current Facility-Administered Medications Medication Dose Route Frequency    bisacodyl (DULCOLAX) rectal suppository 10 mg  10 mg Rectal Daily    cefepime (MAXIPIME) 2,000 mg in dextrose 5 % 50 mL IVPB  2,000 mg Intravenous Q12H    heparin (porcine) subcutaneous injection 5,000 Units  5,000 Units Subcutaneous Q8H [facility]    heparin (porcine) subcutaneous injection 5,000 Units  5,000 Units Subcutaneous Q8H [facility]    ipratropium-albuterol (DUO-NEB) 0 5-2 5 mg/3 mL inhalation solution 3 mL  3 mL Nebulization Q6H PRN    lactated ringers bolus 1,000 mL  1,000 mL Intravenous Once    lactated ringers infusion  125 mL/hr Intravenous Continuous    lamoTRIgine (LaMICtal) tablet 200 mg  200 mg Oral Daily Before Breakfast    levETIRAcetam (KEPPRA) 500 mg in sodium chloride 0 9 % 100 mL IVPB  500 mg Intravenous Q12H [facility]    mineral oil enema 1 enema  1 enema Rectal TID    ondansetron (ZOFRAN) injection 4 mg  4 mg Intravenous Q6H PRN    pantoprazole (PROTONIX) EC tablet 40 mg  40 mg Oral Early Morning    potassium chloride (K-DUR,KLOR-CON) CR tablet 40 mEq  40 mEq Oral Once    potassium chloride 20 mEq IVPB (premix)  20 mEq Intravenous Once    potassium chloride oral solution 20 mEq  20 mEq Oral Once    QUEtiapine (SEROquel) tablet 400 mg  400 mg Oral HS    senna-docusate sodium (SENOKOT S) 8 6-50 mg per tablet 2 tablet  2 tablet Oral BID PRN    topiramate (TOPAMAX) tablet 50 mg  50 mg Oral BID    vancomycin (VANCOCIN) IVPB (premix in dextrose) 750 mg 150 mL  15 mg/kg Intravenous Q12H     Home medications:  Prior to Admission Medications   Prescriptions Last Dose Informant Patient Reported? Taking?    QUEtiapine (SEROquel) 200 mg tablet  Self No No   Sig: Take 2 tablets (400 mg total) by mouth daily at bedtime   Sennosides-Docusate Sodium (SENNA-PLUS PO)   Yes No   Sig: Take 8 6 mg by mouth 2 (two) times a day as needed   acetaminophen (TYLENOL) 325 mg tablet   No No   Sig: Take 2 tablets (650 mg total) by mouth 3 (three) times a day   apixaban (ELIQUIS) 2 5 mg   No No   Sig: Take 1 tablet (2 5 mg total) by mouth 2 (two) times a day   atorvastatin (LIPITOR) 40 mg tablet   No No   Sig: Take 1 tablet (40 mg total) by mouth daily at bedtime for 14 days   bisacodyl (Biscolax) 10 mg suppository   No No   Sig: Insert 1 suppository (10 mg total) into the rectum as needed for constipation   clonazePAM (KlonoPIN) 0 5 mg tablet   No No   Sig: Take 1 tablet (0 5 mg total) by mouth 2 (two) times a day   esomeprazole (NexIUM) 40 mg packet   Yes No   Sig: Take 40 mg by mouth every morning before breakfast   estradiol (ESTRACE VAGINAL) 0 1 mg/g vaginal cream   Yes No   Sig: Insert 0 5 g into the vagina daily   famotidine (PEPCID) 20 mg tablet   No No   Sig: Take 1 tablet (20 mg total) by mouth daily for 14 days   ferrous sulfate 325 (65 Fe) mg tablet   No No   Sig: Take 1 tablet (325 mg total) by mouth daily with breakfast   hydrocortisone 2 5 % cream   Yes No   Sig: Apply topically 4 (four) times a day as needed   lamoTRIgine (LaMICtal) 200 MG tablet   No No   Sig: Take 1 tablet (200 mg total) by mouth daily before breakfast   lidocaine (LMX) 4 % cream   No No   Sig: q6hr prn rib pain  pantoprazole (PROTONIX) 40 mg tablet   No No   Sig: Take 1 tablet (40 mg total) by mouth daily in the early morning   predniSONE 20 mg tablet   No No   Sig: Take 40 mg daily x 3 days, then 30 mg x 3 days, then 20 mg x 3 days, then 10 mg x 3 days   topiramate (TOPAMAX) 50 MG tablet  Self No No   Sig: Take 1 tablet (50 mg total) by mouth 2 (two) times a day      Facility-Administered Medications: None     Allergies:   Allergies   Allergen Reactions    Ethanol     Simvastatin      ------------------------------------------------------------------------------------------------------------  Advance Directive and Living Will:      Power of : Yes  POLST:    ------------------------------------------------------------------------------------------------------------  Anticipated Length of Stay is > 2 midnights    Care Time Delivered:   Upon my evaluation, this patient had a high probability of imminent or life-threatening deterioration due to urosepsis, which required my direct attention, intervention, and personal management  I have personally provided 45 minutes  of critical care time, exclusive of procedures, teaching, family meetings, and any prior time recorded by providers other than myself  Gamal Dudley DO        Portions of the record may have been created with voice recognition software  Occasional wrong word or "sound a like" substitutions may have occurred due to the inherent limitations of voice recognition software    Read the chart carefully and recognize, using context, where substitutions have occurred

## 2021-02-09 NOTE — ASSESSMENT & PLAN NOTE
? CTAP on presentation with marked distention of the rectum with large amount of fecal material  ? Plan:   § Two bowel movements yesterday  § Continue miralax, senokot, PRN mineral oil enemas

## 2021-02-09 NOTE — DISCHARGE INSTR - DIET
Regular w/ thin   MEDS IN PUREE ONLE  Safe Swallow Strategies:  Alternate bites/sips, small bites/sips

## 2021-02-09 NOTE — ASSESSMENT & PLAN NOTE
? Blood cx 2/6: 1/2 GNR  ? Ucx 2/6: +nitrites, +leukos, >100,000 CFU mixed contaminants    ? Plan:   § Broaden antibiotics to cefepime from rocephin  § Follow up procalcitonin  § Repeat blood cultures   § Repeat urine cultures  § Follow fever/WBC curve   § WBC 3 48 from 5 77  § AF

## 2021-02-09 NOTE — SPEECH THERAPY NOTE
Speech Language/Pathology    Speech/Language Pathology Progress Note    Patient Name: Hannah Young  DBVLH'I Date: 2/9/2021     Problem List  Principal Problem:    Subdural hematoma ([facility] )  Active Problems:    Bipolar 1 disorder ([facility] )    Constipation    Fall    Sepsis due to urinary tract infection ([facility] )    Hypotension       Past Medical History  Past Medical History:   Diagnosis Date    Anemia 2/26/2019    Anxiety     Bipolar 1 disorder ([facility] )     Depression     DVT (deep venous thrombosis) ([place] [address] ) 2008    Left leg    GERD (gastroesophageal reflux disease)     Hypertension     Overactive bladder         Past Surgical History  Past Surgical History:   Procedure Laterality Date    ADENOIDECTOMY      CATARACT EXTRACTION Bilateral     Right 10/2003, left 4/2004    CHOLECYSTECTOMY      DILATION AND CURETTAGE OF UTERUS  1985    HERNIA REPAIR  11/02/2007    Femoral and small bowel resection    HIP SURGERY      L hip    TONSILLECTOMY      As a youth    WRIST SURGERY      L wrist         Subjective  Pt now in ICU for hypotension  "A hamburger and a coke"    Current Diet:;  Regular w/ thin, SLP rec mechanical soft and NTL    Objective:  Pt seen for dx dysphagia tx  Pt had breakfast w/ nursing, cut up Syriac toast into small pieces and thickened coffee  Nursing reports she took pills w/ thin liquids without difficulty  Pt seen w/ trials NTL, thin liquids, and medium-hard solids  Pt w/ cough on initial cup sip thin liquid, pt reports "it spilled down " Subsequent cup and straw sips were without overt s/s aspiration  Pt took minimal bites of solids, able to masticate, manipulate, and transfer small pieces of bread  Swallows are fairly prompt  Pt denied further trials  Assessment:  Pt well-tolerated trials of medium-hard solids in small pieces and sips of thin liquids, straw better than cup       Plan/Recommendations:  Dysphagia 3 w/ thin, supervision  Frequent oral care  ST f/u as able and appropriate

## 2021-02-10 LAB
ANION GAP SERPL CALCULATED.3IONS-SCNC: 3 MMOL/L (ref 4–13)
BACTERIA BLD CULT: ABNORMAL
BUN SERPL-MCNC: 9 MG/DL (ref 5–25)
CALCIUM SERPL-MCNC: 8.5 MG/DL (ref 8.3–10.1)
CHLORIDE SERPL-SCNC: 119 MMOL/L (ref 100–108)
CO2 SERPL-SCNC: 28 MMOL/L (ref 21–32)
CREAT SERPL-MCNC: 0.45 MG/DL (ref 0.6–1.3)
CREAT UR-MCNC: 46.7 MG/DL
ERYTHROCYTE [DISTWIDTH] IN BLOOD BY AUTOMATED COUNT: 15.9 % (ref 11.6–15.1)
GFR SERPL CREATININE-BSD FRML MDRD: 91 ML/MIN/1.73SQ M
GLUCOSE SERPL-MCNC: 105 MG/DL (ref 65–140)
GRAM STN SPEC: ABNORMAL
HCT VFR BLD AUTO: 27 % (ref 34.8–46.1)
HGB BLD-MCNC: 8 G/DL (ref 11.5–15.4)
MAGNESIUM SERPL-MCNC: 2.2 MG/DL (ref 1.6–2.6)
MCH RBC QN AUTO: 31.5 PG (ref 26.8–34.3)
MCHC RBC AUTO-ENTMCNC: 29.6 G/DL (ref 31.4–37.4)
MCV RBC AUTO: 106 FL (ref 82–98)
OSMOLALITY UR/SERPL-RTO: 294 MMOL/KG (ref 282–298)
OSMOLALITY UR: 369 MMOL/KG
PLATELET # BLD AUTO: 332 THOUSANDS/UL (ref 149–390)
PMV BLD AUTO: 9.6 FL (ref 8.9–12.7)
POTASSIUM SERPL-SCNC: 3.9 MMOL/L (ref 3.5–5.3)
PROCALCITONIN SERPL-MCNC: 0.06 NG/ML
RBC # BLD AUTO: 2.54 MILLION/UL (ref 3.81–5.12)
SODIUM 24H UR-SCNC: 79 MOL/L
SODIUM SERPL-SCNC: 150 MMOL/L (ref 136–145)
WBC # BLD AUTO: 3.21 THOUSAND/UL (ref 4.31–10.16)

## 2021-02-10 PROCEDURE — 99232 SBSQ HOSP IP/OBS MODERATE 35: CPT | Performed by: SURGERY

## 2021-02-10 PROCEDURE — 92526 ORAL FUNCTION THERAPY: CPT

## 2021-02-10 PROCEDURE — 82570 ASSAY OF URINE CREATININE: CPT | Performed by: EMERGENCY MEDICINE

## 2021-02-10 PROCEDURE — 84300 ASSAY OF URINE SODIUM: CPT | Performed by: EMERGENCY MEDICINE

## 2021-02-10 PROCEDURE — 84145 PROCALCITONIN (PCT): CPT | Performed by: STUDENT IN AN ORGANIZED HEALTH CARE EDUCATION/TRAINING PROGRAM

## 2021-02-10 PROCEDURE — 80048 BASIC METABOLIC PNL TOTAL CA: CPT | Performed by: STUDENT IN AN ORGANIZED HEALTH CARE EDUCATION/TRAINING PROGRAM

## 2021-02-10 PROCEDURE — 85027 COMPLETE CBC AUTOMATED: CPT | Performed by: STUDENT IN AN ORGANIZED HEALTH CARE EDUCATION/TRAINING PROGRAM

## 2021-02-10 PROCEDURE — 99232 SBSQ HOSP IP/OBS MODERATE 35: CPT | Performed by: INTERNAL MEDICINE

## 2021-02-10 PROCEDURE — 83935 ASSAY OF URINE OSMOLALITY: CPT | Performed by: EMERGENCY MEDICINE

## 2021-02-10 PROCEDURE — 83735 ASSAY OF MAGNESIUM: CPT | Performed by: STUDENT IN AN ORGANIZED HEALTH CARE EDUCATION/TRAINING PROGRAM

## 2021-02-10 PROCEDURE — 83930 ASSAY OF BLOOD OSMOLALITY: CPT | Performed by: EMERGENCY MEDICINE

## 2021-02-10 RX ADMIN — QUETIAPINE 400 MG: 100 TABLET, FILM COATED ORAL at 21:27

## 2021-02-10 RX ADMIN — CLONAZEPAM 0.5 MG: 0.5 TABLET ORAL at 08:28

## 2021-02-10 RX ADMIN — HEPARIN SODIUM 5000 UNITS: 5000 INJECTION INTRAVENOUS; SUBCUTANEOUS at 05:45

## 2021-02-10 RX ADMIN — LEVETIRACETAM 500 MG: 500 TABLET, FILM COATED ORAL at 21:23

## 2021-02-10 RX ADMIN — HEPARIN SODIUM 5000 UNITS: 5000 INJECTION INTRAVENOUS; SUBCUTANEOUS at 21:23

## 2021-02-10 RX ADMIN — CLONAZEPAM 0.5 MG: 0.5 TABLET ORAL at 17:50

## 2021-02-10 RX ADMIN — PANTOPRAZOLE SODIUM 40 MG: 40 TABLET, DELAYED RELEASE ORAL at 05:54

## 2021-02-10 RX ADMIN — POLYETHYLENE GLYCOL 3350 17 G: 17 POWDER, FOR SOLUTION ORAL at 08:28

## 2021-02-10 RX ADMIN — HEPARIN SODIUM 5000 UNITS: 5000 INJECTION INTRAVENOUS; SUBCUTANEOUS at 14:37

## 2021-02-10 RX ADMIN — DOCUSATE SODIUM AND SENNOSIDES 2 TABLET: 8.6; 5 TABLET ORAL at 08:28

## 2021-02-10 RX ADMIN — LAMOTRIGINE 200 MG: 100 TABLET ORAL at 06:06

## 2021-02-10 RX ADMIN — TOPIRAMATE 50 MG: 25 TABLET ORAL at 08:28

## 2021-02-10 RX ADMIN — CEFEPIME HYDROCHLORIDE 2000 MG: 2 INJECTION, POWDER, FOR SOLUTION INTRAVENOUS at 05:57

## 2021-02-10 RX ADMIN — DOCUSATE SODIUM AND SENNOSIDES 2 TABLET: 8.6; 5 TABLET ORAL at 17:50

## 2021-02-10 RX ADMIN — TOPIRAMATE 50 MG: 25 TABLET ORAL at 17:50

## 2021-02-10 RX ADMIN — CEFEPIME HYDROCHLORIDE 2000 MG: 2 INJECTION, POWDER, FOR SOLUTION INTRAVENOUS at 17:49

## 2021-02-10 RX ADMIN — LEVETIRACETAM 500 MG: 500 TABLET, FILM COATED ORAL at 08:28

## 2021-02-10 NOTE — ASSESSMENT & PLAN NOTE
CTAP on presentation with marked distention of the rectum with large amount of fecal material  § Had 2 bowel movements  § Continue miralax, senokot, PRN mineral oil enemas  § Per gerontology, consider switching iron supplementation to ferrous gluconate

## 2021-02-10 NOTE — ASSESSMENT & PLAN NOTE
? Repeat head CT on 2/8 demonstrated no interval change in subacute b/l SDH hematomas compared to 2/7  ? Plan:   § Neurosurgery consulted  Patient cleared for VTE prophylaxis  Had some increasing confusion overnight, resolved with GCS 15 at this point    § Currently GCS 15, repeat head CT if GCS declines more than 2 points in 1 hour  § Keppra 500mg BID x7 days for seizure ppx  § Q2H neuro checks  § PT/OT

## 2021-02-10 NOTE — PROGRESS NOTES
Progress Note - Geriatric Medicine   Candy Burgos 80 y o  female MRN: [id number]  Unit/Bed#: [facility] 608-01 Encounter: [id number]      Assessment/Plan:    Acute metabolic encephalopathy   -markedly improved, multifactorial including sepsis 2/2 UTI, urinary retention, left frontal subdural hematoma and possible benzodiazepine withdrawal   -Abx regimen expanded from Rocephin to Cefepime    -aj catheter in place for urinary retention    -hypotension resolved   -home Clonazepam restarted (maintained on chronically as o/p)   -Nsx following for SDH   -continue supportive cares  -maintain normal circadian rhythm  -ensure pain well controlled  -monitor I/Os for fecal and urinary retention (aj in place, last BM recorded 2/8)  -redirect unwanted behaviors as first line    Sepsis 2/2 UTI  -coverage expanded from rocephin to cefepime on 2/8, currently day #5 abx overall   -blood pressures improved, now maintaining SBP (100-114)  -afebrile overnight and no longer toxic appearing     Urinary retention   -aj catheter in place  -continue regular Aj cares    Left frontal subdural hematoma  -s/p fall at long-term care facility  -s/p Kcentra on admission as patient was maintained on Eliquis as o/p for DVT/PE prevention as part of COVID protocol with end date scheduled for 2/8/21, has now been discontinued    Hx COVID pneumonia  -not currently on isolation as had been cleared by protocol  -negative on admission (2/6/21)    Ambulatory dysfunction with falls  -continue to encourage use of assist devices directed by therapy  -encourage good body mechanics assist with all transfers  -keep personal items and call bell close to prevent reaching  -continue to monitor for and address environmental fall hazards    Cognitive impairment   -consistent with early dementia versus MCI  -MOCA 13/30 (4/2019)  -continue encourage patient remain physically, socially, and cognitively active engaged maintain cognitive acuity  -continue to ensure that mood and depressive symptoms are adequately controlled as may patient's response to therapies as well as quality of life and overall sense of well-being    Impaired hearing  -continue to encourage use of hearing aid at all appropriate  -speak loudly and clearly using teach back method to ensure clear communication  -consider use of hearing amplifier if hearing aid is not sufficient    Bipolar I disorder  -continued on home medication regimen including clonazepam, lamotrigine, Seroquel, and Topiramate  -patient him agitation symptoms overnight last evening which has since resolved, suspect may have in part been due to early signs of benzodiazepine withdrawal and her home clonazepam has since been restarted, deferred dose increase for immediate future as patient appears to be stable on current regimen however will continue to re-evaluate is medically appropriate    Constipation   -continue bowel regimen including MiraLax  -enema available prn  -encourage mobilization hydration to encourage bowel motility  -last BM documented 2/8/21    Deconditioning/debility/frailty  -continue to treat metabolic derangements as noted above  -continue encourage well-balanced nutrition and continue nutritional supplements between meals  -continue to encourage mobilization physical occupational therapy as cleared by primary team with focus on gait endurance, and fall risk reduction strategies and optimization of ADLs    Care coordination:  Rounded at bedside with [address] (RN)    Subjective:     Patient seen and examined at bedside where she is sitting resting comfortably watching television, she reports feeling somewhat down about not being able to get in touch with her son Liz Richards and feels that someone called all her kids and told them not to answer the phone when she calls  She states that she wants to talk to Liz Richards, she does not want anyone to give him information as she reports that she wants to do it herself   Nursing reports that she becomes very agitated and aggressive overnight hitting at staff, reportedly responds better to male staff overnight  Review of Systems   Constitutional: Negative for appetite change and fever  HENT: Positive for hearing loss  Negative for dental problem  Facial swelling: severely impaired  Eyes: Negative for visual disturbance  Respiratory: Negative for shortness of breath  Cardiovascular: Negative for chest pain  Gastrointestinal: Negative for abdominal pain, nausea and vomiting  Genitourinary: Negative for dysuria  Musculoskeletal: Negative for arthralgias  Skin: Negative  Neurological: Negative for dizziness and light-headedness  Hematological: Negative  Psychiatric/Behavioral: Positive for dysphoric mood  Negative for sleep disturbance  The patient is nervous/anxious  All other systems reviewed and are negative  Objective:     Vitals: Blood pressure 114/66, pulse 86, temperature 97 9 °F (36 6 °C), resp  rate 17, height 5' 4" (1 626 m), weight 48 5 kg (107 lb), SpO2 96 %  ,Body mass index is 18 37 kg/m²  Intake/Output Summary (Last 24 hours) at 2/10/2021 0849  Last data filed at 2/10/2021 5497  Gross per 24 hour   Intake 1690 ml   Output 1575 ml   Net 115 ml       Current Medications: Reviewed    Physical Exam:   Physical Exam  Vitals signs and nursing note reviewed  Constitutional:       General: She is not in acute distress  Comments: Thin, frail, elderly appearing female   HENT:      Head: Normocephalic and atraumatic  Comments: Hearing aid in right ear     Nose: Nose normal       Mouth/Throat:      Mouth: Mucous membranes are dry  Eyes:      General: No scleral icterus  Right eye: No discharge  Left eye: No discharge  Conjunctiva/sclera: Conjunctivae normal    Neck:      Comments: Trachea appears midline  Phonation normal  Cardiovascular:      Rate and Rhythm: Normal rate  Heart sounds: Murmur (Systolic) present  Pulmonary:      Effort: No respiratory distress  Breath sounds: Normal breath sounds  No wheezing  Abdominal:      General: Bowel sounds are normal       Palpations: Abdomen is soft  Tenderness: There is no abdominal tenderness  Comments: Reports appetite is good and abdominal pain now resolved   Skin:     General: Skin is warm and dry  Coloration: Skin is pale  Comments: Skin is thin and friable   Neurological:      Mental Status: She is alert  Comments: Awake and alert, oriented to person, place, situation in general, slow to answer questions at times as reports some chronic word finding difficulties   Psychiatric:      Comments: Pleasant and cooperative, enjoying watching music channel this morning         Invasive Devices     Peripheral Intravenous Line            Peripheral IV 02/08/21 Left Antecubital 1 day    Peripheral IV 02/09/21 Right Antecubital 1 day    Peripheral IV 02/09/21 Right Forearm 1 day          Drain            Urethral Catheter Temperature probe 16 Fr  3 days              Lab, Imaging and other studies: I have personally reviewed pertinent reports

## 2021-02-10 NOTE — CASE MANAGEMENT
Pt will need Psych eval as she potentially is a target due to [facility] Hospitalization over 30 years ago  CM has a call into [facility] to see if a level 2 was done within the past year   If not, CM will option patient

## 2021-02-10 NOTE — ASSESSMENT & PLAN NOTE
· Patient experienced an episode of hypotension early on the morning of 2/9  · After 2L, patient's BP improved, patient transitioned out of ICU  · Maintain MAP >65

## 2021-02-10 NOTE — ASSESSMENT & PLAN NOTE
? Blood cx 2/6: 1/2 GNR  ? Ucx 2/6: +nitrites, +leukos, >100,000 CFU mixed contaminants    ? Plan:   § To continue on cefepime for total of 7d, with repeat blood cultures pending  § Complicated UTI 2/2 chronic indwelling aj catheter due to urinary retention

## 2021-02-10 NOTE — PLAN OF CARE
Problem: Potential for Falls  Goal: Patient will remain free of falls  Description: INTERVENTIONS:  - Assess patient frequently for physical needs  -  Identify cognitive and physical deficits and behaviors that affect risk of falls    -  [place] fall precautions as indicated by assessment   - Educate patient/family on patient safety including physical limitations  - Instruct patient to call for assistance with activity based on assessment  - Modify environment to reduce risk of injury  - Consider OT/PT consult to assist with strengthening/mobility  Outcome: Progressing     Problem: Prexisting or High Potential for Compromised Skin Integrity  Goal: Skin integrity is maintained or improved  Description: INTERVENTIONS:  - Identify patients at risk for skin breakdown  - Assess and monitor skin integrity  - Assess and monitor nutrition and hydration status  - Monitor labs   - Assess for incontinence   - Turn and reposition patient  - Assist with mobility/ambulation  - Relieve pressure over bony prominences  - Avoid friction and shearing  - Provide appropriate hygiene as needed including keeping skin clean and dry  - Evaluate need for skin moisturizer/barrier cream  - Collaborate with interdisciplinary team   - Patient/family teaching  - Consider wound care consult   Outcome: Progressing     Problem: INFECTION - ADULT  Goal: Absence or prevention of progression during hospitalization  Description: INTERVENTIONS:  - Assess and monitor for signs and symptoms of infection  - Monitor lab/diagnostic results  - Monitor all insertion sites, i e  indwelling lines, tubes, and drains  - Monitor endotracheal if appropriate and nasal secretions for changes in amount and color  - [place] appropriate cooling/warming therapies per order  - Administer medications as ordered  - Instruct and encourage patient and family to use good hand hygiene technique  - Identify and instruct in appropriate isolation precautions for identified infection/condition  Outcome: Progressing  Goal: Absence of fever/infection during neutropenic period  Description: INTERVENTIONS:  - Monitor WBC    Outcome: Progressing     Problem: DISCHARGE PLANNING  Goal: Discharge to home or other facility with appropriate resources  Description: INTERVENTIONS:  - Identify barriers to discharge w/patient and caregiver  - Arrange for needed discharge resources and transportation as appropriate  - Identify discharge learning needs (meds, wound care, etc )  - Arrange for interpretive services to assist at discharge as needed  - Refer to Case Management Department for coordinating discharge planning if the patient needs post-hospital services based on physician/advanced practitioner order or complex needs related to functional status, cognitive ability, or social support system  Outcome: Progressing     Problem: Knowledge Deficit  Goal: Patient/family/caregiver demonstrates understanding of disease process, treatment plan, medications, and discharge instructions  Description: Complete learning assessment and assess knowledge base  Interventions:  - Provide teaching at level of understanding  - Provide teaching via preferred learning methods  Outcome: Progressing     Problem: CONFUSION/THOUGHT DISTURBANCE  Goal: Thought disturbances (confusion, delirium, depression, dementia or psychosis) are managed to maintain or return to baseline mental status and functional level  Description: INTERVENTIONS:  - Assess for possible contributors to  thought disturbance, including but not limited to medications, infection, impaired vision or hearing, underlying metabolic abnormalities, dehydration, respiratory compromise,  psychiatric diagnoses and notify attending PHYSICAN/AP  - Monitor and intervene to maintain adequate nutrition, hydration, elimination, sleep and activity  - Decrease environmental stimuli, including noise as appropriate    - Provide frequent contacts to provide refocusing, direction and reassurance as needed  Approach patient calmly with eye contact and at their level  - [place] high risk fall precautions, aspiration precautions and other safety measures, as indicated  - If delirium suspected, notify physician/AP of change in condition and request immediate in-person evaluation  - Pursue consults as appropriate including Geriatric (campus dependent), OT for cognitive evaluation/activity planning, psychiatric, pastoral care, etc   Outcome: Progressing     Problem: Nutrition/Hydration-ADULT  Goal: Nutrient/Hydration intake appropriate for improving, restoring or maintaining nutritional needs  Description: Monitor and assess patient's nutrition/hydration status for malnutrition  Collaborate with interdisciplinary team and initiate plan and interventions as ordered  Monitor patient's weight and dietary intake as ordered or per policy  Utilize nutrition screening tool and intervene as necessary  Determine patient's food preferences and provide high-protein, high-caloric foods as appropriate       INTERVENTIONS:  - Monitor oral intake, urinary output, labs, and treatment plans  - Assess nutrition and hydration status and recommend course of action  - Evaluate amount of meals eaten  - Assist patient with eating if necessary   - Allow adequate time for meals  - Recommend/ encourage appropriate diets, oral nutritional supplements, and vitamin/mineral supplements  - Order, calculate, and assess calorie counts as needed  - Recommend, monitor, and adjust tube feedings and TPN/PPN based on assessed needs  - Assess need for intravenous fluids  - Provide specific nutrition/hydration education as appropriate  - Include patient/family/caregiver in decisions related to nutrition  Outcome: Progressing

## 2021-02-10 NOTE — SPEECH THERAPY NOTE
Speech Language/Pathology    Speech/Language Pathology Progress Note    Patient Name: Maisha Salgado  NUPAQ'F Date: 2/10/2021     Problem List  Principal Problem:    Subdural hematoma ([facility] )  Active Problems:    Bipolar 1 disorder ([facility] )    Constipation    Fall    Sepsis due to urinary tract infection ([facility] )    Hypotension       Past Medical History  Past Medical History:   Diagnosis Date    Anemia 2/26/2019    Anxiety     Bipolar 1 disorder ([facility] )     Depression     [facility] 2008    Left leg    GERD (gastroesophageal reflux disease)     Hypertension     Overactive bladder         Past Surgical History  Past Surgical History:   Procedure Laterality Date    ADENOIDECTOMY      CATARACT EXTRACTION Bilateral     Right 10/2003, left 4/2004    CHOLECYSTECTOMY      DILATION AND CURETTAGE OF UTERUS  1985    HERNIA REPAIR  11/02/2007    Femoral and small bowel resection    HIP SURGERY      L hip    TONSILLECTOMY      As a youth    WRIST SURGERY      L wrist         Subjective:  Pt upright in bed, awake, alert, and talkative  "I feel the urge to go"    Current Diet:  Dysphagia 3 w/ thin     Objective:  Pt seen for f/u dysphagia tx  Pt seen w/ trials of regular texture solids (granola bar) and thin liquids  Mastication and bolus formation of regular texture is appropriate  Transfers are wfl, no lingual residue  Pt reports globus sensation w/ solid and requests liquid wash  Pt is able to draw liquids via straw  Swallows appear timely  Pt w/ throat clear x1 w/ thin liquids after taking several consecutive sips  Education provided on using small single sips and optimizing positioning  Pt demonstrated understanding and was able to demonstrate independent use of small single sips  Pt may benefit from VBS to rule out aspiration as pt's status appears to have improved since previous admission ST notes       Assessment:  Pt is fairly well-tolerated thin liquids, (throat clear x1 after several consecutive sips), oral manipulation is appropriate for advanced solids, though pt reports globus sensation and need for liquid wash w/ advanced solids  Plan/Recommendations:  Continue current diet, close supervision  Frequent oral care  ?  VBS if clinically appropriate

## 2021-02-11 ENCOUNTER — APPOINTMENT (INPATIENT)
Dept: RADIOLOGY | Facility: HOSPITAL | Age: 86
DRG: 085 | End: 2021-02-11
Payer: COMMERCIAL

## 2021-02-11 LAB
ANION GAP SERPL CALCULATED.3IONS-SCNC: 3 MMOL/L (ref 4–13)
BASOPHILS # BLD AUTO: 0.05 THOUSANDS/ΜL (ref 0–0.1)
BASOPHILS NFR BLD AUTO: 1 % (ref 0–1)
BUN SERPL-MCNC: 10 MG/DL (ref 5–25)
CALCIUM SERPL-MCNC: 9.2 MG/DL (ref 8.3–10.1)
CHLORIDE SERPL-SCNC: 115 MMOL/L (ref 100–108)
CO2 SERPL-SCNC: 29 MMOL/L (ref 21–32)
CREAT SERPL-MCNC: 0.51 MG/DL (ref 0.6–1.3)
EOSINOPHIL # BLD AUTO: 0.13 THOUSAND/ΜL (ref 0–0.61)
EOSINOPHIL NFR BLD AUTO: 3 % (ref 0–6)
ERYTHROCYTE [DISTWIDTH] IN BLOOD BY AUTOMATED COUNT: 16 % (ref 11.6–15.1)
GFR SERPL CREATININE-BSD FRML MDRD: 87 ML/MIN/1.73SQ M
GLUCOSE SERPL-MCNC: 80 MG/DL (ref 65–140)
HCT VFR BLD AUTO: 33.1 % (ref 34.8–46.1)
HGB BLD-MCNC: 9.6 G/DL (ref 11.5–15.4)
IMM GRANULOCYTES # BLD AUTO: 0.03 THOUSAND/UL (ref 0–0.2)
IMM GRANULOCYTES NFR BLD AUTO: 1 % (ref 0–2)
LYMPHOCYTES # BLD AUTO: 2.55 THOUSANDS/ΜL (ref 0.6–4.47)
LYMPHOCYTES NFR BLD AUTO: 47 % (ref 14–44)
MCH RBC QN AUTO: 31.3 PG (ref 26.8–34.3)
MCHC RBC AUTO-ENTMCNC: 29 G/DL (ref 31.4–37.4)
MCV RBC AUTO: 108 FL (ref 82–98)
MONOCYTES # BLD AUTO: 0.52 THOUSAND/ΜL (ref 0.17–1.22)
MONOCYTES NFR BLD AUTO: 10 % (ref 4–12)
NEUTROPHILS # BLD AUTO: 1.99 THOUSANDS/ΜL (ref 1.85–7.62)
NEUTS SEG NFR BLD AUTO: 38 % (ref 43–75)
NRBC BLD AUTO-RTO: 0 /100 WBCS
PLATELET # BLD AUTO: 449 THOUSANDS/UL (ref 149–390)
PMV BLD AUTO: 9.6 FL (ref 8.9–12.7)
POTASSIUM SERPL-SCNC: 4 MMOL/L (ref 3.5–5.3)
RBC # BLD AUTO: 3.07 MILLION/UL (ref 3.81–5.12)
SODIUM SERPL-SCNC: 147 MMOL/L (ref 136–145)
WBC # BLD AUTO: 5.27 THOUSAND/UL (ref 4.31–10.16)

## 2021-02-11 PROCEDURE — 99232 SBSQ HOSP IP/OBS MODERATE 35: CPT | Performed by: SURGERY

## 2021-02-11 PROCEDURE — 97116 GAIT TRAINING THERAPY: CPT

## 2021-02-11 PROCEDURE — 97110 THERAPEUTIC EXERCISES: CPT

## 2021-02-11 PROCEDURE — 99232 SBSQ HOSP IP/OBS MODERATE 35: CPT | Performed by: INTERNAL MEDICINE

## 2021-02-11 PROCEDURE — 74230 X-RAY XM SWLNG FUNCJ C+: CPT

## 2021-02-11 PROCEDURE — 85025 COMPLETE CBC W/AUTO DIFF WBC: CPT | Performed by: EMERGENCY MEDICINE

## 2021-02-11 PROCEDURE — 99222 1ST HOSP IP/OBS MODERATE 55: CPT | Performed by: PSYCHIATRY & NEUROLOGY

## 2021-02-11 PROCEDURE — BD11YZZ FLUOROSCOPY OF ESOPHAGUS USING OTHER CONTRAST: ICD-10-PCS | Performed by: EMERGENCY MEDICINE

## 2021-02-11 PROCEDURE — 92611 MOTION FLUOROSCOPY/SWALLOW: CPT

## 2021-02-11 PROCEDURE — 80048 BASIC METABOLIC PNL TOTAL CA: CPT | Performed by: EMERGENCY MEDICINE

## 2021-02-11 PROCEDURE — 92526 ORAL FUNCTION THERAPY: CPT

## 2021-02-11 RX ADMIN — DOCUSATE SODIUM AND SENNOSIDES 2 TABLET: 8.6; 5 TABLET ORAL at 17:21

## 2021-02-11 RX ADMIN — CEFEPIME HYDROCHLORIDE 2000 MG: 2 INJECTION, POWDER, FOR SOLUTION INTRAVENOUS at 05:21

## 2021-02-11 RX ADMIN — LEVETIRACETAM 500 MG: 500 TABLET, FILM COATED ORAL at 09:12

## 2021-02-11 RX ADMIN — HEPARIN SODIUM 5000 UNITS: 5000 INJECTION INTRAVENOUS; SUBCUTANEOUS at 05:22

## 2021-02-11 RX ADMIN — CEFEPIME HYDROCHLORIDE 2000 MG: 2 INJECTION, POWDER, FOR SOLUTION INTRAVENOUS at 17:21

## 2021-02-11 RX ADMIN — PANTOPRAZOLE SODIUM 40 MG: 40 TABLET, DELAYED RELEASE ORAL at 05:22

## 2021-02-11 RX ADMIN — HEPARIN SODIUM 5000 UNITS: 5000 INJECTION INTRAVENOUS; SUBCUTANEOUS at 21:57

## 2021-02-11 RX ADMIN — QUETIAPINE 400 MG: 100 TABLET, FILM COATED ORAL at 21:57

## 2021-02-11 RX ADMIN — POLYETHYLENE GLYCOL 3350 17 G: 17 POWDER, FOR SOLUTION ORAL at 09:12

## 2021-02-11 RX ADMIN — HEPARIN SODIUM 5000 UNITS: 5000 INJECTION INTRAVENOUS; SUBCUTANEOUS at 14:27

## 2021-02-11 RX ADMIN — LAMOTRIGINE 200 MG: 100 TABLET ORAL at 05:22

## 2021-02-11 RX ADMIN — DOCUSATE SODIUM AND SENNOSIDES 2 TABLET: 8.6; 5 TABLET ORAL at 09:12

## 2021-02-11 RX ADMIN — TOPIRAMATE 50 MG: 25 TABLET ORAL at 17:21

## 2021-02-11 RX ADMIN — TOPIRAMATE 50 MG: 25 TABLET ORAL at 09:12

## 2021-02-11 RX ADMIN — CLONAZEPAM 0.5 MG: 0.5 TABLET ORAL at 09:12

## 2021-02-11 RX ADMIN — LEVETIRACETAM 500 MG: 500 TABLET, FILM COATED ORAL at 21:56

## 2021-02-11 RX ADMIN — CLONAZEPAM 0.5 MG: 0.5 TABLET ORAL at 17:21

## 2021-02-11 NOTE — PROCEDURES
Video Swallow Study      Patient Name: Grecia Collins  UCARU'H Date: 2/11/2021        Past Medical History  Past Medical History:   Diagnosis Date    Anemia 2/26/2019    Anxiety     B[facility] disorder ([facility] )     Depression     DVT (deep venous thrombosis) ([facility] ) 2008    Left leg    GERD (gastroesophageal reflux disease)     Hypertension     Overactive bladder         Past Surgical History  Past Surgical History:   Procedure Laterality Date    ADENOIDECTOMY      CATARACT EXTRACTION Bilateral     Right 10/2003, left 4/2004    CHOLECYSTECTOMY      DILATION AND CURETTAGE OF UTERUS  1985    HERNIA REPAIR  11/02/2007    Femoral and small bowel resection    HIP SURGERY      L hip    TONSILLECTOMY      As a youth    WRIST SURGERY      L wrist     Video Barium Swallow Study    Summary:  Pt presents w/ mild oropharyngeal dysphagia vinicius by reduced bolus control resulting in premature spill to the pharynx, reduced laryngeal excursion, and pharyngeal retention following the swallow  Transfers are reduced leaving residue in the oral cavity  Swallow initiation is delayed, most material reaching the pyriforms prior to initiation  Minimal epiglottic inversion  Pt needs cues to clear retention in valleculae, pyriforms, and PPW using secondary swallow  Aspiration during the swallow while taking a pill whole w/ water  No other penetration or aspiration visualized during today's study  Screening of the esophagus revealed CP prominence, pill stasis, slow clearing and ? Tortuous esophagus -  Consult GI  Images are on PACS for review  Recommendations:  Diet: Regular textures   Liquids: Thin   Meds: In puree  Strategies:  Alternate liquid solids, slow rate  Upright position  F/u ST tx: Yes  Therapy Prognosis: Good   Prognosis considerations: Age, current medical, past medical   Intermittent Supervision  Aspiration Precautions  Reflux Precautions  Consider consult with: GI Results reviewed with: pt, nursing, physician, dietician  Aspiration precautions posted  If a dedicated assessment of the esophagus is desired, consider esophagram/barium swallow or EGD  Patient's goal: potato chips    Goals:  Pt will tolerate least restrictive diet w/out s/s aspiration or oral/pharyngeal difficulties  PMH:  2/9"Lashell Hdez is a 80 y o  female OMHx of GERD, DVT on Eliquis, bipolar 1, who presented to [facility] as a transfer from [facility] on 2/7/2021 for a witnessed fall at her nursing home  At the time patient denied head strike or LOC  CT imaging on admission demonstrated an acute on chronic left subdural hematoma with overlying left frontal lobe 9 mm maximal width, no midline shift  CTAP demonstrated enlarged rectum with large amount of fecal material, on admission patient was noted to have urosepsis and was started on Rocephin  Patient received Kcentra for reversal on admission  Patient noted to be hypotensive and confused by nursing this AM  On examination patient with a GCS of 15 and was easily awaken, O2 sats of 95% on 1L NC  Patient hypotensive with MAPs in the 60s despite 1L bolus of LR  Patient transferred to [facility] for likely low urine output and urosepsis "    Previous VBS:  None    Current Diet:   Dysphagia 3 w/ thin     Premorbid diet:  Mechanical w/ soft    Dentition:  Natural     O2 requirement:  RA    Oral mech:  Strength and ROM: WFL     Vocal Quality/Speech:  Hoarse at baseline     Cognitive status:  Awake, alert    Consistencies administered: Barium laden applesauce, banana, chicken, hard solid, honey thick, nectar thick, thin liquids, 13mm barium pill  Liquids were administered by cup and straw  Pt was seated laterally at 90 degrees       Oral stage:  Lip closure: wfl   Mastication: wfl   Bolus formation: wfl   Bolus control: reduced   Transfer: reduced   Residue: w/ all     Pharyngeal stage:  Swallow promptness: mild/mod delay   Spill to valleculae: w/ all   Spill to pyriforms: w/ all   Epiglottic inversion: minimal   Laryngeal excursion: minimal anterior movement   Pharyngeal constriction: reduced   Vallecular retention: mild w/ all   Pyriform retention: mild w/ all   PPW coating: w/ all   Osteophytes: +   CP prominence: +   Retropulsion from prominence: -   Transient penetration: -   Epiglottic undercoat: w/   Penetration: -   Aspiration: w/ pill whole w/ water   Strategies: -   Response to aspiration: SILENT    Screening of Esophageal stage:  ?  Tortuous, pill stasis, slow clearing

## 2021-02-11 NOTE — PLAN OF CARE
Problem: Potential for Falls  Goal: Patient will remain free of falls  Description: INTERVENTIONS:  - Assess patient frequently for physical needs  -  Identify cognitive and physical deficits and behaviors that affect risk of falls    -  [place] fall precautions as indicated by assessment   - Educate patient/family on patient safety including physical limitations  - Instruct patient to call for assistance with activity based on assessment  - Modify environment to reduce risk of injury  - Consider OT/PT consult to assist with strengthening/mobility  Outcome: Progressing     Problem: Prexisting or High Potential for Compromised Skin Integrity  Goal: Skin integrity is maintained or improved  Description: INTERVENTIONS:  - Identify patients at risk for skin breakdown  - Assess and monitor skin integrity  - Assess and monitor nutrition and hydration status  - Monitor labs   - Assess for incontinence   - Turn and reposition patient  - Assist with mobility/ambulation  - Relieve pressure over bony prominences  - Avoid friction and shearing  - Provide appropriate hygiene as needed including keeping skin clean and dry  - Evaluate need for skin moisturizer/barrier cream  - Collaborate with interdisciplinary team   - Patient/family teaching  - Consider wound care consult   Outcome: Progressing     Problem: INFECTION - ADULT  Goal: Absence or prevention of progression during hospitalization  Description: INTERVENTIONS:  - Assess and monitor for signs and symptoms of infection  - Monitor lab/diagnostic results  - Monitor all insertion sites, i e  indwelling lines, tubes, and drains  - Monitor endotracheal if appropriate and nasal secretions for changes in amount and color  - [place] appropriate cooling/warming therapies per order  - Administer medications as ordered  - Instruct and encourage patient and family to use good hand hygiene technique  - Identify and instruct in appropriate isolation precautions for identified infection/condition  Outcome: Progressing  Goal: Absence of fever/infection during neutropenic period  Description: INTERVENTIONS:  - Monitor WBC    Outcome: Progressing     Problem: DISCHARGE PLANNING  Goal: Discharge to home or other facility with appropriate resources  Description: INTERVENTIONS:  - Identify barriers to discharge w/patient and caregiver  - Arrange for needed discharge resources and transportation as appropriate  - Identify discharge learning needs (meds, wound care, etc )  - Arrange for interpretive services to assist at discharge as needed  - Refer to Case Management Department for coordinating discharge planning if the patient needs post-hospital services based on physician/advanced practitioner order or complex needs related to functional status, cognitive ability, or social support system  Outcome: Progressing     Problem: Knowledge Deficit  Goal: Patient/family/caregiver demonstrates understanding of disease process, treatment plan, medications, and discharge instructions  Description: Complete learning assessment and assess knowledge base  Interventions:  - Provide teaching at level of understanding  - Provide teaching via preferred learning methods  Outcome: Progressing     Problem: CONFUSION/THOUGHT DISTURBANCE  Goal: Thought disturbances (confusion, delirium, depression, dementia or psychosis) are managed to maintain or return to baseline mental status and functional level  Description: INTERVENTIONS:  - Assess for possible contributors to  thought disturbance, including but not limited to medications, infection, impaired vision or hearing, underlying metabolic abnormalities, dehydration, respiratory compromise,  psychiatric diagnoses and notify attending PHYSICAN/AP  - Monitor and intervene to maintain adequate nutrition, hydration, elimination, sleep and activity  - Decrease environmental stimuli, including noise as appropriate    - Provide frequent contacts to provide refocusing, direction and reassurance as needed  Approach patient calmly with eye contact and at their level  - [place] high risk fall precautions, aspiration precautions and other safety measures, as indicated  - If delirium suspected, notify physician/AP of change in condition and request immediate in-person evaluation  - Pursue consults as appropriate including Geriatric (campus dependent), OT for cognitive evaluation/activity planning, psychiatric, pastoral care, etc   Outcome: Progressing     Problem: Nutrition/Hydration-ADULT  Goal: Nutrient/Hydration intake appropriate for improving, restoring or maintaining nutritional needs  Description: Monitor and assess patient's nutrition/hydration status for malnutrition  Collaborate with interdisciplinary team and initiate plan and interventions as ordered  Monitor patient's weight and dietary intake as ordered or per policy  Utilize nutrition screening tool and intervene as necessary  Determine patient's food preferences and provide high-protein, high-caloric foods as appropriate       INTERVENTIONS:  - Monitor oral intake, urinary output, labs, and treatment plans  - Assess nutrition and hydration status and recommend course of action  - Evaluate amount of meals eaten  - Assist patient with eating if necessary   - Allow adequate time for meals  - Recommend/ encourage appropriate diets, oral nutritional supplements, and vitamin/mineral supplements  - Order, calculate, and assess calorie counts as needed  - Recommend, monitor, and adjust tube feedings and TPN/PPN based on assessed needs  - Assess need for intravenous fluids  - Provide specific nutrition/hydration education as appropriate  - Include patient/family/caregiver in decisions related to nutrition  Outcome: Progressing

## 2021-02-11 NOTE — PHYSICAL THERAPY NOTE
PHYSICAL THERAPY NOTE          Patient Name: Grecia Collins  MTNOU'H Date: 2/11/2021 02/11/21 1531   Note Type   Note Type Treatment   Pain Assessment   Pain Assessment Tool Pain Assessment not indicated - pt denies pain   Restrictions/Precautions   Weight Bearing Precautions Per Order No   Other Precautions Cognitive; Chair Alarm; Bed Alarm;Multiple lines; Fall Risk;Hard of hearing   General   Chart Reviewed Yes   Response to Previous Treatment Patient with no complaints from previous session  Family/Caregiver Present No   Cognition   Overall Cognitive Status Impaired   Arousal/Participation Alert; Cooperative   Attention Attends with cues to redirect   Orientation Level Oriented to person;Oriented to place;Oriented to time   Memory Decreased recall of recent events   Following Commands Follows one step commands with increased time or repetition   Subjective   Subjective Patient willing to participate in PT treatment session   Bed Mobility   Supine to Sit 4  Minimal assistance   Additional items Assist x 1; Increased time required;Verbal cues   Transfers   Sit to Stand 4  Minimal assistance   Additional items Assist x 1; Increased time required;Verbal cues   Stand to Sit 4  Minimal assistance   Additional items Assist x 1; Increased time required;Verbal cues   Additional Comments Verbal cues and tactile cues needed for hand placement safety  Rolling walker was utilized for transfers   Ambulation/Elevation   Gait pattern Excessively slow; Short stride; Foward flexed; Inconsistent kristopher   Gait Assistance 4  Minimal assist   Additional items Assist x 1  (Chair follow for safety)   Assistive Device Rolling walker   Distance 45ft    Balance   Static Sitting Fair -   Static Standing Poor +   Ambulatory Poor +   Endurance Deficit   Endurance Deficit Yes   Endurance Deficit Description Fatigue, weakness   Activity Tolerance   Activity Tolerance Patient limited by fatigue   Medical Staff Made Aware Lexa Do, SPT; Luis Manuel Koch, rehab aide    Nurse Made Aware Patient appropriate to be seen and mobilized per nursing   Exercises   Hip Abduction Sitting;10 reps;AROM; Bilateral  (x 2 sets )   Knee AROM Long Arc Quad Sitting;10 reps;AROM; Bilateral  (x 2 sets )   Marching Sitting;10 reps;AROM; Bilateral  (x 2 sets )   Assessment   Prognosis Fair   Problem List Decreased strength;Decreased endurance; Impaired balance;Decreased mobility; Decreased cognition; Impaired judgement;Decreased safety awareness; Impaired hearing   Assessment Patient resting in bed at time of PT treatment session  Patient reports feeling " slightly better" and is willing to participate with PT  Patient was able to perform all bed mobility and transfers with Min a x1 which is slightly improved compared to previous session, but increased time is still required to complete due to fatigue and weakness  Verbal cues and tactile cues were needed for proper hand placement and safety with all transfers as well as Min assist to facilitate proper anterior weight shift when performing sit to stand  In standing, patient was noted to have forward flexed posture and required cues to correct  Patient was able to tolerate increased ambulation distances today with Min a x1 and the use of a rolling walker, however, distances continue to be limited by fatigue, weakness  Chair follow was required due to patient's decreased endurance and overall activity tolerance  No gross loss of balance was noted during gait training, but patient did present with forward flexed posture, decreased step length bilaterally, as well as decreased foot clearance  Patient denied any lightheadedness or dizziness with standing or ambulation during treatment session  Seated back in chair patient was able to perform all lower extremity TherEx without any increase in complaints    Slight cuing as well as visual instruction was needed for proper form pacing  At conclusion of PT treatment session patient was assisted back into chair with all needs within reach and chair alarm activated  Overall patient continues to progress with therapy will continue to benefit from skilled PT services during hospital stay  D/C recommendation when medically cleared his rehab   Barriers to Discharge None   Goals   Patient Goals " to get better"   STG Expiration Date 02/18/20   PT Treatment Day 1   Plan   Treatment/Interventions Functional transfer training;LE strengthening/ROM; Therapeutic exercise; Endurance training;Patient/family training;Equipment eval/education; Bed mobility;Gait training;Spoke to nursing;OT   Progress Progressing toward goals   PT Frequency Other (Comment)  (3-6x a week )   Recommendation   PT Discharge Recommendation Post-Acute Rehabilitation Services   Equipment Recommended Walker  (RW)   PT - OK to Discharge Yes  (To rehab when medically cleared)   AM-PAC Basic Mobility Inpatient   Turning in Bed Without Bedrails 3   Lying on Back to Sitting on Edge of Flat Bed 3   Moving Bed to Chair 3   Standing Up From Chair 3   Walk in Room 3   Climb 3-5 Stairs 2   Basic Mobility Inpatient Raw Score 17   Basic Mobility Standardized Score 39 67   Portions of the documentation may have been created using voice recognition software  Occasional wrong word or sound alike substitutions may have occurred due to the inherent limitations of the voice recognition software  Read the chart carefully and recognize, using context, where substitutions have occurred      Mehreen Wilkinson, PT, DPT

## 2021-02-11 NOTE — DISCHARGE INSTR - OTHER ORDERS
Wound Care  Plan:   1-Back--Apply Allevyn Life foam dressing to open area on lower back  Change dressing every 3 days

## 2021-02-11 NOTE — ASSESSMENT & PLAN NOTE
-Blood cx 2/6: 1/2 GNR  -Repeat cultures from 2/09 negative for growth at 24hrs   Follow up on final culture results  -Urine culture 2/6: +nitrites, +leukos, >100,000 CFU mixed contaminants   -Plan: continue on cefepime for total of 7d, with repeat blood cultures pending  -Complicated UTI 2/2 chronic indwelling aj catheter due to urinary retention  -Remains afebrile, WBC count wnl

## 2021-02-11 NOTE — PROGRESS NOTES
Progress Note - Geriatric Medicine   Viridiana Matute 80 y o  female MRN: [id number]  Unit/Bed#: [facility] 608-01 Encounter: [id number]      Assessment/Plan:    Acute metabolic encephalopathy  -markedly improved, appears to be back to baseline  -multifactorial including UTI, sepsis, and subdural hematoma in frail elderly individual   -currently on cefepime for UTI   -no longer hypotensive   -home medication regimen restarted  -continue maintain normal circadian rhythm  -continue good pain control  -continue to monitor for fecal and urinary retention    Sepsis 2/2 UTI  -vitals appear to be stable  -blood cultures no growth at 4 days, no growth 48 hours  -urine culture revealed mixed contaminant  -antibiotic coverage expanded Rocephin to Cefepime 2/8  -day # 6 antibiotics    Urinary retention  -chronic indwelling Louie catheter for urinary retention  -continue Louie care    Left frontal subdural hematoma  -s/p fall again care facility  -s/p Kcentra on admission as was on Eliquis for recent COVID PNA, completed course of Eliquis, not maintained on systemic AC at baseline   -Nsx following  -Neurochecks per protocol     Ambulatory dysfunction with recurrent falls  -continue to encourage good body mechanics assist with all transfers  -keep personal items and call bell close to prevent reaching  -continue fall precautions  -PT/OT, anticipate STR on dc    Bipolar 1 disorder  -continued on home medication regimen including clonazepam, lamotrigine, Seroquel, and topiramate  -patient has been cleared by Psychiatry for placement to short-term rehab    Hx of COVID pneumonia  -clinically resolved  -no longer requiring isolation per protocol    Cognitive impairment  -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  -MOCA 13/30 (2019)  -continue to encourage patient to remain physically, socially, and cognitively active and engaged to maintain cognitive acuity    Deconditioning/debility/frailty  -continue good control chronic medical conditions   -encourage well balanced nutrition  -continue PT/OT  -STR for strengthening, endurance, gait and maximizing independence with ADLs    Care Coordination: Rounded at bedside with Mikayla Pinto (RN)    Subjective:     Patient seen and examined at bedside, she reports that she is upset that she did not receive a dinner tray last evening and that she had to call to order and by the time she did they didn't have what she wanted and this morning she tells me that she is upset that she didn't get breakfast yet and is starving and is upset that she had to call at 830 to order and it still isnt here  She reports that she wants coffee but when I offered to persoanlly get her coffee in the meantime until her tray comes she declined and stated that she would wait for dietary to send it, she also complains that she has been leaking urine around her aj catheter and states that she is soaking but is resistant to allowing staff get her cleaned up and onto dry sheets  Review of Systems   Constitutional: Negative  HENT: Negative  Eyes: Negative  Respiratory: Negative  Cardiovascular: Negative  Gastrointestinal: Negative  Endocrine: Negative  Genitourinary: Positive for frequency (despite aj catheter in place reports feels urine leaking all the time around it)  Musculoskeletal: Positive for gait problem  Skin: Negative  Allergic/Immunologic: Negative  Neurological: Positive for weakness  Psychiatric/Behavioral:        Frustrated   All other systems reviewed and are negative  Objective:     Vitals: Blood pressure 119/68, pulse 98, temperature 97 8 °F (36 6 °C), resp  rate 20, height 5' 4" (1 626 m), weight 48 6 kg (107 lb 2 3 oz), SpO2 98 %  ,Body mass index is 18 39 kg/m²        Intake/Output Summary (Last 24 hours) at 2/11/2021 0854  Last data filed at 2/11/2021 0701  Gross per 24 hour   Intake 880 ml   Output 900 ml   Net -20 ml     Current Medications: Reviewed    Physical Exam: Physical Exam  Vitals signs and nursing note reviewed  Constitutional:       Comments: Thin, frail, elderly appearing female in no acute distress sitting in bed resting comfortably   HENT:      Head: Normocephalic and atraumatic  Comments: Temples and periorbital areas shallow     Nose: Nose normal       Mouth/Throat:      Mouth: Mucous membranes are moist    Eyes:      General:         Right eye: No discharge  Left eye: No discharge  Conjunctiva/sclera: Conjunctivae normal    Neck:      Comments: Trachea appears midline, phonation normal  Cardiovascular:      Rate and Rhythm: Normal rate  Pulses: Normal pulses  Pulmonary:      Effort: Pulmonary effort is normal  No respiratory distress  Breath sounds: Normal breath sounds  No wheezing  Abdominal:      General: Bowel sounds are normal  There is no distension  Palpations: Abdomen is soft  Tenderness: There is no abdominal tenderness  Musculoskeletal:      Comments: Diffuse subcutaneous fat muscle wasting   Skin:     General: Skin is warm and dry  Coloration: Skin is pale  Neurological:      Mental Status: She is alert  Comments: Awake and alert, answers questions appropriately   Psychiatric:         Attention and Perception: She is inattentive  Mood and Affect: Affect is angry  Speech: Speech is tangential          Behavior: Behavior is not combative  Judgment: Judgment is impulsive  Comments: Irritable and resistant to assistance at times she asks for it        Invasive Devices     Peripheral Intravenous Line            Peripheral IV 02/08/21 Left Antecubital 2 days    Peripheral IV 02/09/21 Right Antecubital 2 days    Peripheral IV 02/09/21 Right Forearm 2 days          Drain            Urethral Catheter Temperature probe 16 Fr  4 days              Lab, Imaging and other studies: I have personally reviewed pertinent reports

## 2021-02-11 NOTE — SPEECH THERAPY NOTE
Speech Language/Pathology    Speech/Language Pathology Progress Note    Patient Name: Talya Nogueira  FFPBZ'H Date: 2/11/2021     Problem List  Principal Problem:    Subdural hematoma ([facility] )  Active Problems:    [facility] )    Constipation    Fall    Sepsis due to urinary tract infection ([facility] )    Hypotension       Past Medical History  Past Medical History:   Diagnosis Date    Anemia 2/26/2019    Anxiety     Bipolar 1 disorder ([facility] )     Depression     DVT (deep venous thrombosis) ([place] [address] ) 2008    Left leg    GERD (gastroesophageal reflux disease)     Hypertension     Overactive bladder         Past Surgical History  Past Surgical History:   Procedure Laterality Date    ADENOIDECTOMY      CATARACT EXTRACTION Bilateral     Right 10/2003, left 4/2004    CHOLECYSTECTOMY      DILATION AND CURETTAGE OF UTERUS  1985    HERNIA REPAIR  11/02/2007    Femoral and small bowel resection    HIP SURGERY      L hip    TONSILLECTOMY      As a youth    WRIST SURGERY      L wrist         Subjective:  Pt awake in bed, complaining that her breakfast tray had not arrived  Current Diet:  Dysphagia 3 w/ thin     Objective:  Pt seen for f/u dysphagia tx  Pt wanted to wait for PO until her breakfast arrived  Pt did take pills whole w/ water w/ nursing present  Pt w/ coughing following pills  She stated "I'm not sure what happened there," took some time to recover from coughing episode  SLP offered pt more trials of thin liquids  Wet vocal quality noted x1  Potential VBS explained to pt, pt is agreeable to study  Pt would benefit from VBS to further assess swallow function and rule out aspiration as pt has not been consistently symptomatic of aspiration across tx sessions  Assessment:  Pt w/ coughing today while taking pills w/ water, pt then had wet vocal quality x1 w/ straw sip thin  Pt would benefit from VBS to further assess       Plan/Recommendations:  VBS today  Safest and least restrictive diet to be determined at that time- consider offering nectar thick liquids until VBS - notified nursing

## 2021-02-11 NOTE — PLAN OF CARE
Problem: PHYSICAL THERAPY ADULT  Goal: Performs mobility at highest level of function for planned discharge setting  See evaluation for individualized goals  Description: Treatment/Interventions: ADL retraining, Functional transfer training, LE strengthening/ROM, Therapeutic exercise, Endurance training, Cognitive reorientation, Patient/family training, Equipment eval/education, Bed mobility, Gait training, Spoke to nursing, OT  Equipment Recommended: Walker(RW)       See flowsheet documentation for full assessment, interventions and recommendations  Outcome: Progressing  Note: Prognosis: Fair  Problem List: Decreased strength, Decreased endurance, Impaired balance, Decreased mobility, Decreased cognition, Impaired judgement, Decreased safety awareness, Impaired hearing  Assessment: Patient resting in bed at time of PT treatment session  Patient reports feeling " slightly better" and is willing to participate with PT  Patient was able to perform all bed mobility and transfers with Min a x1 which is slightly improved compared to previous session, but increased time is still required to complete due to fatigue and weakness  Verbal cues and tactile cues were needed for proper hand placement and safety with all transfers as well as Min assist to facilitate proper anterior weight shift when performing sit to stand  In standing, patient was noted to have forward flexed posture and required cues to correct  Patient was able to tolerate increased ambulation distances today with Min a x1 and the use of a rolling walker, however, distances continue to be limited by fatigue, weakness  Chair follow was required due to patient's decreased endurance and overall activity tolerance  No gross loss of balance was noted during gait training, but patient did present with forward flexed posture, decreased step length bilaterally, as well as decreased foot clearance    Patient denied any lightheadedness or dizziness with standing or ambulation during treatment session  Seated back in chair patient was able to perform all lower extremity TherEx without any increase in complaints  Slight cuing as well as visual instruction was needed for proper form pacing  At conclusion of PT treatment session patient was assisted back into chair with all needs within reach and chair alarm activated  Overall patient continues to progress with therapy will continue to benefit from skilled PT services during hospital stay  D/C recommendation when medically cleared his rehab  Barriers to Discharge: None     PT Discharge Recommendation: Post-Acute Rehabilitation Services     PT - OK to Discharge: Yes(To rehab when medically cleared)    See flowsheet documentation for full assessment

## 2021-02-11 NOTE — WOUND OSTOMY CARE
Consult Note - Wound   Yandy Alarcon 80 y o  female MRN: [id number]  Unit/Bed#: [facility] 650-09 Encounter: [id number]      History and Present Illness:  80year old female presented to the hospital after a witnessed fall  Assessment Findings:   Patient agreeable to assessment  She is able to turn and move in bed independently  She has a aj catheter and is continent of bowel  Nutrition team is following  Skin is pale, fragile with loss of subcutaneous tissue  Bilateral heels are intact with preventative foam dressings in place  Bilateral buttocks and sacrum intact with mild, blanchable erythema  1   Hospital acquired stage 2 pressure injury to mid lower back along kyphotic spine  See flowsheet and media for wound details  Wound Care  Plan:   1-Apply Allevyn Life foam dressing to midline back, sacrum, and bilateral heels for prevention  Steve with P   Peel back at least daily for skin assessment and re-apply  Change dressing every 3 days and PRN  2-Elevate heels to offload pressure  3-Ehob cushion in chair when out of bed  4-Moisturize skin daily with skin nourishing cream   5-Turn/reposition q2h or when medically stable for pressure re-distribution on skin  6-Back--Apply Allevyn Life foam dressing to open area on lower back  Steve with T   Change dressing every 3 days and PRN  Wound care team to follow  Plan of care reviewed with primary RN  Trauma team, Karen Lemos, made aware of hospital acquired stage 2 to back via tiger text        Wound 02/10/21 Pressure Injury Back Mid;Right (Active)   Wound Image   02/11/21 1406   Wound Description Pink 02/11/21 1406   Pressure Injury Stage 2 02/11/21 1406   Tatiana-wound Assessment Erythema 02/11/21 1406   Wound Length (cm) 0 6 cm 02/11/21 1406   Wound Width (cm) 0 6 cm 02/11/21 1406   Wound Depth (cm) 0 1 cm 02/11/21 1406   Wound Surface Area (cm^2) 0 36 cm^2 02/11/21 1406   Wound Volume (cm^3) 0 04 cm^3 02/11/21 1406   Calculated Wound Volume (cm^3) 0 04 cm^3 02/11/21 1406   Drainage Amount None 02/11/21 1406   Non-staged Wound Description Partial thickness 02/11/21 1406   Treatments Cleansed 02/11/21 1406   Dressing Foam, Silicon (eg  Allevyn, etc) 02/11/21 1406   Patient Tolerance Tolerated well 02/11/21 1406   Dressing Status Clean;Dry; Intact 02/11/21 [address] BSN, RN, [facility]

## 2021-02-11 NOTE — ASSESSMENT & PLAN NOTE
-CTAP on presentation with marked distention of the rectum with large amount of fecal material  -Had 2 bowel movements  -Continue miralax, senokot, PRN mineral oil enemas  -Per gerontology, consider switching iron supplementation to ferrous gluconate

## 2021-02-11 NOTE — ASSESSMENT & PLAN NOTE
-Patient experienced an episode of hypotension early on the morning of 2/9  -After 2L, patient's BP improved, patient transitioned out of ICU  -Maintain MAP >65   HD stable overnight

## 2021-02-11 NOTE — ASSESSMENT & PLAN NOTE
-Repeat head CT on 2/8 demonstrated no interval change in subacute b/l SDH hematomas compared to 2/7  -Neurosurgery consulted  Patient cleared for VTE prophylaxis    -Currently GCS 15, repeat head CT if GCS declines more than 2 points in 1 hour  -Keppra 500mg BID x7 days for seizure ppx  -Q4H neuro checks  -PT/OT

## 2021-02-11 NOTE — PROGRESS NOTES
Progress Note - Grecia Minium 1935, 80 y o  female MRN: [id number]    Unit/Bed#: [facility] 110-72 Encounter: [id number]    Primary Care Provider: Vasu Koenig MD   Date and time admitted to hospital: 2/7/2021  2:38 AM        Hypotension  Assessment & Plan  -Patient experienced an episode of hypotension early on the morning of 2/9  -After 2L, patient's BP improved, patient transitioned out of ICU  -Maintain MAP >65  HD stable overnight    Sepsis due to urinary tract infection ([address] )  Assessment & Plan  -Blood cx 2/6: 1/2 GNR  -Repeat cultures from 2/09 negative for growth at 24hrs  Follow up on final culture results  -Urine culture 2/6: +nitrites, +leukos, >100,000 CFU mixed contaminants   -Plan: continue on cefepime for total of 7d, with repeat blood cultures pending  -Complicated UTI 2/2 chronic indwelling aj catheter due to urinary retention  -Remains afebrile, WBC count wnl    Fall  Assessment & Plan  - PT/OT evaluation and treatment, recommendation acute rehab  - Gerontology consulted    Constipation  Assessment & Plan  -CTAP on presentation with marked distention of the rectum with large amount of fecal material  -Had 2 bowel movements  -Continue miralax, senokot, PRN mineral oil enemas  -Per gerontology, consider switching iron supplementation to ferrous gluconate       Bipolar 1 disorder (HCC)  Assessment & Plan   -Continue home Lamictal, Seroquel, Topamax, and PRN klonopin     * Subdural hematoma (HCC)  Assessment & Plan  -Repeat head CT on 2/8 demonstrated no interval change in subacute b/l SDH hematomas compared to 2/7  -Neurosurgery consulted  Patient cleared for VTE prophylaxis    -Currently GCS 15, repeat head CT if GCS declines more than 2 points in 1 hour  -Keppra 500mg BID x7 days for seizure ppx  -Q4H neuro checks  -PT/OT        Disposition: continue care, pending placement     SUBJECTIVE:    Chief Complaint: "I got the wrong breakfast "    Subjective: no physical complaints OBJECTIVE:     Meds/Allergies     Current Facility-Administered Medications:     cefepime (MAXIPIME) 2,000 mg in dextrose 5 % 50 mL IVPB, 2,000 mg, Intravenous, Q12H, Leopold Ras, MD, Last Rate: 100 mL/hr at 02/11/21 0521, 2,000 mg at 02/11/21 0521    clonazePAM (KlonoPIN) tablet 0 5 mg, 0 5 mg, Oral, BID, Pete Briceno PA-C, 0 5 mg at 02/11/21 7469    heparin (porcine) subcutaneous injection 5,000 Units, 5,000 Units, Subcutaneous, Q8H [address], 5,000 Units at 02/11/21 0522 **AND** [COMPLETED] Platelet count, , , Once, Ivis Hayes MD    lamoTRIgine (LaMICtal) tablet 200 mg, 200 mg, Oral, Daily Before Breakfast, Leopold Ras, MD, 200 mg at 02/11/21 0522    levETIRAcetam (KEPPRA) tablet 500 mg, 500 mg, Oral, Q12H [address], Leopold Ras, MD, 500 mg at 02/11/21 0912    mineral oil enema 1 enema, 1 enema, Rectal, Daily PRN, Leopold Ras, MD    ondansetron [facility]) injection 4 mg, 4 mg, Intravenous, Q6H PRN, Leopold Ras, MD, 4 mg at 02/08/21 1820    pantoprazole (PROTONIX) EC tablet 40 mg, 40 mg, Oral, Early Morning, Leopold Ras, MD, 40 mg at 02/11/21 0522    polyethylene glycol (MIRALAX) packet 17 g, 17 g, Oral, Daily, Leopold Ras, MD, 17 g at 02/11/21 0912    QUEtiapine (SEROquel) tablet 400 mg, 400 mg, Oral, HS, Leopold Ras, MD, 400 mg at 02/10/21 2127    senna-docusate sodium (SENOKOT S) 8 6-50 mg per tablet 2 tablet, 2 tablet, Oral, BID, Leopold Ras, MD, 2 tablet at 02/11/21 0912    topiramate (TOPAMAX) tablet 50 mg, 50 mg, Oral, BID, Leopold Ras, MD, 50 mg at 02/11/21 0912     Vitals:   Vitals:    02/11/21 0246   BP: 119/68   Pulse: 98   Resp: 20   Temp: 97 8 °F (36 6 °C)   SpO2: 98%       Intake/Output:  I/O       02/09 0701 - 02/10 0700 02/10 0701 - 02/11 0700 02/11 0701 - 02/12 0700    P  O  1560 680 150    I V  (mL/kg) 30 (0 6)      IV Piggyback 150 50     Total Intake(mL/kg) 1740 (35 9) 730 (15) 150 (3 1)    Urine (mL/kg/hr) 1700 (1 5) 650 (0 6) 250 (2 5)    Stool 0      Total Output 1700 650 250    Net +40 +80 -100           Unmeasured Urine Occurrence   1 x    Unmeasured Stool Occurrence 0 x             Nutrition/GI Proph/Bowel Reg: dysphagia level 3, nutritional supplements, Senokot S, Miralax     Physical Exam:   GENERAL APPEARANCE: NAD, resting comfortably in bed  NEURO: good sensation throughout  5/5 strength b/l UEs and LEs, GCS 15, alert/oriented x 4  HEENT: NC/AT, no obvious signs of trauma  CV: RRR, No M/R/G, B/L DP/Radial Pulses 2+   LUNGS: CTAB, Chest rise equal B/L  : aj catheter in place, clear, dilute appearing urine in aj catheter bag  ABD: NT/ND, BSx4  MSK: No signs of edema/ erythema noted  SKIN: No obvious signs of skin breakdown noted, warm/dry    Invasive Devices     Peripheral Intravenous Line            Peripheral IV 02/08/21 Left Antecubital 2 days    Peripheral IV 02/09/21 Right Antecubital 2 days    Peripheral IV 02/09/21 Right Forearm 2 days          Drain            Urethral Catheter Temperature probe 16 Fr  4 days                 Lab Results: Results: I have personally reviewed pertinent reports  Imaging/EKG Studies: Results: I have personally reviewed pertinent reports      VTE Prophylaxis: Heparin

## 2021-02-11 NOTE — NUTRITION
02/11/21 1444   Recommendations/Interventions   Summary 2/10: calorie count 1200kcal/ 50g pro for 2 recorded meals lunch and dinner; includes 1 magic cup nutrition supplement; meets ~85% estimated energy and protein needs; per discussion with ST regarding results of VBS today, pt is recommended for diet change to regular with thin liquids; predict adequate PO intake; RD will d/c calorie count   Interventions Supplement continue;Diet: order adjustment   Nutrition Recommendations Other (Specify)  (change diet to regular with thin liquids as per recommendations from [address] [place] [address] Karsten

## 2021-02-11 NOTE — CASE MANAGEMENT
Pt's options paperwork sent to [facility]  CM will need to fax therapy notes, Psych eval and progress note from today once completed

## 2021-02-12 PROBLEM — I95.9 HYPOTENSION: Status: RESOLVED | Noted: 2021-02-09 | Resolved: 2021-02-12

## 2021-02-12 LAB
ANION GAP SERPL CALCULATED.3IONS-SCNC: 3 MMOL/L (ref 4–13)
BACTERIA BLD CULT: NORMAL
BUN SERPL-MCNC: 13 MG/DL (ref 5–25)
CALCIUM SERPL-MCNC: 9 MG/DL (ref 8.3–10.1)
CHLORIDE SERPL-SCNC: 116 MMOL/L (ref 100–108)
CO2 SERPL-SCNC: 29 MMOL/L (ref 21–32)
CREAT SERPL-MCNC: 0.61 MG/DL (ref 0.6–1.3)
GFR SERPL CREATININE-BSD FRML MDRD: 82 ML/MIN/1.73SQ M
GLUCOSE SERPL-MCNC: 84 MG/DL (ref 65–140)
POTASSIUM SERPL-SCNC: 3.8 MMOL/L (ref 3.5–5.3)
SODIUM SERPL-SCNC: 148 MMOL/L (ref 136–145)

## 2021-02-12 PROCEDURE — 80048 BASIC METABOLIC PNL TOTAL CA: CPT | Performed by: SURGERY

## 2021-02-12 PROCEDURE — 92526 ORAL FUNCTION THERAPY: CPT

## 2021-02-12 PROCEDURE — 99232 SBSQ HOSP IP/OBS MODERATE 35: CPT | Performed by: SURGERY

## 2021-02-12 PROCEDURE — 97535 SELF CARE MNGMENT TRAINING: CPT

## 2021-02-12 PROCEDURE — 97116 GAIT TRAINING THERAPY: CPT

## 2021-02-12 PROCEDURE — 97530 THERAPEUTIC ACTIVITIES: CPT

## 2021-02-12 RX ADMIN — DOCUSATE SODIUM AND SENNOSIDES 2 TABLET: 8.6; 5 TABLET ORAL at 18:20

## 2021-02-12 RX ADMIN — TOPIRAMATE 50 MG: 25 TABLET ORAL at 08:53

## 2021-02-12 RX ADMIN — CLONAZEPAM 0.5 MG: 0.5 TABLET ORAL at 08:53

## 2021-02-12 RX ADMIN — CLONAZEPAM 0.5 MG: 0.5 TABLET ORAL at 18:20

## 2021-02-12 RX ADMIN — LEVETIRACETAM 500 MG: 500 TABLET, FILM COATED ORAL at 08:53

## 2021-02-12 RX ADMIN — DOCUSATE SODIUM AND SENNOSIDES 2 TABLET: 8.6; 5 TABLET ORAL at 08:53

## 2021-02-12 RX ADMIN — PANTOPRAZOLE SODIUM 40 MG: 40 TABLET, DELAYED RELEASE ORAL at 05:57

## 2021-02-12 RX ADMIN — ONDANSETRON 4 MG: 2 INJECTION INTRAMUSCULAR; INTRAVENOUS at 05:58

## 2021-02-12 RX ADMIN — LAMOTRIGINE 200 MG: 100 TABLET ORAL at 06:01

## 2021-02-12 RX ADMIN — CEFEPIME HYDROCHLORIDE 2000 MG: 2 INJECTION, POWDER, FOR SOLUTION INTRAVENOUS at 18:22

## 2021-02-12 RX ADMIN — LEVETIRACETAM 500 MG: 500 TABLET, FILM COATED ORAL at 22:03

## 2021-02-12 RX ADMIN — TOPIRAMATE 50 MG: 25 TABLET ORAL at 18:20

## 2021-02-12 RX ADMIN — HEPARIN SODIUM 5000 UNITS: 5000 INJECTION INTRAVENOUS; SUBCUTANEOUS at 14:13

## 2021-02-12 RX ADMIN — CEFEPIME HYDROCHLORIDE 2000 MG: 2 INJECTION, POWDER, FOR SOLUTION INTRAVENOUS at 05:57

## 2021-02-12 RX ADMIN — HEPARIN SODIUM 5000 UNITS: 5000 INJECTION INTRAVENOUS; SUBCUTANEOUS at 05:57

## 2021-02-12 RX ADMIN — POLYETHYLENE GLYCOL 3350 17 G: 17 POWDER, FOR SOLUTION ORAL at 08:52

## 2021-02-12 RX ADMIN — HEPARIN SODIUM 5000 UNITS: 5000 INJECTION INTRAVENOUS; SUBCUTANEOUS at 22:02

## 2021-02-12 RX ADMIN — QUETIAPINE 400 MG: 100 TABLET, FILM COATED ORAL at 22:03

## 2021-02-12 NOTE — ASSESSMENT & PLAN NOTE
-Has a history of HyperNa+  Increased UOP transiently, obtained urine osm and serum osm however, decreased UOP at this time, inconsistent with central DI  Na+ is downtrending, stable today at 148  Continue to monitor

## 2021-02-12 NOTE — OCCUPATIONAL THERAPY NOTE
Occupational Therapy Treatment Note:       02/12/21 1153   OT Last Visit   OT Visit Date 02/12/21   Note Type   Note Type Treatment   Restrictions/Precautions   Other Precautions Bed Alarm; Fall Risk   Pain Assessment   Pain Assessment Tool 0-10   Pain Score No Pain   ADL   Where Assessed Standing at sink   Grooming Assistance 5  Supervision/Setup   Grooming Comments min vc's for sequencing hand washing/ c walker sinkside   Toileting Assistance  4  Minimal Assistance   Functional Standing Tolerance   Time   (fair minus standing balance)   Bed Mobility   Supine to Sit 5  Supervision   Sit to Supine 5  Supervision   Transfers   Sit to Stand 4  Minimal assistance   Stand to Sit 4  Minimal assistance   Toilet transfer 4  Minimal assistance   Additional items Assist x 1   Functional Mobility   Functional Mobility 4  Minimal assistance   Additional items Rolling walker   Cognition   Overall Cognitive Status Impaired   Arousal/Participation Alert   Attention Attends with cues to redirect   Orientation Level Oriented X4   Memory Decreased recall of recent events;Decreased recall of precautions   Following Commands Follows one step commands without difficulty   Activity Tolerance   Activity Tolerance Patient tolerated treatment well   Assessment   Assessment pt participated in am ot session and was seen focusing on toileting, bed mobility, and ub dressing  pt stood sinkside to wash hands post toileting  pt also walked to from bathroom with min asst using rw  pt was able to wipe self and manage clothing with min asst  pt did require cues for grooming  pt was talkative and willing to participate in session for oob for lunch  pt was talktive this session  Plan   Treatment Interventions ADL retraining;Functional transfer training; Endurance training;Cognitive reorientation;Patient/family training;Equipment evaluation/education; Activityengagement   Goal Expiration Date 02/22/21   OT Treatment Day 1   OT Frequency 3-5x/wk Recommendation   OT Discharge Recommendation Post-Acute Rehabilitation Services   Ioana Marc

## 2021-02-12 NOTE — PLAN OF CARE
Problem: PHYSICAL THERAPY ADULT  Goal: Performs mobility at highest level of function for planned discharge setting  See evaluation for individualized goals  Description: Treatment/Interventions: ADL retraining, Functional transfer training, LE strengthening/ROM, Therapeutic exercise, Endurance training, Cognitive reorientation, Patient/family training, Equipment eval/education, Bed mobility, Gait training, Spoke to nursing, OT  Equipment Recommended: Walker(RW)       See flowsheet documentation for full assessment, interventions and recommendations  Outcome: Progressing  Note: Prognosis: Fair  Problem List: Decreased strength, Decreased endurance, Impaired balance, Decreased mobility, Decreased coordination, Decreased cognition, Impaired judgement, Decreased safety awareness, Pain, Impaired hearing, Impaired vision  Assessment: Patient in bed, reporting her stomach hurting, requesting to use bathroom at start of session  Patient was minimal assist for bed mobility and transfers as well as ambulation with RW  Patient required extended time while toileting  Patient able to ambulate short distance this session requesting to lay back down secondary to pain  Would recommend a chair follow for safety if patient to ambulate further distance next session  Cues required for safe distance to RW while navigating turns  Patient limited by pain this session and would continue to benefit from physical therapy to improve functional mobility until medically cleared  Patient in bed at end of session, bed alarm activiated all needs within reach, RN informed  Barriers to Discharge: None     PT Discharge Recommendation: Post-Acute Rehabilitation Services     PT - OK to Discharge: (S) Yes(once medically cleared to rehab)    See flowsheet documentation for full assessment

## 2021-02-12 NOTE — PLAN OF CARE
Problem: OCCUPATIONAL THERAPY ADULT  Goal: Performs self-care activities at highest level of function for planned discharge setting  See evaluation for individualized goals  Description: Treatment Interventions: ADL retraining, Functional transfer training, Endurance training, Cognitive reorientation, Patient/family training, Compensatory technique education, Energy conservation, Activityengagement          See flowsheet documentation for full assessment, interventions and recommendations  Outcome: Progressing  Note: Limitation: Decreased ADL status, Decreased Safe judgement during ADL, Decreased cognition, Decreased endurance, Decreased self-care trans, Decreased high-level ADLs  Prognosis: Fair  Assessment: pt participated in am ot session and was seen focusing on toileting, bed mobility, and ub dressing  pt stood sinkside to wash hands post toileting  pt also walked to from bathroom with min asst using rw  pt was able to wipe self and manage clothing with min asst  pt did require cues for grooming  pt was talkative and willing to participate in session for oob for lunch  pt was talktive this session  Recommendation: Speech consult  OT Discharge Recommendation: Post-Acute Rehabilitation Services  OT - OK to Discharge:  Yes     MAE Patel

## 2021-02-12 NOTE — ASSESSMENT & PLAN NOTE
-Repeat head CT on 2/8 demonstrated no interval change in subacute b/l SDH hematomas compared to 2/7  -Neurosurgery consulted  Patient cleared for VTE prophylaxis    -Currently GCS 15  -Keppra 500mg BID x7 days for seizure ppx  -Q4H neuro checks  -PT/OT

## 2021-02-12 NOTE — PLAN OF CARE
Problem: Potential for Falls  Goal: Patient will remain free of falls  Description: INTERVENTIONS:  - Assess patient frequently for physical needs  -  Identify cognitive and physical deficits and behaviors that affect risk of falls    -  [place] fall precautions as indicated by assessment   - Educate patient/family on patient safety including physical limitations  - Instruct patient to call for assistance with activity based on assessment  - Modify environment to reduce risk of injury  - Consider OT/PT consult to assist with strengthening/mobility  Outcome: Progressing     Problem: Prexisting or High Potential for Compromised Skin Integrity  Goal: Skin integrity is maintained or improved  Description: INTERVENTIONS:  - Identify patients at risk for skin breakdown  - Assess and monitor skin integrity  - Assess and monitor nutrition and hydration status  - Monitor labs   - Assess for incontinence   - Turn and reposition patient  - Assist with mobility/ambulation  - Relieve pressure over bony prominences  - Avoid friction and shearing  - Provide appropriate hygiene as needed including keeping skin clean and dry  - Evaluate need for skin moisturizer/barrier cream  - Collaborate with interdisciplinary team   - Patient/family teaching  - Consider wound care consult   Outcome: Progressing     Problem: INFECTION - ADULT  Goal: Absence or prevention of progression during hospitalization  Description: INTERVENTIONS:  - Assess and monitor for signs and symptoms of infection  - Monitor lab/diagnostic results  - Monitor all insertion sites, i e  indwelling lines, tubes, and drains  - Monitor endotracheal if appropriate and nasal secretions for changes in amount and color  - [place] appropriate cooling/warming therapies per order  - Administer medications as ordered  - Instruct and encourage patient and family to use good hand hygiene technique  - Identify and instruct in appropriate isolation precautions for identified infection/condition  Outcome: Progressing  Goal: Absence of fever/infection during neutropenic period  Description: INTERVENTIONS:  - Monitor WBC    Outcome: Progressing     Problem: DISCHARGE PLANNING  Goal: Discharge to home or other facility with appropriate resources  Description: INTERVENTIONS:  - Identify barriers to discharge w/patient and caregiver  - Arrange for needed discharge resources and transportation as appropriate  - Identify discharge learning needs (meds, wound care, etc )  - Arrange for interpretive services to assist at discharge as needed  - Refer to Case Management Department for coordinating discharge planning if the patient needs post-hospital services based on physician/advanced practitioner order or complex needs related to functional status, cognitive ability, or social support system  Outcome: Progressing     Problem: Knowledge Deficit  Goal: Patient/family/caregiver demonstrates understanding of disease process, treatment plan, medications, and discharge instructions  Description: Complete learning assessment and assess knowledge base  Interventions:  - Provide teaching at level of understanding  - Provide teaching via preferred learning methods  Outcome: Progressing     Problem: CONFUSION/THOUGHT DISTURBANCE  Goal: Thought disturbances (confusion, delirium, depression, dementia or psychosis) are managed to maintain or return to baseline mental status and functional level  Description: INTERVENTIONS:  - Assess for possible contributors to  thought disturbance, including but not limited to medications, infection, impaired vision or hearing, underlying metabolic abnormalities, dehydration, respiratory compromise,  psychiatric diagnoses and notify attending PHYSICAN/AP  - Monitor and intervene to maintain adequate nutrition, hydration, elimination, sleep and activity  - Decrease environmental stimuli, including noise as appropriate    - Provide frequent contacts to provide refocusing, direction and reassurance as needed  Approach patient calmly with eye contact and at their level  - [place] high risk fall precautions, aspiration precautions and other safety measures, as indicated  - If delirium suspected, notify physician/AP of change in condition and request immediate in-person evaluation  - Pursue consults as appropriate including Geriatric (campus dependent), OT for cognitive evaluation/activity planning, psychiatric, pastoral care, etc   Outcome: Progressing     Problem: Nutrition/Hydration-ADULT  Goal: Nutrient/Hydration intake appropriate for improving, restoring or maintaining nutritional needs  Description: Monitor and assess patient's nutrition/hydration status for malnutrition  Collaborate with interdisciplinary team and initiate plan and interventions as ordered  Monitor patient's weight and dietary intake as ordered or per policy  Utilize nutrition screening tool and intervene as necessary  Determine patient's food preferences and provide high-protein, high-caloric foods as appropriate       INTERVENTIONS:  - Monitor oral intake, urinary output, labs, and treatment plans  - Assess nutrition and hydration status and recommend course of action  - Evaluate amount of meals eaten  - Assist patient with eating if necessary   - Allow adequate time for meals  - Recommend/ encourage appropriate diets, oral nutritional supplements, and vitamin/mineral supplements  - Order, calculate, and assess calorie counts as needed  - Recommend, monitor, and adjust tube feedings and TPN/PPN based on assessed needs  - Assess need for intravenous fluids  - Provide specific nutrition/hydration education as appropriate  - Include patient/family/caregiver in decisions related to nutrition  Outcome: Progressing

## 2021-02-12 NOTE — ASSESSMENT & PLAN NOTE
-Blood cx 2/6: 1/2 GNR  -Repeat cultures from 2/09 negative for growth at 24hrs  Follow up on final culture results  -Urine culture 2/6: +nitrites, +leukos, >100,000 CFU mixed contaminants, E  Coli, Enterococcus, 10,000-19,000 Pseudomonas   -Plan: continue on cefepime for total of 7d, with repeat blood cultures 48hrs no growth   Currently day 5/7 cefepime    -Complicated UTI 2/2 chronic indwelling aj catheter due to urinary retention  -Remains afebrile, WBC count wnl

## 2021-02-12 NOTE — SPEECH THERAPY NOTE
Speech Language/Pathology    Speech/Language Pathology Progress Note    Patient Name: Yandy RAMEY Date: 2/12/2021     Problem List  Principal Problem:    Subdural hematoma ([facility] )  Active Problems:    Hyperlipidemia    Bipolar 1 disorder ([facility] )    Constipation    Fall    Sepsis due to urinary tract infection [facility])       Past Medical History  Past Medical History:   Diagnosis Date    Anemia 2/26/2019    Anxiety     Bipolar 1 disorder ([facility] )     Depression     DVT (deep venous thrombosis) ([facility] ) 2008    Left leg    GERD (gastroesophageal reflux disease)     Hypertension     Overactive bladder         Past Surgical History  Past Surgical History:   Procedure Laterality Date    ADENOIDECTOMY      CATARACT EXTRACTION Bilateral     Right 10/2003, left 4/2004    CHOLECYSTECTOMY      DILATION AND CURETTAGE OF UTERUS  1985    HERNIA REPAIR  11/02/2007    Femoral and small bowel resection    HIP SURGERY      L hip    TONSILLECTOMY      As a youth    WRIST SURGERY      L wrist         Subjective:  Pt OOB in chair, lunch tray present  "My hearing aid battery went and I look like a real slob"    Current Diet:  Regular w/ thin liquids     Objective:  Pt seen for dx dysphagia f/u after VBS yesterday  Pt very excited about being able to order regular items  Pt ate lunch meal - chicken salad sandwich and [employer] during tx session  Findings of VBS and current aspiration risk reviewed w/ pt  Pt nodded head during explanation  Safe swallow strategies to reduce aspiration risk explained to pt  Strategies include: alternating liquids/solids, small sips/bites, and taking medication in puree not w/ water  Pt able to state back strategies at conclusion of session  Oral and pharyngeal events across meal were WFL, consistent w/ findings from VBS yesterday,  no overt s/s aspiration  Assessment:  Pt well-tolerated meal regular texture and thin liquids today   Findings from VBS, current aspiration risk, and safe swallow strategy education provided to pt, pt demonstrated understanding       Plan/Recommendations:  Continue current diet  Safe swallow strategies as described above  Frequent oral care  No further IP ST need, please re-consult if necessary

## 2021-02-12 NOTE — PROGRESS NOTES
Progress Note - Nelson Ricks 1935, 80 y o  female MRN: [id number]    Unit/Bed#: [facility] 223-16 Encounter: [id number]    Primary Care Provider: Mir Beauchamp MD   Date and time admitted to hospital: 2/7/2021  2:38 AM        Sepsis due to urinary tract infection [facility])  Assessment & Plan  -Blood cx 2/6: 1/2 GNR  -Repeat cultures from 2/09 negative for growth at 24hrs  Follow up on final culture results  -Urine culture 2/6: +nitrites, +leukos, >100,000 CFU mixed contaminants, E  Coli, Enterococcus, 10,000-19,000 Pseudomonas   -Plan: continue on cefepime for total of 7d, with repeat blood cultures 48hrs no growth  Currently day 5/7 cefepime    -Complicated UTI 2/2 chronic indwelling aj catheter due to urinary retention  -Remains afebrile, WBC count wnl    Fall  Assessment & Plan  - PT/OT evaluation and treatment, recommendation acute rehab  - Gerontology consulted    Constipation  Assessment & Plan  -CTAP on presentation with marked distention of the rectum with large amount of fecal material  -Had 2 bowel movements  -Continue miralax, senokot, PRN mineral oil enemas  -Per gerontology, consider switching iron supplementation to ferrous gluconate       Bipolar 1 disorder (HCC)  Assessment & Plan   -Continue home Lamictal, Seroquel, Topamax, and PRN klonopin   - Required psychiatry evaluation for clearance to be d/c to acute rehab/nursing facility  She was evaluated by psychiatry yesterday and cleared for d/c to appropriate facility  Hyperlipidemia  Assessment & Plan  -Has a history of HyperNa+  Increased UOP transiently, obtained urine osm and serum osm however, decreased UOP at this time, inconsistent with central DI  Na+ is downtrending, stable today at 148  Continue to monitor  * Subdural hematoma (HCC)  Assessment & Plan  -Repeat head CT on 2/8 demonstrated no interval change in subacute b/l SDH hematomas compared to 2/7  -Neurosurgery consulted   Patient cleared for VTE prophylaxis  -Currently GCS 15  -Keppra 500mg BID x7 days for seizure ppx  -Q4H neuro checks  -PT/OT    Hypotension-resolved as of 2/12/2021  Assessment & Plan  -Patient experienced an episode of hypotension early on the morning of 2/9  -After 2L, patient's BP improved, patient transitioned out of ICU  -Maintain MAP >65   HD stable overnight        Disposition: continue current level of care, pending rehab placement       SUBJECTIVE:    Chief Complaint: "I'm tired of not getting what I order for breakfast "    Subjective: no physical complaints today       OBJECTIVE:     Meds/Allergies     Current Facility-Administered Medications:     cefepime (MAXIPIME) 2,000 mg in dextrose 5 % 50 mL IVPB, 2,000 mg, Intravenous, Q12H, Lyndsay Poon MD, Stopped at 02/12/21 4618    clonazePAM (KlonoPIN) tablet 0 5 mg, 0 5 mg, Oral, BID, Pete Briceno PA-C, 0 5 mg at 02/12/21 0774    heparin (porcine) subcutaneous injection 5,000 Units, 5,000 Units, Subcutaneous, Q8H [address], 5,000 Units at 02/12/21 0557 **AND** [COMPLETED] Platelet count, , , Once, Angelica Curry MD    lamoTRIgine (LaMICtal) tablet 200 mg, 200 mg, Oral, Daily Before Breakfast, Lyndsay Poon MD, 200 mg at 02/12/21 0601    levETIRAcetam (KEPPRA) tablet 500 mg, 500 mg, Oral, Q12H [address], Lyndsay Poon MD, 500 mg at 02/12/21 5370    mineral oil enema 1 enema, 1 enema, Rectal, Daily PRN, Lyndsay Poon MD    ondansetron [facility]) injection 4 mg, 4 mg, Intravenous, Q6H PRN, Lyndsay Poon MD, 4 mg at 02/12/21 0558    pantoprazole (PROTONIX) EC tablet 40 mg, 40 mg, Oral, Early Morning, Lyndsay Poon MD, 40 mg at 02/12/21 0557    polyethylene glycol (MIRALAX) packet 17 g, 17 g, Oral, Daily, Lyndsay Poon MD, 17 g at 02/12/21 3915    QUEtiapine (SEROquel) tablet 400 mg, 400 mg, Oral, HS, Lyndsay Poon MD, 400 mg at 02/11/21 2157    senna-docusate sodium (SENOKOT S) 8 6-50 mg per tablet 2 tablet, 2 tablet, Oral, BID, Lyndsay Poon MD, 2 tablet at 02/12/21 0853    topiramate (TOPAMAX) tablet 50 mg, 50 mg, Oral, BID, Dannie Wesley MD, 50 mg at 02/12/21 0853     Vitals:   Vitals:    02/12/21 1043   BP: 104/60   Pulse: 86   Resp: 19   Temp: 97 8 °F (36 6 °C)   SpO2: 94%       Intake/Output:  I/O       02/10 0701 - 02/11 0700 02/11 0701 - 02/12 0700 02/12 0701 - 02/13 0700    P  O  680 630     I V  (mL/kg)       IV Piggyback 50 50     Total Intake(mL/kg) 730 (15) 680 (14)     Urine (mL/kg/hr) 650 (0 6) 1250 (1 1)     Stool  0     Total Output 650 1250     Net +80 -570            Unmeasured Urine Occurrence  1 x     Unmeasured Stool Occurrence  0 x            Nutrition/GI Proph/Bowel Reg: Miralax, Senokot S  Regular diet, meds in puree only    Physical Exam:   GENERAL APPEARANCE: NAD, resting comfortably in bed  NEURO: good sensation throughout, GCS 15, alert/oriented x4, no FND  HEENT: NC/AT, no obvious signs of trauma  CV: RRR, systolic murmur grade 2/6, B/L DP/Radial Pulses 2+   LUNGS: CTAB, Chest rise equal B/L  ABD: NT/ND, BSx4  MSK: No signs of edema/ erythema noted  SKIN: No obvious signs of skin breakdown noted, warm/dry    Invasive Devices     Peripheral Intravenous Line            Peripheral IV 02/09/21 Right Antecubital 3 days    Peripheral IV 02/09/21 Right Forearm 3 days          Drain            Urethral Catheter Latex;Straight-tip 18 Fr  1 day                 Lab Results: Results: I have personally reviewed pertinent reports  Imaging/EKG Studies: Results: I have personally reviewed pertinent reports      VTE Prophylaxis: Heparin

## 2021-02-12 NOTE — PHYSICAL THERAPY NOTE
Physical Therapy Progress Note     02/12/21 1539   PT Last Visit   PT Visit Date 02/12/21   Pain Assessment   Pain Assessment Tool 0-10   Pain Score 6   Pain Location/Orientation Location: Abdomen   Restrictions/Precautions   Weight Bearing Precautions Per Order No   Other Precautions Bed Alarm;Cognitive;Multiple lines;Pain; Fall Risk;Hard of hearing;Telemetry   General   Chart Reviewed Yes   Response to Previous Treatment Patient with no complaints from previous session  Family/Caregiver Present No   Cognition   Overall Cognitive Status Impaired   Arousal/Participation Alert; Cooperative   Attention Attends with cues to redirect   Following Commands Follows one step commands with increased time or repetition   Subjective   Subjective Patient in bed, reporting stomach pain requesting to use the bathroom  Bed Mobility   Supine to Sit 4  Minimal assistance   Additional items Assist x 1; Increased time required;Verbal cues;LE management   Sit to Supine 4  Minimal assistance   Additional items Assist x 1; Increased time required;Verbal cues;LE management   Transfers   Sit to Stand 4  Minimal assistance   Additional items Assist x 1; Increased time required;Verbal cues   Stand to Sit 4  Minimal assistance   Additional items Assist x 1; Increased time required;Verbal cues   Toilet transfer 3  Moderate assistance   Additional items Assist x 1; Increased time required;Standard toilet;Verbal cues   Ambulation/Elevation   Gait pattern Excessively slow; Step to; Foward flexed; Inconsistent kristopher;Decreased foot clearance; Improper Weight shift   Gait Assistance 4  Minimal assist   Additional items Assist x 1;Verbal cues; Tactile cues   Assistive Device Rolling walker   Distance 10ftx2, 15ftx2    Balance   Static Sitting Fair -   Dynamic Sitting Fair -   Static Standing Poor +   Ambulatory Poor +   Endurance Deficit   Endurance Deficit Yes   Endurance Deficit Description pain, weakness, fatigue   Activity Tolerance   Activity Tolerance Patient limited by pain; Patient limited by fatigue   Nurse Made Aware appropriate to see   Assessment   Prognosis Fair   Problem List Decreased strength;Decreased endurance; Impaired balance;Decreased mobility; Decreased coordination;Decreased cognition; Impaired judgement;Decreased safety awareness;Pain; Impaired hearing; Impaired vision   Assessment Patient in bed, reporting her stomach hurting, requesting to use bathroom at start of session  Patient was minimal assist for bed mobility and transfers as well as ambulation with RW  Patient required extended time while toileting  Patient able to ambulate short distance this session requesting to lay back down secondary to pain  Would recommend a chair follow for safety if patient to ambulate further distance next session  Cues required for safe distance to RW while navigating turns  Patient limited by pain this session and would continue to benefit from physical therapy to improve functional mobility until medically cleared  Patient in bed at end of session, bed alarm activiated all needs within reach, RN informed   Goals   Patient Goals to feel better   STG Expiration Date 02/18/21   Short Term Goal #1 In 10 days, pt will: 1) perform bed mobility with Mod I to improve functional mobility and decrease caregiver burden  2) perform transfers with S to promote functional mobility, safety, independence, and QOL  3) ambulate 200' with S and least restrictive device to promote return to nursing home environment  5) improve balance grades by 1/2 to promote safety with functional mobility  6) improve strength grades by 1/2 to promote safety with functional mobility  PT Treatment Day 2   Plan   Treatment/Interventions Functional transfer training;LE strengthening/ROM; Endurance training;Patient/family training;Bed mobility;Gait training;Spoke to nursing   Progress Progressing toward goals   PT Frequency Other (Comment)  (3-6x/wk)   Recommendation   PT Discharge Recommendation Post-Acute Rehabilitation Services   Equipment Recommended Walker   PT - OK to Discharge Yes  (once medically cleared to rehab)     Agnes Martinez, DWAINE

## 2021-02-12 NOTE — ASSESSMENT & PLAN NOTE
-Continue home Lamictal, Seroquel, Topamax, and PRN klonopin   - Required psychiatry evaluation for clearance to be d/c to acute rehab/nursing facility  She was evaluated by psychiatry yesterday and cleared for d/c to appropriate facility

## 2021-02-12 NOTE — UTILIZATION REVIEW
Continued Stay Review    Date: 2/12                        Current Patient Class: Inpatient Current Level of Care: Med Surg    HPI:86 y o  female initially admitted on 2/7 presents with on witnessed fall     Assessment/Plan:   Sepsis due to UTI - -Repeat cultures from 2/09 negative for growth at 24hrs  F/u on final culture results  Continue Iv antibiotics for total of 7 days  Complicated UTI 2/2 chronic indwelling aj catheter due to urinary retention  Subdural hematoma - Continue neuro checks q4h and Keppra  GCS 15  Constipation - Had 2 BM, continue miralax, senokot and prn mineral oil enemas      2/12 Per CM notes; Updated clinicals sent to [address] for options paperwork    Pertinent Labs/Diagnostic Results:   Results from last 7 days   Lab Units 02/06/21  2309   SARS-COV-2  Negative     Results from last 7 days   Lab Units 02/11/21  0700 02/10/21  0449 02/09/21  0540 02/09/21  0348 02/08/21  0449 02/07/21  1524   WBC Thousand/uL 5 27 3 21*  --  3 48* 5 77 7 06   HEMOGLOBIN g/dL 9 6* 8 0*  --  7 9* 9 7* 8 8*   HEMATOCRIT % 33 1* 27 0*  --  25 5* 31 8* 28 5*   PLATELETS Thousands/uL 449* 332 338 322 440* 389   NEUTROS ABS Thousands/µL 1 99  --   --  1 62* 4 04  --          Results from last 7 days   Lab Units 02/12/21  0458 02/11/21  0700 02/10/21  0449 02/09/21  1502 02/09/21  0540 02/09/21  0348   SODIUM mmol/L 148* 147* 150* 146*  --  148*   POTASSIUM mmol/L 3 8 4 0 3 9 3 8  --  2 8*   CHLORIDE mmol/L 116* 115* 119* 114*  --  115*   CO2 mmol/L 29 29 28 29  --  30   ANION GAP mmol/L 3* 3* 3* 3*  --  3*   BUN mg/dL 13 10 9 8  --  11   CREATININE mg/dL 0 61 0 51* 0 45* 0 46*  --  0 50*   EGFR ml/min/1 73sq m 82 87 91 90  --  88   CALCIUM mg/dL 9 0 9 2 8 5 8 2*  --  8 4   CALCIUM, IONIZED mmol/L  --   --   --   --  1 14  --    MAGNESIUM mg/dL  --   --  2 2  --  1 8  --    PHOSPHORUS mg/dL  --   --   --   --  2 7  --      Results from last 7 days   Lab Units 02/06/21  2300   AST U/L 30   ALT U/L 18   ALK PHOS U/L 112   TOTAL PROTEIN g/dL 6 8   ALBUMIN g/dL 2 8*   TOTAL BILIRUBIN mg/dL 0 37         Results from last 7 days   Lab Units 02/12/21  0458 02/11/21  0700 02/10/21  0449 02/09/21  1502 02/09/21  0348 02/08/21  0449 02/07/21  1524 02/06/21  2300   GLUCOSE RANDOM mg/dL 84 80 105 101 99 73 88 102     Results from last 7 days   Lab Units 02/10/21  1054   OSMOLALITY, SERUM mmol/       Results from last 7 days   Lab Units 02/06/21  2300   TROPONIN I ng/mL 0 02         Results from last 7 days   Lab Units 02/06/21  2300   PROTIME seconds 17 1*   INR  1 42*   PTT seconds 32         Results from last 7 days   Lab Units 02/10/21  0449 02/09/21  0636   PROCALCITONIN ng/ml 0 06 <0 05     Results from last 7 days   Lab Units 02/09/21  0540 02/07/21  0121 02/06/21  2300   LACTIC ACID mmol/L 1 3 0 6 2 9*       Results from last 7 days   Lab Units 02/10/21  1054 02/07/21  0016   CLARITY UA   --  Cloudy   COLOR UA   --  Trina   SPEC GRAV UA   --  1 020   PH UA   --  5 5   GLUCOSE UA mg/dl  --  Negative   KETONES UA mg/dl  --  15 (1+)*   BLOOD UA   --  Large*   PROTEIN UA mg/dl  --  >=300*   NITRITE UA   --  Positive*   BILIRUBIN UA   --  Interference- unable to analyze*   UROBILINOGEN UA E U /dl  --  1 0   LEUKOCYTES UA   --  Trace*   WBC UA /hpf  --  Innumerable*   RBC UA /hpf  --  Innumerable*   BACTERIA UA /hpf  --  Moderate*   EPITHELIAL CELLS WET PREP /hpf  --  Moderate*   SODIUM UR  79  --    CREATININE UR mg/dL 46 7  --      Results from last 7 days   Lab Units 02/06/21  2309   INFLUENZA A PCR  Negative   INFLUENZA B PCR  Negative   RSV PCR  Negative     Results from last 7 days   Lab Units 02/09/21  0646 02/07/21  0016 02/06/21  2302 02/06/21  2300   BLOOD CULTURE  No Growth at 72 hrs  No Growth at 72 hrs   --  No Growth After 5 Days   Escherichia coli*   GRAM STAIN RESULT   --   --   --  Gram negative rods*   URINE CULTURE   --  >100,000 cfu/ml   --   --      Vital Signs:   02/12/21 10:38:08  --  86  17 89/48Abnormal   62  94 %  --   02/12/21 07:15:56  97 6 °F (36 4 °C)  76  18  106/58  74  96 %  --   02/12/21 03:22:23  99 °F (37 2 °C)  99  17  107/60  76  96 %  --   02/12/21 00:18:30  98 1 °F (36 7 °C)  104  17  98/52  67  95 %         Medications:   Scheduled Medications:  cefepime, 2,000 mg, Intravenous, Q12H  clonazePAM, 0 5 mg, Oral, BID  heparin (porcine), 5,000 Units, Subcutaneous, Q8H [place]  lamoTRIgine, 200 mg, Oral, Daily Before Breakfast  levETIRAcetam, 500 mg, Oral, Q12H [place]  pantoprazole, 40 mg, Oral, Early Morning  polyethylene glycol, 17 g, Oral, Daily  QUEtiapine, 400 mg, Oral, HS  senna-docusate sodium, 2 tablet, Oral, BID  topiramate, 50 mg, Oral, BID      Continuous IV Infusions: None     PRN Meds:  mineral oil, 1 enema, Rectal, Daily PRN  ondansetron, 4 mg, Intravenous, Q6H PRN 2/12 x1      Discharge Plan: See above    Network Utilization Review Department  ATTENTION: Please call with any questions or concerns to [phone] and carefully listen to the prompts so that you are directed to the right person  All voicemails are confidential   Michaelle Query all requests for admission clinical reviews, approved or denied determinations and any other requests to dedicated fax number below belonging to the campus where the patient is receiving treatment   List of dedicated fax numbers for the Facilities:  [address] [place] [address] DENIALS (Administrative/Medical Necessity) [phone]   [address] (Maternity/NICU/Pediatrics) [phone]   [address] ([facility], [facility]) [address] [place] [address] [phone]     [address] [phone]   [place] [address] [phone]

## 2021-02-13 LAB
ANION GAP SERPL CALCULATED.3IONS-SCNC: 2 MMOL/L (ref 4–13)
BUN SERPL-MCNC: 17 MG/DL (ref 5–25)
CALCIUM SERPL-MCNC: 9.3 MG/DL (ref 8.3–10.1)
CHLORIDE SERPL-SCNC: 111 MMOL/L (ref 100–108)
CO2 SERPL-SCNC: 33 MMOL/L (ref 21–32)
CREAT SERPL-MCNC: 0.52 MG/DL (ref 0.6–1.3)
GFR SERPL CREATININE-BSD FRML MDRD: 87 ML/MIN/1.73SQ M
GLUCOSE SERPL-MCNC: 102 MG/DL (ref 65–140)
POTASSIUM SERPL-SCNC: 3.8 MMOL/L (ref 3.5–5.3)
SODIUM SERPL-SCNC: 146 MMOL/L (ref 136–145)

## 2021-02-13 PROCEDURE — 99232 SBSQ HOSP IP/OBS MODERATE 35: CPT | Performed by: SURGERY

## 2021-02-13 PROCEDURE — 80048 BASIC METABOLIC PNL TOTAL CA: CPT | Performed by: EMERGENCY MEDICINE

## 2021-02-13 RX ADMIN — CLONAZEPAM 0.5 MG: 0.5 TABLET ORAL at 17:25

## 2021-02-13 RX ADMIN — TOPIRAMATE 50 MG: 25 TABLET ORAL at 09:31

## 2021-02-13 RX ADMIN — CEFEPIME HYDROCHLORIDE 2000 MG: 2 INJECTION, POWDER, FOR SOLUTION INTRAVENOUS at 05:41

## 2021-02-13 RX ADMIN — LAMOTRIGINE 200 MG: 100 TABLET ORAL at 07:34

## 2021-02-13 RX ADMIN — PANTOPRAZOLE SODIUM 40 MG: 40 TABLET, DELAYED RELEASE ORAL at 05:37

## 2021-02-13 RX ADMIN — DOCUSATE SODIUM AND SENNOSIDES 2 TABLET: 8.6; 5 TABLET ORAL at 09:33

## 2021-02-13 RX ADMIN — CEFEPIME HYDROCHLORIDE 2000 MG: 2 INJECTION, POWDER, FOR SOLUTION INTRAVENOUS at 17:25

## 2021-02-13 RX ADMIN — HEPARIN SODIUM 5000 UNITS: 5000 INJECTION INTRAVENOUS; SUBCUTANEOUS at 22:05

## 2021-02-13 RX ADMIN — LEVETIRACETAM 500 MG: 500 TABLET, FILM COATED ORAL at 09:31

## 2021-02-13 RX ADMIN — DOCUSATE SODIUM AND SENNOSIDES 2 TABLET: 8.6; 5 TABLET ORAL at 17:25

## 2021-02-13 RX ADMIN — HEPARIN SODIUM 5000 UNITS: 5000 INJECTION INTRAVENOUS; SUBCUTANEOUS at 05:38

## 2021-02-13 RX ADMIN — CLONAZEPAM 0.5 MG: 0.5 TABLET ORAL at 09:31

## 2021-02-13 RX ADMIN — HEPARIN SODIUM 5000 UNITS: 5000 INJECTION INTRAVENOUS; SUBCUTANEOUS at 13:35

## 2021-02-13 RX ADMIN — LEVETIRACETAM 500 MG: 500 TABLET, FILM COATED ORAL at 22:06

## 2021-02-13 RX ADMIN — POLYETHYLENE GLYCOL 3350 17 G: 17 POWDER, FOR SOLUTION ORAL at 09:31

## 2021-02-13 RX ADMIN — TOPIRAMATE 50 MG: 25 TABLET ORAL at 17:25

## 2021-02-13 RX ADMIN — QUETIAPINE 400 MG: 100 TABLET, FILM COATED ORAL at 22:08

## 2021-02-13 NOTE — ASSESSMENT & PLAN NOTE
-Blood cx 2/6: 1/2 GNR  -Repeat cultures from 2/09 negative for growth at 72hrs  -Urine culture 2/6: +nitrites, +leukos, >100,000 mixed contaminants, E   Coli, Enterococcus, 10,000-19,000 Pseudomonas   -Plan: continue on cefepime for total of 7d, Currently day 6/7 cefepime    -Complicated UTI 2/2 chronic indwelling aj catheter due to urinary retention  -Remains afebrile, WBC count wnl

## 2021-02-13 NOTE — ASSESSMENT & PLAN NOTE
-Continue home Lamictal, Seroquel, Topamax, and klonopin   - Required psychiatry evaluation for clearance to be d/c to acute rehab/nursing facility  She was evaluated by psychiatry yesterday and cleared for d/c to appropriate facility

## 2021-02-13 NOTE — ASSESSMENT & PLAN NOTE
- Neuro exam: GCS 14 (4, 4, 6), due to confusion, baseline   - Continue neurologic checks: Every 4 hours  - CT scan of the head on 2/8 demonstrated no interval change in subacute b/l SDH hematomas compared to 2/7  - Following Neurosurgery evaluation and recommendations, signed off  - Complete 7 day course of Keppra for seizure prophylaxis- D7 D/C tomorrow 2/14  - Chemical DVT prophylaxis: SQH  - Hold all anticoagulants and anti platelet medications for 2 weeks and/or until cleared by Neurosurgery to resume   - PT and OT (including cognitive evaluation) evaluation and treatment as indicated    -Will plan for follow-up in two weeks with repeat CT head without contrast

## 2021-02-13 NOTE — ASSESSMENT & PLAN NOTE
- Status post fall with the below noted injuries  - Fall precautions  - Geriatric Medicine consultation for evaluation, medication review and recommendations   - PT and OT evaluation and treatment as indicated, recommending post acute rehab  - Case Management consultation for disposition planning

## 2021-02-13 NOTE — PLAN OF CARE
Problem: Potential for Falls  Goal: Patient will remain free of falls  Description: INTERVENTIONS:  - Assess patient frequently for physical needs  -  Identify cognitive and physical deficits and behaviors that affect risk of falls    -  [place] fall precautions as indicated by assessment   - Educate patient/family on patient safety including physical limitations  - Instruct patient to call for assistance with activity based on assessment  - Modify environment to reduce risk of injury  - Consider OT/PT consult to assist with strengthening/mobility  Outcome: Progressing     Problem: Prexisting or High Potential for Compromised Skin Integrity  Goal: Skin integrity is maintained or improved  Description: INTERVENTIONS:  - Identify patients at risk for skin breakdown  - Assess and monitor skin integrity  - Assess and monitor nutrition and hydration status  - Monitor labs   - Assess for incontinence   - Turn and reposition patient  - Assist with mobility/ambulation  - Relieve pressure over bony prominences  - Avoid friction and shearing  - Provide appropriate hygiene as needed including keeping skin clean and dry  - Evaluate need for skin moisturizer/barrier cream  - Collaborate with interdisciplinary team   - Patient/family teaching  - Consider wound care consult   Outcome: Progressing     Problem: INFECTION - ADULT  Goal: Absence or prevention of progression during hospitalization  Description: INTERVENTIONS:  - Assess and monitor for signs and symptoms of infection  - Monitor lab/diagnostic results  - Monitor all insertion sites, i e  indwelling lines, tubes, and drains  - Monitor endotracheal if appropriate and nasal secretions for changes in amount and color  - [place] appropriate cooling/warming therapies per order  - Administer medications as ordered  - Instruct and encourage patient and family to use good hand hygiene technique  - Identify and instruct in appropriate isolation precautions for identified infection/condition  Outcome: Progressing  Goal: Absence of fever/infection during neutropenic period  Description: INTERVENTIONS:  - Monitor WBC    Outcome: Progressing     Problem: DISCHARGE PLANNING  Goal: Discharge to home or other facility with appropriate resources  Description: INTERVENTIONS:  - Identify barriers to discharge w/patient and caregiver  - Arrange for needed discharge resources and transportation as appropriate  - Identify discharge learning needs (meds, wound care, etc )  - Arrange for interpretive services to assist at discharge as needed  - Refer to Case Management Department for coordinating discharge planning if the patient needs post-hospital services based on physician/advanced practitioner order or complex needs related to functional status, cognitive ability, or social support system  Outcome: Progressing     Problem: Knowledge Deficit  Goal: Patient/family/caregiver demonstrates understanding of disease process, treatment plan, medications, and discharge instructions  Description: Complete learning assessment and assess knowledge base  Interventions:  - Provide teaching at level of understanding  - Provide teaching via preferred learning methods  Outcome: Progressing     Problem: CONFUSION/THOUGHT DISTURBANCE  Goal: Thought disturbances (confusion, delirium, depression, dementia or psychosis) are managed to maintain or return to baseline mental status and functional level  Description: INTERVENTIONS:  - Assess for possible contributors to  thought disturbance, including but not limited to medications, infection, impaired vision or hearing, underlying metabolic abnormalities, dehydration, respiratory compromise,  psychiatric diagnoses and notify attending PHYSICAN/AP  - Monitor and intervene to maintain adequate nutrition, hydration, elimination, sleep and activity  - Decrease environmental stimuli, including noise as appropriate    - Provide frequent contacts to provide refocusing, direction and reassurance as needed  Approach patient calmly with eye contact and at their level  - [place] high risk fall precautions, aspiration precautions and other safety measures, as indicated  - If delirium suspected, notify physician/AP of change in condition and request immediate in-person evaluation  - Pursue consults as appropriate including Geriatric (campus dependent), OT for cognitive evaluation/activity planning, psychiatric, pastoral care, etc   Outcome: Progressing     Problem: Nutrition/Hydration-ADULT  Goal: Nutrient/Hydration intake appropriate for improving, restoring or maintaining nutritional needs  Description: Monitor and assess patient's nutrition/hydration status for malnutrition  Collaborate with interdisciplinary team and initiate plan and interventions as ordered  Monitor patient's weight and dietary intake as ordered or per policy  Utilize nutrition screening tool and intervene as necessary  Determine patient's food preferences and provide high-protein, high-caloric foods as appropriate       INTERVENTIONS:  - Monitor oral intake, urinary output, labs, and treatment plans  - Assess nutrition and hydration status and recommend course of action  - Evaluate amount of meals eaten  - Assist patient with eating if necessary   - Allow adequate time for meals  - Recommend/ encourage appropriate diets, oral nutritional supplements, and vitamin/mineral supplements  - Order, calculate, and assess calorie counts as needed  - Recommend, monitor, and adjust tube feedings and TPN/PPN based on assessed needs  - Assess need for intravenous fluids  - Provide specific nutrition/hydration education as appropriate  - Include patient/family/caregiver in decisions related to nutrition  Outcome: Progressing

## 2021-02-13 NOTE — ASSESSMENT & PLAN NOTE
-CTAP on presentation with marked distention of the rectum with large amount of fecal material  -Had 2 bowel movements  -Continue miralax, senokot, PRN mineral oil enemas  -Per gerontology, consider switching iron supplementation to ferrous gluconate   - BM today

## 2021-02-14 LAB
ANION GAP SERPL CALCULATED.3IONS-SCNC: 3 MMOL/L (ref 4–13)
BACTERIA BLD CULT: NORMAL
BACTERIA BLD CULT: NORMAL
BUN SERPL-MCNC: 17 MG/DL (ref 5–25)
CALCIUM SERPL-MCNC: 9 MG/DL (ref 8.3–10.1)
CHLORIDE SERPL-SCNC: 114 MMOL/L (ref 100–108)
CO2 SERPL-SCNC: 30 MMOL/L (ref 21–32)
CREAT SERPL-MCNC: 0.54 MG/DL (ref 0.6–1.3)
GFR SERPL CREATININE-BSD FRML MDRD: 86 ML/MIN/1.73SQ M
GLUCOSE SERPL-MCNC: 92 MG/DL (ref 65–140)
POTASSIUM SERPL-SCNC: 3.7 MMOL/L (ref 3.5–5.3)
SODIUM SERPL-SCNC: 147 MMOL/L (ref 136–145)

## 2021-02-14 PROCEDURE — 80048 BASIC METABOLIC PNL TOTAL CA: CPT | Performed by: PHYSICIAN ASSISTANT

## 2021-02-14 RX ADMIN — CLONAZEPAM 0.5 MG: 0.5 TABLET ORAL at 09:44

## 2021-02-14 RX ADMIN — POLYETHYLENE GLYCOL 3350 17 G: 17 POWDER, FOR SOLUTION ORAL at 09:45

## 2021-02-14 RX ADMIN — HEPARIN SODIUM 5000 UNITS: 5000 INJECTION INTRAVENOUS; SUBCUTANEOUS at 21:45

## 2021-02-14 RX ADMIN — HEPARIN SODIUM 5000 UNITS: 5000 INJECTION INTRAVENOUS; SUBCUTANEOUS at 14:29

## 2021-02-14 RX ADMIN — TOPIRAMATE 50 MG: 25 TABLET ORAL at 17:14

## 2021-02-14 RX ADMIN — HEPARIN SODIUM 5000 UNITS: 5000 INJECTION INTRAVENOUS; SUBCUTANEOUS at 05:26

## 2021-02-14 RX ADMIN — MINERAL OIL 1 ENEMA: 100 ENEMA RECTAL at 10:44

## 2021-02-14 RX ADMIN — CLONAZEPAM 0.5 MG: 0.5 TABLET ORAL at 17:14

## 2021-02-14 RX ADMIN — DOCUSATE SODIUM AND SENNOSIDES 2 TABLET: 8.6; 5 TABLET ORAL at 17:13

## 2021-02-14 RX ADMIN — CEFEPIME HYDROCHLORIDE 2000 MG: 2 INJECTION, POWDER, FOR SOLUTION INTRAVENOUS at 17:14

## 2021-02-14 RX ADMIN — TOPIRAMATE 50 MG: 25 TABLET ORAL at 09:45

## 2021-02-14 RX ADMIN — QUETIAPINE 400 MG: 100 TABLET, FILM COATED ORAL at 21:45

## 2021-02-14 RX ADMIN — CEFEPIME HYDROCHLORIDE 2000 MG: 2 INJECTION, POWDER, FOR SOLUTION INTRAVENOUS at 05:26

## 2021-02-14 RX ADMIN — DOCUSATE SODIUM AND SENNOSIDES 2 TABLET: 8.6; 5 TABLET ORAL at 09:45

## 2021-02-14 RX ADMIN — PANTOPRAZOLE SODIUM 40 MG: 40 TABLET, DELAYED RELEASE ORAL at 05:26

## 2021-02-14 RX ADMIN — LAMOTRIGINE 200 MG: 100 TABLET ORAL at 06:02

## 2021-02-14 NOTE — PLAN OF CARE
Problem: Potential for Falls  Goal: Patient will remain free of falls  Description: INTERVENTIONS:  - Assess patient frequently for physical needs  -  Identify cognitive and physical deficits and behaviors that affect risk of falls    -  [place] fall precautions as indicated by assessment   - Educate patient/family on patient safety including physical limitations  - Instruct patient to call for assistance with activity based on assessment  - Modify environment to reduce risk of injury  - Consider OT/PT consult to assist with strengthening/mobility  Outcome: Progressing     Problem: Prexisting or High Potential for Compromised Skin Integrity  Goal: Skin integrity is maintained or improved  Description: INTERVENTIONS:  - Identify patients at risk for skin breakdown  - Assess and monitor skin integrity  - Assess and monitor nutrition and hydration status  - Monitor labs   - Assess for incontinence   - Turn and reposition patient  - Assist with mobility/ambulation  - Relieve pressure over bony prominences  - Avoid friction and shearing  - Provide appropriate hygiene as needed including keeping skin clean and dry  - Evaluate need for skin moisturizer/barrier cream  - Collaborate with interdisciplinary team   - Patient/family teaching  - Consider wound care consult   Outcome: Progressing     Problem: INFECTION - ADULT  Goal: Absence or prevention of progression during hospitalization  Description: INTERVENTIONS:  - Assess and monitor for signs and symptoms of infection  - Monitor lab/diagnostic results  - Monitor all insertion sites, i e  indwelling lines, tubes, and drains  - Monitor endotracheal if appropriate and nasal secretions for changes in amount and color  - [place] appropriate cooling/warming therapies per order  - Administer medications as ordered  - Instruct and encourage patient and family to use good hand hygiene technique  - Identify and instruct in appropriate isolation precautions for identified infection/condition  Outcome: Progressing  Goal: Absence of fever/infection during neutropenic period  Description: INTERVENTIONS:  - Monitor WBC    Outcome: Progressing     Problem: DISCHARGE PLANNING  Goal: Discharge to home or other facility with appropriate resources  Description: INTERVENTIONS:  - Identify barriers to discharge w/patient and caregiver  - Arrange for needed discharge resources and transportation as appropriate  - Identify discharge learning needs (meds, wound care, etc )  - Arrange for interpretive services to assist at discharge as needed  - Refer to Case Management Department for coordinating discharge planning if the patient needs post-hospital services based on physician/advanced practitioner order or complex needs related to functional status, cognitive ability, or social support system  Outcome: Progressing     Problem: Knowledge Deficit  Goal: Patient/family/caregiver demonstrates understanding of disease process, treatment plan, medications, and discharge instructions  Description: Complete learning assessment and assess knowledge base  Interventions:  - Provide teaching at level of understanding  - Provide teaching via preferred learning methods  Outcome: Progressing     Problem: CONFUSION/THOUGHT DISTURBANCE  Goal: Thought disturbances (confusion, delirium, depression, dementia or psychosis) are managed to maintain or return to baseline mental status and functional level  Description: INTERVENTIONS:  - Assess for possible contributors to  thought disturbance, including but not limited to medications, infection, impaired vision or hearing, underlying metabolic abnormalities, dehydration, respiratory compromise,  psychiatric diagnoses and notify attending PHYSICAN/AP  - Monitor and intervene to maintain adequate nutrition, hydration, elimination, sleep and activity  - Decrease environmental stimuli, including noise as appropriate    - Provide frequent contacts to provide refocusing, direction and reassurance as needed  Approach patient calmly with eye contact and at their level  - [employer] high risk fall precautions, aspiration precautions and other safety measures, as indicated  - If delirium suspected, notify physician/AP of change in condition and request immediate in-person evaluation  - Pursue consults as appropriate including Geriatric (campus dependent), OT for cognitive evaluation/activity planning, psychiatric, pastoral care, etc   Outcome: Progressing     Problem: Nutrition/Hydration-ADULT  Goal: Nutrient/Hydration intake appropriate for improving, restoring or maintaining nutritional needs  Description: Monitor and assess patient's nutrition/hydration status for malnutrition  Collaborate with interdisciplinary team and initiate plan and interventions as ordered  Monitor patient's weight and dietary intake as ordered or per policy  Utilize nutrition screening tool and intervene as necessary  Determine patient's food preferences and provide high-protein, high-caloric foods as appropriate       INTERVENTIONS:  - Monitor oral intake, urinary output, labs, and treatment plans  - Assess nutrition and hydration status and recommend course of action  - Evaluate amount of meals eaten  - Assist patient with eating if necessary   - Allow adequate time for meals  - Recommend/ encourage appropriate diets, oral nutritional supplements, and vitamin/mineral supplements  - Order, calculate, and assess calorie counts as needed  - Recommend, monitor, and adjust tube feedings and TPN/PPN based on assessed needs  - Assess need for intravenous fluids  - Provide specific nutrition/hydration education as appropriate  - Include patient/family/caregiver in decisions related to nutrition  Outcome: Progressing

## 2021-02-15 ENCOUNTER — TELEPHONE (OUTPATIENT)
Dept: NEUROSURGERY | Facility: CLINIC | Age: 86
End: 2021-02-15

## 2021-02-15 LAB
ANION GAP SERPL CALCULATED.3IONS-SCNC: 2 MMOL/L (ref 4–13)
BASOPHILS # BLD AUTO: 0.04 THOUSANDS/ΜL (ref 0–0.1)
BASOPHILS NFR BLD AUTO: 1 % (ref 0–1)
BUN SERPL-MCNC: 14 MG/DL (ref 5–25)
CALCIUM SERPL-MCNC: 8.9 MG/DL (ref 8.3–10.1)
CHLORIDE SERPL-SCNC: 112 MMOL/L (ref 100–108)
CO2 SERPL-SCNC: 32 MMOL/L (ref 21–32)
CREAT SERPL-MCNC: 0.64 MG/DL (ref 0.6–1.3)
EOSINOPHIL # BLD AUTO: 0.1 THOUSAND/ΜL (ref 0–0.61)
EOSINOPHIL NFR BLD AUTO: 2 % (ref 0–6)
ERYTHROCYTE [DISTWIDTH] IN BLOOD BY AUTOMATED COUNT: 16 % (ref 11.6–15.1)
GFR SERPL CREATININE-BSD FRML MDRD: 81 ML/MIN/1.73SQ M
GLUCOSE SERPL-MCNC: 198 MG/DL (ref 65–140)
HCT VFR BLD AUTO: 31.5 % (ref 34.8–46.1)
HGB BLD-MCNC: 9.1 G/DL (ref 11.5–15.4)
IMM GRANULOCYTES # BLD AUTO: 0.09 THOUSAND/UL (ref 0–0.2)
IMM GRANULOCYTES NFR BLD AUTO: 2 % (ref 0–2)
LYMPHOCYTES # BLD AUTO: 1.9 THOUSANDS/ΜL (ref 0.6–4.47)
LYMPHOCYTES NFR BLD AUTO: 39 % (ref 14–44)
MCH RBC QN AUTO: 31.4 PG (ref 26.8–34.3)
MCHC RBC AUTO-ENTMCNC: 28.9 G/DL (ref 31.4–37.4)
MCV RBC AUTO: 109 FL (ref 82–98)
MONOCYTES # BLD AUTO: 0.51 THOUSAND/ΜL (ref 0.17–1.22)
MONOCYTES NFR BLD AUTO: 11 % (ref 4–12)
NEUTROPHILS # BLD AUTO: 2.22 THOUSANDS/ΜL (ref 1.85–7.62)
NEUTS SEG NFR BLD AUTO: 45 % (ref 43–75)
NRBC BLD AUTO-RTO: 0 /100 WBCS
PLATELET # BLD AUTO: 432 THOUSANDS/UL (ref 149–390)
PMV BLD AUTO: 9.6 FL (ref 8.9–12.7)
POTASSIUM SERPL-SCNC: 3.2 MMOL/L (ref 3.5–5.3)
RBC # BLD AUTO: 2.9 MILLION/UL (ref 3.81–5.12)
SODIUM SERPL-SCNC: 146 MMOL/L (ref 136–145)
WBC # BLD AUTO: 4.86 THOUSAND/UL (ref 4.31–10.16)

## 2021-02-15 PROCEDURE — 99232 SBSQ HOSP IP/OBS MODERATE 35: CPT | Performed by: SURGERY

## 2021-02-15 PROCEDURE — 80048 BASIC METABOLIC PNL TOTAL CA: CPT | Performed by: SURGERY

## 2021-02-15 PROCEDURE — 97110 THERAPEUTIC EXERCISES: CPT

## 2021-02-15 PROCEDURE — 85025 COMPLETE CBC W/AUTO DIFF WBC: CPT | Performed by: SURGERY

## 2021-02-15 PROCEDURE — 97116 GAIT TRAINING THERAPY: CPT

## 2021-02-15 RX ADMIN — CEFEPIME HYDROCHLORIDE 2000 MG: 2 INJECTION, POWDER, FOR SOLUTION INTRAVENOUS at 05:19

## 2021-02-15 RX ADMIN — CLONAZEPAM 0.5 MG: 0.5 TABLET ORAL at 18:52

## 2021-02-15 RX ADMIN — LAMOTRIGINE 200 MG: 100 TABLET ORAL at 05:07

## 2021-02-15 RX ADMIN — QUETIAPINE 400 MG: 100 TABLET, FILM COATED ORAL at 22:10

## 2021-02-15 RX ADMIN — DOCUSATE SODIUM AND SENNOSIDES 2 TABLET: 8.6; 5 TABLET ORAL at 08:52

## 2021-02-15 RX ADMIN — CLONAZEPAM 0.5 MG: 0.5 TABLET ORAL at 08:52

## 2021-02-15 RX ADMIN — HEPARIN SODIUM 5000 UNITS: 5000 INJECTION INTRAVENOUS; SUBCUTANEOUS at 05:07

## 2021-02-15 RX ADMIN — TOPIRAMATE 50 MG: 25 TABLET ORAL at 08:52

## 2021-02-15 RX ADMIN — PANTOPRAZOLE SODIUM 40 MG: 40 TABLET, DELAYED RELEASE ORAL at 05:07

## 2021-02-15 RX ADMIN — HEPARIN SODIUM 5000 UNITS: 5000 INJECTION INTRAVENOUS; SUBCUTANEOUS at 13:33

## 2021-02-15 RX ADMIN — HEPARIN SODIUM 5000 UNITS: 5000 INJECTION INTRAVENOUS; SUBCUTANEOUS at 22:10

## 2021-02-15 RX ADMIN — CEFEPIME HYDROCHLORIDE 2000 MG: 2 INJECTION, POWDER, FOR SOLUTION INTRAVENOUS at 18:53

## 2021-02-15 RX ADMIN — TOPIRAMATE 50 MG: 25 TABLET ORAL at 18:52

## 2021-02-15 NOTE — ASSESSMENT & PLAN NOTE
-Continue home Lamictal, Seroquel, Topamax, and klonopin   - 2/11 Psychiatry evaluation: cleared for d/c to post-acute rehabilitation facility

## 2021-02-15 NOTE — TELEPHONE ENCOUNTER
03/25/2021-PT HAD CT COMPLETED ON 3/21 AND IS SCHEDULED TODAY FOR VIRTUAL APT      03/03/2021-CALLED [facility], SPOKE TO  NAVA, WHO STATES PT IS BEING DISCHARGED TO [address] ON 03/05/2021 AND THEY SPOKE TO SON WHO IS AWARE OF HOW LONG IT'S BEEN SINCE LAST CT AND THAT PT NEEDS REPEAT CT, BUT DOES NOT WANT TO TAKE PT TO BOTH CT AND FACILITY DUE TO PT BEING DISCHARGED ON SAME DAY       02/25/2021-AFTER SEVERAL ATTEMPT'S WITHIN THE LAST FEW DAYS, SPOKE TO AMANDA AT [address] ADVISED TO MOVE CT UP SOONER  SHE WILL SEE WHAT SHE CAN DO WITH TRANSPORTATION  CT APPROVED-AUTH IN REFERRAL      02/19/2021-CALLED [facility], TRANSFERRED TO "CLINIC DEPT", SPOKE TO AMANDA, CONFIRMED 02/24/2021 VIRTUAL APT AND THEY WILL SCHEDULE CT AT  FACILITY PRIOR TO APT  AMANDA CALLED BACK AND ADVISED THAT SHE ONLY HAS TRANSPORTATION AVAILABLE FOR 03/05/2021 WHICH SHE SCHEDULED CT FOR  KWASI RESCHEDULED VIRTUAL TO 03/08/2021  DISCUSSED WITH FEDERICA ABURTO AND WE NEED CT SOONER      CALLED AMANDA BACK AND LEFT MESSAGE ON MACHINE ADVISING CT NEEDS TO BE SOONER          02/17/2021-PT STILL IN HOSPITAL    02/16/2021-PT STILL IN HOSPITAL    02/15/2021-PT STILL IN HOSPITAL  02/24/2021 APT W/CT HEAD    02/12/2021-PT STILL IN HOSPITAL    02/11/2021-PT STILL IN HOSPITAL    02/10/2021-PT STILL IN HOSPITAL    02/08/2021-(JORDAN)SIGN OFF, FOLLOW UP IN 2 WEEKS WITH REPEAT CTH

## 2021-02-15 NOTE — ASSESSMENT & PLAN NOTE
-CT Abdomen/ Pelvis on presentation with marked distention of the rectum with large amount of fecal material  -Stool burden was alleviated  -Per gerontology, consider switching home iron supplementation to ferrous gluconate   -Continue miralax, senokot, PRN mineral oil enemas

## 2021-02-15 NOTE — PLAN OF CARE
Problem: Potential for Falls  Goal: Patient will remain free of falls  Description: INTERVENTIONS:  - Assess patient frequently for physical needs  -  Identify cognitive and physical deficits and behaviors that affect risk of falls    -  [place] fall precautions as indicated by assessment   - Educate patient/family on patient safety including physical limitations  - Instruct patient to call for assistance with activity based on assessment  - Modify environment to reduce risk of injury  - Consider OT/PT consult to assist with strengthening/mobility  Outcome: Progressing     Problem: Prexisting or High Potential for Compromised Skin Integrity  Goal: Skin integrity is maintained or improved  Description: INTERVENTIONS:  - Identify patients at risk for skin breakdown  - Assess and monitor skin integrity  - Assess and monitor nutrition and hydration status  - Monitor labs   - Assess for incontinence   - Turn and reposition patient  - Assist with mobility/ambulation  - Relieve pressure over bony prominences  - Avoid friction and shearing  - Provide appropriate hygiene as needed including keeping skin clean and dry  - Evaluate need for skin moisturizer/barrier cream  - Collaborate with interdisciplinary team   - Patient/family teaching  - Consider wound care consult   Outcome: Progressing     Problem: INFECTION - ADULT  Goal: Absence or prevention of progression during hospitalization  Description: INTERVENTIONS:  - Assess and monitor for signs and symptoms of infection  - Monitor lab/diagnostic results  - Monitor all insertion sites, i e  indwelling lines, tubes, and drains  - Monitor endotracheal if appropriate and nasal secretions for changes in amount and color  - [place] appropriate cooling/warming therapies per order  - Administer medications as ordered  - Instruct and encourage patient and family to use good hand hygiene technique  - Identify and instruct in appropriate isolation precautions for identified infection/condition  Outcome: Progressing  Goal: Absence of fever/infection during neutropenic period  Description: INTERVENTIONS:  - Monitor WBC    Outcome: Progressing     Problem: DISCHARGE PLANNING  Goal: Discharge to home or other facility with appropriate resources  Description: INTERVENTIONS:  - Identify barriers to discharge w/patient and caregiver  - Arrange for needed discharge resources and transportation as appropriate  - Identify discharge learning needs (meds, wound care, etc )  - Arrange for interpretive services to assist at discharge as needed  - Refer to Case Management Department for coordinating discharge planning if the patient needs post-hospital services based on physician/advanced practitioner order or complex needs related to functional status, cognitive ability, or social support system  Outcome: Progressing

## 2021-02-15 NOTE — PROGRESS NOTES
Progress Note - Candy Burgos 1935, 80 y o  female MRN: [id number]    Unit/Bed#: [facility] 360-72 Encounter: [id number]    Primary Care Provider: Malachi Leyden, MD   Date and time admitted to hospital: 2/7/2021  2:38 AM        Sepsis due to urinary tract infection [facility])  Assessment & Plan  -Blood cx 2/6: 1/2 GNR, E  Coli  -Repeat cultures from 2/09 negative for growth after 5 days  -Urine culture 2/6: +nitrites, +leukos, >100,000 mixed contaminants, E  Coli, Enterococcus, 10,000-19,000 Pseudomonas   -Plan: Complete 7 day course of cefepime today  -Complicated UTI 2/2 chronic indwelling aj catheter due to urinary retention  -Remains afebrile, WBC count wnl  - Discontinue aj catheter, void trial    Fall  Assessment & Plan  - Status post fall with the below noted injuries  - Fall precautions  - Geriatric Medicine consultation for evaluation, medication review and recommendations   - PT and OT evaluation and treatment as indicated, recommending post acute rehab  - Case Management consultation for disposition planning    Constipation  Assessment & Plan  -CT Abdomen/ Pelvis on presentation with marked distention of the rectum with large amount of fecal material  -Stool burden was alleviated  -Per gerontology, consider switching home iron supplementation to ferrous gluconate   -Continue miralax, senokot, PRN mineral oil enemas    Bipolar 1 disorder (HCC)  Assessment & Plan   -Continue home Lamictal, Seroquel, Topamax, and klonopin   - 2/11 Psychiatry evaluation: cleared for d/c to post-acute rehabilitation facility  * Subdural hematoma (HCC)  Assessment & Plan  - Neuro exam: GCS 14 (4, 4, 6), due to confusion, baseline   - Continue neurologic checks: Every 4 hours  - CT scan of the head on 2/8 demonstrated no interval change in subacute b/l SDH hematomas compared to 2/7  - Following Neurosurgery evaluation and recommendations, signed off    - Completed 7 day course of Keppra for seizure prophylaxis 2/14  - Chemical DVT prophylaxis: SQH  - PT and OT (including cognitive evaluation) evaluation and treatment as indicated, disposition to post-acute rehab  - Neurosurgery follow-up in two weeks with repeat CT head without contrast      DVT PPX: SQH, SCD  PT and OT: Post-acute rehabilitation, [facility] patient    Disposition: Continue med surg level of care, Q4H neuros  Urosepsis resolved, discontinue aj catheter for void trial  Disposition to post-acute rehabilitation, DC planning with case management  SUBJECTIVE:  Chief Complaint: "My groin hurts"    Subjective: Patient admits that she has groin pain today  She states that she ambulated with assistance yesterday to the toilet for a bowel movement and she has had left sided groin pain since then  She admits that it is a sore pain, worse with movement and radiates to the left hip  She also states that her aj catheter is bothering her causing urinary urgency and itching  She denies chest pain, shortness of breath, abdominal pain, nausea, vomiting, diarrhea, numbness, tingling        OBJECTIVE:     Meds/Allergies     Current Facility-Administered Medications:     cefepime (MAXIPIME) 2,000 mg in dextrose 5 % 50 mL IVPB, 2,000 mg, Intravenous, Q12H, Beck Lopez PA-C, Stopped at 02/15/21 0549    clonazePAM (KlonoPIN) tablet 0 5 mg, 0 5 mg, Oral, BID, Pete Briceno PA-C, 0 5 mg at 02/15/21 3925    heparin (porcine) subcutaneous injection 5,000 Units, 5,000 Units, Subcutaneous, Q8H [facility] & half-way, 5,000 Units at 02/15/21 0507 **AND** [COMPLETED] Platelet count, , , Once, Yola Cedeno MD    lamoTRIgine (LaMICtal) tablet 200 mg, 200 mg, Oral, Daily Before Breakfast, Chung Painting MD, 200 mg at 02/15/21 0507    mineral oil enema 1 enema, 1 enema, Rectal, Daily PRN, Chung Painting MD, 1 enema at 02/14/21 1044    ondansetron (ZOFRAN) injection 4 mg, 4 mg, Intravenous, Q6H PRN, Chung Painting MD, 4 mg at 02/12/21 0558    pantoprazole (PROTONIX) EC tablet 40 mg, 40 mg, Oral, Early Morning, Meseret Brock MD, 40 mg at 02/15/21 0507    polyethylene glycol (MIRALAX) packet 17 g, 17 g, Oral, Daily, Meseret Brock MD, 17 g at 02/14/21 0945    QUEtiapine (SEROquel) tablet 400 mg, 400 mg, Oral, HS, Meseret Brock MD, 400 mg at 02/14/21 2145    senna-docusate sodium (SENOKOT S) 8 6-50 mg per tablet 2 tablet, 2 tablet, Oral, BID, Meseret Brock MD, 2 tablet at 02/15/21 0852    topiramate (TOPAMAX) tablet 50 mg, 50 mg, Oral, BID, Meseret Brock MD, 50 mg at 02/15/21 0852     Vitals:   Vitals:    02/15/21 0726   BP: 131/73   Pulse: 87   Resp: 16   Temp: 97 6 °F (36 4 °C)   SpO2: 93%       Intake/Output:  I/O       02/13 0701 - 02/14 0700 02/14 0701 - 02/15 0700 02/15 0701 - 02/16 0700    P  O  465 545 240    IV Piggyback  100     Total Intake(mL/kg) 465 (9 4) 645 (13) 240 (4 8)    Urine (mL/kg/hr) 1250 (1 1) 1775 (1 5) 300 (2 6)    Stool 0 0     Total Output 1250 1775 300    Net -785 -1130 -60           Unmeasured Urine Occurrence 2 x 1 x     Unmeasured Stool Occurrence 0 x 2 x            Nutrition/GI Proph/Bowel Reg: Miralax, Senokot    Physical Exam:   GENERAL APPEARANCE: No acute distress  NEURO: GCS 14, some confusion  No focal deficits  Extrapyramidal movements  HEENT: Normocephalic, atraumatic  EOMI, PERRLA  CV: Regular heart rate and rhythm, no murmurs, rubs, or gallops  LUNGS: CTAB, regular respiratory effort  GI: Bowel sounds in 4 quadrants, mild suprapubic tenderness  No masses, hernias  : Louie catheter in place  MSK: Left hip, lateral thigh tenderness without swelling, ecchymosis, erythema  ROM, motor function, strength limited  +2 DP/PT pulses bilaterally  SKIN: Pink, warm, dry      Invasive Devices     Peripheral Intravenous Line            Peripheral IV 02/14/21 Left;Upper Arm less than 1 day          Drain            Urethral Catheter Latex;Straight-tip 18 Fr  3 days                 Lab Results: BMP/CMP: No results found for: SODIUM, K, CL, CO2, ANIONGAP, BUN, CREATININE,

## 2021-02-15 NOTE — PHYSICAL THERAPY NOTE
PHYSICAL THERAPY NOTE          Patient Name: Mahsa Paiz  GJWDY'P Date: 2/15/2021     02/15/21 1532   Note Type   Note Type Treatment   Pain Assessment   Pain Assessment Tool 0-10   Pain Score 3   Pain Location/Orientation Orientation: Left; Location: Hip;Location: Groin   Pain Onset/Description Onset: Ongoing;Frequency: Constant/Continuous; Descriptor: Aching   Effect of Pain on Daily Activities Increased pain with activity   Patient's Stated Pain Goal No pain   Hospital Pain Intervention(s) Ambulation/increased activity;Repositioned   Restrictions/Precautions   Weight Bearing Precautions Per Order No   Other Precautions Chair Alarm; Bed Alarm;Multiple lines; Fall Risk;Pain;Hard of hearing   General   Chart Reviewed Yes   Response to Previous Treatment Patient with no complaints from previous session  Family/Caregiver Present No   Cognition   Overall Cognitive Status Impaired   Arousal/Participation Alert   Attention Attends with cues to redirect   Orientation Level Oriented to person;Oriented to place;Oriented to time   Memory Decreased recall of recent events   Following Commands Follows one step commands with increased time or repetition   Subjective   Subjective Patient willing to participate in PT treatment session   Bed Mobility   Supine to Sit 4  Minimal assistance   Additional items Assist x 1; Increased time required;Verbal cues   Transfers   Sit to Stand 5  Supervision   Additional items Increased time required;Verbal cues   Stand to Sit 5  Supervision   Additional items Increased time required;Verbal cues   Toilet transfer 4  Minimal assistance   Additional items Assist x 1; Increased time required;Verbal cues   Additional Comments Verbal cues and tactile cues needed for hand placement and safety   Ambulation/Elevation   Gait pattern Excessively slow; Short stride; Foward flexed; Inconsistent kristopher   Gait Assistance 4 Minimal assist   Additional items Assist x 1   Assistive Device Rolling walker   Distance 45ft x 2   (limited by fatigue )   Balance   Static Sitting Fair   Dynamic Sitting Fair -   Static Standing Poor +   Dynamic Standing Poor +   Ambulatory Poor +   Endurance Deficit   Endurance Deficit Yes   Endurance Deficit Description pain, weakness, fatigue   Activity Tolerance   Activity Tolerance Patient limited by fatigue;Patient limited by pain   Medical Staff [address] [place] [address]    Nurse Made Aware Patient appropriate to be seen and mobilized per nursing   Exercises   Hip Abduction Sitting;15 reps;AROM; Bilateral   Knee AROM Long Arc Quad Sitting;15 reps;AROM; Bilateral   Marching Sitting;15 reps;AROM; Bilateral  (x 2 sets )   Assessment   Prognosis Good   Problem List Decreased strength;Decreased range of motion;Decreased endurance; Impaired balance;Decreased mobility; Decreased cognition; Impaired judgement;Decreased safety awareness;Pain   Assessment Patient resting in bed at time of PT treatment session  Patient reports feeling " tired and sore" today however is willing to participate with therapy  Patient able to perform all bed mobility with Min a x1, this is approximately the same level assist required compared to previous session  Patient was able to perform all transfers with supervision which is improved compared to previous session however increased time still required to complete as well as verbal cues and tactile cues for hand placement and safety  In standing, patient was noted to have forward flexed posture and required tactile cues to correct  Patient was able to tolerate increased ambulation distances with Min a x1 and the use of a rolling walker however distances continue to be limited by fatigue, pain, weakness  Chair follow was required during gait training due to patient's decreased endurance and overall mobility tolerance    No gross loss of balance noted with gait training however patient did present with step through gait pattern with decreased step length, forward flexed posture, decreased gait speed  Patient was able to tolerate and perform all lower extremity TherEx seated out of bed in chair without any increase in complaints  Verbal cues were required for proper form pacing as well as to redirect patient's attention as patient is slightly tangential   At conclusion of PT treatment session patient requesting to utilize bathroom  Patient was assisted onto toilet with Min a x1  Patient was instructed to pull call bell cord when finished and verbalized understanding  jermaine Dodge aware pt is in bathroom  Overall patient continues to progress with PT and will continue to benefit from skilled PT services during hospital stay  D/C recommendation when medically cleared his rehab   Goals   Patient Goals " to feel better"   STG Expiration Date 02/18/21   PT Treatment Day 3   Plan   Treatment/Interventions Functional transfer training;LE strengthening/ROM; Therapeutic exercise; Endurance training;Patient/family training;Equipment eval/education; Bed mobility;Gait training;Spoke to nursing   Progress Progressing toward goals   PT Frequency Other (Comment)  (3-6x a week )   Recommendation   PT Discharge Recommendation Post-Acute Rehabilitation Services   Equipment Recommended Walker   PT - OK to Discharge Yes  (To rehab when medically cleared)   AM-PAC Basic Mobility Inpatient   Turning in Bed Without Bedrails 3   Lying on Back to Sitting on Edge of Flat Bed 3   Moving Bed to Chair 3   Standing Up From Chair 3   Walk in Room 3   Climb 3-5 Stairs 2   Basic Mobility Inpatient Raw Score 17   Basic Mobility Standardized Score 39 67   Portions of the documentation may have been created using voice recognition software  Occasional wrong word or sound alike substitutions may have occurred due to the inherent limitations of the voice recognition software   Read the chart carefully and recognize, using context, where substitutions have occurred      Allie Stevens, PT, DPT

## 2021-02-15 NOTE — ASSESSMENT & PLAN NOTE
-Blood cx 2/6: 1/2 GNR, E  Coli  -Repeat cultures from 2/09 negative for growth after 5 days  -Urine culture 2/6: +nitrites, +leukos, >100,000 mixed contaminants, E   Coli, Enterococcus, 10,000-19,000 Pseudomonas   -Plan: Complete 7 day course of cefepime today  -Complicated UTI 2/2 chronic indwelling aj catheter due to urinary retention  -Remains afebrile, WBC count wnl  - Discontinue aj catheter, void trial

## 2021-02-15 NOTE — ASSESSMENT & PLAN NOTE
- Neuro exam: GCS 14 (4, 4, 6), due to confusion, baseline   - Continue neurologic checks: Every 4 hours  - CT scan of the head on 2/8 demonstrated no interval change in subacute b/l SDH hematomas compared to 2/7  - Following Neurosurgery evaluation and recommendations, signed off    - Completed 7 day course of Keppra for seizure prophylaxis 2/14  - Chemical DVT prophylaxis: SQH  - PT and OT (including cognitive evaluation) evaluation and treatment as indicated, disposition to post-acute rehab  - Neurosurgery follow-up in two weeks with repeat CT head without contrast

## 2021-02-15 NOTE — PLAN OF CARE
Problem: PHYSICAL THERAPY ADULT  Goal: Performs mobility at highest level of function for planned discharge setting  See evaluation for individualized goals  Description: Treatment/Interventions: ADL retraining, Functional transfer training, LE strengthening/ROM, Therapeutic exercise, Endurance training, Cognitive reorientation, Patient/family training, Equipment eval/education, Bed mobility, Gait training, Spoke to nursing, OT  Equipment Recommended: Walker(RW)       See flowsheet documentation for full assessment, interventions and recommendations  Outcome: Progressing  Note: Prognosis: Good  Problem List: Decreased strength, Decreased range of motion, Decreased endurance, Impaired balance, Decreased mobility, Decreased cognition, Impaired judgement, Decreased safety awareness, Pain  Assessment: Patient resting in bed at time of PT treatment session  Patient reports feeling " tired and sore" today however is willing to participate with therapy  Patient able to perform all bed mobility with Min a x1, this is approximately the same level assist required compared to previous session  Patient was able to perform all transfers with supervision which is improved compared to previous session however increased time still required to complete as well as verbal cues and tactile cues for hand placement and safety  In standing, patient was noted to have forward flexed posture and required tactile cues to correct  Patient was able to tolerate increased ambulation distances with Min a x1 and the use of a rolling walker however distances continue to be limited by fatigue, pain, weakness  Chair follow was required during gait training due to patient's decreased endurance and overall mobility tolerance  No gross loss of balance noted with gait training however patient did present with step through gait pattern with decreased step length, forward flexed posture, decreased gait speed    Patient was able to tolerate and perform all lower extremity TherEx seated out of bed in chair without any increase in complaints  Verbal cues were required for proper form pacing as well as to redirect patient's attention as patient is slightly tangential   At conclusion of PT treatment session patient requesting to utilize bathroom  Patient was assisted onto toilet with Min a x1  Patient was instructed to pull call bell cord when finished and verbalized understanding  jermaine Dodge aware pt is in bathroom  Overall patient continues to progress with PT and will continue to benefit from skilled PT services during hospital stay  D/C recommendation when medically cleared his rehab  Barriers to Discharge: None     PT Discharge Recommendation: Post-Acute Rehabilitation Services     PT - OK to Discharge: Yes(To rehab when medically cleared)    See flowsheet documentation for full assessment

## 2021-02-16 ENCOUNTER — APPOINTMENT (INPATIENT)
Dept: RADIOLOGY | Facility: HOSPITAL | Age: 86
DRG: 085 | End: 2021-02-16
Payer: COMMERCIAL

## 2021-02-16 PROCEDURE — 73552 X-RAY EXAM OF FEMUR 2/>: CPT

## 2021-02-16 PROCEDURE — 72170 X-RAY EXAM OF PELVIS: CPT

## 2021-02-16 PROCEDURE — 99232 SBSQ HOSP IP/OBS MODERATE 35: CPT | Performed by: SURGERY

## 2021-02-16 PROCEDURE — 99232 SBSQ HOSP IP/OBS MODERATE 35: CPT | Performed by: INTERNAL MEDICINE

## 2021-02-16 RX ADMIN — HEPARIN SODIUM 5000 UNITS: 5000 INJECTION INTRAVENOUS; SUBCUTANEOUS at 06:06

## 2021-02-16 RX ADMIN — CLONAZEPAM 0.5 MG: 0.5 TABLET ORAL at 17:36

## 2021-02-16 RX ADMIN — HEPARIN SODIUM 5000 UNITS: 5000 INJECTION INTRAVENOUS; SUBCUTANEOUS at 13:15

## 2021-02-16 RX ADMIN — PANTOPRAZOLE SODIUM 40 MG: 40 TABLET, DELAYED RELEASE ORAL at 06:06

## 2021-02-16 RX ADMIN — LAMOTRIGINE 200 MG: 100 TABLET ORAL at 06:06

## 2021-02-16 RX ADMIN — HEPARIN SODIUM 5000 UNITS: 5000 INJECTION INTRAVENOUS; SUBCUTANEOUS at 21:11

## 2021-02-16 RX ADMIN — TOPIRAMATE 50 MG: 25 TABLET ORAL at 09:12

## 2021-02-16 RX ADMIN — TOPIRAMATE 50 MG: 25 TABLET ORAL at 17:36

## 2021-02-16 RX ADMIN — ONDANSETRON 4 MG: 2 INJECTION INTRAMUSCULAR; INTRAVENOUS at 17:40

## 2021-02-16 RX ADMIN — CLONAZEPAM 0.5 MG: 0.5 TABLET ORAL at 09:12

## 2021-02-16 RX ADMIN — QUETIAPINE 400 MG: 100 TABLET, FILM COATED ORAL at 21:10

## 2021-02-16 NOTE — CASE MANAGEMENT
Pt accepted to [facility] with a bed open on Thursday  Pt also accepted to [facility] who can accept tomorrow  CM discussed with pt's son Denise Hinson  He prefers [employer]  CM will submit for auth tomorrow

## 2021-02-16 NOTE — ASSESSMENT & PLAN NOTE
- Neuro exam:  GCS 15  - Continue neurologic checks: Every 4 hours  - CT scan of the head on 2/8 demonstrated no interval change in subacute b/l SDH hematomas compared to 2/7  - Following Neurosurgery evaluation and recommendations, signed off    - Completed 7 day course of Keppra for seizure prophylaxis 2/14  - Chemical DVT prophylaxis: SQH  - PT and OT (including cognitive evaluation) evaluation and treatment as indicated, disposition to post-acute rehab  - Neurosurgery follow-up in two weeks with repeat CT head without contrast

## 2021-02-16 NOTE — ASSESSMENT & PLAN NOTE
-Blood cx 2/6: 1/2 GNR, E  Coli  -Repeat cultures from 2/09 negative for growth after 5 days  -Urine culture 2/6: +nitrites, +leukos, >100,000 mixed contaminants, E   Coli, Enterococcus, 10,000-19,000 Pseudomonas   -Plan: Complete 7 day course of cefepime today  -Complicated UTI 2/2 chronic indwelling aj catheter due to urinary retention  -Remains afebrile, WBC count wnl  -2/15 Aj catheter discontinued  -Monitor for signs of dysuria, urinary retention

## 2021-02-16 NOTE — ASSESSMENT & PLAN NOTE
- patient experiencing left hip and leg pain with movement and palpation after having a fall on 2/6/2021  - CT Chest, abdomen, pelvis negative for acute osseous abnormalities  - x-ray left hip and femur pending

## 2021-02-16 NOTE — PLAN OF CARE
Problem: Potential for Falls  Goal: Patient will remain free of falls  Description: INTERVENTIONS:  - Assess patient frequently for physical needs  -  Identify cognitive and physical deficits and behaviors that affect risk of falls    -  [place] fall precautions as indicated by assessment   - Educate patient/family on patient safety including physical limitations  - Instruct patient to call for assistance with activity based on assessment  - Modify environment to reduce risk of injury  - Consider OT/PT consult to assist with strengthening/mobility  Outcome: Progressing     Problem: Prexisting or High Potential for Compromised Skin Integrity  Goal: Skin integrity is maintained or improved  Description: INTERVENTIONS:  - Identify patients at risk for skin breakdown  - Assess and monitor skin integrity  - Assess and monitor nutrition and hydration status  - Monitor labs   - Assess for incontinence   - Turn and reposition patient  - Assist with mobility/ambulation  - Relieve pressure over bony prominences  - Avoid friction and shearing  - Provide appropriate hygiene as needed including keeping skin clean and dry  - Evaluate need for skin moisturizer/barrier cream  - Collaborate with interdisciplinary team   - Patient/family teaching  - Consider wound care consult   Outcome: Progressing     Problem: INFECTION - ADULT  Goal: Absence or prevention of progression during hospitalization  Description: INTERVENTIONS:  - Assess and monitor for signs and symptoms of infection  - Monitor lab/diagnostic results  - Monitor all insertion sites, i e  indwelling lines, tubes, and drains  - Monitor endotracheal if appropriate and nasal secretions for changes in amount and color  - [place] appropriate cooling/warming therapies per order  - Administer medications as ordered  - Instruct and encourage patient and family to use good hand hygiene technique  - Identify and instruct in appropriate isolation precautions for identified infection/condition  Outcome: Progressing  Goal: Absence of fever/infection during neutropenic period  Description: INTERVENTIONS:  - Monitor WBC    Outcome: Progressing     Problem: DISCHARGE PLANNING  Goal: Discharge to home or other facility with appropriate resources  Description: INTERVENTIONS:  - Identify barriers to discharge w/patient and caregiver  - Arrange for needed discharge resources and transportation as appropriate  - Identify discharge learning needs (meds, wound care, etc )  - Arrange for interpretive services to assist at discharge as needed  - Refer to Case Management Department for coordinating discharge planning if the patient needs post-hospital services based on physician/advanced practitioner order or complex needs related to functional status, cognitive ability, or social support system  Outcome: Progressing

## 2021-02-16 NOTE — PROGRESS NOTES
Progress Note - Abbie Patton 1935, 80 y o  female MRN: [id number]    Unit/Bed#: [facility] 142-53 Encounter: [id number]    Primary Care Provider: Ad Brown MD   Date and time admitted to hospital: 2/7/2021  2:38 AM        Sepsis due to urinary tract infection [facility])  Assessment & Plan  -Blood cx 2/6: 1/2 GNR, E  Coli  -Repeat cultures from 2/09 negative for growth after 5 days  -Urine culture 2/6: +nitrites, +leukos, >100,000 mixed contaminants, E  Coli, Enterococcus, 10,000-19,000 Pseudomonas   -Plan: Complete 7 day course of cefepime today  -Complicated UTI 2/2 chronic indwelling aj catheter due to urinary retention  -Remains afebrile, WBC count wnl  -2/15 Aj catheter discontinued  -Monitor for signs of dysuria, urinary retention    Fall  Assessment & Plan  - Status post fall with the below noted injuries  - Fall precautions    - Geriatric Medicine consultation for evaluation, medication review and recommendations   - PT and OT evaluation and treatment as indicated, recommending post acute rehab  - Case Management consultation for disposition planning    Ambulatory dysfunction  Assessment & Plan  - patient experiencing left hip and leg pain with movement and palpation after having a fall on 2/6/2021  - CT Chest, abdomen, pelvis negative for acute osseous abnormalities  - x-ray left hip and femur pending    Constipation  Assessment & Plan  -CT Abdomen/ Pelvis on presentation with marked distention of the rectum with large amount of fecal material  -Stool burden was alleviated  -Per gerontology, consider switching home iron supplementation to ferrous gluconate   -Continue miralax, senokot, PRN mineral oil enemas    Bipolar 1 disorder [facility])  Assessment & Plan  - 2/11 Psychiatry evaluation: cleared for d/c to post-acute rehabilitation facility    - Patient experiencing depression during admission because of recent hospitalizations   -Continue home Lamictal, Seroquel, Topamax, and klonopin     * Subdural hematoma (HCC)  Assessment & Plan  - Neuro exam:  GCS 15  - Continue neurologic checks: Every 4 hours  - CT scan of the head on 2/8 demonstrated no interval change in subacute b/l SDH hematomas compared to 2/7  - Following Neurosurgery evaluation and recommendations, signed off  - Completed 7 day course of Keppra for seizure prophylaxis 2/14  - Chemical DVT prophylaxis: SQH  - PT and OT (including cognitive evaluation) evaluation and treatment as indicated, disposition to post-acute rehab  - Neurosurgery follow-up in two weeks with repeat CT head without contrast        DVT PPX: DQH, SCD  PT and OT: Post-acute rehabilitation    Disposition:  Continue med surge level of care  Continue PT and OT treatment  X-rays of left femur and hip pending due to pain and ambulatory dysfunction  Disposition planning  SUBJECTIVE:  Chief Complaint: "I am okay"    Subjective:  Patient admits that she feels okay today  She reports that she feels more comfortable now that her Louie catheter has been removed  However she reports pain in her left hip and leg with movement  Patient also reports that she is comfortable at [facility] but is getting depressed about possibly leaving soon  She feels that her medications are helping her adequately with these feelings of depression  She denies headaches, chest pain, shortness of breath, difficulty breathing, abdominal pain, dysuria         OBJECTIVE:     Meds/Allergies     Current Facility-Administered Medications:     clonazePAM (KlonoPIN) tablet 0 5 mg, 0 5 mg, Oral, BID, Pete Briceno PA-C, 0 5 mg at 02/16/21 0912    heparin (porcine) subcutaneous injection 5,000 Units, 5,000 Units, Subcutaneous, Q8H [address], 5,000 Units at 02/16/21 0606 **AND** [COMPLETED] Platelet count, , , Once, Angelica Chahal MD    lamoTRIgine (LaMICtal) tablet 200 mg, 200 mg, Oral, Daily Before Breakfast, Rosemary Clay MD, 200 mg at 02/16/21 0606    mineral oil enema 1 enema, 1 enema, Rectal, Daily PRN, Amanda Morrison MD, 1 enema at 02/14/21 1044    ondansetron [facility]) injection 4 mg, 4 mg, Intravenous, Q6H PRN, Amanda Morrison MD, 4 mg at 02/12/21 0558    pantoprazole (PROTONIX) EC tablet 40 mg, 40 mg, Oral, Early Morning, Amanda Morrison MD, 40 mg at 02/16/21 0606    polyethylene glycol (MIRALAX) packet 17 g, 17 g, Oral, Daily, Amanda Morrison MD, 17 g at 02/14/21 0945    QUEtiapine (SEROquel) tablet 400 mg, 400 mg, Oral, HS, Amanda Morrison MD, 400 mg at 02/15/21 2210    senna-docusate sodium (SENOKOT S) 8 6-50 mg per tablet 2 tablet, 2 tablet, Oral, BID, Amanda Morrison MD, 2 tablet at 02/15/21 0852    topiramate (TOPAMAX) tablet 50 mg, 50 mg, Oral, BID, Amanda Morrison MD, 50 mg at 02/16/21 0912     Vitals:   Vitals:    02/16/21 0725   BP: 113/64   Pulse: 85   Resp: 16   Temp: 98 2 °F (36 8 °C)   SpO2: 96%       Intake/Output:  I/O       02/14 0701 - 02/15 0700 02/15 0701 - 02/16 0700 02/16 0701 - 02/17 0700    P  O  545 840 240    IV Piggyback 100      Total Intake(mL/kg) 645 (13) 840 (17 5) 240 (5)    Urine (mL/kg/hr) 1775 (1 5) 625 (0 5)     Stool 0 0     Total Output 1775 625     Net -1130 +215 +240           Unmeasured Urine Occurrence 1 x 3 x 2 x    Unmeasured Stool Occurrence 2 x 5 x 1 x           Nutrition/GI Proph/Bowel Reg: Miralax, Senokot S    Physical Exam:   GENERAL APPEARANCE:  No acute distress  NEURO:  GCS 15, alert and oriented x3, no focal deficits  HEENT:  Normocephalic, atraumatic  EOMI, PERRLA  CV:  Regular heart rate and rhythm, heart murmur auscultated  ([facility] Rheumatic fever as a child) no rubs or gallops  LUNGS:  CTAB,  unlabored breathing effort  GI:  Nontender nondistended  :  Voiding  MSK:  ROM, motor function, strength intact throughout   +2 DP/PT pulses  SKIN:  Pink warm dry    Invasive Devices     Peripheral Intravenous Line            Peripheral IV 02/14/21 Left;Upper Arm 1 day                 Lab Results: BMP/CMP: No results found for: SODIUM, K, CL, CO2, ANIONGAP, BUN, CREATININE, GLUCOSE, CALCIUM, AST, ALT, ALKPHOS, PROT, BILITOT, EGFR and CBC: No results found for: WBC, HGB, HCT, MCV, PLT, ADJUSTEDWBC, MCH, MCHC, RDW, MPV, NRBC  Imaging/EKG Studies:  FL barium swallow video w speech   Final Result by VALENTINE SAUNDERS (02/11 3230)      XR chest portable   Final Result by Jerry Badillo MD (02/09 7633)      Mild bilateral peripheral groundglass opacity, greater on the left, improved since the chest radiograph from December 2020, unchanged since the chest CT from 3 days ago  This may be due to post infectious scar from Covid-19  Workstation performed: [id number]         CT head wo contrast   Final Result by Farhat Fleming MD (02/08 1719)      No  interval change in subacute bilateral subdural hematomas  Workstation performed: [id number]         CT head wo contrast   Final Result by Jennifer Boyd MD (19/29 4999)      Acute bilateral subdural hemorrhages are larger and more hyperdense compared with February 6, 2021  There remains no midline shift  Close clinical correlation and follow-up is recommended               I personally discussed this study with Kath Castellanos on 2/7/2021 at 8:19 AM                            Workstation performed: [id number]         CT head wo contrast    (Results Pending)   XR hip/pelv 2-3 vws left if performed    (Results Pending)   XR femur 2 vw left    (Results Pending)

## 2021-02-16 NOTE — ASSESSMENT & PLAN NOTE
- 2/11 Psychiatry evaluation: cleared for d/c to post-acute rehabilitation facility    - Patient experiencing depression during admission because of recent hospitalizations   -Continue home Lamictal, Seroquel, Topamax, and klonopin

## 2021-02-16 NOTE — PROGRESS NOTES
Progress Note - Geriatric Medicine   Gabriela Carlisle 80 y o  female MRN: [id number]  Unit/Bed#: [facility] 608-01 Encounter: [id number]      Assessment/Plan:    Acute metabolic encephalopathy  -resolved, appears to be at baseline  -s/p treatment of sepsis 2/2 UTI and urinary retention with chronic indwelling aj catheter which has since been d/c as pt has passed voiding trial  -episode of encephalopathy present on admission increases patient's risk of recurrent encephalopathy and thus adherence to strict delirium precautions recommended, continue to encourage well-balanced nutrition, maintain normal circadian rhythm, and provide frequent reorientation and redirection    Sepsis 2/2 UTI  -bps remain stable and has completed antibiotic regimen    Urinary retention  -admitted with chronic urinary catheter in place however following treatment of UTI and acute metabolic encephalopathy patient has now passed voiding trial and is no currently requiring use of indwelling chronic Aj catheter  -continue to monitor for fecal and urinary retention high risk of recurrent retention   -continue to limit use of/avoid medications with significant anticholinergic profiles as may increase patient's risk of retention as well maintaining good bowel regimen as constipation also increases patient's risk of recurrent retention    Left frontal subdural hematoma  -s/p fall at long term care facility  [facility] on admission due to Elquis use which was temporary as part of anticoagulation protocol initiated during recent COVID infection and has completed regimen since, no longer on systemic a/c at baseline  -s/p completion of ppx Keppra   -repeat CTH prior to o/p Nsx follow-up at two week follow-up    Ambulatory dysfunction with recurrent falls  -multifactorial including deconditioning and debility, use of walker for ambulation at baseline, and recent COVID pneumonia  -continue to encourage good body mechanics and assist with all transfers  -pt more comfortable ambulating with reduction of tethers including IV and indwelling Louie catheter   -continue to encourage pt to be mindful of environmental fall hazards  -PT/OT following and recommend STR, pt continues to work with PT and is looking forward to progressing with recovery     Bipolar 1 disorder  -stable on home medication regimen including lamotrigine, clonazepam, Seroquel, and topirimate  -patient has been seen by Psychiatry for clearance to placement to short-term rehab    Cognitive impairment  -early dementia versus MCI  -continue to encourage patient remain physically, socially, and cognitively active and engaged to maintain cognitive acuity  -reports has good support network which is instrumental to her continued wellbeing    Hx COVID pneumonia  -no longer requiring isolation pre protocol and clinically resolved    Deconditioning/debility/frailty  -remains high risk of further debility with minimal metabolic stressors due to limited metabolic reserve  -continue to encourage well balanced nutrition and good control chronic medical conditions to improve resilience and limit impact of minor metabolic abnormalities as arise    Care coordination:  Rounded with Elmo Nassar (RN) at bedside    Subjective:     Patient seen examined at bedside where she sitting resting comfortably eating breakfast, she reports she slept well overnight and has no acute pain this morning  She offers no acute complaints  Nursing reports no acute events overnight  Review of Systems   Constitutional: Negative for chills and fever  HENT: Positive for hearing loss  Negative for trouble swallowing  Eyes: Negative for visual disturbance  Respiratory: Negative for shortness of breath  Cardiovascular: Negative for leg swelling  Gastrointestinal: Negative for abdominal pain, nausea and vomiting  Endocrine: Negative  Genitourinary: Negative  Musculoskeletal: Negative for arthralgias  Skin: Negative  Allergic/Immunologic: Negative  Neurological: Negative  Hematological: Negative  Psychiatric/Behavioral: Negative for sleep disturbance  All other systems reviewed and are negative  Objective:     Vitals: Blood pressure 113/64, pulse 85, temperature 98 2 °F (36 8 °C), resp  rate 16, height 5' 4" (1 626 m), weight 48 1 kg (106 lb), SpO2 96 %  ,Body mass index is 18 19 kg/m²  Intake/Output Summary (Last 24 hours) at 2/16/2021 0811  Last data filed at 2/16/2021 0558  Gross per 24 hour   Intake 840 ml   Output 625 ml   Net 215 ml     Current Medications: Reviewed    Physical Exam:   Physical Exam  Vitals signs and nursing note reviewed  Constitutional:       Comments: Thin, frail, elderly female sitting in chair side bed resting comfortably eating breakfast   HENT:      Head: Normocephalic and atraumatic  Nose: Nose normal       Mouth/Throat:      Mouth: Mucous membranes are moist    Eyes:      General:         Right eye: No discharge  Left eye: No discharge  Conjunctiva/sclera: Conjunctivae normal    Neck:      Musculoskeletal: Neck supple  Trachea: Trachea and phonation normal    Cardiovascular:      Rate and Rhythm: Normal rate  Pulses: Normal pulses  Heart sounds: Murmur (Systolic) present  Pulmonary:      Effort: Pulmonary effort is normal  No respiratory distress  Breath sounds: Normal breath sounds  No wheezing  Abdominal:      General: Bowel sounds are normal  There is no distension  Palpations: Abdomen is soft  Tenderness: There is no abdominal tenderness  Musculoskeletal:      Comments: Diffuse subcutaneous fat muscle wasting   Skin:     General: Skin is warm and dry  Neurological:      Mental Status: She is alert        Comments: Awake and alert, oriented, answers questions appropriately, no acute distress   Psychiatric:      Comments: Pleasant and cooperative        Invasive Devices     Peripheral Intravenous Line Peripheral IV 02/14/21 Left;Upper Arm 1 day              Lab, Imaging and other studies: I have personally reviewed pertinent reports

## 2021-02-17 LAB
ANION GAP SERPL CALCULATED.3IONS-SCNC: 3 MMOL/L (ref 4–13)
BUN SERPL-MCNC: 9 MG/DL (ref 5–25)
CALCIUM SERPL-MCNC: 9.1 MG/DL (ref 8.3–10.1)
CHLORIDE SERPL-SCNC: 113 MMOL/L (ref 100–108)
CO2 SERPL-SCNC: 31 MMOL/L (ref 21–32)
CREAT SERPL-MCNC: 0.59 MG/DL (ref 0.6–1.3)
FLUAV RNA RESP QL NAA+PROBE: NEGATIVE
FLUBV RNA RESP QL NAA+PROBE: NEGATIVE
GFR SERPL CREATININE-BSD FRML MDRD: 83 ML/MIN/1.73SQ M
GLUCOSE SERPL-MCNC: 100 MG/DL (ref 65–140)
MAGNESIUM SERPL-MCNC: 1.9 MG/DL (ref 1.6–2.6)
POTASSIUM SERPL-SCNC: 3.5 MMOL/L (ref 3.5–5.3)
RSV RNA RESP QL NAA+PROBE: NEGATIVE
SARS-COV-2 RNA RESP QL NAA+PROBE: NEGATIVE
SODIUM SERPL-SCNC: 147 MMOL/L (ref 136–145)

## 2021-02-17 PROCEDURE — NC001 PR NO CHARGE: Performed by: PHYSICIAN ASSISTANT

## 2021-02-17 PROCEDURE — 0241U HB NFCT DS VIR RESP RNA 4 TRGT: CPT | Performed by: PHYSICIAN ASSISTANT

## 2021-02-17 PROCEDURE — 83735 ASSAY OF MAGNESIUM: CPT | Performed by: ORTHOPAEDIC SURGERY

## 2021-02-17 PROCEDURE — 80048 BASIC METABOLIC PNL TOTAL CA: CPT | Performed by: PHYSICIAN ASSISTANT

## 2021-02-17 PROCEDURE — NC001 PR NO CHARGE: Performed by: SURGERY

## 2021-02-17 RX ORDER — POTASSIUM CHLORIDE 20 MEQ/1
20 TABLET, EXTENDED RELEASE ORAL ONCE
Status: COMPLETED | OUTPATIENT
Start: 2021-02-17 | End: 2021-02-17

## 2021-02-17 RX ADMIN — POTASSIUM CHLORIDE 20 MEQ: 1500 TABLET, EXTENDED RELEASE ORAL at 12:08

## 2021-02-17 RX ADMIN — QUETIAPINE 400 MG: 100 TABLET, FILM COATED ORAL at 21:23

## 2021-02-17 RX ADMIN — CLONAZEPAM 0.5 MG: 0.5 TABLET ORAL at 08:14

## 2021-02-17 RX ADMIN — TOPIRAMATE 50 MG: 25 TABLET ORAL at 08:14

## 2021-02-17 RX ADMIN — CLONAZEPAM 0.5 MG: 0.5 TABLET ORAL at 18:57

## 2021-02-17 RX ADMIN — HEPARIN SODIUM 5000 UNITS: 5000 INJECTION INTRAVENOUS; SUBCUTANEOUS at 21:23

## 2021-02-17 RX ADMIN — TOPIRAMATE 50 MG: 25 TABLET ORAL at 18:57

## 2021-02-17 RX ADMIN — DOCUSATE SODIUM AND SENNOSIDES 2 TABLET: 8.6; 5 TABLET ORAL at 18:57

## 2021-02-17 RX ADMIN — HEPARIN SODIUM 5000 UNITS: 5000 INJECTION INTRAVENOUS; SUBCUTANEOUS at 06:14

## 2021-02-17 RX ADMIN — LAMOTRIGINE 200 MG: 100 TABLET ORAL at 06:14

## 2021-02-17 RX ADMIN — HEPARIN SODIUM 5000 UNITS: 5000 INJECTION INTRAVENOUS; SUBCUTANEOUS at 13:20

## 2021-02-17 RX ADMIN — PANTOPRAZOLE SODIUM 40 MG: 40 TABLET, DELAYED RELEASE ORAL at 06:14

## 2021-02-17 NOTE — DISCHARGE INSTRUCTIONS
Discharge Instructions - Neurosurgery    · Do not take any blood thinning medications (ie  No Advil  No motrin  No ibuprofen  No Aleve  No Aspirin  No fishoil  No heparin  No antiplatelet / no anticoagulation medication)  · Refrain from activity that increases chance of trauma to head or falls  Recommend you take fall precaution  · No strenuous activity or sports  · Return to hospital Emergency Room if you experience worsening / new headache, nausea/vomiting, speech/vision change, seizure, confusion / mental status change, weakness, or other neurological changes  Please follow up in 2 weeks with the Neurosurgical group with repeat CT head without contrast 2-3 days prior to your appointment  You may go to any [facility] to get your imaging done  Please call [phone] to schedule your imaging appointment  Intracranial Hematoma   WHAT YOU NEED TO KNOW:   An intracranial hematoma is a collection of blood inside your skull  The blood leaks from a tear or rupture in a vein or artery, such as after a hemorrhagic stroke  The collected blood puts pressure on the brain  Serious medical problems can develop, such as seizures or a coma  An intracranial hematoma is a life-threatening emergency that needs immediate medical care  DISCHARGE INSTRUCTIONS:   Call your local emergency number (911 in the [address] [place] [address],[address]) or have someone else call if:   · You have any of the following signs of a stroke:      ? Numbness or drooping on one side of your face     ? Weakness in an arm or leg    ? Confusion or difficulty speaking    ? Dizziness, a severe headache, or vision loss       · You have a seizure  · You have new problems with balance or movement  Seek care immediately if:   · You are sleepier or are harder to wake than usual     · You have problems thinking  · Your behavior or personality has changed  · You have repeated or forceful vomiting or you cannot keep liquids down      Call your doctor or neurologist if:   · You have nausea or are vomiting  · Your symptoms are getting worse  · You have questions or concerns about your condition or care  Medicines:   · Anticonvulsants  may be given to prevent and control seizures  · Take your medicine as directed  Contact your healthcare provider if you think your medicine is not helping or if you have side effects  Tell him or her if you are allergic to any medicine  Keep a list of the medicines, vitamins, and herbs you take  Include the amounts, and when and why you take them  Bring the list or the pill bottles to follow-up visits  Carry your medicine list with you in case of an emergency  Warning signs of a stroke: The words BE FAST can help you remember and recognize warning signs of a stroke:  · B = Balance:  Sudden loss of balance    · E = Eyes:  Loss of vision in one or both eyes    · F = Face:  Face droops on one side    · A = Arms:  Arm drops when both arms are raised    · S = Speech:  Speech is slurred or sounds different    · T = Time:  Time to get help immediately     Manage or prevent another intracranial hematoma:  Healthcare providers will help you create goals for your recovery  The following lifestyle changes can help lower your risk for a stroke that can lead to another hematoma:  · Manage health conditions  A condition such as diabetes can increase your risk for a stroke  Control your blood sugar level if you have hyperglycemia or diabetes  Take your prescribed medicines and check your blood sugar level as directed  · Check your blood pressure as directed  High blood pressure can increase your risk for a stroke  Follow your healthcare provider's directions for controlling your blood pressure  · Limit alcohol  Large amounts of alcohol can lead to an intracerebral hematoma or a stroke  Alcohol may also raise your blood pressure or thin your blood  Blood thinning can cause a hemorrhagic stroke      · Do not use nicotine products or illegal drugs  Nicotine and other chemicals in cigarettes and cigars can cause blood vessel damage  Nicotine and illegal drugs both increase your risk for a stroke  Ask your healthcare provider for information if you currently smoke or use drugs and need help to quit  E-cigarettes or smokeless tobacco still contain nicotine  Talk to your healthcare provider before you use these products  · Take blood thinners exactly as directed  Blood thinners increase your risk for an intracerebral hematoma  Your healthcare provider may make changes to your blood thinner if it caused your hematoma  Always follow directions so you do not take too much of this medicine  · Eat a variety of healthy foods  Healthy foods include whole-grain breads, low-fat dairy products, beans, lean meats, and fish  Eat at least 5 servings of fruits and vegetables each day  Choose foods that are low in fat, cholesterol, salt, and sugar  Eat foods that are high in potassium, such as potatoes and bananas  A nutritionist can help you create healthy meal plans  · Maintain a healthy weight  Ask your healthcare provider how much you should weigh  Ask him or her to help you create a weight loss plan if you are overweight  He or she can help you create small goals if you have a lot of weight to lose  · Exercise as directed  Exercise can lower your blood pressure, cholesterol, weight, and blood sugar levels  Healthcare providers will help you create exercise goals  They can also help you make a plan to reach your goals  For example, you can break exercise into 10 minute periods, 3 times in a day  Find an exercise that you enjoy  This will make it easier for you to reach your exercise goals  · Manage stress  Stress can raise your blood pressure  Find new ways to relax, such as deep breathing or listening to music      Follow up with your doctor or neurologist within 2 days:  Write down your questions so you remember to ask them during your visits  For support and more information:   · National Stroke Association  [address] , [address]  Phone: 8- 314 - 687-6668  Web Address: [employer] [address]  Aretha28 Information is for End User's use only and may not be sold, redistributed or otherwise used for commercial purposes  All illustrations and images included in CareNotes® are the copyrighted property of [employer]  or [address] [place], [place] [address]pa[facility]  The above information is an  only  It is not intended as medical advice for individual conditions or treatments  Talk to your doctor, nurse or pharmacist before following any medical regimen to see if it is safe and effective for you  Urinary Tract Infection in Older Adults   WHAT YOU NEED TO KNOW:   A urinary tract infection (UTI) is caused by bacteria that get inside your urinary tract  Your urinary tract includes your kidneys, ureters, bladder, and urethra  Urine is made in your kidneys, and it flows from the ureters to the bladder  Urine leaves the bladder through the urethra  A UTI is more common in your lower urinary tract, which includes your bladder and urethra  DISCHARGE INSTRUCTIONS:   Seek care immediately if:   · You are urinating very little or not at all  · You are vomiting  · You have a high fever with shaking chills  · You have side or back pain that gets worse  Call your doctor if:   · You have a fever  · You are a woman and you have increased white or yellow discharge from your vagina  · You do not feel better after 2 days of taking antibiotics  · You have questions or concerns about your condition or care  Medicines:   · Medicines  help treat the bacterial infection or decrease pain and burning when you urinate  You may also need medicines to decrease the urge to urinate often   If you have UTIs often (called recurrent UTIs), you may be given antibiotics to take regularly  You will be given directions for when and how to use antibiotics  The goal is to prevent UTIs but not cause antibiotic resistance by using antibiotics too often  · Take your medicine as directed  Contact your healthcare provider if you think your medicine is not helping or if you have side effects  Tell him or her if you are allergic to any medicine  Keep a list of the medicines, vitamins, and herbs you take  Include the amounts, and when and why you take them  Bring the list or the pill bottles to follow-up visits  Carry your medicine list with you in case of an emergency  Self-care:   · Drink liquids as directed  Liquids can help flush bacteria from your urinary tract  Ask how much liquid to drink each day and which liquids are best for you  You may need to drink more liquids than usual to help flush out the bacteria  Do not drink alcohol, caffeine, and citrus juices  These can irritate your bladder and increase your symptoms  · Apply heat  on your abdomen for 20 to 30 minutes every 2 hours for as many days as directed  Heat helps decrease discomfort and pressure in your bladder  Prevent a UTI:   · Urinate when you feel the urge  Do not hold your urine  Bacteria can grow if urine stays in the bladder too long  It may be helpful to urinate at least every 3 to 4 hours  · Urinate after you have sex  to flush away bacteria that can enter your urinary tract during sex  · Wear cotton underwear and clothes that are loose  Tight pants and nylon underwear can trap moisture and cause bacteria to grow  · Cranberry juice or cranberry supplements  may help prevent UTIs  Your healthcare provider can recommend the right juice or supplement for you  · Women should wipe front to back  after urinating or having a bowel movement  This may prevent germs from getting into the urinary tract  Do not douche or use feminine deodorants  These can change the chemical balance in your vagina   You may also be given vaginal estrogen medicine  This medicine helps prevent recurrent UTIs in women who have gone through menopause or are in archie-menopause  Follow up with your healthcare provider as directed:  Write down your questions so you remember to ask them during your visits  © Copyright [address] Information is for End User's use only and may not be sold, redistributed or otherwise used for commercial purposes  All illustrations and images included in CareNotes® are the copyrighted property of [employer] | [employer]  or [facility] Sharmila Nunes   The above information is an  only  It is not intended as medical advice for individual conditions or treatments  Talk to your doctor, nurse or pharmacist before following any medical regimen to see if it is safe and effective for you

## 2021-02-17 NOTE — TRANSPORTATION MEDICAL NECESSITY
Section I - General Information    Name of Patient: Hannah Young                 : 1935    Medicare #: [id number]  Transport Date: 21 (PCS is valid for round trips on this date and for all repetitive trips in the 60-day range as noted below )  Origin: [facility]                                                         Destination: [facility]  Is the pt's stay covered under Medicare Part A (PPS/DRG)   []     Closest appropriate facility? If no, why is transport to more distant facility required? Yes  If hospice pt, is this transport related to pt's terminal illness? No       Section II - Medical Necessity Questionnaire  Ambulance transportation is medically necessary only if other means of transport are contraindicated or would be potentially harmful to the patient  To meet this requirement, the patient must either be "bed confined" or suffer from a condition such that transport by means other than ambulance is contraindicated by the patient's condition  The following questions must be answered by the medical professional signing below for this form to be valid:    1)  Describe the MEDICAL CONDITION (physical and/or mental) of this patient AT [address] [place] [address] that requires the patient to be transported in an ambulance and why transport by other means is contraindicated by the patient's condition: falls, SDH, confusion     2) Is the patient "bed confined" as defined below? No  To be "be confined" the patient must satisfy all three of the following conditions: (1) unable to get up from bed without Assistance; AND (2) unable to ambulate; AND (3) unable to sit in a chair or wheelchair  3) Can this patient safely be transported by car or wheelchair van (i e , seated during transport without a medical attendant or monitoring)?    No    4) In addition to completing questions 1-3 above, please check any of the following conditions that apply*: *Note: supporting documentation for any boxes checked must be maintained in the patient's medical records  If hosp-hosp transfer, describe services needed at 2nd facility not available at 1st facility? Patient is confused  Moderate/severe pain on movement   Unable to tolerate seated position for time needed to transport       Section III - Signature of Physician or Healthcare Professional  I certify that the above information is true and correct based on my evaluation of this patient, and represent that the patient requires transport by ambulance and that other forms of transport are contraindicated  I understand that this information will be used by the Centers for Medicare and Medicaid Services (CMS) to support the determination of medical necessity for ambulance services, and I represent that I have personal knowledge of the patient's condition at time of transport  []  If this box is checked, I also certify that the patient is physically or mentally incapable of signing the ambulance service's claim and that the institution with which I am affiliated has furnished care, services, or assistance to the patient  My signature below is made on behalf of the patient pursuant to 42 CFR §424 36(b)(4)  In accordance with 42 CFR §424 37, the specific reason(s) that the patient is physically or mentally incapable of signing the claim form is as follows:      Signature of Physician* or Healthcare Professional______________________________________________________________  Signature Date 02/17/21 (For scheduled repetitive transports, this form is not valid for transports performed more than 60 days after this date)    Printed Name & Credentials of Physician or Healthcare Professional (MD, DO, RN, etc )_Eduardo Srinivasan _______________________________  *Form must be signed by patient's attending physician for scheduled, repetitive transports   For non-repetitive, unscheduled ambulance transports, if unable to obtain the signature of the attending physician, any of the following may sign (choose appropriate option below)  [] Physician Assistant []  Clinical Nurse Specialist []  Registered Nurse  []  Nurse Practitioner  [x] Discharge Planner

## 2021-02-17 NOTE — ASSESSMENT & PLAN NOTE
-Blood cx 2/6: 1/2 GNR, E  Coli, repeat cultures 2/09 negative  -Urine culture 2/6: +nitrites, +leukos, E   Coli, Enterococcus, Pseudomonas   -Completed 7 day course of cefepime   -Remains afebrile, WBC count wnl- continue to monitor fever curve  -Monitor for signs of dysuria, urinary retention

## 2021-02-17 NOTE — ASSESSMENT & PLAN NOTE
- patient experiencing left hip and leg pain with movement and palpation after having a fall on 2/6/2021  - CT Chest, abdomen, pelvis negative for acute osseous abnormalities  - x-ray left hip and femur- no acute osseous abnormality  - patient states that she is feeling better today

## 2021-02-17 NOTE — ASSESSMENT & PLAN NOTE
- 2/11 Psychiatry evaluation: cleared for d/c to post-acute rehabilitation facility    - Patient experiencing depression during admission because of recent hospitalizations   -Continue home Lamictal 200mg, Seroquel 400mg, Topamax 50mg, and klonopin

## 2021-02-17 NOTE — DISCHARGE SUMMARY
Discharge- Mora Nyhan 1935, 80 y o  female MRN: [id number]    Unit/Bed#: [facility] 019-77 Encounter: [id number]    Primary Care Provider: Ailyn Alberto MD   Date and time admitted to hospital: 2/7/2021  2:38 AM        [address]  - Status post fall with the below noted injuries  - Fall precautions  - Geriatric Medicine consultation for evaluation, medication review and recommendations   - PT and OT evaluation and treatment as indicated, recommending post acute rehab- discharged to [facility]     * Subdural hematoma ([facility] [address] )  Assessment & Plan  - Neuro exam:  GCS 15  - Continue neurologic checks: Every 4 hours  - CT scan of the head on 2/8 demonstrated no interval change in subacute b/l SDH hematomas compared to 2/7  - Following Neurosurgery evaluation and recommendations, signed off  - Completed 7 day course of Keppra 2/14  - Chemical DVT prophylaxis: SQH  - PT and OT (including cognitive evaluation) evaluation and treatment as indicated, disposition to post-acute rehab  - Neurosurgery follow-up in two weeks with repeat CT head without contrast      Sepsis due to urinary tract infection ([facility] [address] )  Assessment & Plan  -Blood cx 2/6: 1/2 GNR, E  Coli, repeat cultures 2/09 negative  -Urine culture 2/6: +nitrites, +leukos, E   Coli, Enterococcus, Pseudomonas   -Completed 7 day course of cefepime   -Remains afebrile, WBC count wnl- continue to monitor fever curve  -Monitor for signs of dysuria, urinary retention    Ambulatory dysfunction  Assessment & Plan  - patient experiencing left hip and leg pain with movement and palpation after having a fall on 2/6/2021  - CT Chest, abdomen, pelvis negative for acute osseous abnormalities  - x-ray left hip and femur- no acute osseous abnormality  - patient states that she is feeling better today    Constipation  Assessment & Plan  -CT Abdomen/ Pelvis on presentation with marked distention of the rectum with large amount of fecal material  -Stool burden was alleviated  -Per gerontology, consider switching home iron supplementation to ferrous gluconate   -Continue miralax, Senokot    Bipolar 1 disorder [facility])  Assessment & Plan  - 2/11 Psychiatry evaluation: cleared for d/c to post-acute rehabilitation facility    - Patient experiencing depression during admission because of recent hospitalizations   -Continue home Lamictal 200mg, Seroquel 400mg, Topamax 50mg, and klonopin     Subjective: GL an 80year-old female presents after a fall complicated by subdural hematoma as well as Urosepsis  The patient has no complaints today, she is eager to go to her next placement  She denies any dizziness, headaches, fevers, chills, chest pain, shortness of breath, palpitations, nausea, vomiting, diarrhea, or lower extremity pain  Physical Exam:   GENERAL APPEARANCE:  Well-developed, well-nourished, 43-year-old female appearing in no acute distress, sitting up in bed, talkative  NEURO:  GCS 15, moving all extremities well, sensation and strength intact  HEENT:  Atraumatic, normocephalic PERRLA, mucous membrane sores, trachea midline, no rhinorrhea  CV:  Regular rate and rhythm, no murmurs or gallops  LUNGS:  Clear auscultation bilaterally, no adventitious breath sounds  GI:  Soft, nontender, nondistended, bowel sounds present  Bowel movement yesterday  :  Voiding  MSK:  Moving all extremities well, sensation and strength intact  SKIN:  Warm, pink        Resolved Problems  Date Reviewed: 2/17/2021          Resolved    Diarrhea 2/8/2021     Resolved by  Mirela Benjamin PA-C    Hypotension 2/12/2021     Resolved by  Renny Schuler DO          Admission Date:   Admission Orders (From admission, onward)     Ordered        02/07/21 0350  INPATIENT ADMISSION  Once                     Admitting Diagnosis: Subdural hematoma ([facility] [address] ) [S06 [id number]]  Polypharmacy [[id number]]  Other injury of unspecified body region, initial encounter [T14  8XXA]    HPI: Per Dr Emmanuel Pinto Sumaya Cuevas is a 80 y o  female past medical history GERD, DVT on Eliquis, bipolar 1, who presents with on witnessed fall at her nursing home  Patient denied loss of consciousness  Denied head strike  However CT imaging demonstrating acute on chronic left subdural hematoma overlying left frontal lobe 9 mm maximal width  No midline shift  CT C-spine negative  CT abdomen demonstrating enlargement of the rectum with large amount of fecal material   Of note prior echocardiogram in 2020 with ejection fraction of 70%  Of note patient also with urosepsis  Patient started on ceftriaxone "    Procedures Performed: No orders of the defined types were placed in this encounter  Summary of Hospital Course:  Patient was admitted to the trauma service teacher subdural hematoma while on Eliquis  She was found to be septic due to urosepsis and started on ceftriaxone  The patient was evaluated by Neurosurgery and the patient was started on a 7 day course of Keppra, repeat imaging of the head was obtained which revealed stable hematoma  The patient was found to be so constipated which was treated with a bowel regimen  The patient was evaluated by Physical therapy and Occupational therapy who recommended post acute rehab services  The patient's hospital course was complicated by an episode of hypotension altered mental status, the patient was admitted to the ICU for fluid resuscitation, antibiotics were broadened at that time to cefepime  The patient responded well to fluid boluses, return to med surge status approximately 1 day later  The patient was restarted on all home medications at the time including psychiatric medications  The patient was then remain on the trauma service to require placement at a post-acute Rehab Service  Case management was consulted and the located seizure broke for placement    The patient remained stable during the hospital course, she was stable for discharge on 02/17/2021 to [facility]  The patient was transported to [facility] by ems  Significant Findings, Care, Treatment and Services Provided:   · Subdural hematoma-evaluate neurosurgery-7 day course of Keppra prophylaxis  · Urosepsis- treated with ceftriaxone then cefepime        Condition at Discharge: stable         Discharge instructions/Information to patient and family:   See after visit summary for information provided to patient and family  Provisions for Follow-Up Care:  See after visit summary for information related to follow-up care and any pertinent home health orders  PCP: Jonatan Jones MD    Disposition: Short-term rehab at [place] [address] Readmission: No    Discharge Statement   I spent 25 minutes discharging the patient  This time was spent on the day of discharge  I had direct contact with the patient on the day of discharge  Additional documentation is required if more than 30 minutes were spent on discharge  Discharge Medications:  See after visit summary for reconciled discharge medications provided to patient and family

## 2021-02-17 NOTE — MALNUTRITION/BMI
This medical record reflects one or more clinical indicators suggestive of malnutrition and/or morbid obesity  BMI Findings: Body mass index is 18 19 kg/m²  See Nutrition note dated 2/17/21 for additional details  Completed nutrition assessment is viewable in the nutrition documentation

## 2021-02-17 NOTE — ASSESSMENT & PLAN NOTE
- Neuro exam:  GCS 15  - Continue neurologic checks: Every 4 hours  - CT scan of the head on 2/8 demonstrated no interval change in subacute b/l SDH hematomas compared to 2/7  - Following Neurosurgery evaluation and recommendations, signed off    - Completed 7 day course of Keppra 2/14  - Chemical DVT prophylaxis: SQH  - PT and OT (including cognitive evaluation) evaluation and treatment as indicated, disposition to post-acute rehab  - Neurosurgery follow-up in two weeks with repeat CT head without contrast

## 2021-02-17 NOTE — ASSESSMENT & PLAN NOTE
-CT Abdomen/ Pelvis on presentation with marked distention of the rectum with large amount of fecal material  -Stool burden was alleviated  -Per gerontology, consider switching home iron supplementation to ferrous gluconate   -Continue miralax, Senokot

## 2021-02-17 NOTE — CASE MANAGEMENT
[facility] refusing to accept the pt after 1900  Stating its "too late"  Cm explained that there are no transport running tomorrow due to the weather, and that the pt will potentially remain in the hospital until Friday due to this fact  CM called pt's son Jhoana Rayo to inform him of the situation  CM called [facility] to cancel the d/c, as well as pt's son to explain the situation

## 2021-02-17 NOTE — ASSESSMENT & PLAN NOTE
- Status post fall with the below noted injuries  - Fall precautions    - Geriatric Medicine consultation for evaluation, medication review and recommendations   - PT and OT evaluation and treatment as indicated, recommending post acute rehab- discharged to [address] [place] [address]

## 2021-02-17 NOTE — PROGRESS NOTES
Progress Note - Vianey Hopedale 1935, 80 y o  female MRN: [id number]    Unit/Bed#: [facility] 529-89 Encounter: [id number]    Primary Care Provider: Marcelle Fernández MD   Date and time admitted to hospital: 2/7/2021  2:38 AM        [address]  - Status post fall with the below noted injuries  - Fall precautions  - Geriatric Medicine consultation for evaluation, medication review and recommendations   - PT and OT evaluation and treatment as indicated, recommending post acute rehab  - Case Management consultation for disposition planning    * [facility] ([facility] )  Assessment & Plan  - Neuro exam:  GCS 15  - Continue neurologic checks: Every 4 hours  - CT scan of the head on 2/8 demonstrated no interval change in subacute b/l SDH hematomas compared to 2/7  - Following Neurosurgery evaluation and recommendations, signed off  - Completed 7 day course of Keppra 2/14  - Chemical DVT prophylaxis: SQH  - PT and OT (including cognitive evaluation) evaluation and treatment as indicated, disposition to post-acute rehab  - Neurosurgery follow-up in two weeks with repeat CT head without contrast      Sepsis due to urinary tract infection ([facility] )  Assessment & Plan  -Blood cx 2/6: 1/2 GNR, E  Coli, repeat cultures 2/09 negative  -Urine culture 2/6: +nitrites, +leukos, E   Coli, Enterococcus, Pseudomonas   -Completed 7 day course of cefepime   -Remains afebrile, WBC count wnl- continue to monitor fever curve  -Monitor for signs of dysuria, urinary retention    Ambulatory dysfunction  Assessment & Plan  - patient experiencing left hip and leg pain with movement and palpation after having a fall on 2/6/2021  - CT Chest, abdomen, pelvis negative for acute osseous abnormalities  - x-ray left hip and femur- no acute osseous abnormality  - patient states that she is feeling better today    Constipation  Assessment & Plan  -CT Abdomen/ Pelvis on presentation with marked distention of the rectum with large amount of fecal material  -Stool burden was alleviated  -Per gerontology, consider switching home iron supplementation to ferrous gluconate   -Continue miralax, Senokot    Bipolar 1 disorder [facility])  Assessment & Plan  - 2/11 Psychiatry evaluation: cleared for d/c to post-acute rehabilitation facility    - Patient experiencing depression during admission because of recent hospitalizations   -Continue home Lamictal 200mg, Seroquel 400mg, Topamax 50mg, and klonopin           Disposition:  Stable for discharge today 02/17/2021, pending authorization from [facility]  Maintain MedSurg status  SUBJECTIVE:  Chief Complaint: "Im going today?"    Subjective: GL an 80year-old female presents after a fall complicated by subdural hematoma as well as Urosepsis  The patient has no complaints today, she is eager to go to her next placement  She denies any dizziness, headaches, fevers, chills, chest pain, shortness of breath, palpitations, nausea, vomiting, diarrhea, or lower extremity pain         OBJECTIVE:     Meds/Allergies     Current Facility-Administered Medications:     clonazePAM (KlonoPIN) tablet 0 5 mg, 0 5 mg, Oral, BID, Pete Briceno PA-C, 0 5 mg at 02/17/21 1494    heparin (porcine) subcutaneous injection 5,000 Units, 5,000 Units, Subcutaneous, Q8H [address], 5,000 Units at 02/17/21 0614 **AND** [COMPLETED] Platelet count, , , Once, Montserrat Rodrigez MD    lamoTRIgine (LaMICtal) tablet 200 mg, 200 mg, Oral, Daily Before Breakfast, Breann Connelly MD, 200 mg at 02/17/21 1521    mineral oil enema 1 enema, 1 enema, Rectal, Daily PRN, Breann Connelly MD, 1 enema at 02/14/21 1044    ondansetron (ZOFRAN) injection 4 mg, 4 mg, Intravenous, Q6H PRN, Breann Connelly MD, 4 mg at 02/16/21 1740    pantoprazole (PROTONIX) EC tablet 40 mg, 40 mg, Oral, Early Morning, Breann Connelly MD, 40 mg at 02/17/21 4050    polyethylene glycol (MIRALAX) packet 17 g, 17 g, Oral, Daily, Breann Connelly MD, 17 g at 02/14/21 0945    QUEtiapine (SEROquel) tablet 400 mg, 400 mg, Oral, HS, Shelton Arreguin MD, 400 mg at 02/16/21 2110    senna-docusate sodium (SENOKOT S) 8 6-50 mg per tablet 2 tablet, 2 tablet, Oral, BID, Shelton Arreguin MD, 2 tablet at 02/15/21 0852    topiramate (TOPAMAX) tablet 50 mg, 50 mg, Oral, BID, Shelton Arreguin MD, 50 mg at 02/17/21 5003     Vitals:   Vitals:    02/17/21 0700   BP: 111/69   Pulse:    Resp: 18   Temp:    SpO2:        Intake/Output:  I/O       02/15 0701 - 02/16 0700 02/16 0701 - 02/17 0700 02/17 0701 - 02/18 0700    P  O  840 420     IV Piggyback       Total Intake(mL/kg) 840 (17 5) 420 (8 7)     Urine (mL/kg/hr) 625 (0 5)      Stool 0      Total Output 625      Net +215 +420            Unmeasured Urine Occurrence 3 x 6 x     Unmeasured Stool Occurrence 5 x 3 x            Nutrition/GI Proph/Bowel Reg:  Regular house diet, Dulcolax, MiraLax bowel regimen    Physical Exam:   GENERAL APPEARANCE:  Well-developed, well-nourished, 80year-old female appearing in no acute distress, sitting up in bed, talkative  NEURO:  GCS 15, moving all extremities well, sensation and strength intact  HEENT:  Atraumatic, normocephalic PERRLA, mucous membrane sores, trachea midline, no rhinorrhea  CV:  Regular rate and rhythm, no murmurs or gallops  LUNGS:  Clear auscultation bilaterally, no adventitious breath sounds  GI:  Soft, nontender, nondistended, bowel sounds present  Bowel movement yesterday  :  Voiding  MSK:  Moving all extremities well, sensation and strength intact    SKIN:  Warm, pink    Invasive Devices     Peripheral Intravenous Line            Peripheral IV 02/14/21 Left;Upper Arm 2 days               VTE Prophylaxis: Heparin

## 2021-02-17 NOTE — CASE MANAGEMENT
Cm has a transport via "[employer] [id number]  CM informed [facility], who is requesting an earlier pickup

## 2021-02-17 NOTE — UTILIZATION REVIEW
Continued Stay Review    Date: 2/17                        Current Patient Class: Inpatient Current Level of Care: Med Surg    HPI:86 y o  female initially admitted on 2/7 presents with on witnessed fall at her nursing home    Assessment/Plan:   Subdural Hematoma - Continue neuro checks q4h  Neurosurgery signing off  Sepsis - Complete 7d course of Iv antibiotics  Monitor for signs of dysuria, urinary retention  Constipation - Stool burden was alleviated  Continue Miralax and senokot  For IP Rehab placement - Accepted by [facility]   Awaiting Insurance auth - in progress    Pertinent Labs/Diagnostic Results:   Results from last 7 days   Lab Units 02/17/21  0926   SARS-COV-2  Negative     Results from last 7 days   Lab Units 02/15/21  0936 02/11/21  0700   WBC Thousand/uL 4 86 5 27   HEMOGLOBIN g/dL 9 1* 9 6*   HEMATOCRIT % 31 5* 33 1*   PLATELETS Thousands/uL 432* 449*   NEUTROS ABS Thousands/µL 2 22 1 99         Results from last 7 days   Lab Units 02/17/21  0834 02/15/21  0936 02/14/21 0440 02/13/21 0446 02/12/21  0458   SODIUM mmol/L 147* 146* 147* 146* 148*   POTASSIUM mmol/L 3 5 3 2* 3 7 3 8 3 8   CHLORIDE mmol/L 113* 112* 114* 111* 116*   CO2 mmol/L 31 32 30 33* 29   ANION GAP mmol/L 3* 2* 3* 2* 3*   BUN mg/dL 9 14 17 17 13   CREATININE mg/dL 0 59* 0 64 0 54* 0 52* 0 61   EGFR ml/min/1 73sq m 83 81 86 87 82   CALCIUM mg/dL 9 1 8 9 9 0 9 3 9 0   MAGNESIUM mg/dL 1 9  --   --   --   --              Results from last 7 days   Lab Units 02/17/21  0834 02/15/21  0936 02/14/21  0440 02/13/21  0446 02/12/21  0458 02/11/21  0700   GLUCOSE RANDOM mg/dL 100 198* 92 102 84 80         Results from last 7 days   Lab Units 02/17/21  0926   INFLUENZA A PCR  Negative   INFLUENZA B PCR  Negative   RSV PCR  Negative       Vital Signs:   02/17/21 07:00:54  98 4 °F (36 9 °C)  66  18  111/69  83  95 %  --  --  --   02/17/21 00:03:25  --  65  17  138/75  96  --  --  --  --     Medications:   Scheduled Medications:  clonazePAM, 0 5 mg, Oral, BID  heparin (porcine), 5,000 Units, Subcutaneous, Q8H [facility]  lamoTRIgine, 200 mg, Oral, Daily Before Breakfast  pantoprazole, 40 mg, Oral, Early Morning  polyethylene glycol, 17 g, Oral, Daily  QUEtiapine, 400 mg, Oral, HS  senna-docusate sodium, 2 tablet, Oral, BID  topiramate, 50 mg, Oral, BID    Continuous IV Infusions: None     PRN Meds:  mineral oil, 1 enema, Rectal, Daily PRN  ondansetron, 4 mg, Intravenous, Q6H PRN      Discharge Plan: See above    Network Utilization Review Department  ATTENTION: Please call with any questions or concerns to [phone] and carefully listen to the prompts so that you are directed to the right person  All voicemails are confidential   Zenaida Markham all requests for admission clinical reviews, approved or denied determinations and any other requests to dedicated fax number below belonging to the campus where the patient is receiving treatment   List of dedicated fax numbers for the Facilities:  [address] DENIALS (Administrative/Medical Necessity) [phone]   [address] (Maternity/NICU/Pediatrics) [phone]   [address] [place] [address] Dr Compa Paul 2433 (Karime Nina "Heather" 103) [address] [place] [address] Juwan Acosta 14[address] [place] [address] [place] [address] [phone]

## 2021-02-17 NOTE — WOUND OSTOMY CARE
Progress Note - Wound   Candy Burgos 80 y o  female MRN: [id number]  Unit/Bed#: [facility] 717-25 Encounter: [id number]        Assessment:   Patient is seen for wound follow up  Paitent is independent with positioning and continent  Findings  1  Mid back stage 2 pressure injury--resolved      sacro-buttocks intact          Wound Care  Plan:   1-Apply Allevyn Life foam dressing to midline back, sacrum, and bilateral heels for prevention  Steve with P   Peel back at least daily for skin assessment and re-apply  Change dressing every 3 days and PRN  2-Elevate heels to offload pressure  3-Ehob cushion in chair when out of bed  4-Moisturize skin daily with skin nourishing cream   5-Turn/reposition q2h or when medically stable for pressure re-distribution on skin  6-Back--Apply Allevyn Life foam dressing to open area on lower back  Steve with P   Change dressing every 3 days and PRN    Wound Care will sign off  Call or Tigertext with any questions

## 2021-02-18 LAB
BACTERIA UR QL AUTO: ABNORMAL /HPF
BILIRUB UR QL STRIP: NEGATIVE
CLARITY UR: CLEAR
COLOR UR: YELLOW
GLUCOSE UR STRIP-MCNC: NEGATIVE MG/DL
HGB UR QL STRIP.AUTO: ABNORMAL
HYALINE CASTS #/AREA URNS LPF: ABNORMAL /LPF
KETONES UR STRIP-MCNC: NEGATIVE MG/DL
LEUKOCYTE ESTERASE UR QL STRIP: NEGATIVE
NITRITE UR QL STRIP: NEGATIVE
NON-SQ EPI CELLS URNS QL MICRO: ABNORMAL /HPF
PH UR STRIP.AUTO: 7 [PH]
PROT UR STRIP-MCNC: NEGATIVE MG/DL
RBC #/AREA URNS AUTO: ABNORMAL /HPF
SP GR UR STRIP.AUTO: 1.01 (ref 1–1.03)
UROBILINOGEN UR QL STRIP.AUTO: 0.2 E.U./DL
WBC #/AREA URNS AUTO: ABNORMAL /HPF

## 2021-02-18 PROCEDURE — 81001 URINALYSIS AUTO W/SCOPE: CPT | Performed by: PHYSICIAN ASSISTANT

## 2021-02-18 PROCEDURE — NC001 PR NO CHARGE: Performed by: EMERGENCY MEDICINE

## 2021-02-18 PROCEDURE — 99232 SBSQ HOSP IP/OBS MODERATE 35: CPT | Performed by: EMERGENCY MEDICINE

## 2021-02-18 RX ADMIN — HEPARIN SODIUM 5000 UNITS: 5000 INJECTION INTRAVENOUS; SUBCUTANEOUS at 06:27

## 2021-02-18 RX ADMIN — HEPARIN SODIUM 5000 UNITS: 5000 INJECTION INTRAVENOUS; SUBCUTANEOUS at 14:27

## 2021-02-18 RX ADMIN — ONDANSETRON 4 MG: 2 INJECTION INTRAMUSCULAR; INTRAVENOUS at 07:20

## 2021-02-18 RX ADMIN — DOCUSATE SODIUM AND SENNOSIDES 2 TABLET: 8.6; 5 TABLET ORAL at 09:43

## 2021-02-18 RX ADMIN — POLYETHYLENE GLYCOL 3350 17 G: 17 POWDER, FOR SOLUTION ORAL at 09:43

## 2021-02-18 RX ADMIN — PANTOPRAZOLE SODIUM 40 MG: 40 TABLET, DELAYED RELEASE ORAL at 06:27

## 2021-02-18 RX ADMIN — LAMOTRIGINE 200 MG: 100 TABLET ORAL at 06:27

## 2021-02-18 RX ADMIN — CLONAZEPAM 0.5 MG: 0.5 TABLET ORAL at 09:44

## 2021-02-18 RX ADMIN — TOPIRAMATE 50 MG: 25 TABLET ORAL at 09:43

## 2021-02-18 NOTE — CASE MANAGEMENT
On call note: Received a call from pt's bedside RN Paramjit Brandon who states she received a call from [facility] Admissions  Paramjit Brandon was informed by CB that they're able to accept pt tonight as long as transport is before 8:30pm     CM called [address], [facility], [place], Yadi Rhodes, and Mario--no available transport for [employer]  CM informed pt's bedside RN Paramjit Brandon of same  Praamjit Brandon to contact [facility] to update them on same

## 2021-02-18 NOTE — ASSESSMENT & PLAN NOTE
- Status post fall with the below noted injuries  - Fall precautions    - Geriatric Medicine consultation for evaluation, medication review and recommendations   - PT and OT evaluation and treatment as indicated, recommending post acute rehab- discharged to [address] [place] [address]

## 2021-02-18 NOTE — PLAN OF CARE
Problem: Potential for Falls  Goal: Patient will remain free of falls  Description: INTERVENTIONS:  - Assess patient frequently for physical needs  -  Identify cognitive and physical deficits and behaviors that affect risk of falls    -  [place] fall precautions as indicated by assessment   - Educate patient/family on patient safety including physical limitations  - Instruct patient to call for assistance with activity based on assessment  - Modify environment to reduce risk of injury  - Consider OT/PT consult to assist with strengthening/mobility  Outcome: Progressing     Problem: Prexisting or High Potential for Compromised Skin Integrity  Goal: Skin integrity is maintained or improved  Description: INTERVENTIONS:  - Identify patients at risk for skin breakdown  - Assess and monitor skin integrity  - Assess and monitor nutrition and hydration status  - Monitor labs   - Assess for incontinence   - Turn and reposition patient  - Assist with mobility/ambulation  - Relieve pressure over bony prominences  - Avoid friction and shearing  - Provide appropriate hygiene as needed including keeping skin clean and dry  - Evaluate need for skin moisturizer/barrier cream  - Collaborate with interdisciplinary team   - Patient/family teaching  - Consider wound care consult   Outcome: Progressing     Problem: INFECTION - ADULT  Goal: Absence or prevention of progression during hospitalization  Description: INTERVENTIONS:  - Assess and monitor for signs and symptoms of infection  - Monitor lab/diagnostic results  - Monitor all insertion sites, i e  indwelling lines, tubes, and drains  - Monitor endotracheal if appropriate and nasal secretions for changes in amount and color  - [place] appropriate cooling/warming therapies per order  - Administer medications as ordered  - Instruct and encourage patient and family to use good hand hygiene technique  - Identify and instruct in appropriate isolation precautions for identified infection/condition  Outcome: Progressing  Goal: Absence of fever/infection during neutropenic period  Description: INTERVENTIONS:  - Monitor WBC    Outcome: Progressing     Problem: DISCHARGE PLANNING  Goal: Discharge to home or other facility with appropriate resources  Description: INTERVENTIONS:  - Identify barriers to discharge w/patient and caregiver  - Arrange for needed discharge resources and transportation as appropriate  - Identify discharge learning needs (meds, wound care, etc )  - Arrange for interpretive services to assist at discharge as needed  - Refer to Case Management Department for coordinating discharge planning if the patient needs post-hospital services based on physician/advanced practitioner order or complex needs related to functional status, cognitive ability, or social support system  Outcome: Progressing     Problem: INFECTION - ADULT  Goal: Absence or prevention of progression during hospitalization  Description: INTERVENTIONS:  - Assess and monitor for signs and symptoms of infection  - Monitor lab/diagnostic results  - Monitor all insertion sites, i e  indwelling lines, tubes, and drains  - Monitor endotracheal if appropriate and nasal secretions for changes in amount and color  - [place] appropriate cooling/warming therapies per order  - Administer medications as ordered  - Instruct and encourage patient and family to use good hand hygiene technique  - Identify and instruct in appropriate isolation precautions for identified infection/condition  Outcome: Progressing     Problem: Knowledge Deficit  Goal: Patient/family/caregiver demonstrates understanding of disease process, treatment plan, medications, and discharge instructions  Description: Complete learning assessment and assess knowledge base    Interventions:  - Provide teaching at level of understanding  - Provide teaching via preferred learning methods  Outcome: Progressing     Problem: CONFUSION/THOUGHT DISTURBANCE  Goal: Thought disturbances (confusion, delirium, depression, dementia or psychosis) are managed to maintain or return to baseline mental status and functional level  Description: INTERVENTIONS:  - Assess for possible contributors to  thought disturbance, including but not limited to medications, infection, impaired vision or hearing, underlying metabolic abnormalities, dehydration, respiratory compromise,  psychiatric diagnoses and notify attending PHYSICAN/AP  - Monitor and intervene to maintain adequate nutrition, hydration, elimination, sleep and activity  - Decrease environmental stimuli, including noise as appropriate  - Provide frequent contacts to provide refocusing, direction and reassurance as needed  Approach patient calmly with eye contact and at their level  - [employer] high risk fall precautions, aspiration precautions and other safety measures, as indicated  - If delirium suspected, notify physician/AP of change in condition and request immediate in-person evaluation  - Pursue consults as appropriate including Geriatric (campus dependent), OT for cognitive evaluation/activity planning, psychiatric, pastoral care, etc   Outcome: Progressing     Problem: Nutrition/Hydration-ADULT  Goal: Nutrient/Hydration intake appropriate for improving, restoring or maintaining nutritional needs  Description: Monitor and assess patient's nutrition/hydration status for malnutrition  Collaborate with interdisciplinary team and initiate plan and interventions as ordered  Monitor patient's weight and dietary intake as ordered or per policy  Utilize nutrition screening tool and intervene as necessary  Determine patient's food preferences and provide high-protein, high-caloric foods as appropriate       INTERVENTIONS:  - Monitor oral intake, urinary output, labs, and treatment plans  - Assess nutrition and hydration status and recommend course of action  - Evaluate amount of meals eaten  - Assist patient with eating if necessary - Allow adequate time for meals  - Recommend/ encourage appropriate diets, oral nutritional supplements, and vitamin/mineral supplements  - Order, calculate, and assess calorie counts as needed  - Recommend, monitor, and adjust tube feedings and TPN/PPN based on assessed needs  - Assess need for intravenous fluids  - Provide specific nutrition/hydration education as appropriate  - Include patient/family/caregiver in decisions related to nutrition  Outcome: Progressing

## 2021-02-18 NOTE — PROGRESS NOTES
Progress Note - Mahsa Paiz 1935, 80 y o  female MRN: [id number]    Unit/Bed#: [facility] 817-82 Encounter: [id number]    Primary Care Provider: Neel Metzger MD   Date and time admitted to hospital: 2/7/2021  2:38 AM         [address]  - Status post fall with the below noted injuries  - Fall precautions  - Geriatric Medicine consultation for evaluation, medication review and recommendations   - PT and OT evaluation and treatment as indicated, recommending post acute rehab- discharged to [facility]     * [facility] ([facility] )  Assessment & Plan  - Neuro exam:  GCS 15  - Continue neurologic checks: Every 4 hours  - CT scan of the head on 2/8 demonstrated no interval change in subacute b/l SDH hematomas compared to 2/7  - Following Neurosurgery evaluation and recommendations, signed off  - Completed 7 day course of Keppra 2/14  - Chemical DVT prophylaxis: SQH  - PT and OT (including cognitive evaluation) evaluation and treatment as indicated, disposition to post-acute rehab  - Neurosurgery follow-up in two weeks with repeat CT head without contrast      Sepsis due to urinary tract infection ([facility] )  Assessment & Plan  -Blood cx 2/6: 1/2 GNR, E  Coli, repeat cultures 2/09 negative  -Urine culture 2/6: +nitrites, +leukos, E   Coli, Enterococcus, Pseudomonas   -Completed 7 day course of cefepime   -Remains afebrile, WBC count wnl- continue to monitor fever curve  -Monitor for signs of dysuria, urinary retention    Ambulatory dysfunction  Assessment & Plan  - patient experiencing left hip and leg pain with movement and palpation after having a fall on 2/6/2021  - CT Chest, abdomen, pelvis negative for acute osseous abnormalities  - x-ray left hip and femur- no acute osseous abnormality  - patient states that she is feeling better today    Constipation  Assessment & Plan  -CT Abdomen/ Pelvis on presentation with marked distention of the rectum with large amount of fecal material  -Stool burden was alleviated  -Per gerontology, consider switching home iron supplementation to ferrous gluconate   -Continue miralax, Senokot    Bipolar 1 disorder [facility])  Assessment & Plan  - 2/11 Psychiatry evaluation: cleared for d/c to post-acute rehabilitation facility    - Patient experiencing depression during admission because of recent hospitalizations   -Continue home Lamictal 200mg, Seroquel 400mg, Topamax 50mg, and klonopin           Disposition:  Stable for discharge, a transportation issue, accepted at [facility]  Will discharge when transportation because available  SUBJECTIVE:  Chief Complaint: " I feel good, thought I was leaving!"    Subjective: GL is a 80year old female who presents after a fall resulting in SDH complicated by urosepsis  The patient has no acute complaints this morning  She denies any fevers, chills, headache, chest pain, SOB, abdominal pain, nausea, vomiting, diarrhea, or lower extremity pain         OBJECTIVE:     Meds/Allergies     Current Facility-Administered Medications:     clonazePAM (KlonoPIN) tablet 0 5 mg, 0 5 mg, Oral, BID, Pete Briceno PA-C, 0 5 mg at 02/17/21 1857    heparin (porcine) subcutaneous injection 5,000 Units, 5,000 Units, Subcutaneous, Q8H [facility], 5,000 Units at 02/18/21 0627 **AND** [COMPLETED] Platelet count, , , Once, Alexia Patel MD    lamoTRIgine (LaMICtal) tablet 200 mg, 200 mg, Oral, Daily Before Breakfast, Amish Solis MD, 200 mg at 02/18/21 7996    mineral oil enema 1 enema, 1 enema, Rectal, Daily PRN, Amish Solis MD, 1 enema at 02/14/21 1044    ondansetron (ZOFRAN) injection 4 mg, 4 mg, Intravenous, Q6H PRN, Amish Solis MD, 4 mg at 02/16/21 1740    pantoprazole (PROTONIX) EC tablet 40 mg, 40 mg, Oral, Early Morning, Amish Solis MD, 40 mg at 02/18/21 2642    polyethylene glycol (MIRALAX) packet 17 g, 17 g, Oral, Daily, Amish Solis MD, 17 g at 02/14/21 0945    QUEtiapine (SEROquel) tablet 400 mg, 400 mg, Oral, HS, Amish MD Irma, 400 mg at 02/17/21 2123    senna-docusate sodium (SENOKOT S) 8 6-50 mg per tablet 2 tablet, 2 tablet, Oral, BID, Cb Kraus MD, 2 tablet at 02/17/21 1857    topiramate (TOPAMAX) tablet 50 mg, 50 mg, Oral, BID, Cb Kraus MD, 50 mg at 02/17/21 1857     Vitals:   Vitals:    02/18/21 0714   BP: 131/82   Pulse: 88   Resp: 18   Temp: 97 7 °F (36 5 °C)   SpO2: 94%       Intake/Output:  I/O       02/16 0701 - 02/17 0700 02/17 0701 - 02/18 0700 02/18 0701 - 02/19 0700    P  O  420 1200     Total Intake(mL/kg) 420 (8 7) 1200 (24 9)     Urine (mL/kg/hr)  250 (0 2)     Stool       Total Output  250     Net +420 +950            Unmeasured Urine Occurrence 6 x 6 x     Unmeasured Stool Occurrence 3 x             Nutrition/GI Proph/Bowel Reg: Regular house diet, Miralax/sennakot    Physical Exam:   GENERAL APPEARANCE: WD/WN in no acute distress, resting comfortably in bed  NEURO: GCS15, moving all extremities well, sensation and strength intact  HEENT: Atraumatic, normocephalic, PERRLA, no rhinorrhea, trachea midline  CV: RRR, No M/R/G  LUNGS: CTA no adventitous  GI: Softr, non-tender, non-distended, bowel sounds present  Bowel movement today     : Voiding  MSK: Moving all extremities well, no signs of obvious deformities or hematomas  SKIN:  Warm, pink    Invasive Devices     Peripheral Intravenous Line            Peripheral IV 02/14/21 Left;Upper Arm 3 days                 VTE Prophylaxis: Heparin

## 2021-02-18 NOTE — CASE MANAGEMENT
CELIA spoke to liaison from [facility]  Pt will d/c there today @1700 via [employer]  [facility] liaison made aware and in agreement  Cm informed pt's son of the d/c

## 2021-02-18 NOTE — DISCHARGE SUMMARY
Addendum to d/c summary from 2/17 at 5:09 PM    Patient was unable to be discharged secondary to lack of transportation to [facility]  Plan is for patient to be D/C to [facility] today at [address] [facility]  The patient remained stable during the remainder of her course on the medical-surgical unit  The patient is to be transported via EMS

## 2021-02-19 VITALS
RESPIRATION RATE: 18 BRPM | SYSTOLIC BLOOD PRESSURE: 136 MMHG | TEMPERATURE: 97.3 F | OXYGEN SATURATION: 95 % | WEIGHT: 106 LBS | BODY MASS INDEX: 18.1 KG/M2 | HEART RATE: 62 BPM | HEIGHT: 64 IN | DIASTOLIC BLOOD PRESSURE: 81 MMHG

## 2021-02-19 NOTE — UTILIZATION REVIEW
Notification of Discharge  This is a Notification of Discharge from our facility [address]  Please be advised that this patient has been discharge from our facility  Below you will find the admission and discharge date and time including the patients disposition  PRESENTATION DATE: 2/7/2021  2:38 AM  OBS ADMISSION DATE:   IP ADMISSION DATE: 2/7/21 0349   DISCHARGE DATE: 2/18/2021  5:10 PM  DISPOSITION: Non [facility]   Admission Orders listed below:  Admission Orders (From admission, onward)     Ordered        02/07/21 0350  INPATIENT ADMISSION  Once                   Please contact the UR Department if additional information is required to close this patient's authorization/case  [address] Utilization Review Department  Main: [phone] x carefully listen to the prompts  All voicemails are confidential   [email or web address]  Send all requests for admission clinical reviews, approved or denied determinations and any other requests to dedicated fax number below belonging to the campus where the patient is receiving treatment   List of dedicated fax numbers:  [address] DENIALS (Administrative/Medical Necessity) [phone]   [address] (Maternity/NICU/Pediatrics) [phone]   Mu[address]oz Cu[address] [phone]   Alan Faes [phone]   Hildegarnadja Liter [phone]   [address] [phone]   [facility]  [phone]   [address] [facility]  [address] [facility] [address] [facility] [phone]

## 2021-02-22 ENCOUNTER — TELEPHONE (OUTPATIENT)
Dept: UROLOGY | Facility: MEDICAL CENTER | Age: 86
End: 2021-02-22

## 2021-02-22 NOTE — TELEPHONE ENCOUNTER
Patient of Dr Jakob Spivey seen in the [facility] office  Patient Shailesh Lacyilor called and advised that the patient had her aj removed when she was in the hospital on 02/07/2021  She advised that the patient is in a rehab facility for the next few weeks and would like to know if she should still come out for this appointment on 03/10/21  Please advise

## 2021-03-08 ENCOUNTER — TELEPHONE (OUTPATIENT)
Dept: FAMILY MEDICINE CLINIC | Facility: CLINIC | Age: 86
End: 2021-03-08

## 2021-03-08 NOTE — TELEPHONE ENCOUNTER
Yvette from [facility] requesting an order for the patient to be given tylenol  They are unable to give because no order on file for patient  Order to be given tylenol for mild pain and fever  Please advise  Fax to [phone]       Jasmyn Sal can be reached at [phone] if needed

## 2021-03-18 ENCOUNTER — TELEPHONE (OUTPATIENT)
Dept: FAMILY MEDICINE CLINIC | Facility: CLINIC | Age: 86
End: 2021-03-18

## 2021-03-18 DIAGNOSIS — R13.12 OROPHARYNGEAL DYSPHAGIA: Primary | ICD-10-CM

## 2021-03-18 NOTE — TELEPHONE ENCOUNTER
Radha Jackson from [facility] notified of verbal order for Tylenol and speech eval and treat faxed    [phone]

## 2021-03-18 NOTE — TELEPHONE ENCOUNTER
Indra Marina from [facility] is requesting an order for speech eval and treat  Also, the patient is having some pain, they are unable to give her anything because no order is on file for patient, she requested tylenol for patient Please advise   Phone # [phone]   Fax # [phone]

## 2021-03-21 ENCOUNTER — HOSPITAL ENCOUNTER (OUTPATIENT)
Dept: CT IMAGING | Facility: HOSPITAL | Age: 86
Discharge: HOME | End: 2021-03-21
Payer: COMMERCIAL

## 2021-03-21 DIAGNOSIS — S06.5X9A SUBDURAL HEMATOMA (HCC): ICD-10-CM

## 2021-03-21 PROCEDURE — G1004 CDSM NDSC: HCPCS

## 2021-03-21 PROCEDURE — 70450 CT HEAD/BRAIN W/O DYE: CPT

## 2021-03-24 NOTE — ASSESSMENT & PLAN NOTE
Pleasant 80year old female with PMH including HTN, GERD, DVT 2008 on Elqiuis, bipolar, depression, anemia and aneixty who presents status post fall at nursing home  The patient is being seen virtually (phone call) for her two week follow up and review of CT head imaging  Imaging:   · CT head without 2/7/2021:   Acute bilateral subdural hemorrhages are larger and more hyperdense compared with February 6, 2021  There remains no midline shift  Close clinical correlation and follow-up is recommended  · CT head 2/8/2021: No  interval change in subacute bilateral subdural hematomas  · CT head 3/21/21: Resolution of small right frontal subdural hemorrhage  Mild chronic microangiopathic changes are similar to prior study  Plan:  · Images reviewed and discussed with patient in detail and Neurosurgeon  Recommend risk vs benefit discussion with patient and PCP regarding necessary need for taking Elliquis prophylacticlly  From a Neurological standpoint, the patient is a fall risk  · Continue to recommend and endorse fall precautions  · Discussed emergent symptoms of worsening headaches, slurred speech, dizziness, etc  Instructed patient and her nurse Carolyn Barone at [facility] to return to ED if theses symtpoms occur and to notify our office  · Patient and Carolyn Barone the nurse at [facility] understood and is amenable to plan of care  · We will follow up with this patient as needed or sooner if symptoms worsen or change

## 2021-03-25 ENCOUNTER — TELEMEDICINE (OUTPATIENT)
Dept: NEUROSURGERY | Facility: CLINIC | Age: 86
End: 2021-03-25
Payer: COMMERCIAL

## 2021-03-25 VITALS — HEIGHT: 64 IN | WEIGHT: 100 LBS | BODY MASS INDEX: 17.07 KG/M2

## 2021-03-25 DIAGNOSIS — S06.5X9A SUBDURAL HEMATOMA (HCC): Primary | ICD-10-CM

## 2021-03-25 PROCEDURE — 99213 OFFICE O/P EST LOW 20 MIN: CPT | Performed by: PHYSICIAN ASSISTANT

## 2021-03-25 PROCEDURE — 1036F TOBACCO NON-USER: CPT | Performed by: PHYSICIAN ASSISTANT

## 2021-03-25 NOTE — PROGRESS NOTES
Virtual Brief Visit    Assessment/Plan:    Problem List Items Addressed This Visit        Nervous and Auditory    Subdural hematoma ([facility] [address] ) - Primary     Pleasant 80year old female with PMH including HTN, GERD, DVT 2008 on Elqiuis, bipolar, depression, anemia and aneixty who presents status post fall at nursing home  The patient is being seen virtually (phone call) for her two week follow up and review of CT head imaging  Imaging:   · CT head without 2/7/2021:   Acute bilateral subdural hemorrhages are larger and more hyperdense compared with February 6, 2021  There remains no midline shift  Close clinical correlation and follow-up is recommended  · CT head 2/8/2021: No  interval change in subacute bilateral subdural hematomas  · CT head 3/21/21: Resolution of small right frontal subdural hemorrhage  Mild chronic microangiopathic changes are similar to prior study  Plan:  · Images reviewed and discussed with patient in detail and Neurosurgeon  Recommend risk vs benefit discussion with patient and PCP regarding necessary need for taking Elliquis prophylacticlly  From a Neurological standpoint, the patient is a fall risk  · Continue to recommend and endorse fall precautions  · Discussed emergent symptoms of worsening headaches, slurred speech, dizziness, etc  Instructed patient and her nurse Costa Dutton at [facility] to return to ED if theses symtpoms occur and to notify our office  · Patient and Costa Dutton the nurse at [facility] understood and is amenable to plan of care  · We will follow up with this patient as needed or sooner if symptoms worsen or change  BMI Counseling: Body mass index is 17 16 kg/m²  The BMI is below normal  Patient referred to PCP due to patient being underweight              Reason for visit is   Chief Complaint   Patient presents with    Virtual Brief Visit     subdural hematoma        Encounter provider Jill Mayfield PA-C    Provider located at [facility]  [facility] [place]  [address]  [facility] [address] [phone]    Recent Visits  No visits were found meeting these conditions  Showing recent visits within past 7 days and meeting all other requirements     Today's Visits  Date Type Provider Dept   03/25/21 Telemedicine Leonie Bass, [address]   Showing today's visits and meeting all other requirements     Future Appointments  No visits were found meeting these conditions  Showing future appointments within next 150 days and meeting all other requirements        After connecting through telephone, the patient was identified by name and date of birth  Caleb Mendoza was informed that this is a telemedicine visit and that the visit is being conducted through telephone  My office door was closed  No one else was in the room  She acknowledged consent and understanding of privacy and security of the platform  The patient has agreed to participate and understands she can discontinue the visit at any time  Patient is aware this is a billable service  Subjective    Caleb Mendoza is a 80 y o  female  Pleasant 80year old female with PMH including HTN, GERD, DVT 2008 on Elqiuis, bipolar, depression, anemia and aneixty who presents following fall at nursing home 2/7/21  The patient is being seen virtually (phone call) for follow up and review of CT head imaging  Patient is hard of hearing but was able to communicate that she denies any symptoms of headaches, nausea, vomiting, dizziness, gait dysfunction, slurred speech, or exhaustion       Past Medical History:   Diagnosis Date    Anemia 2/26/2019    Anxiety     Bipolar 1 disorder ([address] )     Depression     DVT (deep venous thrombosis) ([facility] [address] ) 2008    Left leg    GERD (gastroesophageal reflux disease)     Hypertension     Overactive bladder        Past Surgical History:   Procedure Laterality Date    ADENOIDECTOMY      CATARACT EXTRACTION Bilateral     Right 10/2003, left 4/2004    CHOLECYSTECTOMY      DILATION AND CURETTAGE OF UTERUS  1985    HERNIA REPAIR  11/02/2007    Femoral and small bowel resection    HIP SURGERY      L hip    TONSILLECTOMY      As a youth    WRIST SURGERY      L wrist       Current Outpatient Medications   Medication Sig Dispense Refill    acetaminophen (TYLENOL) 325 mg tablet Take 2 tablets (650 mg total) by mouth 3 (three) times a day 100 tablet 6    clonazePAM (KlonoPIN) 0 5 mg tablet Take 1 tablet (0 5 mg total) by mouth 2 (two) times a day 60 tablet 5    ferrous sulfate 325 (65 Fe) mg tablet Take 1 tablet (325 mg total) by mouth daily with breakfast 30 tablet 0    lamoTRIgine (LaMICtal) 200 MG tablet Take 1 tablet (200 mg total) by mouth daily before breakfast 30 tablet 12    pantoprazole (PROTONIX) 40 mg tablet Take 1 tablet (40 mg total) by mouth daily in the early morning 90 tablet 3    QUEtiapine (SEROquel) 200 mg tablet Take 2 tablets (400 mg total) by mouth daily at bedtime 60 tablet 0    Sennosides-Docusate Sodium (SENNA-PLUS PO) Take 8 6 mg by mouth 2 (two) times a day as needed      topiramate (TOPAMAX) 50 MG tablet Take 1 tablet (50 mg total) by mouth 2 (two) times a day 60 tablet 0    atorvastatin (LIPITOR) 40 mg tablet Take 1 tablet (40 mg total) by mouth daily at bedtime for 14 days 14 tablet 0    bisacodyl (Biscolax) 10 mg suppository Insert 1 suppository (10 mg total) into the rectum as needed for constipation (Patient not taking: Reported on 3/25/2021) 12 suppository 3    estradiol (ESTRACE VAGINAL) 0 1 mg/g vaginal cream Insert 0 5 g into the vagina daily      famotidine (PEPCID) 20 mg tablet Take 1 tablet (20 mg total) by mouth daily for 14 days 14 tablet 0    hydrocortisone 2 5 % cream Apply topically 4 (four) times a day as needed      lidocaine (LMX) 4 % cream q6hr prn rib pain   (Patient not taking: Reported on 3/25/2021) 30 g 12    predniSONE 20 mg tablet Take 40 mg daily x 3 days, then 30 mg x 3 days, then 20 mg x 3 days, then 10 mg x 3 days (Patient not taking: Reported on 3/25/2021) 20 tablet 0     No current facility-administered medications for this visit  Allergies   Allergen Reactions    Ethanol     Simvastatin        Review of Systems   Constitutional: Negative  Negative for fatigue  HENT: Positive for hearing loss (wears hearing aide, right ear)  Eyes: Positive for visual disturbance (wears glasses)  Respiratory: Negative  Cardiovascular: Negative  Gastrointestinal: Positive for constipation  Negative for nausea and vomiting  Endocrine: Negative  Genitourinary: Positive for frequency  Musculoskeletal: Positive for back pain and gait problem (uses a walker)  Skin: Negative  Allergic/Immunologic: Negative  Neurological: Positive for weakness (legs)  Negative for dizziness, light-headedness, numbness and headaches  Hematological: Negative  Psychiatric/Behavioral: Negative  ROS was personally reviewed and changes made as needed       Vitals:    03/25/21 0939   Weight: 45 4 kg (100 lb)   Height: 5' 4" (1 626 m)         I spent 15 minutes directly with the patient during this visit    VIRTUAL VISIT Jimmy Thompson acknowledges that she has consented to an online visit or consultation  She understands that the online visit is based solely on information provided by her, and that, in the absence of a face-to-face physical evaluation by the physician, the diagnosis she receives is both limited and provisional in terms of accuracy and completeness  This is not intended to replace a full medical face-to-face evaluation by the physician  Desirae Washington understands and accepts these terms

## 2021-04-14 ENCOUNTER — TELEPHONE (OUTPATIENT)
Dept: FAMILY MEDICINE CLINIC | Facility: CLINIC | Age: 86
End: 2021-04-14

## 2021-05-03 ENCOUNTER — TELEPHONE (OUTPATIENT)
Dept: OTHER | Facility: OTHER | Age: 86
End: 2021-05-03

## 2021-05-04 DIAGNOSIS — F41.1 ANXIETY STATE: ICD-10-CM

## 2021-05-04 RX ORDER — CLONAZEPAM 0.5 MG/1
0.5 TABLET ORAL 2 TIMES DAILY
Qty: 60 TABLET | Refills: 5 | Status: SHIPPED | OUTPATIENT
Start: 2021-05-04 | End: 2021-10-14

## 2021-05-04 NOTE — TELEPHONE ENCOUNTER
Tiger Text:    #: [phone] / Chet Hart / Eric Cast M Bethany Leventhal /  1935 / Brandie Sharma is calling requesting a script of ClonazePAM 0 5 MG Twice Daily By Mouth ordered & sent to [facility] for pt

## 2021-05-05 NOTE — PROGRESS NOTES
Assessment & Plan:     F31 9  Bipolar I disorder ([facility] [address] )   I have evaluated the patient and found the condition to be: Stable    K21 9  Gastroesophageal reflux disease, unspecified whether esophagitis present   I have evaluated the patient and found the condition to be: Stable    R13 12  Oropharyngeal dysphagia   I have evaluated the patient and found the condition to be: Stable    R33 9  Urinary retention   I have evaluated the patient and found the condition to be: Stable    K57 30  Diverticulosis of colon   I have evaluated the patient and found the condition to be: Stable    D50 0  Iron deficiency anemia due to chronic blood loss     E43  Unspecified severe protein-calorie malnutrition (HCC)     F41 1  Anxiety state          Subjective:     Patient ID: Carla Roldan is a 80 y o  female     Chief Complaint   Patient presents with    Medicare Wellness Visit        PATIENT RETURNS FOR FOLLOW-UP OF [address]  NO HOSPITAL STAYS OR EMERGENCY VISITS RECENTLY  MEDS WERE REVIEWED AND NO SIDE EFFECTS  NO NEW ISSUES  UNLESS NOTED BELOW  NO NEW MEDICAL PROVIDER REPORTED  THE CHRONIC DISEASES LISTED ABOVE ARE STABLE AND UNCHANGED/ THE PLAN OF CARE FOR THOSE WILL REMAIN UNCHANGED UNLESS NOTED BELOW  Enhanced service   AWV/initial      Review of Systems   Constitutional: Negative for activity change and appetite change  HENT: Negative for voice change  Eyes: Negative for visual disturbance  Respiratory: Negative for chest tightness and shortness of breath  Cardiovascular: Negative for chest pain, palpitations and leg swelling  Gastrointestinal: Negative for abdominal pain, blood in stool, constipation and diarrhea  Genitourinary: Negative for dysuria, vaginal bleeding and vaginal discharge  Skin: Negative for rash  Neurological: Negative for dizziness  Psychiatric/Behavioral: Negative for dysphoric mood         Objective:      /86 (BP Location: Left arm, Patient Position: Sitting, Cuff Size: Standard)   Pulse 86   Temp 98 2 °F (36 8 °C) (Temporal)   Ht 5' 3" (1 6 m)   Wt 47 6 kg (105 lb)   SpO2 98%   BMI 18 60 kg/m²     Problem List Items Addressed This Visit        Digestive    Gastroesophageal reflux disease    Oropharyngeal dysphagia - Primary       Other    Anxiety state    Constipation    Anemia, macrocytic     Stable 2/21               Physical Exam  Vitals signs and nursing note reviewed  Constitutional:       General: She is not in acute distress  Appearance: She is well-developed  HENT:      Head: Normocephalic and atraumatic  Eyes:      Conjunctiva/sclera: Conjunctivae normal    Neck:      Musculoskeletal: Neck supple  Cardiovascular:      Rate and Rhythm: Normal rate and regular rhythm  Heart sounds: No murmur  Pulmonary:      Effort: Pulmonary effort is normal  No respiratory distress  Breath sounds: Normal breath sounds  Abdominal:      Palpations: Abdomen is soft  Tenderness: There is no abdominal tenderness  Skin:     General: Skin is warm and dry  Neurological:      Mental Status: She is alert  Patient's chronic problems that were reviewed today are stable  Recent hospital stays reviewed  Recent labs and imaging reviewed  Recent visits to other providers reviewed  Meds reviewed and no changes made  Appropriate labs and imaging were ordered  Preventive measures appropriate for age and sex were reviewed with patient  Immunizations were updated as appropriate

## 2021-05-06 ENCOUNTER — RA CDI HCC (OUTPATIENT)
Dept: OTHER | Facility: HOSPITAL | Age: 86
End: 2021-05-06

## 2021-05-06 NOTE — PROGRESS NOTES
[facility] [employer]  coding opportunities             Chart reviewed, (number of) suggestions sent to provider: 2           Patients insurance company: [employer] (Medicare Advantage and Commercial)     Visit status: Patient arrived for their scheduled appointment   dx on [id number]  Provider never responded to [facility] [employer]  coding request     [facility] [employer]  coding opportunities             Chart reviewed, (number of) suggestions sent to provider: 2   DX:  E43-Unspecified severe protein-calorie [id number]  F33 2-Major depressive disorder, recurrent severe without psychotic features          Patients insurance company: [employer] ([employer])

## 2021-05-13 ENCOUNTER — OFFICE VISIT (OUTPATIENT)
Dept: FAMILY MEDICINE CLINIC | Facility: CLINIC | Age: 86
End: 2021-05-13
Payer: COMMERCIAL

## 2021-05-13 ENCOUNTER — TELEPHONE (OUTPATIENT)
Dept: FAMILY MEDICINE CLINIC | Facility: CLINIC | Age: 86
End: 2021-05-13

## 2021-05-13 VITALS
WEIGHT: 105 LBS | BODY MASS INDEX: 18.61 KG/M2 | HEART RATE: 86 BPM | TEMPERATURE: 98.2 F | HEIGHT: 63 IN | OXYGEN SATURATION: 98 % | DIASTOLIC BLOOD PRESSURE: 86 MMHG | SYSTOLIC BLOOD PRESSURE: 142 MMHG

## 2021-05-13 DIAGNOSIS — I82.5Y2 CHRONIC DEEP VEIN THROMBOSIS (DVT) OF PROXIMAL VEIN OF LEFT LOWER EXTREMITY (HCC): ICD-10-CM

## 2021-05-13 DIAGNOSIS — S06.5X9A SUBDURAL HEMATOMA (HCC): ICD-10-CM

## 2021-05-13 DIAGNOSIS — R10.30 LOWER ABDOMINAL PAIN: ICD-10-CM

## 2021-05-13 DIAGNOSIS — E43 UNSPECIFIED SEVERE PROTEIN-CALORIE MALNUTRITION (HCC): ICD-10-CM

## 2021-05-13 DIAGNOSIS — U07.1 PNEUMONIA DUE TO COVID-19 VIRUS: ICD-10-CM

## 2021-05-13 DIAGNOSIS — J12.82 PNEUMONIA DUE TO COVID-19 VIRUS: ICD-10-CM

## 2021-05-13 DIAGNOSIS — F41.1 ANXIETY STATE: ICD-10-CM

## 2021-05-13 DIAGNOSIS — K59.00 CONSTIPATION, UNSPECIFIED CONSTIPATION TYPE: ICD-10-CM

## 2021-05-13 DIAGNOSIS — K57.30 DIVERTICULOSIS OF COLON: ICD-10-CM

## 2021-05-13 DIAGNOSIS — K21.9 GASTROESOPHAGEAL REFLUX DISEASE, UNSPECIFIED WHETHER ESOPHAGITIS PRESENT: ICD-10-CM

## 2021-05-13 DIAGNOSIS — R13.12 OROPHARYNGEAL DYSPHAGIA: Primary | ICD-10-CM

## 2021-05-13 DIAGNOSIS — D53.9 ANEMIA, MACROCYTIC: ICD-10-CM

## 2021-05-13 PROBLEM — R31.29 MICROSCOPIC HEMATURIA: Chronic | Status: ACTIVE | Noted: 2021-05-13

## 2021-05-13 PROBLEM — D64.9 ANEMIA: Chronic | Status: RESOLVED | Noted: 2019-02-26 | Resolved: 2021-05-13

## 2021-05-13 LAB
SL AMB  POCT GLUCOSE, UA: NEGATIVE
SL AMB LEUKOCYTE ESTERASE,UA: NEGATIVE
SL AMB POCT BILIRUBIN,UA: NEGATIVE
SL AMB POCT BLOOD,UA: ABNORMAL
SL AMB POCT CLARITY,UA: CLEAR
SL AMB POCT COLOR,UA: YELLOW
SL AMB POCT KETONES,UA: NEGATIVE
SL AMB POCT NITRITE,UA: NEGATIVE
SL AMB POCT PH,UA: 6.5
SL AMB POCT SPECIFIC GRAVITY,UA: 1.01
SL AMB POCT URINE PROTEIN: NEGATIVE
SL AMB POCT UROBILINOGEN: NEGATIVE

## 2021-05-13 PROCEDURE — 3725F SCREEN DEPRESSION PERFORMED: CPT | Performed by: FAMILY MEDICINE

## 2021-05-13 PROCEDURE — 3077F SYST BP >= 140 MM HG: CPT | Performed by: FAMILY MEDICINE

## 2021-05-13 PROCEDURE — T1015 CLINIC SERVICE: HCPCS | Performed by: FAMILY MEDICINE

## 2021-05-13 PROCEDURE — 1125F AMNT PAIN NOTED PAIN PRSNT: CPT | Performed by: FAMILY MEDICINE

## 2021-05-13 PROCEDURE — 3079F DIAST BP 80-89 MM HG: CPT | Performed by: FAMILY MEDICINE

## 2021-05-13 PROCEDURE — G0438 PPPS, INITIAL VISIT: HCPCS | Performed by: FAMILY MEDICINE

## 2021-05-13 PROCEDURE — 1170F FXNL STATUS ASSESSED: CPT | Performed by: FAMILY MEDICINE

## 2021-05-13 PROCEDURE — 81002 URINALYSIS NONAUTO W/O SCOPE: CPT | Performed by: FAMILY MEDICINE

## 2021-05-13 PROCEDURE — 1036F TOBACCO NON-USER: CPT | Performed by: FAMILY MEDICINE

## 2021-05-13 PROCEDURE — 99214 OFFICE O/P EST MOD 30 MIN: CPT | Performed by: FAMILY MEDICINE

## 2021-05-13 PROCEDURE — 1160F RVW MEDS BY RX/DR IN RCRD: CPT | Performed by: FAMILY MEDICINE

## 2021-05-13 PROCEDURE — 3288F FALL RISK ASSESSMENT DOCD: CPT | Performed by: FAMILY MEDICINE

## 2021-05-13 RX ORDER — BISACODYL 10 MG
10 SUPPOSITORY, RECTAL RECTAL DAILY
Qty: 12 SUPPOSITORY | Refills: 6 | Status: SHIPPED | OUTPATIENT
Start: 2021-05-13

## 2021-05-13 NOTE — PATIENT INSTRUCTIONS
Medicare Preventive Visit Patient Instructions  Thank you for completing your Welcome to Medicare Visit or Medicare Annual Wellness Visit today  Your next wellness visit will be due in one year (5/14/2022)  The screening/preventive services that you may require over the next 5-10 years are detailed below  Some tests may not apply to you based off risk factors and/or age  Screening tests ordered at today's visit but not completed yet may show as past due  Also, please note that scanned in results may not display below  Preventive Screenings:  Service Recommendations Previous Testing/Comments   Colorectal Cancer Screening  * Colonoscopy    * Fecal Occult Blood Test (FOBT)/Fecal Immunochemical Test (FIT)  * Fecal DNA/Cologuard Test  * Flexible Sigmoidoscopy Age: 54-65 years old   Colonoscopy: every 10 years (may be performed more frequently if at higher risk)  OR  FOBT/FIT: every 1 year  OR  Cologuard: every 3 years  OR  Sigmoidoscopy: every 5 years  Screening may be recommended earlier than age 48 if at higher risk for colorectal cancer  Also, an individualized decision between you and your healthcare provider will decide whether screening between the ages of 74-80 would be appropriate  Colonoscopy: Not on file  FOBT/FIT: Not on file  Cologuard: Not on file  Sigmoidoscopy: Not on file          Breast Cancer Screening Age: 36 years old  Frequency: every 1-2 years  Not required if history of left and right mastectomy Mammogram: Not on file        Cervical Cancer Screening Between the ages of 21-29, pap smear recommended once every 3 years  Between the ages of 33-67, can perform pap smear with HPV co-testing every 5 years     Recommendations may differ for women with a history of total hysterectomy, cervical cancer, or abnormal pap smears in past  Pap Smear: Not on file        Hepatitis C Screening Once for adults born between [facility]  More frequently in patients at high risk for Hepatitis C Hep C Antibody: Not on file        Diabetes Screening 1-2 times per year if you're at risk for diabetes or have pre-diabetes Fasting glucose: 82 mg/dL   A1C: No results in last 5 years        Cholesterol Screening Once every 5 years if you don't have a lipid disorder  May order more often based on risk factors  Lipid panel: Not on file          Other Preventive Screenings Covered by Medicare:  1  Abdominal Aortic Aneurysm (AAA) Screening: covered once if your at risk  You're considered to be at risk if you have a family history of AAA  2  Lung Cancer Screening: covers low dose CT scan once per year if you meet all of the following conditions: (1) Age 50-69; (2) No signs or symptoms of lung cancer; (3) Current smoker or have quit smoking within the last 15 years; (4) You have a tobacco smoking history of at least 30 pack years (packs per day multiplied by number of years you smoked); (5) You get a written order from a healthcare provider  3  Glaucoma Screening: covered annually if you're considered high risk: (1) You have diabetes OR (2) Family history of glaucoma OR (3)  aged 48 and older OR (3)  American aged 72 and older  3  Osteoporosis Screening: covered every 2 years if you meet one of the following conditions: (1) You're estrogen deficient and at risk for osteoporosis based off medical history and other findings; (2) Have a vertebral abnormality; (3) On glucocorticoid therapy for more than 3 months; (4) Have primary hyperparathyroidism; (5) On osteoporosis medications and need to assess response to drug therapy  · Last bone density test (DXA Scan): Not on file  5  HIV Screening: covered annually if you're between the age of 12-76  Also covered annually if you are younger than 13 and older than 72 with risk factors for HIV infection  For pregnant patients, it is covered up to 3 times per pregnancy      Immunizations:  Immunization Recommendations   Influenza Vaccine Annual influenza vaccination during flu season is recommended for all persons aged >= 6 months who do not have contraindications   Pneumococcal Vaccine (Prevnar and Pneumovax)  * Prevnar = PCV13  * Pneumovax = PPSV23   Adults 25-60 years old: 1-3 doses may be recommended based on certain risk factors  Adults 72 years old: Prevnar (PCV13) vaccine recommended followed by Pneumovax (PPSV23) vaccine  If already received PPSV23 since turning 65, then PCV13 recommended at least one year after PPSV23 dose  Hepatitis B Vaccine 3 dose series if at intermediate or high risk (ex: diabetes, end stage renal disease, liver disease)   Tetanus (Td) Vaccine - COST NOT COVERED BY MEDICARE PART B Following completion of primary series, a booster dose should be given every 10 years to maintain immunity against tetanus  Td may also be given as tetanus wound prophylaxis  Tdap Vaccine - COST NOT COVERED BY MEDICARE PART B Recommended at least once for all adults  For pregnant patients, recommended with each pregnancy  Shingles Vaccine (Shingrix) - COST NOT COVERED BY MEDICARE PART B  2 shot series recommended in those aged 48 and above     Health Maintenance Due:  There are no preventive care reminders to display for this patient  Immunizations Due:      Topic Date Due    COVID-19 Vaccine (1) Never done    DTaP,Tdap,and Td Vaccines (1 - Tdap) 01/11/1956     Advance Directives   What are advance directives? Advance directives are legal documents that state your wishes and plans for medical care  These plans are made ahead of time in case you lose your ability to make decisions for yourself  Advance directives can apply to any medical decision, such as the treatments you want, and if you want to donate organs  What are the types of advance directives? There are many types of advance directives, and each state has rules about how to use them  You may choose a combination of any of the following:  · Living will:   This is a written record of the treatment you want  You can also choose which treatments you do not want, which to limit, and which to stop at a certain time  This includes surgery, medicine, IV fluid, and tube feedings  · Durable power of  for healthcare [facility]): This is a written record that states who you want to make healthcare choices for you when you are unable to make them for yourself  This person, called a proxy, is usually a family member or a friend  You may choose more than 1 proxy  · Do not resuscitate (DNR) order:  A DNR order is used in case your heart stops beating or you stop breathing  It is a request not to have certain forms of treatment, such as CPR  A DNR order may be included in other types of advance directives  · Medical directive: This covers the care that you want if you are in a coma, near death, or unable to make decisions for yourself  You can list the treatments you want for each condition  Treatment may include pain medicine, surgery, blood transfusions, dialysis, IV or tube feedings, and a ventilator (breathing machine)  · Values history: This document has questions about your views, beliefs, and how you feel and think about life  This information can help others choose the care that you would choose  Why are advance directives important? An advance directive helps you control your care  Although spoken wishes may be used, it is better to have your wishes written down  Spoken wishes can be misunderstood, or not followed  Treatments may be given even if you do not want them  An advance directive may make it easier for your family to make difficult choices about your care  © Copyright [employer] 2018 Information is for End User's use only and may not be sold, redistributed or otherwise used for commercial purposes  All illustrations and images included in CareNotes® are the copyrighted property of [employer]  or [address]      There is still some blood showing in the urinalysis    Make sure you keep the appointment with the urologist

## 2021-05-13 NOTE — PROGRESS NOTES
Assessment and Plan:     Problem List Items Addressed This Visit        Digestive    Gastroesophageal reflux disease    Diverticulosis of colon    Oropharyngeal dysphagia - Primary       Respiratory    Pneumonia due to COVID-19 virus       Nervous and Auditory    Subdural hematoma (HCC)       Other    Anxiety state    Constipation    Anemia, macrocytic     Stable 2/21                 Preventive health issues were discussed with patient, and age appropriate screening tests were ordered as noted in patient's After Visit Summary  Personalized health advice and appropriate referrals for health education or preventive services given if needed, as noted in patient's After Visit Summary       History of Present Illness:     Patient presents for Medicare Annual Wellness visit    Patient Care Team:  Makenna House MD as PCP - General (Family Medicine)  Makenna House MD as PCP - PCP-[facility] Attributed-Roster     Problem List:     Patient Active Problem List   Diagnosis    Depression    Gastroesophageal reflux disease    Essential hypertension    Tobacco dependence syndrome    Sciatica    Bipolar 1 disorder ([address]    Lower abdominal pain    Severe protein-calorie malnutrition ([address]    Vitamin B12 deficiency    Physical deconditioning    Chest pain in adult    Headache    Anxiety state    Acute on chronic cholecystitis    Disorder of gallbladder    Diverticulosis of colon    Umbilical hernia    Prophylactic antibiotic    OAB (overactive bladder)    Constipation    Acute respiratory failure due to COVID-19 ([address] )    Acute metabolic encephalopathy    Anemia, macrocytic    Hypernatremia    Pneumonia due to COVID-19 virus    Urinary retention    Ambulatory dysfunction    Oropharyngeal dysphagia    Acute cystitis without hematuria    Subdural hematoma ([address]    Fall    Sepsis due to urinary tract infection ([address] )    Microscopic hematuria      Past Medical and Surgical History:     Past Medical History:   Diagnosis Date    Anemia 2/26/2019    Anxiety     [facility]     Depression     [facility] ) 2008    Left leg    GERD (gastroesophageal reflux disease)     Hypertension     Overactive bladder      Past Surgical History:   Procedure Laterality Date    ADENOIDECTOMY      CATARACT EXTRACTION Bilateral     Right 10/2003, left 4/2004    CHOLECYSTECTOMY      DILATION AND CURETTAGE OF UTERUS  1985    HERNIA REPAIR  11/02/2007    Femoral and small bowel resection    HIP SURGERY      L hip    TONSILLECTOMY      As a youth    WRIST SURGERY      L wrist      Family History:     Family History   Problem Relation Age of Onset    Heart disease Father       Social History:     E-Cigarette/Vaping    E-Cigarette Use Never User      E-Cigarette/Vaping Substances    Nicotine No     THC No     CBD No     Flavoring No      Social History     Socioeconomic History    Marital status: /Civil Union     Spouse name: None    Number of children: None    Years of education: None    Highest education level: None   Occupational History    None   Social Needs    Financial resource strain: None    Food insecurity     Worry: None     Inability: None    Transportation needs     Medical: None     Non-medical: None   Tobacco Use    Smoking status: Former Smoker     Packs/day: 1 00     Types: Cigarettes    Smokeless tobacco: Former User    Tobacco comment: no exposure to passive smoke   Substance and Sexual Activity    Alcohol use: Not Currently     Alcohol/week: 0 0 standard drinks     Frequency: Never     Drinks per session: Patient refused     Binge frequency: Never    Drug use: Not Currently    Sexual activity: Not Currently   Lifestyle    Physical activity     Days per week: None     Minutes per session: None    Stress: None   Relationships    Social connections     Talks on phone: None     Gets together: None     Attends Restorationism service: None Active member of club or organization: None     Attends meetings of clubs or organizations: None     Relationship status: None    Intimate partner violence     Fear of current or ex partner: None     Emotionally abused: None     Physically abused: None     Forced sexual activity: None   Other Topics Concern    None   Social History Narrative    None      Medications and Allergies:     Current Outpatient Medications   Medication Sig Dispense Refill    acetaminophen (TYLENOL) 325 mg tablet Take 2 tablets (650 mg total) by mouth 3 (three) times a day 100 tablet 6    clonazePAM (KlonoPIN) 0 5 mg tablet Take 1 tablet (0 5 mg total) by mouth 2 (two) times a day 60 tablet 5    ferrous sulfate 325 (65 Fe) mg tablet Take 1 tablet (325 mg total) by mouth daily with breakfast 30 tablet 0    lamoTRIgine (LaMICtal) 200 MG tablet Take 1 tablet (200 mg total) by mouth daily before breakfast 30 tablet 12    lidocaine (LMX) 4 % cream q6hr prn rib pain   30 g 12    pantoprazole (PROTONIX) 40 mg tablet Take 1 tablet (40 mg total) by mouth daily in the early morning 90 tablet 3    QUEtiapine (SEROquel) 200 mg tablet Take 2 tablets (400 mg total) by mouth daily at bedtime 60 tablet 0    Sennosides-Docusate Sodium (SENNA-PLUS PO) Take 8 6 mg by mouth 2 (two) times a day as needed      topiramate (TOPAMAX) 50 MG tablet Take 1 tablet (50 mg total) by mouth 2 (two) times a day 60 tablet 0    atorvastatin (LIPITOR) 40 mg tablet Take 1 tablet (40 mg total) by mouth daily at bedtime for 14 days 14 tablet 0    bisacodyl (Biscolax) 10 mg suppository Insert 1 suppository (10 mg total) into the rectum as needed for constipation (Patient not taking: Reported on 3/25/2021) 12 suppository 3    estradiol (ESTRACE VAGINAL) 0 1 mg/g vaginal cream Insert 0 5 g into the vagina daily      famotidine (PEPCID) 20 mg tablet Take 1 tablet (20 mg total) by mouth daily for 14 days 14 tablet 0    hydrocortisone 2 5 % cream Apply topically 4 (four) times a day as needed       No current facility-administered medications for this visit  Allergies   Allergen Reactions    Ethanol     Simvastatin       Immunizations:     Immunization History   Administered Date(s) Administered    Pneumococcal Conjugate 13-Valent 04/17/2015    Pneumococcal Polysaccharide PPV23 03/06/2000    SARS-CoV-2 / COVID-19 mRNA IM ([email or web address]) 04/12/2021, 05/12/2021    Td (adult), Unspecified 12/14/2011    Td (adult), adsorbed 12/14/2011    Tuberculin Skin Test-PPD Intradermal 04/05/2019    Typhoid, Unspecified 01/01/1999    influenza, injectable, quadrivalent 11/01/2019      Health Maintenance: There are no preventive care reminders to display for this patient  Topic Date Due    DTaP,Tdap,and Td Vaccines (1 - Tdap) 01/11/1956      Medicare Health Risk Assessment:     /86 (BP Location: Left arm, Patient Position: Sitting, Cuff Size: Standard)   Pulse 86   Temp 98 2 °F (36 8 °C) (Temporal)   Ht 5' 3" (1 6 m)   Wt 47 6 kg (105 lb)   SpO2 98%   BMI 18 60 kg/m²      Cathy Vela is here for her Initial Wellness visit  Health Risk Assessment:   Patient rates overall health as good  Patient feels that their physical health rating is same  Patient is satisfied with their life  Eyesight was rated as slightly worse  Hearing was rated as slightly worse  Patient feels that their emotional and mental health rating is same  Patients states they are never, rarely angry  Patient states they are sometimes unusually tired/fatigued  Pain experienced in the last 7 days has been none  Patient states that she has experienced no weight loss or gain in last 6 months  Depression Screening:   PHQ-2 Score: 2  PHQ-9 Score: 3      Fall Risk Screening:    In the past year, patient has experienced: history of falling in past year    Number of falls: 1  Injured during fall?: Yes    Feels unsteady when standing or walking?: Yes    Worried about falling?: Yes      Urinary Incontinence Screening:   Patient has leaked urine accidently in the last six months  Home Safety:  Patient has trouble with stairs inside or outside of their home  Patient has working smoke alarms and has working carbon monoxide detector  Home safety hazards include: none  Nutrition:   Current diet is Regular  Medications:   Patient is currently taking over-the-counter supplements  OTC medications include: see medication list  Patient is not able to manage medications  Activities of Daily Living (ADLs)/Instrumental Activities of Daily Living (IADLs):   Walk and transfer into and out of bed and chair?: Yes  Dress and groom yourself?: Yes    Bathe or shower yourself?: Yes    Feed yourself? Yes  Do your laundry/housekeeping?: No  Manage your money, pay your bills and track your expenses?: No  Make your own meals?: No    Do your own shopping?: No    Previous Hospitalizations:   Any hospitalizations or ED visits within the last 12 months?: Yes    How many hospitalizations have you had in the last year?: 1-2    Hospitalization Comments: As noted 2/21    Advance Care Planning:   Living will: Yes    Durable POA for healthcare:  Yes    Advanced directive: Yes      Cognitive Screening:   Provider or family/friend/caregiver concerned regarding cognition?: No    PREVENTIVE SCREENINGS      Cardiovascular Screening:    General: Screening Not Indicated      Diabetes Screening:     General: Screening Current      Colorectal Cancer Screening:     General: Screening Not Indicated      Breast Cancer Screening:     General: Screening Not Indicated      Cervical Cancer Screening:    General: Screening Not Indicated      Osteoporosis Screening:    General: Screening Not Indicated      Abdominal Aortic Aneurysm (AAA) Screening:        General: Screening Not Indicated      Lung Cancer Screening:     General: Screening Not Indicated    Screening, Brief Intervention, and Referral to Treatment (SBIRT)    Screening  Typical number of drinks in a day: 0    Single Item Drug Screening:  How often have you used an illegal drug (including marijuana) or a prescription medication for non-medical reasons in the past year? never    Single Item Drug Screen Score: 0  Interpretation: Negative screen for possible drug use disorder    Brief Intervention  Alcohol & drug use screenings were reviewed  No concerns regarding substance use disorder identified         Caroline Navas MD

## 2021-05-14 ENCOUNTER — TELEPHONE (OUTPATIENT)
Dept: FAMILY MEDICINE CLINIC | Facility: CLINIC | Age: 86
End: 2021-05-14

## 2021-07-09 ENCOUNTER — OFFICE VISIT (OUTPATIENT)
Dept: URGENT CARE | Facility: MEDICAL CENTER | Age: 86
End: 2021-07-09
Payer: COMMERCIAL

## 2021-07-09 VITALS
SYSTOLIC BLOOD PRESSURE: 171 MMHG | HEART RATE: 81 BPM | TEMPERATURE: 97.7 F | OXYGEN SATURATION: 95 % | DIASTOLIC BLOOD PRESSURE: 91 MMHG | RESPIRATION RATE: 16 BRPM

## 2021-07-09 DIAGNOSIS — I10 HYPERTENSION, UNSPECIFIED TYPE: ICD-10-CM

## 2021-07-09 DIAGNOSIS — R35.0 URINARY FREQUENCY: Primary | ICD-10-CM

## 2021-07-09 LAB
ATRIAL RATE: 73 BPM
P AXIS: 24 DEGREES
PR INTERVAL: 200 MS
QRS AXIS: -11 DEGREES
QRSD INTERVAL: 92 MS
QT INTERVAL: 392 MS
QTC INTERVAL: 431 MS
SL AMB  POCT GLUCOSE, UA: ABNORMAL
SL AMB LEUKOCYTE ESTERASE,UA: ABNORMAL
SL AMB POCT BILIRUBIN,UA: ABNORMAL
SL AMB POCT BLOOD,UA: ABNORMAL
SL AMB POCT CLARITY,UA: CLEAR
SL AMB POCT COLOR,UA: YELLOW
SL AMB POCT KETONES,UA: ABNORMAL
SL AMB POCT NITRITE,UA: ABNORMAL
SL AMB POCT PH,UA: 7
SL AMB POCT SPECIFIC GRAVITY,UA: 1
SL AMB POCT URINE PROTEIN: ABNORMAL
SL AMB POCT UROBILINOGEN: 0.2
T WAVE AXIS: 59 DEGREES
VENTRICULAR RATE: 73 BPM

## 2021-07-09 PROCEDURE — 93010 ELECTROCARDIOGRAM REPORT: CPT | Performed by: PHYSICIAN ASSISTANT

## 2021-07-09 PROCEDURE — 87086 URINE CULTURE/COLONY COUNT: CPT | Performed by: PHYSICIAN ASSISTANT

## 2021-07-09 PROCEDURE — 99213 OFFICE O/P EST LOW 20 MIN: CPT | Performed by: PHYSICIAN ASSISTANT

## 2021-07-09 PROCEDURE — 81002 URINALYSIS NONAUTO W/O SCOPE: CPT | Performed by: PHYSICIAN ASSISTANT

## 2021-07-09 PROCEDURE — 93005 ELECTROCARDIOGRAM TRACING: CPT | Performed by: PHYSICIAN ASSISTANT

## 2021-07-09 NOTE — PROGRESS NOTES
[facility] [facility]        NAME: Vianey Titus is a 80 y o  female  : 1935    MRN: [id number]  DATE: 2021  TIME: 6:54 PM    Assessment and Plan   Urinary frequency [R35 0]  1  Urinary frequency  POCT urine dip    Urine culture   Sent to ED by ambulance  EKG- Sinus rhythm with premature atrial complexes  Left ventricular hypertrophy with repolarization abnormality  Possible left atrial enlargement  Urinalysis moderate blood and trace leukocytes  Patient Instructions       Patient was sent to ED For abnormal EKG and high blood pressure  Chief Complaint     Chief Complaint   Patient presents with    Possible UTI     Patient relates she has been having urinary frequency for 2 - 3 months  "When I walk it feels like I have to pee" Denies fever and chills  C/O left groin pain for 1 month  Denies injury  History of Present Illness       Patient presents today with urinary frequency for 2-3 months  Patient reports she also has pain in left groin for 1 month  Admits history of COVID 19  Patient reports she was constipated yesterday but since 21  she had diarrhea 7 times  Review of Systems   Review of Systems   HENT: Negative  Respiratory: Positive for shortness of breath  Gastrointestinal: Positive for diarrhea  Genitourinary: Positive for frequency, hematuria and urgency  Musculoskeletal: Negative  Neurological: Negative  Psychiatric/Behavioral: Negative            Current Medications       Current Outpatient Medications:     acetaminophen (TYLENOL) 325 mg tablet, Take 2 tablets (650 mg total) by mouth 3 (three) times a day, Disp: 100 tablet, Rfl: 6    bisacodyl (DULCOLAX) 10 mg suppository, Insert 1 suppository (10 mg total) into the rectum daily, Disp: 12 suppository, Rfl: 6    clonazePAM (KlonoPIN) 0 5 mg tablet, Take 1 tablet (0 5 mg total) by mouth 2 (two) times a day, Disp: 60 tablet, Rfl: 5    ferrous sulfate 325 (65 Fe) mg tablet, Take 1 tablet (325 mg total) by mouth daily with breakfast, Disp: 30 tablet, Rfl: 0    lamoTRIgine (LaMICtal) 200 MG tablet, Take 1 tablet (200 mg total) by mouth daily before breakfast, Disp: 30 tablet, Rfl: 12    lidocaine (LMX) 4 % cream, q6hr prn rib pain , Disp: 30 g, Rfl: 12    pantoprazole (PROTONIX) 40 mg tablet, Take 1 tablet (40 mg total) by mouth daily in the early morning, Disp: 90 tablet, Rfl: 3    QUEtiapine (SEROquel) 200 mg tablet, Take 2 tablets (400 mg total) by mouth daily at bedtime, Disp: 60 tablet, Rfl: 0    Sennosides-Docusate Sodium (SENNA-PLUS PO), Take 8 6 mg by mouth 2 (two) times a day as needed, Disp: , Rfl:     topiramate (TOPAMAX) 50 MG tablet, Take 1 tablet (50 mg total) by mouth 2 (two) times a day, Disp: 60 tablet, Rfl: 0    atorvastatin (LIPITOR) 40 mg tablet, Take 1 tablet (40 mg total) by mouth daily at bedtime for 14 days, Disp: 14 tablet, Rfl: 0    estradiol (ESTRACE VAGINAL) 0 1 mg/g vaginal cream, Insert 0 5 g into the vagina daily (Patient not taking: Reported on 7/9/2021), Disp: , Rfl:     famotidine (PEPCID) 20 mg tablet, Take 1 tablet (20 mg total) by mouth daily for 14 days, Disp: 14 tablet, Rfl: 0    hydrocortisone 2 5 % cream, Apply topically 4 (four) times a day as needed (Patient not taking: Reported on 7/9/2021), Disp: , Rfl:     Current Allergies     Allergies as of 07/09/2021 - Reviewed 07/09/2021   Allergen Reaction Noted    Ethanol  05/21/2018    Simvastatin  07/01/2000            The following portions of the patient's history were reviewed and updated as appropriate: allergies, current medications, past family history, past medical history, past social history, past surgical history and problem list      Past Medical History:   Diagnosis Date    Anemia 2/26/2019    Anxiety     [facility]     Depression     [facility]    Left leg    GERD (gastroesophageal reflux disease)     Hypertension     Overactive bladder        Past Surgical History:   Procedure Laterality Date    ADENOIDECTOMY      CATARACT EXTRACTION Bilateral     Right 10/2003, left 4/2004    CHOLECYSTECTOMY      DILATION AND CURETTAGE OF UTERUS  1985    HERNIA REPAIR  11/02/2007    Femoral and small bowel resection    HIP SURGERY      L hip    TONSILLECTOMY      As a youth    WRIST SURGERY      L wrist       Family History   Problem Relation Age of Onset    Heart disease Father          Medications have been verified  Objective   BP (!) 171/91   Pulse 81   Temp 97 7 °F (36 5 °C) (Tympanic)   Resp 16   SpO2 95%   No LMP recorded  Patient is postmenopausal        Physical Exam     Physical Exam  HENT:      Head: Normocephalic  Eyes:      Extraocular Movements: Extraocular movements intact  Pupils: Pupils are equal, round, and reactive to light  Cardiovascular:      Rate and Rhythm: Regular rhythm  Comments: HX of murmer  Pulmonary:      Breath sounds: Normal breath sounds  Abdominal:      General: There is distension  Palpations: Abdomen is soft  Tenderness: There is abdominal tenderness  There is guarding  Skin:     Comments: Displays dehydration   Neurological:      Mental Status: She is alert and oriented to person, place, and time     Psychiatric:         Mood and Affect: Mood normal          Behavior: Behavior normal

## 2021-07-10 LAB — BACTERIA UR CULT: NORMAL

## 2021-07-13 ENCOUNTER — TELEPHONE (OUTPATIENT)
Dept: URGENT CARE | Facility: MEDICAL CENTER | Age: 86
End: 2021-07-13

## 2021-07-13 ENCOUNTER — TELEPHONE (OUTPATIENT)
Dept: FAMILY MEDICINE CLINIC | Facility: CLINIC | Age: 86
End: 2021-07-13

## 2021-07-13 NOTE — TELEPHONE ENCOUNTER
Patient was educated on urine culture   Patients daughter was told urine culture had mixed contaminants but no bacteria

## 2021-07-13 NOTE — TELEPHONE ENCOUNTER
Patients daughter called  She states patient was taken to care now with a B/P of 220/100 was discharge 160/90  Daughter would like to know if patient should be checking B/P on a regular and if you think patient should   She would like for you to send an order to the nursing home and she would also like a phone call please advise

## 2021-07-14 NOTE — TELEPHONE ENCOUNTER
Spoke with Isai Love, patients (POA) and let her know the plan to check blood pressure checks daily after patient rests for 5 mins and [place] is to keep a log and call us if consistently over 150/90  No changes in medicine until Dr Med Perez sees the pattern  Sebas King Spoke to [place] at [facility], told her I will send order and to call  or fax us with a log of blood pressure checks, no changes in meds until Dr Med Perez sees the pattern   Faxed Blood pressure order to [phone]

## 2021-07-22 DIAGNOSIS — R30.0 BURNING WITH URINATION: Primary | ICD-10-CM

## 2021-07-23 ENCOUNTER — TELEPHONE (OUTPATIENT)
Dept: FAMILY MEDICINE CLINIC | Facility: CLINIC | Age: 86
End: 2021-07-23

## 2021-07-27 ENCOUNTER — TELEPHONE (OUTPATIENT)
Dept: FAMILY MEDICINE CLINIC | Facility: CLINIC | Age: 86
End: 2021-07-27

## 2021-07-27 ENCOUNTER — TREATMENT (OUTPATIENT)
Dept: FAMILY MEDICINE CLINIC | Facility: CLINIC | Age: 86
End: 2021-07-27

## 2021-07-27 DIAGNOSIS — M79.659 PAIN OF THIGH, UNSPECIFIED LATERALITY: Primary | ICD-10-CM

## 2021-07-27 RX ORDER — TROLAMINE SALICYLATE 10 G/100G
CREAM TOPICAL 3 TIMES DAILY
Qty: 85 G | Refills: 8 | Status: SHIPPED | OUTPATIENT
Start: 2021-07-27

## 2021-07-27 NOTE — TELEPHONE ENCOUNTER
Dung already aware earlier today  Dr Enrrique Guevara prefers aspercreme only  Documented in previous telephone message

## 2021-07-27 NOTE — TELEPHONE ENCOUNTER
Dung requested motrin for hip pain, Dr James Velasco stated and documented he only prefers she us aspercreme and called into [facility] aware

## 2021-07-27 NOTE — TELEPHONE ENCOUNTER
Raisa Gallo from [facility] called because she states she requested aspercreme and motrin, but only received the aspercreme script    Please fax motrin script

## 2021-07-28 ENCOUNTER — PROCEDURE VISIT (OUTPATIENT)
Dept: UROLOGY | Facility: MEDICAL CENTER | Age: 86
End: 2021-07-28
Payer: COMMERCIAL

## 2021-07-28 ENCOUNTER — TELEPHONE (OUTPATIENT)
Dept: FAMILY MEDICINE CLINIC | Facility: CLINIC | Age: 86
End: 2021-07-28

## 2021-07-28 ENCOUNTER — TREATMENT (OUTPATIENT)
Dept: FAMILY MEDICINE CLINIC | Facility: CLINIC | Age: 86
End: 2021-07-28

## 2021-07-28 VITALS — BODY MASS INDEX: 18.6 KG/M2 | HEIGHT: 63 IN | DIASTOLIC BLOOD PRESSURE: 74 MMHG | SYSTOLIC BLOOD PRESSURE: 136 MMHG

## 2021-07-28 DIAGNOSIS — R35.0 URINARY FREQUENCY: ICD-10-CM

## 2021-07-28 DIAGNOSIS — R52 PAIN: Primary | ICD-10-CM

## 2021-07-28 DIAGNOSIS — R33.9 URINARY RETENTION WITH INCOMPLETE BLADDER EMPTYING: Primary | ICD-10-CM

## 2021-07-28 LAB
SL AMB  POCT GLUCOSE, UA: NORMAL
SL AMB LEUKOCYTE ESTERASE,UA: NORMAL
SL AMB POCT BILIRUBIN,UA: NORMAL
SL AMB POCT BLOOD,UA: NORMAL
SL AMB POCT CLARITY,UA: CLEAR
SL AMB POCT COLOR,UA: YELLOW
SL AMB POCT KETONES,UA: NORMAL
SL AMB POCT NITRITE,UA: NORMAL
SL AMB POCT PH,UA: 7
SL AMB POCT SPECIFIC GRAVITY,UA: 1.01
SL AMB POCT URINE PROTEIN: NORMAL
SL AMB POCT UROBILINOGEN: 0.2

## 2021-07-28 PROCEDURE — 1160F RVW MEDS BY RX/DR IN RCRD: CPT | Performed by: UROLOGY

## 2021-07-28 PROCEDURE — 3075F SYST BP GE 130 - 139MM HG: CPT | Performed by: UROLOGY

## 2021-07-28 PROCEDURE — 99213 OFFICE O/P EST LOW 20 MIN: CPT | Performed by: UROLOGY

## 2021-07-28 PROCEDURE — 81003 URINALYSIS AUTO W/O SCOPE: CPT | Performed by: UROLOGY

## 2021-07-28 PROCEDURE — 3078F DIAST BP <80 MM HG: CPT | Performed by: UROLOGY

## 2021-07-28 PROCEDURE — 52000 CYSTOURETHROSCOPY: CPT | Performed by: UROLOGY

## 2021-07-28 RX ORDER — IBUPROFEN 400 MG/1
400 TABLET ORAL EVERY 8 HOURS PRN
Qty: 20 TABLET | Refills: 1 | Status: SHIPPED | OUTPATIENT
Start: 2021-07-28 | End: 2021-10-26 | Stop reason: ALTCHOICE

## 2021-07-28 NOTE — TELEPHONE ENCOUNTER
Ani Workman from [facility] called regarding patient  She states they need an order for motrin  Patient has had pain for over 24 hours and the tylenol she gets is not helping   Please advise [phone]

## 2021-07-28 NOTE — PROGRESS NOTES
HISTORY:    Evaluation for frequency, urgency, poor bladder emptying  Has had catheters in an out at times  Also complains of left hip pain, radiation to left labial area         ASSESSMENT / PLAN:    Cysto shows mild distention about 200 mL  No lesion tumor stones  With this degree of borderline emptying, she cannot use overactive bladder medicines, or any type of Botox    Recommend observation and timed voiding for the frequency    PCP to evaluate the left hip pain  The following portions of the patient's history were reviewed and updated as appropriate: allergies, current medications, past family history, past medical history, past social history, past surgical history and problem list     Review of Systems   All other systems reviewed and are negative  Objective:     Cystoscopy     Date/Time 7/28/2021 1:57 PM     Performed by  Caridad Romberg, MD     Authorized by Caridad Romberg, MD          Procedure Details:  Procedure type: cystoscopy    Patient tolerance: Patient tolerated the procedure well with no immediate complications    Additional Procedure Details:     Patient presents for cystoscopy  I described the procedure, answered any questions, and we discussed potential risks and complications  Patient expressed understanding, and signed an informed consent document  The patient was carefully placed in lithotomy position on the examining table  The urethra was prepped with sterile disinfectant  The 13 Kyr[place]z flexible cystoscope was passed in the urethra with the following findings:    Urethra:  Recessed but not stenotic     Bladder:  Mild inflammation, severe trabeculation, no tumors    Residual urine estimated to be 200 mL  The patient tolerated the procedure well and was escorted from the examining table       Physical Exam        No results found for: PSA]  BUN   Date Value Ref Range Status   02/17/2021 9 5 - 25 mg/dL Final   12/06/2017 13 7 - 25 mg/dL Final     Creatinine   Date Value Ref Range Status   02/17/2021 0 59 (L) 0 60 - 1 30 mg/dL Final     Comment:     Standardized to IDMS reference method     No components found for: CBC      Patient Active Problem List   Diagnosis    Depression    Gastroesophageal reflux disease    Essential hypertension    Tobacco dependence syndrome    Sciatica    Bipolar 1 disorder ([facility] [address] )    Lower abdominal pain    Severe protein-calorie malnutrition (HCC)    Vitamin B12 deficiency    Physical deconditioning    Chest pain in adult    Headache    Anxiety state    Acute on chronic cholecystitis    Disorder of gallbladder    Diverticulosis of colon    Umbilical hernia    Prophylactic antibiotic    OAB (overactive bladder)    Constipation    Acute respiratory failure due to COVID-19 (HCC)    Acute metabolic encephalopathy    Anemia, macrocytic    Hypernatremia    Pneumonia due to COVID-19 virus    Urinary retention    Ambulatory dysfunction    Oropharyngeal dysphagia    Acute cystitis without hematuria    Subdural hematoma (HCC)    Fall    Sepsis due to urinary tract infection ([facility])    Microscopic hematuria    Chronic deep vein thrombosis (DVT) of proximal vein of left lower extremity ([facility] [address] )        Diagnoses and all orders for this visit:    Urinary retention with incomplete bladder emptying  -     POCT urine dip auto non-scope           Patient ID: Michaelle Live is a 80 y o  female        Current Outpatient Medications:     acetaminophen (TYLENOL) 325 mg tablet, Take 2 tablets (650 mg total) by mouth 3 (three) times a day, Disp: 100 tablet, Rfl: 6    bisacodyl (DULCOLAX) 10 mg suppository, Insert 1 suppository (10 mg total) into the rectum daily, Disp: 12 suppository, Rfl: 6    clonazePAM (KlonoPIN) 0 5 mg tablet, Take 1 tablet (0 5 mg total) by mouth 2 (two) times a day, Disp: 60 tablet, Rfl: 5    ferrous sulfate 325 (65 Fe) mg tablet, Take 1 tablet (325 mg total) by mouth daily with breakfast, Disp: 30 tablet, Rfl: 0   hydrocortisone 2 5 % cream, Apply topically 2 (two) times a day, Disp: 30 g, Rfl: 1    lamoTRIgine (LaMICtal) 200 MG tablet, Take 1 tablet (200 mg total) by mouth daily before breakfast, Disp: 30 tablet, Rfl: 12    lidocaine (LMX) 4 % cream, q6hr prn rib pain , Disp: 30 g, Rfl: 12    pantoprazole (PROTONIX) 40 mg tablet, Take 1 tablet (40 mg total) by mouth daily in the early morning, Disp: 90 tablet, Rfl: 3    QUEtiapine (SEROquel) 200 mg tablet, Take 2 tablets (400 mg total) by mouth daily at bedtime, Disp: 60 tablet, Rfl: 0    Sennosides-Docusate Sodium (SENNA-PLUS PO), Take 8 6 mg by mouth 2 (two) times a day as needed, Disp: , Rfl:     topiramate (TOPAMAX) 50 MG tablet, Take 1 tablet (50 mg total) by mouth 2 (two) times a day, Disp: 60 tablet, Rfl: 0    trolamine salicylate (ASPERCREME) 10 % cream, Apply topically 3 (three) times a day, Disp: 85 g, Rfl: 8    atorvastatin (LIPITOR) 40 mg tablet, Take 1 tablet (40 mg total) by mouth daily at bedtime for 14 days, Disp: 14 tablet, Rfl: 0    estradiol (ESTRACE VAGINAL) 0 1 mg/g vaginal cream, Insert 0 5 g into the vagina daily (Patient not taking: Reported on 7/9/2021), Disp: , Rfl:     famotidine (PEPCID) 20 mg tablet, Take 1 tablet (20 mg total) by mouth daily for 14 days, Disp: 14 tablet, Rfl: 0    Past Medical History:   Diagnosis Date    Anemia 2/26/2019    Anxiety     Bipolar 1 disorder (HCC)     Depression     DVT (deep venous thrombosis) ([facility]) 2008    Left leg    GERD (gastroesophageal reflux disease)     Hypertension     Overactive bladder        Past Surgical History:   Procedure Laterality Date    ADENOIDECTOMY      CATARACT EXTRACTION Bilateral     Right 10/2003, left 4/2004    CHOLECYSTECTOMY      DILATION AND CURETTAGE OF UTERUS  1985    HERNIA REPAIR  11/02/2007    Femoral and small bowel resection    HIP SURGERY      L hip    TONSILLECTOMY      As a youth    WRIST SURGERY      L wrist       Social History

## 2021-08-20 ENCOUNTER — TELEPHONE (OUTPATIENT)
Dept: FAMILY MEDICINE CLINIC | Facility: CLINIC | Age: 86
End: 2021-08-20

## 2021-08-20 NOTE — TELEPHONE ENCOUNTER
Pt aid from nursing home called in ; pt has been complaining about not being to pass a Bm ; Pt has not had a Bm in 4 day ; pt was given bisacodyl  (suppository) as well as, prune juice , still no Bm     What should be pt next step    Call Tsering Singh @ [phone]

## 2021-09-23 ENCOUNTER — TELEPHONE (OUTPATIENT)
Dept: FAMILY MEDICINE CLINIC | Facility: CLINIC | Age: 86
End: 2021-09-23

## 2021-09-23 ENCOUNTER — TELEPHONE (OUTPATIENT)
Dept: OTHER | Facility: OTHER | Age: 86
End: 2021-09-23

## 2021-09-23 NOTE — TELEPHONE ENCOUNTER
[phone]/NGIYT would like a call back /Pt is Amarilys Aly  35/ Pt is having Hip pain and is requesting to go to the ED  Being that she is at the [address] [place], [place] [address]Natalie would like to know if she should have them send her out or should she be on a pain med  Please call      Dr Dylan Marsh was paged at [place]

## 2021-09-23 NOTE — TELEPHONE ENCOUNTER
chely mcintosh  patient continues to c/o hip pain  The pain waxes and wanes and is unchanged from before  No recent trauma or falls  Will increase tylenol to 1000mg TID scheduled and have patient follow up with pcp  Renny Bradley and gave verbal order

## 2021-09-24 NOTE — TELEPHONE ENCOUNTER
Alicia Negro called back and would like a script for a hip xray sent to [facility]    Mj Valdez

## 2021-09-27 ENCOUNTER — TELEPHONE (OUTPATIENT)
Dept: FAMILY MEDICINE CLINIC | Facility: CLINIC | Age: 86
End: 2021-09-27

## 2021-09-27 DIAGNOSIS — M79.659 PAIN OF THIGH, UNSPECIFIED LATERALITY: Primary | ICD-10-CM

## 2021-09-27 NOTE — TELEPHONE ENCOUNTER
Patient called asking how much apap she can take in a day, patient was given advised that she can take 650 mg TID as per order from Dr Fuentes Sanchez

## 2021-09-27 NOTE — TELEPHONE ENCOUNTER
Daughter will call back with what hip is of concern  Please fax order to [facility] attn:naz   [phone]  daughter called back  It is the left hip

## 2021-09-30 ENCOUNTER — TELEPHONE (OUTPATIENT)
Dept: FAMILY MEDICINE CLINIC | Facility: CLINIC | Age: 86
End: 2021-09-30

## 2021-09-30 NOTE — TELEPHONE ENCOUNTER
Discussed with patient her xray results  If pain continues to be a problem she will follow up with her PCP in November

## 2021-10-04 ENCOUNTER — TELEPHONE (OUTPATIENT)
Dept: FAMILY MEDICINE CLINIC | Facility: CLINIC | Age: 86
End: 2021-10-04

## 2021-10-14 DIAGNOSIS — F41.1 ANXIETY STATE: ICD-10-CM

## 2021-10-14 RX ORDER — CLONAZEPAM 0.5 MG/1
TABLET ORAL
Qty: 60 TABLET | Refills: 5 | Status: SHIPPED | OUTPATIENT
Start: 2021-10-14 | End: 2022-05-11 | Stop reason: SDUPTHER

## 2021-10-18 ENCOUNTER — TELEPHONE (OUTPATIENT)
Dept: FAMILY MEDICINE CLINIC | Facility: CLINIC | Age: 86
End: 2021-10-18

## 2021-10-26 ENCOUNTER — OFFICE VISIT (OUTPATIENT)
Dept: FAMILY MEDICINE CLINIC | Facility: CLINIC | Age: 86
End: 2021-10-26
Payer: COMMERCIAL

## 2021-10-26 VITALS
HEART RATE: 86 BPM | DIASTOLIC BLOOD PRESSURE: 80 MMHG | WEIGHT: 114.2 LBS | OXYGEN SATURATION: 95 % | BODY MASS INDEX: 20.23 KG/M2 | HEIGHT: 63 IN | SYSTOLIC BLOOD PRESSURE: 142 MMHG | TEMPERATURE: 97.5 F

## 2021-10-26 DIAGNOSIS — R10.84 GENERALIZED ABDOMINAL PAIN: Primary | ICD-10-CM

## 2021-10-26 DIAGNOSIS — Z71.85 IMMUNIZATION COUNSELING: ICD-10-CM

## 2021-10-26 DIAGNOSIS — Z91.81 AT RISK FOR FALLS: ICD-10-CM

## 2021-10-26 DIAGNOSIS — R35.0 URINARY FREQUENCY: ICD-10-CM

## 2021-10-26 DIAGNOSIS — K59.00 CONSTIPATION, UNSPECIFIED CONSTIPATION TYPE: ICD-10-CM

## 2021-10-26 LAB
SL AMB  POCT GLUCOSE, UA: NEGATIVE
SL AMB LEUKOCYTE ESTERASE,UA: NEGATIVE
SL AMB POCT BILIRUBIN,UA: NEGATIVE
SL AMB POCT BLOOD,UA: NORMAL
SL AMB POCT CLARITY,UA: CLEAR
SL AMB POCT COLOR,UA: YELLOW
SL AMB POCT KETONES,UA: NEGATIVE
SL AMB POCT NITRITE,UA: NEGATIVE
SL AMB POCT PH,UA: 6.5
SL AMB POCT SPECIFIC GRAVITY,UA: 1.01
SL AMB POCT URINE PROTEIN: NORMAL
SL AMB POCT UROBILINOGEN: NORMAL

## 2021-10-26 PROCEDURE — 3288F FALL RISK ASSESSMENT DOCD: CPT | Performed by: NURSE PRACTITIONER

## 2021-10-26 PROCEDURE — 1101F PT FALLS ASSESS-DOCD LE1/YR: CPT | Performed by: NURSE PRACTITIONER

## 2021-10-26 PROCEDURE — 1160F RVW MEDS BY RX/DR IN RCRD: CPT | Performed by: NURSE PRACTITIONER

## 2021-10-26 PROCEDURE — 3725F SCREEN DEPRESSION PERFORMED: CPT | Performed by: NURSE PRACTITIONER

## 2021-10-26 PROCEDURE — 81002 URINALYSIS NONAUTO W/O SCOPE: CPT | Performed by: NURSE PRACTITIONER

## 2021-10-26 PROCEDURE — 99214 OFFICE O/P EST MOD 30 MIN: CPT | Performed by: NURSE PRACTITIONER

## 2021-10-26 PROCEDURE — 1100F PTFALLS ASSESS-DOCD GE2>/YR: CPT | Performed by: NURSE PRACTITIONER

## 2021-10-26 PROCEDURE — 1036F TOBACCO NON-USER: CPT | Performed by: NURSE PRACTITIONER

## 2021-10-26 RX ORDER — PHENAZOPYRIDINE HYDROCHLORIDE 100 MG/1
100 TABLET, FILM COATED ORAL
Qty: 30 TABLET | Refills: 3 | Status: SHIPPED | OUTPATIENT
Start: 2021-10-26 | End: 2022-01-05

## 2021-10-27 ENCOUNTER — TELEPHONE (OUTPATIENT)
Dept: FAMILY MEDICINE CLINIC | Facility: CLINIC | Age: 86
End: 2021-10-27

## 2021-10-29 ENCOUNTER — TELEPHONE (OUTPATIENT)
Dept: FAMILY MEDICINE CLINIC | Facility: CLINIC | Age: 86
End: 2021-10-29

## 2021-11-04 ENCOUNTER — TELEPHONE (OUTPATIENT)
Dept: FAMILY MEDICINE CLINIC | Facility: CLINIC | Age: 86
End: 2021-11-04

## 2021-11-05 ENCOUNTER — TELEPHONE (OUTPATIENT)
Dept: FAMILY MEDICINE CLINIC | Facility: CLINIC | Age: 86
End: 2021-11-05

## 2021-11-19 ENCOUNTER — OFFICE VISIT (OUTPATIENT)
Dept: FAMILY MEDICINE CLINIC | Facility: CLINIC | Age: 86
End: 2021-11-19
Payer: COMMERCIAL

## 2021-11-19 VITALS
HEART RATE: 72 BPM | BODY MASS INDEX: 20.51 KG/M2 | OXYGEN SATURATION: 96 % | DIASTOLIC BLOOD PRESSURE: 88 MMHG | SYSTOLIC BLOOD PRESSURE: 130 MMHG | RESPIRATION RATE: 18 BRPM | WEIGHT: 115.8 LBS | TEMPERATURE: 97.6 F

## 2021-11-19 DIAGNOSIS — R31.29 MICROSCOPIC HEMATURIA: Chronic | ICD-10-CM

## 2021-11-19 DIAGNOSIS — N32.81 OAB (OVERACTIVE BLADDER): ICD-10-CM

## 2021-11-19 DIAGNOSIS — E53.8 VITAMIN B12 DEFICIENCY: ICD-10-CM

## 2021-11-19 DIAGNOSIS — I10 ESSENTIAL HYPERTENSION: Primary | Chronic | ICD-10-CM

## 2021-11-19 PROBLEM — N30.00 ACUTE CYSTITIS WITHOUT HEMATURIA: Status: RESOLVED | Noted: 2021-01-12 | Resolved: 2021-11-19

## 2021-11-19 PROBLEM — E43 SEVERE PROTEIN-CALORIE MALNUTRITION (HCC): Status: RESOLVED | Noted: 2019-03-31 | Resolved: 2021-11-19

## 2021-11-19 PROCEDURE — 3079F DIAST BP 80-89 MM HG: CPT | Performed by: FAMILY MEDICINE

## 2021-11-19 PROCEDURE — 1036F TOBACCO NON-USER: CPT | Performed by: FAMILY MEDICINE

## 2021-11-19 PROCEDURE — 1160F RVW MEDS BY RX/DR IN RCRD: CPT | Performed by: FAMILY MEDICINE

## 2021-11-19 PROCEDURE — 99214 OFFICE O/P EST MOD 30 MIN: CPT | Performed by: FAMILY MEDICINE

## 2021-11-19 PROCEDURE — 3725F SCREEN DEPRESSION PERFORMED: CPT | Performed by: FAMILY MEDICINE

## 2021-11-19 PROCEDURE — 3075F SYST BP GE 130 - 139MM HG: CPT | Performed by: FAMILY MEDICINE

## 2021-12-01 ENCOUNTER — TREATMENT (OUTPATIENT)
Dept: FAMILY MEDICINE CLINIC | Facility: CLINIC | Age: 86
End: 2021-12-01

## 2021-12-01 ENCOUNTER — TELEPHONE (OUTPATIENT)
Dept: FAMILY MEDICINE CLINIC | Facility: CLINIC | Age: 86
End: 2021-12-01

## 2021-12-01 DIAGNOSIS — R13.10 DYSPHAGIA, UNSPECIFIED TYPE: Primary | ICD-10-CM

## 2021-12-29 ENCOUNTER — RA CDI HCC (OUTPATIENT)
Dept: OTHER | Facility: HOSPITAL | Age: 86
End: 2021-12-29

## 2021-12-29 NOTE — PROGRESS NOTES
[facility]  coding opportunities          Number of diagnosis code(s) already on the problem list added to FYI fla               Number of suggestions used: 0      Number of suggestions NOT actually used: 5     Patients insurance company: [employer] (Medicare Advantage and Commercial)     Visit status: Patient arrived for their scheduled appointment        [facility]  coding opportunities          Number of diagnosis code(s) already on the problem list added to [employer] fla                     Patients insurance company: [employer] [address] [place] [address] ([employer] and [employer])           With the beginning of the new year, this is a reminder to address all [facility]  (risk adjustment) codes for  as patient scores reset to zero LILLIANA   1)I82  5Y2 Chronic deep vein thrombosis (DVT) of proximal vein of left lower extremity ([facility] [address] )  2)S06  5X9A Subdural hematoma (HCC)  3)F31 9 Bipolar 1 disorder (HCC)  4)A41 9, N39 0 Sepsis due to urinary tract infection ([facility] [address] )  5)U07 1, J96 00 Acute respiratory failure due to COVID-19 ([facility] )

## 2022-01-05 ENCOUNTER — OFFICE VISIT (OUTPATIENT)
Dept: FAMILY MEDICINE CLINIC | Facility: CLINIC | Age: 87
End: 2022-01-05
Payer: COMMERCIAL

## 2022-01-05 VITALS
RESPIRATION RATE: 17 BRPM | DIASTOLIC BLOOD PRESSURE: 72 MMHG | SYSTOLIC BLOOD PRESSURE: 120 MMHG | WEIGHT: 113.4 LBS | BODY MASS INDEX: 20.09 KG/M2 | TEMPERATURE: 97.6 F | HEART RATE: 100 BPM | OXYGEN SATURATION: 96 % | HEIGHT: 63 IN

## 2022-01-05 DIAGNOSIS — M54.6 ACUTE THORACIC BACK PAIN, UNSPECIFIED BACK PAIN LATERALITY: Primary | ICD-10-CM

## 2022-01-05 LAB
SL AMB  POCT GLUCOSE, UA: NEGATIVE
SL AMB LEUKOCYTE ESTERASE,UA: NEGATIVE
SL AMB POCT BILIRUBIN,UA: NEGATIVE
SL AMB POCT BLOOD,UA: NORMAL
SL AMB POCT CLARITY,UA: NORMAL
SL AMB POCT COLOR,UA: YELLOW
SL AMB POCT KETONES,UA: NEGATIVE
SL AMB POCT NITRITE,UA: NEGATIVE
SL AMB POCT PH,UA: 7
SL AMB POCT SPECIFIC GRAVITY,UA: 1
SL AMB POCT URINE PROTEIN: NORMAL
SL AMB POCT UROBILINOGEN: NEGATIVE

## 2022-01-05 PROCEDURE — 81002 URINALYSIS NONAUTO W/O SCOPE: CPT | Performed by: FAMILY MEDICINE

## 2022-01-05 PROCEDURE — 99213 OFFICE O/P EST LOW 20 MIN: CPT | Performed by: FAMILY MEDICINE

## 2022-01-05 PROCEDURE — 1036F TOBACCO NON-USER: CPT | Performed by: FAMILY MEDICINE

## 2022-01-05 PROCEDURE — 1160F RVW MEDS BY RX/DR IN RCRD: CPT | Performed by: FAMILY MEDICINE

## 2022-01-05 NOTE — PATIENT INSTRUCTIONS
I want you to avoid all caffeine products  That includes coffee tea yarelis root beer etcetera  You may have decaffeinated tea

## 2022-01-05 NOTE — PROGRESS NOTES
Assessment/Plan:    No problem-specific Assessment & Plan notes found for this encounter  Diagnoses and all orders for this visit:    Acute thoracic back pain, unspecified back pain laterality  -     POCT urine dip          Subjective:      Patient ID: Beryl Bolaños is a 80 y o  female  L chest wall pain ; no fall / injury; No rash; 2 mos  Improving     Worsening fequency and some perirectal feeling of lesions there     No blood / dysuria       The following portions of the patient's history were reviewed and updated as appropriate: allergies, current medications, past family history, past medical history, past social history, past surgical history and problem list     Review of Systems   Gastrointestinal: Positive for constipation  Negative for abdominal pain and anal bleeding  Genitourinary: Positive for difficulty urinating and frequency  Objective:  Vitals:    01/05/22 1106   BP: 120/72   BP Location: Left arm   Patient Position: Sitting   Cuff Size: Adult   Pulse: 100   Resp: 17   Temp: 97 6 °F (36 4 °C)   TempSrc: Tympanic   SpO2: 96%   Weight: 51 4 kg (113 lb 6 4 oz)   Height: 5' 3" (1 6 m)      Physical Exam  Constitutional:       Appearance: She is well-developed  Eyes:      Conjunctiva/sclera: Conjunctivae normal    Pulmonary:      Effort: Pulmonary effort is normal    Abdominal:      Comments: Several small hemorrhoidal remnants; no perirectal mass / DYAN normal    Skin:     General: Skin is warm and dry  Psychiatric:         Thought Content:  Thought content normal

## 2022-01-14 ENCOUNTER — TELEPHONE (OUTPATIENT)
Dept: FAMILY MEDICINE CLINIC | Facility: CLINIC | Age: 87
End: 2022-01-14

## 2022-01-15 ENCOUNTER — TELEPHONE (OUTPATIENT)
Dept: OTHER | Facility: OTHER | Age: 87
End: 2022-01-15

## 2022-01-20 ENCOUNTER — TELEPHONE (OUTPATIENT)
Dept: FAMILY MEDICINE CLINIC | Facility: CLINIC | Age: 87
End: 2022-01-20

## 2022-01-20 NOTE — TELEPHONE ENCOUNTER
----- Message from Martin Pollock, [address] sent at 1/20/2022 11:36 AM EST -----  Please call patient with urine showing no infection

## 2022-01-20 NOTE — TELEPHONE ENCOUNTER
Patients son notified, was told to call [phone] and leave a message for patient, urine was negative for infection

## 2022-02-11 NOTE — ASSESSMENT & PLAN NOTE
- patient experiencing left hip and leg pain with movement and palpation after having a fall on 2/6/2021  - CT Chest, abdomen, pelvis negative for acute osseous abnormalities  - x-ray left hip and femur- no acute osseous abnormality  - patient states that she is feeling better today Patient Education       Preventing Falls   The Basics   Written by the doctors and editors at [facility]   Am I at risk of falling? -- Your risk of falling increases as you grow older. That's because getting older can make it harder to walk steadily and keep your balance. Also, the effects of falls are more serious in older people.  Overall, 3 to 4 out of every 10 people over the age of 65 fall each year. Up to 75 percent of people who fracture a hip never recover to the point they were before they had their fracture. If you have fallen in the past, you are at higher risk of falling again.  Several things can increase your risk of a fall, including:  · Illness  · A change in the medicines you take  · An unsafe or unfamiliar setting (for example, a room with rugs or furniture that might trip you, or an area you don't know well)  How can my doctor help me to avoid falling? -- Your doctor can talk to you about the following things:  · Past falls - It is important to tell your doctor about any times you have fallen or almost fallen. He or she can then suggest ways to prevent another fall.  · Your health conditions - Some health problems can put you at risk of falling. These include conditions that affect eyesight, hearing, muscle strength, or balance.  · The medicines you take - Certain medicines can increase the risk of falling. These include some medicines that are used for sleeping problems, anxiety, high blood pressure, or depression. Adding new medicines, or changing doses of some medicines, can also affect your risk of falling.  The more your doctor knows about your situation, the better he or she will be able to help you. For example, if you fell because you have a condition that causes pain, your doctor might suggest treatments to deal with the pain. Or if one of your medicines is making you dizzy and more likely to fall, your doctor might switch you to a different medicine.  Is there anything I can do on my  own? -- Yes. To help keep from falling, you can:  · Make your home safer - To avoid falling at home, get rid of things that might make you trip or slip. This might include furniture, electrical cords, clutter, and loose rugs (figure 1). Keep your home well-lit so that you can easily see where you are going. Avoid storing things in high places so you don't have to reach or climb.  · Wear sturdy shoes that fit well - Wearing shoes with high heels or slippery soles, or shoes that are too loose, can lead to falls. Walking around in bare feet, or only socks, can also increase your risk of falling.  · Take vitamin D pills - Taking vitamin D might lower the risk of falls in older people. This is because vitamin D helps make bones and muscles stronger. Your doctor can talk to you about whether you should take extra vitamin D, and how much.  · Stay active - Exercising on a regular basis can help lower your risk of falling. It might also help prevent you from getting hurt if you do fall. It is best to do a few different activities that help with both strength and balance. There are many kinds of exercise that can be safe for older people. These include walking, swimming, and Ronnie Chi (a Chinese martial art that involves slow, gentle movements).  · Use a cane, walker, and other safety devices - If your doctor recommends that you use a cane or walker, be sure that it's the right size and you know how to use it. There are other devices that might help you avoid falling, too. These include grab bars or a sturdy seat for the shower, non-slip bath mats, and hand rails or treads for the stairs (to prevent slipping).  If you worry that you could fall, there are also alarm buttons that let you call for help if you fall and can't get up.  What should I do if I fall? -- If you fall, see your doctor right away, even if you aren't hurt. Your doctor can try to figure out what caused you to fall, and how likely you are to fall again. He or  she will do an exam and talk to you about your health problems, medicines, and activities. Then he or she can suggest things you can do to avoid falling again.  Many older people have a hard time recovering after a fall. Doing things to prevent falling can help you to protect your health and independence.  All topics are updated as new evidence becomes available and our peer review process is complete.  This topic retrieved from [employer] on: Sep 21, 2021.  Topic 87001 Version 18.0  Release: 29.4.2 - C29.263  © 2021 UpToDate, Inc. and/or its affiliates. All rights reserved.  figure 1: How to avoid falling at home     This picture shows some of the things that can cause a fall in your home. Look around and remove any loose rugs, electrical cords, clutter, or furniture that could trip you.  Graphic 86144 Version 1.0    Consumer Information Use and Disclaimer   This information is not specific medical advice and does not replace information you receive from your health care provider. This is only a brief summary of general information. It does NOT include all information about conditions, illnesses, injuries, tests, procedures, treatments, therapies, discharge instructions or life-style choices that may apply to you. You must talk with your health care provider for complete information about your health and treatment options. This information should not be used to decide whether or not to accept your health care provider's advice, instructions or recommendations. Only your health care provider has the knowledge and training to provide advice that is right for you. The use of this information is governed by the [employer] End User License Agreement, available at https://www.[employer]/en/solutions/[employer]/about/ty.The use of [employer] content is governed by the [employer] Terms of Use. ©2021 UpToDate, Inc. All rights reserved.  Copyright   © 2021 UpToDate, Inc. and/or its affiliates. All rights reserved.  Patient  Education       Chronic Pain Discharge Instructions   About this topic   Pain can be an unpleasant feeling that happens in any part of the body. It can be mild or very bad. Pain may come and go or you may feel it all of the time. It may be dull, sharp, or throbbing. When you are in pain, you may not feel hungry. Pain can cause upset stomach and throwing up. You may also feel nervous or have problems sleeping.  Pain is most often a warning that something is wrong. You may have had surgery or an injury. Pain may be from health problems such as migraine or cancer. Acute pain lasts for only a short time and then goes away. Chronic pain lasts for a long time and most often does not go away completely. Chronic pain may disrupt your daily activities. How a doctor treats chronic pain depends on things like the kind of pain, how bad it is, and what is causing the pain.       What care is needed at home?   · Ask your doctor what you need to do when you go home. Make sure you ask questions if you do not understand what the doctor says. This way you will know what you need to do.  · Pay attention to your levels of pain.  · Take your drugs safely.  ? Take drugs only as directed and take only drugs ordered for you. Do not share drugs.  · Store your drugs safely.  ? Keep drugs out of the reach of children and pets. A locked cabinet is the safest place to store drugs.  ? Make sure you store your drug in a safe location after every use. Set an alarm to remind you of the next dosing time rather than leaving the drug out to serve as a reminder.  · It is a good idea to keep a diary and write about your pain. This might help to see if there is a pattern to your pain. Make notes about:  ? Where your pain is  ? When you have the pain  ? How your pain feels. Is it dull, sharp, burning, stabbing, or cramping?  ? What causes your pain  ? What makes your pain better or worse  · Ice or heat may be used to ease pain.  ? Ice may help with  swelling that may happen with some pain. Place an ice pack or a bag of frozen peas wrapped in a towel over the painful part. Never put ice right on the skin. Do not leave the ice on more than 10 to 15 minutes at a time.  ? If your doctor tells you to use heat, put a heating pad on the painful part for no more than 20 minutes at a time. Never go to sleep with a heating pad on as this can cause burns.  · Try to stay calm. Anxiety and stress may make your pain worse.  · Try using massage, relaxation, breathing exercises, yoga, kaykay chi, and music therapy.  · You may consider other ways to help with pain. Some of them are acupuncture, biofeedback, physical therapy, electrical stimulation, counseling, or meditation. Ask your doctor if these may help manage your pain.  What follow-up care is needed?   · Your doctor may ask you to make visits to the office to check on your progress. Be sure to keep these visits.  · If the pain is worse or comes more often, see your doctor. Also talk to your doctor if you are having trouble sleeping at night.  · You may also need to see a:  ? Physical therapist to teach you exercises to help you stretch  ? Occupational therapist to help you find ways to make you more comfortable doing your regular daily activities  ? Psychological therapist to help deal with the stress of chronic pain  ? Doctor who specializes in managing ongoing pain  What drugs may be needed?   The doctor may order drugs to:  · Help with pain and swelling  · Help treat other problems that may go along with chronic pain, such as low mood or being worried  Take your drugs as ordered by your doctor. Some of these drugs can be habit forming and may cause side effects.  Will physical activity be limited?   Physical activities may be limited due to the pain that you have.  What changes to diet are needed?   Changes in food or diet may depend on what kind of pain you have. Talk with your doctor about what kind of food is good for  you.  What problems could happen?   · Not able to function well  · Irritation, sadness, anxiety, and low mood  · Sexual dysfunction  · Loss of appetite  · Need more drugs for pain  · Side effects from drugs for pain, such as constipation, upset stomach, and dizziness  · Addiction to certain drugs for pain  What can be done to prevent this health problem?   The best thing you can do is talk to your doctor about any pain you have. Your doctor can help you make a plan to lower your pain.  Some causes of pain get better by staying active and working out. Your doctor may send you to a physical therapist to help you work on strength exercises and stretching.  Stop smoking if you are a smoker. Studies show that smokers tend to have more pain than people who do not smoke.  When do I need to call the doctor?   · Signs of infection. These include a fever of 100.4°F (38°C) or higher, chills, very bad sore throat, ear or sinus pain, pain or blood with passing urine.  · Very bad upset stomach, throwing up, or belly pain; not able to eat or drink anything  · Weight loss without trying to lose weight  · Dizziness or seeing things that are not really there  · Chest pain or trouble breathing  · Back or side pain that lasts and you dont know why. (You have not done any hard exercises or other activity that may have pulled a muscle.)  · Not able to move or do daily actions  · Very bad pain that is not helped by your drugs  · Health problem is not better or you are feeling worse  Helpful tips   · Get rid of any drug that is no longer needed. Check with your pharmacy to learn about how to get rid of unused drugs.  ? Most drugs may be thrown away in household trash after mixing with coffee grounds or saundra litter and sealing in a plastic bag.  ? In [place], take any unused drugs to the pharmacy.  Teach Back: Helping You Understand   The Teach Back Method helps you understand the information we are giving you. After you talk with the  staff, tell them in your own words what you learned. This helps to make sure the staff has described each thing clearly. It also helps to explain things that may have been confusing. Before going home, make sure you can do these:  · I can tell you about my pain.  · I can tell you what may help ease my pain.  · I can tell you what I will do if I have very bad back, side, chest, or belly pain, or the pain is not helped by my drugs.  Where can I learn more?   American Academy of Family Physicians  Familydoctor.org/condition/chronic-pain   [facility]  https://www.[facility].nih.gov/health/chronic-pain-in-depth   [employer] [employer] and Stroke  https://www.ninds.nih.gov/Disorders/All-Disorders/Chronic-Pain-Information-Page   Last Reviewed Date   2021-07-09  Consumer Information Use and Disclaimer   This information is not specific medical advice and does not replace information you receive from your health care provider. This is only a brief summary of general information. It does NOT include all information about conditions, illnesses, injuries, tests, procedures, treatments, therapies, discharge instructions or life-style choices that may apply to you. You must talk with your health care provider for complete information about your health and treatment options. This information should not be used to decide whether or not to accept your health care providers advice, instructions or recommendations. Only your health care provider has the knowledge and training to provide advice that is right for you.  Copyright   Copyright © 2021 UpToDate, Inc. and its affiliates and/or licensors. All rights reserved.

## 2022-02-14 ENCOUNTER — OFFICE VISIT (OUTPATIENT)
Dept: FAMILY MEDICINE CLINIC | Facility: CLINIC | Age: 87
End: 2022-02-14
Payer: COMMERCIAL

## 2022-02-14 VITALS
RESPIRATION RATE: 18 BRPM | OXYGEN SATURATION: 97 % | HEIGHT: 61 IN | WEIGHT: 115.8 LBS | BODY MASS INDEX: 21.86 KG/M2 | HEART RATE: 92 BPM | SYSTOLIC BLOOD PRESSURE: 138 MMHG | DIASTOLIC BLOOD PRESSURE: 80 MMHG | TEMPERATURE: 99 F

## 2022-02-14 DIAGNOSIS — K59.00 CONSTIPATION, UNSPECIFIED CONSTIPATION TYPE: Primary | ICD-10-CM

## 2022-02-14 PROCEDURE — 99214 OFFICE O/P EST MOD 30 MIN: CPT | Performed by: FAMILY MEDICINE

## 2022-02-14 NOTE — PROGRESS NOTES
Assessment/Plan:      1  Constipation, unspecified constipation type  Assessment & Plan:  Worsening  · Has not moved bowels in 5 days  · Her bowel movements are not regular  · Unresponsive to senna plus or dulcolax suppository  · Abdominal exam unremarkable: no rebound or guarding, hypoactive bowel sounds  · add miralax 17mg daily until bm   · This may also help with her urinary incontinence            Subjective:      Patient ID: Bowen Mckeon is a 80 y o  female presents today for constipation and nausea  HPI    The following portions of the patient's history were reviewed and updated as appropriate: allergies, current medications, past family history, past medical history, past social history, past surgical history and problem list     Review of Systems   Constitutional: Negative for activity change, chills, diaphoresis and fever  HENT: Negative for ear pain, hearing loss, postnasal drip, rhinorrhea, sinus pressure, sinus pain, sneezing and sore throat  Respiratory: Negative for cough, chest tightness, shortness of breath and wheezing  Cardiovascular: Negative for chest pain, palpitations and leg swelling  Gastrointestinal: Negative for abdominal pain, blood in stool, constipation, diarrhea, nausea and vomiting  Genitourinary: Negative for dysuria, frequency, hematuria and urgency  Musculoskeletal: Negative for arthralgias and myalgias  Neurological: Negative for dizziness, syncope, weakness, light-headedness, numbness and headaches  Objective:      /80 (BP Location: Left arm, Patient Position: Sitting, Cuff Size: Standard)   Pulse 92   Temp 99 °F (37 2 °C) (Tympanic)   Resp 18   Ht 5' 1" (1 549 m)   Wt 52 5 kg (115 lb 12 8 oz)   SpO2 97%   BMI 21 88 kg/m²          Physical Exam  Vitals reviewed  Constitutional:       General: She is not in acute distress  Appearance: She is well-developed  She is not diaphoretic     HENT:      Head: Normocephalic and atraumatic  Right Ear: External ear normal       Left Ear: External ear normal       Nose: Nose normal  No congestion  Mouth/Throat:      Mouth: Mucous membranes are moist       Pharynx: Oropharynx is clear  No oropharyngeal exudate  Eyes:      General: No scleral icterus  Right eye: No discharge  Left eye: No discharge  Conjunctiva/sclera: Conjunctivae normal       Pupils: Pupils are equal, round, and reactive to light  Neck:      Thyroid: No thyromegaly  Vascular: No JVD  Trachea: No tracheal deviation  Cardiovascular:      Rate and Rhythm: Normal rate and regular rhythm  Heart sounds: Normal heart sounds  No murmur heard  No friction rub  No gallop  Pulmonary:      Effort: Pulmonary effort is normal  No respiratory distress  Breath sounds: Normal breath sounds  No wheezing or rales  Chest:      Chest wall: No tenderness  Abdominal:      General: Bowel sounds are normal  There is no distension  Palpations: Abdomen is soft  Tenderness: There is no abdominal tenderness  There is no right CVA tenderness, left CVA tenderness, guarding or rebound  Musculoskeletal:         General: Normal range of motion  Cervical back: Normal range of motion and neck supple  No tenderness  Right lower leg: No edema  Left lower leg: No edema  Lymphadenopathy:      Cervical: No cervical adenopathy  Skin:     General: Skin is warm and dry  Neurological:      Mental Status: She is alert and oriented to person, place, and time  Mental status is at baseline  Psychiatric:         Mood and Affect: Mood is anxious  Affect is tearful (anxious about moving bowels)  Speech: Speech normal          Behavior: Behavior normal          Thought Content: Thought content normal          Cognition and Memory: Memory is impaired           Judgment: Judgment normal

## 2022-02-14 NOTE — ASSESSMENT & PLAN NOTE
Worsening  · Has not moved bowels in 5 days    · Her bowel movements are not regular  · Unresponsive to senna plus or dulcolax suppository  · Abdominal exam unremarkable: no rebound or guarding, hypoactive bowel sounds  · add miralax 17mg daily until bm   · This may also help with her urinary incontinence

## 2022-02-17 ENCOUNTER — DOCUMENTATION (OUTPATIENT)
Dept: FAMILY MEDICINE CLINIC | Facility: CLINIC | Age: 87
End: 2022-02-17

## 2022-02-17 ENCOUNTER — TELEPHONE (OUTPATIENT)
Dept: FAMILY MEDICINE CLINIC | Facility: CLINIC | Age: 87
End: 2022-02-17

## 2022-02-17 NOTE — LETTER
February 17, 2022     [facility]    Patient: Bowen Mckeon   YOB: 1935   Date of Visit: 2/17/2022       1  DISCONTINUE MIRALAX 17 GM DAILY    2  START MIRALAX 17 GM DAILY AS NEEDED FOR CONSTIPATION      DR TAWANA PECK

## 2022-02-17 NOTE — TELEPHONE ENCOUNTER
Patient called stating that she started the miralax and she had a big bowel movement - facility is giving the medication to her on daily basis   Giovani Floyd is requesting a note sent to facility stating that to take miralax prn

## 2022-02-25 ENCOUNTER — TELEPHONE (OUTPATIENT)
Dept: OTHER | Facility: OTHER | Age: 87
End: 2022-02-25

## 2022-02-25 ENCOUNTER — TELEMEDICINE (OUTPATIENT)
Dept: FAMILY MEDICINE CLINIC | Facility: CLINIC | Age: 87
End: 2022-02-25
Payer: COMMERCIAL

## 2022-02-25 ENCOUNTER — TELEPHONE (OUTPATIENT)
Dept: FAMILY MEDICINE CLINIC | Facility: CLINIC | Age: 87
End: 2022-02-25

## 2022-02-25 DIAGNOSIS — R19.7 DIARRHEA, UNSPECIFIED TYPE: Primary | ICD-10-CM

## 2022-02-25 DIAGNOSIS — K92.1 MELENA: ICD-10-CM

## 2022-02-25 PROCEDURE — 1036F TOBACCO NON-USER: CPT | Performed by: FAMILY MEDICINE

## 2022-02-25 PROCEDURE — 99214 OFFICE O/P EST MOD 30 MIN: CPT | Performed by: FAMILY MEDICINE

## 2022-02-25 PROCEDURE — 1160F RVW MEDS BY RX/DR IN RCRD: CPT | Performed by: FAMILY MEDICINE

## 2022-02-25 RX ORDER — LOPERAMIDE HYDROCHLORIDE 2 MG/1
2 TABLET ORAL 3 TIMES DAILY PRN
Qty: 30 TABLET | Refills: 0 | Status: SHIPPED | OUTPATIENT
Start: 2022-02-25

## 2022-02-25 NOTE — PROGRESS NOTES
Virtual Regular Visit    Verification of patient location:    Patient is located in the following state in which I hold an active license [place]      Assessment/Plan:    Problem List Items Addressed This Visit        Digestive    Melena     New  · Multiple episodes of watery black tarry stool with some episodes of bright red blood  · Patient is having 6-8 episodes daily for the past several days  · Also having episodes of dizziness when standing up  · Patient does take iron supplements which may be contributing to the color of the stool as well as a history of hemorrhoids  · Patient feels overall very unwell  · Due to this I recommend she be evaluated in the emergency department for possible fluid hydration and further evaluation of acute blood loss  · Spoke to patient's son, eRagan Capps, regarding this and he will help get her to the hospital   · Follow-up as needed         Relevant Orders    Transfer to other facility      Other Visit Diagnoses     Diarrhea, unspecified type    -  Primary    Relevant Orders    Transfer to other facility               Reason for visit is   Chief Complaint   Patient presents with    GI Problem     can't stop having BM's today  is very nervous  has blood but has hemorrhoids  urinating alot too      Facial Swelling     swelling around eyes tearing no vision changes  x 2 days     Virtual Regular Visit        Encounter provider [address] Juwan Berrios 1257, DO    Provider located at [facility] AT [address] [address]      Recent Visits  No visits were found meeting these conditions    Showing recent visits within past 7 days and meeting all other requirements  Today's Visits  Date Type Provider Dept   02/25/22 Telephone Linette Booth MD [facility]   02/25/22 Telemedicine Demetri Scales DO Pg  Krystal Marcelo \[facility]\"" 45    Showing today's visits and meeting all other requirements  Future Appointments  No visits were found meeting these conditions  Showing future appointments within next 150 days and meeting all other requirements       The patient was identified by name and date of birth  Gino Schmitt was informed that this is a telemedicine visit and that the visit is being conducted through Telephone  My office door was closed  No one else was in the room  She acknowledged consent and understanding of privacy and security of the video platform  The patient has agreed to participate and understands they can discontinue the visit at any time  Patient is aware this is a billable service  Subjective  Gino Schmitt is a 80 y o  female presents for acute visit  Frequent bowel movements  She had 6 bowel movements today and 8 bowel movements yesterday  She feels light headed  Admits to some black tarry stool and bright red blood  Diarrhea has been ongoing for about a week  Patient stopped taking miralax that was prescribed for constipation  She also admits to light headedness when standing up but no syncope  She feels like something is off and dehydrated  Also has a complaint of nonspecific facial swelling  She is unable to do a virtual video visit but admits to swelling under her eyes      HPI     Past Medical History:   Diagnosis Date    Anemia 2/26/2019    Anxiety     Bipolar [facility] disorder ([facility] [address] )     Depression     DVT (deep venous thrombosis) ([facility] [address] ) 2008    Left leg    GERD (gastroesophageal reflux disease)     Hypertension     Overactive bladder        Past Surgical History:   Procedure Laterality Date    ADENOIDECTOMY      CATARACT EXTRACTION Bilateral     Right 10/2003, left 4/2004    CHOLECYSTECTOMY      DILATION AND CURETTAGE OF UTERUS  1985    HERNIA REPAIR  11/02/2007    Femoral and small bowel resection    HIP SURGERY      L hip    TONSILLECTOMY      As a youth    WRIST SURGERY      L wrist       Current Outpatient Medications   Medication Sig Dispense Refill    acetaminophen (TYLENOL) 325 mg tablet Take 2 tablets (650 mg total) by mouth 3 (three) times a day 100 tablet 6    bisacodyl (DULCOLAX) 10 mg suppository Insert 1 suppository (10 mg total) into the rectum daily 12 suppository 6    clonazePAM (KlonoPIN) 0 5 mg tablet 1 TAB BY MOUTH TWICE DAILY FOR ANXIETY **HAZARDOUS DRUG**SWALLOW WHOLE-DO NOT CHEW OR CRUSH* 60 tablet 5    ferrous sulfate 325 (65 Fe) mg tablet Take 1 tablet (325 mg total) by mouth daily with breakfast 30 tablet 0    hydrocortisone 2 5 % cream Apply topically 2 (two) times a day 30 g 1    lamoTRIgine (LaMICtal) 200 MG tablet Take 1 tablet (200 mg total) by mouth daily before breakfast 30 tablet 12    pantoprazole (PROTONIX) 40 mg tablet Take 1 tablet (40 mg total) by mouth daily in the early morning 90 tablet 3    QUEtiapine (SEROquel) 200 mg tablet Take 2 tablets (400 mg total) by mouth daily at bedtime 60 tablet 0    Sennosides-Docusate Sodium (SENNA-PLUS PO) Take 8 6 mg by mouth 2 (two) times a day as needed      topiramate (TOPAMAX) 50 MG tablet Take 1 tablet (50 mg total) by mouth 2 (two) times a day 60 tablet 0    trolamine salicylate (ASPERCREME) 10 % cream Apply topically 3 (three) times a day 85 g 8    famotidine (PEPCID) 20 mg tablet Take 1 tablet (20 mg total) by mouth daily for 14 days 14 tablet 0     No current facility-administered medications for this visit  Allergies   Allergen Reactions    Ethanol     Simvastatin        Review of Systems   Constitutional: Negative for activity change, chills, diaphoresis and fever  HENT: Negative for ear pain, hearing loss, postnasal drip, rhinorrhea, sinus pressure, sinus pain, sneezing and sore throat  Respiratory: Negative for cough, chest tightness, shortness of breath and wheezing  Cardiovascular: Negative for chest pain, palpitations and leg swelling  Gastrointestinal: Positive for anal bleeding, blood in stool and diarrhea   Negative for abdominal distention, abdominal pain, constipation, nausea, rectal pain and vomiting  Endocrine: Positive for polyuria  Genitourinary: Negative for dysuria, frequency, hematuria and urgency  Musculoskeletal: Negative for arthralgias and myalgias  Neurological: Positive for light-headedness  Negative for dizziness, syncope, weakness, numbness and headaches  Video Exam    There were no vitals filed for this visit  It was my intent to perform this visit via video technology but the patient was not able to do a video connection so the visit was completed via audio telephone only  I spent 20 minutes directly with the patient during this visit    VIRTUAL VISIT Keshia Garvey 673 verbally agrees to participate in [employer]  Pt is aware that [employer] could be limited without vital signs or the ability to perform a full hands-on physical Fouzia Screws understands she or the provider may request at any time to terminate the video visit and request the patient to seek care or treatment in person

## 2022-02-25 NOTE — ASSESSMENT & PLAN NOTE
New  · Multiple episodes of watery black tarry stool with some episodes of bright red blood  · Patient is having 6-8 episodes daily for the past several days  · Also having episodes of dizziness when standing up  · Patient does take iron supplements which may be contributing to the color of the stool as well as a history of hemorrhoids  · Patient feels overall very unwell  · Due to this I recommend she be evaluated in the emergency department for possible fluid hydration and further evaluation of acute blood loss  · Spoke to patient's son, Jasmin Rodgers, regarding this and he will help get her to the hospital   · Follow-up as needed

## 2022-02-25 NOTE — TELEPHONE ENCOUNTER
I called back to [place]    They never took her to the ER as advised   I questioned why    They stated they talked with her son and they wanted to try imodium or urgent care    I advised I was concerned that Dr Evette Villalta had been told of tarry stools for several days and and red blood    Dr Evette Villalta was concerned with the blood and possible drop in hgb and dehydration    [place]  relates they have seen no tarry stools or blood they states she was constipated and now can't stop going   She relates this happens off and on   They wanted order for imodium to try  Discussed that per Dr Evette Villalta note we are getting a mixed message of what is going on   Rebeca  stated she didn't get that from report that she had those stools  All she notes if liquid  stools  Advised that referral  transfer to hospital had already been sent   Advised urgent care is no help that she would need further testing for dehydration and labs stat  Staff feels she is stable    Advised I would send in med but need to closely monitor if they do not take her for dehydration or change in mentation  [facility] also [facility] stated she would call her supervisor     [facility]

## 2022-03-01 ENCOUNTER — TELEPHONE (OUTPATIENT)
Dept: FAMILY MEDICINE CLINIC | Facility: CLINIC | Age: 87
End: 2022-03-01

## 2022-03-01 NOTE — TELEPHONE ENCOUNTER
patient called because she is wondering if there is alcohol in metamucil  She states that she has hemoirhhoids and when she goes there is sometimes blood on the paper  She states she is allergic to alcohol

## 2022-03-04 ENCOUNTER — TELEPHONE (OUTPATIENT)
Dept: FAMILY MEDICINE CLINIC | Facility: CLINIC | Age: 87
End: 2022-03-04

## 2022-03-04 NOTE — TELEPHONE ENCOUNTER
Patient would like to know if she should continue taking the Sennosides-Docusate Sodium (SENNA-PLUS PO)  and the metamucil together

## 2022-03-04 NOTE — TELEPHONE ENCOUNTER
Patient called asking if her Senna is as needed basis - patient is so confused on how she needs to take her senna, thoroughly explained to patient how prn works

## 2022-03-08 ENCOUNTER — TELEPHONE (OUTPATIENT)
Dept: FAMILY MEDICINE CLINIC | Facility: CLINIC | Age: 87
End: 2022-03-08

## 2022-03-08 NOTE — TELEPHONE ENCOUNTER
Patient called asking " can I take senna, I only go one time this morning and I feel like going again?" patient has been calling our office asking if she can take medication - given advised to have one of the nurses evaluate her and see what is going on, this is a way for us to find out what treatments is  Needed

## 2022-03-08 NOTE — TELEPHONE ENCOUNTER
Patient is having issues making bowel movements, is taking miralax and senna and still having constipation  Patient has hemmorrhoids  Please advise

## 2022-03-09 ENCOUNTER — TELEPHONE (OUTPATIENT)
Dept: FAMILY MEDICINE CLINIC | Facility: CLINIC | Age: 87
End: 2022-03-09

## 2022-03-09 NOTE — TELEPHONE ENCOUNTER
Dung called, requesting script for hemorrhoid cream sent to [facility]  Cannot have anything containing alcohol   Please advise

## 2022-03-16 ENCOUNTER — TREATMENT (OUTPATIENT)
Dept: FAMILY MEDICINE CLINIC | Facility: CLINIC | Age: 87
End: 2022-03-16

## 2022-03-16 ENCOUNTER — DOCUMENTATION (OUTPATIENT)
Dept: FAMILY MEDICINE CLINIC | Facility: CLINIC | Age: 87
End: 2022-03-16

## 2022-03-16 ENCOUNTER — TELEPHONE (OUTPATIENT)
Dept: FAMILY MEDICINE CLINIC | Facility: CLINIC | Age: 87
End: 2022-03-16

## 2022-03-16 DIAGNOSIS — K59.00 CONSTIPATION, UNSPECIFIED CONSTIPATION TYPE: Primary | ICD-10-CM

## 2022-03-16 NOTE — TELEPHONE ENCOUNTER
Patient would like a call back in regards to the too many times she is using the bathroom   She believes it may be the medication she is taking and would like further guidance

## 2022-04-02 ENCOUNTER — HOSPITAL ENCOUNTER (EMERGENCY)
Facility: HOSPITAL | Age: 87
Discharge: HOME | End: 2022-04-02
Attending: EMERGENCY MEDICINE | Admitting: EMERGENCY MEDICINE
Payer: COMMERCIAL

## 2022-04-02 VITALS
TEMPERATURE: 97.7 F | SYSTOLIC BLOOD PRESSURE: 191 MMHG | RESPIRATION RATE: 14 BRPM | DIASTOLIC BLOOD PRESSURE: 85 MMHG | HEIGHT: 61 IN | HEART RATE: 85 BPM | OXYGEN SATURATION: 99 % | BODY MASS INDEX: 21.88 KG/M2

## 2022-04-02 DIAGNOSIS — K59.00 CONSTIPATION, UNSPECIFIED CONSTIPATION TYPE: ICD-10-CM

## 2022-04-02 DIAGNOSIS — K62.3 RECTAL PROLAPSE: Primary | ICD-10-CM

## 2022-04-02 PROCEDURE — 99282 EMERGENCY DEPT VISIT SF MDM: CPT | Performed by: PHYSICIAN ASSISTANT

## 2022-04-02 PROCEDURE — 99283 EMERGENCY DEPT VISIT LOW MDM: CPT

## 2022-04-02 RX ORDER — DOCUSATE SODIUM 100 MG/1
100 CAPSULE, LIQUID FILLED ORAL EVERY 12 HOURS
Qty: 60 CAPSULE | Refills: 0 | Status: SHIPPED | OUTPATIENT
Start: 2022-04-02

## 2022-04-02 RX ORDER — POLYETHYLENE GLYCOL 3350 17 G/17G
17 POWDER, FOR SOLUTION ORAL DAILY
Qty: 20 EACH | Refills: 0 | Status: SHIPPED | OUTPATIENT
Start: 2022-04-02 | End: 2022-07-08 | Stop reason: SDUPTHER

## 2022-04-02 NOTE — ED PROVIDER NOTES
History  Chief Complaint   Patient presents with    Rectal Problems     Arrives EMS from [address] [place] [address] reporting she was having a BM this morning leading to a prolapsed rectum  Patient is an 29-year-old female past medical history of hypertension, GERD, overactive bladder, bipolar disorder who presents from [address] [place] [address] with rectal prolapse  Patient has extensive history of intermittent constipation has been on several medications to help alleviate her symptoms  Patient reports that she will have episodes of loose stools, then become constipated, take the medications she is prescribed, then have loose stools again  Patient has been constipated over the last few days and she reports she was bearing down to have a bowel movement today, did have some bowel movement, but also noted significant pain  When assessed by staff, they noted rectal prolapse and sent patient to the ED  Patient denies any history of similar symptoms, prior prolapse  Patient initially noted mild lower abdominal cramping just prior to bowel movement, but denies any other symptoms at this time  Patient states she is otherwise in her usual state of health and denies any fevers, chills, diaphoresis, headache, congestion, cough, shortness of breath, chest pain, palpitations, current abdominal pain, nausea, vomiting, dysuria, hematuria  Prior to Admission Medications   Prescriptions Last Dose Informant Patient Reported? Taking?    QUEtiapine (SEROquel) 200 mg tablet  Outside Facility (Specify) No No   Sig: Take 2 tablets (400 mg total) by mouth daily at bedtime   Sennosides-Docusate Sodium (SENNA-PLUS PO)  Outside Facility (Specify) Yes No   Sig: Take 8 6 mg by mouth 2 (two) times a day as needed   acetaminophen (TYLENOL) 325 mg tablet  Outside Facility (Specify) No No   Sig: Take 2 tablets (650 mg total) by mouth 3 (three) times a day   bisacodyl (DULCOLAX) 10 mg suppository  Outside Facility (Specify) No No   Sig: Insert 1 suppository (10 mg total) into the rectum daily   clonazePAM (KlonoPIN) 0 5 mg tablet  Outside Facility (Specify) No No   Si TAB BY MOUTH TWICE DAILY FOR ANXIETY **HAZARDOUS DRUG**SWALLOW WHOLE-DO NOT CHEW OR CRUSH*   famotidine (PEPCID) 20 mg tablet   No No   Sig: Take 1 tablet (20 mg total) by mouth daily for 14 days   ferrous sulfate 325 (65 Fe) mg tablet  Outside Facility (Specify) No No   Sig: Take 1 tablet (325 mg total) by mouth daily with breakfast   hydrocortisone 2 5 % cream  Outside Facility (Specify) No No   Sig: Apply topically 2 (two) times a day   lamoTRIgine (LaMICtal) 200 MG tablet  Outside Facility (Specify) No No   Sig: Take 1 tablet (200 mg total) by mouth daily before breakfast   loperamide (IMODIUM A-D) 2 MG tablet   No No   Sig: Take 1 tablet (2 mg total) by mouth 3 (three) times a day as needed for diarrhea   pantoprazole (PROTONIX) 40 mg tablet  Outside Facility (Specify) No No   Sig: Take 1 tablet (40 mg total) by mouth daily in the early morning   psyllium (METAMUCIL) 58 6 % powder   No No   Sig: One pckt daily on Tuesday and Friday   topiramate (TOPAMAX) 50 MG tablet  Outside Facility (Specify) No No   Sig: Take 1 tablet (50 mg total) by mouth 2 (two) times a day   trolamine salicylate (ASPERCREME) 10 % cream  Outside Facility (Specify) No No   Sig: Apply topically 3 (three) times a day      Facility-Administered Medications: None       Past Medical History:   Diagnosis Date    Anemia 2019    Anxiety     Bipolar 1 disorder (HCC)     Depression     DVT (deep venous thrombosis) ([facility] [address] )     Left leg    GERD (gastroesophageal reflux disease)     Hypertension     Overactive bladder        Past Surgical History:   Procedure Laterality Date    ADENOIDECTOMY      CATARACT EXTRACTION Bilateral     Right 10/2003, left 2004    CHOLECYSTECTOMY      DILATION AND CURETTAGE OF UTERUS  1985    HERNIA REPAIR  2007    Femoral and small bowel resection    HIP SURGERY      L hip    TONSILLECTOMY      As a youth    WRIST SURGERY      L wrist       Family History   Problem Relation Age of Onset    Heart disease Father      I have reviewed and agree with the history as documented  E-Cigarette/Vaping    E-Cigarette Use Never User      E-Cigarette/Vaping Substances    Nicotine No     THC No     CBD No     Flavoring No      Social History     Tobacco Use    Smoking status: Former Smoker     Packs/day: 1 00     Types: Cigarettes    Smokeless tobacco: Former User    Tobacco comment: no exposure to passive smoke   Vaping Use    Vaping Use: Never used   Substance Use Topics    Alcohol use: Not Currently     Alcohol/week: 0 0 standard drinks    Drug use: Not Currently       Review of Systems   Constitutional: Negative for chills, diaphoresis and fever  HENT: Negative for congestion  Eyes: Negative for visual disturbance  Respiratory: Negative for cough, shortness of breath, wheezing and stridor  Cardiovascular: Negative for chest pain, palpitations and leg swelling  Gastrointestinal: Positive for constipation and rectal pain  Negative for abdominal distention, abdominal pain, anal bleeding, blood in stool, diarrhea, nausea and vomiting  Rectal prolapse   Genitourinary: Negative for difficulty urinating, dysuria and hematuria  Musculoskeletal: Negative for neck pain  Skin: Negative for color change, pallor and rash  Neurological: Negative for dizziness, weakness, light-headedness, numbness and headaches  All other systems reviewed and are negative  Physical Exam  Physical Exam  Vitals and nursing note reviewed  Exam conducted with a chaperone present (JAIDA Granados)  Constitutional:       General: She is awake  She is not in acute distress  Appearance: She is well-developed  She is not ill-appearing, toxic-appearing or diaphoretic  HENT:      Head: Normocephalic and atraumatic        Right Ear: External ear normal       Left Ear: External ear normal       Nose: Nose normal    Eyes:      Conjunctiva/sclera: Conjunctivae normal       Pupils: Pupils are equal, round, and reactive to light  Cardiovascular:      Rate and Rhythm: Normal rate and regular rhythm  Heart sounds: Normal heart sounds  Pulmonary:      Effort: Pulmonary effort is normal  No respiratory distress  Breath sounds: Normal breath sounds  No stridor  No decreased breath sounds or wheezing  Abdominal:      General: Bowel sounds are normal  There is no distension  Palpations: Abdomen is soft  Tenderness: There is no abdominal tenderness  Genitourinary:     Rectum: Mass (rectal prolapse), tenderness and external hemorrhoid present  No anal fissure  Comments: 2 inches of rectal prolapse noted on exam  Prolapse was corrected with gentle pressure and resolution of prolapse and pain  Musculoskeletal:         General: Normal range of motion  Cervical back: Normal range of motion and neck supple  Comments: Moving all 4 extremities freely   Skin:     General: Skin is warm and dry  Capillary Refill: Capillary refill takes less than 2 seconds  Neurological:      General: No focal deficit present  Mental Status: She is alert and oriented to person, place, and time  GCS: GCS eye subscore is 4  GCS verbal subscore is 5  GCS motor subscore is 6  Psychiatric:         Behavior: Behavior is cooperative           Vital Signs  ED Triage Vitals [04/02/22 1138]   Temperature Pulse Respirations Blood Pressure SpO2   97 7 °F (36 5 °C) 85 14 (!) 191/85 99 %      Temp Source Heart Rate Source Patient Position - Orthostatic VS BP Location FiO2 (%)   Oral Monitor Lying Right arm --      Pain Score       7           Vitals:    04/02/22 1138   BP: (!) 191/85   Pulse: 85   Patient Position - Orthostatic VS: Lying         Visual Acuity      ED Medications  Medications - No data to display    Diagnostic Studies  Results Reviewed     None                 No orders to display              Procedures  Procedures         ED Course                               SBIRT 22yo+      Most Recent Value   SBIRT (24 yo +)    In order to provide better care to our patients, we are screening all of our patients for alcohol and drug use  Would it be okay to ask you these screening questions? No Filed at: 04/02/2022 1139                    [facility]  Number of Diagnoses or Management Options  Constipation, unspecified constipation type  Rectal prolapse  Diagnosis management comments: Rectal prolapse was noted on initial exam   Successful reduction of prolapse with gentle pressure and resolution of pain  Extensive discussion with patient and daughter at bedside regarding bowel regimen, appropriate precautions and education  Patient instructed to follow-up with colorectal specialist for further evaluation and monitoring of symptoms  Recommended follow-up with PCP for monitoring of symptoms  The management plan was discussed in detail with the patient at bedside and all questions were answered  Provided both verbal and written instructions  Reviewed red flag symptoms and strict return to ED instructions  Patient notes understanding and agrees to plan  Disposition  Final diagnoses:   Rectal prolapse - resolved   Constipation, unspecified constipation type     Time reflects when diagnosis was documented in both MDM as applicable and the Disposition within this note     Time User Action Codes Description Comment    4/2/2022 12:12 PM Ever Machuca Add [K62 3] Rectal prolapse     4/2/2022 12:12 PM Ever Machuca Modify [K62 3] Rectal prolapse resolved    4/2/2022 12:45 PM Juju Alvarez Add [K59 00] Constipation, unspecified constipation type       ED Disposition     ED Disposition Condition Date/Time Comment    Discharge Stable Sat Apr 2, 2022 12:44 PM [address] discharge to home/self care              Follow-up Information     Follow up With Specialties Details Why Contact Info Additional [address] [facility] Emergency Medicine  If symptoms worsen [facility] [id number] [address] [facility], [address]  , [place], [address]    Reed Calle MD Colon and Rectal Surgery Schedule an appointment as soon as possible for a visit   [address] Jenn Bonner MD Family Medicine  As needed [address]  [phone]             Discharge Medication List as of 4/2/2022 12:45 PM      START taking these medications    Details   docusate sodium (COLACE) 100 mg capsule Take 1 capsule (100 mg total) by mouth every 12 (twelve) hours, Starting Sat 4/2/2022, Print      polyethylene glycol (MIRALAX) 17 g packet Take 17 g by mouth daily, Starting Sat 4/2/2022, Print         CONTINUE these medications which have NOT CHANGED    Details   acetaminophen (TYLENOL) 325 mg tablet Take 2 tablets (650 mg total) by mouth 3 (three) times a day, Starting Fri 11/13/2020, Normal      bisacodyl (DULCOLAX) 10 mg suppository Insert 1 suppository (10 mg total) into the rectum daily, Starting Thu 5/13/2021, Print      clonazePAM (KlonoPIN) 0 5 mg tablet 1 TAB BY MOUTH TWICE DAILY FOR ANXIETY **HAZARDOUS DRUG**SWALLOW WHOLE-DO NOT CHEW OR CRUSH*, Normal      famotidine (PEPCID) 20 mg tablet Take 1 tablet (20 mg total) by mouth daily for 14 days, Starting Sun 1/10/2021, Until Sun 1/24/2021, No Print      ferrous sulfate 325 (65 Fe) mg tablet Take 1 tablet (325 mg total) by mouth daily with breakfast, Starting Sun 1/10/2021, No Print      hydrocortisone 2 5 % cream Apply topically 2 (two) times a day, Starting Thu 7/22/2021, Normal      lamoTRIgine (LaMICtal) 200 MG tablet Take 1 tablet (200 mg total) by mouth daily before breakfast, Starting Fri 12/6/2019, Print      loperamide (IMODIUM A-D) 2 MG tablet Take 1 tablet (2 mg total) by mouth 3 (three) times a day as needed for diarrhea, Starting Fri 2/25/2022, Normal      pantoprazole (PROTONIX) 40 mg tablet Take 1 tablet (40 mg total) by mouth daily in the early morning, Starting Tue 5/28/2019, Normal      psyllium (METAMUCIL) 58 6 % powder One pckt daily on Tuesday and Friday, No Print      QUEtiapine (SEROquel) 200 mg tablet Take 2 tablets (400 mg total) by mouth daily at bedtime, Starting Mon 8/13/2018, Print      Sennosides-Docusate Sodium (SENNA-PLUS PO) Take 8 6 mg by mouth 2 (two) times a day as needed, Historical Med      topiramate (TOPAMAX) 50 MG tablet Take 1 tablet (50 mg total) by mouth 2 (two) times a day, Starting Mon 8/13/2018, Print      trolamine salicylate (ASPERCREME) 10 % cream Apply topically 3 (three) times a day, Starting Tue 7/27/2021, Normal             No discharge procedures on file      PDMP Review       Value Time User    PDMP Reviewed  Yes 2/17/2021  5:17 PM Waylon Griffin PA-C          ED Provider  Electronically Signed by           Derek Steele PA-C  04/03/22 9589

## 2022-04-02 NOTE — DISCHARGE INSTRUCTIONS
Continue all previously prescribed medications  Change MiraLax to daily  Add Colace 2 times a day  Continue this regimen until loose stools, then can return to original stool regimen  Follow-up with colorectal specialist for further evaluation of symptoms  Follow-up with PCP as needed for monitoring of symptoms  Return to ED if symptoms worsen including abdominal pain, inability tolerate food or fluid, blood in stool, repeat prolapse

## 2022-04-04 ENCOUNTER — DOCUMENTATION (OUTPATIENT)
Dept: FAMILY MEDICINE CLINIC | Facility: CLINIC | Age: 87
End: 2022-04-04

## 2022-04-04 ENCOUNTER — TELEPHONE (OUTPATIENT)
Dept: FAMILY MEDICINE CLINIC | Facility: CLINIC | Age: 87
End: 2022-04-04

## 2022-04-04 NOTE — TELEPHONE ENCOUNTER
Patient called asking if she can hold on colace and miralax - patient was seen in ER 4/2/22  For constipation  And was given colace and miralax - patient feel so weak right not, feeling of passing out due to diarrhea  Keeping herself  well hydrated   Please advise

## 2022-04-05 ENCOUNTER — TELEPHONE (OUTPATIENT)
Dept: FAMILY MEDICINE CLINIC | Facility: CLINIC | Age: 87
End: 2022-04-05

## 2022-04-05 NOTE — TELEPHONE ENCOUNTER
CC:  Krissy Clark is here today for Pre-Op Exam     Medications: medications verified, no change  Refills needed today? NO  denies Latex allergy or sensitivity  Patient would like communication of their results via:      Home Phone: [phone] (home)  Okay to leave a message containing results? Yes    Cell Phone:   Telephone Information:   Mobile [phone] to leave a message containing results? Yes  Tobacco history: verified  Patient's current [facility] status: Active. Pharmacy Verified? Yes    There are no preventive care reminders to display for this patient. Patient is up to date, no discussion needed.       Unaddressed Risk Adjusted [address] Categories and Diagnoses    [address] 96 - Heart Arrhythmias   Unaddressed Dx:Paroxysmal Supraventricular Tachycardia (Cms/Hcc)  [address] 108 - Vascular Disease   Unaddressed Dx:Carotid Artery Disease (Cms/Hcc) DME form, office notes, immunization record faxed to [facility] at

## 2022-05-02 ENCOUNTER — TRANSITIONAL CARE MANAGEMENT (OUTPATIENT)
Dept: FAMILY MEDICINE CLINIC | Facility: CLINIC | Age: 87
End: 2022-05-02

## 2022-05-06 ENCOUNTER — OFFICE VISIT (OUTPATIENT)
Dept: FAMILY MEDICINE CLINIC | Facility: CLINIC | Age: 87
End: 2022-05-06
Payer: COMMERCIAL

## 2022-05-06 VITALS
WEIGHT: 115 LBS | SYSTOLIC BLOOD PRESSURE: 140 MMHG | OXYGEN SATURATION: 98 % | BODY MASS INDEX: 21.71 KG/M2 | HEIGHT: 61 IN | TEMPERATURE: 97.3 F | HEART RATE: 82 BPM | DIASTOLIC BLOOD PRESSURE: 84 MMHG

## 2022-05-06 DIAGNOSIS — A08.11 NOROVIRUS: ICD-10-CM

## 2022-05-06 DIAGNOSIS — K62.2: ICD-10-CM

## 2022-05-06 DIAGNOSIS — L30.9 DERMATITIS: Primary | ICD-10-CM

## 2022-05-06 PROBLEM — N39.0 SEPSIS DUE TO URINARY TRACT INFECTION (HCC): Status: RESOLVED | Noted: 2021-02-07 | Resolved: 2022-05-06

## 2022-05-06 PROBLEM — A41.9 SEPSIS DUE TO URINARY TRACT INFECTION (HCC): Status: RESOLVED | Noted: 2021-02-07 | Resolved: 2022-05-06

## 2022-05-06 PROCEDURE — 99495 TRANSJ CARE MGMT MOD F2F 14D: CPT | Performed by: FAMILY MEDICINE

## 2022-05-06 RX ORDER — LORATADINE 10 MG/1
10 TABLET ORAL DAILY
Qty: 90 TABLET | Refills: 1 | Status: SHIPPED | OUTPATIENT
Start: 2022-05-06

## 2022-05-06 RX ORDER — TRIAMCINOLONE ACETONIDE 1 MG/G
CREAM TOPICAL 2 TIMES DAILY
Qty: 30 G | Refills: 0 | Status: SHIPPED | OUTPATIENT
Start: 2022-05-06

## 2022-05-06 NOTE — ASSESSMENT & PLAN NOTE
· S/p repair and proctoplasty 4/9/22  · Developed post operative urinary retention and illius which has resolved  · Patient is urinating normally  · Has since moved bowels from discharge but is slightly constipated now   Has not moved bowels in 2 days  · Continue senna  · Encouraged hydration  · Note for nursing home to call if patient does not move bowels in the next 3 days

## 2022-05-06 NOTE — ASSESSMENT & PLAN NOTE
New  · Unclear cause  · Erythematous papules on upper back, chest, and upper thighs  · No new medications, detergents, soaps, or lotions  · Start topical kenalog cream and loratadine  · Recommend patients nursing home check bed for bed bugs  · RTC in 2 weeks if rash has not resolved

## 2022-05-06 NOTE — PROGRESS NOTES
TCM Call (since 4/5/2022)     Date and time call was made  5/2/2022  8:55 AM    Patient was hospitialized at  [facility]        Date of Admission  04/05/22    Date of discharge  04/15/22    Disposition  Home    Current Symptoms  Rash      TCM Call (since 4/5/2022)     Rash location  Leg; Neck    Laterality  Bilateral    Did you obtain your prescribed medications  Yes    Do you need help managing your prescriptions or medications  Yes    Is transportation to your appointment needed  No    I have advised the patient to call PCP with any new or worsening symptoms  jyotsna jernigan  [facility]         Assessment/Plan:      1  Dermatitis  Assessment & Plan:  New  Unclear cause  Erythematous papules on upper back, chest, and upper thighs  No new medications, detergents, soaps, or lotions  Start topical kenalog cream and loratadine  Recommend patients nursing home check bed for bed bugs  RTC in 2 weeks if rash has not resolved    Orders:  -     loratadine (CLARITIN) 10 mg tablet; Take 1 tablet (10 mg total) by mouth daily  -     triamcinolone (KENALOG) 0 1 % cream; Apply topically 2 (two) times a day    2  Norovirus  Assessment & Plan:  Resolved  No longer having diarrhea        3  Anal canal prolapse  Assessment & Plan:  S/p repair and proctoplasty 4/9/22  Developed post operative urinary retention and illius which has resolved  Patient is urinating normally  Has since moved bowels from discharge but is slightly constipated now  Has not moved bowels in 2 days  Continue senna  Encouraged hydration  Note for nursing home to call if patient does not move bowels in the next 3 days              Subjective:      Patient ID: Alfie Carter is a 80 y o  female presents today for TCM  Her main complaint is rash on her chest, back and thighs  This is a new rash that started when she got home    She was discharged on senna  She has not moved her bowels 2 days ago  Denies nausea, or vomiting   She is urinating normally now  She has occasional stomach pain  The following portions of the patient's history were reviewed and updated as appropriate: allergies, current medications, past family history, past medical history, past social history, past surgical history and problem list     Review of Systems   Constitutional: Positive for fatigue  Negative for activity change, chills, diaphoresis and fever  HENT: Negative for ear pain, hearing loss, postnasal drip, rhinorrhea, sinus pressure, sinus pain, sneezing and sore throat  Respiratory: Negative for cough, chest tightness, shortness of breath and wheezing  Cardiovascular: Negative for chest pain, palpitations and leg swelling  Gastrointestinal: Positive for constipation  Negative for abdominal pain, blood in stool, diarrhea, nausea and vomiting  Genitourinary: Negative for dysuria, frequency, hematuria and urgency  Musculoskeletal: Negative for arthralgias and myalgias  Skin: Positive for rash  Neurological: Negative for dizziness, syncope, weakness, light-headedness, numbness and headaches  Objective:      /84 (BP Location: Right arm, Patient Position: Sitting, Cuff Size: Adult)   Pulse 82   Temp (!) 97 3 °F (36 3 °C) (Temporal)   Ht 5' 1" (1 549 m)   Wt 52 2 kg (115 lb)   SpO2 98%   BMI 21 73 kg/m²          Physical Exam  Vitals reviewed  Constitutional:       General: She is not in acute distress  Appearance: She is well-developed  She is not diaphoretic  HENT:      Head: Normocephalic and atraumatic  Right Ear: External ear normal       Left Ear: External ear normal       Nose: Nose normal  No congestion  Mouth/Throat:      Mouth: Mucous membranes are moist       Pharynx: Oropharynx is clear  No oropharyngeal exudate  Eyes:      General: No scleral icterus  Right eye: No discharge  Left eye: No discharge        Conjunctiva/sclera: Conjunctivae normal       Pupils: Pupils are equal, round, and reactive to light    Neck:      Thyroid: No thyromegaly  Vascular: No JVD  Trachea: No tracheal deviation  Cardiovascular:      Rate and Rhythm: Normal rate and regular rhythm  Heart sounds: Normal heart sounds  No murmur heard  No friction rub  No gallop  Pulmonary:      Effort: Pulmonary effort is normal  No respiratory distress  Breath sounds: Normal breath sounds  No wheezing or rales  Chest:      Chest wall: No tenderness  Abdominal:      General: Bowel sounds are normal  There is no distension  Palpations: Abdomen is soft  Tenderness: There is abdominal tenderness (mild tenderness to lower abdomen)  There is no right CVA tenderness, left CVA tenderness, guarding or rebound  Musculoskeletal:         General: Normal range of motion  Cervical back: Normal range of motion and neck supple  No tenderness  Right lower leg: No edema  Left lower leg: No edema  Lymphadenopathy:      Cervical: No cervical adenopathy  Skin:     General: Skin is warm and dry  Findings: Rash present  Comments: Erythematous papules on chest, back, and thighs   Neurological:      Mental Status: She is alert and oriented to person, place, and time  Mental status is at baseline  Psychiatric:         Mood and Affect: Mood normal          Behavior: Behavior normal          Thought Content:  Thought content normal          Judgment: Judgment normal

## 2022-05-11 DIAGNOSIS — F41.1 ANXIETY STATE: ICD-10-CM

## 2022-05-11 RX ORDER — CLONAZEPAM 0.5 MG/1
TABLET ORAL
Qty: 60 TABLET | Refills: 5 | Status: SHIPPED | OUTPATIENT
Start: 2022-05-11

## 2022-05-12 ENCOUNTER — RA CDI HCC (OUTPATIENT)
Dept: OTHER | Facility: HOSPITAL | Age: 87
End: 2022-05-12

## 2022-05-12 NOTE — PROGRESS NOTES
[facility]  coding opportunities          Chart Reviewed number of suggestions sent to Provider: 1   [phone]    Patients Insurance     Medicare Insurance: [employer]

## 2022-05-19 ENCOUNTER — TELEMEDICINE (OUTPATIENT)
Dept: FAMILY MEDICINE CLINIC | Facility: CLINIC | Age: 87
End: 2022-05-19
Payer: COMMERCIAL

## 2022-05-19 DIAGNOSIS — K21.9 GASTROESOPHAGEAL REFLUX DISEASE, UNSPECIFIED WHETHER ESOPHAGITIS PRESENT: Primary | ICD-10-CM

## 2022-05-19 DIAGNOSIS — R31.29 MICROSCOPIC HEMATURIA: Chronic | ICD-10-CM

## 2022-05-19 DIAGNOSIS — F33.2 SEVERE EPISODE OF RECURRENT MAJOR DEPRESSIVE DISORDER, WITHOUT PSYCHOTIC FEATURES (HCC): ICD-10-CM

## 2022-05-19 DIAGNOSIS — I10 ESSENTIAL HYPERTENSION: Chronic | ICD-10-CM

## 2022-05-19 PROCEDURE — 99214 OFFICE O/P EST MOD 30 MIN: CPT | Performed by: FAMILY MEDICINE

## 2022-05-19 PROCEDURE — 1160F RVW MEDS BY RX/DR IN RCRD: CPT | Performed by: FAMILY MEDICINE

## 2022-05-19 PROCEDURE — 1036F TOBACCO NON-USER: CPT | Performed by: FAMILY MEDICINE

## 2022-05-19 PROCEDURE — G0439 PPPS, SUBSEQ VISIT: HCPCS | Performed by: FAMILY MEDICINE

## 2022-05-19 NOTE — PROGRESS NOTES
Assessment/Plan:    No problem-specific Assessment & Plan notes found for this encounter  Diagnoses and all orders for this visit:    Gastroesophageal reflux disease, unspecified whether esophagitis present    Essential hypertension    Severe episode of recurrent major depressive disorder, without psychotic features ([facility] )    Microscopic hematuria          Subjective:      Patient ID: Ciro Grover is a 80 y o  female  F/U rash : improving     AWV/ sub        S/p Rectal prolapse repair  Doing OK from that standpoint  Had some perioperative anemia : will sofia           The following portions of the patient's history were reviewed and updated as appropriate: allergies, current medications, past family history, past medical history, past social history, past surgical history and problem list     Review of Systems   Constitutional: Negative for activity change and appetite change  HENT: Negative for voice change  Eyes: Negative for visual disturbance  Respiratory: Negative for chest tightness and shortness of breath  Cardiovascular: Negative for chest pain, palpitations and leg swelling  Gastrointestinal: Negative for abdominal pain, blood in stool, constipation and diarrhea  Genitourinary: Negative for dysuria, vaginal bleeding and vaginal discharge  Skin: Negative for rash  Neurological: Negative for dizziness  Psychiatric/Behavioral: Negative for dysphoric mood  Objective: There were no vitals filed for this visit  Physical Exam  Constitutional:       Appearance: She is well-developed  HENT:      Head: Normocephalic and atraumatic  Eyes:      Conjunctiva/sclera: Conjunctivae normal    Pulmonary:      Effort: Pulmonary effort is normal  No respiratory distress  Neurological:      Mental Status: She is alert and oriented to person, place, and time  Psychiatric:         Behavior: Behavior normal          Thought Content:  Thought content normal          Judgment: Judgment normal           Assessment and Plan:     Problem List Items Addressed This Visit        Digestive    Gastroesophageal reflux disease - Primary       Cardiovascular and Mediastinum    Essential hypertension (Chronic)       Genitourinary    Microscopic hematuria (Chronic)       Other    Depression           Preventive health issues were discussed with patient, and age appropriate screening tests were ordered as noted in patient's After Visit Summary  Personalized health advice and appropriate referrals for health education or preventive services given if needed, as noted in patient's After Visit Summary       History of Present Illness:     Patient presents for Medicare Annual Wellness visit    Patient Care Team:  Kieran Mcmahon MD as PCP - General (Family Medicine)  Kieran Mcmahon MD as PCP - PCP-[facility] Attributed-Roster     Problem List:     Patient Active Problem List   Diagnosis    Depression    Gastroesophageal reflux disease    Essential hypertension    Sciatica    Bipolar 1 disorder ([address] )    Lower abdominal pain    Vitamin B12 deficiency    Physical deconditioning    Chest pain in adult    Headache    Anxiety state    Acute on chronic cholecystitis    Disorder of gallbladder    Diverticulosis of colon    Umbilical hernia    Prophylactic antibiotic    OAB (overactive bladder)    Constipation    Acute respiratory failure due to COVID-19 ([address] )    Acute metabolic encephalopathy    Anemia, macrocytic    Hypernatremia    Pneumonia due to COVID-19 virus    Urinary retention    Ambulatory dysfunction    Oropharyngeal dysphagia    Subdural hematoma ([address] )    Fall    Microscopic hematuria    Chronic deep vein thrombosis (DVT) of proximal vein of left lower extremity ([address] )    Melena    Dermatitis    Anal canal prolapse    Norovirus      Past Medical and Surgical History:     Past Medical History:   Diagnosis Date    Anemia 2/26/2019    Anxiety     Bipolar 1 disorder ([facility] )     Depression     DVT (deep venous thrombosis) ([facility] ) 2008    Left leg    GERD (gastroesophageal reflux disease)     Hypertension     Overactive bladder      Past Surgical History:   Procedure Laterality Date    ADENOIDECTOMY      CATARACT EXTRACTION Bilateral     Right 10/2003, left 4/2004    CHOLECYSTECTOMY      DILATION AND CURETTAGE OF UTERUS  1985    HERNIA REPAIR  11/02/2007    Femoral and small bowel resection    HIP SURGERY      L hip    TONSILLECTOMY      As a youth    WRIST SURGERY      L wrist      Family History:     Family History   Problem Relation Age of Onset    Heart disease Father       Social History:     Social History     Socioeconomic History    Marital status: /Civil Union     Spouse name: None    Number of children: None    Years of education: None    Highest education level: None   Occupational History    None   Tobacco Use    Smoking status: Former Smoker     Packs/day: 1 00     Types: Cigarettes    Smokeless tobacco: Former User    Tobacco comment: no exposure to passive smoke   Vaping Use    Vaping Use: Never used   Substance and Sexual Activity    Alcohol use: Not Currently     Alcohol/week: 0 0 standard drinks    Drug use: Not Currently    Sexual activity: Not Currently   Other Topics Concern    None   Social History Narrative    None     Social Determinants of Health     Financial Resource Strain: Not on file   Food Insecurity: Not on file   Transportation Needs: Not on file   Physical Activity: Not on file   Stress: Not on file   Social Connections: Not on file   Intimate Partner Violence: Not on file   Housing Stability: Not on file      Medications and Allergies:     Current Outpatient Medications   Medication Sig Dispense Refill    acetaminophen (TYLENOL) 325 mg tablet Take 2 tablets (650 mg total) by mouth 3 (three) times a day 100 tablet 6    bisacodyl (DULCOLAX) 10 mg suppository Insert 1 suppository (10 mg total) into the rectum daily 12 suppository 6    clonazePAM (KlonoPIN) 0 5 mg tablet One bid as directed 60 tablet 5    docusate sodium (COLACE) 100 mg capsule Take 1 capsule (100 mg total) by mouth every 12 (twelve) hours 60 capsule 0    ferrous sulfate 325 (65 Fe) mg tablet Take 1 tablet (325 mg total) by mouth daily with breakfast 30 tablet 0    lamoTRIgine (LaMICtal) 200 MG tablet Take 1 tablet (200 mg total) by mouth daily before breakfast 30 tablet 12    loperamide (IMODIUM A-D) 2 MG tablet Take 1 tablet (2 mg total) by mouth 3 (three) times a day as needed for diarrhea 30 tablet 0    loratadine (CLARITIN) 10 mg tablet Take 1 tablet (10 mg total) by mouth daily 90 tablet 1    pantoprazole (PROTONIX) 40 mg tablet Take 1 tablet (40 mg total) by mouth daily in the early morning 90 tablet 3    polyethylene glycol (MIRALAX) 17 g packet Take 17 g by mouth daily 20 each 0    psyllium (METAMUCIL) 58 6 % powder One pckt daily on Tuesday and Friday 660 g 12    QUEtiapine (SEROquel) 200 mg tablet Take 2 tablets (400 mg total) by mouth daily at bedtime 60 tablet 0    Sennosides-Docusate Sodium (SENNA-PLUS PO) Take 8 6 mg by mouth 2 (two) times a day as needed      topiramate (TOPAMAX) 50 MG tablet Take 1 tablet (50 mg total) by mouth 2 (two) times a day 60 tablet 0    triamcinolone (KENALOG) 0 1 % cream Apply topically 2 (two) times a day 30 g 0    trolamine salicylate (ASPERCREME) 10 % cream Apply topically 3 (three) times a day 85 g 8    famotidine (PEPCID) 20 mg tablet Take 1 tablet (20 mg total) by mouth daily for 14 days 14 tablet 0     No current facility-administered medications for this visit       Allergies   Allergen Reactions    Ethanol     Simvastatin       Immunizations:     Immunization History   Administered Date(s) Administered    COVID-19 PFIZER VACCINE 0 3 ML IM 04/12/2021, 05/12/2021, 10/20/2021    INFLUENZA 10/20/2021    Pneumococcal Conjugate 13-Valent 04/17/2015    Pneumococcal Polysaccharide PPV23 03/06/2000    Td (adult), Unspecified 12/14/2011    Td (adult), adsorbed 12/14/2011    Tuberculin Skin Test-PPD Intradermal 04/05/2019    Typhoid, Unspecified 01/01/1999    influenza, injectable, quadrivalent 11/01/2019      Health Maintenance: There are no preventive care reminders to display for this patient  Topic Date Due    DTaP,Tdap,and Td Vaccines (1 - Tdap) 12/14/2011    COVID-19 Vaccine (4 - Booster for Pfizer series) 02/20/2022      Medicare Health Risk Assessment:     There were no vitals taken for this visit  Aleta Shin is here for her Subsequent Wellness visit  Last Medicare Wellness visit information reviewed, patient interviewed, no change since last AWV       Previous Hospitalizations:   Any hospitalizations or ED visits within the last 12 months?: No      Advance Care Planning:   Living will: No    Durable POA for healthcare: No    Advanced directive: No      Comments: reviewed    PREVENTIVE SCREENINGS      Cardiovascular Screening:    General: Screening Not Indicated      Diabetes Screening:     General: Screening Not Indicated      Colorectal Cancer Screening:     General: Screening Not Indicated      Breast Cancer Screening:     General: Screening Not Indicated      Cervical Cancer Screening:    General: Screening Not Indicated      Osteoporosis Screening:    General: Screening Not Indicated      Abdominal Aortic Aneurysm (AAA) Screening:        General: Screening Not Indicated      Lung Cancer Screening:     General: Screening Not Indicated      Hepatitis C Screening:    General: Screening Not Indicated      Emma Rayo MD

## 2022-05-19 NOTE — PATIENT INSTRUCTIONS
Medicare Preventive Visit Patient Instructions  Thank you for completing your Welcome to Medicare Visit or Medicare Annual Wellness Visit today  Your next wellness visit will be due in one year (5/20/2023)  The screening/preventive services that you may require over the next 5-10 years are detailed below  Some tests may not apply to you based off risk factors and/or age  Screening tests ordered at today's visit but not completed yet may show as past due  Also, please note that scanned in results may not display below  Preventive Screenings:  Service Recommendations Previous Testing/Comments   Colorectal Cancer Screening  * Colonoscopy    * Fecal Occult Blood Test (FOBT)/Fecal Immunochemical Test (FIT)  * Fecal DNA/Cologuard Test  * Flexible Sigmoidoscopy Age: 54-65 years old   Colonoscopy: every 10 years (may be performed more frequently if at higher risk)  OR  FOBT/FIT: every 1 year  OR  Cologuard: every 3 years  OR  Sigmoidoscopy: every 5 years  Screening may be recommended earlier than age 48 if at higher risk for colorectal cancer  Also, an individualized decision between you and your healthcare provider will decide whether screening between the ages of 74-80 would be appropriate  Colonoscopy: Not on file  FOBT/FIT: Not on file  Cologuard: Not on file  Sigmoidoscopy: Not on file          Breast Cancer Screening Age: 36 years old  Frequency: every 1-2 years  Not required if history of left and right mastectomy Mammogram: Not on file        Cervical Cancer Screening Between the ages of 21-29, pap smear recommended once every 3 years  Between the ages of 33-67, can perform pap smear with HPV co-testing every 5 years     Recommendations may differ for women with a history of total hysterectomy, cervical cancer, or abnormal pap smears in past  Pap Smear: Not on file        Hepatitis C Screening Once for adults born between [facility]  More frequently in patients at high risk for Hepatitis C Hep C Antibody: Not on file        Diabetes Screening 1-2 times per year if you're at risk for diabetes or have pre-diabetes Fasting glucose: 82 mg/dL   A1C: No results in last 5 years        Cholesterol Screening Once every 5 years if you don't have a lipid disorder  May order more often based on risk factors  Lipid panel: Not on file          Other Preventive Screenings Covered by Medicare:  1  Abdominal Aortic Aneurysm (AAA) Screening: covered once if your at risk  You're considered to be at risk if you have a family history of AAA  2  Lung Cancer Screening: covers low dose CT scan once per year if you meet all of the following conditions: (1) Age 50-69; (2) No signs or symptoms of lung cancer; (3) Current smoker or have quit smoking within the last 15 years; (4) You have a tobacco smoking history of at least 30 pack years (packs per day multiplied by number of years you smoked); (5) You get a written order from a healthcare provider  3  Glaucoma Screening: covered annually if you're considered high risk: (1) You have diabetes OR (2) Family history of glaucoma OR (3)  aged 48 and older OR (3)  American aged 72 and older  3  Osteoporosis Screening: covered every 2 years if you meet one of the following conditions: (1) You're estrogen deficient and at risk for osteoporosis based off medical history and other findings; (2) Have a vertebral abnormality; (3) On glucocorticoid therapy for more than 3 months; (4) Have primary hyperparathyroidism; (5) On osteoporosis medications and need to assess response to drug therapy  · Last bone density test (DXA Scan): Not on file  5  HIV Screening: covered annually if you're between the age of 12-76  Also covered annually if you are younger than 13 and older than 72 with risk factors for HIV infection  For pregnant patients, it is covered up to 3 times per pregnancy      Immunizations:  Immunization Recommendations   Influenza Vaccine Annual influenza vaccination during flu season is recommended for all persons aged >= 6 months who do not have contraindications   Pneumococcal Vaccine (Prevnar and Pneumovax)  * Prevnar = PCV13  * Pneumovax = PPSV23   Adults 25-60 years old: 1-3 doses may be recommended based on certain risk factors  Adults 72 years old: Prevnar (PCV13) vaccine recommended followed by Pneumovax (PPSV23) vaccine  If already received PPSV23 since turning 65, then PCV13 recommended at least one year after PPSV23 dose  Hepatitis B Vaccine 3 dose series if at intermediate or high risk (ex: diabetes, end stage renal disease, liver disease)   Tetanus (Td) Vaccine - COST NOT COVERED BY MEDICARE PART B Following completion of primary series, a booster dose should be given every 10 years to maintain immunity against tetanus  Td may also be given as tetanus wound prophylaxis  Tdap Vaccine - COST NOT COVERED BY MEDICARE PART B Recommended at least once for all adults  For pregnant patients, recommended with each pregnancy  Shingles Vaccine (Shingrix) - COST NOT COVERED BY MEDICARE PART B  2 shot series recommended in those aged 48 and above     Health Maintenance Due:  There are no preventive care reminders to display for this patient  Immunizations Due:      Topic Date Due    DTaP,Tdap,and Td Vaccines (1 - Tdap) 12/14/2011    COVID-19 Vaccine (4 - Booster for Pfizer series) 02/20/2022     Advance Directives   What are advance directives? Advance directives are legal documents that state your wishes and plans for medical care  These plans are made ahead of time in case you lose your ability to make decisions for yourself  Advance directives can apply to any medical decision, such as the treatments you want, and if you want to donate organs  What are the types of advance directives? There are many types of advance directives, and each state has rules about how to use them  You may choose a combination of any of the following:  · Living will:   This is a written record of the treatment you want  You can also choose which treatments you do not want, which to limit, and which to stop at a certain time  This includes surgery, medicine, IV fluid, and tube feedings  · Durable power of  for healthcare [place] [facility]): This is a written record that states who you want to make healthcare choices for you when you are unable to make them for yourself  This person, called a proxy, is usually a family member or a friend  You may choose more than 1 proxy  · Do not resuscitate (DNR) order:  A DNR order is used in case your heart stops beating or you stop breathing  It is a request not to have certain forms of treatment, such as CPR  A DNR order may be included in other types of advance directives  · Medical directive: This covers the care that you want if you are in a coma, near death, or unable to make decisions for yourself  You can list the treatments you want for each condition  Treatment may include pain medicine, surgery, blood transfusions, dialysis, IV or tube feedings, and a ventilator (breathing machine)  · Values history: This document has questions about your views, beliefs, and how you feel and think about life  This information can help others choose the care that you would choose  Why are advance directives important? An advance directive helps you control your care  Although spoken wishes may be used, it is better to have your wishes written down  Spoken wishes can be misunderstood, or not followed  Treatments may be given even if you do not want them  An advance directive may make it easier for your family to make difficult choices about your care  © Copyright [employer] 2018 Information is for End User's use only and may not be sold, redistributed or otherwise used for commercial purposes   All illustrations and images included in CareNotes® are the copyrighted property of [employer]  or [facility] [employer]

## 2022-07-08 ENCOUNTER — OFFICE VISIT (OUTPATIENT)
Dept: FAMILY MEDICINE CLINIC | Facility: CLINIC | Age: 87
End: 2022-07-08
Payer: COMMERCIAL

## 2022-07-08 VITALS
RESPIRATION RATE: 18 BRPM | SYSTOLIC BLOOD PRESSURE: 128 MMHG | HEIGHT: 61 IN | BODY MASS INDEX: 21.83 KG/M2 | DIASTOLIC BLOOD PRESSURE: 82 MMHG | OXYGEN SATURATION: 99 % | WEIGHT: 115.6 LBS | HEART RATE: 103 BPM | TEMPERATURE: 97.9 F

## 2022-07-08 DIAGNOSIS — R35.0 URINARY FREQUENCY: Primary | ICD-10-CM

## 2022-07-08 DIAGNOSIS — M25.562 CHRONIC PAIN OF LEFT KNEE: ICD-10-CM

## 2022-07-08 DIAGNOSIS — M25.552 HIP PAIN, ACUTE, LEFT: ICD-10-CM

## 2022-07-08 DIAGNOSIS — G89.29 CHRONIC PAIN OF LEFT KNEE: ICD-10-CM

## 2022-07-08 DIAGNOSIS — K59.00 CONSTIPATION, UNSPECIFIED CONSTIPATION TYPE: ICD-10-CM

## 2022-07-08 PROBLEM — J12.82 PNEUMONIA DUE TO COVID-19 VIRUS: Status: RESOLVED | Noted: 2020-12-28 | Resolved: 2022-07-08

## 2022-07-08 PROBLEM — U07.1 ACUTE RESPIRATORY FAILURE DUE TO COVID-19 (HCC): Status: RESOLVED | Noted: 2020-12-18 | Resolved: 2022-07-08

## 2022-07-08 PROBLEM — K92.1 MELENA: Status: RESOLVED | Noted: 2022-02-25 | Resolved: 2022-07-08

## 2022-07-08 PROBLEM — U07.1 PNEUMONIA DUE TO COVID-19 VIRUS: Status: RESOLVED | Noted: 2020-12-28 | Resolved: 2022-07-08

## 2022-07-08 PROBLEM — A08.11 NOROVIRUS: Status: RESOLVED | Noted: 2022-04-06 | Resolved: 2022-07-08

## 2022-07-08 PROBLEM — J96.00 ACUTE RESPIRATORY FAILURE DUE TO COVID-19 (HCC): Status: RESOLVED | Noted: 2020-12-18 | Resolved: 2022-07-08

## 2022-07-08 LAB
BACTERIA UR QL AUTO: ABNORMAL /HPF
BILIRUB UR QL STRIP: NEGATIVE
CLARITY UR: ABNORMAL
COLOR UR: YELLOW
GLUCOSE UR STRIP-MCNC: NEGATIVE MG/DL
HGB UR QL STRIP.AUTO: ABNORMAL
KETONES UR STRIP-MCNC: NEGATIVE MG/DL
LEUKOCYTE ESTERASE UR QL STRIP: ABNORMAL
MUCOUS THREADS UR QL AUTO: ABNORMAL
NITRITE UR QL STRIP: NEGATIVE
NON-SQ EPI CELLS URNS QL MICRO: ABNORMAL /HPF
PH UR STRIP.AUTO: 6 [PH]
PROT UR STRIP-MCNC: ABNORMAL MG/DL
RBC #/AREA URNS AUTO: ABNORMAL /HPF
SL AMB  POCT GLUCOSE, UA: NEGATIVE
SL AMB LEUKOCYTE ESTERASE,UA: ABNORMAL
SL AMB POCT BILIRUBIN,UA: NEGATIVE
SL AMB POCT BLOOD,UA: ABNORMAL
SL AMB POCT CLARITY,UA: ABNORMAL
SL AMB POCT COLOR,UA: ABNORMAL
SL AMB POCT KETONES,UA: NEGATIVE
SL AMB POCT NITRITE,UA: POSITIVE
SL AMB POCT PH,UA: 5
SL AMB POCT SPECIFIC GRAVITY,UA: 1.01
SL AMB POCT URINE PROTEIN: ABNORMAL
SL AMB POCT UROBILINOGEN: NEGATIVE
SP GR UR STRIP.AUTO: 1.01 (ref 1–1.03)
TRANS CELLS #/AREA URNS HPF: PRESENT /[HPF]
UROBILINOGEN UR STRIP-ACNC: <2 MG/DL
WBC #/AREA URNS AUTO: ABNORMAL /HPF
WBC CLUMPS # UR AUTO: PRESENT /UL

## 2022-07-08 PROCEDURE — 99214 OFFICE O/P EST MOD 30 MIN: CPT | Performed by: FAMILY MEDICINE

## 2022-07-08 PROCEDURE — 1160F RVW MEDS BY RX/DR IN RCRD: CPT | Performed by: FAMILY MEDICINE

## 2022-07-08 PROCEDURE — 81001 URINALYSIS AUTO W/SCOPE: CPT | Performed by: FAMILY MEDICINE

## 2022-07-08 PROCEDURE — 81002 URINALYSIS NONAUTO W/O SCOPE: CPT | Performed by: FAMILY MEDICINE

## 2022-07-08 PROCEDURE — 87086 URINE CULTURE/COLONY COUNT: CPT | Performed by: FAMILY MEDICINE

## 2022-07-08 RX ORDER — SULFAMETHOXAZOLE AND TRIMETHOPRIM 800; 160 MG/1; MG/1
1 TABLET ORAL 2 TIMES DAILY
Qty: 20 TABLET | Refills: 0 | Status: SHIPPED | OUTPATIENT
Start: 2022-07-08 | End: 2022-07-18

## 2022-07-08 RX ORDER — POLYETHYLENE GLYCOL 3350 17 G/17G
17 POWDER, FOR SOLUTION ORAL EVERY OTHER DAY
Qty: 100 EACH | Refills: 1 | Status: SHIPPED | OUTPATIENT
Start: 2022-07-08

## 2022-07-08 NOTE — ASSESSMENT & PLAN NOTE
Unclear cause  · Rule out hip fracture  · Groin pain with DIAZ and Log roll  · Follow up XR hip and XR femur of left

## 2022-07-08 NOTE — ASSESSMENT & PLAN NOTE
· Likely arthritic in nature  · Pain on palpation to medial and lateral joint line with crepitus  · Negative stephanie  · No ligamentous laxity  · Offered CSI today and patient declines  · Follow up L knee XR

## 2022-07-08 NOTE — ASSESSMENT & PLAN NOTE
· Ongoing for months  · Urine dip: positive for nitrites, leuks, blood  · Positive CVA tenderness on left  · Follow up Urine culture  · Start bactrim for 10 days  · Reviewed recent renal function which is normal  No history of renal disease

## 2022-07-08 NOTE — ASSESSMENT & PLAN NOTE
worsening  · Has not moved bowels in the past 5 days  · Current regimen : sennacot 8 6-50mg daily   · Previously on miralax but was stopped  · Had suppository last Friday and is due for another dose  · Start miralax 17g every other day

## 2022-07-08 NOTE — PROGRESS NOTES
Assessment/Plan:      1  Urinary frequency  Assessment & Plan:  Ongoing for months  Urine dip: positive for nitrites, leuks, blood  Positive CVA tenderness on left  Follow up Urine culture  Start bactrim for 10 days  Reviewed recent renal function which is normal  No history of renal disease    Orders:  -     POCT urine dip  -     UA w Reflex to Microscopic w Reflex to Culture - Clinic Collect  -     sulfamethoxazole-trimethoprim (BACTRIM DS) 800-160 mg per tablet; Take 1 tablet by mouth 2 (two) times a day for 10 days    2  Constipation, unspecified constipation type  Assessment & Plan:  worsening  Has not moved bowels in the past 5 days  Current regimen : sennacot 8 6-50mg daily   Previously on miralax but was stopped  Had suppository last Friday and is due for another dose  Start miralax 17g every other day    Orders:  -     polyethylene glycol (MIRALAX) 17 g packet; Take 17 g by mouth every other day    3  Chronic pain of left knee  Assessment & Plan:  Likely arthritic in nature  Pain on palpation to medial and lateral joint line with crepitus  Negative stephanie  No ligamentous laxity  Offered CSI today and patient declines  Follow up L knee XR    Orders:  -     XR knee 4+ vw left injury; Future; Expected date: 07/08/2022    4  Hip pain, acute, left  Assessment & Plan:  Unclear cause  Rule out hip fracture  Groin pain with DIAZ and Log roll  Follow up XR hip and XR femur of left      Orders:  -     XR hip/pelv 2-3 vws left if performed; Future; Expected date: 07/08/2022  -     XR femur 1 vw left; Future; Expected date: 07/08/2022            Subjective:      Patient ID: Surekha Nair is a 80 y o  female presents today for multiple medical concerns:     Left flank pain: ongoing for months, radiates into groin  Had a fall about 1 month ago but does not remember hitting hip   Developed the pain a few days after that  Left knee pain: ongoing for 3 weeks    Constipation: has not moved bowels in 5 days     Urinary frequency: patient has left flank pain that radiates into groin with urinary frequency, urgency, and suprapubic tenderness    HPI    The following portions of the patient's history were reviewed and updated as appropriate: allergies, current medications, past family history, past medical history, past social history, past surgical history and problem list     Review of Systems   Constitutional: Negative for activity change, chills, diaphoresis and fever  HENT: Negative for ear pain, hearing loss, postnasal drip, rhinorrhea, sinus pressure, sinus pain, sneezing and sore throat  Respiratory: Negative for cough, chest tightness, shortness of breath and wheezing  Cardiovascular: Negative for chest pain, palpitations and leg swelling  Gastrointestinal: Negative for abdominal pain, blood in stool, constipation, diarrhea, nausea and vomiting  Genitourinary: Positive for dysuria, frequency and urgency  Negative for decreased urine volume, difficulty urinating, dyspareunia, enuresis, flank pain, genital sores, hematuria, menstrual problem, pelvic pain, vaginal bleeding, vaginal discharge and vaginal pain  Musculoskeletal: Positive for arthralgias (hip pain with radiation to groin), back pain and gait problem  Negative for myalgias  Neurological: Negative for dizziness, syncope, weakness, light-headedness, numbness and headaches  Objective:      /82 (BP Location: Left arm, Patient Position: Sitting, Cuff Size: Standard)   Pulse 103   Temp 97 9 °F (36 6 °C) (Temporal)   Resp 18   Ht 5' 1" (1 549 m)   Wt 52 4 kg (115 lb 9 6 oz)   SpO2 99%   BMI 21 84 kg/m²          Physical Exam  Vitals reviewed  Constitutional:       General: She is not in acute distress  Appearance: She is well-developed  She is not diaphoretic  HENT:      Head: Normocephalic and atraumatic        Right Ear: External ear normal       Left Ear: External ear normal       Nose: Nose normal  No congestion  Mouth/Throat:      Mouth: Mucous membranes are moist       Pharynx: Oropharynx is clear  No oropharyngeal exudate  Eyes:      General: No scleral icterus  Right eye: No discharge  Left eye: No discharge  Conjunctiva/sclera: Conjunctivae normal       Pupils: Pupils are equal, round, and reactive to light  Neck:      Thyroid: No thyromegaly  Vascular: No JVD  Trachea: No tracheal deviation  Cardiovascular:      Rate and Rhythm: Normal rate and regular rhythm  Heart sounds: Normal heart sounds  No murmur heard  No friction rub  No gallop  Pulmonary:      Effort: Pulmonary effort is normal  No respiratory distress  Breath sounds: Normal breath sounds  No wheezing or rales  Chest:      Chest wall: No tenderness  Abdominal:      General: Abdomen is flat  Bowel sounds are normal  There is no distension  Palpations: Abdomen is soft  Tenderness: There is abdominal tenderness in the suprapubic area and left lower quadrant  There is left CVA tenderness  There is no right CVA tenderness, guarding or rebound  Negative signs include Chi's sign, Rovsing's sign, McBurney's sign, psoas sign and obturator sign  Musculoskeletal:         General: Normal range of motion  Cervical back: Normal range of motion and neck supple  No tenderness  Left hip: Bony tenderness present  Left knee: Crepitus present  Tenderness present over the medial joint line and lateral joint line  No LCL laxity, MCL laxity, ACL laxity or PCL laxity  Instability Tests: Medial Millie test negative and lateral Millie test negative  Right lower leg: No edema  Left lower leg: No edema  Legs:       Comments: Positive pain in groin with yudy and log roll  Antalgic gait   Lymphadenopathy:      Cervical: No cervical adenopathy  Skin:     General: Skin is warm and dry     Neurological:      Mental Status: She is alert and oriented to person, place, and time  Mental status is at baseline  Psychiatric:         Mood and Affect: Mood normal          Behavior: Behavior normal          Thought Content:  Thought content normal          Judgment: Judgment normal

## 2022-07-09 LAB — BACTERIA UR CULT: NORMAL

## 2022-07-15 ENCOUNTER — TELEPHONE (OUTPATIENT)
Dept: FAMILY MEDICINE CLINIC | Facility: CLINIC | Age: 87
End: 2022-07-15

## 2022-07-15 NOTE — TELEPHONE ENCOUNTER
Patient calling with feeling of oiliness in vaginal area  She was on an antibiotic 7/8/2022 for UTI  No itching   She will have nurses at the facility look to see if she has any discharge

## 2022-07-25 ENCOUNTER — TELEPHONE (OUTPATIENT)
Dept: FAMILY MEDICINE CLINIC | Facility: CLINIC | Age: 87
End: 2022-07-25

## 2022-07-25 NOTE — TELEPHONE ENCOUNTER
Last bowel movement 7/17/22  She had diarrhea  She has not stopped her medications to assist her in going she is concerned something else is going on      Pt would like for us to call the [facility] [phone]

## 2022-07-26 NOTE — TELEPHONE ENCOUNTER
Patient called in and stated she does not want to follow recommendations of doing the suppositories due to her active prolapse   She states she feels she is having the same issues as before and she is concerned  She has tried maalox and prune juice and still nothing  She feels the urgency to go but when she sits nothing comes out

## 2022-08-19 ENCOUNTER — OFFICE VISIT (OUTPATIENT)
Dept: FAMILY MEDICINE CLINIC | Facility: CLINIC | Age: 87
End: 2022-08-19
Payer: COMMERCIAL

## 2022-08-19 VITALS
BODY MASS INDEX: 21.69 KG/M2 | TEMPERATURE: 98.2 F | DIASTOLIC BLOOD PRESSURE: 80 MMHG | SYSTOLIC BLOOD PRESSURE: 118 MMHG | HEART RATE: 89 BPM | RESPIRATION RATE: 18 BRPM | OXYGEN SATURATION: 97 % | WEIGHT: 114.8 LBS

## 2022-08-19 DIAGNOSIS — G89.29 CHRONIC PAIN OF LEFT KNEE: ICD-10-CM

## 2022-08-19 DIAGNOSIS — K62.2: ICD-10-CM

## 2022-08-19 DIAGNOSIS — M54.32 SCIATICA OF LEFT SIDE: ICD-10-CM

## 2022-08-19 DIAGNOSIS — M25.562 CHRONIC PAIN OF LEFT KNEE: ICD-10-CM

## 2022-08-19 DIAGNOSIS — R35.0 URINARY FREQUENCY: Primary | ICD-10-CM

## 2022-08-19 LAB
BACTERIA UR QL AUTO: ABNORMAL /HPF
BILIRUB UR QL STRIP: NEGATIVE
CLARITY UR: ABNORMAL
COLOR UR: YELLOW
GLUCOSE UR STRIP-MCNC: NEGATIVE MG/DL
HGB UR QL STRIP.AUTO: ABNORMAL
KETONES UR STRIP-MCNC: NEGATIVE MG/DL
LEUKOCYTE ESTERASE UR QL STRIP: ABNORMAL
MUCOUS THREADS UR QL AUTO: ABNORMAL
NITRITE UR QL STRIP: POSITIVE
NON-SQ EPI CELLS URNS QL MICRO: ABNORMAL /HPF
PH UR STRIP.AUTO: 6 [PH]
PROT UR STRIP-MCNC: ABNORMAL MG/DL
RBC #/AREA URNS AUTO: ABNORMAL /HPF
SL AMB  POCT GLUCOSE, UA: NEGATIVE
SL AMB LEUKOCYTE ESTERASE,UA: ABNORMAL
SL AMB POCT BILIRUBIN,UA: NEGATIVE
SL AMB POCT BLOOD,UA: ABNORMAL
SL AMB POCT CLARITY,UA: ABNORMAL
SL AMB POCT COLOR,UA: YELLOW
SL AMB POCT KETONES,UA: NEGATIVE
SL AMB POCT NITRITE,UA: POSITIVE
SL AMB POCT PH,UA: 6
SL AMB POCT SPECIFIC GRAVITY,UA: 1.01
SL AMB POCT URINE PROTEIN: ABNORMAL
SL AMB POCT UROBILINOGEN: NEGATIVE
SP GR UR STRIP.AUTO: 1.02 (ref 1–1.03)
TRANS CELLS #/AREA URNS HPF: PRESENT /[HPF]
UROBILINOGEN UR STRIP-ACNC: <2 MG/DL
WBC #/AREA URNS AUTO: ABNORMAL /HPF
WBC CLUMPS # UR AUTO: PRESENT /UL

## 2022-08-19 PROCEDURE — 99214 OFFICE O/P EST MOD 30 MIN: CPT | Performed by: FAMILY MEDICINE

## 2022-08-19 PROCEDURE — 3725F SCREEN DEPRESSION PERFORMED: CPT | Performed by: FAMILY MEDICINE

## 2022-08-19 PROCEDURE — 87186 SC STD MICRODIL/AGAR DIL: CPT | Performed by: FAMILY MEDICINE

## 2022-08-19 PROCEDURE — 81001 URINALYSIS AUTO W/SCOPE: CPT | Performed by: FAMILY MEDICINE

## 2022-08-19 PROCEDURE — 87086 URINE CULTURE/COLONY COUNT: CPT | Performed by: FAMILY MEDICINE

## 2022-08-19 PROCEDURE — 1160F RVW MEDS BY RX/DR IN RCRD: CPT | Performed by: FAMILY MEDICINE

## 2022-08-19 PROCEDURE — 81002 URINALYSIS NONAUTO W/O SCOPE: CPT | Performed by: FAMILY MEDICINE

## 2022-08-19 PROCEDURE — 87077 CULTURE AEROBIC IDENTIFY: CPT | Performed by: FAMILY MEDICINE

## 2022-08-19 RX ORDER — SULFAMETHOXAZOLE AND TRIMETHOPRIM 800; 160 MG/1; MG/1
1 TABLET ORAL 2 TIMES DAILY
Qty: 10 TABLET | Refills: 0 | Status: SHIPPED | OUTPATIENT
Start: 2022-08-19 | End: 2022-08-24

## 2022-08-19 RX ORDER — PREDNISONE 20 MG/1
40 TABLET ORAL DAILY
Qty: 10 TABLET | Refills: 0 | Status: SHIPPED | OUTPATIENT
Start: 2022-08-19 | End: 2022-08-24

## 2022-08-19 NOTE — ASSESSMENT & PLAN NOTE
· Patient complains of itching and burning of her rectum  ·  s/p repair and procto plasty 04/09/2022  · Moving bowels normally now   · Rectal exam is unremarkable  · Encourage follow-up with Colorectal surgery or Gastroenterology

## 2022-08-19 NOTE — ASSESSMENT & PLAN NOTE
Unchanged  ·  improvement with Tylenol  · Left knee x-ray shows mild osteoarthritis  · There is tenderness to medial joint line and crepitus  · Offered corticosteroid injection today which patient is apprehensive about  · Will treat with prednisone instead since she is also complaining of lower back pain and hip pain    · Follow-up as needed

## 2022-08-19 NOTE — ASSESSMENT & PLAN NOTE
Recurrent  · Urine dip shows +nitrites, large protein, trace leukocytes, and large blood  ·  last urine culture was negative for infection  · Follow up urine culture  ·  positive suprapubic tenderness on exam   · No CVA tenderness  · Start Bactrim b i d  for 5 days   · Follow-up as needed

## 2022-08-19 NOTE — ASSESSMENT & PLAN NOTE
·  Left lower back pain radiating down left leg    · No  Bony tenderness to lower back  ·  due to fall risk and age I do not recommend NSAIDs or muscle relaxers  · Will start prednisone 40 mg for 5 days  ·  may also use heating pads and topical Aspercreme

## 2022-08-19 NOTE — PROGRESS NOTES
Urinary Incontinence Plan of Care: counseling topics discussed: practice Kegel (pelvic floor strengthening) exercises, use restroom every 2 hours, limit alcohol, caffeine, spicy foods, and acidic foods, limiting fluid intake 3-4 hours before bed and preventing constipation  Assessment/Plan:      1  Urinary frequency  Assessment & Plan:  Recurrent  Urine dip shows +nitrites, large protein, trace leukocytes, and large blood   last urine culture was negative for infection  Follow up urine culture   positive suprapubic tenderness on exam   No CVA tenderness  Start Bactrim b i d  for 5 days   Follow-up as needed    Orders:  -     POCT urine dip  -     UA w Reflex to Microscopic w Reflex to Culture - Clinic Collect  -     sulfamethoxazole-trimethoprim (BACTRIM DS) 800-160 mg per tablet; Take 1 tablet by mouth 2 (two) times a day for 5 days    2  Sciatica of left side  Assessment & Plan:   Left lower back pain radiating down left leg  No  Bony tenderness to lower back   due to fall risk and age I do not recommend NSAIDs or muscle relaxers  Will start prednisone 40 mg for 5 days   may also use heating pads and topical Aspercreme    Orders:  -     predniSONE 20 mg tablet; Take 2 tablets (40 mg total) by mouth daily for 5 days    3  Chronic pain of left knee  Assessment & Plan:   Unchanged   improvement with Tylenol  Left knee x-ray shows mild osteoarthritis  There is tenderness to medial joint line and crepitus  Offered corticosteroid injection today which patient is apprehensive about  Will treat with prednisone instead since she is also complaining of lower back pain and hip pain  Follow-up as needed    Orders:  -     predniSONE 20 mg tablet; Take 2 tablets (40 mg total) by mouth daily for 5 days    4   Anal canal prolapse  Assessment & Plan:  Patient complains of itching and burning of her rectum   s/p repair and procto plasty 04/09/2022  Moving bowels normally now   Rectal exam is unremarkable  Encourage follow-up with Colorectal surgery or Gastroenterology              Subjective:      Patient ID: Gino Schmitt is a 80 y o  female presents today for chronic left leg pain that waxes and wanes  Improves with tylenol  Reviewed xr or left hip, femur, and knee  Knee shows mild osteoarthritis  complains of urinary frequency, and dysuria as well as rectal itching      HPI    The following portions of the patient's history were reviewed and updated as appropriate: allergies, current medications, past family history, past medical history, past social history, past surgical history and problem list     Review of Systems   Constitutional: Negative for activity change, chills, diaphoresis and fever  HENT: Negative for ear pain, hearing loss, postnasal drip, rhinorrhea, sinus pressure, sinus pain, sneezing and sore throat  Respiratory: Negative for cough, chest tightness, shortness of breath and wheezing  Cardiovascular: Negative for chest pain, palpitations and leg swelling  Gastrointestinal: Negative for abdominal pain, blood in stool, constipation, diarrhea, nausea and vomiting  Genitourinary: Positive for dysuria, enuresis, frequency and urgency  Negative for hematuria  Musculoskeletal: Positive for arthralgias, back pain and gait problem  Negative for myalgias  Neurological: Negative for dizziness, syncope, weakness, light-headedness, numbness and headaches  Psychiatric/Behavioral: The patient is nervous/anxious  Objective:      /80 (BP Location: Left arm, Patient Position: Sitting, Cuff Size: Standard)   Pulse 89   Temp 98 2 °F (36 8 °C) (Temporal)   Resp 18   Wt 52 1 kg (114 lb 12 8 oz)   SpO2 97%   BMI 21 69 kg/m²          Physical Exam  Vitals reviewed  Constitutional:       General: She is not in acute distress  Appearance: She is well-developed  She is not diaphoretic  HENT:      Head: Normocephalic and atraumatic        Right Ear: External ear normal       Left Ear: External ear normal       Nose: Nose normal  No congestion  Mouth/Throat:      Mouth: Mucous membranes are moist       Pharynx: Oropharynx is clear  No oropharyngeal exudate  Eyes:      General: No scleral icterus  Right eye: No discharge  Left eye: No discharge  Conjunctiva/sclera: Conjunctivae normal       Pupils: Pupils are equal, round, and reactive to light  Neck:      Thyroid: No thyromegaly  Vascular: No JVD  Trachea: No tracheal deviation  Cardiovascular:      Rate and Rhythm: Normal rate and regular rhythm  Heart sounds: Normal heart sounds  No murmur heard  No friction rub  No gallop  Pulmonary:      Effort: Pulmonary effort is normal  No respiratory distress  Breath sounds: Normal breath sounds  No wheezing or rales  Chest:      Chest wall: No tenderness  Abdominal:      General: Bowel sounds are normal  There is no distension  Palpations: Abdomen is soft  Tenderness: There is abdominal tenderness in the suprapubic area  There is no right CVA tenderness, left CVA tenderness, guarding or rebound  Musculoskeletal:         General: Normal range of motion  Cervical back: Normal range of motion and neck supple  No tenderness  Left knee: Bony tenderness ( medial joint line) and crepitus present  Right lower leg: No edema  Left lower leg: No edema  Lymphadenopathy:      Cervical: No cervical adenopathy  Skin:     General: Skin is warm and dry  Neurological:      General: No focal deficit present  Mental Status: She is alert and oriented to person, place, and time  Mental status is at baseline  Cranial Nerves: No cranial nerve deficit  Sensory: No sensory deficit  Motor: No weakness  Coordination: Coordination normal       Gait: Gait abnormal       Deep Tendon Reflexes: Reflexes normal    Psychiatric:         Mood and Affect: Mood is anxious           Behavior: Behavior normal  Thought Content:  Thought content normal          Judgment: Judgment normal

## 2022-08-23 LAB
BACTERIA UR CULT: ABNORMAL
BACTERIA UR CULT: ABNORMAL

## 2022-09-08 ENCOUNTER — RA CDI HCC (OUTPATIENT)
Dept: OTHER | Facility: HOSPITAL | Age: 87
End: 2022-09-08

## 2022-09-08 ENCOUNTER — OFFICE VISIT (OUTPATIENT)
Dept: FAMILY MEDICINE CLINIC | Facility: CLINIC | Age: 87
End: 2022-09-08
Payer: COMMERCIAL

## 2022-09-08 VITALS
WEIGHT: 114 LBS | TEMPERATURE: 97.6 F | DIASTOLIC BLOOD PRESSURE: 78 MMHG | OXYGEN SATURATION: 96 % | HEART RATE: 85 BPM | HEIGHT: 61 IN | BODY MASS INDEX: 21.52 KG/M2 | SYSTOLIC BLOOD PRESSURE: 132 MMHG

## 2022-09-08 DIAGNOSIS — R30.0 DYSURIA: Primary | ICD-10-CM

## 2022-09-08 DIAGNOSIS — K59.00 CONSTIPATION, UNSPECIFIED CONSTIPATION TYPE: ICD-10-CM

## 2022-09-08 DIAGNOSIS — N39.0 URINARY TRACT INFECTION WITH HEMATURIA, SITE UNSPECIFIED: ICD-10-CM

## 2022-09-08 DIAGNOSIS — R31.9 URINARY TRACT INFECTION WITH HEMATURIA, SITE UNSPECIFIED: ICD-10-CM

## 2022-09-08 LAB
BACTERIA UR QL AUTO: ABNORMAL /HPF
BILIRUB UR QL STRIP: NEGATIVE
CLARITY UR: ABNORMAL
COLOR UR: ABNORMAL
GLUCOSE UR STRIP-MCNC: NEGATIVE MG/DL
HGB UR QL STRIP.AUTO: NEGATIVE
KETONES UR STRIP-MCNC: NEGATIVE MG/DL
LEUKOCYTE ESTERASE UR QL STRIP: ABNORMAL
MUCOUS THREADS UR QL AUTO: ABNORMAL
NITRITE UR QL STRIP: POSITIVE
NON-SQ EPI CELLS URNS QL MICRO: ABNORMAL /HPF
PH UR STRIP.AUTO: 6.5 [PH]
PROT UR STRIP-MCNC: NEGATIVE MG/DL
RBC #/AREA URNS AUTO: ABNORMAL /HPF
SL AMB  POCT GLUCOSE, UA: ABNORMAL
SL AMB LEUKOCYTE ESTERASE,UA: 15
SL AMB POCT BILIRUBIN,UA: NEGATIVE
SL AMB POCT BLOOD,UA: ABNORMAL
SL AMB POCT CLARITY,UA: ABNORMAL
SL AMB POCT COLOR,UA: YELLOW
SL AMB POCT KETONES,UA: 5
SL AMB POCT NITRITE,UA: POSITIVE
SL AMB POCT PH,UA: 6
SL AMB POCT SPECIFIC GRAVITY,UA: 1.02
SL AMB POCT URINE PROTEIN: 15
SL AMB POCT UROBILINOGEN: NEGATIVE
SP GR UR STRIP.AUTO: 1.02 (ref 1–1.03)
UROBILINOGEN UR STRIP-ACNC: <2 MG/DL
WBC #/AREA URNS AUTO: ABNORMAL /HPF

## 2022-09-08 PROCEDURE — 87186 SC STD MICRODIL/AGAR DIL: CPT | Performed by: FAMILY MEDICINE

## 2022-09-08 PROCEDURE — 81001 URINALYSIS AUTO W/SCOPE: CPT | Performed by: FAMILY MEDICINE

## 2022-09-08 PROCEDURE — 87077 CULTURE AEROBIC IDENTIFY: CPT | Performed by: FAMILY MEDICINE

## 2022-09-08 PROCEDURE — 99214 OFFICE O/P EST MOD 30 MIN: CPT | Performed by: FAMILY MEDICINE

## 2022-09-08 PROCEDURE — 87086 URINE CULTURE/COLONY COUNT: CPT | Performed by: FAMILY MEDICINE

## 2022-09-08 PROCEDURE — 81002 URINALYSIS NONAUTO W/O SCOPE: CPT | Performed by: FAMILY MEDICINE

## 2022-09-08 RX ORDER — AMOXICILLIN AND CLAVULANATE POTASSIUM 875; 125 MG/1; MG/1
1 TABLET, FILM COATED ORAL EVERY 12 HOURS SCHEDULED
Qty: 20 TABLET | Refills: 0 | Status: SHIPPED | OUTPATIENT
Start: 2022-09-08 | End: 2022-09-18

## 2022-09-08 NOTE — PROGRESS NOTES
Annal[facility] 75  coding opportunities       Chart reviewed, no opportunity found: CHART REVIEWED, NO OPPORTUNITY FOUND        Patients Insurance     Medicare Insurance: [employer] [facility] [employer]

## 2022-09-08 NOTE — ASSESSMENT & PLAN NOTE
· Suprapubic tenderness  · No cva tenderness  · Urine positive dip for nitrites and leuks  · Follow up urine culture  · Previous urine culture grew MDRO susceptible to augmentin   Start augmentin BID for 10 days DISPLAY PLAN FREE TEXT

## 2022-09-08 NOTE — PROGRESS NOTES
Assessment/Plan:      1  Dysuria  -     POCT urine dip  -     UA w Reflex to Microscopic w Reflex to Culture  -     amoxicillin-clavulanate (Augmentin) 875-125 mg per tablet; Take 1 tablet by mouth every 12 (twelve) hours for 10 days    2  Urinary tract infection with hematuria, site unspecified  Assessment & Plan:  Suprapubic tenderness  No cva tenderness  Urine positive dip for nitrites and leuks  Follow up urine culture  Previous urine culture grew MDRO susceptible to augmentin  Start augmentin BID for 10 days    Orders:  -     amoxicillin-clavulanate (Augmentin) 875-125 mg per tablet; Take 1 tablet by mouth every 12 (twelve) hours for 10 days    3  Constipation, unspecified constipation type  Assessment & Plan:  Continue miralax, sennacot, and start dulcolax suppository  No rectal prolapse on physical exam                Subjective:      Patient ID: John Freed is a 80 y o  female presents today for constipation and UTI  She has not had a bm in about 5 days  Has not received an enema or suppository by the staff at her nursing home  Patient may not be the best historian regarding this  She feels like she may have another rectal prolapse  She also complains of urinary frequency, burning, and urgency  HPI    The following portions of the patient's history were reviewed and updated as appropriate: allergies, current medications, past family history, past medical history, past social history, past surgical history and problem list     Review of Systems   Constitutional: Negative for activity change, chills, diaphoresis and fever  HENT: Negative for ear pain, hearing loss, postnasal drip, rhinorrhea, sinus pressure, sinus pain, sneezing and sore throat  Respiratory: Negative for cough, chest tightness, shortness of breath and wheezing  Cardiovascular: Negative for chest pain, palpitations and leg swelling  Gastrointestinal: Positive for constipation   Negative for abdominal pain, blood in stool, diarrhea, nausea and vomiting  Genitourinary: Positive for dysuria and frequency  Negative for difficulty urinating, dyspareunia, enuresis, flank pain, genital sores, hematuria and urgency  Musculoskeletal: Negative for arthralgias and myalgias  Neurological: Negative for dizziness, syncope, weakness, light-headedness, numbness and headaches  Objective:      /78 (BP Location: Left arm, Patient Position: Sitting, Cuff Size: Adult)   Pulse 85   Temp 97 6 °F (36 4 °C)   Ht 5' 1" (1 549 m)   Wt 51 7 kg (114 lb)   SpO2 96%   BMI 21 54 kg/m²          Physical Exam  Vitals reviewed  Constitutional:       General: She is not in acute distress  Appearance: She is well-developed  She is not diaphoretic  HENT:      Head: Normocephalic and atraumatic  Right Ear: External ear normal       Left Ear: External ear normal       Nose: Nose normal  No congestion  Mouth/Throat:      Mouth: Mucous membranes are moist       Pharynx: Oropharynx is clear  No oropharyngeal exudate  Eyes:      General: No scleral icterus  Right eye: No discharge  Left eye: No discharge  Conjunctiva/sclera: Conjunctivae normal       Pupils: Pupils are equal, round, and reactive to light  Neck:      Thyroid: No thyromegaly  Vascular: No JVD  Trachea: No tracheal deviation  Cardiovascular:      Rate and Rhythm: Normal rate and regular rhythm  Heart sounds: Normal heart sounds  No murmur heard  No friction rub  No gallop  Pulmonary:      Effort: Pulmonary effort is normal  No respiratory distress  Breath sounds: Normal breath sounds  No wheezing or rales  Chest:      Chest wall: No tenderness  Abdominal:      General: Bowel sounds are normal  There is no distension  Palpations: Abdomen is soft  Tenderness: There is abdominal tenderness in the suprapubic area  There is no right CVA tenderness, left CVA tenderness, guarding or rebound  Genitourinary:     Rectum: Normal  No tenderness, anal fissure, external hemorrhoid or internal hemorrhoid  Normal anal tone  Musculoskeletal:         General: Normal range of motion  Cervical back: Normal range of motion and neck supple  No tenderness  Right lower leg: No edema  Left lower leg: No edema  Lymphadenopathy:      Cervical: No cervical adenopathy  Skin:     General: Skin is warm and dry  Neurological:      Mental Status: She is alert and oriented to person, place, and time  Mental status is at baseline  Psychiatric:         Mood and Affect: Mood normal          Behavior: Behavior normal          Thought Content:  Thought content normal          Judgment: Judgment normal

## 2022-09-09 NOTE — PROGRESS NOTES
Assessment/Plan:    No problem-specific Assessment & Plan notes found for this encounter  Diagnoses and all orders for this visit:    Essential hypertension  -     amLODIPine (NORVASC) 2 5 mg tablet; Take 1 tablet (2 5 mg total) by mouth daily          Subjective:      Patient ID: Denilson Rubin is a 80 y o  female  Lynda barber comes for a post hospital visit  She was discharged about 9 days ago  She was in the hospital for 1 week for increased depression etc   Her medications were adjusted  [facility] added amlodipine for elevated blood pressures  She is having no side effects of this and her blood pressures are excellent  The following portions of the patient's history were reviewed and updated as appropriate: allergies, current medications, past family history, past medical history, past social history, past surgical history and problem list     Review of Systems   Constitutional: Negative for activity change and appetite change  HENT: Negative for voice change  Eyes: Negative for visual disturbance  Respiratory: Negative for chest tightness and shortness of breath  Cardiovascular: Negative for chest pain, palpitations and leg swelling  Gastrointestinal: Negative for abdominal pain, blood in stool, constipation and diarrhea  Genitourinary: Negative for dysuria, vaginal bleeding and vaginal discharge  Skin: Negative for rash  Neurological: Negative for dizziness  Psychiatric/Behavioral: Negative for dysphoric mood  Objective:  Vitals:    08/23/18 1036   BP: 130/70   BP Location: Left arm   Patient Position: Sitting   Cuff Size: Adult   Temp: (!) 96 8 °F (36 °C)   Weight: 53 8 kg (118 lb 9 6 oz)      Physical Exam   Constitutional: She appears well-developed and well-nourished  HENT:   Head: Normocephalic and atraumatic  Eyes: Conjunctivae are normal    Neck: Neck supple  No thyromegaly present     Cardiovascular: Normal rate, regular rhythm, normal Division of Vascular Surgery        Follow Up    S/p L  3rd-5th toe amputation for completion transmetatarsal on 8/23/22  Chief Complaint:   Foot wound    History of Present Illness:      Kia Alvarado is a 79 y.o. gentleman who presents with wound to his left foot after undergoing completion transmetatarsal amputation on  8/23/22. He has been performing daily dressing changes and wearing a darco shoe. He has otherwise been well, denies fevers, chills, chest pain, sob. He states his wound does have quite a bit of discharge.      Medical History:     Past Medical History:   Diagnosis Date    Atrial fibrillation (HCC)     CAD (coronary artery disease)     Cardiomyopathy (HCC)     Cellulitis     CHF (congestive heart failure) ([facility]     Diabetes mellitus ([facility] [address]     Foot infection     Heart failure (HCC)     Hyperlipidemia     Hypertension     MRSA (methicillin resistant staph aureus) culture positive     Osteomyelitis (HCC)     PVD (peripheral vascular disease) ([facility] [address]     Wound, open, foot     left foot       Surgical History:     Past Surgical History:   Procedure Laterality Date    CARDIAC SURGERY  2010    CABG X3    COLONOSCOPY      DEBRIDEMENT Right 11/19/2015    DEBRIDEMENT WOUND FOOT RIGHT W/ APPLICATION BILAT INTEG RAT GRAFT    ENDOSCOPY, COLON, DIAGNOSTIC      EYE SURGERY Bilateral     cataracts    HERNIA REPAIR      umbilical    KNEE ARTHROSCOPY Right     OTHER SURGICAL HISTORY Right 12 29 15    wound care graft procedure foot,appl of integra    PACEMAKER PLACEMENT  2011    WITH DEFIBRILLATOR    TOE AMPUTATION Right     R great    TOE AMPUTATION Left 3/13/2022    TOE AMPUTATION--left 2nd digit performed by Shayne Leslie DPM at [facility] Left 8/24/2022    Left third fourth and fifth toe amputation through the metatarsal for completion transmetatarsal amputation performed by Josué Helton MD at [facility] Left 7/13/2022    RIGHT COMMON FEMORAL ACCESS UNDER heart sounds and intact distal pulses  No murmur heard  Pulmonary/Chest: Effort normal and breath sounds normal  No respiratory distress  Musculoskeletal: She exhibits no edema  Lymphadenopathy:     She has no cervical adenopathy  Skin: Skin is warm and dry  Psychiatric: She has a normal mood and affect   Her behavior is normal  Constitutional:  Negative for activity change, fatigue and fever. HENT:  Negative for congestion, postnasal drip and sinus pain. Eyes:  Negative for photophobia, redness and visual disturbance. Respiratory:  Negative for cough, chest tightness and shortness of breath. Cardiovascular:  Negative for chest pain, palpitations and leg swelling. Gastrointestinal:  Negative for abdominal pain, diarrhea and nausea. Genitourinary:  Negative for difficulty urinating, flank pain and hematuria. Musculoskeletal:  Negative for back pain, joint swelling and myalgias. Skin:  Positive for wound. Negative for color change and pallor. Neurological:  Negative for dizziness, light-headedness and headaches. Psychiatric/Behavioral:  Negative for agitation, behavioral problems and confusion. Physical Exam:     Vitals:  BP (!) 186/99 (Site: Left Upper Arm, Position: Sitting, Cuff Size: Medium Adult)   Pulse 87   Ht 5' 10\" (1.778 m)   Wt 185 lb (83.9 kg)   BMI 26.54 kg/m²     Physical Exam  Constitutional:       General: He is not in acute distress. Appearance: He is normal weight. HENT:      Head: Normocephalic and atraumatic. Right Ear: External ear normal.      Left Ear: External ear normal.      Nose: Nose normal.      Mouth/Throat:      Mouth: Mucous membranes are moist.      Pharynx: Oropharynx is clear. Eyes:      Extraocular Movements: Extraocular movements intact. Pupils: Pupils are equal, round, and reactive to light. Cardiovascular:      Rate and Rhythm: Normal rate and regular rhythm. Pulses: Normal pulses. Pulmonary:      Effort: Pulmonary effort is normal. No respiratory distress. Breath sounds: No wheezing. Abdominal:      General: Abdomen is flat. There is no distension. Palpations: Abdomen is soft. Musculoskeletal:         General: Swelling present.       Comments: Edema surrounding transmetatarsal site   Skin:     Capillary Refill: Capillary refill takes

## 2022-09-10 LAB — BACTERIA UR CULT: ABNORMAL

## 2022-09-14 NOTE — CONSULTS
Consultation - [address] 49 M [address] [facility] 80 y o  female MRN: [id number]  Unit/Bed#: [facility] 568-46 Encounter: [id number]      Chief Complaint:  I am doing well    History of Present Illness   Physician Requesting Consult: Jonathan Nicole MD  Reason for Consult / Principal Problem:  History of bipolar, history of admission [facility] state [facility], psychiatric consult request for placement into rehab  Diagnosis bipolar 1 disorder    Madhuri Subramanian is a 80 y o  female who bipolar disorder , GERD, diabetes, presents with unwillingness fall at the nursing home the CT scan show acute on chronic left subdural hematoma  Patient had a history of bipolar disorder and have a state in the psychiatric hospital and needs psychiatric clearance  She states that she had been stable her medication for many years, she see Dr Atanacio Sicard every 3 months  She states that her mood is stable, she denies any suicidal ideation plan or intent, she denies any psychotic symptoms  She states that she had been sick because she had the COVID-19 several weeks ago and after that she had not been feeling well  She denies any other issues  Psychiatric Review Of Systems:  sleep: no  appetite changes: no  weight changes: no  energy/anergy: no  interest/pleasure/anhedonia: no  somatic symptoms: no  anxiety/panic: no  kirill: no  guilty/hopeless: no  self injurious behavior/risky behavior: no    Historical Information   Past Psychiatric History:   She had bipolar disorder, she have a history of admission to the [facility] many years ago  Currently in treatment with Dr Atanacio Sicard  Past Suicide attempts:  None  Past Violent behavior:  None  Past Psychiatric medication trial:  Seroquel, clonazepam, Topamax,    Substance Abuse History:  She denies any drugs or alcohol history     I have assessed this patient for substance use within the past 12 months     History of IP/OP rehabilitation program:    none  Smoking history:   Former smoker  Family Psychiatric History:   Denies any family history of mental illness, substance abuse or suicidal attempt    Social History  Education: 11th grade  Learning Disabilities: None  Marital history:   Living arrangement, social support: She live in a personal care home  Occupational History: retired  Functioning Relationships: good support system    Other Pertinent History: None    Traumatic History:   Abuse: She denies any  Other Traumatic Events: None    Past Medical History:   Diagnosis Date    Anemia 2/26/2019    Anxiety     B[facility] disorder ([facility]     Depression     DVT (deep venous thrombosis) ([facility] ) 2008    Left leg    GERD (gastroesophageal reflux disease)     Hypertension     Overactive bladder        Medical Review Of Systems:  Review of Systems - Negative except difficulty ambulating, all other systems reviewed were negative    Meds/Allergies   current meds:   Current Facility-Administered Medications   Medication Dose Route Frequency    cefepime (MAXIPIME) 2,000 mg in dextrose 5 % 50 mL IVPB  2,000 mg Intravenous Q12H    clonazePAM (KlonoPIN) tablet 0 5 mg  0 5 mg Oral BID    heparin (porcine) subcutaneous injection 5,000 Units  5,000 Units Subcutaneous Q8H [facility]    lamoTRIgine (LaMICtal) tablet 200 mg  200 mg Oral Daily Before Breakfast    levETIRAcetam (KEPPRA) tablet 500 mg  500 mg Oral Q12H [facility]    mineral oil enema 1 enema  1 enema Rectal Daily PRN    ondansetron (ZOFRAN) injection 4 mg  4 mg Intravenous Q6H PRN    pantoprazole (PROTONIX) EC tablet 40 mg  40 mg Oral Early Morning    polyethylene glycol (MIRALAX) packet 17 g  17 g Oral Daily    QUEtiapine (SEROquel) tablet 400 mg  400 mg Oral HS    senna-docusate sodium (SENOKOT S) 8 6-50 mg per tablet 2 tablet  2 tablet Oral BID    topiramate (TOPAMAX) tablet 50 mg  50 mg Oral BID     Allergies   Allergen Reactions    Ethanol     Simvastatin        Objective   Vital signs in last 24 hours:  Temp:  [97 8 °F (36 6 °C)-98 2 °F (36 8 °C)] 98 °F (36 7 °C)  HR:  [85-98] 85  Resp:  [15-20] 16  BP: (106-127)/(59-85) 124/66      Intake/Output Summary (Last 24 hours) at 2/11/2021 1501  Last data filed at 2/11/2021 1349  Gross per 24 hour   Intake 660 ml   Output 1750 ml   Net -1090 ml       Mental Status Evaluation:  Appearance:  age appropriate   Behavior:  catatonic   Speech:  normal pitch and normal volume   Mood:  euthymic   Affect:  mood-congruent   Language: naming objects and repeating phrases   Thought Process:  goal directed   Associations: intact associations   Thought Content:  normal   Perceptual Disturbances: None   Risk Potential: Suicidal Ideations none, Homicidal Ideations none and Potential for Aggression No   Sensorium:  person, place and time/date   Memory:  recent and remote memory grossly intact   Cognition:  recent and remote memory grossly intact   Consciousness:  alert and awake    Attention: attention span and concentration were age appropriate   Intellect: within normal limits   Fund of Knowledge: awareness of current events: Fair, past history: Fair and vocabulary: Fair   Insight:  fair   Judgment: fair   Muscle Strength and Tone: Within normal limits   Gait/Station: Unable to assess patient is in bed   Motor Activity: no abnormal movements     Lab Results:    I have personally reviewed all pertinent laboratory/tests results  Labs in last 72 hours:   Recent Labs     02/11/21  0700   WBC 5 27   RBC 3 07*   HGB 9 6*   HCT 33 1*   *   RDW 16 0*   NEUTROABS 1 99   SODIUM 147*   K 4 0   *   CO2 29   BUN 10   CREATININE 0 51*   GLUC 80   CALCIUM 9 2     Recent Imaging Studies: Procedure: Xr Chest Portable    Result Date: 2/9/2021  Narrative: CHEST INDICATION:   f/u cxr  episode of hypoxia    Patient had confirmed COVID-19  Positive on 12/15/2020  Negative on 2/6/2021  COMPARISON:  Chest radiograph from 12/24/2020 and chest CT from 2/6/2021   EXAM PERFORMED/VIEWS:  XR CHEST PORTABLE FINDINGS: Cardiomediastinal silhouette appears unremarkable  Mild bilateral peripheral groundglass opacity, greater on the left, similar to the chest CT from 3 days ago, improved from the chest radiograph from 12/24/2020  No effusion or pneumothorax  Osseous structures appear within normal limits for patient age  Impression: Mild bilateral peripheral groundglass opacity, greater on the left, improved since the chest radiograph from December 2020, unchanged since the chest CT from 3 days ago  This may be due to post infectious scar from Covid-19  Workstation performed: [id number]     Procedure: Ct Head Wo Contrast    Result Date: 2/8/2021  Narrative: CT BRAIN - WITHOUT CONTRAST INDICATION:   Follow-up subdural hemorrhage  COMPARISON:  1/12/2021, 1/6/2021 and 1/7/2021  TECHNIQUE:  CT examination of the brain was performed  In addition to axial images, sagittal and coronal 2D reformatted images were created and submitted for interpretation  Radiation dose length product (DLP) for this visit:  892 47 mGy-cm   This examination, like all CT scans performed in the [facility], was performed utilizing techniques to minimize radiation dose exposure, including the use of iterative  reconstruction and automated exposure control  IMAGE QUALITY:  Diagnostic  FINDINGS: PARENCHYMA: Stable subacute right frontal and left frontoparietal subdural hematomas  No evidence of new hemorrhage  No midline shift  Mild microangiopathic disease  VENTRICLES AND EXTRA-AXIAL SPACES:  No hydrocephalus  VISUALIZED ORBITS AND PARANASAL SINUSES:  Bilateral axial myopia  CALVARIUM AND EXTRACRANIAL SOFT TISSUES:  No acute calvarial fracture  Impression: No  interval change in subacute bilateral subdural hematomas  Workstation performed: [id number]     Procedure: Fl Barium Swallow Video W Speech    Result Date: 2/11/2021  Narrative: A video barium swallow study was performed by the Department of Speech Pathology   Please refer to the report for the official interpretation  The images are stored for archival purposes only  Study images were not formally reviewed by the Radiology Department  Code Status: )Level 3 - DNAR and DNI    Assessment/Plan     Assessment:  Sujatha Choi is a 80 y o  female bipolar disorder, GERD, DVT, who presented to the [facility] after fa fall needs psychiatric clearance to go to inpatient rehabilitation  She states that she is feeling well, she is taking her medication as prescribed, she had been in the psychiatric unit for many years, she follow with a psychiatrist every 3 months  She denies any suicidal homicidal ideation plan or intent, she denies any psychotic symptoms  Diagnosis:  Bipolar disorder most recent episode depressed my without psychotic features  Plan:   Continue medical management  Continue Seroquel 400 mg p o  HS  Continue Topamax 50 mg p o  B i d   Continue Lamictal 200 mg daily  Continue clonazepam 0 5 mg p o  B i d  She is psychiatrically cleared to go to inpatient rehabilitation  No other intervention at this time  I will sign off  Risks, benefits and possible side effects of Medications:   Risks, benefits, and possible side effects of medications explained to patient and patient verbalizes understanding             Marianna Beatty MD Quality 226: Preventive Care And Screening: Tobacco Use: Screening And Cessation Intervention: Patient screened for tobacco use and is an ex/non-smoker Quality 431: Preventive Care And Screening: Unhealthy Alcohol Use - Screening: Patient not screened for unhealthy alcohol use using a systematic screening method Detail Level: Detailed

## 2022-09-15 ENCOUNTER — OFFICE VISIT (OUTPATIENT)
Dept: FAMILY MEDICINE CLINIC | Facility: CLINIC | Age: 87
End: 2022-09-15
Payer: COMMERCIAL

## 2022-09-15 VITALS
BODY MASS INDEX: 22.09 KG/M2 | TEMPERATURE: 97.7 F | RESPIRATION RATE: 14 BRPM | HEIGHT: 61 IN | SYSTOLIC BLOOD PRESSURE: 136 MMHG | DIASTOLIC BLOOD PRESSURE: 78 MMHG | HEART RATE: 101 BPM | OXYGEN SATURATION: 97 % | WEIGHT: 117 LBS

## 2022-09-15 DIAGNOSIS — M54.32 SCIATICA OF LEFT SIDE: ICD-10-CM

## 2022-09-15 DIAGNOSIS — K62.2: ICD-10-CM

## 2022-09-15 DIAGNOSIS — F31.9 BIPOLAR 1 DISORDER (HCC): ICD-10-CM

## 2022-09-15 DIAGNOSIS — N32.81 OAB (OVERACTIVE BLADDER): ICD-10-CM

## 2022-09-15 DIAGNOSIS — R35.0 URINARY FREQUENCY: Primary | ICD-10-CM

## 2022-09-15 DIAGNOSIS — S06.5XAA SUBDURAL HEMATOMA: ICD-10-CM

## 2022-09-15 DIAGNOSIS — D53.9 ANEMIA, MACROCYTIC: ICD-10-CM

## 2022-09-15 DIAGNOSIS — R33.9 URINARY RETENTION: ICD-10-CM

## 2022-09-15 DIAGNOSIS — I82.5Y2 CHRONIC DEEP VEIN THROMBOSIS (DVT) OF PROXIMAL VEIN OF LEFT LOWER EXTREMITY (HCC): ICD-10-CM

## 2022-09-15 DIAGNOSIS — N39.0 RECURRENT UTI: ICD-10-CM

## 2022-09-15 DIAGNOSIS — K59.00 CONSTIPATION, UNSPECIFIED CONSTIPATION TYPE: ICD-10-CM

## 2022-09-15 DIAGNOSIS — I10 ESSENTIAL HYPERTENSION: Chronic | ICD-10-CM

## 2022-09-15 DIAGNOSIS — K21.9 GASTROESOPHAGEAL REFLUX DISEASE, UNSPECIFIED WHETHER ESOPHAGITIS PRESENT: ICD-10-CM

## 2022-09-15 PROBLEM — R51.9 HEADACHE: Status: RESOLVED | Noted: 2019-04-21 | Resolved: 2022-09-15

## 2022-09-15 PROBLEM — R10.30 LOWER ABDOMINAL PAIN: Status: RESOLVED | Noted: 2019-01-22 | Resolved: 2022-09-15

## 2022-09-15 PROBLEM — R07.9 CHEST PAIN IN ADULT: Status: RESOLVED | Noted: 2019-04-21 | Resolved: 2022-09-15

## 2022-09-15 PROBLEM — K82.9 DISORDER OF GALLBLADDER: Status: RESOLVED | Noted: 2019-10-04 | Resolved: 2022-09-15

## 2022-09-15 PROBLEM — K81.2 ACUTE ON CHRONIC CHOLECYSTITIS: Status: RESOLVED | Noted: 2019-10-04 | Resolved: 2022-09-15

## 2022-09-15 PROCEDURE — 1160F RVW MEDS BY RX/DR IN RCRD: CPT | Performed by: FAMILY MEDICINE

## 2022-09-15 PROCEDURE — 3288F FALL RISK ASSESSMENT DOCD: CPT | Performed by: FAMILY MEDICINE

## 2022-09-15 PROCEDURE — 99214 OFFICE O/P EST MOD 30 MIN: CPT | Performed by: FAMILY MEDICINE

## 2022-09-15 PROCEDURE — 1101F PT FALLS ASSESS-DOCD LE1/YR: CPT | Performed by: FAMILY MEDICINE

## 2022-09-15 NOTE — ASSESSMENT & PLAN NOTE
Waxes and wanes  · Patient admits to depression that is worsening  · Follows with Psychiatry, Dr Ousmane Pressley every 3 months  · Per patient's daughter, had gene site testing done and it was found that her Seroquel may not be effective  · Recommend patient and daughter speak to Psychiatry to discuss further medication changes

## 2022-09-15 NOTE — ASSESSMENT & PLAN NOTE
Unchanged  · Patient declines referral to Orthopedics as there is no plan for surgical intervention at her age and comorbidities  · Patient had improvement with prednisone, but Psychiatry would not like patient on this as this could worsen her bipolar disorder  · Continue topical treatments, heating pads, and start physical therapy

## 2022-09-15 NOTE — ASSESSMENT & PLAN NOTE
· Patient previously followed by Urogynecology  · Has been on pessary and myrbetriq  · Based on age and comorbidities no further treatment was recommended  · Patient is now having recurrent UTIs, will refer back to Urogynecology for follow-up

## 2022-09-15 NOTE — PROGRESS NOTES
Assessment/Plan:      1  Urinary frequency  -     Ambulatory Referral to Urogynecology; Future    2  Recurrent UTI  -     Ambulatory Referral to Urogynecology; Future    3  OAB (overactive bladder)  Assessment & Plan:  Patient previously followed by Urogynecology  Has been on pessary and myrbetriq  Based on age and comorbidities no further treatment was recommended  Patient is now having recurrent UTIs, will refer back to Urogynecology for follow-up    Orders:  -     Ambulatory Referral to Urogynecology; Future    4  Urinary retention  -     Ambulatory Referral to Urogynecology; Future    5  Essential hypertension  Assessment & Plan:  Diet controlled  Patient's blood pressure is at goal of 136/78 which is less than 140/90    Orders:  -     Comprehensive metabolic panel; Future    6  Bipolar 1 disorder [facility])  Assessment & Plan:  Waxes and wanes  Patient admits to depression that is worsening  Follows with Psychiatry, Dr Saad Styles every 3 months  Per patient's daughter, had gene site testing done and it was found that her Seroquel may not be effective  Recommend patient and daughter speak to Psychiatry to discuss further medication changes      7  Constipation, unspecified constipation type    8  Gastroesophageal reflux disease, unspecified whether esophagitis present  Assessment & Plan:  Stable on pantoprazole and famotidine      9  Chronic deep vein thrombosis (DVT) of proximal vein of left lower extremity (HCC)  Assessment & Plan:  Stable without recurrence  Patient is no longer on anticoagulation      10  Subdural hematoma [facility])  Assessment & Plan:  Resolved  Occurrence 02/07/2021 after mechanical fall  Neurosurgery signed off      11   Sciatica of left side  Assessment & Plan:  Unchanged  Patient declines referral to Orthopedics as there is no plan for surgical intervention at her age and comorbidities  Patient had improvement with prednisone, but Psychiatry would not like patient on this as this could worsen her bipolar disorder  Continue topical treatments, heating pads, and start physical therapy    Orders:  -     Ambulatory Referral to Physical Therapy; Future    12  Anemia, macrocytic  -     CBC and differential; Future    13  Anal canal prolapse  Assessment & Plan:  Stable  Encouraged regular bowel movements and avoidance of constipation to prevent recurrence              Subjective:      Patient ID: Curtis Witt is a 80 y o  female presents today for routine follow up  Her main complaint today is recurrent UTI and left leg pain  Accompanied by daughter     HPI    The following portions of the patient's history were reviewed and updated as appropriate: allergies, current medications, past family history, past medical history, past social history, past surgical history and problem list     Review of Systems   Constitutional: Negative for activity change, chills, diaphoresis and fever  HENT: Negative for ear pain, hearing loss, postnasal drip, rhinorrhea, sinus pressure, sinus pain, sneezing and sore throat  Respiratory: Negative for cough, chest tightness, shortness of breath and wheezing  Cardiovascular: Negative for chest pain, palpitations and leg swelling  Gastrointestinal: Negative for abdominal pain, blood in stool, constipation, diarrhea, nausea and vomiting  Genitourinary: Negative for dysuria, frequency, hematuria and urgency  Musculoskeletal: Negative for arthralgias and myalgias  Neurological: Negative for dizziness, syncope, weakness, light-headedness, numbness and headaches  Psychiatric/Behavioral: Positive for dysphoric mood  Negative for hallucinations, self-injury, sleep disturbance and suicidal ideas  The patient is nervous/anxious  The patient is not hyperactive            Objective:      /78 (BP Location: Right arm, Patient Position: Sitting, Cuff Size: Standard)   Pulse 101   Temp 97 7 °F (36 5 °C) (Tympanic)   Resp 14   Ht 5' 1" (1 549 m)   Wt 53 1 kg (117 lb)   SpO2 97%   BMI 22 11 kg/m²          Physical Exam  Vitals reviewed  Constitutional:       General: She is not in acute distress  Appearance: She is well-developed  She is not diaphoretic  HENT:      Head: Normocephalic and atraumatic  Right Ear: External ear normal       Left Ear: External ear normal       Nose: Nose normal  No congestion  Mouth/Throat:      Mouth: Mucous membranes are moist       Pharynx: Oropharynx is clear  No oropharyngeal exudate  Eyes:      General: No scleral icterus  Right eye: No discharge  Left eye: No discharge  Conjunctiva/sclera: Conjunctivae normal       Pupils: Pupils are equal, round, and reactive to light  Neck:      Thyroid: No thyromegaly  Vascular: No JVD  Trachea: No tracheal deviation  Cardiovascular:      Rate and Rhythm: Normal rate and regular rhythm  Heart sounds: Normal heart sounds  No murmur heard  No friction rub  No gallop  Pulmonary:      Effort: Pulmonary effort is normal  No respiratory distress  Breath sounds: Normal breath sounds  No wheezing or rales  Chest:      Chest wall: No tenderness  Abdominal:      General: Bowel sounds are normal  There is no distension  Palpations: Abdomen is soft  Tenderness: There is no abdominal tenderness  There is no right CVA tenderness, left CVA tenderness, guarding or rebound  Musculoskeletal:         General: Normal range of motion  Cervical back: Normal range of motion and neck supple  No tenderness  Right lower leg: No edema  Left lower leg: No edema  Lymphadenopathy:      Cervical: No cervical adenopathy  Skin:     General: Skin is warm and dry  Neurological:      Mental Status: She is alert and oriented to person, place, and time  Mental status is at baseline  Psychiatric:         Mood and Affect: Mood is anxious  Behavior: Behavior normal          Thought Content:  Thought content normal  Judgment: Judgment normal

## 2022-09-19 ENCOUNTER — TELEPHONE (OUTPATIENT)
Dept: FAMILY MEDICINE CLINIC | Facility: CLINIC | Age: 87
End: 2022-09-19

## 2022-09-19 NOTE — TELEPHONE ENCOUNTER
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 ??  ----- Message -----  From: Dex Camacho MA  Sent: 9/12/2022   7:54 AM EDT  To: Usman Mckeon MD  Subject: FW: Test result for Shmuel Garcia              ----- Message -----  From: Martell Andrade  Sent: 9/11/2022   8:11 PM EDT  To: [facility]  Subject: Test result for Shmuel Garcia                This was a test done by my mothers place of residence [address] [place] [address]  Please review and add to her chart  This was done to test the effectiveness of her psych meds  Per summary on page 13 they do not support that the meds are helping her  A copy of this also needs to be faxed to her psychiatrist dr sosa  Could you please forward it  I dont have access to a fax machine  if you need to contact me you can text or call me at [phone]  Do not respond to email address   Thanks Veena Meek daughter to Shmuel Garcia

## 2022-09-22 ENCOUNTER — TELEPHONE (OUTPATIENT)
Dept: FAMILY MEDICINE CLINIC | Facility: CLINIC | Age: 87
End: 2022-09-22

## 2022-09-22 NOTE — TELEPHONE ENCOUNTER
Patient called requesting for referral to see urologist, given advised that last time she was with Dr Judith Hunter - referral was given  Patient was given advise that referral will be fax to [facility] for the staff to call the office for an appointment   Fax to

## 2022-10-19 ENCOUNTER — TREATMENT (OUTPATIENT)
Dept: FAMILY MEDICINE CLINIC | Facility: CLINIC | Age: 87
End: 2022-10-19

## 2022-10-19 ENCOUNTER — TELEPHONE (OUTPATIENT)
Dept: FAMILY MEDICINE CLINIC | Facility: CLINIC | Age: 87
End: 2022-10-19

## 2022-10-19 DIAGNOSIS — R35.0 FREQUENCY OF URINATION: Primary | ICD-10-CM

## 2022-10-19 RX ORDER — TRAMADOL HYDROCHLORIDE 50 MG/1
50 TABLET ORAL EVERY 8 HOURS PRN
Qty: 30 TABLET | Refills: 0 | Status: SHIPPED | OUTPATIENT
Start: 2022-10-19

## 2022-10-19 NOTE — TELEPHONE ENCOUNTER
[facility]  Given tylenol  Having leg pain from vagina to knee  Leg pain for 2 month but today much much worse  Also going to bathroom urination every hour  BM loose    What else can she take?   Should she go to the hospital? She is very upset about this  [phone]

## 2022-10-19 NOTE — TELEPHONE ENCOUNTER
Spoke with nurse Lien Lares at [facility] says she has urine checked every week and a half   family is upset because they are constantly billed for them for no reason  Lien Lares states complaints are psychiatric in nature --she sees Dr Jatinder Nelson and Candice Jackson for psychotherapy    she has regular BM's and chooses to sit on the toilet for 3 hours and then complain of leg pain   Wyphyllis also asks to go to the hospital, which the family is also upset with [address] for sending to hospital, because nothing is found and they are billed for hospital visit  [employer] Lien Lares is ok with doing short round of tramadol  Please send to [facility]

## 2022-10-24 ENCOUNTER — TELEPHONE (OUTPATIENT)
Dept: FAMILY MEDICINE CLINIC | Facility: CLINIC | Age: 87
End: 2022-10-24

## 2022-10-24 NOTE — TELEPHONE ENCOUNTER
Patient called she states the tramadol is giving her leg pain and dry mouth  She would like something else for pain  I explain to patient   Is not in office until tomorrow and she is okay to wait

## 2022-10-26 NOTE — TELEPHONE ENCOUNTER
I would recommend she continue tylenol as other pain medications are not recommended for patient's her age

## 2022-11-06 DIAGNOSIS — F41.1 ANXIETY STATE: ICD-10-CM

## 2022-11-07 RX ORDER — CLONAZEPAM 0.5 MG/1
TABLET ORAL
Qty: 60 TABLET | Refills: 0 | Status: SHIPPED | OUTPATIENT
Start: 2022-11-07

## 2022-11-08 DIAGNOSIS — F41.1 ANXIETY STATE: ICD-10-CM

## 2022-11-08 RX ORDER — CLONAZEPAM 0.5 MG/1
TABLET ORAL
Qty: 60 TABLET | Refills: 0 | OUTPATIENT
Start: 2022-11-08

## 2022-11-16 ENCOUNTER — OFFICE VISIT (OUTPATIENT)
Dept: FAMILY MEDICINE CLINIC | Facility: CLINIC | Age: 87
End: 2022-11-16

## 2022-11-16 VITALS
TEMPERATURE: 97.9 F | BODY MASS INDEX: 22.39 KG/M2 | HEART RATE: 106 BPM | SYSTOLIC BLOOD PRESSURE: 112 MMHG | RESPIRATION RATE: 16 BRPM | HEIGHT: 61 IN | WEIGHT: 118.6 LBS | DIASTOLIC BLOOD PRESSURE: 74 MMHG | OXYGEN SATURATION: 99 %

## 2022-11-16 DIAGNOSIS — K62.2: Primary | ICD-10-CM

## 2022-11-16 DIAGNOSIS — M54.32 SCIATICA OF LEFT SIDE: ICD-10-CM

## 2022-11-16 NOTE — ASSESSMENT & PLAN NOTE
Stable  Regular bowel movements every 2 days     On bowel regimen  Does not strain to void   Does have prolonged periods of sitting while using restroom   No acute findings for rectal pain, no hemorrhoids

## 2022-11-16 NOTE — ASSESSMENT & PLAN NOTE
Unchanged   Declined orthopedics referral   Continue with topical treatments, heating pads and incorporate daily stretching into routine

## 2022-11-29 ENCOUNTER — TELEPHONE (OUTPATIENT)
Dept: FAMILY MEDICINE CLINIC | Facility: CLINIC | Age: 87
End: 2022-11-29

## 2022-11-29 NOTE — TELEPHONE ENCOUNTER
Patient called she still having leg pain  She is taking tramadol for pain but states this medication is not helping   Please advise

## 2022-12-05 DIAGNOSIS — R35.0 FREQUENCY OF URINATION: ICD-10-CM

## 2022-12-07 RX ORDER — TRAMADOL HYDROCHLORIDE 50 MG/1
50 TABLET ORAL EVERY 8 HOURS PRN
Qty: 30 TABLET | Refills: 0 | Status: SHIPPED | OUTPATIENT
Start: 2022-12-07

## 2022-12-12 DIAGNOSIS — F41.1 ANXIETY STATE: ICD-10-CM

## 2022-12-12 RX ORDER — CLONAZEPAM 0.5 MG/1
TABLET ORAL
Qty: 60 TABLET | Refills: 0 | Status: SHIPPED | OUTPATIENT
Start: 2022-12-12

## 2022-12-15 DIAGNOSIS — R35.0 FREQUENCY OF URINATION: ICD-10-CM

## 2022-12-15 DIAGNOSIS — M25.552 HIP PAIN, ACUTE, LEFT: Primary | ICD-10-CM

## 2022-12-16 RX ORDER — TRAMADOL HYDROCHLORIDE 50 MG/1
50 TABLET ORAL EVERY 8 HOURS PRN
Qty: 30 TABLET | Refills: 0 | OUTPATIENT
Start: 2022-12-16

## 2022-12-22 RX ORDER — NALOXONE HYDROCHLORIDE 4 MG/.1ML
SPRAY NASAL
Qty: 1 EACH | Refills: 1 | Status: SHIPPED | OUTPATIENT
Start: 2022-12-22

## 2022-12-22 RX ORDER — TRAMADOL HYDROCHLORIDE 50 MG/1
50 TABLET ORAL EVERY 8 HOURS PRN
Qty: 30 TABLET | Refills: 0 | Status: SHIPPED | OUTPATIENT
Start: 2022-12-22

## 2023-01-09 ENCOUNTER — RA CDI HCC (OUTPATIENT)
Dept: OTHER | Facility: HOSPITAL | Age: 88
End: 2023-01-09

## 2023-01-09 NOTE — PROGRESS NOTES
[facility] 75  coding opportunities       Chart reviewed, no opportunity found: CHART REVIEWED, NO OPPORTUNITY FOUND      This is a reminder to address ALL HCC (risk adjustment) codes as found on active problem list for 2023 as patient scores reset to zero LILLIANA    Patients Insurance     Medicare Insurance: [employer]

## 2023-01-13 ENCOUNTER — TELEPHONE (OUTPATIENT)
Dept: FAMILY MEDICINE CLINIC | Facility: CLINIC | Age: 88
End: 2023-01-13

## 2023-01-13 DIAGNOSIS — F41.1 ANXIETY STATE: ICD-10-CM

## 2023-01-13 DIAGNOSIS — R35.0 FREQUENCY OF URINATION: ICD-10-CM

## 2023-01-13 RX ORDER — TRAMADOL HYDROCHLORIDE 50 MG/1
50 TABLET ORAL EVERY 8 HOURS PRN
Qty: 30 TABLET | Refills: 0 | Status: SHIPPED | OUTPATIENT
Start: 2023-01-13

## 2023-01-13 RX ORDER — CLONAZEPAM 0.5 MG/1
TABLET ORAL
Qty: 60 TABLET | Refills: 0 | Status: SHIPPED | OUTPATIENT
Start: 2023-01-13

## 2023-01-13 NOTE — TELEPHONE ENCOUNTER
Daughter Layman Cheadle canceled appt with DR Zamarripa for Monday 1/16/2023  Daughter states Luis Miguel Boo is getting back injections and does not want to reschedule

## 2023-01-26 ENCOUNTER — TELEPHONE (OUTPATIENT)
Dept: FAMILY MEDICINE CLINIC | Facility: CLINIC | Age: 88
End: 2023-01-26

## 2023-01-26 NOTE — TELEPHONE ENCOUNTER
Ana Damon LPN from [facility] called  Requesting for script for urinalysis  Patient started to have urinary frequency and burning yesterday  Denies any fever or chills

## 2023-02-03 DIAGNOSIS — R35.0 FREQUENCY OF URINATION: Primary | ICD-10-CM

## 2023-02-13 DIAGNOSIS — R35.0 FREQUENCY OF URINATION: ICD-10-CM

## 2023-02-13 RX ORDER — TRAMADOL HYDROCHLORIDE 50 MG/1
50 TABLET ORAL EVERY 8 HOURS PRN
Qty: 30 TABLET | Refills: 0 | Status: SHIPPED | OUTPATIENT
Start: 2023-02-13

## 2023-02-15 DIAGNOSIS — F41.1 ANXIETY STATE: ICD-10-CM

## 2023-02-15 RX ORDER — CLONAZEPAM 0.5 MG/1
TABLET ORAL
Qty: 60 TABLET | Refills: 0 | Status: SHIPPED | OUTPATIENT
Start: 2023-02-15

## 2023-03-01 ENCOUNTER — TELEPHONE (OUTPATIENT)
Dept: FAMILY MEDICINE CLINIC | Facility: CLINIC | Age: 88
End: 2023-03-01

## 2023-03-01 NOTE — TELEPHONE ENCOUNTER
Kian Sarabia calling [facility] needs to schedule apppointment  Will check with staff to see if they have transport for her today

## 2023-03-08 DIAGNOSIS — R35.0 FREQUENCY OF URINATION: ICD-10-CM

## 2023-03-08 RX ORDER — TRAMADOL HYDROCHLORIDE 50 MG/1
50 TABLET ORAL EVERY 8 HOURS PRN
Qty: 30 TABLET | Refills: 0 | Status: SHIPPED | OUTPATIENT
Start: 2023-03-08

## 2023-03-31 ENCOUNTER — RA CDI HCC (OUTPATIENT)
Dept: OTHER | Facility: HOSPITAL | Age: 88
End: 2023-03-31

## 2023-03-31 NOTE — PROGRESS NOTES
[facility] 75  coding opportunities       Chart reviewed, no opportunity found: CHART REVIEWED, NO OPPORTUNITY FOUND      This is a reminder to address ALL HCC (risk adjustment) codes as found on active problem list for 2023 as patient scores reset to zero LILLIANA    Patients Insurance     Medicare Insurance: [employer]

## 2023-04-04 NOTE — ASSESSMENT & PLAN NOTE
· COVID-19 infection with acute respiratory failure/hypoxia  · COVID-19:  Continue remdesivir and atorvastatin    Will add dexamethasone given worsening respiratory status   · Possible bacterial pneumonia however given negative procalcitonin will discontinue ceftriaxone and doxycycline    Lab Results   Component Value Date    SARSCOV2 Positive (A) 12/18/2020    [place] Not Detected 07/17/2020    SARSCOV2 Negative 07/13/2020     Results from last 7 days   Lab Units 12/18/20  2230 12/18/20  2044   D-DIMER QUANTITATIVE ug/ml FEU  --  1 47*   CRP mg/L 200 0*  --    FERRITIN ng/mL 413*  --      Results from last 7 days   Lab Units 12/19/20  0639 12/18/20  2044   PROCALCITONIN ng/ml 0 11 0 16 Cartilage Graft Text: The defect edges were debeveled with a #15 scalpel blade.  Given the location of the defect, shape of the defect, the fact the defect involved a full thickness cartilage defect a cartilage graft was deemed most appropriate.  An appropriate donor site was identified, cleansed, and anesthetized. The cartilage graft was then harvested and transferred to the recipient site, oriented appropriately and then sutured into place.  The secondary defect was then repaired using a primary closure.

## 2023-04-07 ENCOUNTER — OFFICE VISIT (OUTPATIENT)
Dept: FAMILY MEDICINE CLINIC | Facility: CLINIC | Age: 88
End: 2023-04-07

## 2023-04-07 VITALS
OXYGEN SATURATION: 96 % | HEART RATE: 81 BPM | BODY MASS INDEX: 23.05 KG/M2 | WEIGHT: 122 LBS | SYSTOLIC BLOOD PRESSURE: 120 MMHG | DIASTOLIC BLOOD PRESSURE: 80 MMHG | TEMPERATURE: 97.9 F

## 2023-04-07 DIAGNOSIS — K59.00 CONSTIPATION, UNSPECIFIED CONSTIPATION TYPE: ICD-10-CM

## 2023-04-07 DIAGNOSIS — I82.5Y2 CHRONIC DEEP VEIN THROMBOSIS (DVT) OF PROXIMAL VEIN OF LEFT LOWER EXTREMITY (HCC): ICD-10-CM

## 2023-04-07 DIAGNOSIS — F31.9 BIPOLAR 1 DISORDER (HCC): ICD-10-CM

## 2023-04-07 DIAGNOSIS — Z00.00 MEDICARE ANNUAL WELLNESS VISIT, SUBSEQUENT: Primary | ICD-10-CM

## 2023-04-07 DIAGNOSIS — K21.9 GASTROESOPHAGEAL REFLUX DISEASE, UNSPECIFIED WHETHER ESOPHAGITIS PRESENT: ICD-10-CM

## 2023-04-07 DIAGNOSIS — M54.32 SCIATICA OF LEFT SIDE: ICD-10-CM

## 2023-04-07 DIAGNOSIS — N32.81 OAB (OVERACTIVE BLADDER): ICD-10-CM

## 2023-04-07 DIAGNOSIS — I10 ESSENTIAL HYPERTENSION: Chronic | ICD-10-CM

## 2023-04-07 PROBLEM — S06.5XAA SUBDURAL HEMATOMA (HCC): Status: RESOLVED | Noted: 2021-02-07 | Resolved: 2023-04-07

## 2023-04-07 PROBLEM — G93.41 ACUTE METABOLIC ENCEPHALOPATHY: Status: RESOLVED | Noted: 2020-12-18 | Resolved: 2023-04-07

## 2023-04-07 RX ORDER — IBUPROFEN 400 MG/1
TABLET ORAL
COMMUNITY
Start: 2023-03-15

## 2023-04-07 RX ORDER — GABAPENTIN 100 MG/1
CAPSULE ORAL
COMMUNITY
Start: 2023-03-10

## 2023-04-07 NOTE — ASSESSMENT & PLAN NOTE
· Follows with urogynecology  · Was prescribed vaginal estrogen cream but did not use  · Has been on medications and pessaries in the past without improvement

## 2023-04-07 NOTE — PROGRESS NOTES
Assessment and Plan:     Problem List Items Addressed This Visit        Digestive    Gastroesophageal reflux disease     Remains on pantoprazole and Pepcid            Cardiovascular and Mediastinum    Essential hypertension (Chronic)     Stable  Blood pressure today is 120/80  No episodes of hypotension         Chronic deep vein thrombosis (DVT) of proximal vein of left lower extremity ([facility] [address] )     Patient had a DVT following abdominal hernia repair surgery in 2007  No further recurrences            Nervous and Auditory    Sciatica     Underwent SANCHO with physiatry which improved symptoms            Genitourinary    OAB (overactive bladder)     Follows with urogynecology  Was prescribed vaginal estrogen cream but did not use  Has been on medications and pessaries in the past without improvement              Other    Bipolar 1 disorder ([facility] [address] )     Follows with Dr Nella Tafoya  Patient's mood is up-and-down  Denies any suicidal thoughts or ideations         Constipation     Continues to wax and wane  More constipation than diarrhea  Requires MiraLAX, Senokot as needed        Other Visit Diagnoses     Medicare annual wellness visit, subsequent    -  Primary          Urinary Incontinence Plan of Care: counseling topics discussed: practice Kegel (pelvic floor strengthening) exercises, use restroom every 2 hours, limit alcohol, caffeine, spicy foods, and acidic foods, keeping a bladder diary, limiting fluid intake 3-4 hours before bed, preventing constipation and improving blood sugar control  Referral was placed for Urogynecology  Preventive health issues were discussed with patient, and age appropriate screening tests were ordered as noted in patient's After Visit Summary  Personalized health advice and appropriate referrals for health education or preventive services given if needed, as noted in patient's After Visit Summary       History of Present Illness:     Patient presents for a Medicare Wellness Visit    HPI Patient Care Team:  Serena Mendoza DO as PCP - General (Family Medicine)  Nirali Walker MD as PCP - PCP-[facility] Attributed-Roster     Review of Systems:     Review of Systems   Constitutional: Negative for activity change, chills, diaphoresis and fever  HENT: Negative for ear pain, hearing loss, postnasal drip, rhinorrhea, sinus pressure, sinus pain, sneezing and sore throat  Respiratory: Negative for cough, chest tightness, shortness of breath and wheezing  Cardiovascular: Negative for chest pain, palpitations and leg swelling  Gastrointestinal: Negative for abdominal pain, blood in stool, constipation, diarrhea, nausea and vomiting  Genitourinary: Negative for dysuria, frequency, hematuria and urgency  Musculoskeletal: Negative for arthralgias and myalgias  Neurological: Negative for dizziness, syncope, weakness, light-headedness, numbness and headaches          Problem List:     Patient Active Problem List   Diagnosis   • Depression   • Gastroesophageal reflux disease   • Essential hypertension   • Sciatica   • Bipolar 1 disorder ([facility] )   • Vitamin B12 deficiency   • Physical deconditioning   • Anxiety state   • Diverticulosis of colon   • Umbilical hernia   • Prophylactic antibiotic   • OAB (overactive bladder)   • Constipation   • Anemia, macrocytic   • Hypernatremia   • Urinary retention   • Ambulatory dysfunction   • Oropharyngeal dysphagia   • Fall   • Microscopic hematuria   • Chronic deep vein thrombosis (DVT) of proximal vein of left lower extremity (HCC)   • Dermatitis   • Anal canal prolapse   • Urinary frequency   • Hip pain, acute, left   • Chronic pain of left knee   • Recurrent UTI      Past Medical and Surgical History:     Past Medical History:   Diagnosis Date   • Anemia 2/26/2019   • Anxiety    • Bipolar 1 disorder ([facility] )    • Depression    • DVT (deep venous thrombosis) ([facility] ) 2008    Left leg   • GERD (gastroesophageal reflux disease)    • Hypertension • Overactive bladder      Past Surgical History:   Procedure Laterality Date   • ADENOIDECTOMY     • CATARACT EXTRACTION Bilateral     Right 10/2003, left 4/2004   • CHOLECYSTECTOMY     • DILATION AND CURETTAGE OF UTERUS  1985   • HERNIA REPAIR  11/02/2007    Femoral and small bowel resection   • HIP SURGERY      L hip   • TONSILLECTOMY      As a youth   • WRIST SURGERY      L wrist      Family History:     Family History   Problem Relation Age of Onset   • Heart disease Father       Social History:     Social History     Socioeconomic History   • Marital status: /Civil Union     Spouse name: None   • Number of children: None   • Years of education: None   • Highest education level: None   Occupational History   • None   Tobacco Use   • Smoking status: Former     Packs/day: 1 00     Types: Cigarettes   • Smokeless tobacco: Former   • Tobacco comments:     no exposure to passive smoke   Vaping Use   • Vaping Use: Never used   Substance and Sexual Activity   • Alcohol use: Not Currently     Alcohol/week: 0 0 standard drinks   • Drug use: Not Currently   • Sexual activity: Not Currently   Other Topics Concern   • None   Social History Narrative   • None     Social Determinants of Health     Financial Resource Strain: Low Risk    • Difficulty of Paying Living Expenses: Not very hard   Food Insecurity: Not on file   Transportation Needs: No Transportation Needs   • Lack of Transportation (Medical): No   • Lack of Transportation (Non-Medical):  No   Physical Activity: Not on file   Stress: Not on file   Social Connections: Not on file   Intimate Partner Violence: Not on file   Housing Stability: Not on file      Medications and Allergies:     Current Outpatient Medications   Medication Sig Dispense Refill   • acetaminophen (TYLENOL) 325 mg tablet Take 2 tablets (650 mg total) by mouth 3 (three) times a day 100 tablet 6   • bisacodyl (DULCOLAX) 10 mg suppository Insert 1 suppository (10 mg total) into the rectum daily 12 suppository 6   • clonazePAM (KlonoPIN) 0 25 MG disintegrating tablet      • clonazePAM (KlonoPIN) 0 5 mg tablet One bid as directed 60 tablet 0   • docusate sodium (COLACE) 100 mg capsule Take 1 capsule (100 mg total) by mouth every 12 (twelve) hours 60 capsule 0   • ferrous sulfate 325 (65 Fe) mg tablet Take 1 tablet (325 mg total) by mouth daily with breakfast 30 tablet 0   • gabapentin (NEURONTIN) 100 mg capsule      • ibuprofen (MOTRIN) 400 mg tablet      • lamoTRIgine (LaMICtal) 200 MG tablet Take 1 tablet (200 mg total) by mouth daily before breakfast 30 tablet 12   • loperamide (IMODIUM A-D) 2 MG tablet Take 1 tablet (2 mg total) by mouth 3 (three) times a day as needed for diarrhea 30 tablet 0   • loratadine (CLARITIN) 10 mg tablet Take 1 tablet (10 mg total) by mouth daily 90 tablet 1   • naloxone (NARCAN) 4 mg/0 1 mL nasal spray Administer 1 spray into a nostril  If no response after 2-3 minutes, give another dose in the other nostril using a new spray   1 each 1   • pantoprazole (PROTONIX) 40 mg tablet Take 1 tablet (40 mg total) by mouth daily in the early morning 90 tablet 3   • polyethylene glycol (MIRALAX) 17 g packet Take 17 g by mouth every other day 100 each 1   • psyllium (METAMUCIL) 58 6 % powder One pckt daily on Tuesday and Friday 660 g 12   • QUEtiapine (SEROquel) 200 mg tablet Take 2 tablets (400 mg total) by mouth daily at bedtime 60 tablet 0   • Sennosides-Docusate Sodium (SENNA-PLUS PO) Take 8 6 mg by mouth 2 (two) times a day as needed     • topiramate (TOPAMAX) 50 MG tablet Take 1 tablet (50 mg total) by mouth 2 (two) times a day 60 tablet 0   • traMADol (Ultram) 50 mg tablet Take 1 tablet (50 mg total) by mouth every 8 (eight) hours as needed for moderate pain 30 tablet 0   • triamcinolone (KENALOG) 0 1 % cream Apply topically 2 (two) times a day 30 g 0   • trolamine salicylate (ASPERCREME) 10 % cream Apply topically 3 (three) times a day 85 g 8   • famotidine (PEPCID) 20 mg tablet Take 1 tablet (20 mg total) by mouth daily for 14 days 14 tablet 0     No current facility-administered medications for this visit  Allergies   Allergen Reactions   • Ethanol    • Simvastatin       Immunizations:     Immunization History   Administered Date(s) Administered   • COVID-19 PFIZER VACCINE 0 3 ML IM 04/12/2021, 05/12/2021, 10/20/2021   • INFLUENZA 10/20/2021   • Pneumococcal Conjugate 13-Valent 04/17/2015   • Pneumococcal Polysaccharide PPV23 03/06/2000   • Td (adult), Unspecified 12/14/2011   • Td (adult), adsorbed 12/14/2011   • Tuberculin Skin Test-PPD Intradermal 04/05/2019   • Typhoid, Unspecified 01/01/1999   • influenza, injectable, quadrivalent 11/01/2019      Health Maintenance: There are no preventive care reminders to display for this patient  Topic Date Due   • COVID-19 Vaccine (4 - Booster for Pfizer series) 12/15/2021      Medicare Screening Tests and Risk Assessments:     Steph Andre is here for her Subsequent Wellness visit  Last Medicare Wellness visit information reviewed, patient interviewed and updates made to the record today  Health Risk Assessment:   Patient rates overall health as fair  Patient feels that their physical health rating is same  Patient is satisfied with their life  Eyesight was rated as same  Hearing was rated as same  Patient feels that their emotional and mental health rating is slightly worse  Patients states they are never, rarely angry  Patient states they are sometimes unusually tired/fatigued  Pain experienced in the last 7 days has been some  Patient's pain rating has been 3/10  Patient states that she has experienced no weight loss or gain in last 6 months  Depression Screening:   PHQ-9 Score: 7      Fall Risk Screening:    In the past year, patient has experienced: history of falling in past year    Number of falls: 1  Injured during fall?: No    Feels unsteady when standing or walking?: Yes    Worried about falling?: Yes      Urinary Incontinence Screening:   Patient has leaked urine accidently in the last six months  Home Safety:  Patient has trouble with stairs inside or outside of their home  Patient has working smoke alarms and has working carbon monoxide detector  Home safety hazards include: none  Nutrition:   Current diet is Regular  Medications:   Patient is not currently taking any over-the-counter supplements  Patient is not able to manage medications  Activities of Daily Living (ADLs)/Instrumental Activities of Daily Living (IADLs):   Walk and transfer into and out of bed and chair?: Yes  Dress and groom yourself?: Yes    Bathe or shower yourself?: Yes    Feed yourself?  Yes  Do your laundry/housekeeping?: No  Manage your money, pay your bills and track your expenses?: No  Make your own meals?: No    Do your own shopping?: No    Previous Hospitalizations:   Any hospitalizations or ED visits within the last 12 months?: Yes    How many hospitalizations have you had in the last year?: 1-2    Advance Care Planning:   Living will: No    Durable POA for healthcare: No    Advanced directive: No    Advanced directive counseling given: Yes    Five wishes given: No    End of Life Decisions reviewed with patient: Yes    Provider agrees with end of life decisions: Yes      Comments: Level 3 DNR/DNI  Son and sister are next of kin    Cognitive Screening:   Provider or family/friend/caregiver concerned regarding cognition?: No    PREVENTIVE SCREENINGS      Cardiovascular Screening:    General: Screening Not Indicated      Diabetes Screening:     General: Screening Not Indicated      Colorectal Cancer Screening:     General: Screening Not Indicated      Breast Cancer Screening:     General: Screening Not Indicated      Cervical Cancer Screening:    General: Screening Not Indicated      Osteoporosis Screening:    General: Screening Not Indicated      Abdominal Aortic Aneurysm (AAA) Screening:        General: Screening Not Indicated Lung Cancer Screening:     General: Screening Not Indicated    Screening, Brief Intervention, and Referral to Treatment (SBIRT)    Screening  Typical number of drinks in a day: 0  Typical number of drinks in a week: 0  Interpretation: Low risk drinking behavior  Review of Current Opioid Use    Opioid Risk Tool (ORT) Interpretation: Complete Opioid Risk Tool (ORT)    No results found  Physical Exam:     /80 (BP Location: Left arm, Patient Position: Sitting, Cuff Size: Adult)   Pulse 81   Temp 97 9 °F (36 6 °C) (Temporal)   Wt 55 3 kg (122 lb)   SpO2 96%   BMI 23 05 kg/m²     Physical Exam  Constitutional:       General: She is not in acute distress  Appearance: She is well-developed  She is not diaphoretic  HENT:      Head: Normocephalic and atraumatic  Right Ear: Tympanic membrane, ear canal and external ear normal       Left Ear: Tympanic membrane, ear canal and external ear normal       Nose: Nose normal  No congestion  Mouth/Throat:      Mouth: Mucous membranes are moist       Pharynx: Oropharynx is clear  No oropharyngeal exudate  Eyes:      Pupils: Pupils are equal, round, and reactive to light  Neck:      Vascular: No JVD  Cardiovascular:      Rate and Rhythm: Normal rate and regular rhythm  Heart sounds: Normal heart sounds  No murmur heard  No friction rub  No gallop  Pulmonary:      Effort: Pulmonary effort is normal  No respiratory distress  Breath sounds: Normal breath sounds  No wheezing or rales  Chest:      Chest wall: No tenderness  Abdominal:      General: Bowel sounds are normal  There is no distension  Palpations: Abdomen is soft  Tenderness: There is no abdominal tenderness  There is no guarding or rebound  Musculoskeletal:         General: No tenderness  Normal range of motion  Lymphadenopathy:      Cervical: No cervical adenopathy  Skin:     General: Skin is warm and dry     Neurological:      Mental Status: She is alert and oriented to person, place, and time  Cranial Nerves: No cranial nerve deficit  Psychiatric:         Mood and Affect: Mood normal          Behavior: Behavior normal          Thought Content:  Thought content normal          Judgment: Judgment normal           Demetri Mon, DO

## 2023-04-07 NOTE — ASSESSMENT & PLAN NOTE
Follows with Dr You Fairchild  Patient's mood is up-and-down  Denies any suicidal thoughts or ideations

## 2023-04-07 NOTE — PATIENT INSTRUCTIONS
Medicare Preventive Visit Patient Instructions  Thank you for completing your Welcome to Medicare Visit or Medicare Annual Wellness Visit today  Your next wellness visit will be due in one year (4/7/2024)  The screening/preventive services that you may require over the next 5-10 years are detailed below  Some tests may not apply to you based off risk factors and/or age  Screening tests ordered at today's visit but not completed yet may show as past due  Also, please note that scanned in results may not display below  Preventive Screenings:  Service Recommendations Previous Testing/Comments   Colorectal Cancer Screening  * Colonoscopy    * Fecal Occult Blood Test (FOBT)/Fecal Immunochemical Test (FIT)  * Fecal DNA/Cologuard Test  * Flexible Sigmoidoscopy Age: 39-70 years old   Colonoscopy: every 10 years (may be performed more frequently if at higher risk)  OR  FOBT/FIT: every 1 year  OR  Cologuard: every 3 years  OR  Sigmoidoscopy: every 5 years  Screening may be recommended earlier than age 39 if at higher risk for colorectal cancer  Also, an individualized decision between you and your healthcare provider will decide whether screening between the ages of 74-80 would be appropriate  Colonoscopy: Not on file  FOBT/FIT: Not on file  Cologuard: Not on file  Sigmoidoscopy: Not on file    Screening Not Indicated     Breast Cancer Screening Age: 36 years old  Frequency: every 1-2 years  Not required if history of left and right mastectomy Mammogram: Not on file        Cervical Cancer Screening Between the ages of 21-29, pap smear recommended once every 3 years  Between the ages of 33-67, can perform pap smear with HPV co-testing every 5 years     Recommendations may differ for women with a history of total hysterectomy, cervical cancer, or abnormal pap smears in past  Pap Smear: Not on file    Screening Not Indicated   Hepatitis C Screening Once for adults born between 1945 and 1965  More frequently in patients at high risk for Hepatitis C Hep C Antibody: Not on file        Diabetes Screening 1-2 times per year if you're at risk for diabetes or have pre-diabetes Fasting glucose: 82 mg/dL (4/17/2019)  A1C: No results in last 5 years (No results in last 5 years)      Cholesterol Screening Once every 5 years if you don't have a lipid disorder  May order more often based on risk factors  Lipid panel: Not on file          Other Preventive Screenings Covered by Medicare:  1  Abdominal Aortic Aneurysm (AAA) Screening: covered once if your at risk  You're considered to be at risk if you have a family history of AAA  2  Lung Cancer Screening: covers low dose CT scan once per year if you meet all of the following conditions: (1) Age 50-69; (2) No signs or symptoms of lung cancer; (3) Current smoker or have quit smoking within the last 15 years; (4) You have a tobacco smoking history of at least 20 pack years (packs per day multiplied by number of years you smoked); (5) You get a written order from a healthcare provider  3  Glaucoma Screening: covered annually if you're considered high risk: (1) You have diabetes OR (2) Family history of glaucoma OR (3)  aged 48 and older OR (3)  American aged 72 and older  3  Osteoporosis Screening: covered every 2 years if you meet one of the following conditions: (1) You're estrogen deficient and at risk for osteoporosis based off medical history and other findings; (2) Have a vertebral abnormality; (3) On glucocorticoid therapy for more than 3 months; (4) Have primary hyperparathyroidism; (5) On osteoporosis medications and need to assess response to drug therapy  · Last bone density test (DXA Scan): Not on file  5  HIV Screening: covered annually if you're between the age of 12-76  Also covered annually if you are younger than 13 and older than 72 with risk factors for HIV infection   For pregnant patients, it is covered up to 3 times per pregnancy  Immunizations:  Immunization Recommendations   Influenza Vaccine Annual influenza vaccination during flu season is recommended for all persons aged >= 6 months who do not have contraindications   Pneumococcal Vaccine   * Pneumococcal conjugate vaccine = PCV13 (Prevnar 13), PCV15 (Vaxneuvance), PCV20 (Prevnar 20)  * Pneumococcal polysaccharide vaccine = PPSV23 (Pneumovax) Adults 25-60 years old: 1-3 doses may be recommended based on certain risk factors  Adults 72 years old: 1-2 doses may be recommended based off what pneumonia vaccine you previously received   Hepatitis B Vaccine 3 dose series if at intermediate or high risk (ex: diabetes, end stage renal disease, liver disease)   Tetanus (Td) Vaccine - COST NOT COVERED BY MEDICARE PART B Following completion of primary series, a booster dose should be given every 10 years to maintain immunity against tetanus  Td may also be given as tetanus wound prophylaxis  Tdap Vaccine - COST NOT COVERED BY MEDICARE PART B Recommended at least once for all adults  For pregnant patients, recommended with each pregnancy  Shingles Vaccine (Shingrix) - COST NOT COVERED BY MEDICARE PART B  2 shot series recommended in those aged 48 and above     Health Maintenance Due:  There are no preventive care reminders to display for this patient  Immunizations Due:      Topic Date Due   • COVID-19 Vaccine (4 - Booster for Pfizer series) 12/15/2021   • Influenza Vaccine (1) 09/01/2022     Advance Directives   What are advance directives? Advance directives are legal documents that state your wishes and plans for medical care  These plans are made ahead of time in case you lose your ability to make decisions for yourself  Advance directives can apply to any medical decision, such as the treatments you want, and if you want to donate organs  What are the types of advance directives? There are many types of advance directives, and each state has rules about how to use them  You may choose a combination of any of the following:  · Living will: This is a written record of the treatment you want  You can also choose which treatments you do not want, which to limit, and which to stop at a certain time  This includes surgery, medicine, IV fluid, and tube feedings  · Durable power of  for healthcare [place] [facility]): This is a written record that states who you want to make healthcare choices for you when you are unable to make them for yourself  This person, called a proxy, is usually a family member or a friend  You may choose more than 1 proxy  · Do not resuscitate (DNR) order:  A DNR order is used in case your heart stops beating or you stop breathing  It is a request not to have certain forms of treatment, such as CPR  A DNR order may be included in other types of advance directives  · Medical directive: This covers the care that you want if you are in a coma, near death, or unable to make decisions for yourself  You can list the treatments you want for each condition  Treatment may include pain medicine, surgery, blood transfusions, dialysis, IV or tube feedings, and a ventilator (breathing machine)  · Values history: This document has questions about your views, beliefs, and how you feel and think about life  This information can help others choose the care that you would choose  Why are advance directives important? An advance directive helps you control your care  Although spoken wishes may be used, it is better to have your wishes written down  Spoken wishes can be misunderstood, or not followed  Treatments may be given even if you do not want them  An advance directive may make it easier for your family to make difficult choices about your care  Fall Prevention    Fall prevention  includes ways to make your home and other areas safer  It also includes ways you can move more carefully to prevent a fall   Health conditions that cause changes in your blood pressure, vision, or muscle strength and coordination may increase your risk for falls  Medicines may also increase your risk for falls if they make you dizzy, weak, or sleepy  Fall prevention tips:   · Stand or sit up slowly  · Use assistive devices as directed  · Wear shoes that fit well and have soles that   · Wear a personal alarm  · Stay active  · Manage your medical conditions  Home Safety Tips:  · Add items to prevent falls in the bathroom  · Keep paths clear  · Install bright lights in your home  · Keep items you use often on shelves within reach  · Paint or place reflective tape on the edges of your stairs  Urinary Incontinence   Urinary incontinence (UI)  is when you lose control of your bladder  UI develops because your bladder cannot store or empty urine properly  The 3 most common types of UI are stress incontinence, urge incontinence, or both  Medicines:   · May be given to help strengthen your bladder control  Report any side effects of medication to your healthcare provider  Do pelvic muscle exercises often:  Your pelvic muscles help you stop urinating  Squeeze these muscles tight for 5 seconds, then relax for 5 seconds  Gradually work up to squeezing for 10 seconds  Do 3 sets of 15 repetitions a day, or as directed  This will help strengthen your pelvic muscles and improve bladder control  Train your bladder:  Go to the bathroom at set times, such as every 2 hours, even if you do not feel the urge to go  You can also try to hold your urine when you feel the urge to go  For example, hold your urine for 5 minutes when you feel the urge to go  As that becomes easier, hold your urine for 10 minutes  Self-care:   · Keep a UI record  Write down how often you leak urine and how much you leak  Make a note of what you were doing when you leaked urine  · Drink liquids as directed  You may need to limit the amount of liquid you drink to help control your urine leakage   Do not drink any liquid right before you go to bed  Limit or do not have drinks that contain caffeine or alcohol  · Prevent constipation  Eat a variety of high-fiber foods  Good examples are high-fiber cereals, beans, vegetables, and whole-grain breads  Walking is the best way to trigger your intestines to have a bowel movement  · Exercise regularly and maintain a healthy weight  Weight loss and exercise will decrease pressure on your bladder and help you control your leakage  · Use a catheter as directed  to help empty your bladder  A catheter is a tiny, plastic tube that is put into your bladder to drain your urine  · Go to behavior therapy as directed  Behavior therapy may be used to help you learn to control your urge to urinate  Narcotic (Opioid) Safety    Use narcotics safely:  · Take prescribed narcotics exactly as directed  · Do not give narcotics to others or take narcotics that belong to someone else  · Do not mix narcotics without medicines or alcohol  · Do not drive or operate heavy machinery after you take the narcotic  · Monitor for side effects and notify your healthcare provider if you experienced side effects such as nausea, sleepiness, itching, or trouble thinking clearly  Manage constipation:    Constipation is the most common side effect of narcotic medicine  Constipation is when you have hard, dry bowel movements, or you go longer than usual between bowel movements  Tell your healthcare provider about all changes in your bowel movements while you are taking narcotics  He or she may recommend laxative medicine to help you have a bowel movement  He or she may also change the kind of narcotic you are taking, or change when you take it  The following are more ways you can prevent or relieve constipation:    · Drink liquids as directed  You may need to drink extra liquids to help soften and move your bowels   Ask how much liquid to drink each day and which liquids are best for you   · Eat high-fiber foods  This may help decrease constipation by adding bulk to your bowel movements  High-fiber foods include fruits, vegetables, whole-grain breads and cereals, and beans  Your healthcare provider or dietitian can help you create a high-fiber meal plan  Your provider may also recommend a fiber supplement if you cannot get enough fiber from food  · Exercise regularly  Regular physical activity can help stimulate your intestines  Walking is a good exercise to prevent or relieve constipation  Ask which exercises are best for you  · Schedule a time each day to have a bowel movement  This may help train your body to have regular bowel movements  Bend forward while you are on the toilet to help move the bowel movement out  Sit on the toilet for at least 10 minutes, even if you do not have a bowel movement  Store narcotics safely:   · Store narcotics where others cannot easily get them  Keep them in a locked cabinet or secure area  Do not  keep them in a purse or other bag you carry with you  A person may be looking for something else and find the narcotics  · Make sure narcotics are stored out of the reach of children  A child can easily overdose on narcotics  Narcotics may look like candy to a small child  The best way to dispose of narcotics: The laws vary by country and area  In the [place], the best way is to return the narcotics through a take-back program  This program is offered by the [employer] ([employer])  The following are options for using the program:  · Take the narcotics to a VANESSA collection site  The site is often a law enforcement center  Call your local law enforcement center for scheduled take-back days in your area  You will be given information on where to go if the collection site is in a different location  · Take the narcotics to an approved pharmacy or hospital   A pharmacy or hospital may be set up as a collection site   You will need to ask if it is a VANESSA collection site if you were not directed there  A pharmacy or doctor's office may not be able to take back narcotics unless it is a VANESSA site  · Use a mail-back system  This means you are given containers to put the narcotics into  You will then mail them in the containers  · Use a take-back drop box  This is a place to leave the narcotics at any time  People and animals will not be able to get into the box  Your local law enforcement agency can tell you where to find a drop box in your area  Other ways to manage pain:   · Ask your healthcare provider about non-narcotic medicines to control pain  Nonprescription medicines include NSAIDs (such as ibuprofen) and acetaminophen  Prescription medicines include muscle relaxers, antidepressants, and steroids  · Pain may be managed without any medicines  Some ways to relieve pain include massage, aromatherapy, or meditation  Physical or occupational therapy may also help  For more information:   · Drug Enforcement Administration  [facility] [address]  Phone: 7- 037 - [phone]  Web Address: [facility]/drug_disposal/    · [address] and Drug Administration  [facility] [address] [facility]  Phone: 1- 713 - 956-3058  Web Address: http://[employer]/     © Copyright [employer] 2018 Information is for End User's use only and may not be sold, redistributed or otherwise used for commercial purposes   All illustrations and images included in CareNotes® are the copyrighted property of A D A M , Inc  or [address] [place] [address]

## 2023-04-24 ENCOUNTER — TRANSITIONAL CARE MANAGEMENT (OUTPATIENT)
Dept: FAMILY MEDICINE CLINIC | Facility: CLINIC | Age: 88
End: 2023-04-24

## 2023-04-24 ENCOUNTER — TELEPHONE (OUTPATIENT)
Dept: FAMILY MEDICINE CLINIC | Facility: CLINIC | Age: 88
End: 2023-04-24

## 2023-04-24 NOTE — TELEPHONE ENCOUNTER
Patients daughter calling because she would like to have Lidocaine patches instead of Oxycodone She states patient will become addicted to the oxycodone so they do not want to use that medication

## 2023-05-02 ENCOUNTER — OFFICE VISIT (OUTPATIENT)
Dept: FAMILY MEDICINE CLINIC | Facility: CLINIC | Age: 88
End: 2023-05-02

## 2023-05-02 VITALS
WEIGHT: 125.2 LBS | TEMPERATURE: 98.3 F | DIASTOLIC BLOOD PRESSURE: 80 MMHG | HEART RATE: 87 BPM | BODY MASS INDEX: 23.66 KG/M2 | RESPIRATION RATE: 18 BRPM | SYSTOLIC BLOOD PRESSURE: 124 MMHG | OXYGEN SATURATION: 94 %

## 2023-05-02 DIAGNOSIS — K59.00 CONSTIPATION, UNSPECIFIED CONSTIPATION TYPE: ICD-10-CM

## 2023-05-02 DIAGNOSIS — M54.30 SCIATICA, UNSPECIFIED LATERALITY: ICD-10-CM

## 2023-05-02 DIAGNOSIS — F11.20 CONTINUOUS OPIOID DEPENDENCE (HCC): ICD-10-CM

## 2023-05-02 DIAGNOSIS — M25.552 HIP PAIN, ACUTE, LEFT: Primary | ICD-10-CM

## 2023-05-02 DIAGNOSIS — S06.5X9A TRAUMATIC SUBDURAL HEMORRHAGE WITH LOSS OF CONSCIOUSNESS OF UNSPECIFIED DURATION, INITIAL ENCOUNTER (HCC): ICD-10-CM

## 2023-05-02 RX ORDER — OXYCODONE HYDROCHLORIDE 5 MG/1
TABLET ORAL
COMMUNITY
Start: 2023-04-22 | End: 2023-05-02

## 2023-05-02 RX ORDER — ACETAMINOPHEN 500 MG
1000 TABLET ORAL 3 TIMES DAILY
Qty: 540 TABLET | Refills: 0
Start: 2023-05-02 | End: 2023-07-31

## 2023-05-02 NOTE — PROGRESS NOTES
TCM Call     Date and time call was made  4/24/2023  9:05 AM    Hospital care reviewed  Records reviewed    Patient was hospitialized at  [facility]    Date of Admission  04/20/23    Date of discharge  04/22/23    Disposition  Assisted Living    Were the patients medications reviewed and updated  Yes    Current Symptoms  None    Fatigue severity  Mild      TCM Call     Rash location  Leg; Neck    Laterality  Bilateral    Post hospital issues  None    Should patient be enrolled in anticoag monitoring? No    Scheduled for follow up? Yes    Did you obtain your prescribed medications  Yes    Do you need help managing your prescriptions or medications  No    Is transportation to your appointment needed  No    I have advised the patient to call PCP with any new or worsening symptoms  Bunny Vallecillo, [address] or [employer] other    Support System  Family; Home care staff    The type of support provided  Physical; Emotional; Financial    Are you recieving any outpatient services  No    Are you recieving home care services  No    Are you using any community resources  No    Current waiver services  No    Have you fallen in the last 12 months  No    Interperter language line needed  No    Counseling  Family            Assessment/Plan:      1  Hip pain, acute, left  Assessment & Plan:  Unchanged  Was admitted for evaluation for intractable pain  Receives good pain relief with tramadol  Tramadol was discontinued due to age and comorbidities  After discharge patient was started on oxycodone 5 mg which caused patient be tired and constipated  Discontinue oxycodone and restart tramadol  Recommend also continuing Tylenol 1000 mg 3 times daily  Follows with pain management/physiatry and is due to have a lumbar spine MRI  Patient was also started on gabapentin      2   Constipation, unspecified constipation type  Assessment & Plan:  Waxes and wanes  Likely due to irritable bowel syndrome  Has had more constipation than diarrhea generally  Takes MiraLAX every other day and senna daily  Patient receives bisacodyl suppositories every 5 or 6 days if no bowel movements  Recommend patient receive suppository if no bowel movements for 3 days      3  Sciatica, unspecified laterality  -     acetaminophen (TYLENOL) 500 mg tablet; Take 2 tablets (1,000 mg total) by mouth 3 (three) times a day    4  Continuous opioid dependence ([facility] )    5  Traumatic subdural hemorrhage with loss of consciousness of unspecified duration, initial encounter ([facility] )            Subjective:      Patient ID: Rosa Gauthier is a 80 y o  female presents today for hospital follow up  Patient reports having fatigue/lethargy with oxycodone  She does not want to take this any longer  She would prefer to stay on tramadol for pain  Discussed with patient's daughter who did not want patient on tramadol or oxycodone due to potential side effects at patient's age  At this point since patient's main medications are managed by her assisted living facility remaining on tramadol is safe and would prevent frequent ED visits  HPI    The following portions of the patient's history were reviewed and updated as appropriate: allergies, current medications, past family history, past medical history, past social history, past surgical history and problem list     Review of Systems   Constitutional: Negative for activity change, chills, diaphoresis and fever  HENT: Negative for ear pain, hearing loss, postnasal drip, rhinorrhea, sinus pressure, sinus pain, sneezing and sore throat  Respiratory: Negative for cough, chest tightness, shortness of breath and wheezing  Cardiovascular: Negative for chest pain, palpitations and leg swelling  Gastrointestinal: Positive for constipation  Negative for abdominal pain, blood in stool, diarrhea, nausea and vomiting  Genitourinary: Negative for dysuria, frequency, hematuria and urgency  Musculoskeletal: Positive for back pain  Negative for arthralgias and myalgias  Neurological: Negative for dizziness, syncope, weakness, light-headedness, numbness and headaches  Objective:      /80 (BP Location: Left arm, Patient Position: Sitting, Cuff Size: Large)   Pulse 87   Temp 98 3 °F (36 8 °C) (Temporal)   Resp 18   Wt 56 8 kg (125 lb 3 2 oz)   SpO2 94%   BMI 23 66 kg/m²          Physical Exam  Vitals reviewed  Constitutional:       General: She is not in acute distress  Appearance: She is well-developed  She is not ill-appearing, toxic-appearing or diaphoretic  HENT:      Head: Normocephalic and atraumatic  Right Ear: External ear normal       Left Ear: External ear normal       Nose: Nose normal  No congestion  Mouth/Throat:      Mouth: Mucous membranes are moist       Pharynx: Oropharynx is clear  No oropharyngeal exudate  Eyes:      General: No scleral icterus  Right eye: No discharge  Left eye: No discharge  Conjunctiva/sclera: Conjunctivae normal       Pupils: Pupils are equal, round, and reactive to light  Neck:      Thyroid: No thyromegaly  Vascular: No JVD  Trachea: No tracheal deviation  Cardiovascular:      Rate and Rhythm: Normal rate and regular rhythm  Heart sounds: Normal heart sounds  No murmur heard  No friction rub  No gallop  Pulmonary:      Effort: Pulmonary effort is normal  No respiratory distress  Breath sounds: Normal breath sounds  No wheezing or rales  Chest:      Chest wall: No tenderness  Abdominal:      General: Bowel sounds are normal  There is no distension  Palpations: Abdomen is soft  Tenderness: There is no abdominal tenderness  There is no right CVA tenderness, left CVA tenderness, guarding or rebound  Musculoskeletal:         General: Normal range of motion  Cervical back: Normal range of motion and neck supple  No tenderness  Right lower leg: No edema  Left lower leg: No edema  Lymphadenopathy:      Cervical: No cervical adenopathy  Skin:     General: Skin is warm and dry  Coloration: Skin is not pale  Findings: No erythema  Neurological:      Mental Status: She is alert and oriented to person, place, and time  Mental status is at baseline  Psychiatric:         Mood and Affect: Mood normal          Behavior: Behavior normal          Thought Content:  Thought content normal          Judgment: Judgment normal

## 2023-05-02 NOTE — ASSESSMENT & PLAN NOTE
Unchanged  · Was admitted for evaluation for intractable pain  · Receives good pain relief with tramadol  · Tramadol was discontinued due to age and comorbidities  · After discharge patient was started on oxycodone 5 mg which caused patient be tired and constipated  · Discontinue oxycodone and restart tramadol  · Recommend also continuing Tylenol 1000 mg 3 times daily  · Follows with pain management/physiatry and is due to have a lumbar spine MRI  · Patient was also started on gabapentin

## 2023-05-02 NOTE — ASSESSMENT & PLAN NOTE
Waxes and wanes  · Likely due to irritable bowel syndrome  · Has had more constipation than diarrhea generally  · Takes MiraLAX every other day and senna daily  · Patient receives bisacodyl suppositories every 5 or 6 days if no bowel movements  · Recommend patient receive suppository if no bowel movements for 3 days

## 2023-05-08 ENCOUNTER — TELEPHONE (OUTPATIENT)
Dept: FAMILY MEDICINE CLINIC | Facility: CLINIC | Age: 88
End: 2023-05-08

## 2023-05-08 NOTE — TELEPHONE ENCOUNTER
Patient wants to take tylenol as needed 8 hours apart, so she is not waking up in the middle of the night to take this when she is not in pain  Patient is in [place]  Would like a call back  Karlie Alcocer

## 2023-05-22 ENCOUNTER — TELEMEDICINE (OUTPATIENT)
Dept: FAMILY MEDICINE CLINIC | Facility: CLINIC | Age: 88
End: 2023-05-22

## 2023-05-22 VITALS — BODY MASS INDEX: 23.66 KG/M2 | HEIGHT: 61 IN

## 2023-05-22 DIAGNOSIS — K62.89 RECTAL PAIN: Primary | ICD-10-CM

## 2023-05-22 RX ORDER — TRAMADOL HYDROCHLORIDE 50 MG/1
50 TABLET ORAL DAILY PRN
Qty: 30 TABLET | Refills: 0 | Status: SHIPPED | OUTPATIENT
Start: 2023-05-22

## 2023-05-22 NOTE — PROGRESS NOTES
Virtual Regular Visit    Verification of patient location:    Patient is located at Home in the following state in which I hold an active license [place]      Assessment/Plan: Perianal irritation    Use HC 2 5% cream bid until improved     Problem List Items Addressed This Visit    None  Visit Diagnoses     Frequency of urination                   Reason for visit is   Chief Complaint   Patient presents with   • Rectal Pain     Nauseous    • Virtual Regular Visit        Encounter provider Faiza Miller MD    Provider located at [address] [place] [id number]      Recent Visits  No visits were found meeting these conditions  Showing recent visits within past 7 days and meeting all other requirements  Today's Visits  Date Type Provider Dept   05/22/23 Telemedicine Faiza Miller MD [facility] today's visits and meeting all other requirements  Future Appointments  No visits were found meeting these conditions  Showing future appointments within next 150 days and meeting all other requirements       The patient was identified by name and date of birth  Bev Plasencia was informed that this is a telemedicine visit and that the visit is being conducted through the [employer]  She agrees to proceed     My office door was closed  No one else was in the room  She acknowledged consent and understanding of privacy and security of the video platform  The patient has agreed to participate and understands they can discontinue the visit at any time  Patient is aware this is a billable service  Subjective  Bev Plasencia is a 80 y o  female calling about perirectal pain and irritation; no change in BM pattern ; no blood ; feels painful when wiping          See above        Past Medical History:   Diagnosis Date   • Anemia 2/26/2019   • Anxiety    • Bipolar 1 disorder ([facility] [address] )    • Depression • DVT (deep venous thrombosis) ([facility] [address] ) 2008    Left leg   • GERD (gastroesophageal reflux disease)    • Hypertension    • Overactive bladder        Past Surgical History:   Procedure Laterality Date   • ADENOIDECTOMY     • CATARACT EXTRACTION Bilateral     Right 10/2003, left 4/2004   • CHOLECYSTECTOMY     • DILATION AND CURETTAGE OF UTERUS  1985   • HERNIA REPAIR  11/02/2007    Femoral and small bowel resection   • HIP SURGERY      L hip   • TONSILLECTOMY      As a youth   • WRIST SURGERY      L wrist       Current Outpatient Medications   Medication Sig Dispense Refill   • acetaminophen (TYLENOL) 500 mg tablet Take 2 tablets (1,000 mg total) by mouth 3 (three) times a day 540 tablet 0   • bisacodyl (DULCOLAX) 10 mg suppository Insert 1 suppository (10 mg total) into the rectum daily 12 suppository 6   • clonazePAM (KlonoPIN) 0 5 mg tablet One bid as directed 60 tablet 0   • ferrous sulfate 325 (65 Fe) mg tablet Take 1 tablet (325 mg total) by mouth daily with breakfast 30 tablet 0   • gabapentin (NEURONTIN) 100 mg capsule      • ibuprofen (MOTRIN) 400 mg tablet      • lamoTRIgine (LaMICtal) 200 MG tablet Take 1 tablet (200 mg total) by mouth daily before breakfast 30 tablet 12   • loperamide (IMODIUM A-D) 2 MG tablet Take 1 tablet (2 mg total) by mouth 3 (three) times a day as needed for diarrhea 30 tablet 0   • loratadine (CLARITIN) 10 mg tablet Take 1 tablet (10 mg total) by mouth daily 90 tablet 1   • naloxone (NARCAN) 4 mg/0 1 mL nasal spray Administer 1 spray into a nostril  If no response after 2-3 minutes, give another dose in the other nostril using a new spray   1 each 1   • pantoprazole (PROTONIX) 40 mg tablet Take 1 tablet (40 mg total) by mouth daily in the early morning 90 tablet 3   • psyllium (METAMUCIL) 58 6 % powder One pckt daily on Tuesday and Friday 660 g 12   • QUEtiapine (SEROquel) 200 mg tablet Take 2 tablets (400 mg total) by mouth daily at bedtime 60 tablet 0   • Sennosides-Docusate Sodium "(SENNA-PLUS PO) Take 8 6 mg by mouth 2 (two) times a day as needed     • topiramate (TOPAMAX) 50 MG tablet Take 1 tablet (50 mg total) by mouth 2 (two) times a day 60 tablet 0   • traMADol (Ultram) 50 mg tablet Take 1 tablet (50 mg total) by mouth daily as needed for moderate pain 30 tablet 0   • triamcinolone (KENALOG) 0 1 % cream Apply topically 2 (two) times a day 30 g 0   • trolamine salicylate (ASPERCREME) 10 % cream Apply topically 3 (three) times a day 85 g 8   • docusate sodium (COLACE) 100 mg capsule Take 1 capsule (100 mg total) by mouth every 12 (twelve) hours (Patient not taking: Reported on 5/2/2023) 60 capsule 0   • famotidine (PEPCID) 20 mg tablet Take 1 tablet (20 mg total) by mouth daily for 14 days 14 tablet 0   • polyethylene glycol (MIRALAX) 17 g packet Take 17 g by mouth every other day (Patient not taking: Reported on 5/2/2023) 100 each 1     No current facility-administered medications for this visit  Allergies   Allergen Reactions   • Ethanol    • Simvastatin        Review of Systems   Gastrointestinal: Positive for rectal pain  Negative for abdominal pain, anal bleeding, blood in stool, constipation and diarrhea  Genitourinary: Negative for difficulty urinating  Video Exam    It was my intent to perform this visit via video technology but the patient was not able to do a video connection so the visit was completed via audio telephone only  Vitals:    05/22/23 1323   Height: 5' 1\" (1 549 m)       Physical Exam  Constitutional:       Appearance: She is well-developed  Pulmonary:      Effort: Pulmonary effort is normal  No respiratory distress  Neurological:      Mental Status: She is alert and oriented to person, place, and time  Psychiatric:         Behavior: Behavior normal          Thought Content:  Thought content normal          Judgment: Judgment normal           Visit Time  Total Visit Duration: 17      "

## 2023-05-22 NOTE — TELEPHONE ENCOUNTER
That is fine  Let [address] know we can change the order to Tylenol 1000 mg every 8 hours

## 2023-05-23 ENCOUNTER — TELEPHONE (OUTPATIENT)
Dept: FAMILY MEDICINE CLINIC | Facility: CLINIC | Age: 88
End: 2023-05-23

## 2023-05-23 NOTE — TELEPHONE ENCOUNTER
Denver corrales  Can't use rectal cream because it has alcohol in it    She is allergic to alcohol    Please advise

## 2023-05-24 ENCOUNTER — TRANSCRIBE ORDERS (OUTPATIENT)
Dept: FAMILY MEDICINE CLINIC | Facility: CLINIC | Age: 88
End: 2023-05-24

## 2023-05-24 DIAGNOSIS — K62.2: Primary | ICD-10-CM

## 2023-05-24 RX ORDER — MOMETASONE FUROATE 1 MG/G
CREAM TOPICAL
Qty: 15 G | Refills: 6 | Status: SHIPPED | OUTPATIENT
Start: 2023-05-24

## 2023-05-25 DIAGNOSIS — R30.0 DYSURIA: Primary | ICD-10-CM

## 2023-05-26 ENCOUNTER — TELEPHONE (OUTPATIENT)
Dept: FAMILY MEDICINE CLINIC | Facility: CLINIC | Age: 88
End: 2023-05-26

## 2023-05-26 NOTE — TELEPHONE ENCOUNTER
[id number] nurse called , they are requesting Tylenol order to be prn not every 8 hours patient is complaining about being woken up with no pain

## 2023-06-05 ENCOUNTER — TELEPHONE (OUTPATIENT)
Dept: FAMILY MEDICINE CLINIC | Facility: CLINIC | Age: 88
End: 2023-06-05

## 2023-06-07 NOTE — PROGRESS NOTES
Progress Note - Nelson Ricks 1935, 80 y o  female MRN: [id number]    Unit/Bed#: [facility] 132-55 Encounter: [id number]    Primary Care Provider: Mir Beauchamp MD   Date and time admitted to hospital: 2/7/2021  2:38 AM        Hypotension  Assessment & Plan  · Patient experienced an episode of hypotension early on the morning of 2/9  · After 2L, patient's BP improved, patient transitioned out of ICU  · Maintain MAP >65    Sepsis due to urinary tract infection [facility])  Assessment & Plan  ? Blood cx 2/6: 1/2 GNR  ? Ucx 2/6: +nitrites, +leukos, >100,000 CFU mixed contaminants  ? Plan:   § To continue on cefepime for total of 7d, with repeat blood cultures pending  § Complicated UTI 2/2 chronic indwelling aj catheter due to urinary retention    Fall  Assessment & Plan  - PT/OT evaluation and treatment, recommendation acute rehab  - Gerontology consulted    Constipation  Assessment & Plan  CTAP on presentation with marked distention of the rectum with large amount of fecal material  § Had 2 bowel movements  § Continue miralax, senokot, PRN mineral oil enemas  § Per gerontology, consider switching iron supplementation to ferrous gluconate       Bipolar 1 disorder (HCC)  Assessment & Plan   Continue home Lamictal, Seroquel, Topamax, and PRN klonopin     * Subdural hematoma (HCC)  Assessment & Plan  ? Repeat head CT on 2/8 demonstrated no interval change in subacute b/l SDH hematomas compared to 2/7  ? Plan:   § Neurosurgery consulted  Patient cleared for VTE prophylaxis  Had some increasing confusion overnight, resolved with GCS 15 at this point    § Currently GCS 15, repeat head CT if GCS declines more than 2 points in 1 hour  § Keppra 500mg BID x7 days for seizure ppx  § Q2H neuro checks  § PT/OT        Disposition: continue care      SUBJECTIVE:  Chief Complaint: no complaints    Subjective: no complaints       OBJECTIVE:     Meds/Allergies     Current Facility-Administered Medications:     cefepime (MAXIPIME) 2,000 mg in dextrose 5 % 50 mL IVPB, 2,000 mg, Intravenous, Q12H, Kimmy Nice MD, Last Rate: 100 mL/hr at 02/10/21 0557, 2,000 mg at 02/10/21 0557    clonazePAM (KlonoPIN) tablet 0 5 mg, 0 5 mg, Oral, BID, Pete Briceno PA-C, 0 5 mg at 02/09/21 1733    heparin (porcine) subcutaneous injection 5,000 Units, 5,000 Units, Subcutaneous, Q8H [address], 5,000 Units at 02/10/21 0545 **AND** [COMPLETED] Platelet count, , , Once, Lesley Frank MD    lamoTRIgine (LaMICtal) tablet 200 mg, 200 mg, Oral, Daily Before Breakfast, Kimmy Nice MD, 200 mg at 02/10/21 0606    levETIRAcetam (KEPPRA) tablet 500 mg, 500 mg, Oral, Q12H [address], Kimmy Nice MD, 500 mg at 02/09/21 2115    mineral oil enema 1 enema, 1 enema, Rectal, Daily PRN, Kimmy Nice MD    [facility] injection 4 mg, 4 mg, Intravenous, Q6H PRN, Kimmy Nice MD, 4 mg at 02/08/21 1820    pantoprazole (PROTONIX) EC tablet 40 mg, 40 mg, Oral, Early Morning, Kimmy Nice MD, 40 mg at 02/10/21 0554    polyethylene glycol (MIRALAX) packet 17 g, 17 g, Oral, Daily, Kimmy Nice MD, 17 g at 02/09/21 0933    QUEtiapine (SEROquel) tablet 400 mg, 400 mg, Oral, HS, Kimmy Nice MD, 400 mg at 02/09/21 2115    senna-docusate sodium (SENOKOT S) 8 6-50 mg per tablet 2 tablet, 2 tablet, Oral, BID, Kimmy Nice MD, 2 tablet at 02/09/21 1734    topiramate (TOPAMAX) tablet 50 mg, 50 mg, Oral, BID, Kimmy Nice MD, 50 mg at 02/09/21 1733     Vitals:   Vitals:    02/10/21 0210   BP: 102/65   Pulse: 100   Resp: 17   Temp:    SpO2: 97%       Intake/Output:  I/O       02/08 0701 - 02/09 0700 02/09 0701 - 02/10 0700 02/10 0701 - 02/11 0700    P  O  520 1560     I V  (mL/kg) 1738 8 (36) 30 (0 6)     IV Piggyback 1000 150     Total Intake(mL/kg) 3258 8 (67 5) 1740 (35 9)     Urine (mL/kg/hr) 1800 (1 6) 1700 (1 5)     Stool 0 0     Total Output 1800 1700     Net +1458 8 +40            Unmeasured Stool Occurrence 2 x 0 x            Nutrition/GI Proph/Bowel Reg: dysphagia 3, nutritional supplements, senokot s, Miralax    Physical Exam:   GENERAL APPEARANCE: NAD, resting comfortably in bed  NEURO: good sensation throughout  GCS 15, oriented x4  HEENT: NC/AT, no obvious signs of trauma  CV: RRR, No + 2/6 murmur, B/L DP/Radial Pulses 2+   LUNGS: CTAB, Chest rise equal B/L  ABD: NT/ND, BSx4  MSK: No signs of edema/ erythema noted  SKIN: No obvious signs of skin breakdown noted, warm/dry    Invasive Devices     Peripheral Intravenous Line            Peripheral IV 02/08/21 Left Antecubital 1 day    Peripheral IV 02/09/21 Right Antecubital 1 day    Peripheral IV 02/09/21 Right Forearm 1 day          Drain            Urethral Catheter Temperature probe 16 Fr  3 days                 Lab Results: Results: I have personally reviewed pertinent reports  Imaging/EKG Studies: Results: I have personally reviewed pertinent reports      VTE Prophylaxis: Heparin negative/UCG

## 2023-06-13 ENCOUNTER — TELEPHONE (OUTPATIENT)
Dept: FAMILY MEDICINE CLINIC | Facility: CLINIC | Age: 88
End: 2023-06-13

## 2023-06-13 NOTE — TELEPHONE ENCOUNTER
Patient says her legs feel like there burning laying but when she walks it bothers and wants to know what to do.

## 2023-06-22 DIAGNOSIS — K62.89 RECTAL PAIN: ICD-10-CM

## 2023-06-22 RX ORDER — TRAMADOL HYDROCHLORIDE 50 MG/1
50 TABLET ORAL DAILY PRN
Qty: 30 TABLET | Refills: 0 | Status: SHIPPED | OUTPATIENT
Start: 2023-06-22

## 2023-06-24 ENCOUNTER — TREATMENT (OUTPATIENT)
Dept: FAMILY MEDICINE CLINIC | Facility: CLINIC | Age: 88
End: 2023-06-24

## 2023-06-24 DIAGNOSIS — K21.9 GASTROESOPHAGEAL REFLUX DISEASE, UNSPECIFIED WHETHER ESOPHAGITIS PRESENT: Primary | ICD-10-CM

## 2023-06-24 PROBLEM — R41.89 COGNITIVE IMPAIRMENT: Chronic | Status: ACTIVE | Noted: 2023-06-24

## 2023-06-26 ENCOUNTER — TELEPHONE (OUTPATIENT)
Dept: FAMILY MEDICINE CLINIC | Facility: CLINIC | Age: 88
End: 2023-06-26

## 2023-06-26 NOTE — TELEPHONE ENCOUNTER
----- Message from [place] Collet, MD sent at 6/24/2023  2:17 PM EDT -----  MA 51 form completed and on my workstation ;

## 2023-06-30 ENCOUNTER — TELEPHONE (OUTPATIENT)
Dept: FAMILY MEDICINE CLINIC | Facility: CLINIC | Age: 88
End: 2023-06-30

## 2023-06-30 NOTE — TELEPHONE ENCOUNTER
Burning down both legs lower, abd pain x one day  Bowels sound great  No UTI when checked last week  Jessica Keenan from [address] [facility] living  Phone  [phone]  Fax: [phone]  Please advise

## 2023-07-05 NOTE — TELEPHONE ENCOUNTER
Has moved bowels  still has leg burning but Quita (nurse) says she is at her baseline   she is doing ok

## 2023-07-06 DIAGNOSIS — K59.00 CONSTIPATION, UNSPECIFIED CONSTIPATION TYPE: ICD-10-CM

## 2023-07-06 RX ORDER — POLYETHYLENE GLYCOL 3350 17 G/17G
17 POWDER, FOR SOLUTION ORAL DAILY PRN
Qty: 100 EACH | Refills: 1 | Status: SHIPPED | OUTPATIENT
Start: 2023-07-06

## 2023-07-11 DIAGNOSIS — K62.89 RECTAL PAIN: ICD-10-CM

## 2023-07-12 RX ORDER — TRAMADOL HYDROCHLORIDE 50 MG/1
50 TABLET ORAL DAILY PRN
Qty: 30 TABLET | Refills: 0 | Status: SHIPPED | OUTPATIENT
Start: 2023-07-12

## 2023-07-18 ENCOUNTER — TREATMENT (OUTPATIENT)
Dept: FAMILY MEDICINE CLINIC | Facility: CLINIC | Age: 88
End: 2023-07-18

## 2023-07-18 ENCOUNTER — TELEPHONE (OUTPATIENT)
Dept: FAMILY MEDICINE CLINIC | Facility: CLINIC | Age: 88
End: 2023-07-18

## 2023-07-18 DIAGNOSIS — R26.2 AMBULATORY DYSFUNCTION: Primary | ICD-10-CM

## 2023-07-25 ENCOUNTER — RA CDI HCC (OUTPATIENT)
Dept: OTHER | Facility: HOSPITAL | Age: 88
End: 2023-07-25

## 2023-07-25 NOTE — PROGRESS NOTES
[facility] [address] [facility] coding opportunities       Chart reviewed, no opportunity found: CHART REVIEWED, NO OPPORTUNITY FOUND        Patients Insurance     Medicare Insurance: [employer]

## 2023-07-27 ENCOUNTER — HOSPITAL ENCOUNTER (EMERGENCY)
Facility: HOSPITAL | Age: 88
Discharge: NURSING, REHAB OR LONG-TERM CARE | End: 2023-07-27
Attending: EMERGENCY MEDICINE
Payer: COMMERCIAL

## 2023-07-27 ENCOUNTER — APPOINTMENT (EMERGENCY)
Dept: CT IMAGING | Facility: HOSPITAL | Age: 88
End: 2023-07-27
Payer: COMMERCIAL

## 2023-07-27 VITALS
TEMPERATURE: 99.1 F | OXYGEN SATURATION: 95 % | SYSTOLIC BLOOD PRESSURE: 148 MMHG | RESPIRATION RATE: 17 BRPM | HEART RATE: 82 BPM | DIASTOLIC BLOOD PRESSURE: 89 MMHG

## 2023-07-27 DIAGNOSIS — M43.9 COMPRESSION DEFORMITY OF VERTEBRA: Primary | ICD-10-CM

## 2023-07-27 DIAGNOSIS — K57.90 DIVERTICULOSIS: ICD-10-CM

## 2023-07-27 DIAGNOSIS — R31.9 HEMATURIA: ICD-10-CM

## 2023-07-27 LAB
ALBUMIN SERPL BCP-MCNC: 4 G/DL (ref 3.5–5)
ALP SERPL-CCNC: 67 U/L (ref 34–104)
ALT SERPL W P-5'-P-CCNC: 7 U/L (ref 7–52)
ANION GAP SERPL CALCULATED.3IONS-SCNC: 4 MMOL/L
AST SERPL W P-5'-P-CCNC: 15 U/L (ref 13–39)
BACTERIA UR QL AUTO: ABNORMAL /HPF
BASOPHILS # BLD AUTO: 0.04 THOUSANDS/ÂΜL (ref 0–0.1)
BASOPHILS NFR BLD AUTO: 1 % (ref 0–1)
BILIRUB SERPL-MCNC: 0.33 MG/DL (ref 0.2–1)
BILIRUB UR QL STRIP: NEGATIVE
BUN SERPL-MCNC: 13 MG/DL (ref 5–25)
CALCIUM SERPL-MCNC: 9 MG/DL (ref 8.4–10.2)
CHLORIDE SERPL-SCNC: 112 MMOL/L (ref 96–108)
CLARITY UR: CLEAR
CO2 SERPL-SCNC: 28 MMOL/L (ref 21–32)
COLOR UR: YELLOW
CREAT SERPL-MCNC: 0.75 MG/DL (ref 0.6–1.3)
EOSINOPHIL # BLD AUTO: 0.08 THOUSAND/ÂΜL (ref 0–0.61)
EOSINOPHIL NFR BLD AUTO: 2 % (ref 0–6)
ERYTHROCYTE [DISTWIDTH] IN BLOOD BY AUTOMATED COUNT: 13.7 % (ref 11.6–15.1)
GFR SERPL CREATININE-BSD FRML MDRD: 71 ML/MIN/1.73SQ M
GLUCOSE SERPL-MCNC: 72 MG/DL (ref 65–140)
GLUCOSE UR STRIP-MCNC: NEGATIVE MG/DL
HCT VFR BLD AUTO: 37.1 % (ref 34.8–46.1)
HGB BLD-MCNC: 11.5 G/DL (ref 11.5–15.4)
HGB UR QL STRIP.AUTO: ABNORMAL
IMM GRANULOCYTES # BLD AUTO: 0.01 THOUSAND/UL (ref 0–0.2)
IMM GRANULOCYTES NFR BLD AUTO: 0 % (ref 0–2)
KETONES UR STRIP-MCNC: NEGATIVE MG/DL
LEUKOCYTE ESTERASE UR QL STRIP: NEGATIVE
LYMPHOCYTES # BLD AUTO: 1.93 THOUSANDS/ÂΜL (ref 0.6–4.47)
LYMPHOCYTES NFR BLD AUTO: 40 % (ref 14–44)
MCH RBC QN AUTO: 31.9 PG (ref 26.8–34.3)
MCHC RBC AUTO-ENTMCNC: 31 G/DL (ref 31.4–37.4)
MCV RBC AUTO: 103 FL (ref 82–98)
MONOCYTES # BLD AUTO: 0.32 THOUSAND/ÂΜL (ref 0.17–1.22)
MONOCYTES NFR BLD AUTO: 7 % (ref 4–12)
NEUTROPHILS # BLD AUTO: 2.46 THOUSANDS/ÂΜL (ref 1.85–7.62)
NEUTS SEG NFR BLD AUTO: 50 % (ref 43–75)
NITRITE UR QL STRIP: NEGATIVE
NON-SQ EPI CELLS URNS QL MICRO: ABNORMAL /HPF
NRBC BLD AUTO-RTO: 0 /100 WBCS
PH UR STRIP.AUTO: 7 [PH] (ref 4.5–8)
PLATELET # BLD AUTO: 264 THOUSANDS/UL (ref 149–390)
PMV BLD AUTO: 9.2 FL (ref 8.9–12.7)
POTASSIUM SERPL-SCNC: 3.9 MMOL/L (ref 3.5–5.3)
PROT SERPL-MCNC: 7.1 G/DL (ref 6.4–8.4)
PROT UR STRIP-MCNC: NEGATIVE MG/DL
RBC # BLD AUTO: 3.6 MILLION/UL (ref 3.81–5.12)
RBC #/AREA URNS AUTO: ABNORMAL /HPF
SODIUM SERPL-SCNC: 144 MMOL/L (ref 135–147)
SP GR UR STRIP.AUTO: 1.01 (ref 1–1.03)
UROBILINOGEN UR QL STRIP.AUTO: 0.2 E.U./DL
WBC # BLD AUTO: 4.84 THOUSAND/UL (ref 4.31–10.16)
WBC #/AREA URNS AUTO: ABNORMAL /HPF

## 2023-07-27 PROCEDURE — 81001 URINALYSIS AUTO W/SCOPE: CPT

## 2023-07-27 PROCEDURE — 85025 COMPLETE CBC W/AUTO DIFF WBC: CPT

## 2023-07-27 PROCEDURE — 74177 CT ABD & PELVIS W/CONTRAST: CPT

## 2023-07-27 PROCEDURE — 36415 COLL VENOUS BLD VENIPUNCTURE: CPT

## 2023-07-27 PROCEDURE — 99285 EMERGENCY DEPT VISIT HI MDM: CPT

## 2023-07-27 PROCEDURE — 99284 EMERGENCY DEPT VISIT MOD MDM: CPT

## 2023-07-27 PROCEDURE — 80053 COMPREHEN METABOLIC PANEL: CPT

## 2023-07-27 PROCEDURE — G1004 CDSM NDSC: HCPCS

## 2023-07-27 RX ADMIN — IOHEXOL 100 ML: 350 INJECTION, SOLUTION INTRAVENOUS at 14:02

## 2023-07-27 NOTE — ED NOTES
Pt was offered dinner and stated she is still full from the meal she had earlier.         Bhavana Bae RN  07/27/23 2187

## 2023-07-27 NOTE — ED NOTES
Pt attempted to call son for ride but he did not answer. Pt requested transport to be set up back to [facility].         Grover Park RN  07/27/23 [id number]

## 2023-07-27 NOTE — ED PROVIDER NOTES
History  Chief Complaint   Patient presents with   • Possible UTI     Pt reports burning with urination over the last few days. Denies blood in urine or fevers. Reports nausea. Pt also reports burning in rectum      80 YOF with PMH anemia, anxiety, depression, DVT in left leg, GERD, HTN and rectal prolapse presents today with burning with urination for the past few days that has continued to worsen. Reports that the burning is in her vagina as well and radiates to her rectum. Pt reports RLQ abdominal pain as well. States last week she had diarrhea for a few days but then she has not had a BM since then that she can recall. Pt reports this is normal for her though- have a few days of loose stools and then she won't go for a little. Pt denies fever, chills, chest pain, SOB, back pain. No blood when she wipes. Prior to Admission Medications   Prescriptions Last Dose Informant Patient Reported? Taking?    QUEtiapine (SEROquel) 200 mg tablet  Outside Facility (Specify) No No   Sig: Take 2 tablets (400 mg total) by mouth daily at bedtime   Sennosides-Docusate Sodium (SENNA-PLUS PO)  Outside Facility (Specify) Yes No   Sig: Take 8.6 mg by mouth 2 (two) times a day as needed   acetaminophen (TYLENOL) 500 mg tablet   No No   Sig: Take 2 tablets (1,000 mg total) by mouth 3 (three) times a day   bisacodyl (DULCOLAX) 10 mg suppository  Outside Facility (Specify) No No   Sig: Insert 1 suppository (10 mg total) into the rectum daily   clonazePAM (KlonoPIN) 0.5 mg tablet  Outside Facility (Specify) No No   Sig: One bid as directed   docusate sodium (COLACE) 100 mg capsule  Outside Facility (Specify) No No   Sig: Take 1 capsule (100 mg total) by mouth every 12 (twelve) hours   Patient not taking: Reported on 5/2/2023   famotidine (PEPCID) 20 mg tablet   No No   Sig: Take 1 tablet (20 mg total) by mouth daily for 14 days   ferrous sulfate 325 (65 Fe) mg tablet  Outside Facility (Specify) No No   Sig: Take 1 tablet (325 mg total) by mouth daily with breakfast   gabapentin (NEURONTIN) 100 mg capsule  Outside Facility (Specify) Yes No   ibuprofen (MOTRIN) 400 mg tablet  Outside Facility (Specify) Yes No   lamoTRIgine (LaMICtal) 200 MG tablet  Outside Facility (Specify) No No   Sig: Take 1 tablet (200 mg total) by mouth daily before breakfast   loperamide (IMODIUM A-D) 2 MG tablet  Outside Facility (Specify) No No   Sig: Take 1 tablet (2 mg total) by mouth 3 (three) times a day as needed for diarrhea   loratadine (CLARITIN) 10 mg tablet  Outside Facility (Specify) No No   Sig: Take 1 tablet (10 mg total) by mouth daily   mometasone (ELOCON) 0.1 % cream   No No   Sig: Apply to rectal area daily until cleared   naloxone (NARCAN) 4 mg/0.1 mL nasal spray  Outside Facility (Specify) No No   Sig: Administer 1 spray into a nostril. If no response after 2-3 minutes, give another dose in the other nostril using a new spray.    pantoprazole (PROTONIX) 40 mg tablet  Outside Facility (Specify) No No   Sig: Take 1 tablet (40 mg total) by mouth daily in the early morning   polyethylene glycol (MIRALAX) 17 g packet   No No   Sig: Take 17 g by mouth daily as needed (constipation)   psyllium (METAMUCIL) 58.6 % powder  Outside Facility (Specify) No No   Sig: One pckt daily on Tuesday and Friday   topiramate (TOPAMAX) 50 MG tablet  Outside Facility (Specify) No No   Sig: Take 1 tablet (50 mg total) by mouth 2 (two) times a day   traMADol (Ultram) 50 mg tablet   No No   Sig: Take 1 tablet (50 mg total) by mouth daily as needed for moderate pain   triamcinolone (KENALOG) 0.1 % cream  Outside Facility (Specify) No No   Sig: Apply topically 2 (two) times a day   trolamine salicylate (ASPERCREME) 10 % cream  Outside Facility (Specify) No No   Sig: Apply topically 3 (three) times a day      Facility-Administered Medications: None       Past Medical History:   Diagnosis Date   • Anemia 2/26/2019   • Anxiety    • Bipolar 1 disorder ([address])    • Depression    • DVT (deep venous thrombosis) ([address]) 2008    Left leg   • GERD (gastroesophageal reflux disease)    • Hypertension    • Overactive bladder        Past Surgical History:   Procedure Laterality Date   • ADENOIDECTOMY     • CATARACT EXTRACTION Bilateral     Right 10/2003, left 4/2004   • CHOLECYSTECTOMY     • DILATION AND CURETTAGE OF UTERUS  1985   • HERNIA REPAIR  11/02/2007    Femoral and small bowel resection   • HIP SURGERY      L hip   • TONSILLECTOMY      As a youth   • WRIST SURGERY      L wrist       Family History   Problem Relation Age of Onset   • Heart disease Father      I have reviewed and agree with the history as documented. E-Cigarette/Vaping   • E-Cigarette Use Never User      E-Cigarette/Vaping Substances   • Nicotine No    • THC No    • CBD No    • Flavoring No      Social History     Tobacco Use   • Smoking status: Former     Packs/day: 1.00     Types: Cigarettes   • Smokeless tobacco: Former   • Tobacco comments:     no exposure to passive smoke   Vaping Use   • Vaping Use: Never used   Substance Use Topics   • Alcohol use: Not Currently     Alcohol/week: 0.0 standard drinks of alcohol   • Drug use: Not Currently       Review of Systems   Constitutional: Negative for chills and fever. Gastrointestinal: Positive for abdominal pain. Negative for nausea and vomiting. Genitourinary: Positive for dysuria. Negative for frequency and urgency. All other systems reviewed and are negative. Physical Exam  Physical Exam  Vitals and nursing note reviewed. Constitutional:       General: She is not in acute distress. Appearance: Normal appearance. She is well-developed. She is not ill-appearing. HENT:      Head: Normocephalic and atraumatic. Eyes:      Conjunctiva/sclera: Conjunctivae normal.   Cardiovascular:      Rate and Rhythm: Normal rate. Pulmonary:      Effort: Pulmonary effort is normal.   Abdominal:      Palpations: Abdomen is soft. Tenderness:  There is abdominal tenderness (RLQ ). Musculoskeletal:         General: No swelling. Cervical back: Normal range of motion and neck supple. Skin:     General: Skin is warm and dry. Capillary Refill: Capillary refill takes less than 2 seconds. Neurological:      Mental Status: She is alert.    Psychiatric:         Mood and Affect: Mood normal.         Vital Signs  ED Triage Vitals   Temperature Pulse Respirations Blood Pressure SpO2   07/27/23 1147 07/27/23 1147 07/27/23 1147 07/27/23 1147 07/27/23 1147   (!) 97.3 °F (36.3 °C) 84 17 (!) 186/112 96 %      Temp Source Heart Rate Source Patient Position - Orthostatic VS BP Location FiO2 (%)   07/27/23 1147 07/27/23 1147 07/27/23 1230 07/27/23 1230 --   Oral Monitor Lying Right arm       Pain Score       --                  Vitals:    07/27/23 1147 07/27/23 1230 07/27/23 1245 07/27/23 1445   BP: (!) 186/112 (!) 198/89 (!) 172/76 (!) 180/89   Pulse: 84 74 74 70   Patient Position - Orthostatic VS:  Lying  Lying         Visual Acuity      ED Medications  Medications   iohexol (OMNIPAQUE) 350 MG/ML injection (SINGLE-DOSE) 100 mL (100 mL Intravenous Given 7/27/23 1402)       Diagnostic Studies  Results Reviewed     Procedure Component Value Units Date/Time    Urine Microscopic [[id number]]  (Abnormal) Collected: 07/27/23 1221    Lab Status: Final result Specimen: Urine, Clean Catch Updated: 07/27/23 1300     RBC, UA 4-10 /hpf      WBC, UA 1-2 /hpf      Epithelial Cells None Seen /hpf      Bacteria, UA None Seen /hpf     Comprehensive metabolic panel [[id number]]  (Abnormal) Collected: 07/27/23 1220    Lab Status: Final result Specimen: Blood from Arm, Left Updated: 07/27/23 1249     Sodium 144 mmol/L      Potassium 3.9 mmol/L      Chloride 112 mmol/L      CO2 28 mmol/L      ANION GAP 4 mmol/L      BUN 13 mg/dL      Creatinine 0.75 mg/dL      Glucose 72 mg/dL      Calcium 9.0 mg/dL      AST 15 U/L      ALT 7 U/L      Alkaline Phosphatase 67 U/L      Total Protein 7.1 g/dL      Albumin 4.0 g/dL      Total Bilirubin 0.33 mg/dL      eGFR 71 ml/min/1.73sq m     Narrative:      National Kidney Disease Foundation guidelines for Chronic Kidney Disease (CKD):   •  Stage 1 with normal or high GFR (GFR > 90 mL/min/1.73 square meters)  •  Stage 2 Mild CKD (GFR = 60-89 mL/min/1.73 square meters)  •  Stage 3A Moderate CKD (GFR = 45-59 mL/min/1.73 square meters)  •  Stage 3B Moderate CKD (GFR = 30-44 mL/min/1.73 square meters)  •  Stage 4 Severe CKD (GFR = 15-29 mL/min/1.73 square meters)  •  Stage 5 End Stage CKD (GFR <15 mL/min/1.73 square meters)  Note: GFR calculation is accurate only with a steady state creatinine    CBC and differential [[id number]]  (Abnormal) Collected: 07/27/23 1220    Lab Status: Final result Specimen: Blood from Arm, Left Updated: 07/27/23 1230     WBC 4.84 Thousand/uL      RBC 3.60 Million/uL      Hemoglobin 11.5 g/dL      Hematocrit 37.1 %       fL      MCH 31.9 pg      MCHC 31.0 g/dL      RDW 13.7 %      MPV 9.2 fL      Platelets 336 Thousands/uL      nRBC 0 /100 WBCs      Neutrophils Relative 50 %      Immat GRANS % 0 %      Lymphocytes Relative 40 %      Monocytes Relative 7 %      Eosinophils Relative 2 %      Basophils Relative 1 %      Neutrophils Absolute 2.46 Thousands/µL      Immature Grans Absolute 0.01 Thousand/uL      Lymphocytes Absolute 1.93 Thousands/µL      Monocytes Absolute 0.32 Thousand/µL      Eosinophils Absolute 0.08 Thousand/µL      Basophils Absolute 0.04 Thousands/µL     Urine Macroscopic, POC [[id number]]  (Abnormal) Collected: 07/27/23 1221    Lab Status: Final result Specimen: Urine Updated: 07/27/23 1222     Color, UA Yellow     Clarity, UA Clear     pH, UA 7.0     Leukocytes, UA Negative     Nitrite, UA Negative     Protein, UA Negative mg/dl      Glucose, UA Negative mg/dl      Ketones, UA Negative mg/dl      Urobilinogen, UA 0.2 E.U./dl      Bilirubin, UA Negative     Occult Blood, UA Trace     Specific Gravity, UA 1.015    Narrative:      CL[facility] RESULT                 CT abdomen pelvis w contrast   Final Result by Chey Martel MD (07/27 3669)      Mild acute or subacute compression compression deformity deformity of the L3 vertebral body. The study was marked in [facility] for immediate notification. Workstation performed: [id number]                    Procedures  Procedures         ED Course  ED Course as of 07/27/23 1605   Thu Jul 27, 2023   1301 Bacteria, UA: None Seen   1522 CT abdomen pelvis w contrast  Mild acute or subacute compression compression deformity deformity of the L3 vertebral body. SBIRT 22yo+    Flowsheet Row Most Recent Value   Initial Alcohol Screen: US AUDIT-C     1. How often do you have a drink containing alcohol? 0 Filed at: 07/27/2023 1152   2. How many drinks containing alcohol do you have on a typical day you are drinking? 0 Filed at: 07/27/2023 1152   3a. Male UNDER 65: How often do you have five or more drinks on one occasion? 0 Filed at: 07/27/2023 1152   3b. FEMALE Any Age, or MALE 65+: How often do you have 4 or more drinks on one occassion? 0 Filed at: 07/27/2023 1152   Audit-C Score 0 Filed at: 07/27/2023 1152   VENKAT: How many times in the past year have you. .. Used an illegal drug or used a prescription medication for non-medical reasons? Never Filed at: 07/27/2023 1152                    Medical Decision Making  80 YOF with PMH anemia, anxiety, depression, DVT in left leg, GERD, HTN and rectal prolapse presents today with burning with urination for the past few days that has continued to worsen. Reports RLQ pain. On physical exam pt is well appearing, no acute distress. Afebrile and non tachy. Has some RLQ tenderness. Rectal temp was performed on pt and there was no prolapse. CBC and CMP normal. UA showed blood however no signs of UTI. CT showed no acute findings to account for pt's pain.  It did show mild acute or subacute compression compression deformity deformity of the L3 vertebral body. Pt denies any recent falls. Discussed with pt that she should follow up with her PCP. I have discussed the plan to discharge pt from ED. The patient was discharged in stable condition.  Patient ambulated off the department.  Extensive return to emergency department precautions were discussed.  Follow up with appropriate providers including primary care physician was discussed.  Patient and/or their  primary decision maker expressed understanding. [facility] remained stable during entire emergency department stay. 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 Decision-making details documented in ED Course. Radiology: ordered. Decision-making details documented in ED Course. Risk  Prescription drug management. Disposition  Final diagnoses:   Compression deformity of vertebra   Diverticulosis   Hematuria     Time reflects when diagnosis was documented in both MDM as applicable and the Disposition within this note     Time User Action Codes Description Comment    7/27/2023  3:23 PM Winsome Callander Add [W86.5P9] Acquired compression deformity of vertebra     7/27/2023  3:23 PM Winsome Callander Add [M43.9] Compression deformity of vertebra     7/27/2023  3:23 PM Iline Peper [M43.9] Compression deformity of vertebra     7/27/2023  3:23 PM Winsome Callander Remove [X84.8T2] Acquired compression deformity of vertebra     7/27/2023  3:23 PM Winsome Callander Add [K57.90] Diverticulosis     7/27/2023  4:01 PM Winsome Callander Add [R31.9] Hematuria       ED Disposition     ED Disposition   Discharge    Condition   Stable    Date/Time   Thu Jul 27, 2023  4:01 PM    [address] discharge to home/self care.                Follow-up Information    None         Patient's Medications   Discharge Prescriptions    No medications on file       No discharge procedures on file.     PDMP Review       Value Time User    PDMP Reviewed  Yes 6/22/2023  4:43  [facility],           ED Provider  Electronically Signed by           Zamzam Boswell PA-C  07/27/23 5537

## 2023-07-27 NOTE — ED NOTES
RN contacted [facility] and advised pt would be returning to their facility.       [address], RN  07/27/23 6127

## 2023-08-01 ENCOUNTER — OFFICE VISIT (OUTPATIENT)
Dept: FAMILY MEDICINE CLINIC | Facility: CLINIC | Age: 88
End: 2023-08-01
Payer: COMMERCIAL

## 2023-08-01 VITALS
SYSTOLIC BLOOD PRESSURE: 150 MMHG | WEIGHT: 125 LBS | DIASTOLIC BLOOD PRESSURE: 80 MMHG | BODY MASS INDEX: 23.6 KG/M2 | TEMPERATURE: 95.2 F | OXYGEN SATURATION: 99 % | HEIGHT: 61 IN | HEART RATE: 72 BPM

## 2023-08-01 DIAGNOSIS — N39.0 RECURRENT UTI: ICD-10-CM

## 2023-08-01 DIAGNOSIS — N32.81 OAB (OVERACTIVE BLADDER): ICD-10-CM

## 2023-08-01 DIAGNOSIS — R30.0 DYSURIA: ICD-10-CM

## 2023-08-01 DIAGNOSIS — G62.9 NEUROPATHY: Primary | ICD-10-CM

## 2023-08-01 LAB
SL AMB  POCT GLUCOSE, UA: NORMAL
SL AMB LEUKOCYTE ESTERASE,UA: NORMAL
SL AMB POCT BILIRUBIN,UA: NORMAL
SL AMB POCT BLOOD,UA: NORMAL
SL AMB POCT CLARITY,UA: CLEAR
SL AMB POCT COLOR,UA: YELLOW
SL AMB POCT KETONES,UA: NORMAL
SL AMB POCT NITRITE,UA: NORMAL
SL AMB POCT PH,UA: 6
SL AMB POCT SPECIFIC GRAVITY,UA: 1.01
SL AMB POCT URINE PROTEIN: NORMAL
SL AMB POCT UROBILINOGEN: 3.5

## 2023-08-01 PROCEDURE — 99213 OFFICE O/P EST LOW 20 MIN: CPT | Performed by: FAMILY MEDICINE

## 2023-08-01 PROCEDURE — 81002 URINALYSIS NONAUTO W/O SCOPE: CPT | Performed by: FAMILY MEDICINE

## 2023-08-01 RX ORDER — GABAPENTIN 100 MG/1
200 CAPSULE ORAL 2 TIMES DAILY
Qty: 120 CAPSULE | Refills: 2 | Status: SHIPPED | OUTPATIENT
Start: 2023-08-01 | End: 2023-10-30

## 2023-08-01 NOTE — ASSESSMENT & PLAN NOTE
Acute on chronic  · Previously followed with urogynecology  · Prescribed vaginal estrogen cream but did not use.   Due to history of DVT this may be contraindicated  · Referral back to urogynecology provided as she has not followed up in quite some time

## 2023-08-01 NOTE — ASSESSMENT & PLAN NOTE
Worsening  · Main complaint is continued burning, pins-and-needles feeling down bilateral legs and knees. · Denies falls, or injuries.   · Patient is tolerating gabapentin 100 mg twice daily  · We will increase to 100 mg twice daily and monitor for improvement

## 2023-08-01 NOTE — PROGRESS NOTES
Assessment/Plan:      1. Neuropathy  Assessment & Plan:  Worsening  Main complaint is continued burning, pins-and-needles feeling down bilateral legs and knees. Denies falls, or injuries. Patient is tolerating gabapentin 100 mg twice daily  We will increase to 100 mg twice daily and monitor for improvement    Orders:  -     gabapentin (NEURONTIN) 100 mg capsule; Take 2 capsules (200 mg total) by mouth 2 (two) times a day    2. Recurrent UTI  Assessment & Plan:  Patient's urine is negative for urinary tract infection today. No nitrites, leukocytes, or blood      Orders:  -     Ambulatory Referral to Urogynecology; Future  -     POCT urine dip    3. OAB (overactive bladder)  Assessment & Plan:  Acute on chronic  Previously followed with urogynecology  Prescribed vaginal estrogen cream but did not use. Due to history of DVT this may be contraindicated  Referral back to urogynecology provided as she has not followed up in quite some time    Orders:  -     Ambulatory Referral to Urogynecology; Future  -     POCT urine dip    4. Dysuria  -     POCT urine dip            Subjective:      Patient ID: Pina Hernandez is a 80 y.o. female presents today from nursing home for complaint of bilateral leg burning. HPI    The following portions of the patient's history were reviewed and updated as appropriate: allergies, current medications, past family history, past medical history, past social history, past surgical history and problem list.    Review of Systems   Constitutional: Negative for activity change, chills, diaphoresis and fever. HENT: Negative for ear pain, hearing loss, postnasal drip, rhinorrhea, sinus pressure, sinus pain, sneezing and sore throat. Respiratory: Negative for cough, chest tightness, shortness of breath and wheezing. Cardiovascular: Negative for chest pain, palpitations and leg swelling.    Gastrointestinal: Negative for abdominal pain, blood in stool, constipation, diarrhea, nausea and vomiting. Genitourinary: Positive for dysuria and frequency. Negative for hematuria and urgency. Musculoskeletal: Positive for arthralgias. Negative for myalgias. Neurological: Negative for dizziness, syncope, weakness, light-headedness, numbness and headaches. Psychiatric/Behavioral: Negative for dysphoric mood. The patient is not nervous/anxious. Objective:      /80 (BP Location: Left arm, Patient Position: Sitting, Cuff Size: Standard)   Pulse 72   Temp (!) 95.2 °F (35.1 °C) (Temporal)   Ht 5' 1" (1.549 m)   Wt 56.7 kg (125 lb)   SpO2 99%   BMI 23.62 kg/m²          Physical Exam  Vitals reviewed. Constitutional:       General: She is not in acute distress. Appearance: She is well-developed. She is not diaphoretic. HENT:      Head: Normocephalic and atraumatic. Right Ear: External ear normal.      Left Ear: External ear normal.      Nose: Nose normal. No congestion. Mouth/Throat:      Mouth: Mucous membranes are moist.      Pharynx: Oropharynx is clear. No oropharyngeal exudate. Eyes:      General: No scleral icterus. Right eye: No discharge. Left eye: No discharge. Conjunctiva/sclera: Conjunctivae normal.      Pupils: Pupils are equal, round, and reactive to light. Neck:      Thyroid: No thyromegaly. Vascular: No JVD. Trachea: No tracheal deviation. Cardiovascular:      Rate and Rhythm: Normal rate and regular rhythm. Heart sounds: Normal heart sounds. No murmur heard. No friction rub. No gallop. Pulmonary:      Effort: Pulmonary effort is normal. No respiratory distress. Breath sounds: Normal breath sounds. No wheezing or rales. Chest:      Chest wall: No tenderness. Abdominal:      General: Bowel sounds are normal. There is no distension. Palpations: Abdomen is soft. Tenderness: There is no abdominal tenderness. There is no right CVA tenderness, left CVA tenderness, guarding or rebound. Musculoskeletal:         General: Normal range of motion. Cervical back: Normal range of motion and neck supple. No tenderness. Right lower leg: No edema. Left lower leg: No edema. Lymphadenopathy:      Cervical: No cervical adenopathy. Skin:     General: Skin is warm and dry. Neurological:      Mental Status: She is alert and oriented to person, place, and time. Mental status is at baseline. Psychiatric:         Mood and Affect: Mood normal.         Behavior: Behavior normal.         Thought Content:  Thought content normal.         Judgment: Judgment normal.

## 2023-08-09 DIAGNOSIS — K62.89 RECTAL PAIN: ICD-10-CM

## 2023-08-09 RX ORDER — TRAMADOL HYDROCHLORIDE 50 MG/1
50 TABLET ORAL DAILY PRN
Qty: 30 TABLET | Refills: 0 | Status: SHIPPED | OUTPATIENT
Start: 2023-08-09

## 2023-08-16 ENCOUNTER — TELEPHONE (OUTPATIENT)
Dept: FAMILY MEDICINE CLINIC | Facility: CLINIC | Age: 88
End: 2023-08-16

## 2023-08-16 DIAGNOSIS — N39.0 RECURRENT UTI: Primary | ICD-10-CM

## 2023-08-16 RX ORDER — SULFAMETHOXAZOLE AND TRIMETHOPRIM 800; 160 MG/1; MG/1
1 TABLET ORAL EVERY 12 HOURS SCHEDULED
Qty: 10 TABLET | Refills: 0 | Status: SHIPPED | OUTPATIENT
Start: 2023-08-16 | End: 2023-08-21

## 2023-08-16 NOTE — TELEPHONE ENCOUNTER
----- Message from Sharona Barone DO sent at 8/16/2023  8:19 AM EDT -----  Let patient know that she tested positive for a UTI and I sent an antibiotic to [facility] pharmacy.

## 2023-08-23 ENCOUNTER — TELEPHONE (OUTPATIENT)
Dept: FAMILY MEDICINE CLINIC | Facility: CLINIC | Age: 88
End: 2023-08-23

## 2023-08-23 DIAGNOSIS — R35.0 URINARY FREQUENCY: Primary | ICD-10-CM

## 2023-08-23 NOTE — TELEPHONE ENCOUNTER
stomach pain to legs and leaking urine  Trouble for a long time   Today urinated 7 times nurse at [facility] told her to talk to her PCP   [phone]

## 2023-08-24 ENCOUNTER — TELEPHONE (OUTPATIENT)
Dept: FAMILY MEDICINE CLINIC | Facility: CLINIC | Age: 88
End: 2023-08-24

## 2023-08-24 NOTE — TELEPHONE ENCOUNTER
Patient is having pain and would like for you to call where she at so they can take her to Urgent care

## 2023-09-05 ENCOUNTER — RA CDI HCC (OUTPATIENT)
Dept: OTHER | Facility: HOSPITAL | Age: 88
End: 2023-09-05

## 2023-09-05 NOTE — PROGRESS NOTES
[facility] coding opportunities       Chart reviewed, no opportunity found: CHART REVIEWED, NO OPPORTUNITY FOUND        Patients Insurance     Medicare Insurance: [employer]

## 2023-09-12 ENCOUNTER — OFFICE VISIT (OUTPATIENT)
Dept: FAMILY MEDICINE CLINIC | Facility: CLINIC | Age: 88
End: 2023-09-12
Payer: COMMERCIAL

## 2023-09-12 VITALS
OXYGEN SATURATION: 88 % | SYSTOLIC BLOOD PRESSURE: 146 MMHG | HEART RATE: 100 BPM | BODY MASS INDEX: 24.13 KG/M2 | HEIGHT: 61 IN | DIASTOLIC BLOOD PRESSURE: 88 MMHG | WEIGHT: 127.8 LBS | TEMPERATURE: 97.9 F

## 2023-09-12 DIAGNOSIS — N32.81 OAB (OVERACTIVE BLADDER): ICD-10-CM

## 2023-09-12 DIAGNOSIS — K59.00 CONSTIPATION, UNSPECIFIED CONSTIPATION TYPE: ICD-10-CM

## 2023-09-12 DIAGNOSIS — G62.9 NEUROPATHY: Primary | ICD-10-CM

## 2023-09-12 PROCEDURE — 99214 OFFICE O/P EST MOD 30 MIN: CPT

## 2023-09-12 NOTE — PROGRESS NOTES
Name: Guillermo Torrez      : 1935      MRN: [id number]  Encounter Provider: ALBERTO Sen  Encounter Date: 2023   Encounter department: [facility]     1. Neuropathy  Assessment & Plan:  Consistent complaints of burning, pins and needles down her bilateral legs originating in the pelvis   Denies recent falls or injury   Current medication: Gabapentin 100mg BID       2. Constipation, unspecified constipation type  Assessment & Plan:  Has days of constipation followed by diarrhea   Related to IBS   Episodes of constipation and diarrhea   Medication: Miralax every other day, senna daily, and bisacodyl PRN       3. OAB (overactive bladder)  Assessment & Plan:  Previously seen by urogynecology   Referral placed in august- has not followed up since. · Encouraged patient to call and make appointment, information provided. Subjective      Abdominal Pain  This is a chronic problem. The current episode started more than 1 year ago. The onset quality is gradual. The problem occurs daily. The problem has been waxing and waning. The pain is located in the RLQ and LLQ. The pain is at a severity of 6/10. The pain is moderate. The quality of the pain is burning and aching. The abdominal pain radiates to the pelvis and perineum. Associated symptoms include arthralgias, constipation, diarrhea and frequency. Pertinent negatives include no dysuria, fever, headaches, hematochezia, hematuria, melena, nausea or vomiting. The pain is aggravated by certain positions and movement. Relieved by: "all of the sudden it goes away"  She has tried H2 blockers for the symptoms. The treatment provided no relief. Review of Systems   Constitutional: Negative for activity change, chills, fatigue and fever. HENT: Negative for congestion, ear pain, rhinorrhea, sore throat and trouble swallowing. Eyes: Negative for pain and visual disturbance.    Respiratory: Negative for cough, chest tightness and shortness of breath. Cardiovascular: Negative for chest pain, palpitations and leg swelling. Gastrointestinal: Positive for abdominal pain, constipation and diarrhea. Negative for hematochezia, melena, nausea and vomiting. Genitourinary: Positive for frequency and pelvic pain. Negative for decreased urine volume, difficulty urinating, dysuria, flank pain, hematuria, urgency and vaginal bleeding. Musculoskeletal: Positive for arthralgias. Negative for back pain and joint swelling. Skin: Negative for color change and rash. Neurological: Negative for dizziness, seizures, syncope and headaches. Psychiatric/Behavioral: Negative for dysphoric mood. The patient is not nervous/anxious. All other systems reviewed and are negative. Current Outpatient Medications on File Prior to Visit   Medication Sig   • bisacodyl (DULCOLAX) 10 mg suppository Insert 1 suppository (10 mg total) into the rectum daily   • clonazePAM (KlonoPIN) 0.5 mg tablet One bid as directed   • ferrous sulfate 325 (65 Fe) mg tablet Take 1 tablet (325 mg total) by mouth daily with breakfast   • gabapentin (NEURONTIN) 100 mg capsule Take 2 capsules (200 mg total) by mouth 2 (two) times a day   • ibuprofen (MOTRIN) 400 mg tablet    • lamoTRIgine (LaMICtal) 200 MG tablet Take 1 tablet (200 mg total) by mouth daily before breakfast   • loperamide (IMODIUM A-D) 2 MG tablet Take 1 tablet (2 mg total) by mouth 3 (three) times a day as needed for diarrhea   • loratadine (CLARITIN) 10 mg tablet Take 1 tablet (10 mg total) by mouth daily   • mometasone (ELOCON) 0.1 % cream Apply to rectal area daily until cleared   • naloxone (NARCAN) 4 mg/0.1 mL nasal spray Administer 1 spray into a nostril. If no response after 2-3 minutes, give another dose in the other nostril using a new spray.    • pantoprazole (PROTONIX) 40 mg tablet Take 1 tablet (40 mg total) by mouth daily in the early morning   • polyethylene glycol (MIRALAX) 17 g packet Take 17 g by mouth daily as needed (constipation)   • psyllium (METAMUCIL) 58.6 % powder One pckt daily on Tuesday and Friday   • QUEtiapine (SEROquel) 200 mg tablet Take 2 tablets (400 mg total) by mouth daily at bedtime   • Sennosides-Docusate Sodium (SENNA-PLUS PO) Take 8.6 mg by mouth 2 (two) times a day as needed   • topiramate (TOPAMAX) 50 MG tablet Take 1 tablet (50 mg total) by mouth 2 (two) times a day   • traMADol (Ultram) 50 mg tablet Take 1 tablet (50 mg total) by mouth daily as needed for moderate pain   • triamcinolone (KENALOG) 0.1 % cream Apply topically 2 (two) times a day   • trolamine salicylate (ASPERCREME) 10 % cream Apply topically 3 (three) times a day   • famotidine (PEPCID) 20 mg tablet Take 1 tablet (20 mg total) by mouth daily for 14 days   • [DISCONTINUED] docusate sodium (COLACE) 100 mg capsule Take 1 capsule (100 mg total) by mouth every 12 (twelve) hours (Patient not taking: Reported on 5/2/2023)       Objective     /88 (BP Location: Left arm, Patient Position: Sitting, Cuff Size: Adult)   Pulse 100   Temp 97.9 °F (36.6 °C) (Temporal)   Ht 5' 1" (1.549 m)   Wt 58 kg (127 lb 12.8 oz)   SpO2 (!) 88%   BMI 24.15 kg/m²     Physical Exam  Vitals and nursing note reviewed. Constitutional:       Appearance: Normal appearance. She is normal weight. HENT:      Head: Normocephalic. Right Ear: Tympanic membrane, ear canal and external ear normal. There is no impacted cerumen. Left Ear: Tympanic membrane, ear canal and external ear normal. There is no impacted cerumen. Nose: Nose normal.      Mouth/Throat:      Mouth: Mucous membranes are moist.      Pharynx: Oropharynx is clear. Eyes:      Extraocular Movements: Extraocular movements intact. Conjunctiva/sclera: Conjunctivae normal.      Pupils: Pupils are equal, round, and reactive to light. Cardiovascular:      Rate and Rhythm: Normal rate and regular rhythm.       Pulses: Normal pulses. Heart sounds: Normal heart sounds. Pulmonary:      Effort: Pulmonary effort is normal.      Breath sounds: Normal breath sounds. Abdominal:      General: Bowel sounds are normal. There is no distension. Palpations: Abdomen is soft. Tenderness: There is no abdominal tenderness. Musculoskeletal:         General: Normal range of motion. Cervical back: Normal range of motion. Skin:     General: Skin is warm. Capillary Refill: Capillary refill takes less than 2 seconds. Neurological:      General: No focal deficit present. Mental Status: She is alert and oriented to person, place, and time. Mental status is at baseline. Psychiatric:         Mood and Affect: Mood normal.         Behavior: Behavior normal.         Thought Content:  Thought content normal.         Judgment: Judgment normal.       ALBERTO Henderson

## 2023-09-12 NOTE — ASSESSMENT & PLAN NOTE
Has days of constipation followed by diarrhea   Related to IBS   Episodes of constipation and diarrhea   Medication: Miralax every other day, senna daily, and bisacodyl PRN

## 2023-09-12 NOTE — ASSESSMENT & PLAN NOTE
Previously seen by urogynecology   Referral placed in august- has not followed up since. · Encouraged patient to call and make appointment, information provided.

## 2023-09-12 NOTE — ASSESSMENT & PLAN NOTE
Consistent complaints of burning, pins and needles down her bilateral legs originating in the pelvis   Denies recent falls or injury   Current medication: Gabapentin 100mg BID

## 2023-09-27 ENCOUNTER — TELEPHONE (OUTPATIENT)
Dept: FAMILY MEDICINE CLINIC | Facility: CLINIC | Age: 88
End: 2023-09-27

## 2023-09-27 DIAGNOSIS — R30.0 DYSURIA: Primary | ICD-10-CM

## 2023-09-27 NOTE — TELEPHONE ENCOUNTER
Debbie Santiago from [facility] called to request an order for a UA c/s because patient c/o dysuria and burning sensation since this morning. Would like order faxed to them at # [phone]. Debbie Santiago states she did get a urine sample and tested positive with the dip kit.

## 2023-10-09 ENCOUNTER — OFFICE VISIT (OUTPATIENT)
Dept: FAMILY MEDICINE CLINIC | Facility: CLINIC | Age: 88
End: 2023-10-09
Payer: COMMERCIAL

## 2023-10-09 VITALS
SYSTOLIC BLOOD PRESSURE: 128 MMHG | HEART RATE: 96 BPM | OXYGEN SATURATION: 97 % | BODY MASS INDEX: 24.49 KG/M2 | WEIGHT: 129.6 LBS | TEMPERATURE: 97.6 F | RESPIRATION RATE: 20 BRPM | DIASTOLIC BLOOD PRESSURE: 86 MMHG

## 2023-10-09 DIAGNOSIS — R53.81 PHYSICAL DECONDITIONING: ICD-10-CM

## 2023-10-09 DIAGNOSIS — G62.9 NEUROPATHY: ICD-10-CM

## 2023-10-09 DIAGNOSIS — F31.9 BIPOLAR 1 DISORDER (HCC): ICD-10-CM

## 2023-10-09 DIAGNOSIS — K62.2: ICD-10-CM

## 2023-10-09 DIAGNOSIS — D53.9 ANEMIA, MACROCYTIC: ICD-10-CM

## 2023-10-09 DIAGNOSIS — K62.89 RECTAL PAIN: ICD-10-CM

## 2023-10-09 DIAGNOSIS — F11.20 CONTINUOUS OPIOID DEPENDENCE (HCC): ICD-10-CM

## 2023-10-09 DIAGNOSIS — R26.2 AMBULATORY DYSFUNCTION: ICD-10-CM

## 2023-10-09 DIAGNOSIS — N32.81 OAB (OVERACTIVE BLADDER): Primary | ICD-10-CM

## 2023-10-09 DIAGNOSIS — I10 ESSENTIAL HYPERTENSION: Chronic | ICD-10-CM

## 2023-10-09 PROCEDURE — 99214 OFFICE O/P EST MOD 30 MIN: CPT | Performed by: FAMILY MEDICINE

## 2023-10-09 RX ORDER — GABAPENTIN 100 MG/1
300 CAPSULE ORAL 2 TIMES DAILY
Qty: 180 CAPSULE | Refills: 2 | Status: SHIPPED | OUTPATIENT
Start: 2023-10-09 | End: 2024-01-07

## 2023-10-09 RX ORDER — ESTRADIOL 0.1 MG/G
CREAM VAGINAL
COMMUNITY
Start: 2023-09-22

## 2023-10-09 NOTE — ASSESSMENT & PLAN NOTE
Chronic and unchanged  In the setting of chronic rectal prolapse s/p surgery  Last BM 4 days ago, usually moves it every 20 days  Encouraged bowel regimen  Physical exam is unremarkable

## 2023-10-09 NOTE — ASSESSMENT & PLAN NOTE
· Patient has chronic arthritic pains  · Symptoms improved with tramadol nightly  · Increasing gabapentin to assist with pain relief 300 mg twice daily

## 2023-10-09 NOTE — ASSESSMENT & PLAN NOTE
Waxes and wanes  · Follows with psychiatry monthly  · Patient will feel depressed on occasion which I encouraged patient to try to socialize  · Denies suicidal thoughts or ideations  · She is mainly frustrated about her age and chronic health conditions  · Continue current regimen

## 2023-10-09 NOTE — PROGRESS NOTES
Assessment/Plan:      1. OAB (overactive bladder)  Assessment & Plan:  Follows with urogynecology  Encourage patient to limit constipation      2. Bipolar 1 disorder ([address] )  Assessment & Plan:  Waxes and wanes  Follows with psychiatry monthly  Patient will feel depressed on occasion which I encouraged patient to try to socialize  Denies suicidal thoughts or ideations  She is mainly frustrated about her age and chronic health conditions  Continue current regimen      3. Physical deconditioning  Assessment & Plan:  Encouraged home stretches and exercises  No recent falls      4. Ambulatory dysfunction  Assessment & Plan:  Stable  No falls      5. Continuous opioid dependence ([address])  Assessment & Plan:  Patient has chronic arthritic pains  Symptoms improved with tramadol nightly  Increasing gabapentin to assist with pain relief 300 mg twice daily      6. Neuropathy  Assessment & Plan:  Unchanged  will come and go  Continues to tolerate gabapentin 200 mg twice daily  Increase to 300 mg twice daily and monitor for improvement    Orders:  -     gabapentin (NEURONTIN) 100 mg capsule; Take 3 capsules (300 mg total) by mouth 2 (two) times a day    7. Essential hypertension  Assessment & Plan:  Well-controlled on diet alone  BP today is 128/86  Remains at goal less than 140/90    Orders:  -     Comprehensive metabolic panel; Future    8. Anemia, macrocytic  Assessment & Plan:  Remains on ferrous sulfate 325 mg daily  Recheck CBC    Orders:  -     CBC and differential; Future    9. 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 female presents today for routine follow up. She continues to c/o of burning of her legs.  She is tolerating gabapentin 100mg BID.     Sees [facility] once a month. Had follow up gynecology. She was started on estrogen cream three times weekly. It is hard to say if this is helping. Last bm 4 days ago. HPI    The following portions of the patient's history were reviewed and updated as appropriate: allergies, current medications, past family history, past medical history, past social history, past surgical history and problem list.    Review of Systems   Constitutional: Negative for activity change, chills, diaphoresis and fever. HENT: Negative for ear pain, hearing loss, postnasal drip, rhinorrhea, sinus pressure, sinus pain, sneezing and sore throat. Respiratory: Negative for cough, chest tightness, shortness of breath and wheezing. Cardiovascular: Negative for chest pain, palpitations and leg swelling. Gastrointestinal: Positive for rectal pain. Negative for abdominal pain, blood in stool, constipation, diarrhea, nausea and vomiting. Genitourinary: Negative for dysuria, frequency, hematuria and urgency. Musculoskeletal: Negative for arthralgias and myalgias. Neurological: Negative for dizziness, syncope, weakness, light-headedness, numbness and headaches. Psychiatric/Behavioral: Positive for dysphoric mood. Objective:      /86 (BP Location: Left arm, Patient Position: Sitting, Cuff Size: Standard)   Pulse 96   Temp 97.6 °F (36.4 °C) (Temporal)   Resp 20   Wt 58.8 kg (129 lb 9.6 oz)   SpO2 97%   BMI 24.49 kg/m²          Physical Exam  Vitals reviewed. Constitutional:       General: She is not in acute distress. Appearance: She is well-developed. She is not diaphoretic. HENT:      Head: Normocephalic and atraumatic. Right Ear: External ear normal.      Left Ear: External ear normal.      Nose: Nose normal. No congestion. Mouth/Throat:      Mouth: Mucous membranes are moist.      Pharynx: Oropharynx is clear. No oropharyngeal exudate. Eyes:      General: No scleral icterus. Right eye: No discharge. Left eye: No discharge. Conjunctiva/sclera: Conjunctivae normal.      Pupils: Pupils are equal, round, and reactive to light. Neck:      Thyroid: No thyromegaly. Vascular: No JVD. Trachea: No tracheal deviation. Cardiovascular:      Rate and Rhythm: Normal rate and regular rhythm. Heart sounds: Normal heart sounds. No murmur heard. No friction rub. No gallop. Pulmonary:      Effort: Pulmonary effort is normal. No respiratory distress. Breath sounds: Normal breath sounds. No wheezing or rales. Chest:      Chest wall: No tenderness. Abdominal:      General: Bowel sounds are normal. There is no distension. Palpations: Abdomen is soft. Tenderness: There is no abdominal tenderness. There is no right CVA tenderness, left CVA tenderness, guarding or rebound. Musculoskeletal:         General: Normal range of motion. Cervical back: Normal range of motion and neck supple. No tenderness. Right lower leg: No edema. Left lower leg: No edema. Lymphadenopathy:      Cervical: No cervical adenopathy. Skin:     General: Skin is warm and dry. Neurological:      Mental Status: She is alert and oriented to person, place, and time. Mental status is at baseline. Psychiatric:         Mood and Affect: Mood normal.         Behavior: Behavior normal.         Thought Content:  Thought content normal.         Judgment: Judgment normal.

## 2023-10-09 NOTE — ASSESSMENT & PLAN NOTE
Unchanged  · will come and go  · Continues to tolerate gabapentin 200 mg twice daily  · Increase to 300 mg twice daily and monitor for improvement

## 2023-10-18 NOTE — ASSESSMENT & PLAN NOTE
· History of essential hypertension no longer on any medications Xelkelseyz Pregnancy And Lactation Text: This medication is Pregnancy Category D and is not considered safe during pregnancy.  The risk during breast feeding is also uncertain.

## 2023-10-24 ENCOUNTER — TELEPHONE (OUTPATIENT)
Dept: FAMILY MEDICINE CLINIC | Facility: CLINIC | Age: 88
End: 2023-10-24

## 2023-10-24 NOTE — TELEPHONE ENCOUNTER
Abi Navarro will check with facility for transportation to an appt to address her urinaary and leg complaints

## 2023-10-25 DIAGNOSIS — K62.89 RECTAL PAIN: ICD-10-CM

## 2023-10-25 RX ORDER — TRAMADOL HYDROCHLORIDE 50 MG/1
50 TABLET ORAL DAILY PRN
Qty: 30 TABLET | Refills: 0 | Status: SHIPPED | OUTPATIENT
Start: 2023-10-25

## 2023-10-27 ENCOUNTER — OFFICE VISIT (OUTPATIENT)
Dept: FAMILY MEDICINE CLINIC | Facility: CLINIC | Age: 88
End: 2023-10-27

## 2023-10-27 VITALS
DIASTOLIC BLOOD PRESSURE: 50 MMHG | HEART RATE: 89 BPM | SYSTOLIC BLOOD PRESSURE: 100 MMHG | TEMPERATURE: 97.4 F | OXYGEN SATURATION: 96 % | BODY MASS INDEX: 24.47 KG/M2 | HEIGHT: 61 IN | WEIGHT: 129.63 LBS

## 2023-10-27 DIAGNOSIS — G62.9 NEUROPATHY: ICD-10-CM

## 2023-10-27 DIAGNOSIS — N32.81 OAB (OVERACTIVE BLADDER): Primary | ICD-10-CM

## 2023-10-27 DIAGNOSIS — R35.0 URINARY FREQUENCY: ICD-10-CM

## 2023-10-27 LAB
SL AMB  POCT GLUCOSE, UA: NORMAL
SL AMB LEUKOCYTE ESTERASE,UA: NORMAL
SL AMB POCT BILIRUBIN,UA: NORMAL
SL AMB POCT BLOOD,UA: NORMAL
SL AMB POCT CLARITY,UA: NORMAL
SL AMB POCT COLOR,UA: NORMAL
SL AMB POCT KETONES,UA: NORMAL
SL AMB POCT NITRITE,UA: NORMAL
SL AMB POCT PH,UA: NORMAL
SL AMB POCT SPECIFIC GRAVITY,UA: NORMAL
SL AMB POCT URINE PROTEIN: NORMAL
SL AMB POCT UROBILINOGEN: NORMAL

## 2023-10-27 RX ORDER — GABAPENTIN 400 MG/1
400 CAPSULE ORAL 2 TIMES DAILY
Qty: 60 CAPSULE | Refills: 2 | Status: SHIPPED | OUTPATIENT
Start: 2023-10-27 | End: 2024-01-25

## 2023-10-27 RX ORDER — OXYBUTYNIN CHLORIDE 5 MG/1
5 TABLET ORAL 2 TIMES DAILY
Qty: 60 TABLET | Refills: 0 | Status: SHIPPED | OUTPATIENT
Start: 2023-10-27

## 2023-10-27 NOTE — PROGRESS NOTES
Name: Migdalia Hardin      : 1935      MRN: [id number]  Encounter Provider: ALBERTO Ventura  Encounter Date: 10/27/2023   Encounter department: [facility] [place]    Assessment & Plan     1. OAB (overactive bladder)  Assessment & Plan:  Follows with urogynecology   New medications: Oxybutinin 5mg BID     Orders:  -     oxybutynin (DITROPAN) 5 mg tablet; Take 1 tablet (5 mg total) by mouth 2 (two) times a day  -     POCT urine dip    2. Neuropathy  Assessment & Plan:  Unchanged   Waxes and wanes in symptoms   Previous medications Gabapentin 300mg BID   New medications: Gabapentin 400mg BID     Orders:  -     gabapentin (NEURONTIN) 400 mg capsule; Take 1 capsule (400 mg total) by mouth 2 (two) times a day    3. Urinary frequency  Assessment & Plan:  PCOT urine (-)     Orders:  -     POCT urine dip           Subjective      Rectal and leg pain. This is a reoccurring problem that has been going on for years. Patient is currently on Gabapentin for nerve pain in addition to Tramadol. Patient also is complaining of urinary frequency. Review of Systems   Constitutional:  Negative for activity change, fatigue and fever. HENT:  Negative for congestion, ear pain, rhinorrhea, sore throat and trouble swallowing. Eyes:  Negative for pain and visual disturbance. Respiratory:  Negative for cough, chest tightness and shortness of breath. Cardiovascular:  Negative for chest pain, palpitations and leg swelling. Gastrointestinal:  Negative for abdominal pain, constipation and diarrhea. Genitourinary:  Negative for difficulty urinating and dysuria. Musculoskeletal:  Positive for myalgias. Negative for arthralgias and back pain. Skin:  Negative for color change and rash. Neurological:  Negative for dizziness, seizures, syncope and headaches. Psychiatric/Behavioral:  Negative for dysphoric mood. The patient is not nervous/anxious.     All other systems reviewed and are negative. Current Outpatient Medications on File Prior to Visit   Medication Sig    bisacodyl (DULCOLAX) 10 mg suppository Insert 1 suppository (10 mg total) into the rectum daily    clonazePAM (KlonoPIN) 0.5 mg tablet One bid as directed    estradiol (ESTRACE) 0.1 mg/g vaginal cream     ferrous sulfate 325 (65 Fe) mg tablet Take 1 tablet (325 mg total) by mouth daily with breakfast    ibuprofen (MOTRIN) 400 mg tablet     lamoTRIgine (LaMICtal) 200 MG tablet Take 1 tablet (200 mg total) by mouth daily before breakfast    loperamide (IMODIUM A-D) 2 MG tablet Take 1 tablet (2 mg total) by mouth 3 (three) times a day as needed for diarrhea    loratadine (CLARITIN) 10 mg tablet Take 1 tablet (10 mg total) by mouth daily    mometasone (ELOCON) 0.1 % cream Apply to rectal area daily until cleared    naloxone (NARCAN) 4 mg/0.1 mL nasal spray Administer 1 spray into a nostril. If no response after 2-3 minutes, give another dose in the other nostril using a new spray.     pantoprazole (PROTONIX) 40 mg tablet Take 1 tablet (40 mg total) by mouth daily in the early morning    polyethylene glycol (MIRALAX) 17 g packet Take 17 g by mouth daily as needed (constipation)    psyllium (METAMUCIL) 58.6 % powder One pckt daily on Tuesday and Friday    QUEtiapine (SEROquel) 200 mg tablet Take 2 tablets (400 mg total) by mouth daily at bedtime    Sennosides-Docusate Sodium (SENNA-PLUS PO) Take 8.6 mg by mouth 2 (two) times a day as needed    topiramate (TOPAMAX) 50 MG tablet Take 1 tablet (50 mg total) by mouth 2 (two) times a day    traMADol (Ultram) 50 mg tablet Take 1 tablet (50 mg total) by mouth daily as needed for moderate pain    triamcinolone (KENALOG) 0.1 % cream Apply topically 2 (two) times a day    trolamine salicylate (ASPERCREME) 10 % cream Apply topically 3 (three) times a day    [DISCONTINUED] gabapentin (NEURONTIN) 100 mg capsule Take 3 capsules (300 mg total) by mouth 2 (two) times a day    famotidine (PEPCID) 20 mg tablet Take 1 tablet (20 mg total) by mouth daily for 14 days       Objective     /50 (BP Location: Left arm, Patient Position: Sitting, Cuff Size: Standard)   Pulse 89   Temp (!) 97.4 °F (36.3 °C) (Temporal)   Ht 5' 1" (1.549 m)   Wt 58.8 kg (129 lb 10.1 oz)   SpO2 96%   BMI 24.49 kg/m²     Physical Exam  Vitals and nursing note reviewed. Constitutional:       Appearance: Normal appearance. She is normal weight. HENT:      Head: Normocephalic. Right Ear: Tympanic membrane, ear canal and external ear normal. There is no impacted cerumen. Left Ear: Tympanic membrane, ear canal and external ear normal. There is no impacted cerumen. Nose: Nose normal.      Mouth/Throat:      Mouth: Mucous membranes are moist.      Pharynx: Oropharynx is clear. Eyes:      Extraocular Movements: Extraocular movements intact. Conjunctiva/sclera: Conjunctivae normal.      Pupils: Pupils are equal, round, and reactive to light. Cardiovascular:      Rate and Rhythm: Normal rate and regular rhythm. Pulses: Normal pulses. Heart sounds: Normal heart sounds. Pulmonary:      Effort: Pulmonary effort is normal.      Breath sounds: Normal breath sounds. Abdominal:      General: Bowel sounds are normal. There is no distension. Palpations: Abdomen is soft. Tenderness: There is no abdominal tenderness. Musculoskeletal:         General: Normal range of motion. Cervical back: Normal range of motion. Skin:     General: Skin is warm. Capillary Refill: Capillary refill takes less than 2 seconds. Neurological:      General: No focal deficit present. Mental Status: She is alert and oriented to person, place, and time. Mental status is at baseline. Psychiatric:         Mood and Affect: Mood normal.         Behavior: Behavior normal.         Thought Content:  Thought content normal.         Judgment: Judgment normal.       ALBERTO Livingston

## 2023-10-27 NOTE — ASSESSMENT & PLAN NOTE
Unchanged   Waxes and wanes in symptoms   Previous medications Gabapentin 300mg BID   New medications: Gabapentin 400mg BID

## 2023-11-22 ENCOUNTER — TELEPHONE (OUTPATIENT)
Dept: FAMILY MEDICINE CLINIC | Facility: CLINIC | Age: 88
End: 2023-11-22

## 2023-11-22 NOTE — TELEPHONE ENCOUNTER
Zeynep River states she has not been feeling like herself for months  sleeps all the time and legs hurt   Zeynep River asking doctor to [facility]  [phone]

## 2023-11-23 ENCOUNTER — NURSE TRIAGE (OUTPATIENT)
Dept: OTHER | Facility: OTHER | Age: 88
End: 2023-11-23

## 2023-11-23 NOTE — TELEPHONE ENCOUNTER
Regarding: leg pains  ----- Message from Filiberto Breath sent at 11/23/2023 12:19 PM EST -----  "Both of my legs hurt but my left leg really hurts and I am having trouble standing on it."

## 2023-11-23 NOTE — TELEPHONE ENCOUNTER
Reason for Disposition  • Unable to walk    Answer Assessment - Initial Assessment Questions  1. ONSET: "When did the pain start? "       3-4 days, progressively getting worse    2. LOCATION: "Where is the pain located?"       Down both legs into knee and below    3. PAIN: "How bad is the pain?"    (Scale 1-10; or mild, moderate, severe)    -  MILD (1-3): doesn't interfere with normal activities     -  MODERATE (4-7): interferes with normal activities (e.g., work or school) or awakens from sleep, limping     -  SEVERE (8-10): excruciating pain, unable to do any normal activities, unable to walk      Left is worse 9/10- tylenol is not helping    4. WORK OR EXERCISE: "Has there been any recent work or exercise that involved this part of the body?"       Denies    5. CAUSE: "What do you think is causing the leg pain?"      Unknown    6.  OTHER SYMPTOMS: "Do you have any other symptoms?" (e.g., chest pain, back pain, breathing difficulty, swelling, rash, fever, numbness, weakness)      Trouble walking    Protocols used: Leg Pain-ADULT-

## 2023-11-24 ENCOUNTER — TELEPHONE (OUTPATIENT)
Dept: FAMILY MEDICINE CLINIC | Facility: CLINIC | Age: 88
End: 2023-11-24

## 2023-11-24 NOTE — TELEPHONE ENCOUNTER
Addended by: ELEN MCFARLAND on: 1/10/2023 01:27 PM     Modules accepted: Orders     Patient calling with leg pain hard time walking   nurses at facility put on cream for pain  triage nurse yesterday told her to go to hospital   she will see how she feels today if not will consider going to hospital

## 2024-06-03 NOTE — TELEPHONE ENCOUNTER
Silver Covington called from [facility] requesting a call back from on call provider regarding pt's order for a urine sample  Pt's information was sent to provider via TC 
treated for hepatitis C 20yrs ago